# Patient Record
Sex: FEMALE | Race: WHITE | NOT HISPANIC OR LATINO | Employment: OTHER | URBAN - METROPOLITAN AREA
[De-identification: names, ages, dates, MRNs, and addresses within clinical notes are randomized per-mention and may not be internally consistent; named-entity substitution may affect disease eponyms.]

---

## 2017-08-17 ENCOUNTER — ALLSCRIPTS OFFICE VISIT (OUTPATIENT)
Dept: OTHER | Facility: OTHER | Age: 67
End: 2017-08-17

## 2017-08-17 ENCOUNTER — APPOINTMENT (OUTPATIENT)
Dept: RADIOLOGY | Facility: CLINIC | Age: 67
End: 2017-08-17

## 2017-08-17 DIAGNOSIS — M25.562 PAIN IN LEFT KNEE: ICD-10-CM

## 2017-08-17 DIAGNOSIS — M54.16 RADICULOPATHY OF LUMBAR REGION: ICD-10-CM

## 2017-08-17 DIAGNOSIS — M54.50 LOW BACK PAIN: ICD-10-CM

## 2017-08-17 DIAGNOSIS — M17.11 PRIMARY OSTEOARTHRITIS OF RIGHT KNEE: ICD-10-CM

## 2017-08-17 DIAGNOSIS — M25.561 PAIN IN RIGHT KNEE: ICD-10-CM

## 2017-08-17 PROCEDURE — 72100 X-RAY EXAM L-S SPINE 2/3 VWS: CPT

## 2017-08-17 PROCEDURE — 73564 X-RAY EXAM KNEE 4 OR MORE: CPT

## 2017-08-17 PROCEDURE — 73560 X-RAY EXAM OF KNEE 1 OR 2: CPT

## 2017-08-29 ENCOUNTER — APPOINTMENT (OUTPATIENT)
Dept: PHYSICAL THERAPY | Age: 67
End: 2017-08-29
Payer: MEDICARE

## 2017-08-29 DIAGNOSIS — M17.11 PRIMARY OSTEOARTHRITIS OF RIGHT KNEE: ICD-10-CM

## 2017-08-29 DIAGNOSIS — M54.16 RADICULOPATHY OF LUMBAR REGION: ICD-10-CM

## 2017-08-29 PROCEDURE — G8978 MOBILITY CURRENT STATUS: HCPCS

## 2017-08-29 PROCEDURE — 97162 PT EVAL MOD COMPLEX 30 MIN: CPT

## 2017-08-29 PROCEDURE — G8979 MOBILITY GOAL STATUS: HCPCS

## 2017-08-30 ENCOUNTER — APPOINTMENT (OUTPATIENT)
Dept: PHYSICAL THERAPY | Age: 67
End: 2017-08-30
Payer: MEDICARE

## 2017-08-30 PROCEDURE — 97110 THERAPEUTIC EXERCISES: CPT

## 2017-09-05 ENCOUNTER — APPOINTMENT (OUTPATIENT)
Dept: PHYSICAL THERAPY | Age: 67
End: 2017-09-05
Payer: MEDICARE

## 2017-09-05 ENCOUNTER — GENERIC CONVERSION - ENCOUNTER (OUTPATIENT)
Dept: OTHER | Facility: OTHER | Age: 67
End: 2017-09-05

## 2017-09-05 PROCEDURE — 97110 THERAPEUTIC EXERCISES: CPT

## 2017-09-07 ENCOUNTER — APPOINTMENT (OUTPATIENT)
Dept: PHYSICAL THERAPY | Age: 67
End: 2017-09-07
Payer: MEDICARE

## 2017-09-07 PROCEDURE — 97110 THERAPEUTIC EXERCISES: CPT

## 2017-09-12 ENCOUNTER — APPOINTMENT (OUTPATIENT)
Dept: PHYSICAL THERAPY | Age: 67
End: 2017-09-12
Payer: MEDICARE

## 2017-09-12 PROCEDURE — 97110 THERAPEUTIC EXERCISES: CPT

## 2017-09-14 ENCOUNTER — APPOINTMENT (OUTPATIENT)
Dept: PHYSICAL THERAPY | Age: 67
End: 2017-09-14
Payer: MEDICARE

## 2017-09-14 PROCEDURE — 97110 THERAPEUTIC EXERCISES: CPT

## 2017-09-19 ENCOUNTER — APPOINTMENT (OUTPATIENT)
Dept: PHYSICAL THERAPY | Age: 67
End: 2017-09-19
Payer: MEDICARE

## 2017-09-19 PROCEDURE — 97110 THERAPEUTIC EXERCISES: CPT

## 2017-09-21 ENCOUNTER — APPOINTMENT (OUTPATIENT)
Dept: PHYSICAL THERAPY | Age: 67
End: 2017-09-21
Payer: MEDICARE

## 2017-09-21 PROCEDURE — 97110 THERAPEUTIC EXERCISES: CPT

## 2017-09-26 ENCOUNTER — APPOINTMENT (OUTPATIENT)
Dept: PHYSICAL THERAPY | Age: 67
End: 2017-09-26
Payer: MEDICARE

## 2017-09-26 PROCEDURE — 97110 THERAPEUTIC EXERCISES: CPT

## 2017-09-28 ENCOUNTER — APPOINTMENT (OUTPATIENT)
Dept: PHYSICAL THERAPY | Age: 67
End: 2017-09-28
Payer: MEDICARE

## 2017-09-28 PROCEDURE — G8979 MOBILITY GOAL STATUS: HCPCS

## 2017-09-28 PROCEDURE — 97110 THERAPEUTIC EXERCISES: CPT

## 2017-09-28 PROCEDURE — G8978 MOBILITY CURRENT STATUS: HCPCS

## 2017-09-28 PROCEDURE — 97164 PT RE-EVAL EST PLAN CARE: CPT

## 2017-09-29 ENCOUNTER — ALLSCRIPTS OFFICE VISIT (OUTPATIENT)
Dept: OTHER | Facility: OTHER | Age: 67
End: 2017-09-29

## 2017-09-29 ENCOUNTER — GENERIC CONVERSION - ENCOUNTER (OUTPATIENT)
Dept: OTHER | Facility: OTHER | Age: 67
End: 2017-09-29

## 2017-09-29 DIAGNOSIS — M54.16 RADICULOPATHY OF LUMBAR REGION: ICD-10-CM

## 2017-09-29 DIAGNOSIS — M17.11 PRIMARY OSTEOARTHRITIS OF RIGHT KNEE: ICD-10-CM

## 2017-10-02 ENCOUNTER — APPOINTMENT (OUTPATIENT)
Dept: PHYSICAL THERAPY | Age: 67
End: 2017-10-02
Payer: MEDICARE

## 2017-10-02 PROCEDURE — 97110 THERAPEUTIC EXERCISES: CPT

## 2017-10-04 ENCOUNTER — APPOINTMENT (OUTPATIENT)
Dept: PHYSICAL THERAPY | Age: 67
End: 2017-10-04
Payer: MEDICARE

## 2017-10-04 PROCEDURE — 97110 THERAPEUTIC EXERCISES: CPT

## 2017-10-09 ENCOUNTER — APPOINTMENT (OUTPATIENT)
Dept: PHYSICAL THERAPY | Age: 67
End: 2017-10-09
Payer: MEDICARE

## 2017-10-09 PROCEDURE — 97110 THERAPEUTIC EXERCISES: CPT

## 2017-10-11 ENCOUNTER — APPOINTMENT (OUTPATIENT)
Dept: PHYSICAL THERAPY | Age: 67
End: 2017-10-11
Payer: MEDICARE

## 2017-10-11 PROCEDURE — G8979 MOBILITY GOAL STATUS: HCPCS

## 2017-10-11 PROCEDURE — G8980 MOBILITY D/C STATUS: HCPCS

## 2017-10-11 PROCEDURE — 97110 THERAPEUTIC EXERCISES: CPT

## 2017-10-11 PROCEDURE — G8978 MOBILITY CURRENT STATUS: HCPCS

## 2017-10-17 ENCOUNTER — GENERIC CONVERSION - ENCOUNTER (OUTPATIENT)
Dept: OTHER | Facility: OTHER | Age: 67
End: 2017-10-17

## 2017-10-27 ENCOUNTER — TRANSCRIBE ORDERS (OUTPATIENT)
Dept: ADMINISTRATIVE | Facility: HOSPITAL | Age: 67
End: 2017-10-27

## 2017-10-27 ENCOUNTER — GENERIC CONVERSION - ENCOUNTER (OUTPATIENT)
Dept: OTHER | Facility: OTHER | Age: 67
End: 2017-10-27

## 2017-10-27 DIAGNOSIS — M54.16 LUMBAR RADICULOPATHY: Primary | ICD-10-CM

## 2017-10-30 ENCOUNTER — HOSPITAL ENCOUNTER (OUTPATIENT)
Dept: RADIOLOGY | Facility: HOSPITAL | Age: 67
Discharge: HOME | End: 2017-10-30
Attending: PHYSICAL MEDICINE & REHABILITATION
Payer: MEDICARE

## 2017-10-30 DIAGNOSIS — M54.16 LUMBAR RADICULOPATHY: ICD-10-CM

## 2017-10-30 PROCEDURE — 72148 MRI LUMBAR SPINE W/O DYE: CPT

## 2017-11-01 ENCOUNTER — GENERIC CONVERSION - ENCOUNTER (OUTPATIENT)
Dept: OTHER | Facility: OTHER | Age: 67
End: 2017-11-01

## 2017-11-03 ENCOUNTER — GENERIC CONVERSION - ENCOUNTER (OUTPATIENT)
Dept: OTHER | Facility: OTHER | Age: 67
End: 2017-11-03

## 2017-11-17 ENCOUNTER — ALLSCRIPTS OFFICE VISIT (OUTPATIENT)
Dept: OTHER | Facility: OTHER | Age: 67
End: 2017-11-17

## 2017-11-18 NOTE — PROGRESS NOTES
Assessment    1  Primary localized osteoarthritis of right knee (715 16) (M17 11)    Plan  Primary localized osteoarthritis of right knee    · Follow-up visit in 3 months Evaluation and Treatment  Follow-up  Status: Hold For -Scheduling  Requested for: [id number]    Discussion/Summary    Ms Gibson received a right knee steroid injection today  She should ice her knee and avoid strenuous activity for 1-2 days if needed  She will continue with home exercises  She will take Tylenol as needed for pain control  She will follow up with Dr Milton Ngo  She will follow up with me in 3 months when she is eligible for a steoid injection or sooner if needed  Chief Complaint  Presents for follow-up right knee osteoarthritis      History of Present Illness  HPI: 58-year-old female presents for follow-up right knee osteoarthritis  She had a steroid injection on 08/17 which helped her up until 2 weeks ago  At this time she has medial and anterior right knee pain  Occasionally her pain radiates up the lateral aspect of her knee to her hip  She admits instability as well as feeling like her knee is hyperextending at times  She has done physical therapy in the past and still does home exercises  She stopped Diclofenac as it caused her blood pressure to go up  She has a knee brace  She she has appoint with Dr Milton Ngo regarding her back on November 28th  Review of Systems   Constitutional: not feeling poorly  Musculoskeletal: as noted in HPI  Active Problems  1  Acute lumbar radiculopathy (724 4) (M54 16)   2  Fibromyalgia (729 1) (M79 7)   3  Left knee pain (719 46) (M25 562)   4  Lumbar pain (724 2) (M54 5)   5  Primary localized osteoarthritis of right knee (715 16) (M17 11)   6  Right knee pain (719 46) (M25 561)   7   Right lumbar radiculitis (724 4) (M54 16)    Past Medical History   · History of arthritis (V13 4) (Z87 39)   · History of hypertension (V12 59) (Z86 79)   · Right knee pain (719 46) (M25 561)   · History of Stomach problems (536 9) (K31 9)    The active problems and past medical history were reviewed and updated today  Surgical History   · Denied: History Of Prior Surgery    The surgical history was reviewed and updated today  Family History  Mother    · Family history of malignant neoplasm (V16 9) (Z80 9)   · Denied: Family history of osteoporosis  Family History    · Family history of malignant neoplasm (V16 9) (Z80 9)   · Denied: Family history of osteoporosis    The family history was reviewed and updated today  Social History     · Never a smoker  The social history was reviewed and updated today  Current Meds   1  Diclofenac Sodium 75 MG Oral Tablet Delayed Release; TAKE 1 TABLET BY MOUTH TWICE A DAY AS NEEDED FOR PAIN with food; Therapy: [id number] to (Evaluate:16Oct2017)  Requested for: 17Aug2017; Last Rx:17Aug2017 Ordered   2  Omeprazole 10 MG Oral Capsule Delayed Release; Therapy: (Recorded:17Aug2017) to Recorded    The medication list was reviewed and updated today  Allergies  1  Penicillins    Vitals  Signs     Heart Rate: 77  Systolic: 897  Diastolic: 86  Height: 5 ft 1 5 in  Weight: 181 lb 8 oz  BMI Calculated: 33 74  BSA Calculated: 1 82    Physical Exam  hearing intact   Right Knee: Appearance: Normal  ROM: Full  Special Test: no laxity on Valgus Stress-- and-- no laxity on Varus Stress  Right Hip: Tenderness: None   ROM:  Full  Special Tests: negative STEFANO test,-- negative Radha's test-- and-- negative Straight Leg Raise  Lumbosacral Spine: Appearance: Normal  Tenderness: right sciatic notch, but-- not the lumbar spine-- and-- not the left sciatic notch  Special Tests: negative Straight Leg Raise  Constitutional - General appearance: Normal   Neurologic - Lower extremity peripheral neuro exam: Normal  Neuromuscular strength examination findings: involved side normal foot eversion, inversion, and great toe extension    Psychiatric - Orientation to person, place, and time: Normal -- Mood and affect: Normal       Procedure    Procedure: Injection of the right knee joint  Indication:  Joint pain-- and-- Osteoarthritis  Potential complications include bleeding,-- infection-- and-- allergic reaction  Risk, benefits and alternatives were discussed with the patient  Verbal consent was obtained prior to the procedure  Alcohol was used to prep the area  ethyl chloride spray was used as a topical anesthetic  Using sterile technique, the aspiration/injection needle was then directed from a Anterolateral aspect  A 22-gauge was used to inject 3 mL of 1% Lidocaine-- and-- 2 mL of 40mg/mL methylprednisolone  A bandage was applied  the patient tolerated the procedure well  Complications: none  Patient instructed to avoid strenuous activity for 1-2 day(s)        Future Appointments    Date/Time Provider Specialty Site   02/19/2018 09:45 AM Scotty Julian DO Orthopedic Surgery [facility]   11/28/2017 02:45 PM Divina Suggs DO Pain Management [address]       Signatures   Electronically signed by : Clarissa Ellington DO; Nov 17 2017 10:21AM EST                       (Author)

## 2017-11-27 ENCOUNTER — CONVERSION ENCOUNTER (OUTPATIENT)
Dept: RADIOLOGY | Facility: IMAGING CENTER | Age: 67
End: 2017-11-27

## 2017-11-28 ENCOUNTER — ALLSCRIPTS OFFICE VISIT (OUTPATIENT)
Dept: OTHER | Facility: OTHER | Age: 67
End: 2017-11-28

## 2017-11-29 NOTE — PROGRESS NOTES
Assessment  1  Right lumbar radiculitis (724 4) (M54 16)   2  Lumbar disc herniation with radiculopathy (722 10) (M51 16)    Discussion/Summary    Today while in the office I reviewed the imaging with the patient and her   She does have a L5-S1 disc herniation with extrusion  This is likely accounting for her back and radiating right leg symptoms  She states that currently this is manageable  I did review with her the option of a right L5-S1 interlaminar epidural steroid injection and gave her a handout related to the procedure  She will contact me in the future if she would like to pursue the injection  Chief Complaint    1  Back Pain  continued back and radiating right leg pain improving      History of Present Illness  Ms Steph Rivas returns in follow-up to review MRI findings and discuss treatment options  She currently states her pain is the same or slightly better and rates this as a 4/10  She does describe intermittent pain that is bothersome throughout the entirety of the day and characterized as burning, sharp, shooting, pins and needles  states currently the pain is manageable  She is not seeking any further care at this time  I did review with her options including other medications and an epidural steroid injection which she would prefer to hold off on at this point  I think that is reasonable and she is aware that she may contact me at any time for further care   have personally reviewed and/or updated the patient's past medical history, past surgical history, family history, social history, current medications, allergies, and vital signs today  Shahid Willett presents with complaints of gradual onset of intermittent episodes of moderate bilateral lower back pain, described as sharp and burning, radiating to the right buttock, right thigh, right lower leg and right foot  On a scale of 1 to 10, the patient rates the pain as 4  Symptoms are unchanged        Review of Systems Constitutional: no fever,-- no recent weight gain-- and-- no recent weight loss  Eyes: no double vision-- and-- no blurry vision  Cardiovascular: no chest pain,-- no palpitations-- and-- no lower extremity edema  Respiratory: no complaints of shortness of breath-- and-- no wheezing  Musculoskeletal: no difficulty walking,-- no muscle weakness,-- no joint stiffness,-- no joint swelling,-- no limb swelling,-- no pain in extremity-- and-- no decreased range of motion  Neurological: no dizziness,-- no difficulty swallowing,-- no memory loss,-- no loss of consciousness-- and-- no seizures  Gastrointestinal: no nausea,-- no vomiting,-- no constipation-- and-- no diarrhea  Genitourinary: no difficulty initiating urine stream,-- no genital pain-- and-- no frequent urination  Integumentary: no complaints of skin rash  Psychiatric: no depression  Endocrine: no excessive thirst,-- no adrenal disease,-- no hypothyroidism-- and-- no hyperthyroidism  Hematologic/Lymphatic: no tendency for easy bruising-- and-- no tendency for easy bleeding  Active Problems  1  Acute lumbar radiculopathy (724 4) (M54 16)   2  Fibromyalgia (729 1) (M79 7)   3  Left knee pain (719 46) (M25 562)   4  Lumbar pain (724 2) (M54 5)   5  Primary localized osteoarthritis of right knee (715 16) (M17 11)   6  Right knee pain (719 46) (M25 561)   7  Right lumbar radiculitis (724 4) (M54 16)    Past Medical History  1  History of arthritis (V13 4) (Z87 39)   2  History of hypertension (V12 59) (Z86 79)   3  Right knee pain (719 46) (M25 561)   4  History of Stomach problems (536 9) (K31 9)    Surgical History  1  Denied: History Of Prior Surgery    Family History  Mother    1  Family history of malignant neoplasm (V16 9) (Z80 9)   2  Denied: Family history of osteoporosis  Family History    3  Family history of malignant neoplasm (V16 9) (Z80 9)   4   Denied: Family history of osteoporosis    Social History     · Never a smoker    Current Meds 1  Amlodipine Besy-Benazepril HCl - 10-20 MG Oral Capsule; Therapy: [id number] to Recorded   2  Omeprazole 10 MG Oral Capsule Delayed Release; Therapy: (Recorded:75Rjl9077) to Recorded    Allergies  1  Penicillins    Vitals  Vital Signs    Recorded: 29OGP6013 03:04PM   Heart Rate 84   Respiration 14   Systolic 252   Diastolic 70   Height 5 ft 1 5 in   Weight 178 lb    BMI Calculated 33 09   BSA Calculated 1 81   Pain Scale 4       Physical Exam   Constitutional  General appearance: Well developed, well nourished, alert, in no distress, non-toxic and no overt pain behavior  Eyes  Sclera: anicteric  HEENT  Hearing grossly intact  Pulmonary  Respiratory effort: Even and unlabored  Psychiatric  Mood and affect: Mood and affect appropriate  Neurologic  Cranial nerves: Cranial nerves II-XII grossly intact     Musculoskeletal  Gait and station: Normal        Future Appointments    Date/Time Provider Specialty Site   02/19/2018 09:45 AM Geoffrey Huertas DO Orthopedic Surgery [address]       Signatures   Electronically signed by : Chance Cabrera DO; Nov 28 2017  3:17PM EST                       (Author)

## 2018-01-12 NOTE — MISCELLANEOUS
Message   Recorded as Task   Date: 11/03/2017 09:57 AM, Created By: Alex Walls   Task Name: Follow Up   Assigned To: [id number] [address] clinical,Team   Regarding Patient: Mervin Dowell, Status: Active   Akuachristy Bowman - 03 Nov 2017 9:57 AM     TASK CREATED  please review impression of MRI with pt and schedule follow up with me to review results and determine next treatment step   Yamini Mcgovern - 03 Nov 2017 10:08 AM     TASK EDITED  Attempted to reach pt at home #, male answered and stated pt would return call once home, left c/b# and office hours  Sean Johnsonya - 03 Nov 2017 10:13 AM     TASK EDITED  pt returning call please call pt back at [address] Nov 2017 10:28 AM     TASK EDITED  S/w pt  Advised pt  of the same, scheduled pt  for SOVS on 11/28 at 2:45 with JE  Pt  verbalized understanding  Alex Walls - 19 Nov 2017 11:50 AM     TASK REPLIED TO: Previously Assigned To Alfonso Loomis                   aware agree        Active Problems    1  Acute lumbar radiculopathy (724 4) (M54 16)   2  Fibromyalgia (729 1) (M79 7)   3  Left knee pain (719 46) (M25 562)   4  Lumbar pain (724 2) (M54 5)   5  Primary localized osteoarthritis of right knee (715 16) (M17 11)   6  Right knee pain (719 46) (M25 561)   7  Right lumbar radiculitis (724 4) (M54 16)    Current Meds   1  Diclofenac Sodium 75 MG Oral Tablet Delayed Release; TAKE 1 TABLET BY MOUTH   TWICE A DAY AS NEEDED FOR PAIN with food; Therapy: [id number] to (Evaluate:01Jny4912)  Requested for: 17Aug2017; Last   Rx:[id number] Ordered   2  Omeprazole 10 MG Oral Capsule Delayed Release; Therapy: (Recorded:17Aug2017) to Recorded    Allergies    1   Penicillins    Signatures   Electronically signed by : Samantha Goldberg, ; Nov  3 2017 11:52AM EST                       (Author)

## 2018-01-13 VITALS
SYSTOLIC BLOOD PRESSURE: 132 MMHG | HEART RATE: 77 BPM | BODY MASS INDEX: 33.4 KG/M2 | HEIGHT: 62 IN | DIASTOLIC BLOOD PRESSURE: 86 MMHG | WEIGHT: 181.5 LBS

## 2018-01-13 VITALS
BODY MASS INDEX: 32.76 KG/M2 | HEART RATE: 84 BPM | RESPIRATION RATE: 14 BRPM | SYSTOLIC BLOOD PRESSURE: 112 MMHG | HEIGHT: 62 IN | DIASTOLIC BLOOD PRESSURE: 70 MMHG | WEIGHT: 178 LBS

## 2018-01-15 NOTE — PROGRESS NOTES
Assessment    1  Right knee pain (719 46) (M25 561)   2  Primary localized osteoarthritis of right knee (715 16) (M17 11)   3  Acute lumbar radiculopathy (724 4) (M54 16)    Plan  Acute lumbar radiculopathy    · Follow-up visit in 6 weeks Evaluation and Treatment  Follow-up  Status: Hold For -  Scheduling  Requested for: [id number]  Acute lumbar radiculopathy, Primary localized osteoarthritis of right knee    · MethylPREDNISolone 4 MG Oral Tablet Therapy Pack; use as directed   · *1 - [facility]  *  Status: Active  Requested for: 17Aug2017  Care Summary provided  : Yes  Acute lumbar radiculopathy, Primary localized osteoarthritis of right knee, Right knee  pain    · Diclofenac Sodium 75 MG Oral Tablet Delayed Release; TAKE 1 TABLET BY  MOUTH TWICE A DAY AS NEEDED FOR PAIN with food  Left knee pain    · XR KNEE 1 OR 2 VIEW LEFT; Status:Active - Retrospective By Protocol Authorization; Requested for:17Aug2017;   Lumbar pain    · * XR SPINE LUMBAR 2 OR 3 VIEWS INJURY; Status:Active - Retrospective By Protocol  Authorization; Requested for:17Aug2017;   Primary localized osteoarthritis of right knee    · Depo-Medrol 40 MG/ML Injection Suspension (MethylPREDNISolone  Acetate)  Primary localized osteoarthritis of right knee, Right knee pain    · Knee brace; Status:Complete;   Done: 30TFZ4451  Right knee pain    · * XR KNEE 4+ VW RIGHT INJURY; Status:Active - Retrospective By Protocol  Authorization; Requested for:17Aug2017;     Discussion/Summary    Ms Wali Solano has right knee osteoarthritis and lumbar radiculopathy  She received a right knee steroid injection today  She should ice her knee and avoid strenuous activity for 1-2 days  She will use this injection for diagnostic and therapeutic purposes  She will monitor her pain over the next few hours to be able to differentiate differentiate how much of her pain is coming from her knee and how much is coming from her back   She will try a knee brace for comfort  In 1 week if her predominant pain is coming from her leg she will try a Medrol Dosepak  She also has Diclofenac to take as needed  She hasn't taken together  She'll go to physical therapy for her back and knee  She'll follow up in 6 weeks or sooner if needed  Chief Complaint  Complains of right knee pain      History of Present Illness  HPI: 56-year-old female presents with right knee pain since  and July  She denies any falls or trauma  She admits swelling  Most her pain is anterior  Showsa pain radiates down her leg  She has pain with walking  At this point she has improved but she still has intermittent pain over the anterior aspect of her knee and pain radiating down her leg  She is  pain as burning and stabbing  She has tried Advil and Aleve without relief  He takes aspirin which helps  She admits intermittent numbness and tingling in her feet as well as down her leg  She denies bowel or bladder changes  She denies saddle anesthesia  She has not done physical therapy  She has not had injections in her back or knee  She is not has surgery on her back or knee  Review of Systems    Constitutional: not feeling poorly  Musculoskeletal: as noted in HPI  Active Problems    1  Fibromyalgia (729 1) (M79 7)   2  Left knee pain (719 46) (M25 562)   3  Right knee pain (719 46) (M25 561)    Past Medical History    · History of arthritis (V13 4) (Z87 39)   · History of hypertension (V12 59) (Z86 79)   · Right knee pain (719 46) (M25 561)   · History of Stomach problems (536 9) (K31 9)    The active problems and past medical history were reviewed and updated today  Surgical History    · Denied: History Of Prior Surgery    The surgical history was reviewed and updated today         Family History  Mother    · Family history of malignant neoplasm (V16 9) (Z80 9)   · Denied: Family history of osteoporosis  Family History    · Family history of malignant neoplasm (V16 9) (Z80 9)   · Denied: Family history of osteoporosis    The family history was reviewed and updated today  Social History    · Never a smoker  The social history was reviewed and updated today  Current Meds   1  Omeprazole 10 MG Oral Capsule Delayed Release; Therapy: (Recorded:38Eyl0795) to Recorded    The medication list was reviewed and updated today  Allergies    1  Penicillins    Vitals  Signs    Heart Rate: 69  Systolic: 742  Diastolic: 92  Height: 5 ft 1 5 in  Weight: 178 lb 6 08 oz  BMI Calculated: 33 16  BSA Calculated: 1 81    Physical Exam  hearing intact, no audible wheezing, regular rate, no discharge from eyes     Lumbosacral Spine:   Palpation/Tenderness:  not the lumbar spine, not the left paraspinal and not the right paraspinal    ROM: Full and pain with extension  Strength Testing: Normal     Special Tests:  negative Straight Leg Raise  Right Knee: Appearance: Normal except an effusion grade mild  Tenderness: not over the lateral joint line, not over the medial joint line, not the inferior pole patella, not on the distal patellar tendon, not the midportion of the patella tendon and not the proximal patella tendon  Palpatory findings include crepitus and no warmth  AROM: 5-130 degrees  Motor: Normal  Special Test: positive medial Pérez test, but negative patellar grind, negative lateral Apley's Grind test, negative medial Apley's Grind test, negative lateral Pérez test, negative Anterior Drawer sign, negative Lachman's test, negative Posterior Drawer sign, no laxity on Valgus Stress and no laxity on Varus Stress  Constitutional - General appearance: Normal    Musculoskeletal - Lower extremity compartments: Normal    Cardiovascular - Pulses: Normal  Dorsalis pedis pulse was 2+ on the right  Neurologic - Reflexes: Normal  Deep tendon reflexes: 2+ right patella and 2+ left patella   Lower extremity peripheral neuro exam: Normal  Peripheral sensation findings: light touch intact on both lower legs, ankles, and feet  Neuromuscular strength examination findings: normal bilateral foot eversion, inversion, and great toe extension  Psychiatric - Orientation to person, place, and time: Normal  Mood and affect: Normal       Results/Data  I personally reviewed the films/images/results in the office today  My interpretation follows  X-ray Review X-ray right knee: Severe DJD  X-ray lumbar spine: Mild multilevel degenerative changes  Procedure    Procedure: Injection of the right knee joint  Indication:  Joint pain and Osteoarthritis  Potential complications include bleeding, infection and allergic reaction  Risk, benefits and alternatives were discussed with the patient  Verbal consent was obtained prior to the procedure  Alcohol was used to prep the area  ethyl chloride spray was used as a topical anesthetic  Using sterile technique, the aspiration/injection needle was then directed from a Anterolateral aspect  A 22-gauge was used to inject 3 mL of 1% Lidocaine and 2 mL of 40mg/mL methylprednisolone  A bandage was applied  the patient tolerated the procedure well  Complications: none  Patient instructed to avoid strenuous activity for 1-2 day(s)        Future Appointments    Date/Time Provider Specialty Site   09/29/2017 09:45 AM Christena Lundborg, DO Orthopedic Surgery [address]     Signatures   Electronically signed by : Nam Nolasco DO; Aug 17 2017 11:22AM EST                       (Author)

## 2018-01-17 NOTE — MISCELLANEOUS
Message   Recorded as Task   Date: 10/18/2017 01:19 PM, Created By: Terrence Watt   Task Name: Miscellaneous   Assigned To: Promise Johnson   Regarding Patient: Karlie Blue, Status: Active   Comment:    Promise Johnson - 18 Oct 2017 1:19 PM     TASK CREATED  pt rescheduled her appt that she had with you for tomorrow   Chrissy German - 19 Oct 2017 7:54 AM     TASK REPLIED TO: Previously Assigned To Chrissy German                      aware     Signatures   Electronically signed by :  Cisco Steiner, ; Nov 1 2017  8:46AM EST                       (Author)

## 2018-01-22 VITALS
HEART RATE: 69 BPM | WEIGHT: 178.38 LBS | SYSTOLIC BLOOD PRESSURE: 139 MMHG | DIASTOLIC BLOOD PRESSURE: 92 MMHG | HEIGHT: 62 IN | BODY MASS INDEX: 32.82 KG/M2

## 2018-01-22 VITALS
WEIGHT: 182.25 LBS | BODY MASS INDEX: 33.88 KG/M2 | TEMPERATURE: 97.6 F | DIASTOLIC BLOOD PRESSURE: 70 MMHG | SYSTOLIC BLOOD PRESSURE: 133 MMHG

## 2018-01-22 VITALS
HEIGHT: 62 IN | HEART RATE: 81 BPM | DIASTOLIC BLOOD PRESSURE: 84 MMHG | SYSTOLIC BLOOD PRESSURE: 146 MMHG | WEIGHT: 180.25 LBS | BODY MASS INDEX: 33.17 KG/M2

## 2018-02-19 ENCOUNTER — OFFICE VISIT (OUTPATIENT)
Dept: OBGYN CLINIC | Facility: MEDICAL CENTER | Age: 68
End: 2018-02-19
Payer: COMMERCIAL

## 2018-02-19 DIAGNOSIS — M17.11 PRIMARY LOCALIZED OSTEOARTHRITIS OF RIGHT KNEE: Primary | ICD-10-CM

## 2018-02-19 DIAGNOSIS — M25.561 CHRONIC PAIN OF RIGHT KNEE: ICD-10-CM

## 2018-02-19 DIAGNOSIS — G89.29 CHRONIC PAIN OF RIGHT KNEE: ICD-10-CM

## 2018-02-19 PROCEDURE — 20610 DRAIN/INJ JOINT/BURSA W/O US: CPT | Performed by: PHYSICIAN ASSISTANT

## 2018-02-19 PROCEDURE — 99213 OFFICE O/P EST LOW 20 MIN: CPT | Performed by: ORTHOPAEDIC SURGERY

## 2018-02-19 RX ORDER — METHYLPREDNISOLONE ACETATE 40 MG/ML
2 INJECTION, SUSPENSION INTRA-ARTICULAR; INTRALESIONAL; INTRAMUSCULAR; SOFT TISSUE
Status: COMPLETED | OUTPATIENT
Start: 2018-02-19 | End: 2018-02-19

## 2018-02-19 RX ORDER — LIDOCAINE HYDROCHLORIDE 10 MG/ML
3 INJECTION, SOLUTION INFILTRATION; PERINEURAL
Status: COMPLETED | OUTPATIENT
Start: 2018-02-19 | End: 2018-02-19

## 2018-02-19 RX ADMIN — METHYLPREDNISOLONE ACETATE 2 ML: 40 INJECTION, SUSPENSION INTRA-ARTICULAR; INTRALESIONAL; INTRAMUSCULAR; SOFT TISSUE at 10:30

## 2018-02-19 RX ADMIN — LIDOCAINE HYDROCHLORIDE 3 ML: 10 INJECTION, SOLUTION INFILTRATION; PERINEURAL at 10:30

## 2018-02-19 NOTE — PROGRESS NOTES
Assessment/Plan:    1  Primary localized osteoarthritis of right knee     2  Chronic pain of right knee       Discussion:  - received a right knee steroid injection today  Avoid strenuous activities rest and ice her knee for 1-2 days   -continue take Tylenol as needed for pain control   -continue with home exercises  -continue wear the knee brace as needed for comfort  Return in about 3 months (around 5/19/2018)  Subjective    History of Present Illness   Chief Complaint   Patient presents with    Right Knee - Follow-up       Michelle Mills is a 69 y o  female who presents for follow-up right knee pain today  She had a right knee steroid injection on 11/17/2017  She states she had relief for about 2 and half months  States over the last few weeks she has had increased knee pain  Her knee pain is medial and does not radiate  It is worse with stairs  She does note her knee to be unstable and feels like it lima at times  She does have a knee brace that she wears as needed for comfort  She has been taking Tylenol as needed for pain control  She cannot take NSAIDs due to increased blood pressure  She does have sciatic that runs down her right leg  She has been seeing Dr Sonal Ng for this  [employer] software was used to dictate this note  It may contain errors with dictating incorrect words/spelling  Please contact physician directly for any questions  Review of Systems  Constitutional: negative  Eyes: negative  Respiratory: negative- no audible wheezing   Musculoskeletal: as noted in HPI  Neurological: negative for numbness, tingling, strength intact   Behavioral/Psych: negative    History:    Past Medical History:   Diagnosis Date    Hypertension     Primary localized osteoarthritis of right knee 8/17/2017     History reviewed  No pertinent surgical history    Social History   History   Alcohol Use No     History   Drug use: Unknown     History   Smoking Status    Never Smoker   Smokeless Tobacco    Never Used     Family History:   Family History   Problem Relation Age of Onset    No Known Problems Mother     Skin cancer Father        Meds/Allergies   Allergies   Allergen Reactions    Penicillins           Objective     There were no vitals taken for this visit  Exam   Ortho Exam  Large joint arthrocentesis  Date/Time: 2/19/2018 10:30 AM  Consent given by: patient  Site marked: site marked  Timeout: Immediately prior to procedure a time out was called to verify the correct patient, procedure, equipment, support staff and site/side marked as required   Supporting Documentation  Indications: pain   Procedure Details  Location: knee - R knee  Needle size: 22 G  Approach: anterolateral  Medications administered: 3 mL lidocaine 1 %; 2 mL methylPREDNISolone acetate 40 mg/mL    Patient tolerance: patient tolerated the procedure well with no immediate complications  Dressing:  Sterile dressing applied      Knee Exam    Appearance:  Normal with no swelling or bruising and no obvious joint deformity  Palpation/Tenderness:  medial and lateral joint line tenderness  Active Range of Motion:  Flexion: No limitation and Extension: No limitation  Special Tests:   The  Valgus Stress Test:  negative  Varus Stress Test:  negative

## 2018-05-01 ENCOUNTER — OFFICE VISIT (OUTPATIENT)
Dept: INTERNAL MEDICINE CLINIC | Facility: CLINIC | Age: 68
End: 2018-05-01
Payer: COMMERCIAL

## 2018-05-01 VITALS
SYSTOLIC BLOOD PRESSURE: 110 MMHG | HEIGHT: 62 IN | TEMPERATURE: 97.5 F | HEART RATE: 70 BPM | DIASTOLIC BLOOD PRESSURE: 80 MMHG | BODY MASS INDEX: 34.82 KG/M2 | OXYGEN SATURATION: 98 % | WEIGHT: 189.2 LBS | RESPIRATION RATE: 16 BRPM

## 2018-05-01 DIAGNOSIS — M17.11 PRIMARY LOCALIZED OSTEOARTHRITIS OF RIGHT KNEE: ICD-10-CM

## 2018-05-01 DIAGNOSIS — K21.9 GASTROESOPHAGEAL REFLUX DISEASE WITHOUT ESOPHAGITIS: ICD-10-CM

## 2018-05-01 DIAGNOSIS — M79.7 FIBROMYALGIA: ICD-10-CM

## 2018-05-01 DIAGNOSIS — M51.16 LUMBAR DISC HERNIATION WITH RADICULOPATHY: ICD-10-CM

## 2018-05-01 DIAGNOSIS — I10 BENIGN ESSENTIAL HTN: Primary | ICD-10-CM

## 2018-05-01 PROBLEM — K76.0 FATTY LIVER: Status: ACTIVE | Noted: 2018-05-01

## 2018-05-01 PROBLEM — M54.50 LUMBAR PAIN: Status: ACTIVE | Noted: 2017-08-17

## 2018-05-01 PROCEDURE — 1101F PT FALLS ASSESS-DOCD LE1/YR: CPT | Performed by: INTERNAL MEDICINE

## 2018-05-01 PROCEDURE — 99204 OFFICE O/P NEW MOD 45 MIN: CPT | Performed by: INTERNAL MEDICINE

## 2018-05-01 RX ORDER — OMEPRAZOLE 10 MG/1
10 CAPSULE, DELAYED RELEASE ORAL DAILY
COMMUNITY

## 2018-05-01 RX ORDER — DICLOFENAC SODIUM 75 MG/1
75 TABLET, DELAYED RELEASE ORAL 2 TIMES DAILY PRN
COMMUNITY
End: 2018-08-20 | Stop reason: SDUPTHER

## 2018-05-01 RX ORDER — AMLODIPINE BESYLATE AND BENAZEPRIL HYDROCHLORIDE 10; 20 MG/1; MG/1
CAPSULE ORAL
COMMUNITY
Start: 2017-11-28 | End: 2018-06-01 | Stop reason: SDUPTHER

## 2018-05-01 RX ORDER — DULOXETIN HYDROCHLORIDE 30 MG/1
30 CAPSULE, DELAYED RELEASE ORAL DAILY
Qty: 30 CAPSULE | Refills: 0 | Status: SHIPPED | OUTPATIENT
Start: 2018-05-01 | End: 2018-06-01 | Stop reason: SDUPTHER

## 2018-05-01 RX ORDER — VITAMIN B COMPLEX
1 TABLET ORAL DAILY
COMMUNITY
End: 2019-07-19

## 2018-05-01 NOTE — ASSESSMENT & PLAN NOTE
Failed cortisone inj x 3    Patient encouraged to reconsider hyaluronic inj, she will follow up with Ortho

## 2018-05-01 NOTE — ASSESSMENT & PLAN NOTE
bp acceptable on current regimen of amlodipine-benazepril 10/20mg daily  Patient encouraged to lose weight  Check labs prior to next visit

## 2018-05-01 NOTE — PROGRESS NOTES
Assessment/Plan:    Benign essential HTN  bp acceptable on current regimen of amlodipine-benazepril 10/20mg daily  Patient encouraged to lose weight  Check labs prior to next visit  Lumbar disc herniation with radiculopathy  Completed PT  Discussed reconsidering epidural inj by Pain Management  Primary localized osteoarthritis of right knee  Failed cortisone inj x 3  Patient encouraged to reconsider hyaluronic inj, she will follow up with Ortho    Gastroesophageal reflux disease without esophagitis  She has been on long term PPI and discussed side effects  Encouraged to lose weight, avoid ulcergenic foods and use TUMS/H2B for breakthrough symptoms  Stop PPI  Fibromyalgia  Had side effect from Lyrica  She may benefit from trial of cymbalta 30mg daily, side effects reviewed  Plan to re-eval in 1 month  1  Benign essential HTN  Lipid panel    Comprehensive metabolic panel    TSH, 3rd generation with T4 reflex    Urinalysis with reflex to microscopic   2  Fibromyalgia  DULoxetine (CYMBALTA) 30 mg delayed release capsule   3  Lumbar disc herniation with radiculopathy     4  Primary localized osteoarthritis of right knee     5  Gastroesophageal reflux disease without esophagitis         Subjective:      Patient ID: Marilia Butler is a 77 y o  female  HPI     65yo female with fibromyalgia, b/l knee pain, LBP, fatty liver and HTN here as a new patient  She is accompanied by her   She has received 3 cortisone inj in her R knee, initially lasted for 3 months, 2nd inj  1 5months then 3rd inj only lasted 1 month  Then she started taking diclofenac as needed though is limiting its use due to her blood pressure  She has R moderate degenerative change of medial compartment with tricompartmental marginal osteophytes  Pain started last year and was unable to ambulate  L knee xray shows degenerative change with moderate medial compartment narrowing and marginal osteophytes    MRI LS showed mild central stenosis L4-5 and L5-S1 secondary to protrusion type disc herniation and superimposed on annular bulging  At L5-S1, moderate central and slightly R paramedian disc extends slightly superiorly  Moderate L5_S1 facet hypertrophy  She was referred to Pain Management Dr Emily Titus, patient declined epidural inj  She did complete PT  She has since complained of neck pain and was told she has a pinched nerve  She has not been able to exercise this winter and has gained weight  She has been diagnosed with fibromyalgia, dx by Rheum many years ago  Was tried lyrica but developed swelling  She had complaints of pain in her skin, shoulders, hands, feet, her whole body  She has had HTN for several years, was taken off meds because they normalized but over the past 1 5 years ago her meds were restarted  She currently takes amlodipine-benazepril 10-20mg daily  Home bp around 120-150/60-85  She gets HA, occasional dizziness, MEYER  Denies CP  She has been taking omeprazole for several years  She had liquid dysphagia before, has changed her diet  She is now taking the medication every other day  She has never had EGD  She denies abd or melena      The following portions of the patient's history were reviewed and updated as appropriate: allergies, current medications, past family history, past medical history, past social history, past surgical history and problem list     Current Outpatient Prescriptions:     amLODIPine-benazepril (LOTREL) 10-20 MG per capsule, Take by mouth, Disp: , Rfl:     Coenzyme Q10 (COQ10) 100 MG CAPS, Take by mouth, Disp: , Rfl:     diclofenac (VOLTAREN) 75 mg EC tablet, Take 75 mg by mouth 2 (two) times a day, Disp: , Rfl:     SUPER B COMPLEX/C PO, Take by mouth, Disp: , Rfl:     TURMERIC CURCUMIN PO, Take by mouth, Disp: , Rfl:     DULoxetine (CYMBALTA) 30 mg delayed release capsule, Take 1 capsule (30 mg total) by mouth daily, Disp: 30 capsule, Rfl: 0    omeprazole (PriLOSEC) 10 mg delayed release capsule, Take by mouth, Disp: , Rfl:     Review of Systems   Constitutional: Positive for unexpected weight change  HENT: Positive for rhinorrhea and sneezing  Respiratory:        MEYER   Cardiovascular: Positive for leg swelling  Negative for chest pain and palpitations  Gastrointestinal: Negative for abdominal pain and blood in stool  Heartburn   Musculoskeletal: Positive for arthralgias, back pain, myalgias and neck pain  Knee pain   Neurological: Positive for dizziness and headaches  Psychiatric/Behavioral: Negative  Objective:    /80 (BP Location: Right arm, Patient Position: Sitting)   Pulse 70   Temp 97 5 °F (36 4 °C)   Resp 16   Ht 5' 2" (1 575 m)   Wt 85 8 kg (189 lb 3 2 oz)   SpO2 98%   BMI 34 61 kg/m²      Physical Exam   Constitutional: She is oriented to person, place, and time  She appears well-developed and well-nourished  No distress  HENT:   Mouth/Throat: Oropharynx is clear and moist  She has dentures  Eyes: Conjunctivae and EOM are normal  Pupils are equal, round, and reactive to light  Neck: Neck supple  No thyromegaly present  Cardiovascular: Normal rate, regular rhythm, normal heart sounds and intact distal pulses  Pulmonary/Chest: Effort normal and breath sounds normal  No respiratory distress  She has no wheezes  Abdominal: Soft  Bowel sounds are normal  She exhibits no distension  There is no tenderness  Musculoskeletal:   Tender on b/l 2nd costochondral junction, lateral epicodyle, greater trochanter prominence, medial knee upper outer quad of buttock and trapezius muscles  b/l knee hypertrophy with crepitus   Neurological: She is alert and oriented to person, place, and time  Psychiatric: She has a normal mood and affect  Her behavior is normal    Vitals reviewed

## 2018-05-01 NOTE — ASSESSMENT & PLAN NOTE
Had side effect from Lyrica  She may benefit from trial of cymbalta 30mg daily, side effects reviewed  Plan to re-eval in 1 month

## 2018-05-01 NOTE — ASSESSMENT & PLAN NOTE
She has been on long term PPI and discussed side effects  Encouraged to lose weight, avoid ulcergenic foods and use TUMS/H2B for breakthrough symptoms  Stop PPI

## 2018-05-21 ENCOUNTER — OFFICE VISIT (OUTPATIENT)
Dept: OBGYN CLINIC | Facility: MEDICAL CENTER | Age: 68
End: 2018-05-21
Payer: COMMERCIAL

## 2018-05-21 VITALS
HEIGHT: 62 IN | HEART RATE: 84 BPM | SYSTOLIC BLOOD PRESSURE: 143 MMHG | BODY MASS INDEX: 34.67 KG/M2 | DIASTOLIC BLOOD PRESSURE: 84 MMHG | WEIGHT: 188.4 LBS

## 2018-05-21 DIAGNOSIS — M17.11 PRIMARY LOCALIZED OSTEOARTHRITIS OF RIGHT KNEE: Primary | ICD-10-CM

## 2018-05-21 PROCEDURE — 99213 OFFICE O/P EST LOW 20 MIN: CPT | Performed by: ORTHOPAEDIC SURGERY

## 2018-05-21 PROCEDURE — 20610 DRAIN/INJ JOINT/BURSA W/O US: CPT | Performed by: ORTHOPAEDIC SURGERY

## 2018-05-21 RX ORDER — METHYLPREDNISOLONE ACETATE 40 MG/ML
2 INJECTION, SUSPENSION INTRA-ARTICULAR; INTRALESIONAL; INTRAMUSCULAR; SOFT TISSUE
Status: COMPLETED | OUTPATIENT
Start: 2018-05-21 | End: 2018-05-21

## 2018-05-21 RX ORDER — BUPIVACAINE HYDROCHLORIDE 5 MG/ML
2 INJECTION, SOLUTION EPIDURAL; INTRACAUDAL
Status: COMPLETED | OUTPATIENT
Start: 2018-05-21 | End: 2018-05-21

## 2018-05-21 RX ADMIN — METHYLPREDNISOLONE ACETATE 2 ML: 40 INJECTION, SUSPENSION INTRA-ARTICULAR; INTRALESIONAL; INTRAMUSCULAR; SOFT TISSUE at 09:55

## 2018-05-21 RX ADMIN — BUPIVACAINE HYDROCHLORIDE 2 ML: 5 INJECTION, SOLUTION EPIDURAL; INTRACAUDAL at 09:55

## 2018-05-21 NOTE — PROGRESS NOTES
Assessment/Plan:  1  Primary localized osteoarthritis of right knee      Orders Placed This Encounter   Procedures    Large joint arthrocentesis     Received steroid injection today  Patient knows to ice and avoid strenuous activity for 1-2 days if needed  Diclofenac prn pain- use sparingly  Continue home exercises  Has knee brace for comfort  Follow-up  With Dr Alexi Jones as planned  Return in about 3 months (around 8/21/2018)  I answered all of the patient's questions during the visit and provided education of the patient's condition during the visit  The patient verbalized understanding of the information given and agrees with the plan  This note was dictated using [employer] software  It may contain errors including improperly dictated words  Please contact physician directly for any questions  Subjective   Chief Complaint:   Chief Complaint   Patient presents with    Right Knee - Follow-up       Tobias Goodell is a 77 y o  female who presents for follow up for R knee osteoarthritis  She had received a right knee steroid injection on 02/19 which helped up until about a week ago  At this time she is medial right knee pain and swelling  She try diclofenac since the injection wore off which helped  She tries to use it sparingly as her PCP told her to avoid it secondary to elevation her blood pressure  She does home exercises  She would like to have a right knee steroid injection today  She has also has some increased pain going down her leg  She plans on following up with Dr Alexi Jones for this  Review of Systems  ROS:    See HPI for musculoskeletal review  All other systems reviewed are negative     History:  Past Medical History:   Diagnosis Date    Arthritis     Fatty liver 5/1/2018    Hypertension     Primary localized osteoarthritis of right knee 8/17/2017     History reviewed  No pertinent surgical history    Social History   History   Alcohol Use No     History   Drug use: Unknown     History   Smoking Status    Never Smoker   Smokeless Tobacco    Never Used     Family History:   Family History   Problem Relation Age of Onset    Cancer Mother     Osteoporosis Mother     Skin cancer Father     Cancer Family     Osteoporosis Family        Meds/Allergies     (Not in a hospital admission)  Allergies   Allergen Reactions    Penicillins           Objective     /84   Pulse 84   Ht 5' 2" (1 575 m)   Wt 85 5 kg (188 lb 6 4 oz)   BMI 34 46 kg/m²      PE:  AAOx 3  WDWN  Hearing intact, no drainage from eyes  no audible wheezing  no abdominal distension  LE compartments soft, skin intact    Ortho Exam:  right Knee:   No erythema  no swelling  no effusion  no warmth  AROM: full  Stable to varus/valgus stress    Large joint arthrocentesis  Date/Time: 5/21/2018 9:55 AM  Consent given by: patient  Site marked: site marked  Timeout: Immediately prior to procedure a time out was called to verify the correct patient, procedure, equipment, support staff and site/side marked as required   Supporting Documentation  Indications: pain   Procedure Details  Location: knee - R knee  Preparation: Patient was prepped and draped in the usual sterile fashion  Needle size: 22 G  Ultrasound guidance: no  Approach: anterolateral  Medications administered: 2 mL methylPREDNISolone acetate 40 mg/mL; 2 mL bupivacaine (PF) 0 5 %    Patient tolerance: patient tolerated the procedure well with no immediate complications  Dressing:  Sterile dressing applied

## 2018-05-26 ENCOUNTER — APPOINTMENT (OUTPATIENT)
Dept: LAB | Age: 68
End: 2018-05-26
Payer: COMMERCIAL

## 2018-05-26 DIAGNOSIS — I10 BENIGN ESSENTIAL HTN: ICD-10-CM

## 2018-05-26 LAB
ALBUMIN SERPL BCP-MCNC: 3.6 G/DL (ref 3.5–5)
ALP SERPL-CCNC: 80 U/L (ref 46–116)
ALT SERPL W P-5'-P-CCNC: 40 U/L (ref 12–78)
ANION GAP SERPL CALCULATED.3IONS-SCNC: 7 MMOL/L (ref 4–13)
AST SERPL W P-5'-P-CCNC: 19 U/L (ref 5–45)
BACTERIA UR QL AUTO: ABNORMAL /HPF
BILIRUB SERPL-MCNC: 0.51 MG/DL (ref 0.2–1)
BILIRUB UR QL STRIP: NEGATIVE
BUN SERPL-MCNC: 19 MG/DL (ref 5–25)
CALCIUM SERPL-MCNC: 8.4 MG/DL (ref 8.3–10.1)
CHLORIDE SERPL-SCNC: 107 MMOL/L (ref 100–108)
CHOLEST SERPL-MCNC: 156 MG/DL (ref 50–200)
CLARITY UR: CLEAR
CO2 SERPL-SCNC: 26 MMOL/L (ref 21–32)
COLOR UR: YELLOW
CREAT SERPL-MCNC: 0.68 MG/DL (ref 0.6–1.3)
GFR SERPL CREATININE-BSD FRML MDRD: 89 ML/MIN/1.73SQ M
GLUCOSE P FAST SERPL-MCNC: 93 MG/DL (ref 65–99)
GLUCOSE UR STRIP-MCNC: NEGATIVE MG/DL
HDLC SERPL-MCNC: 50 MG/DL (ref 40–60)
HGB UR QL STRIP.AUTO: NEGATIVE
KETONES UR STRIP-MCNC: NEGATIVE MG/DL
LDLC SERPL CALC-MCNC: 90 MG/DL (ref 0–100)
LEUKOCYTE ESTERASE UR QL STRIP: ABNORMAL
MUCOUS THREADS UR QL AUTO: ABNORMAL
NITRITE UR QL STRIP: NEGATIVE
NON-SQ EPI CELLS URNS QL MICRO: ABNORMAL /HPF
NONHDLC SERPL-MCNC: 106 MG/DL
PH UR STRIP.AUTO: 6 [PH] (ref 4.5–8)
POTASSIUM SERPL-SCNC: 3.6 MMOL/L (ref 3.5–5.3)
PROT SERPL-MCNC: 6.7 G/DL (ref 6.4–8.2)
PROT UR STRIP-MCNC: NEGATIVE MG/DL
RBC #/AREA URNS AUTO: ABNORMAL /HPF
SODIUM SERPL-SCNC: 140 MMOL/L (ref 136–145)
SP GR UR STRIP.AUTO: 1.02 (ref 1–1.03)
TRIGL SERPL-MCNC: 81 MG/DL
TSH SERPL DL<=0.05 MIU/L-ACNC: 0.97 UIU/ML (ref 0.36–3.74)
UROBILINOGEN UR QL STRIP.AUTO: 1 E.U./DL
WBC #/AREA URNS AUTO: ABNORMAL /HPF

## 2018-05-26 PROCEDURE — 80053 COMPREHEN METABOLIC PANEL: CPT

## 2018-05-26 PROCEDURE — 81001 URINALYSIS AUTO W/SCOPE: CPT

## 2018-05-26 PROCEDURE — 80061 LIPID PANEL: CPT

## 2018-05-26 PROCEDURE — 84443 ASSAY THYROID STIM HORMONE: CPT

## 2018-05-26 PROCEDURE — 36415 COLL VENOUS BLD VENIPUNCTURE: CPT

## 2018-06-01 ENCOUNTER — OFFICE VISIT (OUTPATIENT)
Dept: INTERNAL MEDICINE CLINIC | Facility: CLINIC | Age: 68
End: 2018-06-01
Payer: COMMERCIAL

## 2018-06-01 VITALS
HEIGHT: 62 IN | WEIGHT: 184.4 LBS | OXYGEN SATURATION: 96 % | SYSTOLIC BLOOD PRESSURE: 130 MMHG | HEART RATE: 80 BPM | BODY MASS INDEX: 33.93 KG/M2 | TEMPERATURE: 97.4 F | RESPIRATION RATE: 16 BRPM | DIASTOLIC BLOOD PRESSURE: 82 MMHG

## 2018-06-01 DIAGNOSIS — I10 BENIGN ESSENTIAL HTN: ICD-10-CM

## 2018-06-01 DIAGNOSIS — K21.9 GASTROESOPHAGEAL REFLUX DISEASE WITHOUT ESOPHAGITIS: Primary | ICD-10-CM

## 2018-06-01 DIAGNOSIS — M79.7 FIBROMYALGIA: ICD-10-CM

## 2018-06-01 PROCEDURE — 99214 OFFICE O/P EST MOD 30 MIN: CPT | Performed by: INTERNAL MEDICINE

## 2018-06-01 PROCEDURE — 3725F SCREEN DEPRESSION PERFORMED: CPT | Performed by: INTERNAL MEDICINE

## 2018-06-01 RX ORDER — AMLODIPINE BESYLATE AND BENAZEPRIL HYDROCHLORIDE 10; 20 MG/1; MG/1
1 CAPSULE ORAL DAILY
Qty: 90 CAPSULE | Refills: 1 | Status: SHIPPED | OUTPATIENT
Start: 2018-06-01 | End: 2018-11-17 | Stop reason: SDUPTHER

## 2018-06-01 RX ORDER — DULOXETIN HYDROCHLORIDE 30 MG/1
30 CAPSULE, DELAYED RELEASE ORAL DAILY
Qty: 90 CAPSULE | Refills: 1 | Status: SHIPPED | OUTPATIENT
Start: 2018-06-01 | End: 2018-08-17

## 2018-06-01 NOTE — ASSESSMENT & PLAN NOTE
She was unable to wean off omeprazole, encouraged to take take as needed  Avoid ulcergenic foods and to lose weight

## 2018-06-01 NOTE — PROGRESS NOTES
Assessment/Plan:    Gastroesophageal reflux disease without esophagitis  She was unable to wean off omeprazole, encouraged to take take as needed  Avoid ulcergenic foods and to lose weight  Benign essential HTN  Fair control of bp, encouraged to lose weight  Continue on amlodipine-benazepril 10/20mg daily  Fibromyalgia  Improved, recommend continuing on same dose of duloxetine 30mg daily with re-eval in 4months  Encouraged to try aquatic therapy    1  Gastroesophageal reflux disease without esophagitis     2  Benign essential HTN  amLODIPine-benazepril (LOTREL) 10-20 MG per capsule   3  Fibromyalgia  DULoxetine (CYMBALTA) 30 mg delayed release capsule       Subjective:      Patient ID: Luca Ramirez is a 77 y o  female  HPI   67yo female with fibromyalgia, LBP, GERD, b/l knee pain, fatty liver and HTN here for follow up care and to review labs  She is accompanied by her   She was unable to wean off omeprazole, she had worsened symptoms in the evening, therefore she restarted it  She was started on cymbalta 30mg daily at her initial visit for fibromyalgia  Reports the medication has relaxed her, her toes and neck are improved  She is able to turn her neck more  Her bp at home have been stable and have been running around 130/80s      The following portions of the patient's history were reviewed and updated as appropriate: allergies, current medications, past family history, past medical history, past social history, past surgical history and problem list     Current Outpatient Prescriptions:     amLODIPine-benazepril (LOTREL) 10-20 MG per capsule, Take 1 capsule by mouth daily, Disp: 90 capsule, Rfl: 1    Coenzyme Q10 (COQ10) 100 MG CAPS, Take by mouth, Disp: , Rfl:     diclofenac (VOLTAREN) 75 mg EC tablet, Take 75 mg by mouth 2 (two) times a day, Disp: , Rfl:     DULoxetine (CYMBALTA) 30 mg delayed release capsule, Take 1 capsule (30 mg total) by mouth daily, Disp: 90 capsule, Rfl: 1    omeprazole (PriLOSEC) 10 mg delayed release capsule, Take by mouth, Disp: , Rfl:     SUPER B COMPLEX/C PO, Take by mouth, Disp: , Rfl:     TURMERIC CURCUMIN PO, Take by mouth, Disp: , Rfl:     Review of Systems   Constitutional: Positive for unexpected weight change  Respiratory: Negative  Cardiovascular: Negative  Gastrointestinal:        Reflux   Musculoskeletal: Positive for arthralgias, back pain and myalgias  B/ knees pain   Neurological: Positive for headaches  Negative for dizziness  Psychiatric/Behavioral: Negative  Objective:    /82 (BP Location: Left arm, Patient Position: Sitting, Cuff Size: Standard)   Pulse 80   Temp (!) 97 4 °F (36 3 °C)   Resp 16   Ht 5' 2" (1 575 m)   Wt 83 6 kg (184 lb 6 4 oz)   SpO2 96%   BMI 33 73 kg/m²      Physical Exam   Constitutional: She appears well-developed and well-nourished  No distress  Neck: Neck supple  Cardiovascular: Normal rate, regular rhythm, normal heart sounds and intact distal pulses  Pulmonary/Chest: Effort normal and breath sounds normal  No respiratory distress  She has no wheezes  Musculoskeletal:   Mild tenderness on b/l 2nd costochondral junction, lateral epicodyles, greater trochanter prominence, medial knees,  upper outer quad of buttocks and trapezius muscles   Neurological: She is alert  Psychiatric: She has a normal mood and affect  Her behavior is normal    Vitals reviewed        Recent Results (from the past 168 hour(s))   Lipid panel    Collection Time: 05/26/18  8:31 AM   Result Value Ref Range    Cholesterol 156 50 - 200 mg/dL    Triglycerides 81 <=150 mg/dL    HDL, Direct 50 40 - 60 mg/dL    LDL Calculated 90 0 - 100 mg/dL    Non-HDL-Chol (CHOL-HDL) 106 mg/dl   Comprehensive metabolic panel    Collection Time: 05/26/18  8:31 AM   Result Value Ref Range    Sodium 140 136 - 145 mmol/L    Potassium 3 6 3 5 - 5 3 mmol/L    Chloride 107 100 - 108 mmol/L    CO2 26 21 - 32 mmol/L    Anion Gap 7 4 - 13 mmol/L    BUN 19 5 - 25 mg/dL    Creatinine 0 68 0 60 - 1 30 mg/dL    Glucose, Fasting 93 65 - 99 mg/dL    Calcium 8 4 8 3 - 10 1 mg/dL    AST 19 5 - 45 U/L    ALT 40 12 - 78 U/L    Alkaline Phosphatase 80 46 - 116 U/L    Total Protein 6 7 6 4 - 8 2 g/dL    Albumin 3 6 3 5 - 5 0 g/dL    Total Bilirubin 0 51 0 20 - 1 00 mg/dL    eGFR 89 ml/min/1 73sq m   TSH, 3rd generation with T4 reflex    Collection Time: 05/26/18  8:31 AM   Result Value Ref Range    TSH 3RD GENERATON 0 967 0 358 - 3 740 uIU/mL   Urinalysis with reflex to microscopic    Collection Time: 05/26/18  8:31 AM   Result Value Ref Range    Color, UA Yellow     Clarity, UA Clear     Specific Gravity, UA 1 020 1 003 - 1 030    pH, UA 6 0 4 5 - 8 0    Leukocytes, UA Moderate (A) Negative    Nitrite, UA Negative Negative    Protein, UA Negative Negative mg/dl    Glucose, UA Negative Negative mg/dl    Ketones, UA Negative Negative mg/dl    Urobilinogen, UA 1 0 0 2, 1 0 E U /dl E U /dl    Bilirubin, UA Negative Negative    Blood, UA Negative Negative   Urine Microscopic    Collection Time: 05/26/18  8:31 AM   Result Value Ref Range    RBC, UA None Seen None Seen, 0-5 /hpf    WBC, UA 4-10 (A) None Seen, 0-5, 5-55, 5-65 /hpf    Epithelial Cells None Seen None Seen, Occasional /hpf    Bacteria, UA None Seen None Seen, Occasional /hpf    MUCOUS THREADS Occasional (A) None Seen     PHQ-9 Depression Screening    PHQ-9:    Frequency of the following problems over the past two weeks:       Little interest or pleasure in doing things:  0 - not at all  Feeling down, depressed, or hopeless:  0 - not at all  PHQ-2 Score:  0

## 2018-06-01 NOTE — ASSESSMENT & PLAN NOTE
Improved, recommend continuing on same dose of duloxetine 30mg daily with re-eval in 4months    Encouraged to try aquatic therapy

## 2018-06-06 ENCOUNTER — OFFICE VISIT (OUTPATIENT)
Dept: PAIN MEDICINE | Facility: MEDICAL CENTER | Age: 68
End: 2018-06-06
Payer: COMMERCIAL

## 2018-06-06 VITALS
DIASTOLIC BLOOD PRESSURE: 60 MMHG | SYSTOLIC BLOOD PRESSURE: 110 MMHG | WEIGHT: 187 LBS | BODY MASS INDEX: 34.2 KG/M2 | TEMPERATURE: 98.7 F

## 2018-06-06 DIAGNOSIS — M54.50 LUMBAR PAIN: ICD-10-CM

## 2018-06-06 DIAGNOSIS — M51.16 LUMBAR DISC HERNIATION WITH RADICULOPATHY: Primary | ICD-10-CM

## 2018-06-06 DIAGNOSIS — M79.7 FIBROMYALGIA: ICD-10-CM

## 2018-06-06 PROCEDURE — 99214 OFFICE O/P EST MOD 30 MIN: CPT | Performed by: NURSE PRACTITIONER

## 2018-06-06 NOTE — PROGRESS NOTES
Pt c/o lower back pain that radiates down both legs  Assessment:  1  Lumbar disc herniation with radiculopathy - Bilateral    2  Lumbar pain    3  Fibromyalgia        Plan:  77 y o  female who presents for a follow up office visit in regards to Back Pain  She was last seen in the office by Dr Magui Jean-Baptiste on November 28, 2017  The patients current symptoms include lumbosacral back pain that radiates into the bilateral lower extremities on the right side greater than left in an L5-S1 fashion  The patient complains of subjective weakness, numbness, and tingling in both lower extremities but has noticed she has been tripping more often with the right leg  1   I will schedule the patient for a right L5-S1 LESI with Dr Magui Jean-Baptiste  Complete risks and benefits including bleeding, infection, tissue reaction, nerve injury and allergic reaction were discussed  Patient was informed that she does need a  the day of the procedure  The approach was demonstrated using models and literature was provided  2   The patient is to continue on her Cymbalta and diclofenac as prescribed by her PCP  3   I will follow up with the patient pending results of the epidural steroid injection  History of Present Illness: The patient is a 77 y o  female who presents for a follow up office visit in regards to Back Pain  She was last seen in the office by Dr Magui Jean-Baptiste on November 28, 2017  The patients current symptoms include lumbosacral back pain that radiates into the bilateral lower extremities on the right side greater than left in an L5-S1 fashion  The patient complains of subjective weakness, numbness, and tingling in both lower extremities but has noticed she has been tripping more often with the right leg  At today's visit she rates her pain an 8/10 on numeric pain rating scale  She states the pain is constant and more bothersome in the evening    She describes the pain as burning, dull aching, cramping, shooting and pins and needles  The pain is made worse with walking, standing, sitting, and bending and is slightly alleviated with rest      At last office visit, Dr Skylar Schwab did offer a right L5-S1 interlaminar epidural steroid injection, however the patient declined at that time  She was sent home with literature and has returned to the office to move forward with this option  Current pain medications includes:  Diclofenac 75 mg EC BID and duloxetine 30 mg as prescribed by her PCP  Patient reports that this regimen is providing 40% pain relief  The patient is reporting no side effects from this pain medication regimen  Other than as stated above, the patient denies any interval changes in medications, medical condition, mental condition, symptoms, or allergies since the last office visit  I have personally reviewed and/or updated the patient's past medical history, past surgical history, family history, social history, current medications, allergies, and vital signs today  Review of Systems  Review of Systems   Respiratory: Negative for shortness of breath  Cardiovascular: Negative for chest pain  Gastrointestinal: Negative for constipation, diarrhea, nausea and vomiting  Musculoskeletal: Negative for arthralgias, gait problem, joint swelling and myalgias  Difficulty walking  Joint stiffness   Skin: Negative for rash  Neurological: Positive for dizziness  Negative for seizures and weakness  All other systems reviewed and are negative  Past Medical History:   Diagnosis Date    Arthritis     Fatty liver 5/1/2018    Hypertension     Primary localized osteoarthritis of right knee 8/17/2017       History reviewed  No pertinent surgical history  Family History   Problem Relation Age of Onset    Cancer Mother     Osteoporosis Mother     Skin cancer Father     Cancer Family     Osteoporosis Family        Social History     Occupational History    Not on file       Social History Main Topics    Smoking status: Never Smoker    Smokeless tobacco: Never Used    Alcohol use No    Drug use: Unknown    Sexual activity: Not on file         Current Outpatient Prescriptions:     amLODIPine-benazepril (LOTREL) 10-20 MG per capsule, Take 1 capsule by mouth daily, Disp: 90 capsule, Rfl: 1    Coenzyme Q10 (COQ10) 100 MG CAPS, Take by mouth, Disp: , Rfl:     diclofenac (VOLTAREN) 75 mg EC tablet, Take 75 mg by mouth 2 (two) times a day, Disp: , Rfl:     DULoxetine (CYMBALTA) 30 mg delayed release capsule, Take 1 capsule (30 mg total) by mouth daily, Disp: 90 capsule, Rfl: 1    omeprazole (PriLOSEC) 10 mg delayed release capsule, Take by mouth, Disp: , Rfl:     SUPER B COMPLEX/C PO, Take by mouth, Disp: , Rfl:     TURMERIC CURCUMIN PO, Take by mouth, Disp: , Rfl:     Allergies   Allergen Reactions    Penicillins        Physical Exam:    /60   Temp 98 7 °F (37 1 °C)   Wt 84 8 kg (187 lb)   BMI 34 20 kg/m²     Constitutional:normal, well developed, well nourished, alert, in no distress and non-toxic and no overt pain behavior  Eyes:anicteric  HEENT:grossly intact  Neck:supple, symmetric, trachea midline and no masses   Pulmonary:even and unlabored  Cardiovascular:No edema or pitting edema present  Skin:Normal without rashes or lesions and well hydrated  Psychiatric:Mood and affect appropriate  Neurologic:Cranial Nerves II-XII grossly intact  Musculoskeletal:antalgic and Steady without any assistive devices  Positive straight leg test on the right  Positive Teddy's test bilaterally, however worse on the right  Negative Teddy's test on the left    5/5 strength in all muscle groups in the bilateral lower extremities    Imaging  No orders to display     Study Result     MRI LUMBAR SPINE WITHOUT CONTRAST     INDICATION:  Chronic low back pain and bilateral leg pain      COMPARISON:  None      TECHNIQUE:  Sagittal T1, sagittal T2, sagittal inversion recovery, axial T1 and axial T2, coronal T2        IMAGE QUALITY:  Diagnostic     FINDINGS:     ALIGNMENT:  Slight dextroscoliosis lumbar spine      MARROW SIGNAL:  Normal marrow signal is identified within the visualized bony structures  No discrete marrow lesion      DISTAL CORD AND CONUS:  Normal size and signal within the distal cord and conus  The conus ends at the L2 level      PARASPINAL SOFT TISSUES:  Paraspinal soft tissues are unremarkable      SACRUM:  Normal signal within the sacrum  No evidence of insufficiency or stress fracture      LOWER THORACIC DISC SPACES:  Normal disc height and signal   No disc herniation, canal stenosis or foraminal narrowing      LUMBAR DISC SPACES:          L1-L2:  Normal      L2-L3:  Normal      L3-L4:  Moderate facet hypertrophy  Minimal central stenosis without foraminal narrowing      L4-L5:  Broad-based central protrusion type disc herniation with mild facet hypertrophy  There is mild central stenosis  Foramina are patent      L5-S1:  Degenerative disc ossify complex with marginal osteophytes identified  There is a moderate size central superiorly extending protrusion type disc herniation contributing to mild central stenosis and mild right lateral recess stenosis  Moderate   facet hypertrophy noted      IMPRESSION:     Mild central stenosis L4-5 and L5-S1 secondary to protrusion type disc herniation superimposed on annular bulging  At L5-S1, moderate central and slightly right paramedian disc extends slightly superiorly  Moderate L5-S1 facet hypertrophy         Workstation performed: [id number]          No orders of the defined types were placed in this encounter

## 2018-06-08 ENCOUNTER — TELEPHONE (OUTPATIENT)
Dept: INTERNAL MEDICINE CLINIC | Facility: CLINIC | Age: 68
End: 2018-06-08

## 2018-07-23 ENCOUNTER — HOSPITAL ENCOUNTER (OUTPATIENT)
Dept: RADIOLOGY | Facility: MEDICAL CENTER | Age: 68
Discharge: HOME | End: 2018-07-23
Admitting: PHYSICAL MEDICINE & REHABILITATION
Payer: COMMERCIAL

## 2018-07-23 VITALS
TEMPERATURE: 97.6 F | OXYGEN SATURATION: 98 % | RESPIRATION RATE: 18 BRPM | HEART RATE: 76 BPM | SYSTOLIC BLOOD PRESSURE: 123 MMHG | DIASTOLIC BLOOD PRESSURE: 85 MMHG

## 2018-07-23 DIAGNOSIS — M54.16 LUMBAR RADICULOPATHY: ICD-10-CM

## 2018-07-23 PROCEDURE — 62323 NJX INTERLAMINAR LMBR/SAC: CPT | Performed by: PHYSICAL MEDICINE & REHABILITATION

## 2018-07-23 RX ORDER — LIDOCAINE HYDROCHLORIDE 10 MG/ML
5 INJECTION, SOLUTION EPIDURAL; INFILTRATION; INTRACAUDAL; PERINEURAL ONCE
Status: COMPLETED | OUTPATIENT
Start: 2018-07-23 | End: 2018-07-23

## 2018-07-23 RX ORDER — METHYLPREDNISOLONE ACETATE 80 MG/ML
80 INJECTION, SUSPENSION INTRA-ARTICULAR; INTRALESIONAL; INTRAMUSCULAR; PARENTERAL; SOFT TISSUE ONCE
Status: COMPLETED | OUTPATIENT
Start: 2018-07-23 | End: 2018-07-23

## 2018-07-23 RX ADMIN — LIDOCAINE HYDROCHLORIDE 1.5 ML: 10 INJECTION, SOLUTION EPIDURAL; INFILTRATION; INTRACAUDAL; PERINEURAL at 10:42

## 2018-07-23 RX ADMIN — METHYLPREDNISOLONE ACETATE 80 MG: 80 INJECTION, SUSPENSION INTRA-ARTICULAR; INTRALESIONAL; INTRAMUSCULAR; PARENTERAL; SOFT TISSUE at 10:43

## 2018-07-23 RX ADMIN — IOHEXOL 1 ML: 300 INJECTION, SOLUTION INTRAVENOUS at 10:43

## 2018-07-23 NOTE — DISCHARGE INSTRUCTIONS
Epidural Steroid Injection   WHAT YOU NEED TO KNOW:   An epidural steroid injection (CHRISSIE) is a procedure to inject steroid medicine into the epidural space  The epidural space is between your spinal cord and vertebrae  Steroids reduce inflammation and fluid buildup in your spine that may be causing pain  You may be given pain medicine along with the steroids  ACTIVITY  · Do not drive or operate machinery today  · No strenuous activity today - bending, lifting, etc   · You may resume normal activites starting tomorrow - start slowly and as tolerated  · You may shower today, but no tub baths or hot tubs  · You may have numbness for several hours from the local anesthetic  Please use caution and common sense, especially with weight-bearing activities  CARE OF THE INJECTION SITE  · If you have soreness or pain, apply ice to the area today (20 minutes on/20 minutes off)  · Starting tomorrow, you may use warm, moist heat or ice if needed  · You may have an increase or change in your discomfort for 36-48 hours after your treatment  · Apply ice and continue with any pain medication you have been prescribed  · Notify [facility] if you have any of the following: redness, drainage, swelling, headache, stiff neck or fever above 100°F     SPECIAL INSTRUCTIONS  · Our office will contact you in approximately 7 days for a progress report  MEDICATIONS  · Continue to take all routine medications  · Our office may have instructed you to hold some medications  Resume diclofenac tomorrow 7/24/18  If you have a problem specifically related to your procedure, please call our office at [phone]  Problems not related to your procedure should be directed to your primary care physician

## 2018-07-23 NOTE — H&P
History of Present Illness: The patient is a 77 y o  female who presents with complaints of   Back and radiating right leg pain    Patient Active Problem List   Diagnosis    Primary localized osteoarthritis of right knee    Right knee pain    Lumbar disc herniation with radiculopathy    Lumbar pain    Fibromyalgia    Benign essential HTN    Fatty liver    Gastroesophageal reflux disease without esophagitis       Past Medical History:   Diagnosis Date    Arthritis     Fatty liver 5/1/2018    Hypertension     Primary localized osteoarthritis of right knee 8/17/2017       No past surgical history on file  Current Outpatient Prescriptions:     amLODIPine-benazepril (LOTREL) 10-20 MG per capsule, Take 1 capsule by mouth daily, Disp: 90 capsule, Rfl: 1    Coenzyme Q10 (COQ10) 100 MG CAPS, Take 1 capsule by mouth daily  , Disp: , Rfl:     diclofenac (VOLTAREN) 75 mg EC tablet, Take 75 mg by mouth 2 (two) times a day as needed  , Disp: , Rfl:     DULoxetine (CYMBALTA) 30 mg delayed release capsule, Take 1 capsule (30 mg total) by mouth daily, Disp: 90 capsule, Rfl: 1    omeprazole (PriLOSEC) 10 mg delayed release capsule, Take 10 mg by mouth daily  , Disp: , Rfl:     SUPER B COMPLEX/C PO, Take 1 tablet by mouth daily  , Disp: , Rfl:     TURMERIC CURCUMIN PO, Take 1 capsule by mouth daily  , Disp: , Rfl:     Allergies   Allergen Reactions    Penicillins        Physical Exam:   Vitals:    07/23/18 1029   BP: 126/84   Pulse: 80   Resp: 20   Temp: 97 6 °F (36 4 °C)   SpO2: 99%     General: Awake, Alert, Oriented x 3, Mood and affect appropriate  Respiratory: Respirations even and unlabored  Cardiovascular: Peripheral pulses intact; no edema  Musculoskeletal Exam:  Back and radiating right leg    ASA Score:  2  Patient/Chart Verification  Patient ID Verified: Verbal  Consents Confirmed: Procedural, To be obtained in the Pre-Procedure area  H&P( within 30 days) Verified:  To be obtained in the Pre-Procedure area  Interval H&P(within 24 hr) Complete (required for Outpatients and Surgery Admit only): To be obtained in the Pre-Procedure area  Allergies Reviewed: Yes  Anticoag/NSAID held?: Yes (Diclofenac last dose over one week ago)  Currently on antibiotics?: No  Pregnancy denied?: Yes    Assessment:   1   Lumbar radiculopathy        Plan: RT L5-S1 LESI - DICLOFENAC

## 2018-08-02 ENCOUNTER — TELEPHONE (OUTPATIENT)
Dept: PAIN MEDICINE | Facility: MEDICAL CENTER | Age: 68
End: 2018-08-02

## 2018-08-03 NOTE — TELEPHONE ENCOUNTER
Pt is returning call, pain level 1, Pain relief 99%    Pt states she can finally walk much better, and it really feels good

## 2018-08-09 ENCOUNTER — TELEPHONE (OUTPATIENT)
Dept: OBGYN CLINIC | Facility: HOSPITAL | Age: 68
End: 2018-08-09

## 2018-08-09 DIAGNOSIS — M17.11 PRIMARY LOCALIZED OSTEOARTHRITIS OF RIGHT KNEE: Primary | ICD-10-CM

## 2018-08-09 NOTE — TELEPHONE ENCOUNTER
I called and spoke to the patient today  I let her know that we ordered the visco for her  She has an appointment on the 8/23  I let her know to cancel this if she has not heard from our office by then  Someone will call her when the injections are ready   She will call and check on the status a week before her appointment

## 2018-08-09 NOTE — TELEPHONE ENCOUNTER
Caller- dr Frank Cao patient  - [phone]  Patient is calling to inform dr Frank Cao she is interested in the gel injection for her knee  She has a f/u on 8/23

## 2018-08-17 DIAGNOSIS — M79.7 FIBROMYALGIA: Primary | ICD-10-CM

## 2018-08-17 NOTE — TELEPHONE ENCOUNTER
I CALLED & SPOKE WITH PT  SHE SAYS THAT YOU GUROSS TALKED OVER THE PHONE AT ONE POINT AND PER HER , SHE WAS TOLD THAT SHE COULD INCREASE THE DOSE IF SHE NEEDED TO BUT SHE  WANTS TO KNOW IF SHE CAN GET THE 30MG DOSE 1 QD

## 2018-08-20 ENCOUNTER — TELEPHONE (OUTPATIENT)
Dept: PAIN MEDICINE | Facility: CLINIC | Age: 68
End: 2018-08-20

## 2018-08-20 ENCOUNTER — OFFICE VISIT (OUTPATIENT)
Dept: PAIN MEDICINE | Facility: MEDICAL CENTER | Age: 68
End: 2018-08-20
Payer: COMMERCIAL

## 2018-08-20 ENCOUNTER — TELEPHONE (OUTPATIENT)
Dept: OBGYN CLINIC | Facility: MEDICAL CENTER | Age: 68
End: 2018-08-20

## 2018-08-20 VITALS
WEIGHT: 182 LBS | HEART RATE: 83 BPM | TEMPERATURE: 97.4 F | BODY MASS INDEX: 33.29 KG/M2 | SYSTOLIC BLOOD PRESSURE: 154 MMHG | DIASTOLIC BLOOD PRESSURE: 100 MMHG

## 2018-08-20 DIAGNOSIS — M79.7 FIBROMYALGIA: ICD-10-CM

## 2018-08-20 DIAGNOSIS — M46.1 SACROILIITIS (HCC): ICD-10-CM

## 2018-08-20 DIAGNOSIS — M17.11 PRIMARY LOCALIZED OSTEOARTHRITIS OF RIGHT KNEE: ICD-10-CM

## 2018-08-20 DIAGNOSIS — M25.561 CHRONIC PAIN OF RIGHT KNEE: ICD-10-CM

## 2018-08-20 DIAGNOSIS — G89.29 CHRONIC PAIN OF RIGHT KNEE: ICD-10-CM

## 2018-08-20 DIAGNOSIS — M54.50 LUMBAR PAIN: ICD-10-CM

## 2018-08-20 DIAGNOSIS — M54.16 LUMBAR RADICULOPATHY: ICD-10-CM

## 2018-08-20 DIAGNOSIS — M51.16 LUMBAR DISC HERNIATION WITH RADICULOPATHY: Primary | ICD-10-CM

## 2018-08-20 PROCEDURE — 99214 OFFICE O/P EST MOD 30 MIN: CPT | Performed by: NURSE PRACTITIONER

## 2018-08-20 RX ORDER — GABAPENTIN 300 MG/1
300 CAPSULE ORAL 3 TIMES DAILY
Qty: 90 CAPSULE | Refills: 2 | Status: SHIPPED | OUTPATIENT
Start: 2018-08-20 | End: 2018-10-15 | Stop reason: SDUPTHER

## 2018-08-20 RX ORDER — DICLOFENAC SODIUM 75 MG/1
75 TABLET, DELAYED RELEASE ORAL 2 TIMES DAILY PRN
Qty: 60 TABLET | Refills: 2 | Status: SHIPPED | OUTPATIENT
Start: 2018-08-20 | End: 2019-01-11

## 2018-08-20 RX ORDER — DICLOFENAC SODIUM 75 MG/1
75 TABLET, DELAYED RELEASE ORAL 2 TIMES DAILY PRN
Qty: 60 TABLET | Refills: 2 | Status: SHIPPED | OUTPATIENT
Start: 2018-08-20 | End: 2018-08-20 | Stop reason: SDUPTHER

## 2018-08-20 RX ORDER — GABAPENTIN 300 MG/1
300 CAPSULE ORAL 3 TIMES DAILY
Qty: 90 CAPSULE | Refills: 2 | Status: SHIPPED | OUTPATIENT
Start: 2018-08-20 | End: 2018-08-20 | Stop reason: SDUPTHER

## 2018-08-20 NOTE — TELEPHONE ENCOUNTER
S/W Lisandra Cardenas at [facility] at [phone] and cancelled the scripts for gabapentin and diclofenac  She verbalized understanding

## 2018-08-20 NOTE — TELEPHONE ENCOUNTER
Can you call and cancel the prescriptions for gabapentin and diclofenac that I sent to the [facility] on the patient's file by accident  Thank you

## 2018-08-20 NOTE — TELEPHONE ENCOUNTER
Just FYI task can be "done" upon reading  Pt returned call and I explained to her the info mentioned above  She has agreed to come in as originally planned on Thursday 8/23/18 for another cortisone injection as gel injections coinsurance 20% is too high for her at this time  Pt was thankful for the help and info      # [phone]

## 2018-08-20 NOTE — TELEPHONE ENCOUNTER
Patient called  On Aug 9 to ask for gel injections  I couldn't see when if it is authorized or not  She has an appointment on Aug 23 should she keep it?

## 2018-08-20 NOTE — TELEPHONE ENCOUNTER
Attempted to call pt and inform her of info mentioned above  Left v/m for pt to call back       CB# [phone]

## 2018-08-20 NOTE — TELEPHONE ENCOUNTER
Called and spoke to Linda Serrano with rep, Fadi Patel  She tells me that pt is covered for either Synvisc One or Euflexxa @ 80%, however is responsible for remaining 20%  Pt only has primary insurance  Rep also tells me that patient has meet just $105 34 of her deductible of $5090  I will call and make pt aware and see how she would like to proceed  Or, she can keep her appt for later this week for routine 3mo steroid inj     Reference # [id number]  Callback # Neida Seymour: [phone]

## 2018-08-20 NOTE — PROGRESS NOTES
Pt is c/o lower back pain that radiates down her right leg  Assessment:  1  Lumbar disc herniation with radiculopathy    2  Lumbar radiculopathy    3  Lumbar pain    4  Fibromyalgia    5  Chronic pain of right knee    6  Primary localized osteoarthritis of right knee    7  Sacroiliitis ([address] )        Plan:  1  Since last office visit, the patient states that she did receive about 100% relief from her right L5-S1 LESI on July 23, 2018 which has lasted up until this point, however she does state the pain has returned and is radiating in the same fashion in her right lower extremity, just not as severe as prior  We will schedule the patient for a repeat right L5-S1 LESI for the inflammatory component of her residual neuropathic pain complaints  Complete risks and benefits including bleeding, infection, tissue reaction, nerve injury and allergic reaction were discussed  The approach was demonstrated using models and literature was provided  Verbal consent was obtained  2   I will initiate the patient on Gabapentin 300 QHS for her neuropathic pain complaints  I did explain to the patient that if she is tolerating this dose after a week without side effects, she can increase the medication the TID dosing  I did provide the patient with a titration calendar that illustrates how to titrate up on the medication appropriately  Patient was educated on medications most common side effects which include dizziness, drowsiness, and swelling of the hands and feet  Patient was instructed to call the office with any adverse medication side effects  The patient is also aware that if they would like to come off of the medication, they need to let us know as suddenly stopping the medication puts them at risk to have a seizure  3   The patient can continue on diclofenac 75EC BID PRN  She does state this medication has been extremely helpful, however has run out of the medication   I will refill this medication for her at today's office visit as it may be helpful with her sacroiliac mediated pain as well  4   The patient has since discontinued duloxetine for her fibromyalgia since our last visit as Prescribed by her PCP, stating she was trialing 2 tablets daily and ran out early  I did advise against taking medication differently than prescribed and instructed the patient to follow up with her PCP regarding this medication  The patient was agreeable and verbalized an understanding  5  If the patient is left over with right low back pain after the repeat LESI, may benefit from a Right SI joint injection as she does appear to have right sacroiliac joint mediated pain upon exam as well  6  The patient can continue with her home exercise program  7  We will call the patient after her injection with Dr Negin Key  The patient will follow-up in 12 weeks for medication prescription refill and reevaluation  The patient was advised to contact the office should their symptoms worsen in the interim  The patient was agreeable and verbalized an understanding  I attest that I have spent at least 25 minutes face to face with the patient and that at least 50% of the time was spent educating and/or discussing the patient's symptoms and treatment plan options  History of Present Illness: The patient is a 77 y o  female last seen on 06/06/18 who presents for a follow up office visit in regards to chronic pain secondary to fibromyalgia, Lumbar disc herniation, lumbar stenosis and spondylosis  The patient is status post right L5-S1 LESI with Dr Negin Key on July 23, 2018 and has reported 99% relief up until this point  She states the pain has just recently started to return and radiates in the same L5-S1 fashion in her right lower extremity terminating in the calf  She does state that the pain is not as severe as it was prior to the injection  She does continue to follow with orthopedics for  The osteoarthritis in her knees      She currently rates her pain a 6/10 on the numeric pain rating scale  She states the pain is intermittent and follows no particular pattern throughout the day  She characterizes the pain as dull aching  The patient is not currently taking any medications for her neuropathic or osteoarthritic pain  She was prescribed Cymbalta 30 mg daily by her PCP for her fibromyalgia however states she trialed 60 mg daily and ran out of medication early as she was only given enough for 1 tablet daily  She states she was unsure if she was experiencing any relief from this medication  She denies ever trying gabapentin for her neuropathic pain in the past   She also was prescribed diclofenac by Ortho and states that this medication significantly helps with her pain when she takes it as needed  She denies any side effects to her current medication regimen  I have personally reviewed and/or updated the patient's past medical history, past surgical history, family history, social history, current medications, allergies, and vital signs today  Review of Systems:    Review of Systems   Constitutional: Negative for fever and unexpected weight change  HENT: Negative for trouble swallowing  Eyes: Negative for visual disturbance  Respiratory: Negative for shortness of breath and wheezing  Cardiovascular: Negative for chest pain and palpitations  Gastrointestinal: Negative for constipation, diarrhea, nausea and vomiting  Endocrine: Negative for cold intolerance, heat intolerance and polydipsia  Genitourinary: Negative for difficulty urinating and frequency  Musculoskeletal: Negative for arthralgias, gait problem, joint swelling and myalgias  Joint stiffness   Skin: Negative for rash  Neurological: Negative for dizziness, seizures, syncope, weakness and headaches  Hematological: Does not bruise/bleed easily  Psychiatric/Behavioral: Negative for dysphoric mood     All other systems reviewed and are negative  Past Medical History:   Diagnosis Date    Arthritis     Fatty liver 5/1/2018    Hypertension     Primary localized osteoarthritis of right knee 8/17/2017       No past surgical history on file  Family History   Problem Relation Age of Onset    Cancer Mother     Osteoporosis Mother     Skin cancer Father     Cancer Family     Osteoporosis Family        Social History     Occupational History    Not on file       Social History Main Topics    Smoking status: Never Smoker    Smokeless tobacco: Never Used    Alcohol use No    Drug use: Unknown    Sexual activity: Not on file         Current Outpatient Prescriptions:     amLODIPine-benazepril (LOTREL) 10-20 MG per capsule, Take 1 capsule by mouth daily, Disp: 90 capsule, Rfl: 1    Coenzyme Q10 (COQ10) 100 MG CAPS, Take 1 capsule by mouth daily  , Disp: , Rfl:     diclofenac (VOLTAREN) 75 mg EC tablet, Take 1 tablet (75 mg total) by mouth 2 (two) times a day as needed (pain), Disp: 60 tablet, Rfl: 2    omeprazole (PriLOSEC) 10 mg delayed release capsule, Take 10 mg by mouth daily  , Disp: , Rfl:     SUPER B COMPLEX/C PO, Take 1 tablet by mouth daily  , Disp: , Rfl:     TURMERIC CURCUMIN PO, Take 1 capsule by mouth daily  , Disp: , Rfl:     gabapentin (NEURONTIN) 300 mg capsule, Take 1 capsule (300 mg total) by mouth 3 (three) times a day, Disp: 90 capsule, Rfl: 2    Allergies   Allergen Reactions    Penicillins        Physical Exam:    /100   Pulse 83   Temp (!) 97 4 °F (36 3 °C)   Wt 82 6 kg (182 lb)   BMI 33 29 kg/m²     Constitutional:Endomorphic body habitus  Eyes:anicteric  HEENT:grossly intact  Neck:supple, symmetric, trachea midline and no masses   Pulmonary:even and unlabored  Cardiovascular:No edema or pitting edema present  Skin:Normal without rashes or lesions and well hydrated  Psychiatric:Mood and affect appropriate  Neurologic:Cranial Nerves II-XII grossly intact  Musculoskeletal:Antalgic gait, but steady without the use of assistive devices     Lumbar Spine Exam    Appearance:  Normal lordosis  Palpation/Tenderness:  right lumbar paraspinal tenderness  right sacroiliac joint tenderness  Left sacroiliac joint nontender to palpation  Motor Strength:   strength 5/5 in all muscle groups of the bilateral lower extremities  Special Tests:  Left Straight Leg Test:  negative  Right Straight Leg Test:  positive  Left Teddy's Maneuver:  negative  Right Teddy's Maneuver:  positive  Left Pelvic Distraction Test:  negative  Right Pelvic Distraction Test:  positive      Imaging  FL spine and pain procedure    (Results Pending)     MRI lumbar spine wo contrast   Status: Final result   PACS Images     Show images for MRI lumbar spine wo contrast   Order Report      Order Details   Study Result     MRI LUMBAR SPINE WITHOUT CONTRAST     INDICATION:  Chronic low back pain and bilateral leg pain      COMPARISON:  None      TECHNIQUE:  Sagittal T1, sagittal T2, sagittal inversion recovery, axial T1 and axial T2, coronal T2        IMAGE QUALITY:  Diagnostic     FINDINGS:     ALIGNMENT:  Slight dextroscoliosis lumbar spine      MARROW SIGNAL:  Normal marrow signal is identified within the visualized bony structures  No discrete marrow lesion      DISTAL CORD AND CONUS:  Normal size and signal within the distal cord and conus  The conus ends at the L2 level      PARASPINAL SOFT TISSUES:  Paraspinal soft tissues are unremarkable      SACRUM:  Normal signal within the sacrum  No evidence of insufficiency or stress fracture      LOWER THORACIC DISC SPACES:  Normal disc height and signal   No disc herniation, canal stenosis or foraminal narrowing      LUMBAR DISC SPACES:          L1-L2:  Normal      L2-L3:  Normal      L3-L4:  Moderate facet hypertrophy  Minimal central stenosis without foraminal narrowing      L4-L5:  Broad-based central protrusion type disc herniation with mild facet hypertrophy  There is mild central stenosis  Foramina are patent      L5-S1:  Degenerative disc ossify complex with marginal osteophytes identified  There is a moderate size central superiorly extending protrusion type disc herniation contributing to mild central stenosis and mild right lateral recess stenosis  Moderate   facet hypertrophy noted      IMPRESSION:     Mild central stenosis L4-5 and L5-S1 secondary to protrusion type disc herniation superimposed on annular bulging  At L5-S1, moderate central and slightly right paramedian disc extends slightly superiorly    Moderate L5-S1 facet hypertrophy         Workstation performed: [id number]            Orders Placed This Encounter   Procedures    [place] spine and pain procedure

## 2018-08-21 RX ORDER — DULOXETIN HYDROCHLORIDE 60 MG/1
60 CAPSULE, DELAYED RELEASE ORAL DAILY
Qty: 30 CAPSULE | Refills: 2 | Status: SHIPPED | OUTPATIENT
Start: 2018-08-21 | End: 2018-11-15 | Stop reason: SDUPTHER

## 2018-08-23 ENCOUNTER — OFFICE VISIT (OUTPATIENT)
Dept: OBGYN CLINIC | Facility: MEDICAL CENTER | Age: 68
End: 2018-08-23
Payer: COMMERCIAL

## 2018-08-23 ENCOUNTER — APPOINTMENT (OUTPATIENT)
Dept: RADIOLOGY | Facility: CLINIC | Age: 68
End: 2018-08-23
Payer: COMMERCIAL

## 2018-08-23 VITALS
DIASTOLIC BLOOD PRESSURE: 87 MMHG | SYSTOLIC BLOOD PRESSURE: 137 MMHG | BODY MASS INDEX: 33.68 KG/M2 | HEART RATE: 83 BPM | WEIGHT: 183 LBS | HEIGHT: 62 IN

## 2018-08-23 DIAGNOSIS — M70.50 PES ANSERINE BURSITIS: ICD-10-CM

## 2018-08-23 DIAGNOSIS — M17.11 PRIMARY OSTEOARTHRITIS OF RIGHT KNEE: ICD-10-CM

## 2018-08-23 DIAGNOSIS — Z01.89 ENCOUNTER FOR LOWER EXTREMITY COMPARISON IMAGING STUDY: ICD-10-CM

## 2018-08-23 DIAGNOSIS — Z01.89 ENCOUNTER FOR LOWER EXTREMITY COMPARISON IMAGING STUDY: Primary | ICD-10-CM

## 2018-08-23 PROCEDURE — 20610 DRAIN/INJ JOINT/BURSA W/O US: CPT | Performed by: PHYSICIAN ASSISTANT

## 2018-08-23 PROCEDURE — 73560 X-RAY EXAM OF KNEE 1 OR 2: CPT

## 2018-08-23 PROCEDURE — 99213 OFFICE O/P EST LOW 20 MIN: CPT | Performed by: ORTHOPAEDIC SURGERY

## 2018-08-23 PROCEDURE — 73564 X-RAY EXAM KNEE 4 OR MORE: CPT

## 2018-08-23 RX ORDER — METHYLPREDNISOLONE ACETATE 40 MG/ML
2 INJECTION, SUSPENSION INTRA-ARTICULAR; INTRALESIONAL; INTRAMUSCULAR; SOFT TISSUE
Status: COMPLETED | OUTPATIENT
Start: 2018-08-23 | End: 2018-08-23

## 2018-08-23 RX ORDER — BUPIVACAINE HYDROCHLORIDE 2.5 MG/ML
2 INJECTION, SOLUTION INFILTRATION; PERINEURAL
Status: COMPLETED | OUTPATIENT
Start: 2018-08-23 | End: 2018-08-23

## 2018-08-23 RX ADMIN — BUPIVACAINE HYDROCHLORIDE 2 ML: 2.5 INJECTION, SOLUTION INFILTRATION; PERINEURAL at 10:05

## 2018-08-23 RX ADMIN — METHYLPREDNISOLONE ACETATE 2 ML: 40 INJECTION, SUSPENSION INTRA-ARTICULAR; INTRALESIONAL; INTRAMUSCULAR; SOFT TISSUE at 10:05

## 2018-08-23 NOTE — PROGRESS NOTES
Assessment/Plan:  1  Encounter for lower extremity comparison imaging study    2  Primary osteoarthritis of right knee    3  Pes anserine bursitis      Orders Placed This Encounter   Procedures    Large joint arthrocentesis    XR knee 4+ vw right injury    XR knee 1 or 2 vw left   -patient received a right knee steroid injection today  She should avoid strenuous activities rest ice her knee 1-2 days   -continue with diclofenac as needed for pain control  -continue follow-up with pain management for her back and hip bursitis  She knows she can return to our office if she decides for hip bursitis  -may return in 2 weeks if needed for pes anserine bursa injection     Return in about 3 months (around 11/23/2018)  for steroid injection       Subjective   Chief Complaint:   Chief Complaint   Patient presents with    Right Knee - Follow-up       Kari Finney is a 77 y o  female who presents for follow up for right knee pain today  She had a right knee steroid injection on 05/21  She states she did get about 2 months of relief  We are going to try viscosupplementation but the co-pay is too high for her  She still having pain that is medial   The pain does not radiate  Her knee does lock on her and sticks occasionally  She did physical therapy a year ago has been doing home exercises  She does take diclofenac occasionally as needed for pain control  She states her primary care doctor is okay with this and does not report her blood pressure  She has been seeing spine and pain for her back pain has been receiving injections  She states that they talked to her at spine and  pain about doing greater troch bursa injections  She would  continue follow-up with them for this at that time  Review of Systems  ROS:    See HPI for musculoskeletal review     All other systems reviewed are negative     History:  Past Medical History:   Diagnosis Date    Arthritis     Fatty liver 5/1/2018    Hypertension     Primary localized osteoarthritis of right knee 8/17/2017     History reviewed  No pertinent surgical history  Social History   History   Alcohol Use No     History   Drug use: Unknown     History   Smoking Status    Never Smoker   Smokeless Tobacco    Never Used     Family History:   Family History   Problem Relation Age of Onset    Cancer Mother     Osteoporosis Mother     Skin cancer Father     Cancer Family     Osteoporosis Family        Meds/Allergies     (Not in a hospital admission)  Allergies   Allergen Reactions    Penicillins           Objective     /87   Pulse 83   Ht 5' 2" (1 575 m)   Wt 83 kg (183 lb)   BMI 33 47 kg/m²      PE:  AAOx 3  WDWN  Hearing intact, no drainage from eyes  no audible wheezing  no abdominal distension  LE compartments soft, skin intact    Ortho Exam:  right Knee:   No erythema  no swelling  no effusion  no warmth  AROM: full  Stable to varus/valgus stress  Mild TTP over pes anserine bursa      Large joint arthrocentesis  Date/Time: 8/23/2018 10:05 AM  Consent given by: patient  Site marked: site marked  Timeout: Immediately prior to procedure a time out was called to verify the correct patient, procedure, equipment, support staff and site/side marked as required   Supporting Documentation  Indications: pain   Procedure Details  Location: knee - R knee  Needle size: 22 G  Approach: anterolateral  Medications administered: 2 mL bupivacaine 0 25 %; 2 mL methylPREDNISolone acetate 40 mg/mL    Patient tolerance: patient tolerated the procedure well with no immediate complications  Dressing:  Sterile dressing applied      Imaging Studies: I have personally reviewed pertinent films in PACS  X-rays right knee- severe DJD

## 2018-09-05 ENCOUNTER — HOSPITAL ENCOUNTER (OUTPATIENT)
Dept: RADIOLOGY | Facility: MEDICAL CENTER | Age: 68
Discharge: HOME | End: 2018-09-05
Payer: COMMERCIAL

## 2018-09-05 VITALS
SYSTOLIC BLOOD PRESSURE: 120 MMHG | RESPIRATION RATE: 20 BRPM | DIASTOLIC BLOOD PRESSURE: 82 MMHG | OXYGEN SATURATION: 99 % | TEMPERATURE: 97.9 F | HEART RATE: 75 BPM

## 2018-09-05 DIAGNOSIS — M51.16 LUMBAR DISC HERNIATION WITH RADICULOPATHY: ICD-10-CM

## 2018-09-05 DIAGNOSIS — M54.50 LUMBAR PAIN: ICD-10-CM

## 2018-09-05 DIAGNOSIS — M54.16 LUMBAR RADICULOPATHY: ICD-10-CM

## 2018-09-05 PROCEDURE — 62323 NJX INTERLAMINAR LMBR/SAC: CPT | Performed by: PHYSICAL MEDICINE & REHABILITATION

## 2018-09-05 RX ORDER — LIDOCAINE HYDROCHLORIDE 10 MG/ML
5 INJECTION, SOLUTION EPIDURAL; INFILTRATION; INTRACAUDAL; PERINEURAL ONCE
Status: COMPLETED | OUTPATIENT
Start: 2018-09-05 | End: 2018-09-05

## 2018-09-05 RX ORDER — METHYLPREDNISOLONE ACETATE 80 MG/ML
80 INJECTION, SUSPENSION INTRA-ARTICULAR; INTRALESIONAL; INTRAMUSCULAR; PARENTERAL; SOFT TISSUE ONCE
Status: COMPLETED | OUTPATIENT
Start: 2018-09-05 | End: 2018-09-05

## 2018-09-05 RX ADMIN — IOHEXOL 2 ML: 300 INJECTION, SOLUTION INTRAVENOUS at 14:42

## 2018-09-05 RX ADMIN — METHYLPREDNISOLONE ACETATE 80 MG: 80 INJECTION, SUSPENSION INTRA-ARTICULAR; INTRALESIONAL; INTRAMUSCULAR; PARENTERAL; SOFT TISSUE at 14:42

## 2018-09-05 RX ADMIN — LIDOCAINE HYDROCHLORIDE 2.5 ML: 10 INJECTION, SOLUTION EPIDURAL; INFILTRATION; INTRACAUDAL; PERINEURAL at 14:40

## 2018-09-05 NOTE — H&P
History of Present Illness: The patient is a 77 y o  female who presents with complaints of Back and radiating leg pain    Patient Active Problem List   Diagnosis    Primary localized osteoarthritis of right knee    Right knee pain    Lumbar disc herniation with radiculopathy    Lumbar pain    Fibromyalgia    Benign essential HTN    Fatty liver    Gastroesophageal reflux disease without esophagitis    Lumbar radiculopathy    Sacroiliitis (HCC)    Pes anserine bursitis       Past Medical History:   Diagnosis Date    Arthritis     Fatty liver 5/1/2018    Hypertension     Primary localized osteoarthritis of right knee 8/17/2017       No past surgical history on file        Current Outpatient Prescriptions:     amLODIPine-benazepril (LOTREL) 10-20 MG per capsule, Take 1 capsule by mouth daily, Disp: 90 capsule, Rfl: 1    Coenzyme Q10 (COQ10) 100 MG CAPS, Take 1 capsule by mouth daily  , Disp: , Rfl:     diclofenac (VOLTAREN) 75 mg EC tablet, Take 1 tablet (75 mg total) by mouth 2 (two) times a day as needed (pain), Disp: 60 tablet, Rfl: 2    DULoxetine (CYMBALTA) 60 mg delayed release capsule, Take 1 capsule (60 mg total) by mouth daily, Disp: 30 capsule, Rfl: 2    gabapentin (NEURONTIN) 300 mg capsule, Take 1 capsule (300 mg total) by mouth 3 (three) times a day, Disp: 90 capsule, Rfl: 2    omeprazole (PriLOSEC) 10 mg delayed release capsule, Take 10 mg by mouth daily  , Disp: , Rfl:     SUPER B COMPLEX/C PO, Take 1 tablet by mouth daily  , Disp: , Rfl:     TURMERIC CURCUMIN PO, Take 1 capsule by mouth daily  , Disp: , Rfl:     Allergies   Allergen Reactions    Penicillins        Physical Exam:   Vitals:    09/05/18 1422   BP: 120/82   Pulse: 75   Resp: 20   Temp: 97 9 °F (36 6 °C)   SpO2: 99%     General: Awake, Alert, Oriented x 3, Mood and affect appropriate  Respiratory: Respirations even and unlabored  Cardiovascular: Peripheral pulses intact; no edema  Musculoskeletal Exam:  Back and radiating leg pain    ASA Score:  2  Patient/Chart Verification  Patient ID Verified: Verbal  ID Band Applied: No  Consents Confirmed: Procedural, To be obtained in the Pre-Procedure area  H&P( within 30 days) Verified: To be obtained in the Pre-Procedure area  Interval H&P(within 24 hr) Complete (required for Outpatients and Surgery Admit only): To be obtained in the Pre-Procedure area  Allergies Reviewed: Yes  Anticoag/NSAID held?: Yes (per pt no nsiads in greater than 4 days )  Currently on antibiotics?: No    Assessment:   1  Lumbar disc herniation with radiculopathy    2  Lumbar radiculopathy    3   Lumbar pain        Plan: Right L5-S1 LESI #2

## 2018-09-05 NOTE — DISCHARGE INSTRUCTIONS
Epidural Steroid Injection   WHAT YOU NEED TO KNOW:   An epidural steroid injection (CHRISSIE) is a procedure to inject steroid medicine into the epidural space  The epidural space is between your spinal cord and vertebrae  Steroids reduce inflammation and fluid buildup in your spine that may be causing pain  You may be given pain medicine along with the steroids  ACTIVITY  · Do not drive or operate machinery today  · No strenuous activity today - bending, lifting, etc   · You may resume normal activites starting tomorrow - start slowly and as tolerated  · You may shower today, but no tub baths or hot tubs  · You may have numbness for several hours from the local anesthetic  Please use caution and common sense, especially with weight-bearing activities  CARE OF THE INJECTION SITE  · If you have soreness or pain, apply ice to the area today (20 minutes on/20 minutes off)  · Starting tomorrow, you may use warm, moist heat or ice if needed  · You may have an increase or change in your discomfort for 36-48 hours after your treatment  · Apply ice and continue with any pain medication you have been prescribed  · Notify [facility] if you have any of the following: redness, drainage, swelling, headache, stiff neck or fever above 100°F     SPECIAL INSTRUCTIONS  · Our office will contact you in approximately 7 days for a progress report  MEDICATIONS  · Continue to take all routine medications  · Our office may have instructed you to hold some medications  If you have a problem specifically related to your procedure, please call our office at [phone]  Problems not related to your procedure should be directed to your primary care physician

## 2018-09-14 ENCOUNTER — TELEPHONE (OUTPATIENT)
Dept: PAIN MEDICINE | Facility: CLINIC | Age: 68
End: 2018-09-14

## 2018-09-14 NOTE — TELEPHONE ENCOUNTER
Pt  Reports 95% relief post inj  With a pressure feeling from the inj  Site down her thigh 1/10 pain

## 2018-10-05 ENCOUNTER — OFFICE VISIT (OUTPATIENT)
Dept: INTERNAL MEDICINE CLINIC | Facility: CLINIC | Age: 68
End: 2018-10-05
Payer: COMMERCIAL

## 2018-10-05 VITALS
SYSTOLIC BLOOD PRESSURE: 122 MMHG | WEIGHT: 183 LBS | DIASTOLIC BLOOD PRESSURE: 80 MMHG | OXYGEN SATURATION: 98 % | BODY MASS INDEX: 33.68 KG/M2 | TEMPERATURE: 97.8 F | HEART RATE: 79 BPM | HEIGHT: 62 IN

## 2018-10-05 DIAGNOSIS — K21.9 GASTROESOPHAGEAL REFLUX DISEASE WITHOUT ESOPHAGITIS: ICD-10-CM

## 2018-10-05 DIAGNOSIS — M51.16 LUMBAR DISC HERNIATION WITH RADICULOPATHY: ICD-10-CM

## 2018-10-05 DIAGNOSIS — R60.0 BILATERAL LOWER EXTREMITY EDEMA: Primary | ICD-10-CM

## 2018-10-05 DIAGNOSIS — M79.7 FIBROMYALGIA: ICD-10-CM

## 2018-10-05 DIAGNOSIS — I10 BENIGN ESSENTIAL HTN: ICD-10-CM

## 2018-10-05 PROCEDURE — 3074F SYST BP LT 130 MM HG: CPT | Performed by: INTERNAL MEDICINE

## 2018-10-05 PROCEDURE — 99214 OFFICE O/P EST MOD 30 MIN: CPT | Performed by: INTERNAL MEDICINE

## 2018-10-05 PROCEDURE — 3079F DIAST BP 80-89 MM HG: CPT | Performed by: INTERNAL MEDICINE

## 2018-10-05 NOTE — ASSESSMENT & PLAN NOTE
Received epidural injection and recently started on gabapentin 300mg bid  Advised decreasing dose of gabapentin to 300mg in PM due to LE edema    Continue follow up with Pain Management

## 2018-10-05 NOTE — ASSESSMENT & PLAN NOTE
Suspect due to medication side effect  Suspect more due to gabapentin though amlodipine can also cause LE edema    Advised decreasing dose of gabapentin to 300mg in PM

## 2018-10-05 NOTE — PROGRESS NOTES
Assessment/Plan:    Benign essential HTN  She is normotensive on amlodipine-benazepril 10/20mg daily  Obtain labs  Gastroesophageal reflux disease without esophagitis  She will continue on omeprazole 10mg daily as she was unable to wean off the medication    Lumbar disc herniation with radiculopathy  Received epidural injection and recently started on gabapentin 300mg bid  Advised decreasing dose of gabapentin to 300mg in PM due to LE edema  Continue follow up with Pain Management    Bilateral lower extremity edema  Suspect due to medication side effect  Suspect more due to gabapentin though amlodipine can also cause LE edema  Advised decreasing dose of gabapentin to 300mg in PM    Fibromyalgia  Improved on duloxetine 60mg daily  Will continue    1  Bilateral lower extremity edema     2  Benign essential HTN  Comprehensive metabolic panel   3  Fibromyalgia     4  Lumbar disc herniation with radiculopathy     5  Gastroesophageal reflux disease without esophagitis         Subjective:      Patient ID: Milton Laird is a 77 y o  female  HPI  65yo female with LBP, GERD, fibromyalgia, b/l knee pain, fatty liver and HTN here for follow up care  She is accompanied by her   She has had BLE edema intermittently over the summer, however over the past two weeks it has been persistent  Has had chronic MEYER, has morning cough, weight is overall stable  She adheres to low sodium diet  Home bp around 120-130/70s  She was started on gabapentin 300mg bid one month ago for low back pain with some improvement  She also has received epidural injection one month ago  She has been on cymbalta 60mg daily for fibromyalgia and reports decreased numbness in her extremities and her myalgias are improved  She also feels more real  xed  She is unable to wean off omeprazole, she also takes rolaids and TUMS  She gets burning sensation in her stomach      The following portions of the patient's history were reviewed and updated as appropriate: allergies, current medications, past family history, past medical history, past social history, past surgical history and problem list     Current Outpatient Prescriptions:     amLODIPine-benazepril (LOTREL) 10-20 MG per capsule, Take 1 capsule by mouth daily, Disp: 90 capsule, Rfl: 1    Coenzyme Q10 (COQ10) 100 MG CAPS, Take 1 capsule by mouth daily  , Disp: , Rfl:     diclofenac (VOLTAREN) 75 mg EC tablet, Take 1 tablet (75 mg total) by mouth 2 (two) times a day as needed (pain), Disp: 60 tablet, Rfl: 2    DULoxetine (CYMBALTA) 60 mg delayed release capsule, Take 1 capsule (60 mg total) by mouth daily, Disp: 30 capsule, Rfl: 2    gabapentin (NEURONTIN) 300 mg capsule, Take 1 capsule (300 mg total) by mouth 3 (three) times a day, Disp: 90 capsule, Rfl: 2    omeprazole (PriLOSEC) 10 mg delayed release capsule, Take 10 mg by mouth daily  , Disp: , Rfl:     SUPER B COMPLEX/C PO, Take 1 tablet by mouth daily  , Disp: , Rfl:     TURMERIC CURCUMIN PO, Take 1 capsule by mouth daily  , Disp: , Rfl:       Review of Systems   Constitutional: Negative  HENT: Positive for postnasal drip and rhinorrhea  Respiratory: Negative for wheezing  Productive cough, chronic MEYER   Cardiovascular: Positive for leg swelling  Negative for chest pain and palpitations  Gastrointestinal:        Reflux   Genitourinary: Negative  Musculoskeletal: Positive for arthralgias, back pain and myalgias  Knee pain   Allergic/Immunologic: Positive for environmental allergies  Neurological: Positive for numbness  Negative for dizziness and headaches  Psychiatric/Behavioral: Negative for dysphoric mood and sleep disturbance  The patient is nervous/anxious          Objective:    /80 (BP Location: Left arm, Patient Position: Sitting, Cuff Size: Large)   Pulse 79   Temp 97 8 °F (36 6 °C)   Ht 5' 2" (1 575 m)   Wt 83 kg (183 lb)   SpO2 98%   BMI 33 47 kg/m²      Physical Exam Constitutional: She is oriented to person, place, and time  She appears well-developed and well-nourished  HENT:   Nose: Rhinorrhea present  Mouth/Throat: Oropharynx is clear and moist  No oropharyngeal exudate  Neck: Neck supple  No thyromegaly present  Cardiovascular: Normal rate, regular rhythm, normal heart sounds and intact distal pulses  1+ nonpitting edema BLE   Pulmonary/Chest: Effort normal and breath sounds normal  No respiratory distress  She has no wheezes  Lymphadenopathy:     She has no cervical adenopathy  Neurological: She is alert and oriented to person, place, and time  Psychiatric: She has a normal mood and affect  Vitals reviewed

## 2018-10-15 ENCOUNTER — OFFICE VISIT (OUTPATIENT)
Dept: PAIN MEDICINE | Facility: MEDICAL CENTER | Age: 68
End: 2018-10-15
Payer: COMMERCIAL

## 2018-10-15 VITALS
HEART RATE: 80 BPM | SYSTOLIC BLOOD PRESSURE: 122 MMHG | HEIGHT: 62 IN | DIASTOLIC BLOOD PRESSURE: 88 MMHG | WEIGHT: 182 LBS | BODY MASS INDEX: 33.49 KG/M2

## 2018-10-15 DIAGNOSIS — M51.16 LUMBAR DISC HERNIATION WITH RADICULOPATHY: Primary | ICD-10-CM

## 2018-10-15 DIAGNOSIS — M54.50 LUMBAR PAIN: ICD-10-CM

## 2018-10-15 DIAGNOSIS — M79.7 FIBROMYALGIA: ICD-10-CM

## 2018-10-15 DIAGNOSIS — M54.16 LUMBAR RADICULOPATHY: ICD-10-CM

## 2018-10-15 PROCEDURE — 99213 OFFICE O/P EST LOW 20 MIN: CPT | Performed by: NURSE PRACTITIONER

## 2018-10-15 RX ORDER — GABAPENTIN 300 MG/1
300 CAPSULE ORAL 3 TIMES DAILY
Qty: 90 CAPSULE | Refills: 2 | Status: SHIPPED | OUTPATIENT
Start: 2018-10-15 | End: 2019-01-11 | Stop reason: SDUPTHER

## 2018-10-15 NOTE — PROGRESS NOTES
Assessment:  1  Lumbar disc herniation with radiculopathy    2  Lumbar radiculopathy    3  Lumbar pain    4  Fibromyalgia        Plan:  1  Patient is doing well s/p repeat L5-S1 LESI on September 5, 2018  We can repeat this injection p r n   2   Patient will continue on gabapentin 300 mg t i d  p r n     I did discuss with the patient that if her numbness and paresthesias worsen in the right lower extremity, we can increase to 400 mg t i d , however the patient would like to continue on her current dose at this time  This medication was refilled at today's office visit  3   Patient can continue on Duloxetine 60 mg as prescribed by her primary care physician  4   The patient will continue on diclofenac 75 mg b i d  p r n  Bryant Manifold She does not need a refill of this medication at today's office visit  5   Patient will continue with her home exercise program  6  Patient will continue to follow with Orthopedics for right knee injections  7   The patient will follow-up in 3 months for medication prescription refill and reevaluation  The patient was advised to contact the office should their symptoms worsen in the interim  The patient was agreeable and verbalized an understanding  My impressions and treatment recommendations were discussed in detail with the patient who verbalized understanding and had no further questions  Discharge instructions were provided  I personally saw and examined the patient and I agree with the above discussed plan of care  No orders of the defined types were placed in this encounter      New Medications Ordered This Visit   Medications    gabapentin (NEURONTIN) 300 mg capsule     Sig: Take 1 capsule (300 mg total) by mouth 3 (three) times a day     Dispense:  90 capsule     Refill:  2       History of Present Illness:  Wayne Joel is a 77 y o  female last seen in the office on August 20, 2018 who presents for a follow up office visit in regards to Back Pain with radicular symptoms into the bilateral lower extremities, right greater than left, secondary to lumbar disc herniation, lumbar stenosis and spondylosis, and fibromyalgia  The patient is status post repeat right L5-S1 LESI with Dr Oumar Conklin on September 5, 2018 with significant relief of her right lower extremity symptoms  She does continue with occasional numbness in the posterior aspect of her right lower extremity, however she states that it is not bothersome at this time  The patient states that she has also been following with Dr Anushka Estevez of orthopedics for right knee injections and is scheduled to receive Synvisc injections in the near future  Patient currently reports her pain as a 2/10 on the numeric pain rating scale  She states the pain is occasional in nature and follows no particular pattern throughout the day  She characterizes the pain as dull aching    Current pain medications includes:   Gabapentin 300 mg t i d , Duloxetine 60 mg daily, and diclofenac 75 mg 1 tablet b i d  p r n     The patient reports that this regimen is providing 95% pain relief  The patient is reporting no side effects from this pain medication regimen  I have personally reviewed and/or updated the patient's past medical history, past surgical history, family history, social history, current medications, allergies, and vital signs today  Review of Systems   Respiratory: Negative for shortness of breath  Cardiovascular: Negative for chest pain  Gastrointestinal: Negative for constipation, diarrhea, nausea and vomiting  Musculoskeletal: Negative for arthralgias, gait problem, joint swelling and myalgias  Skin: Negative for rash  Neurological: Negative for dizziness, seizures and weakness  All other systems reviewed and are negative        Patient Active Problem List   Diagnosis    Primary localized osteoarthritis of right knee    Right knee pain    Lumbar disc herniation with radiculopathy    Lumbar pain    Fibromyalgia    Benign essential HTN    Fatty liver    Gastroesophageal reflux disease without esophagitis    Lumbar radiculopathy    Sacroiliitis (HCC)    Pes anserine bursitis    Bilateral lower extremity edema       Past Medical History:   Diagnosis Date    Arthritis     Fatty liver 5/1/2018    Hypertension     Primary localized osteoarthritis of right knee 8/17/2017       No past surgical history on file  Family History   Problem Relation Age of Onset    Cancer Mother     Osteoporosis Mother     Skin cancer Father     Cancer Family     Osteoporosis Family        Social History     Occupational History    Not on file  Social History Main Topics    Smoking status: Never Smoker    Smokeless tobacco: Never Used    Alcohol use No    Drug use: Unknown    Sexual activity: Not on file       Current Outpatient Prescriptions on File Prior to Visit   Medication Sig    amLODIPine-benazepril (LOTREL) 10-20 MG per capsule Take 1 capsule by mouth daily    Coenzyme Q10 (COQ10) 100 MG CAPS Take 1 capsule by mouth daily      diclofenac (VOLTAREN) 75 mg EC tablet Take 1 tablet (75 mg total) by mouth 2 (two) times a day as needed (pain)    DULoxetine (CYMBALTA) 60 mg delayed release capsule Take 1 capsule (60 mg total) by mouth daily    omeprazole (PriLOSEC) 10 mg delayed release capsule Take 10 mg by mouth daily      SUPER B COMPLEX/C PO Take 1 tablet by mouth daily      TURMERIC CURCUMIN PO Take 1 capsule by mouth daily      [DISCONTINUED] gabapentin (NEURONTIN) 300 mg capsule Take 1 capsule (300 mg total) by mouth 3 (three) times a day     No current facility-administered medications on file prior to visit          Allergies   Allergen Reactions    Penicillins        Physical Exam:    /88   Pulse 80   Ht 5' 2" (1 575 m)   Wt 82 6 kg (182 lb)   BMI 33 29 kg/m²     Constitutional:normal, well developed, well nourished, alert, in no distress and non-toxic and no overt pain behavior  Eyes:anicteric  HEENT:grossly intact  Neck:supple, symmetric, trachea midline and no masses   Pulmonary:even and unlabored  Cardiovascular:No edema or pitting edema present  Skin:Normal without rashes or lesions and well hydrated  Psychiatric:Mood and affect appropriate  Neurologic:Cranial Nerves II-XII grossly intact  Musculoskeletal:normal gait  Bilateral lower extremity strength 5/5 in all muscle groups  Negative seated straight leg raise bilaterally  Imaging  MRI LUMBAR SPINE WITHOUT CONTRAST     INDICATION:  Chronic low back pain and bilateral leg pain      COMPARISON:  None      TECHNIQUE:  Sagittal T1, sagittal T2, sagittal inversion recovery, axial T1 and axial T2, coronal T2        IMAGE QUALITY:  Diagnostic     FINDINGS:     ALIGNMENT:  Slight dextroscoliosis lumbar spine      MARROW SIGNAL:  Normal marrow signal is identified within the visualized bony structures  No discrete marrow lesion      DISTAL CORD AND CONUS:  Normal size and signal within the distal cord and conus  The conus ends at the L2 level      PARASPINAL SOFT TISSUES:  Paraspinal soft tissues are unremarkable      SACRUM:  Normal signal within the sacrum  No evidence of insufficiency or stress fracture      LOWER THORACIC DISC SPACES:  Normal disc height and signal   No disc herniation, canal stenosis or foraminal narrowing      LUMBAR DISC SPACES:          L1-L2:  Normal      L2-L3:  Normal      L3-L4:  Moderate facet hypertrophy  Minimal central stenosis without foraminal narrowing      L4-L5:  Broad-based central protrusion type disc herniation with mild facet hypertrophy  There is mild central stenosis  Foramina are patent      L5-S1:  Degenerative disc ossify complex with marginal osteophytes identified  There is a moderate size central superiorly extending protrusion type disc herniation contributing to mild central stenosis and mild right lateral recess stenosis    Moderate   facet hypertrophy noted      IMPRESSION:     Mild central stenosis L4-5 and L5-S1 secondary to protrusion type disc herniation superimposed on annular bulging  At L5-S1, moderate central and slightly right paramedian disc extends slightly superiorly    Moderate L5-S1 facet hypertrophy

## 2018-11-05 ENCOUNTER — APPOINTMENT (OUTPATIENT)
Dept: LAB | Age: 68
End: 2018-11-05
Payer: COMMERCIAL

## 2018-11-05 DIAGNOSIS — I10 BENIGN ESSENTIAL HTN: ICD-10-CM

## 2018-11-05 LAB
ALBUMIN SERPL BCP-MCNC: 3.8 G/DL (ref 3.5–5)
ALP SERPL-CCNC: 91 U/L (ref 46–116)
ALT SERPL W P-5'-P-CCNC: 56 U/L (ref 12–78)
ANION GAP SERPL CALCULATED.3IONS-SCNC: 6 MMOL/L (ref 4–13)
AST SERPL W P-5'-P-CCNC: 22 U/L (ref 5–45)
BILIRUB SERPL-MCNC: 0.56 MG/DL (ref 0.2–1)
BUN SERPL-MCNC: 22 MG/DL (ref 5–25)
CALCIUM SERPL-MCNC: 9.1 MG/DL (ref 8.3–10.1)
CHLORIDE SERPL-SCNC: 104 MMOL/L (ref 100–108)
CO2 SERPL-SCNC: 27 MMOL/L (ref 21–32)
CREAT SERPL-MCNC: 0.65 MG/DL (ref 0.6–1.3)
GFR SERPL CREATININE-BSD FRML MDRD: 90 ML/MIN/1.73SQ M
GLUCOSE P FAST SERPL-MCNC: 93 MG/DL (ref 65–99)
POTASSIUM SERPL-SCNC: 4.1 MMOL/L (ref 3.5–5.3)
PROT SERPL-MCNC: 7.3 G/DL (ref 6.4–8.2)
SODIUM SERPL-SCNC: 137 MMOL/L (ref 136–145)

## 2018-11-05 PROCEDURE — 36415 COLL VENOUS BLD VENIPUNCTURE: CPT

## 2018-11-05 PROCEDURE — 80053 COMPREHEN METABOLIC PANEL: CPT

## 2018-11-12 DIAGNOSIS — M25.561 CHRONIC PAIN OF RIGHT KNEE: ICD-10-CM

## 2018-11-12 DIAGNOSIS — M17.11 PRIMARY LOCALIZED OSTEOARTHRITIS OF RIGHT KNEE: ICD-10-CM

## 2018-11-12 DIAGNOSIS — M46.1 SACROILIITIS (HCC): ICD-10-CM

## 2018-11-12 DIAGNOSIS — G89.29 CHRONIC PAIN OF RIGHT KNEE: ICD-10-CM

## 2018-11-12 RX ORDER — DICLOFENAC SODIUM 75 MG/1
75 TABLET, DELAYED RELEASE ORAL 2 TIMES DAILY PRN
Qty: 60 TABLET | Refills: 2 | OUTPATIENT
Start: 2018-11-12

## 2018-11-12 NOTE — TELEPHONE ENCOUNTER
RN s/w pt regarding previous  Per pt she does not require a refill of this medication at this time  Per Nathaniel Lares she has two bottles of this at this time  RN then called [facility] and left a VMMOM regarding same  Pt has f/u appt with [facility] in january and will call if needs refill sooner

## 2018-11-15 DIAGNOSIS — M79.7 FIBROMYALGIA: ICD-10-CM

## 2018-11-15 RX ORDER — DULOXETIN HYDROCHLORIDE 60 MG/1
CAPSULE, DELAYED RELEASE ORAL
Qty: 30 CAPSULE | Refills: 1 | Status: SHIPPED | OUTPATIENT
Start: 2018-11-15 | End: 2019-01-10 | Stop reason: SDUPTHER

## 2018-11-17 DIAGNOSIS — I10 BENIGN ESSENTIAL HTN: ICD-10-CM

## 2018-11-19 ENCOUNTER — OFFICE VISIT (OUTPATIENT)
Dept: OBGYN CLINIC | Facility: MEDICAL CENTER | Age: 68
End: 2018-11-19
Payer: COMMERCIAL

## 2018-11-19 VITALS
BODY MASS INDEX: 33.31 KG/M2 | DIASTOLIC BLOOD PRESSURE: 70 MMHG | HEIGHT: 62 IN | WEIGHT: 181 LBS | SYSTOLIC BLOOD PRESSURE: 114 MMHG | HEART RATE: 78 BPM

## 2018-11-19 DIAGNOSIS — M17.11 PRIMARY LOCALIZED OSTEOARTHRITIS OF RIGHT KNEE: Primary | ICD-10-CM

## 2018-11-19 PROCEDURE — 99213 OFFICE O/P EST LOW 20 MIN: CPT | Performed by: ORTHOPAEDIC SURGERY

## 2018-11-19 PROCEDURE — 20610 DRAIN/INJ JOINT/BURSA W/O US: CPT | Performed by: ORTHOPAEDIC SURGERY

## 2018-11-19 RX ORDER — AMLODIPINE BESYLATE AND BENAZEPRIL HYDROCHLORIDE 10; 20 MG/1; MG/1
CAPSULE ORAL
Qty: 90 CAPSULE | Refills: 1 | Status: SHIPPED | OUTPATIENT
Start: 2018-11-19 | End: 2019-03-05 | Stop reason: SDUPTHER

## 2018-11-19 RX ORDER — METHYLPREDNISOLONE ACETATE 40 MG/ML
2 INJECTION, SUSPENSION INTRA-ARTICULAR; INTRALESIONAL; INTRAMUSCULAR; SOFT TISSUE
Status: COMPLETED | OUTPATIENT
Start: 2018-11-19 | End: 2018-11-19

## 2018-11-19 RX ORDER — LIDOCAINE HYDROCHLORIDE 10 MG/ML
3 INJECTION, SOLUTION INFILTRATION; PERINEURAL
Status: COMPLETED | OUTPATIENT
Start: 2018-11-19 | End: 2018-11-19

## 2018-11-19 RX ADMIN — METHYLPREDNISOLONE ACETATE 2 ML: 40 INJECTION, SUSPENSION INTRA-ARTICULAR; INTRALESIONAL; INTRAMUSCULAR; SOFT TISSUE at 08:19

## 2018-11-19 RX ADMIN — LIDOCAINE HYDROCHLORIDE 3 ML: 10 INJECTION, SOLUTION INFILTRATION; PERINEURAL at 08:19

## 2018-11-19 NOTE — PROGRESS NOTES
Assessment/Plan:  1  Primary localized osteoarthritis of right knee      Orders Placed This Encounter   Procedures    Large joint arthrocentesis   · Steroid injection given today  Patient encouraged to rest and ice knee today  Continue diclofenac prn pain, medications warnings were reviewed with patient  · Follow-up in 2 months for gel injection  Subjective   Chief Complaint:   Chief Complaint   Patient presents with    Right Knee - Follow-up       Mason Stahl is a 77 y o  female who presents for follow up of right knee pain  Patient last received CSI 3 months ago and would like another one today  She reports about 2 months of pain relief from her last injection  She states she uses ice and Diclofenac for pain control when her injection wears off  She reports locking in her knee that occurs 1-2 times per day and occasional instability  No recent falls or trauma  Review of Systems  ROS:    See HPI for musculoskeletal review  All other systems reviewed are negative     History:  Past Medical History:   Diagnosis Date    Arthritis     Fatty liver 5/1/2018    Hypertension     Primary localized osteoarthritis of right knee 8/17/2017     History reviewed  No pertinent surgical history    Social History   History   Alcohol Use No     History   Drug use: Unknown     History   Smoking Status    Never Smoker   Smokeless Tobacco    Never Used     Family History:   Family History   Problem Relation Age of Onset    Cancer Mother     Osteoporosis Mother     Skin cancer Father     Cancer Family     Osteoporosis Family        Meds/Allergies     (Not in a hospital admission)  Allergies   Allergen Reactions    Penicillins           Objective     /70   Pulse 78   Ht 5' 2" (1 575 m)   Wt 82 1 kg (181 lb)   BMI 33 11 kg/m²      PE:  AAOx 3  WDWN  Hearing intact, no drainage from eyes  no audible wheezing  no abdominal distension  LE compartments soft, skin intact    Ortho Exam:  right Knee:   No erythema  no swelling  no effusion  no warmth  AROM: full  Stable to varus/valgus stress    Large joint arthrocentesis  Date/Time: 11/19/2018 8:19 AM  Consent given by: patient  Site marked: site marked  Supporting Documentation  Indications: pain   Procedure Details  Location: knee - R knee  Preparation: Patient was prepped and draped in the usual sterile fashion  Needle size: 22 G  Ultrasound guidance: no  Approach: anterolateral  Medications administered: 3 mL lidocaine 1 %; 2 mL methylPREDNISolone acetate 40 mg/mL    Patient tolerance: patient tolerated the procedure well with no immediate complications  Dressing:  Sterile dressing applied

## 2019-01-10 DIAGNOSIS — M79.7 FIBROMYALGIA: ICD-10-CM

## 2019-01-10 RX ORDER — DULOXETIN HYDROCHLORIDE 60 MG/1
60 CAPSULE, DELAYED RELEASE ORAL DAILY
Qty: 30 CAPSULE | Refills: 2 | Status: SHIPPED | OUTPATIENT
Start: 2019-01-10 | End: 2019-03-05 | Stop reason: SDUPTHER

## 2019-01-10 NOTE — TELEPHONE ENCOUNTER
Will renew present dose, med does not come at a higher dose, additional pill would have to be rx  We can discuss at next office visit

## 2019-01-11 ENCOUNTER — OFFICE VISIT (OUTPATIENT)
Dept: PAIN MEDICINE | Facility: MEDICAL CENTER | Age: 69
End: 2019-01-11
Payer: MEDICARE

## 2019-01-11 VITALS
BODY MASS INDEX: 33.86 KG/M2 | SYSTOLIC BLOOD PRESSURE: 128 MMHG | DIASTOLIC BLOOD PRESSURE: 84 MMHG | WEIGHT: 184 LBS | HEIGHT: 62 IN

## 2019-01-11 DIAGNOSIS — M54.50 LUMBAR PAIN: ICD-10-CM

## 2019-01-11 DIAGNOSIS — M79.7 FIBROMYALGIA: ICD-10-CM

## 2019-01-11 DIAGNOSIS — M54.16 LUMBAR RADICULOPATHY: ICD-10-CM

## 2019-01-11 DIAGNOSIS — M51.16 LUMBAR DISC HERNIATION WITH RADICULOPATHY: Primary | ICD-10-CM

## 2019-01-11 PROCEDURE — 99213 OFFICE O/P EST LOW 20 MIN: CPT | Performed by: NURSE PRACTITIONER

## 2019-01-11 RX ORDER — GABAPENTIN 300 MG/1
300 CAPSULE ORAL DAILY
Qty: 30 CAPSULE | Refills: 2 | Status: SHIPPED | OUTPATIENT
Start: 2019-01-11 | End: 2019-04-19

## 2019-01-28 ENCOUNTER — APPOINTMENT (OUTPATIENT)
Dept: RADIOLOGY | Facility: CLINIC | Age: 69
End: 2019-01-28
Payer: MEDICARE

## 2019-01-28 ENCOUNTER — OFFICE VISIT (OUTPATIENT)
Dept: OBGYN CLINIC | Facility: MEDICAL CENTER | Age: 69
End: 2019-01-28
Payer: MEDICARE

## 2019-01-28 VITALS
DIASTOLIC BLOOD PRESSURE: 73 MMHG | BODY MASS INDEX: 32.79 KG/M2 | HEART RATE: 103 BPM | SYSTOLIC BLOOD PRESSURE: 120 MMHG | HEIGHT: 62 IN | WEIGHT: 178.2 LBS

## 2019-01-28 DIAGNOSIS — M25.562 LEFT KNEE PAIN, UNSPECIFIED CHRONICITY: ICD-10-CM

## 2019-01-28 DIAGNOSIS — M17.11 PRIMARY LOCALIZED OSTEOARTHRITIS OF RIGHT KNEE: Primary | ICD-10-CM

## 2019-01-28 PROCEDURE — 73564 X-RAY EXAM KNEE 4 OR MORE: CPT

## 2019-01-28 PROCEDURE — 20610 DRAIN/INJ JOINT/BURSA W/O US: CPT | Performed by: PHYSICIAN ASSISTANT

## 2019-01-28 PROCEDURE — 99213 OFFICE O/P EST LOW 20 MIN: CPT | Performed by: PHYSICIAN ASSISTANT

## 2019-01-28 RX ORDER — DICLOFENAC SODIUM 75 MG/1
TABLET, DELAYED RELEASE ORAL
Qty: 60 TABLET | Refills: 2 | Status: SHIPPED | OUTPATIENT
Start: 2019-01-28 | End: 2019-03-05

## 2019-01-28 NOTE — PROGRESS NOTES
Assessment/Plan     1  Left knee pain, unspecified chronicity    2  Primary localized osteoarthritis of right knee      Orders Placed This Encounter   Procedures    XR knee 4+ vw left injury   -patient received right knee Synvisc injection today  She should avoid strenuous activities rest ice her knee for 1-2 days  -patient received left knee steroid injection today  She should avoid strenuous activities and rest and ice her knee for 1-2 days   -continue with diclofenac as needed for pain control  -continue with home exercises  -continue with knee brace as needed for comfort  -continue follow-up with pain management for her lower back    Return in about 2 months (around 3/28/2019)  History of Present Illness   Chief complaint:   Chief Complaint   Patient presents with    Right Knee - Follow-up       HPI: Mason Stahl is a 77 y o  female that c/o right knee pain  She last had a steroid injection on 11/19/18  She states that helped relieve her pain until last week  She states her knee pain is medial and lateral   The pain comes and goes  Her knee does feel unstable at times  She does have a knee brace which does help  She has been doing home exercises at the gym which helped  She takes diclofenac as needed for pain control which does help  She is here for Synvisc injection today  She also started complaining of left knee pain that started a month ago  Her knee pain is medial   The pain does not radiate  Pain comes and goes  Her knee does have some instability at times  She has not had any injections or surgeries on this knee  She also is complaining of lower back pain that radiates down her right leg  She does see pain management for this  ROS:    See HPI for musculoskeletal review     All other systems reviewed are negative     Historical Information   Past Medical History:   Diagnosis Date    Arthritis     Fatty liver 5/1/2018    Hypertension     Primary localized osteoarthritis of right knee 8/17/2017     No past surgical history on file  Social History   History   Alcohol Use No     History   Drug use: Unknown     History   Smoking Status    Never Smoker   Smokeless Tobacco    Never Used     Family History:   Family History   Problem Relation Age of Onset    Cancer Mother     Osteoporosis Mother     Skin cancer Father     Cancer Family     Osteoporosis Family        Meds/Allergies     (Not in a hospital admission)  Allergies   Allergen Reactions    Penicillins        Objective   Vitals: Blood pressure 120/73, pulse 103, height 5' 2" (1 575 m), weight 80 8 kg (178 lb 3 2 oz)  ,Body mass index is 32 59 kg/m²      PE:  AAOx 3  WDWN  Hearing intact, no drainage from eyes  Regular rate  no audible wheezing  no abdominal distension  LE compartments soft, skin intact  EHL/AT/GS intact, sensation grossly intact L4, L5, S1, palpable pedal pulse    leftknee:    Appearance:  no swelling   No bruising    no obvious joint deformity   No effusion  Palpation/Tenderness:  +TTP over medial joint line and pes anserine bursa, no TTP over lateral joint line or over patella/patellar tendon  Active Range of Motion:   AROM: full  Special Tests:  Medial Pérez's Test:  Negative  Lateral Pérez's Test:  Negative  Apley's compression test:  Negative  Lachman's Test:  negative  Anterior Drawer Test:  negative  Valgus Stress Test:  negative  Varus Stress Test:  negative      No ipsilateral hip pain with ROM  Right Knee:  no swelling   No bruising    no obvious joint deformity   No effusion  ROM full   + TTP over medial joint line    Large joint arthrocentesis  Date/Time: 1/28/2019 11:51 AM  Consent given by: patient  Site marked: site marked  Timeout: Immediately prior to procedure a time out was called to verify the correct patient, procedure, equipment, support staff and site/side marked as required   Supporting Documentation  Indications: pain   Procedure Details  Location: knee - R knee  Preparation: Patient was prepped and draped in the usual sterile fashion  Needle size: 22 G  Ultrasound guidance: no  Approach: anterolateral  Medications administered: 16 mg hylan 16 MG/2ML    Patient tolerance: patient tolerated the procedure well with no immediate complications  Dressing:  Sterile dressing applied      Imaging Studies: I have personally reviewed pertinent films in PACS   X-rays left knee- severe DJD

## 2019-03-01 ENCOUNTER — TELEPHONE (OUTPATIENT)
Dept: OBGYN CLINIC | Facility: HOSPITAL | Age: 69
End: 2019-03-01

## 2019-03-01 NOTE — TELEPHONE ENCOUNTER
Caller: patient  Call back number: [phone]    Patient called stating that she discussed cutting back her gabapentin at her last visit  She just wanted to let Bryant Mcnair know that it did not help her  She states that she went back to talking 1  300 mg pill three times daily

## 2019-03-04 NOTE — PROGRESS NOTES
Assessment/Plan:    Gastroesophageal reflux disease without esophagitis  Patient was able to wean off omeprazole, may use PRN for breakthrough symptoms  URI (upper respiratory infection)  Subacute  Lung exam unremarkable  Advised to use nasal spray  Benign essential HTN  Controlled on amlodipine-benazepril 10-20mg daily  Med renewed  Check labs prior to next visit  Lumbar radiculopathy  Has received CHRISSIE, last injection was 6 months ago  Advised taking Aleve two tabs today, restart home exercises/stretches  If ineffective, contact Pain Management for consideration for repeat CHRISSIE  Primary localized osteoarthritis of right knee  S/p synvisc injection with relief of symptoms  Continue follow up with Ortho  1  Benign essential HTN  amLODIPine-benazepril (LOTREL) 10-20 MG per capsule    Lipid panel    Comprehensive metabolic panel    TSH, 3rd generation with Free T4 reflex    CBC    Urinalysis with reflex to microscopic   2  Upper respiratory tract infection, unspecified type     3  Lumbar radiculopathy     4  Gastroesophageal reflux disease without esophagitis     5  Primary localized osteoarthritis of right knee     6  Fibromyalgia  DULoxetine (CYMBALTA) 60 mg delayed release capsule       Subjective:      Patient ID: Maximino Palmer is a 68 y o  female  70-year-old female with fibromyalgia, LBP, reflux, HTN, fatty liver and bilateral knee pain here for follow-up care  She is accompanied by her   Hypertension   This is a chronic problem  The current episode started more than 1 year ago  The problem is controlled  Associated symptoms include headaches  Pertinent negatives include no shortness of breath  Risk factors for coronary artery disease include post-menopausal state and obesity  Past treatments include calcium channel blockers and ACE inhibitors  Her  had a cold 3 weeks ago, then she developed symptoms, including fever and has had a productive cough since  Overall she is better from the time her symptoms began  Reports HA, occasional dizziness, nasal congestion  Denies sore throat, wheezing, SOB, nausea  She took theraflu and Robitussin  She received a R knee synvisc injection and L knee cortisone injection two months ago and is scheduled for follow up next week  Reports knees are better but her low back is starting to hurt again with radiation down her R leg, worse over the past few days  Last L5-S1 LESI was 9/2018  She has remained on gabapentin TID and duloxetine  She was able to wean off omeprazole and has been drinking pure aloe and vinegar  She only takes omeprazole when she eats specific foods  The following portions of the patient's history were reviewed and updated as appropriate: allergies, current medications, past family history, past medical history, past social history, past surgical history and problem list     Current Outpatient Medications:     amLODIPine-benazepril (LOTREL) 10-20 MG per capsule, Take 1 capsule by mouth daily, Disp: 90 capsule, Rfl: 1    Coenzyme Q10 (COQ10) 100 MG CAPS, Take 1 capsule by mouth daily  , Disp: , Rfl:     DULoxetine (CYMBALTA) 60 mg delayed release capsule, Take 1 capsule (60 mg total) by mouth daily, Disp: 90 capsule, Rfl: 1    gabapentin (NEURONTIN) 300 mg capsule, Take 1 capsule (300 mg total) by mouth daily, Disp: 30 capsule, Rfl: 2    SUPER B COMPLEX/C PO, Take 1 tablet by mouth daily  , Disp: , Rfl:     omeprazole (PriLOSEC) 10 mg delayed release capsule, Take 10 mg by mouth daily  , Disp: , Rfl:     Review of Systems   Constitutional:        +gradual weight loss   HENT: Positive for postnasal drip and rhinorrhea  Respiratory: Positive for cough  Negative for shortness of breath and wheezing  Cardiovascular: Negative  Gastrointestinal:        Occasional heartburn   Neurological: Positive for numbness and headaches         Objective:    /70 (BP Location: Left arm, Patient Position: Sitting)   Pulse 81   Temp 97 8 °F (36 6 °C)   Resp 16   Ht 5' 2" (1 575 m)   Wt 78 5 kg (173 lb)   SpO2 97%   BMI 31 64 kg/m²      Physical Exam   Constitutional: She appears well-developed and well-nourished  HENT:   Right Ear: External ear normal    Left Ear: External ear normal    Nose: Rhinorrhea present  Mouth/Throat: No oropharyngeal exudate  Wears dentures   Neck: Neck supple  Cardiovascular: Normal rate, regular rhythm, normal heart sounds and intact distal pulses  Pulmonary/Chest: Effort normal and breath sounds normal  No stridor  No respiratory distress  Moist cough   Musculoskeletal:   Motor strength 5/5 BLE   Neurological: She is alert  Psychiatric: She has a normal mood and affect  Vitals reviewed

## 2019-03-05 ENCOUNTER — OFFICE VISIT (OUTPATIENT)
Dept: INTERNAL MEDICINE CLINIC | Facility: CLINIC | Age: 69
End: 2019-03-05
Payer: MEDICARE

## 2019-03-05 VITALS
SYSTOLIC BLOOD PRESSURE: 122 MMHG | HEART RATE: 81 BPM | DIASTOLIC BLOOD PRESSURE: 70 MMHG | TEMPERATURE: 97.8 F | RESPIRATION RATE: 16 BRPM | HEIGHT: 62 IN | BODY MASS INDEX: 31.83 KG/M2 | OXYGEN SATURATION: 97 % | WEIGHT: 173 LBS

## 2019-03-05 DIAGNOSIS — M51.16 LUMBAR DISC HERNIATION WITH RADICULOPATHY: ICD-10-CM

## 2019-03-05 DIAGNOSIS — M17.11 PRIMARY LOCALIZED OSTEOARTHRITIS OF RIGHT KNEE: ICD-10-CM

## 2019-03-05 DIAGNOSIS — J06.9 UPPER RESPIRATORY TRACT INFECTION, UNSPECIFIED TYPE: ICD-10-CM

## 2019-03-05 DIAGNOSIS — M79.7 FIBROMYALGIA: ICD-10-CM

## 2019-03-05 DIAGNOSIS — I10 BENIGN ESSENTIAL HTN: Primary | ICD-10-CM

## 2019-03-05 DIAGNOSIS — K21.9 GASTROESOPHAGEAL REFLUX DISEASE WITHOUT ESOPHAGITIS: ICD-10-CM

## 2019-03-05 DIAGNOSIS — M54.16 LUMBAR RADICULOPATHY: ICD-10-CM

## 2019-03-05 DIAGNOSIS — M54.50 LUMBAR PAIN: ICD-10-CM

## 2019-03-05 PROBLEM — R60.0 BILATERAL LOWER EXTREMITY EDEMA: Status: RESOLVED | Noted: 2018-10-05 | Resolved: 2019-03-05

## 2019-03-05 PROCEDURE — 99214 OFFICE O/P EST MOD 30 MIN: CPT | Performed by: INTERNAL MEDICINE

## 2019-03-05 RX ORDER — DULOXETIN HYDROCHLORIDE 60 MG/1
60 CAPSULE, DELAYED RELEASE ORAL DAILY
Qty: 90 CAPSULE | Refills: 1 | Status: SHIPPED | OUTPATIENT
Start: 2019-03-05 | End: 2019-08-25 | Stop reason: SDUPTHER

## 2019-03-05 RX ORDER — GABAPENTIN 300 MG/1
CAPSULE ORAL
Qty: 90 CAPSULE | Refills: 2 | OUTPATIENT
Start: 2019-03-05

## 2019-03-05 RX ORDER — AMLODIPINE BESYLATE AND BENAZEPRIL HYDROCHLORIDE 10; 20 MG/1; MG/1
1 CAPSULE ORAL DAILY
Qty: 90 CAPSULE | Refills: 1 | Status: SHIPPED | OUTPATIENT
Start: 2019-03-05 | End: 2019-09-05

## 2019-03-05 NOTE — ASSESSMENT & PLAN NOTE
Has received CHRISSIE, last injection was 6 months ago  Advised taking Aleve two tabs today, restart home exercises/stretches  If ineffective, contact Pain Management for consideration for repeat CHRISSIE

## 2019-03-22 ENCOUNTER — OFFICE VISIT (OUTPATIENT)
Dept: OBGYN CLINIC | Facility: MEDICAL CENTER | Age: 69
End: 2019-03-22
Payer: MEDICARE

## 2019-03-22 VITALS
WEIGHT: 174.8 LBS | DIASTOLIC BLOOD PRESSURE: 85 MMHG | HEART RATE: 91 BPM | BODY MASS INDEX: 32.17 KG/M2 | HEIGHT: 62 IN | SYSTOLIC BLOOD PRESSURE: 124 MMHG

## 2019-03-22 DIAGNOSIS — M17.11 PRIMARY LOCALIZED OSTEOARTHRITIS OF RIGHT KNEE: Primary | ICD-10-CM

## 2019-03-22 DIAGNOSIS — M17.12 OSTEOARTHRITIS OF LEFT KNEE, UNSPECIFIED OSTEOARTHRITIS TYPE: ICD-10-CM

## 2019-03-22 PROCEDURE — 20610 DRAIN/INJ JOINT/BURSA W/O US: CPT | Performed by: ORTHOPAEDIC SURGERY

## 2019-03-22 PROCEDURE — 99213 OFFICE O/P EST LOW 20 MIN: CPT | Performed by: ORTHOPAEDIC SURGERY

## 2019-03-22 RX ORDER — LIDOCAINE HYDROCHLORIDE 10 MG/ML
3 INJECTION, SOLUTION INFILTRATION; PERINEURAL
Status: COMPLETED | OUTPATIENT
Start: 2019-03-22 | End: 2019-03-22

## 2019-03-22 RX ORDER — METHYLPREDNISOLONE ACETATE 40 MG/ML
2 INJECTION, SUSPENSION INTRA-ARTICULAR; INTRALESIONAL; INTRAMUSCULAR; SOFT TISSUE
Status: COMPLETED | OUTPATIENT
Start: 2019-03-22 | End: 2019-03-22

## 2019-03-22 RX ORDER — DICLOFENAC SODIUM 75 MG/1
75 TABLET, DELAYED RELEASE ORAL 2 TIMES DAILY
Qty: 60 TABLET | Refills: 1 | Status: SHIPPED | OUTPATIENT
Start: 2019-03-22 | End: 2019-06-11 | Stop reason: SDUPTHER

## 2019-03-22 RX ADMIN — LIDOCAINE HYDROCHLORIDE 3 ML: 10 INJECTION, SOLUTION INFILTRATION; PERINEURAL at 10:43

## 2019-03-22 RX ADMIN — METHYLPREDNISOLONE ACETATE 2 ML: 40 INJECTION, SUSPENSION INTRA-ARTICULAR; INTRALESIONAL; INTRAMUSCULAR; SOFT TISSUE at 10:43

## 2019-03-22 NOTE — PROGRESS NOTES
Assessment/Plan:  1  Primary localized osteoarthritis of right knee    2  Osteoarthritis of left knee, unspecified osteoarthritis type      Orders Placed This Encounter   Procedures    Large joint arthrocentesis: R knee    Injection procedure prior authorization     · Received right knee  steroid injection today  Patient knows to ice and avoid strenuous activity for 1-2 days if needed  · Will be ordering Synvisc one injection for Left knee  · Continue with home exercises   · Continue wearing knee brace for comfort  · Refilled Diclofenac prn for pain   Return for Will contact patient when Left knee Synvisc One injection has been approved   I answered all of the patient's questions during the visit and provided education of the patient's condition during the visit  The patient verbalized understanding of the information given and agrees with the plan  This note was dictated using [employer] software  It may contain errors including improperly dictated words  Please contact physician directly for any questions  Subjective   Chief Complaint:   Chief Complaint   Patient presents with    Right Knee - Follow-up       Maximino Palmer is a 68 y o  female who presents for follow up for bilateral knee  Patient had right knee Synvisc One injection on 1/18/19 and states she had about a month and half of relief  She also notes she had left knee steroid injection on 1/18/19 and had relief for about a month and half  Patient is contunuing with home exercises  Pain in left  knee is anterior medial and right  anterior knee  Pain is constant achy pain  She does note instability in the right knee more often  She is taking Diclofenac prn for pain and wearing knee brace for comfort  Review of Systems  ROS:    See HPI for musculoskeletal review     All other systems reviewed are negative     History:  Past Medical History:   Diagnosis Date    Arthritis     Fatty liver 5/1/2018    Hypertension     Primary localized osteoarthritis of right knee 8/17/2017     History reviewed  No pertinent surgical history  Social History   Social History     Substance and Sexual Activity   Alcohol Use No     Social History     Substance and Sexual Activity   Drug Use Not on file     Social History     Tobacco Use   Smoking Status Never Smoker   Smokeless Tobacco Never Used     Family History:   Family History   Problem Relation Age of Onset    Cancer Mother     Osteoporosis Mother     Skin cancer Father     Cancer Family     Osteoporosis Family        Current Outpatient Medications on File Prior to Visit   Medication Sig Dispense Refill    amLODIPine-benazepril (LOTREL) 10-20 MG per capsule Take 1 capsule by mouth daily 90 capsule 1    Coenzyme Q10 (COQ10) 100 MG CAPS Take 1 capsule by mouth daily        DULoxetine (CYMBALTA) 60 mg delayed release capsule Take 1 capsule (60 mg total) by mouth daily 90 capsule 1    gabapentin (NEURONTIN) 300 mg capsule Take 1 capsule (300 mg total) by mouth daily 30 capsule 2    omeprazole (PriLOSEC) 10 mg delayed release capsule Take 10 mg by mouth daily        SUPER B COMPLEX/C PO Take 1 tablet by mouth daily         No current facility-administered medications on file prior to visit        Allergies   Allergen Reactions    Penicillins         Objective     /85   Pulse 91   Ht 5' 2" (1 575 m)   Wt 79 3 kg (174 lb 12 8 oz)   BMI 31 97 kg/m²      PE:  AAOx 3  WDWN  Hearing intact, no drainage from eyes  no audible wheezing  no abdominal distension  LE compartments soft, skin intact    Ortho Exam:  right Knee:   No erythema  Mild swelling  no effusion  no warmth  AROM:   Stable to varus/valgus stress      Left knee   No erythema  no swelling  no effusion  no warmth  AROM: 5-120  Stable to varus/valgus stress      Imaging Studies: N/A    Large joint arthrocentesis: R knee  Date/Time: 3/22/2019 10:43 AM  Consent given by: patient  Site marked: site marked  Timeout: Immediately prior to procedure a time out was called to verify the correct patient, procedure, equipment, support staff and site/side marked as required   Supporting Documentation  Indications: pain   Procedure Details  Location: knee - R knee  Preparation: Patient was prepped and draped in the usual sterile fashion  Needle size: 22 G  Approach: anterolateral  Medications administered: 3 mL lidocaine 1 %; 2 mL methylPREDNISolone acetate 40 mg/mL    Patient tolerance: patient tolerated the procedure well with no immediate complications  Dressing:  Sterile dressing applied          Scribe Attestation    I,:   Alexandria Hernandez am acting as a scribe while in the presence of the attending physician :        I,:   Oscar Rivero DO personally performed the services described in this documentation    as scribed in my presence :

## 2019-03-29 ENCOUNTER — OFFICE VISIT (OUTPATIENT)
Dept: OBGYN CLINIC | Facility: MEDICAL CENTER | Age: 69
End: 2019-03-29
Payer: MEDICARE

## 2019-03-29 VITALS
BODY MASS INDEX: 31.28 KG/M2 | SYSTOLIC BLOOD PRESSURE: 125 MMHG | DIASTOLIC BLOOD PRESSURE: 82 MMHG | HEART RATE: 96 BPM | HEIGHT: 62 IN | WEIGHT: 170 LBS

## 2019-03-29 DIAGNOSIS — M17.12 OSTEOARTHRITIS OF LEFT KNEE, UNSPECIFIED OSTEOARTHRITIS TYPE: Primary | ICD-10-CM

## 2019-03-29 PROCEDURE — 99213 OFFICE O/P EST LOW 20 MIN: CPT | Performed by: PHYSICIAN ASSISTANT

## 2019-03-29 PROCEDURE — 20610 DRAIN/INJ JOINT/BURSA W/O US: CPT | Performed by: PHYSICIAN ASSISTANT

## 2019-03-29 NOTE — PROGRESS NOTES
Assessment/Plan:  1  Osteoarthritis of left knee, unspecified osteoarthritis type      Orders Placed This Encounter   Procedures    Large joint arthrocentesis: L knee   -patient received left knee Synvisc-One injection today  She should avoid strenuous activities rest ice her knee for 1-2 days   -continue with home exercises  -continue with the knee brace as needed for comfort  -continue with diclofenac as needed for pain control    Return in about 2 months (around 5/29/2019)  For left knee steroid injection  She would like Synvisc for her right knee in July  I answered all of the patient's questions during the visit and provided education of the patient's condition during the visit  The patient verbalized understanding of the information given and agrees with the plan  This note was dictated using [employer] software  It may contain errors including improperly dictated words  Please contact physician directly for any questions  Subjective   Chief Complaint:   Chief Complaint   Patient presents with    Left Knee - Follow-up       Cassandra Rowell is a 68 y o  female who presents for follow up for left knee OA  Patient had CSI on 1/28/19 and notes had relief for two months  Patient states she is having pain in  anterior medial knee  Pain is worse with steps, and transitional positions  She does note instability in the left knee  She is doing daily home exercises  She is currently taking Diclofenac prn for pain   Review of Systems  ROS:    See HPI for musculoskeletal review  All other systems reviewed are negative     History:  Past Medical History:   Diagnosis Date    Arthritis     Fatty liver 5/1/2018    Hypertension     Primary localized osteoarthritis of right knee 8/17/2017     History reviewed  No pertinent surgical history    Social History   Social History     Substance and Sexual Activity   Alcohol Use No     Social History     Substance and Sexual Activity   Drug Use Not on file Social History     Tobacco Use   Smoking Status Never Smoker   Smokeless Tobacco Never Used     Family History:   Family History   Problem Relation Age of Onset    Cancer Mother     Osteoporosis Mother     Skin cancer Father     Cancer Family     Osteoporosis Family        Current Outpatient Medications on File Prior to Visit   Medication Sig Dispense Refill    amLODIPine-benazepril (LOTREL) 10-20 MG per capsule Take 1 capsule by mouth daily 90 capsule 1    Coenzyme Q10 (COQ10) 100 MG CAPS Take 1 capsule by mouth daily        diclofenac (VOLTAREN) 75 mg EC tablet Take 1 tablet (75 mg total) by mouth 2 (two) times a day PRN for pain 60 tablet 1    DULoxetine (CYMBALTA) 60 mg delayed release capsule Take 1 capsule (60 mg total) by mouth daily 90 capsule 1    gabapentin (NEURONTIN) 300 mg capsule Take 1 capsule (300 mg total) by mouth daily 30 capsule 2    omeprazole (PriLOSEC) 10 mg delayed release capsule Take 10 mg by mouth daily        SUPER B COMPLEX/C PO Take 1 tablet by mouth daily         No current facility-administered medications on file prior to visit        Allergies   Allergen Reactions    Penicillins         Objective     /82   Pulse 96   Ht 5' 2" (1 575 m)   Wt 77 1 kg (170 lb)   BMI 31 09 kg/m²      PE:  AAOx 3  WDWN  Hearing intact, no drainage from eyes  no audible wheezing  no abdominal distension  LE compartments soft, skin intact    Ortho Exam:  left Knee:   No erythema  no swelling  no effusion  no warmth  AROM: full  Stable to varus/valgus stress    Imaging Studies: N/A   Large joint arthrocentesis: L knee  Date/Time: 3/29/2019 4:20 PM  Consent given by: patient  Site marked: site marked  Timeout: Immediately prior to procedure a time out was called to verify the correct patient, procedure, equipment, support staff and site/side marked as required   Supporting Documentation  Indications: pain   Procedure Details  Location: knee - L knee  Preparation: Patient was prepped and draped in the usual sterile fashion  Needle size: 22 G  Ultrasound guidance: no  Medications administered: 48 mg hylan 48 MG/6ML    Patient tolerance: patient tolerated the procedure well with no immediate complications  Dressing:  Sterile dressing applied              Scribe Attestation    I,:    am acting as a scribe while in the presence of the attending physician :        I,:    personally performed the services described in this documentation    as scribed in my presence :

## 2019-04-13 DIAGNOSIS — M51.16 LUMBAR DISC HERNIATION WITH RADICULOPATHY: ICD-10-CM

## 2019-04-13 DIAGNOSIS — M54.50 LUMBAR PAIN: ICD-10-CM

## 2019-04-13 DIAGNOSIS — M54.16 LUMBAR RADICULOPATHY: ICD-10-CM

## 2019-04-15 RX ORDER — GABAPENTIN 300 MG/1
CAPSULE ORAL
Qty: 90 CAPSULE | Refills: 2 | OUTPATIENT
Start: 2019-04-15

## 2019-04-19 ENCOUNTER — OFFICE VISIT (OUTPATIENT)
Dept: PAIN MEDICINE | Facility: MEDICAL CENTER | Age: 69
End: 2019-04-19
Payer: MEDICARE

## 2019-04-19 VITALS
WEIGHT: 169 LBS | DIASTOLIC BLOOD PRESSURE: 83 MMHG | SYSTOLIC BLOOD PRESSURE: 134 MMHG | HEART RATE: 76 BPM | HEIGHT: 62 IN | BODY MASS INDEX: 31.1 KG/M2

## 2019-04-19 DIAGNOSIS — M51.16 LUMBAR DISC HERNIATION WITH RADICULOPATHY: ICD-10-CM

## 2019-04-19 DIAGNOSIS — M54.16 LUMBAR RADICULOPATHY: Primary | ICD-10-CM

## 2019-04-19 PROCEDURE — 99214 OFFICE O/P EST MOD 30 MIN: CPT | Performed by: NURSE PRACTITIONER

## 2019-04-19 RX ORDER — GABAPENTIN 400 MG/1
400 CAPSULE ORAL 3 TIMES DAILY
Qty: 90 CAPSULE | Refills: 1 | Status: SHIPPED | OUTPATIENT
Start: 2019-04-19 | End: 2019-06-17 | Stop reason: SDUPTHER

## 2019-05-13 ENCOUNTER — HOSPITAL ENCOUNTER (OUTPATIENT)
Dept: RADIOLOGY | Facility: MEDICAL CENTER | Age: 69
Discharge: HOME | End: 2019-05-13
Payer: MEDICARE

## 2019-05-13 VITALS
RESPIRATION RATE: 18 BRPM | DIASTOLIC BLOOD PRESSURE: 77 MMHG | OXYGEN SATURATION: 97 % | HEART RATE: 91 BPM | SYSTOLIC BLOOD PRESSURE: 129 MMHG | TEMPERATURE: 98 F

## 2019-05-13 DIAGNOSIS — M54.16 LUMBAR RADICULOPATHY: ICD-10-CM

## 2019-05-13 PROCEDURE — 62323 NJX INTERLAMINAR LMBR/SAC: CPT | Performed by: PHYSICAL MEDICINE & REHABILITATION

## 2019-05-13 RX ORDER — METHYLPREDNISOLONE ACETATE 80 MG/ML
80 INJECTION, SUSPENSION INTRA-ARTICULAR; INTRALESIONAL; INTRAMUSCULAR; PARENTERAL; SOFT TISSUE ONCE
Status: COMPLETED | OUTPATIENT
Start: 2019-05-13 | End: 2019-05-13

## 2019-05-13 RX ORDER — LIDOCAINE HYDROCHLORIDE 10 MG/ML
5 INJECTION, SOLUTION EPIDURAL; INFILTRATION; INTRACAUDAL; PERINEURAL ONCE
Status: COMPLETED | OUTPATIENT
Start: 2019-05-13 | End: 2019-05-13

## 2019-05-13 RX ADMIN — LIDOCAINE HYDROCHLORIDE 2.5 ML: 10 INJECTION, SOLUTION EPIDURAL; INFILTRATION; INTRACAUDAL; PERINEURAL at 14:13

## 2019-05-13 RX ADMIN — METHYLPREDNISOLONE ACETATE 80 MG: 80 INJECTION, SUSPENSION INTRA-ARTICULAR; INTRALESIONAL; INTRAMUSCULAR; PARENTERAL; SOFT TISSUE at 14:15

## 2019-05-13 RX ADMIN — IOHEXOL 1 ML: 300 INJECTION, SOLUTION INTRAVENOUS at 14:15

## 2019-05-20 ENCOUNTER — TELEPHONE (OUTPATIENT)
Dept: PAIN MEDICINE | Facility: CLINIC | Age: 69
End: 2019-05-20

## 2019-06-11 DIAGNOSIS — M17.12 OSTEOARTHRITIS OF LEFT KNEE, UNSPECIFIED OSTEOARTHRITIS TYPE: ICD-10-CM

## 2019-06-11 DIAGNOSIS — M17.11 PRIMARY LOCALIZED OSTEOARTHRITIS OF RIGHT KNEE: ICD-10-CM

## 2019-06-11 RX ORDER — DICLOFENAC SODIUM 75 MG/1
75 TABLET, DELAYED RELEASE ORAL 2 TIMES DAILY
Qty: 60 TABLET | Refills: 1 | Status: SHIPPED | OUTPATIENT
Start: 2019-06-11 | End: 2019-08-05 | Stop reason: SDUPTHER

## 2019-06-17 ENCOUNTER — TELEPHONE (OUTPATIENT)
Dept: PAIN MEDICINE | Facility: MEDICAL CENTER | Age: 69
End: 2019-06-17

## 2019-06-17 DIAGNOSIS — M54.16 LUMBAR RADICULOPATHY: ICD-10-CM

## 2019-06-17 RX ORDER — GABAPENTIN 400 MG/1
400 CAPSULE ORAL 3 TIMES DAILY
Qty: 90 CAPSULE | Refills: 1 | Status: SHIPPED | OUTPATIENT
Start: 2019-06-17 | End: 2019-07-19

## 2019-07-11 DIAGNOSIS — M54.16 LUMBAR RADICULOPATHY: ICD-10-CM

## 2019-07-11 RX ORDER — GABAPENTIN 400 MG/1
400 CAPSULE ORAL 3 TIMES DAILY
Qty: 90 CAPSULE | Refills: 1 | OUTPATIENT
Start: 2019-07-11

## 2019-07-16 ENCOUNTER — OFFICE VISIT (OUTPATIENT)
Dept: INTERNAL MEDICINE CLINIC | Facility: CLINIC | Age: 69
End: 2019-07-16
Payer: MEDICARE

## 2019-07-16 VITALS
BODY MASS INDEX: 32.79 KG/M2 | DIASTOLIC BLOOD PRESSURE: 80 MMHG | HEIGHT: 62 IN | TEMPERATURE: 97.8 F | WEIGHT: 178.2 LBS | OXYGEN SATURATION: 96 % | HEART RATE: 80 BPM | RESPIRATION RATE: 16 BRPM | SYSTOLIC BLOOD PRESSURE: 122 MMHG

## 2019-07-16 DIAGNOSIS — Z12.39 SCREENING FOR BREAST CANCER: ICD-10-CM

## 2019-07-16 DIAGNOSIS — M79.7 FIBROMYALGIA: Primary | ICD-10-CM

## 2019-07-16 DIAGNOSIS — E66.9 OBESITY (BMI 30.0-34.9): ICD-10-CM

## 2019-07-16 PROBLEM — J06.9 URI (UPPER RESPIRATORY INFECTION): Status: RESOLVED | Noted: 2019-03-05 | Resolved: 2019-07-16

## 2019-07-16 PROBLEM — E66.811 OBESITY (BMI 30.0-34.9): Status: ACTIVE | Noted: 2019-07-16

## 2019-07-16 PROCEDURE — 99213 OFFICE O/P EST LOW 20 MIN: CPT | Performed by: INTERNAL MEDICINE

## 2019-07-16 NOTE — PATIENT INSTRUCTIONS
Obesity   AMBULATORY CARE:   Obesity  is when your body mass index (BMI) is greater than 30  Your healthcare provider will use your height and weight to measure your BMI  The risks of obesity include  many health problems, such as injuries or physical disability  You may need tests to check for the following:  · Diabetes     · High blood pressure or high cholesterol     · Heart disease     · Gallbladder or liver disease     · Cancer of the colon, breast, prostate, liver, or kidney     · Sleep apnea     · Arthritis or gout  Seek care immediately if:   · You have a severe headache, confusion, or difficulty speaking  · You have weakness on one side of your body  · You have chest pain, sweating, or shortness of breath  Contact your healthcare provider if:   · You have symptoms of gallbladder or liver disease, such as pain in your upper abdomen  · You have knee or hip pain and discomfort while walking  · You have symptoms of diabetes, such as intense hunger and thirst, and frequent urination  · You have symptoms of sleep apnea, such as snoring or daytime sleepiness  · You have questions or concerns about your condition or care  Treatment for obesity  focuses on helping you lose weight to improve your health  Even a small decrease in BMI can reduce the risk for many health problems  Your healthcare provider will help you set a weight-loss goal   · Lifestyle changes  are the first step in treating obesity  These include making healthy food choices and getting regular physical activity  Your healthcare provider may suggest a weight-loss program that involves coaching, education, and therapy  · Medicine  may help you lose weight when it is used with a healthy diet and physical activity  · Surgery  can help you lose weight if you are very obese and have other health problems  There are several types of weight-loss surgery  Ask your healthcare provider for more information    Be successful losing weight:   · Set small, realistic goals  An example of a small goal is to walk for 20 minutes 5 days a week  Anther goal is to lose 5% of your body weight  · Tell friends, family members, and coworkers about your goals  and ask for their support  Ask a friend to lose weight with you, or join a weight-loss support group  · Identify foods or triggers that may cause you to overeat , and find ways to avoid them  Remove tempting high-calorie foods from your home and workplace  Place a bowl of fresh fruit on your kitchen counter  If stress causes you to eat, then find other ways to cope with stress  · Keep a diary to track what you eat and drink  Also write down how many minutes of physical activity you do each day  Weigh yourself once a week and record it in your diary  Eating changes: You will need to eat 500 to 1,000 fewer calories each day than you currently eat to lose 1 to 2 pounds a week  The following changes will help you cut calories:  · Eat smaller portions  Use small plates, no larger than 9 inches in diameter  Fill your plate half full of fruits and vegetables  Measure your food using measuring cups until you know what a serving size looks like  · Eat 3 meals and 1 or 2 snacks each day  Plan your meals in advance  [employer] and eat at home most of the time  Eat slowly  · Eat fruits and vegetables at every meal   They are low in calories and high in fiber, which makes you feel full  Do not add butter, margarine, or cream sauce to vegetables  Use herbs to season steamed vegetables  · Eat less fat and fewer fried foods  Eat more baked or grilled chicken and fish  These protein sources are lower in calories and fat than red meat  Limit fast food  Dress your salads with olive oil and vinegar instead of bottled dressing  · Limit the amount of sugar you eat  Do not drink sugary beverages  Limit alcohol  Activity changes:  Physical activity is good for your body in many ways   It helps you burn calories and build strong muscles  It decreases stress and depression, and improves your mood  It can also help you sleep better  Talk to your healthcare provider before you begin an exercise program   · Exercise for at least 30 minutes 5 days a week  Start slowly  Set aside time each day for physical activity that you enjoy and that is convenient for you  It is best to do both weight training and an activity that increases your heart rate, such as walking, bicycling, or swimming  · Find ways to be more active  Do yard work and housecleaning  Walk up the stairs instead of using elevators  Spend your leisure time going to events that require walking, such as outdoor festivals or fairs  This extra physical activity can help you lose weight and keep it off  Follow up with your healthcare provider as directed: You may need to meet with a dietitian  Write down your questions so you remember to ask them during your visits  © 2017 [employer] [employer] Information is for End User's use only and may not be sold, redistributed or otherwise used for commercial purposes  All illustrations and images included in CareNotes® are the copyrighted property of [employer] , [employer]  or Enrique Owen  The above information is an  only  It is not intended as medical advice for individual conditions or treatments  Talk to your doctor, nurse or pharmacist before following any medical regimen to see if it is safe and effective for you  Low Fat Diet   AMBULATORY CARE:   A low-fat diet  is an eating plan that is low in total fat, unhealthy fat, and cholesterol  You may need to follow a low-fat diet if you have trouble digesting or absorbing fat  You may also need to follow this diet if you have high cholesterol  You can also lower your cholesterol by increasing the amount of fiber in your diet  Soluble fiber is a type of fiber that helps to decrease cholesterol levels     Different types of fat in food:   · Limit unhealthy fats  A diet that is high in cholesterol, saturated fat, and trans fat may cause unhealthy cholesterol levels  Unhealthy cholesterol levels increase your risk of heart disease  ¨ Cholesterol:  Limit intake of cholesterol to less than 200 mg per day  Cholesterol is found in meat, eggs, and dairy  ¨ Saturated fat:  Limit saturated fat to less than 7% of your total daily calories  Ask your dietitian how many calories you need each day  Saturated fat is found in butter, cheese, ice cream, whole milk, and palm oil  Saturated fat is also found in meat, such as beef, pork, chicken skin, and processed meats  Processed meats include sausage, hot dogs, and bologna  ¨ Trans fat:  Avoid trans fat as much as possible  Trans fat is used in fried and baked foods  Foods that say trans fat free on the label may still have up to 0 5 grams of trans fat per serving  · Include healthy fats  Replace foods that are high in saturated and trans fat with foods high in healthy fats  This may help to decrease high cholesterol levels  ¨ Monounsaturated fats: These are found in avocados, nuts, and vegetable oils, such as olive, canola, and sunflower oil  ¨ Polyunsaturated fats: These can be found in vegetable oils, such as soybean or corn oil  Omega-3 fats can help to decrease the risk of heart disease  Omega-3 fats are found in fish, such as salmon, herring, trout, and tuna  Omega-3 fats can also be found in plant foods, such as walnuts, flaxseed, soybeans, and canola oil    Foods to limit or avoid:   · Grains:      ¨ Snacks that are made with partially hydrogenated oils, such as chips, regular crackers, and butter-flavored popcorn    ¨ High-fat baked goods, such as biscuits, croissants, doughnuts, pies, cookies, and pastries    · Dairy:      ¨ Whole milk, 2% milk, and yogurt and ice cream made with whole milk    ¨ Half and half creamer, heavy cream, and whipping cream    ¨ Cheese, cream cheese, and sour cream    · Meats and proteins:      ¨ High-fat cuts of meat (T-bone steak, regular hamburger, and ribs)    ¨ Fried meat, poultry (turkey and chicken), and fish    ¨ Poultry (chicken and turkey) with skin    ¨ Cold cuts (salami or bologna), hot dogs, quintero, and sausage    ¨ Whole eggs and egg yolks    · Vegetables and fruits with added fat:      ¨ Fried vegetables or vegetables in butter or high-fat sauces, such as cream or cheese sauces    ¨ Fried fruit or fruit served with butter or cream    · Fats:      ¨ Butter, stick margarine, and shortening    ¨ Coconut, palm oil, and palm kernel oil  Foods to include:   · Grains:      ¨ Whole-grain breads, cereals, pasta, and brown rice    ¨ Low-fat crackers and pretzels    · Vegetables and fruits:      ¨ Fresh, frozen, or canned vegetables (no salt or low-sodium)    ¨ Fresh, frozen, dried, or canned fruit (canned in light syrup or fruit juice)    ¨ Avocado    · Low-fat dairy products:      ¨ Nonfat (skim) or 1% milk    ¨ Nonfat or low-fat cheese, yogurt, and cottage cheese    · Meats and proteins:      ¨ Chicken or turkey with no skin    ¨ Baked or broiled fish    ¨ Lean beef and pork (loin, round, extra lean hamburger)    ¨ Beans and peas, unsalted nuts, soy products    ¨ Egg whites and substitutes    ¨ Seeds and nuts    · Fats:      ¨ Unsaturated oil, such as canola, olive, peanut, soybean, or sunflower oil    ¨ Soft or liquid margarine and vegetable oil spread    ¨ Low-fat salad dressing  Other ways to decrease fat:   · Read food labels before you buy foods  Choose foods that have less than 30% of calories from fat  Choose low-fat or fat-free dairy products  Remember that fat free does not mean calorie free  These foods still contain calories, and too many calories can lead to weight gain  · Trim fat from meat and avoid fried food  Trim all visible fat from meat before you cook it  Remove the skin from poultry  Do not wagner meat, fish, or poultry  Bake, roast, boil, or broil these foods instead  Avoid fried foods  Eat a baked potato instead of [place] Amy fries  Steam vegetables instead of sautéing them in butter  · Add less fat to foods  Use imitation quintero bits on salads and baked potatoes instead of regular quintero bits  Use fat-free or low-fat salad dressings instead of regular dressings  Use low-fat or nonfat butter-flavored topping instead of regular butter or margarine on popcorn and other foods  Ways to decrease fat in recipes:  Replace high-fat ingredients with low-fat or nonfat ones  This may cause baked goods to be drier than usual  You may need to use nonfat cooking spray on pans to prevent food from sticking  You also may need to change the amount of other ingredients, such as water, in the recipe  Try the following:  · Use low-fat or light margarine instead of regular margarine or shortening  · Use lean ground turkey breast or chicken, or lean ground beef (less than 5% fat) instead of hamburger  · Add 1 teaspoon of canola oil to 8 ounces of skim milk instead of using cream or half and half  · Use grated zucchini, carrots, or apples in breads instead of coconut  · Use blenderized, low-fat cottage cheese, plain tofu, or low-fat ricotta cheese instead of cream cheese  · Use 1 egg white and 1 teaspoon of canola oil, or use ¼ cup (2 ounces) of fat-free egg substitute instead of a whole egg  · Replace half of the oil that is called for in a recipe with applesauce when you bake  Use 3 tablespoons of cocoa powder and 1 tablespoon of canola oil instead of a square of baking chocolate  How to increase fiber:  Eat enough high-fiber foods to get 20 to 30 grams of fiber every day  Slowly increase your fiber intake to avoid stomach cramps, gas, and other problems  · Eat 3 ounces of whole-grain foods each day  An ounce is about 1 slice of bread  Eat whole-grain breads, such as whole-wheat bread   Whole wheat, whole-wheat flour, or other whole grains should be listed as the first ingredient on the food label  Replace white flour with whole-grain flour or use half of each in recipes  Whole-grain flour is heavier than white flour, so you may have to add more yeast or baking powder  · Eat a high-fiber cereal for breakfast   Oatmeal is a good source of soluble fiber  Look for cereals that have bran or fiber in the name  Choose whole-grain products, such as brown rice, barley, and whole-wheat pasta  · Eat more beans, peas, and lentils  For example, add beans to soups or salads  Eat at least 5 cups of fruits and vegetables each day  Eat fruits and vegetables with the peel because the peel is high in fiber  © 2017 [address] [employer] Information is for End User's use only and may not be sold, redistributed or otherwise used for commercial purposes  All illustrations and images included in CareNotes® are the copyrighted property of A D A M [employer]  or Enrique Owen  The above information is an  only  It is not intended as medical advice for individual conditions or treatments  Talk to your doctor, nurse or pharmacist before following any medical regimen to see if it is safe and effective for you  Heart Healthy Diet   AMBULATORY CARE:   A heart healthy diet  is an eating plan low in total fat, unhealthy fats, and sodium (salt)  A heart healthy diet helps decrease your risk for heart disease and stroke  Limit the amount of fat you eat to 25% to 35% of your total daily calories  Limit sodium to less than 2,300 mg each day  Healthy fats:  Healthy fats can help improve cholesterol levels  The risk for heart disease is decreased when cholesterol levels are normal  Choose healthy fats, such as the following:  · Unsaturated fat  is found in foods such as soybean, canola, olive, corn, and safflower oils  It is also found in soft tub margarine that is made with liquid vegetable oil       · Omega-3 fat  is found in certain fish, such as salmon, tuna, and trout, and in walnuts and flaxseed  Unhealthy fats:  Unhealthy fats can cause unhealthy cholesterol levels in your blood and increase your risk of heart disease  Limit unhealthy fats, such as the following:  · Cholesterol  is found in animal foods, such as eggs and lobster, and in dairy products made from whole milk  Limit cholesterol to less than 300 milligrams (mg) each day  You may need to limit cholesterol to 200 mg each day if you have heart disease  · Saturated fat  is found in meats, such as quintero and hamburger  It is also found in chicken or turkey skin, whole milk, and butter  Limit saturated fat to less than 7% of your total daily calories  Limit saturated fat to less than 6% if you have heart disease or are at increased risk for it  · Trans fat  is found in packaged foods, such as potato chips and cookies  It is also in hard margarine, some fried foods, and shortening  Avoid trans fats as much as possible    Heart healthy foods and drinks to include:  Ask your dietitian or healthcare provider how many servings to have from each of the following food groups:  · Grains:      ¨ Whole-wheat breads, cereals, and pastas, and brown rice    ¨ Low-fat, low-sodium crackers and chips    · Vegetables:      ¨ Broccoli, green beans, green peas, and spinach    ¨ Collards, kale, and lima beans    ¨ Carrots, sweet potatoes, tomatoes, and peppers    ¨ Canned vegetables with no salt added    · Fruits:      ¨ Bananas, peaches, pears, and pineapple    ¨ Grapes, raisins, and dates    ¨ Oranges, tangerines, grapefruit, orange juice, and grapefruit juice    ¨ Apricots, mangoes, melons, and papaya    ¨ Raspberries and strawberries    ¨ Canned fruit with no added sugar    · Low-fat dairy products:      ¨ Nonfat (skim) milk, 1% milk, and low-fat almond, cashew, or soy milks fortified with calcium    ¨ Low-fat cheese, regular or frozen yogurt, and cottage cheese    · Meats and proteins , such as lean cuts of beef and pork (loin, leg, round), skinless chicken and turkey, legumes, soy products, egg whites, and nuts  Foods and drinks to limit or avoid:  Ask your dietitian or healthcare provider about these and other foods that are high in unhealthy fat, sodium, and sugar:  · Snack or packaged foods , such as frozen dinners, cookies, macaroni and cheese, and cereals with more than 300 mg of sodium per serving    · Canned or dry mixes  for cakes, soups, sauces, or gravies    · Vegetables with added sodium , such as instant potatoes, vegetables with added sauces, or regular canned vegetables    · Other foods high in sodium , such as ketchup, barbecue sauce, salad dressing, pickles, olives, soy sauce, and miso    · High-fat dairy foods  such as whole or 2% milk, cream cheese, or sour cream, and cheeses     · High-fat protein foods  such as high-fat cuts of beef (T-bone steaks, ribs), chicken or turkey with skin, and organ meats, such as liver    · Cured or smoked meats , such as hot dogs, quintero, and sausage    · Unhealthy fats and oils , such as butter, stick margarine, shortening, and cooking oils such as coconut or palm oil    · Food and drinks high in sugar , such as soft drinks (soda), sports drinks, sweetened tea, candy, cake, cookies, pies, and doughnuts  Other diet guidelines to follow:   · Eat more foods containing omega-3 fats  Eat fish high in omega-3 fats at least 2 times a week  · Limit alcohol  Too much alcohol can damage your heart and raise your blood pressure  Women should limit alcohol to 1 drink a day  Men should limit alcohol to 2 drinks a day  A drink of alcohol is 12 ounces of beer, 5 ounces of wine, or 1½ ounces of liquor  · Choose low-sodium foods  High-sodium foods can lead to high blood pressure  Add little or no salt to food you prepare  Use herbs and spices in place of salt  · Eat more fiber  to help lower cholesterol levels   Eat at least 5 servings of fruits and vegetables each day  Eat 3 ounces of whole-grain foods each day  Legumes (beans) are also a good source of fiber  Lifestyle guidelines:   · Do not smoke  Nicotine and other chemicals in cigarettes and cigars can cause lung and heart damage  Ask your healthcare provider for information if you currently smoke and need help to quit  E-cigarettes or smokeless tobacco still contain nicotine  Talk to your healthcare provider before you use these products  · Exercise regularly  to help you maintain a healthy weight and improve your blood pressure and cholesterol levels  Ask your healthcare provider about the best exercise plan for you  Do not start an exercise program without asking your healthcare provider  Follow up with your healthcare provider as directed:  Write down your questions so you remember to ask them during your visits  © 2017 [facility] [facility] Information is for End User's use only and may not be sold, redistributed or otherwise used for commercial purposes  All illustrations and images included in CareNotes® are the copyrighted property of A D A M [employer]  or Enrique Lex[employer]  The above information is an  only  It is not intended as medical advice for individual conditions or treatments  Talk to your doctor, nurse or pharmacist before following any medical regimen to see if it is safe and effective for you  Calorie Counting Diet   WHAT YOU NEED TO KNOW:   What is a calorie counting diet? It is a meal plan based on counting calories each day to reach a healthy body weight  You will need to eat fewer calories if you are trying to lose weight  Weight loss may decrease your risk for certain health problems or improve your health if you have health problems  Some of these health problems include heart disease, high blood pressure, and diabetes  What foods should I avoid?   Your dietitian will tell you if you need to avoid certain foods based on your body weight and health condition  You may need to avoid high-fat foods if you are at risk for or have heart disease  You may need to eat fewer foods from the breads and starches food group if you have diabetes  How many calories are in foods? The following is a list of foods and drinks with the approximate number of calories in each  Check the food label to find the exact number of calories  A dietitian can tell you how many calories you should have from each food group each day    · Carbohydrate:      ¨ ½ of a 3-inch bagel, 1 slice of bread, or ½ of a hamburger bun or hot dog bun (80)    ¨ 1 (8-inch) flour tortilla or ½ cup of cooked rice (100)    ¨ 1 (6-inch) corn tortilla (80)    ¨ 1 (6-inch) pancake or 1 cup of bran flakes cereal (110)    ¨ ½ cup of cooked cereal (80)    ¨ ½ cup of cooked pasta (85)    ¨ 1 ounce of pretzels (100)    ¨ 3 cups of air-popped popcorn without butter or oil (80)    · Dairy:      ¨ 1 cup of skim or 1% milk (90)    ¨ 1 cup of 2% milk (120)    ¨ 1 cup of whole milk (160)    ¨ 1 cup of 2% chocolate milk (220)    ¨ 1 ounce of low-fat cheese with 3 grams of fat per ounce (70)    ¨ 1 ounce of cheddar cheese (114)    ¨ ½ cup of 1% fat cottage cheese (80)    ¨ 1 cup of plain or sugar-free, fat-free yogurt (90)    · Protein foods:      ¨ 3 ounces of fish (not breaded or fried) (95)    ¨ 3 ounces of breaded, fried fish (195)    ¨ ¾ cup of tuna canned in water (105)    ¨ 3 ounces of chicken breast without skin (105)    ¨ 1 fried chicken breast with skin (350)    ¨ ¼ cup of fat free egg substitute (40)    ¨ 1 large egg (75)    ¨ 3 ounces of lean beef or pork (165)    ¨ 3 ounces of fried pork chop or ham (185)    ¨ ½ cup of cooked dried beans, such as kidney, shields, lentils, or navy (115)    ¨ 3 ounces of bologna or lunch meat (225)    ¨ 2 links of breakfast sausage (140)    · Vegetables:      ¨ ½ cup of sliced mushrooms (10)    ¨ 1 cup of salad greens, such as lettuce, spinach, or corinne (15)    ¨ ½ cup of steamed asparagus (20)    ¨ ½ cup of cooked summer squash, zucchini squash, or green or wax beans (25)    ¨ 1 cup of broccoli or cauliflower florets, or 1 medium tomato (25)    ¨ 1 large raw carrot or ½ cup of cooked carrots (40)    ¨ ? of a medium cucumber or 1 stalk of celery (5)    ¨ 1 small baked potato (160)    ¨ 1 cup of breaded, fried vegetables (230)    · Fruit:      ¨ 1 (6-inch) banana (55)     ¨ ½ of a 4-inch grapefruit (55)    ¨ 15 grapes (60)    ¨ 1 medium orange or apple (70)    ¨ 1 large peach (65)    ¨ 1 cup of fresh pineapple chunks (75)    ¨ 1 cup of melon cubes (50)    ¨ 1¼ cups of whole strawberries (45)    ¨ ½ cup of fruit canned in juice (55)    ¨ ½ cup of fruit canned in heavy syrup (110)    ¨ ?  cup of raisins (130)    ¨ ½ cup of unsweetened fruit juice (60)    ¨ ½ cup of grape, cranberry, or prune juice (90)    · Fat:      ¨ 10 peanuts or 2 teaspoons of peanut butter (55)    ¨ 2 tablespoons of avocado or 1 tablespoon of regular salad dressing (45)    ¨ 2 slices of quintero (90)    ¨ 1 teaspoon of oil, such as safflower, canola, corn, or olive oil (45)    ¨ 2 teaspoons of low-fat margarine, or 1 tablespoon of low-fat mayonnaise (50)    ¨ 1 teaspoon of regular margarine (40)    ¨ 1 tablespoon of regular mayonnaise (135)    ¨ 1 tablespoon of cream cheese or 2 tablespoons of low-fat cream cheese (45)    ¨ 2 tablespoons of vegetable shortening (215)    · Dessert and sweets:      ¨ 8 animal crackers or 5 vanilla wafers (80)    ¨ 1 frozen fruit juice bar (80)    ¨ ½ cup of ice milk or low-fat frozen yogurt (90)    ¨ ½ cup of sherbet or sorbet (125)    ¨ ½ cup of sugar-free pudding or custard (60)    ¨ ½ cup of ice cream (140)    ¨ ½ cup of pudding or custard (175)    ¨ 1 (2-inch) square chocolate brownie (185)    · Combination foods:      ¨ Bean burrito made with an 8-inch tortilla, without cheese (275)    ¨ Chicken breast sandwich with lettuce and tomato (325)    ¨ 1 cup of chicken noodle soup (60)    ¨ 1 beef taco (175)    ¨ Regular hamburger with lettuce and tomato (310)    ¨ Regular cheeseburger with lettuce and tomato (410)     ¨ ¼ of a 12-inch cheese pizza (280)    ¨ Fried fish sandwich with lettuce and tomato (425)    ¨ Hot dog and bun (275)    ¨ 1½ cups of macaroni and cheese (310)    ¨ Taco salad with a fried tortilla shell (870)    · Low-calorie foods:      ¨ 1 tablespoon of ketchup or 1 tablespoon of fat free sour cream (15)    ¨ 1 teaspoon of mustard (5)    ¨ ¼ cup of salsa (20)    ¨ 1 large dill pickle (15)    ¨ 1 tablespoon of fat free salad dressing (10)    ¨ 2 teaspoons of low-sugar, light jam or jelly, or 1 tablespoon of sugar-free syrup (15)    ¨ 1 sugar-free popsicle (15)    ¨ 1 cup of club soda, seltzer water, or diet soda (0)  CARE AGREEMENT:   You have the right to help plan your care  Discuss treatment options with your caregivers to decide what care you want to receive  You always have the right to refuse treatment  The above information is an  only  It is not intended as medical advice for individual conditions or treatments  Talk to your doctor, nurse or pharmacist before following any medical regimen to see if it is safe and effective for you  © 2017 [employer] Information is for End User's use only and may not be sold, redistributed or otherwise used for commercial purposes  All illustrations and images included in CareNotes® are the copyrighted property of [employer]  or [employer]

## 2019-07-16 NOTE — ASSESSMENT & PLAN NOTE
BMI 32 with comorbidities  Patient advised to start food diary, decrease overall calorie intake and eat smaller portion sizes  Increase physical activity as tolerated

## 2019-07-16 NOTE — PROGRESS NOTES
Assessment/Plan:    Problem List Items Addressed This Visit        Other    Fibromyalgia - Primary     May have been exacerbated with recent flare of back pain  Patient at goal on duloxetine 60mg daily, would caution adjustment of gabapentin 400mg TID due to decreasing seizure threshhold  Recommend aquatic therapy for fibromyalgia  Relevant Orders    Ambulatory referral to Physical Therapy    Obesity (BMI 30 0-34  9)     BMI 32 with comorbidities  Patient advised to start food diary, decrease overall calorie intake and eat smaller portion sizes  Increase physical activity as tolerated  Other Visit Diagnoses     Screening for breast cancer        Relevant Orders    Mammo screening bilateral w cad          Subjective:      Patient ID: Leonard Hallman is a 68 y o  female  HPI  77yo female with fibromyalgia, HTN, fatty liver, reflux, LBP and knee pain here for re-evaluation of fibromyalgia  He is accompanied by her   Reports increase back pain with radiculopathy and was advised to rest by Pain Management  She also has noticed hurting "all over", increased sensitivity of her skin as well  She has cut back with activities in her home because she admits at times she does overdo tasks at home  She knows that humidity has bothered her      The following portions of the patient's history were reviewed and updated as appropriate: allergies, current medications, past family history, past medical history, past social history, past surgical history and problem list     Current Outpatient Medications:     amLODIPine-benazepril (LOTREL) 10-20 MG per capsule, Take 1 capsule by mouth daily, Disp: 90 capsule, Rfl: 1    Coenzyme Q10 (COQ10) 100 MG CAPS, Take 1 capsule by mouth daily  , Disp: , Rfl:     diclofenac (VOLTAREN) 75 mg EC tablet, TAKE 1 TABLET (75 MG TOTAL) BY MOUTH 2 (TWO) TIMES A DAY PRN FOR PAIN, Disp: 60 tablet, Rfl: 1    DULoxetine (CYMBALTA) 60 mg delayed release capsule, Take 1 capsule (60 mg total) by mouth daily, Disp: 90 capsule, Rfl: 1    gabapentin (NEURONTIN) 400 mg capsule, Take 1 capsule (400 mg total) by mouth 3 (three) times a day, Disp: 90 capsule, Rfl: 1    omeprazole (PriLOSEC) 10 mg delayed release capsule, Take 10 mg by mouth daily  , Disp: , Rfl:     SUPER B COMPLEX/C PO, Take 1 tablet by mouth daily  , Disp: , Rfl:     Review of Systems   Constitutional: Negative for fever  +weight gain   Musculoskeletal: Positive for arthralgias and myalgias  Knee and hand swelling   Skin: Negative for rash  Neurological: Positive for numbness  Objective:    /80 (BP Location: Left arm, Patient Position: Sitting)   Pulse 80   Temp 97 8 °F (36 6 °C)   Resp 16   Ht 5' 2" (1 575 m)   Wt 80 8 kg (178 lb 3 2 oz)   SpO2 96%   BMI 32 59 kg/m²      Physical Exam   Constitutional: She is oriented to person, place, and time  She appears well-developed and well-nourished  No distress  obese   Cardiovascular: Normal rate, regular rhythm, normal heart sounds and intact distal pulses  Pulmonary/Chest: Effort normal and breath sounds normal  No stridor  No respiratory distress  Musculoskeletal:   Positive tender points b/l mid upper trap muscle, upper mid back, low back, second costochondral junction, lateral elbows, greater trochanter prominence and medial knee   Neurological: She is alert and oriented to person, place, and time  Psychiatric: She has a normal mood and affect  Vitals reviewed  BMI Counseling: Body mass index is 32 59 kg/m²  Discussed the patient's BMI with her  The BMI is above average  BMI counseling and education was provided to the patient  Nutrition recommendations include reducing portion sizes, decreasing overall calorie intake and moderation in carbohydrate intake  Exercise recommendations include moderate aerobic physical activity for 150 minutes/week and strength training exercises

## 2019-07-16 NOTE — ASSESSMENT & PLAN NOTE
May have been exacerbated with recent flare of back pain  Patient at goal on duloxetine 60mg daily, would caution adjustment of gabapentin 400mg TID due to decreasing seizure threshhold  Recommend aquatic therapy for fibromyalgia

## 2019-07-19 ENCOUNTER — OFFICE VISIT (OUTPATIENT)
Dept: PAIN MEDICINE | Facility: MEDICAL CENTER | Age: 69
End: 2019-07-19
Payer: MEDICARE

## 2019-07-19 VITALS
DIASTOLIC BLOOD PRESSURE: 78 MMHG | SYSTOLIC BLOOD PRESSURE: 126 MMHG | WEIGHT: 177 LBS | HEART RATE: 78 BPM | BODY MASS INDEX: 32.37 KG/M2

## 2019-07-19 DIAGNOSIS — M51.16 LUMBAR DISC HERNIATION WITH RADICULOPATHY: ICD-10-CM

## 2019-07-19 DIAGNOSIS — M54.12 CERVICAL RADICULOPATHY: ICD-10-CM

## 2019-07-19 DIAGNOSIS — M54.16 LUMBAR RADICULOPATHY: Primary | ICD-10-CM

## 2019-07-19 PROCEDURE — 99214 OFFICE O/P EST MOD 30 MIN: CPT | Performed by: NURSE PRACTITIONER

## 2019-07-19 RX ORDER — GABAPENTIN 600 MG/1
600 TABLET ORAL 3 TIMES DAILY
Qty: 90 TABLET | Refills: 0 | Status: SHIPPED | OUTPATIENT
Start: 2019-07-19 | End: 2019-09-16

## 2019-07-19 NOTE — PROGRESS NOTES
Assessment:  1  Lumbar radiculopathy    2  Lumbar disc herniation with radiculopathy    3  Cervical radiculopathy        Plan:  1  Patient is status post L5-S1 LESI with Dr Sen Garcia on May 13, 2019  Per the patient, she does feel that some her pain is starting to return, however does continue with relief at this time  I discussed with the patient that since there has been moderate to significant improvement in the pain symptoms, we will hold off on any repeat injections at this point in time  However, I reviewed with the patient that if their symptoms should return or worsen,  they should call our office to schedule to discuss repeating the injection  2  Patient has a new pain complaint of neck pain with numbness, tingling and subjective weakness into the upper extremities consistent with cervical radiculopathy  I will order an MRI of the cervical spine for further evaluation of her symptoms  3  I will increase gabapentin to 600 mg t i d  I advised the patient that if they experience any side effects or issues with the changes in their medication regiment, they should give our office a call to discuss  I also advised the patient not to drive or operate machinery until they see how the changes in the medication regimen affects them  The patient was agreeable and verbalized an understanding  4  Patient may continue Duloxetine and diclofenac as prescribed by her PCP  No changes were made to these medications   5  Patient will follow up in 4 weeks with Dr Sen Garcia for consultation on new neck pain and upper extremity symptoms  Other than as stated above, the patient denies any interval changes in medications, medical condition, mental condition, symptoms, or allergies since the last office visit  M*Modal software was used to dictate this note  It may contain errors with dictating incorrect words or incorrect spelling  Please contact the provider directly with any questions      History of Present Illness: The patient is a 68 y o  female last seen on 4/19/19 who presents for a follow up office visit in regards to chronic lumbosacral back pain with radiculopathy into the bilateral lower extremities secondary to lumbar degenerative disc disease, lumbar spondylosis and stenosis of fibromyalgia  The patient denies bowel or bladder incontinence or saddle anesthesia  The patient is status post right L5-S1 LESI with Dr Sen Garcia on May 13, 2019  She does continue with some relief from the procedure at this time, however does feel her right lower extremity pain is starting to slightly worsened at this time  However, at today's office visit she prioritizes neck pain with pain, numbness, tingling and subjective weakness into the lateral aspect of the bilateral upper extremities into the 4th and 5th digits of her hands  I do not have any updated advanced imaging of the cervical spine for review  MRI of the lumbar spine reveals multilevel lumbar spondylosis, minimal central stenosis at L3-4, mild central stenosis at L4-5, mild central, and mild right foraminal stenosis at L5-S1  The patient currently rates her pain an 8/10 on the numeric pain rating scale  She states her pain is constant nature and bothersome throughout the entire day  She characterizes the pain as burning, sharp, throbbing, numbness  Current pain medications includes:  Gabapentin 400 mg t i d , diclofenac 75 mg b i d  And duloxetine 60 mg daily as prescribed by her PCP   The patient reports that this regimen is providing moderate pain relief  The patient is reporting no side effects from this pain medication regimen  I have personally reviewed and/or updated the patient's past medical history, past surgical history, family history, social history, current medications, allergies, and vital signs today  Review of Systems:    Review of Systems   Respiratory: Negative for shortness of breath      Cardiovascular: Negative for chest pain  Gastrointestinal: Negative for constipation, diarrhea, nausea and vomiting  Musculoskeletal: Positive for gait problem  Negative for arthralgias, joint swelling and myalgias  Skin: Negative for rash  Neurological: Negative for dizziness, seizures and weakness  All other systems reviewed and are negative  Past Medical History:   Diagnosis Date    Arthritis     Fatty liver 5/1/2018    Hypertension     Primary localized osteoarthritis of right knee 8/17/2017       No past surgical history on file      Family History   Problem Relation Age of Onset    Cancer Mother     Osteoporosis Mother     Skin cancer Father     Cancer Family     Osteoporosis Family        Social History     Occupational History    Not on file   Tobacco Use    Smoking status: Never Smoker    Smokeless tobacco: Never Used   Substance and Sexual Activity    Alcohol use: No    Drug use: Not on file    Sexual activity: Not on file         Current Outpatient Medications:     amLODIPine-benazepril (LOTREL) 10-20 MG per capsule, Take 1 capsule by mouth daily, Disp: 90 capsule, Rfl: 1    diclofenac (VOLTAREN) 75 mg EC tablet, TAKE 1 TABLET (75 MG TOTAL) BY MOUTH 2 (TWO) TIMES A DAY PRN FOR PAIN, Disp: 60 tablet, Rfl: 1    DULoxetine (CYMBALTA) 60 mg delayed release capsule, Take 1 capsule (60 mg total) by mouth daily, Disp: 90 capsule, Rfl: 1    omeprazole (PriLOSEC) 10 mg delayed release capsule, Take 10 mg by mouth daily  , Disp: , Rfl:     SUPER B COMPLEX/C PO, Take 1 tablet by mouth daily  , Disp: , Rfl:     gabapentin (NEURONTIN) 600 MG tablet, Take 1 tablet (600 mg total) by mouth 3 (three) times a day, Disp: 90 tablet, Rfl: 0    Allergies   Allergen Reactions    Penicillins        Physical Exam:    /78   Pulse 78   Wt 80 3 kg (177 lb)   BMI 32 37 kg/m²     Constitutional:normal, well developed, well nourished, alert, in no distress and non-toxic and no overt pain behavior  Eyes:anicteric  HEENT:grossly intact  Neck:supple, symmetric, trachea midline and no masses   Pulmonary:even and unlabored  Cardiovascular:No edema or pitting edema present  Skin:Normal without rashes or lesions and well hydrated  Psychiatric:Mood and affect appropriate  Neurologic:Cranial Nerves II-XII grossly intact  Musculoskeletal:normal gait  Neck pain reproduced with cervical extension and flexion  Bilateral upper extremity strength 5/5 in all muscle groups  Negative Gibson's reflex bilaterally  Positive Spurling's reflex bilaterally        Imaging  MRI cervical spine without contrast    (Results Pending)         Orders Placed This Encounter   Procedures    MRI cervical spine without contrast

## 2019-07-25 ENCOUNTER — EVALUATION (OUTPATIENT)
Dept: PHYSICAL THERAPY | Age: 69
End: 2019-07-25
Payer: MEDICARE

## 2019-07-25 DIAGNOSIS — M79.7 FIBROMYALGIA: Primary | ICD-10-CM

## 2019-07-25 PROCEDURE — 97162 PT EVAL MOD COMPLEX 30 MIN: CPT | Performed by: PHYSICAL THERAPIST

## 2019-07-25 NOTE — PROGRESS NOTES
PT Evaluation     Today's date: 2019  Patient name: Elba Askew  : 1950  MRN: [id number]  Referring provider: Tonia Mclain DO  Dx:   Encounter Diagnosis     ICD-10-CM    1  Fibromyalgia M79 7 Ambulatory referral to Physical Therapy                  Assessment  Assessment details: Patient seen for PT evaluation for fibromyalgia  Patient presents with generalized weakness, radicular symptoms into R LE which are alleviated with repeated extension in standing  Poor posture, decreased core strength, gait deficits and significant tightness into extension at R knee which contributes to gait dysfunction  PT discussed goals for pool therapy, will trial to progress to land therapy if able  Impairments: abnormal gait, abnormal or restricted ROM, activity intolerance, impaired balance, impaired physical strength, lacks appropriate home exercise program, pain with function, poor posture  and poor body mechanics  Functional limitations: Patient reports moderate limitations with IADls, standing tolerance, limitations with lifting, bending and with recreational activities  Understanding of Dx/Px/POC: good   Prognosis: good    Goals  Impairment Goals to be met within 4 weeks  - Decrease pain to 3/10 at rest and 6/10 with activity  - Improve ROM by 5-10 degrees lumbar spine  - Increase strength to 5/5 throughout  - Improve sitting and standing posture     Functional Goals to be met within 4-6 weeks     - Return to Prior Level of Function  - Increase Functional Status Measure to: expected  - Patient will be independent with HEP  - Patient to be able to tolerate household chores with pain <5/10         Plan  Patient would benefit from: skilled speech therapy  Planned modality interventions: hydrotherapy  Planned therapy interventions: abdominal trunk stabilization, joint mobilization, manual therapy, neuromuscular re-education, balance, aquatic therapy, patient education, postural training, strengthening, stretching, therapeutic activities, therapeutic exercise, therapeutic training, home exercise program and gait training  Frequency: 2x week  Duration in weeks: 6  Treatment plan discussed with: patient        Subjective Evaluation    History of Present Illness  Mechanism of injury: Patient reports a recent flare up of her pain  Thinks that she "over-did-it" with her exercises from previously and on top of that she had to take care of her  who was recently discharged from the SN[facility]  Patient no longer needs to lift him/support him with standing  Patient followed up with MD  Was recommended for pool therapy  Patient to have MRI of spine - lumbar and cervical as a follow up to pain  Patient has had injections for her spine pain in the past (lumbar spine)  Patient reports a sensation of her arms falling asleep- which is the reason for her MRI for the cervical spine  Patient had injections for lumbar spine, reported relief; last one x ~ 3-4 months ago  Pain  Current pain ratin  At best pain ratin  At worst pain rating: 10  Location: full body  Quality: throbbing, dull ache and sharp  Aggravating factors: sitting, standing, walking, stair climbing, lifting and overhead activity  Progression: no change    Social Support  Steps to enter house: yes  Stairs in house: yes   Lives with: spouse    Employment status: not working  Patient Goals  Patient goals for therapy: decreased pain, improved balance, increased motion, increased strength and return to sport/leisure activities          Objective     Concurrent Complaints  Positive for disturbed sleep   Negative for bladder dysfunction, bowel dysfunction and saddle (S4) numbness    Postural Observations  Seated posture: fair  Standing posture: fair  Correction of posture: has no consistent effect        Neurological Testing     Sensation     Lumbar   Left   Intact: light touch    Right   Paresthesia: light touch    Reflexes   Left   Clonus sign: negative    Right Clonus sign: negative    Active Range of Motion   Left Shoulder   Flexion: 150 degrees   Abduction: 165 degrees   External rotation BTH: C4     Right Shoulder   Flexion: 150 degrees   Abduction: 165 degrees   External rotation BTH: C4     Left Elbow   Normal active range of motion    Right Elbow   Normal active range of motion    Lumbar   Flexion: 70 degrees  with pain  Extension: 25 degrees  with pain  Left lateral flexion: 22 degrees    with pain  Right lateral flexion: 20 degrees  with pain  Left rotation: 20 degrees  with pain  Right rotation: 30 degrees  with pain  Left Hip   Flexion: 80 degrees with pain    Right Hip   Flexion: 80 degrees with pain  Left Knee   Flexion: WFL  Extension: WFL    Right Knee   Flexion: WFL  Extension: -10 degrees     Additional Active Range of Motion Details  Numbness/Tingling in the toes constant    Numbness/tingling and pain R proximal lateral thigh, lateral lower leg and dorsum of the foot   Appears randomly per patient report  Mechanical Assessment    Cervical      Thoracic      Lumbar    Standing extension: repeated movements  Pain location: centralized  Pain intensity: better  Pain level: decreased    Strength/Myotome Testing     Left Shoulder     Planes of Motion   Flexion: 3+   Abduction: 3+   External rotation at 0°: 4-   Internal rotation at 0°: 4-     Right Shoulder     Planes of Motion   Flexion: 3+   Abduction: 3+   External rotation at 0°: 4-   Internal rotation at 0°: 4-     Left Elbow   Flexion: 4  Extension: 4-    Right Elbow   Flexion: 4  Extension: 4-    Left Hip   Planes of Motion   Flexion: 3+  Abduction: 3+  Adduction: 3+    Right Hip   Planes of Motion   Flexion: 3+  Abduction: 3+  Adduction: 3+    Left Knee   Flexion: 4-  Extension: 4-    Right Knee   Flexion: 3+  Extension: 3+    Left Ankle/Foot   Dorsiflexion: 4+  Plantar flexion: 4    Right Ankle/Foot   Dorsiflexion: 4+  Plantar flexion: 4    Ambulation     Observational Gait   Gait: antalgic   Decreased walking speed and stride length  Quality of Movement During Gait     Knee    Knee (Right): Positive increased flexion during stance and increased flexion during swing         Flowsheet Rows      Most Recent Value   PT/OT G-Codes   Current Score  48   Projected Score  55             Precautions none    Specialty Daily Treatment Diary     POOL  Exercise Diary                 Lap walking        Ham stretch with strap        SKTC        LAQ        Seated iso abd                SLR x 3        Ham curls        HR/TR        Side stepping        squats        Pool pony cycle        Standing back bends                Paddles:        Rows and extension        Horizontal abd/add        abd/add        IR/ER                Progress to        Step ups        1/2 jacks

## 2019-07-26 ENCOUNTER — OFFICE VISIT (OUTPATIENT)
Dept: PHYSICAL THERAPY | Age: 69
End: 2019-07-26
Payer: MEDICARE

## 2019-07-26 DIAGNOSIS — M79.7 FIBROMYALGIA: Primary | ICD-10-CM

## 2019-07-26 PROCEDURE — 97113 AQUATIC THERAPY/EXERCISES: CPT | Performed by: SPECIALIST/TECHNOLOGIST

## 2019-07-26 NOTE — PROGRESS NOTES
Daily Note     Today's date: 2019  Patient name: Luz Trent  : 1950  MRN: [id number]  Referring provider: Brenda Montiel, DO  Dx:   Encounter Diagnosis     ICD-10-CM    1  Fibromyalgia M79 7                   Subjective: Pt reports "8/10 pain today"- fibromyalgia symptoms persist      Objective: See treatment diary below    Assessment: Introduced aquatic exercise program this visit, pt demonstrates good form and muscle activation throughout treatment session  Pt demonstrates improved quality of movement in pool due to buoyancy  Tolerated treatment well  Patient would benefit from continued PT to improve fibromyalgia symptoms  Plan: Continue per plan of care  Progress treatment as tolerated         Precautions none    Specialty Daily Treatment Diary     POOL  Exercise Diary                 Lap walking 3x       Ham stretch with strap [id number]       SKTC [id number]       LAQ 20x       Seated iso abd [id number]               SLR x 3 15x       Ham curls 20x       HR/TR 20x       Side stepping 5x       squats 20x       Pool pony cycle        Standing back bends 20x               Paddles:        Rows and extension 20x       Horizontal abd/add 20x       abd/add 20x        IR/ER -               Progress to        Step ups -       1/2 jacks -

## 2019-07-31 ENCOUNTER — OFFICE VISIT (OUTPATIENT)
Dept: PHYSICAL THERAPY | Age: 69
End: 2019-07-31
Payer: MEDICARE

## 2019-07-31 DIAGNOSIS — M79.7 FIBROMYALGIA: Primary | ICD-10-CM

## 2019-07-31 PROCEDURE — 97113 AQUATIC THERAPY/EXERCISES: CPT

## 2019-07-31 NOTE — PROGRESS NOTES
Daily Note     Today's date: 2019  Patient name: Marilia Butler  : 1950  MRN: [id number]  Referring provider: Lauren Serna DO  Dx:   Encounter Diagnosis     ICD-10-CM    1  Fibromyalgia M79 7                   Subjective: Pt noted 5/10 LB, B LE and B feet today        Objective: See treatment diary below      Assessment: Tolerated treatm      Plan: Cont with Plan of care      Precautions none    Specialty Daily Treatment Diary     POOL  Exercise Diary                Lap walking 3x 10x at shallow end       Ham stretch with strap [id number] 20sec 5x       SKTC [id number] 20sec 5x B       LAQ 20x 20c 3sec       Seated iso abd [id number] 20x 5sec               SLR x 3 15x 20x       Ham curls 20x 20x       HR/TR 20x 20x       Side stepping 5x NT       squats 20x 20x       Pool pony cycle  NT       Standing back bends 20x 20x               Paddles:        Rows and extension 20x NT       Horizontal abd/add 20x NT       abd/add 20x  NT      IR/ER -               Progress to        Step ups -       1/2 jacks -

## 2019-08-01 ENCOUNTER — OFFICE VISIT (OUTPATIENT)
Dept: URGENT CARE | Age: 69
End: 2019-08-01
Payer: MEDICARE

## 2019-08-01 VITALS
DIASTOLIC BLOOD PRESSURE: 88 MMHG | WEIGHT: 171 LBS | HEIGHT: 62 IN | HEART RATE: 95 BPM | SYSTOLIC BLOOD PRESSURE: 119 MMHG | TEMPERATURE: 99.4 F | BODY MASS INDEX: 31.47 KG/M2 | OXYGEN SATURATION: 98 % | RESPIRATION RATE: 16 BRPM

## 2019-08-01 DIAGNOSIS — N30.01 ACUTE CYSTITIS WITH HEMATURIA: ICD-10-CM

## 2019-08-01 DIAGNOSIS — R39.9 UTI SYMPTOMS: Primary | ICD-10-CM

## 2019-08-01 LAB
SL AMB  POCT GLUCOSE, UA: NEGATIVE
SL AMB LEUKOCYTE ESTERASE,UA: ABNORMAL
SL AMB POCT BILIRUBIN,UA: ABNORMAL
SL AMB POCT BLOOD,UA: ABNORMAL
SL AMB POCT CLARITY,UA: CLEAR
SL AMB POCT COLOR,UA: ABNORMAL
SL AMB POCT KETONES,UA: ABNORMAL
SL AMB POCT NITRITE,UA: ABNORMAL
SL AMB POCT PH,UA: ABNORMAL
SL AMB POCT SPECIFIC GRAVITY,UA: 1.01
SL AMB POCT URINE PROTEIN: ABNORMAL
SL AMB POCT UROBILINOGEN: ABNORMAL

## 2019-08-01 PROCEDURE — 99213 OFFICE O/P EST LOW 20 MIN: CPT | Performed by: FAMILY MEDICINE

## 2019-08-01 PROCEDURE — 81002 URINALYSIS NONAUTO W/O SCOPE: CPT | Performed by: FAMILY MEDICINE

## 2019-08-01 PROCEDURE — 87086 URINE CULTURE/COLONY COUNT: CPT | Performed by: FAMILY MEDICINE

## 2019-08-01 PROCEDURE — G0463 HOSPITAL OUTPT CLINIC VISIT: HCPCS | Performed by: FAMILY MEDICINE

## 2019-08-01 RX ORDER — SULFAMETHOXAZOLE AND TRIMETHOPRIM 800; 160 MG/1; MG/1
1 TABLET ORAL EVERY 12 HOURS SCHEDULED
Qty: 6 TABLET | Refills: 0 | Status: SHIPPED | OUTPATIENT
Start: 2019-08-01 | End: 2019-08-04

## 2019-08-01 NOTE — PROGRESS NOTES
[facility]        NAME: Chantel Farah is a 68 y o  female  : 1950    MRN: [id number]  DATE: 2019  TIME: 1:14 PM    Assessment and Plan   UTI symptoms [R39 9]  1  UTI symptoms  POCT urine dip    Urine culture   2  Acute cystitis with hematuria  sulfamethoxazole-trimethoprim (BACTRIM DS) 800-160 mg per tablet         Patient Instructions       Follow up with PCP in 3-5 days  Proceed to  ER if symptoms worsen  Chief Complaint     Chief Complaint   Patient presents with    Possible UTI     Pt c/o low back pain x 1 week  Pt reports burning with urination  Pt is taking AZO            History of Present Illness       HPI    Review of Systems   Review of Systems      Current Medications       Current Outpatient Medications:     amLODIPine-benazepril (LOTREL) 10-20 MG per capsule, Take 1 capsule by mouth daily, Disp: 90 capsule, Rfl: 1    diclofenac (VOLTAREN) 75 mg EC tablet, TAKE 1 TABLET (75 MG TOTAL) BY MOUTH 2 (TWO) TIMES A DAY PRN FOR PAIN, Disp: 60 tablet, Rfl: 1    DULoxetine (CYMBALTA) 60 mg delayed release capsule, Take 1 capsule (60 mg total) by mouth daily, Disp: 90 capsule, Rfl: 1    gabapentin (NEURONTIN) 600 MG tablet, Take 1 tablet (600 mg total) by mouth 3 (three) times a day, Disp: 90 tablet, Rfl: 0    omeprazole (PriLOSEC) 10 mg delayed release capsule, Take 10 mg by mouth daily  , Disp: , Rfl:     SUPER B COMPLEX/C PO, Take 1 tablet by mouth daily  , Disp: , Rfl:     sulfamethoxazole-trimethoprim (BACTRIM DS) 800-160 mg per tablet, Take 1 tablet by mouth every 12 (twelve) hours for 3 days, Disp: 6 tablet, Rfl: 0    Current Allergies     Allergies as of 2019 - Reviewed 2019   Allergen Reaction Noted    Penicillins  2017            The following portions of the patient's history were reviewed and updated as appropriate: allergies, current medications, past family history, past medical history, past social history, past surgical history and problem list      Past Medical History:   Diagnosis Date    Arthritis     Fatty liver 5/1/2018    Hypertension     Primary localized osteoarthritis of right knee 8/17/2017       No past surgical history on file  Family History   Problem Relation Age of Onset    Cancer Mother     Osteoporosis Mother     Skin cancer Father     Cancer Family     Osteoporosis Family          Medications have been verified  Objective   /88   Pulse 95   Temp 99 4 °F (37 4 °C) (Tympanic)   Resp 16   Ht 5' 2" (1 575 m)   Wt 77 6 kg (171 lb)   SpO2 98%   BMI 31 28 kg/m²        Physical Exam     Physical Exam            Assessment:      Acute cystitis      Plan:  Plan:      1  Medications: TMP/SMX x 3 days  Urine sent for culture  2  Maintain adequate hydration  3  Follow up if symptoms not improving, and prn  Subjective:      Deneen Amato is a 68 y o  female who complains of dysuria, frequency and incomplete bladder emptying for 6 days  Patient also complains of back pain  Patient denies fever and vaginal discharge  She tried azo for the past 4 days with minimal relief  Patient does not have a history of recurrent UTI  Patient does not have a history of pyelonephritis  The following portions of the patient's history were reviewed and updated as appropriate: allergies, current medications, past family history, past medical history, past social history, past surgical history and problem list     Review of Systems  Pertinent items are noted in HPI  Objective:      /88   Pulse 95   Temp 99 4 °F (37 4 °C) (Tympanic)   Resp 16   Ht 5' 2" (1 575 m)   Wt 77 6 kg (171 lb)   SpO2 98%   BMI 31 28 kg/m²   General: alert and oriented, in no acute distress   Abdomen: soft, non-tender, without masses or organomegaly    Back: CVA tenderness absent   : defer exam     Laboratory:   Urine dipstick shows large blood, but unable to analyze leukocyte esterase and nitrites due to azo use    Micro exam:  Urine sent for culture

## 2019-08-02 ENCOUNTER — APPOINTMENT (OUTPATIENT)
Dept: PHYSICAL THERAPY | Age: 69
End: 2019-08-02
Payer: MEDICARE

## 2019-08-02 LAB — BACTERIA UR CULT: NORMAL

## 2019-08-05 ENCOUNTER — OFFICE VISIT (OUTPATIENT)
Dept: PHYSICAL THERAPY | Age: 69
End: 2019-08-05
Payer: MEDICARE

## 2019-08-05 DIAGNOSIS — M17.12 OSTEOARTHRITIS OF LEFT KNEE, UNSPECIFIED OSTEOARTHRITIS TYPE: ICD-10-CM

## 2019-08-05 DIAGNOSIS — M17.11 PRIMARY LOCALIZED OSTEOARTHRITIS OF RIGHT KNEE: ICD-10-CM

## 2019-08-05 DIAGNOSIS — M79.7 FIBROMYALGIA: Primary | ICD-10-CM

## 2019-08-05 PROCEDURE — 97110 THERAPEUTIC EXERCISES: CPT

## 2019-08-05 RX ORDER — DICLOFENAC SODIUM 75 MG/1
TABLET, DELAYED RELEASE ORAL
Qty: 60 TABLET | Refills: 1 | Status: SHIPPED | OUTPATIENT
Start: 2019-08-05 | End: 2020-01-21 | Stop reason: ALTCHOICE

## 2019-08-05 NOTE — PROGRESS NOTES
Daily Note     Today's date: 2019  Patient name: Kari Finney  : 1950  MRN: [id number]  Referring provider: Cristina Rodas DO  Dx:   Encounter Diagnosis     ICD-10-CM    1  Fibromyalgia M79 7                   Subjective: Pt noted 3/10 in LB and R LE      Objective: See treatment diary below      Assessment: Tolerated treatmment well  Feels B LB and R LE are  Both improved  Felt R knee flexion was especially improved after session        Plan: Cont with Plan of care      Precautions none    Specialty Daily Treatment Diary     POOL  Exercise Diary   8             Lap walking 3x 10x at shallow end  10x at shallow end     Ham stretch with strap [id number] 20sec 5x  22bsni5     SKTC 7l04sgm 20sec 5x B  93qcud7 B     LAQ 20x 20c 3sec  20x 3 sec     Seated iso abd 09q6rxh 20x 5sec  20x 5sec             SLR x 3 15x 20x  20x     Ham curls 20x 20x  20x     HR/TR 20x 20x  20x     Side stepping 5x NT  5x     squats 20x 20x  20x     Pool pony cycle  NT       Standing back bends 20x 20x  20x             Paddles:        Rows and extension 20x NT  20x     Horizontal abd/add 20x NT  20x     abd/add 20x  NT 20x     IR/ER -  20x             Progress to        Step ups -       1/2 jacks -

## 2019-08-07 ENCOUNTER — OFFICE VISIT (OUTPATIENT)
Dept: PHYSICAL THERAPY | Age: 69
End: 2019-08-07
Payer: MEDICARE

## 2019-08-07 DIAGNOSIS — M79.7 FIBROMYALGIA: Primary | ICD-10-CM

## 2019-08-07 PROCEDURE — 97113 AQUATIC THERAPY/EXERCISES: CPT

## 2019-08-07 NOTE — PROGRESS NOTES
Daily Note     Today's date: 2019  Patient name: Gabriel Ludwig  : 1950  MRN: [id number]  Referring provider: Mark Galvan DO  Dx:   Encounter Diagnosis     ICD-10-CM    1  Fibromyalgia M79 7                   Subjective: Pt  reports knees and back feel better  Feels overall stronger  Objective: See treatment diary below      Assessment: Tolerated treatment well  Patient would benefit from continued PT      Plan: Continue per plan of care        Precautions none    Specialty Daily Treatment Diary     POOL  Exercise Diary              Lap walking 3x 10x at shallow end  10x at shallow end 5    Ham stretch with strap 8e26fru 20sec 5x  67nuws9 20sec x 5    SKTC 5m64aoo 20sec 5x B  11azag0 B 10sec x 5    LAQ 20x 20c 3sec  20x 3 sec 20    Seated iso abd 95g2hbb 20x 5sec  20x 5sec 20            SLR x 3 15x 20x  20x 20    Ham curls 20x 20x  20x 20    HR/TR 20x 20x  20x 20    Side stepping 5x NT  5x     squats 20x 20x  20x 20x    Pool pony cycle  NT       Standing back bends 20x 20x  20x 20x            Paddles:        Rows and extension 20x NT  20x Paddles x 20    Horizontal abd/add 20x NT  20x " x 20    abd/add 20x  NT 20x " x 20    IR/ER -  20x "x 20            Progress to        Step ups -       1/2 jacks -

## 2019-08-12 ENCOUNTER — OFFICE VISIT (OUTPATIENT)
Dept: PHYSICAL THERAPY | Age: 69
End: 2019-08-12
Payer: MEDICARE

## 2019-08-12 DIAGNOSIS — M79.7 FIBROMYALGIA: Primary | ICD-10-CM

## 2019-08-12 PROCEDURE — 97113 AQUATIC THERAPY/EXERCISES: CPT | Performed by: SPECIALIST/TECHNOLOGIST

## 2019-08-12 NOTE — PROGRESS NOTES
Daily Note     Today's date: 2019  Patient name: Raisa Grant  : 1950  MRN: [id number]  Referring provider: Júnior Kolb DO  Dx:   Encounter Diagnosis     ICD-10-CM    1  Fibromyalgia M79 7                   Subjective: Pt reports she feels aquatic PT is helping her fibromyalgia symptoms, pt reports improved function with ADL's, able to do stairs with reciprocal pattern now  Objective: See treatment diary below    Assessment: Pt able to increase repetitions with aquatic therex this visit without excessive muscular fatigue or exacerbation of fibromyalgia symptoms  Tolerated treatment well  Patient demonstrated fatigue post treatment, exhibited good technique with therapeutic exercises and would benefit from continued PT    Plan: Continue per plan of care  Progress treatment as tolerated         Precautions none    Specialty Daily Treatment Diary     POOL  Exercise Diary             Lap walking 3x 10x at shallow end  10x at shallow end 5 5x   Ham stretch with strap 5r98gsw 20sec 5x  65zhvg7 20sec x 5 20sec x5   SKTC 1n80ihq 20sec 5x B  21efsr4 B 10sec x 5 10sec x5   LAQ 20x 20c 3sec  20x 3 sec 20 30x   Seated iso abd 57r9drt 20x 5sec  20x 5sec 20 20x           SLR x 3 15x 20x  20x 20 30   Ham curls 20x 20x  20x 20 30x   HR/TR 20x 20x  20x 20 30x   Side stepping 5x NT  5x     squats 20x 20x  20x 20x 30x   Pool pony cycle  NT       Standing back bends 20x 20x  20x 20x 30x           Paddles:        Rows and extension 20x NT  20x Paddles x 20 Paddles x30   Horizontal abd/add 20x NT  20x " x 20 x30   abd/add 20x  NT 20x " x 20 x30   IR/ER -  20x "x 20 x30           Progress to        Step ups -    20x   1/2 jacks -

## 2019-08-13 DIAGNOSIS — M54.16 LUMBAR RADICULOPATHY: ICD-10-CM

## 2019-08-13 DIAGNOSIS — M54.12 CERVICAL RADICULOPATHY: ICD-10-CM

## 2019-08-14 ENCOUNTER — OFFICE VISIT (OUTPATIENT)
Dept: PHYSICAL THERAPY | Age: 69
End: 2019-08-14
Payer: MEDICARE

## 2019-08-14 DIAGNOSIS — M79.7 FIBROMYALGIA: Primary | ICD-10-CM

## 2019-08-14 PROCEDURE — 97113 AQUATIC THERAPY/EXERCISES: CPT

## 2019-08-14 RX ORDER — GABAPENTIN 600 MG/1
TABLET ORAL
Qty: 90 TABLET | Refills: 0 | OUTPATIENT
Start: 2019-08-14

## 2019-08-14 NOTE — PROGRESS NOTES
Daily Note     Today's date: 2019  Patient name: Flex Muhammad  : 1950  MRN: [id number]  Referring provider: Samson Bearden DO  Dx:   Encounter Diagnosis     ICD-10-CM    1  Fibromyalgia M79 7                   Subjective: Pt  Reports her mornings are good but the afternoons are painful  States she can move her toes better  Objective: See treatment diary below      Assessment: Tolerated treatment well  Patient would benefit from continued PT      Plan: Continue per plan of care        Precautions none    Specialty Daily Treatment Diary     POOL  Exercise Diary             Lap walking 3x 10x at shallow end  10x at shallow end 5 5x   Ham stretch with strap 4e98wfh 20sec 5x  78bpbz0 20sec x 5 20sec x5   SKTC 9f76mms 20sec 5x B  67bnnc9 B 10sec x 5 10sec x5   LAQ 30x 20c 3sec  20x 3 sec 20 30x   Seated iso abd 50d0doe 20x 5sec  20x 5sec 20 20x           SLR x 3 30x 20x  20x 20 30   Ham curls 30x 20x  20x 20 30x   HR/TR 30x 20x  20x 20 30x   Side stepping 5x NT  5x     squats 30x 20x  20x 20x 30x   Pool pony cycle  NT       Standing back bends 30x 20x  20x 20x 30x           Paddles:        Rows and extension 30x NT  20x Paddles x 20 Paddles x30   Horizontal abd/add 30x NT  20x " x 20 x30   abd/add 30x  NT 20x " x 20 x30   IR/ER 30x  20x "x 20 x30           Progress to        Step ups -    20x   1/2 jacks -

## 2019-08-19 ENCOUNTER — APPOINTMENT (OUTPATIENT)
Dept: PHYSICAL THERAPY | Age: 69
End: 2019-08-19
Payer: MEDICARE

## 2019-08-20 ENCOUNTER — HOSPITAL ENCOUNTER (OUTPATIENT)
Dept: RADIOLOGY | Facility: IMAGING CENTER | Age: 69
Discharge: HOME | End: 2019-08-20
Payer: MEDICARE

## 2019-08-20 VITALS — HEIGHT: 62 IN | WEIGHT: 174 LBS | BODY MASS INDEX: 32.02 KG/M2

## 2019-08-20 DIAGNOSIS — Z12.39 SCREENING FOR BREAST CANCER: ICD-10-CM

## 2019-08-20 DIAGNOSIS — M54.12 CERVICAL RADICULOPATHY: ICD-10-CM

## 2019-08-20 PROCEDURE — 72141 MRI NECK SPINE W/O DYE: CPT

## 2019-08-20 PROCEDURE — 77067 SCR MAMMO BI INCL CAD: CPT

## 2019-08-21 ENCOUNTER — APPOINTMENT (OUTPATIENT)
Dept: PHYSICAL THERAPY | Age: 69
End: 2019-08-21
Payer: MEDICARE

## 2019-08-21 ENCOUNTER — TELEPHONE (OUTPATIENT)
Dept: PAIN MEDICINE | Facility: CLINIC | Age: 69
End: 2019-08-21

## 2019-08-21 ENCOUNTER — APPOINTMENT (OUTPATIENT)
Dept: LAB | Age: 69
End: 2019-08-21
Payer: MEDICARE

## 2019-08-21 DIAGNOSIS — I10 BENIGN ESSENTIAL HTN: ICD-10-CM

## 2019-08-21 LAB
BACTERIA UR QL AUTO: ABNORMAL /HPF
BILIRUB UR QL STRIP: NEGATIVE
CLARITY UR: ABNORMAL
COLOR UR: YELLOW
ERYTHROCYTE [DISTWIDTH] IN BLOOD BY AUTOMATED COUNT: 13.1 % (ref 11.6–15.1)
GLUCOSE UR STRIP-MCNC: NEGATIVE MG/DL
HCT VFR BLD AUTO: 41.8 % (ref 34.8–46.1)
HGB BLD-MCNC: 13.3 G/DL (ref 11.5–15.4)
HGB UR QL STRIP.AUTO: NEGATIVE
KETONES UR STRIP-MCNC: NEGATIVE MG/DL
LEUKOCYTE ESTERASE UR QL STRIP: ABNORMAL
MCH RBC QN AUTO: 28.2 PG (ref 26.8–34.3)
MCHC RBC AUTO-ENTMCNC: 31.8 G/DL (ref 31.4–37.4)
MCV RBC AUTO: 89 FL (ref 82–98)
NITRITE UR QL STRIP: NEGATIVE
NON-SQ EPI CELLS URNS QL MICRO: ABNORMAL /HPF
PH UR STRIP.AUTO: 6.5 [PH]
PLATELET # BLD AUTO: 216 THOUSANDS/UL (ref 149–390)
PMV BLD AUTO: 10.1 FL (ref 8.9–12.7)
PROT UR STRIP-MCNC: NEGATIVE MG/DL
RBC # BLD AUTO: 4.71 MILLION/UL (ref 3.81–5.12)
RBC #/AREA URNS AUTO: ABNORMAL /HPF
SP GR UR STRIP.AUTO: 1.02 (ref 1–1.03)
UROBILINOGEN UR QL STRIP.AUTO: 0.2 E.U./DL
WBC # BLD AUTO: 4.2 THOUSAND/UL (ref 4.31–10.16)
WBC #/AREA URNS AUTO: ABNORMAL /HPF

## 2019-08-21 PROCEDURE — 36415 COLL VENOUS BLD VENIPUNCTURE: CPT

## 2019-08-21 PROCEDURE — 85027 COMPLETE CBC AUTOMATED: CPT

## 2019-08-21 PROCEDURE — 81001 URINALYSIS AUTO W/SCOPE: CPT

## 2019-08-21 NOTE — TELEPHONE ENCOUNTER
Patient's MRI of the cervical spine does reveal stenosis most severe at C5-6, and C6-7  We can offer her cervical epidural steroid injection for her neck and upper extremity symptoms if she would like  She would just need to hold her diclofenac

## 2019-08-21 NOTE — TELEPHONE ENCOUNTER
RN s/w pt  Pt would like to think about having the injection and discuss with PERRY at her next appt on Friday

## 2019-08-22 ENCOUNTER — APPOINTMENT (OUTPATIENT)
Dept: PHYSICAL THERAPY | Age: 69
End: 2019-08-22
Payer: MEDICARE

## 2019-08-23 ENCOUNTER — OFFICE VISIT (OUTPATIENT)
Dept: PAIN MEDICINE | Facility: MEDICAL CENTER | Age: 69
End: 2019-08-23
Payer: MEDICARE

## 2019-08-23 ENCOUNTER — APPOINTMENT (OUTPATIENT)
Dept: PHYSICAL THERAPY | Age: 69
End: 2019-08-23
Payer: MEDICARE

## 2019-08-23 VITALS
HEIGHT: 62 IN | WEIGHT: 174.8 LBS | BODY MASS INDEX: 32.17 KG/M2 | SYSTOLIC BLOOD PRESSURE: 151 MMHG | HEART RATE: 91 BPM | DIASTOLIC BLOOD PRESSURE: 82 MMHG

## 2019-08-23 DIAGNOSIS — M48.02 CERVICAL SPINAL STENOSIS: ICD-10-CM

## 2019-08-23 DIAGNOSIS — M54.50 CHRONIC RIGHT-SIDED LOW BACK PAIN WITHOUT SCIATICA: ICD-10-CM

## 2019-08-23 DIAGNOSIS — M54.12 CERVICAL RADICULOPATHY: Primary | ICD-10-CM

## 2019-08-23 DIAGNOSIS — M47.816 LUMBAR SPONDYLOSIS: ICD-10-CM

## 2019-08-23 DIAGNOSIS — G89.29 CHRONIC RIGHT-SIDED LOW BACK PAIN WITHOUT SCIATICA: ICD-10-CM

## 2019-08-23 PROCEDURE — 99214 OFFICE O/P EST MOD 30 MIN: CPT | Performed by: PHYSICAL MEDICINE & REHABILITATION

## 2019-08-23 NOTE — PROGRESS NOTES
Assessment:  1  Cervical radiculopathy    2  Cervical spinal stenosis    3  Chronic right-sided low back pain without sciatica    4  Lumbar spondylosis        Plan:  1  We will schedule patient for a cervical epidural steroid injection to address her neck and radiating arm pain  2  Continue medication regimen, she will decrease back down to gabapentin 300 mg t i d   3  If she fails to improve with the epidural steroid injection would obtain bilateral upper extremity EMG to look for peripheral nerve compression  4  We will schedule the patient for right L3-5 medial branch nerve blocks with intention of moving forward towards radiofrequency ablation if there is an appropriate diagnostic response  The initial blocks will be performed with 2% lidocaine and if an appropriate response is obtained upon review of the patient's pain diary, a confirmatory block will be scheduled with 0 5% bupivacaine  In the office today, we reviewed the nature of facet joint pathology in depth using a spine model  We discussed the approach we would use for the injections and provided literature for home review  The patient understands the risks associated with the procedure including bleeding, infection, tissue injury, and allergic reaction and provided verbal informed consent in the office today  History of Present Illness: The patient is a 68 y o  female who presents for consultation in regards to neck and radiating bilateral upper extremity pain  She did also previously undergo epidural steroid injection for the lumbar spine and is continuing to have some significant lower back pain  Her neck pain is rated as a 5/10 and radiates down both arms  She describes intermittent sensation which is worse in the evening and nighttime described as burning, dull, aching, throbbing, cramping, shooting, numbness, pins and needles      She has had some improvement with the medication regimen but is looking for further relief  Review of Systems:    Review of Systems   Constitutional: Negative for chills  HENT: Negative for congestion  Eyes: Negative for pain  Respiratory: Negative for cough  Cardiovascular: Negative for chest pain  Gastrointestinal: Negative for anal bleeding  Endocrine: Negative for polyphagia  Genitourinary: Negative for hematuria  Musculoskeletal: Positive for back pain (right lower lunbar radiating down leg into knee, ankle, and foot), gait problem, joint swelling (bilat ankles up to bilat knees) and myalgias  Skin: Negative for pallor  Allergic/Immunologic: Negative for immunocompromised state  Neurological: Negative for syncope  Hematological: Does not bruise/bleed easily  Psychiatric/Behavioral: Negative for dysphoric mood  Past Medical History:   Diagnosis Date    Arthritis     Fatty liver 5/1/2018    Hypertension     Primary localized osteoarthritis of right knee 8/17/2017       History reviewed  No pertinent surgical history      Family History   Problem Relation Age of Onset    Osteoporosis Mother     Skin cancer Father     Cancer Family     Osteoporosis Family     Stomach cancer Sister 52    No Known Problems Daughter     No Known Problems Maternal Grandmother     No Known Problems Maternal Grandfather     No Known Problems Paternal Grandmother     No Known Problems Paternal Grandfather     No Known Problems Maternal Aunt     No Known Problems Maternal Aunt     No Known Problems Maternal Aunt     No Known Problems Maternal Aunt     No Known Problems Paternal Aunt        Social History     Occupational History    Not on file   Tobacco Use    Smoking status: Never Smoker    Smokeless tobacco: Never Used   Substance and Sexual Activity    Alcohol use: No    Drug use: Not on file    Sexual activity: Not on file         Current Outpatient Medications:     amLODIPine-benazepril (LOTREL) 10-20 MG per capsule, Take 1 capsule by mouth daily, Disp: 90 capsule, Rfl: 1    diclofenac (VOLTAREN) 75 mg EC tablet, TAKE 1 TABLET (75 MG TOTAL) BY MOUTH 2 (TWO) TIMES A DAY AS NEEDED FOR PAIN, Disp: 60 tablet, Rfl: 1    DULoxetine (CYMBALTA) 60 mg delayed release capsule, Take 1 capsule (60 mg total) by mouth daily, Disp: 90 capsule, Rfl: 1    gabapentin (NEURONTIN) 600 MG tablet, Take 1 tablet (600 mg total) by mouth 3 (three) times a day, Disp: 90 tablet, Rfl: 0    omeprazole (PriLOSEC) 10 mg delayed release capsule, Take 10 mg by mouth daily  , Disp: , Rfl:     SUPER B COMPLEX/C PO, Take 1 tablet by mouth daily  , Disp: , Rfl:     Allergies   Allergen Reactions    Penicillins        Physical Exam:    /82   Pulse 91   Ht 5' 2" (1 575 m)   Wt 79 3 kg (174 lb 12 8 oz)   BMI 31 97 kg/m²     CERVICAL  General: Well-developed, well-nourished individual in no acute distress  Mental: Appropriate mood and affect  Grossly oriented with coherent speech and thought processing   Neuro:  Cranial nerves: Cranial nerve function is grossly intact bilaterally   Strength: Bilateral upper extremity strength is normal and symmetric   No atrophy or tone abnormalities noted   Reflexes: Bilateral upper extremity muscle stretch reflexes are physiologic and symmetric   No Gibson sign   Sensation: No loss of sensation is noted   Foraminal Compression Maneuvers:  Spurling sign is absent present on the left  Gait:  Gait/gross motor: Gait is normal  Station is normal      Musculoskeletal:  Palpation: Inspection and palpation of the spine and extremities are unremarkable except for tenderness to palpation over the right lumbar facet joints  Spine: Normal pain-free range of motion except for end range cervical flexion and rotation to either side  No gross axial skeletal deformities   She does have pain with lumbar extension and rotation to the right reproducing her symptoms      Skin: Skin inspection grossly negative for erythema, breakdown, or concerning lesions in affected area   Lymph: No lymphadenopathy is appreciated in the involved extremity   Vessels: No lower extremity edema   Lungs: Breathing is comfortable and regular  No dyspnea noted during examination   Eyes: Visual field grossly intact to confrontation  No redness appreciated  ENT: No craniofacial deformities or asymmetry  No neck masses appreciated  Imaging    Study Result     MRI CERVICAL SPINE WITHOUT CONTRAST     INDICATION: M54 12: Radiculopathy, cervical region  Chronic bilateral arm pain left worse than right     COMPARISON:  MR 1/14/2016     TECHNIQUE:  Sagittal T1, sagittal T2, sagittal inversion recovery, axial T2, axial  2D merge     IMAGE QUALITY:  Diagnostic     FINDINGS:     ALIGNMENT:  Straightening of normal cervical lordosis  Loss of disc height C5-C6 and C6-C7, minor relative retrolisthesis of C6 on C7 with degenerative anterolisthesis of C4 on C5, similar to prior study      MARROW SIGNAL:  Normal marrow signal is identified within the visualized bony structures  No discrete marrow lesion      CERVICAL AND VISUALIZED THORACIC CORD:  Normal signal within the visualized cord      PREVERTEBRAL AND PARASPINAL SOFT TISSUES:  Normal      VISUALIZED POSTERIOR FOSSA:  The visualized posterior fossa demonstrates no abnormal signal      CERVICAL DISC SPACES:     C2-C3:  Minimal facet arthrosis, minor bulge     C3-C4:  Progressive left greater than right facet arthrosis      C4-C5:  Bilateral, left greater than right facet arthrosis slightly progressive  No critical stenosis      C5-C6:  Loss of disc height, marginal osteophytes with circumferential bulge  Bilateral uncinate arthrosis moderately severe foraminal stenosis, correlate for bilateral C6 radiculitis      C6-C7:  Loss of disc height, circumferential bulge with marginal osteophytes    Bilateral, right greater than left uncinate arthrosis with relatively significant bilateral foraminal stenosis, correlate for bilateral C7 radiculitis  AP dimension of the   canal reduced to 7-8 mm      C7-T1:  Bilateral facet arthrosis grade 1 anterolisthesis, no critical stenosis      UPPER THORACIC DISC SPACES:  Bulging discs T2-T3      IMPRESSION:     Progressive degenerative changes of the cervical spine  Loss of disc height the C5-C6 and C6-C7 where marginal osteophytes and uncinate spurs narrow the foramen bilaterally at C5-C6 and C6-C7  Correlate for bilateral C6 and C7 radiculitis                Workstation performed: [id number]          FL spine and pain procedure    (Results Pending)   FL spine and pain procedure    (Results Pending)       Orders Placed This Encounter   Procedures    FL spine and pain procedure    FL spine and pain procedure

## 2019-08-25 DIAGNOSIS — M79.7 FIBROMYALGIA: ICD-10-CM

## 2019-08-25 RX ORDER — DULOXETIN HYDROCHLORIDE 60 MG/1
CAPSULE, DELAYED RELEASE ORAL
Qty: 90 CAPSULE | Refills: 1 | Status: SHIPPED | OUTPATIENT
Start: 2019-08-25 | End: 2020-02-24

## 2019-08-26 ENCOUNTER — EVALUATION (OUTPATIENT)
Dept: PHYSICAL THERAPY | Age: 69
End: 2019-08-26
Payer: MEDICARE

## 2019-08-26 DIAGNOSIS — M79.7 FIBROMYALGIA: Primary | ICD-10-CM

## 2019-08-26 PROCEDURE — 97110 THERAPEUTIC EXERCISES: CPT | Performed by: PHYSICAL THERAPIST

## 2019-08-26 PROCEDURE — 97164 PT RE-EVAL EST PLAN CARE: CPT | Performed by: PHYSICAL THERAPIST

## 2019-08-26 NOTE — PROGRESS NOTES
PT Re-Evaluation     Today's date: 2019  Patient name: Elvia Mcconnell  : 1950  MRN: [id number]  Referring provider: Landry Thao DO  Dx:   Encounter Diagnosis     ICD-10-CM    1  Fibromyalgia M79 7                   Assessment  Assessment details: Patient seen for PT re-assessment  Patient presents with improved lumbar ROM, decrease in pain, no change in radicular symptoms in UEs nor LEs, improved LE and UE strength since IE  Discussed progressing to land and pool therapy to assist with transition to gym/community fitness  PT progressed to land therapy today, good tolerance to therapy, focusing on strengthening within tolerance  Impairments: abnormal gait, abnormal or restricted ROM, activity intolerance, impaired balance, impaired physical strength, lacks appropriate home exercise program, pain with function, poor posture  and poor body mechanics  Functional limitations: Patient reports less pain with ascending/descending the stairs  Still feels moderate limitations with IADLs and recreational activities d/t pain  Understanding of Dx/Px/POC: good   Prognosis: good    Goals  Impairment Goals to be met within 4 weeks  - Decrease pain to 3/10 at rest and 6/10 with activity  Partially met  - Improve ROM by 5-10 degrees lumbar spine met  - Increase strength to 5/5 throughout progressing  - Improve sitting and standing posture  progressing    Functional Goals to be met within 4-6 weeks     - Return to Prior Level of Function progressing  - Increase Functional Status Measure to: expected progressing   - Patient will be independent with HEP partially met  - Patient to be able to tolerate household chores with pain <5/10 progressing         Plan  Patient would benefit from: skilled speech therapy  Planned modality interventions: hydrotherapy  Planned therapy interventions: abdominal trunk stabilization, joint mobilization, manual therapy, neuromuscular re-education, balance, aquatic therapy, patient education, postural training, strengthening, stretching, therapeutic activities, therapeutic exercise, therapeutic training, home exercise program and gait training  Frequency: 2x week  Duration in weeks: 6  Treatment plan discussed with: patient        Subjective Evaluation    History of Present Illness  Mechanism of injury: Patient seen for re-assessment  Patient feels therapy is helping with her strength and with her pain  Enjoyed exercising in the pool  Discussed progressing to pool and land therapy to progress patient's exercise program and to assist with transition to fitness program      Reports that her arms went numb today while she was in the car coming here today  Reports that she will be having testing after the upcoming injections for both her neck and low back  Has had MRI for both cervical and lumbar spine  Patient trying to avoid surgery at all cost    Pain  Current pain ratin  At best pain ratin  At worst pain ratin  Location: full body  Quality: throbbing, dull ache and sharp  Aggravating factors: sitting, standing, walking, stair climbing, lifting and overhead activity  Progression: no change    Social Support  Steps to enter house: yes  Stairs in house: yes   Lives with: spouse    Employment status: not working  Patient Goals  Patient goals for therapy: decreased pain, improved balance, increased motion, increased strength and return to sport/leisure activities          Objective     Concurrent Complaints  Positive for disturbed sleep   Negative for bladder dysfunction, bowel dysfunction and saddle (S4) numbness    Postural Observations  Seated posture: fair  Standing posture: good  Correction of posture: has no consistent effect        Neurological Testing     Sensation     Lumbar   Left   Intact: light touch    Right   Paresthesia: light touch    Reflexes   Left   Clonus sign: negative    Right   Clonus sign: negative    Active Range of Motion   Left Shoulder   Flexion: 150 degrees Abduction: 165 degrees   External rotation BTH: C4     Right Shoulder   Flexion: 150 degrees   Abduction: 165 degrees   External rotation BTH: C4     Left Elbow   Normal active range of motion    Right Elbow   Normal active range of motion    Lumbar   Flexion: 90 degrees  with pain  Extension: 40 degrees  with pain  Left lateral flexion: 30 degrees    with pain  Right lateral flexion: 20 degrees  with pain  Left rotation: 35 degrees  with pain  Right rotation: 32 degrees  with pain  Left Hip   Flexion: 80 degrees with pain    Right Hip   Flexion: 80 degrees with pain  Left Knee   Flexion: WFL  Extension: WFL    Right Knee   Flexion: WFL  Extension: -10 degrees     Additional Active Range of Motion Details  Numbness/Tingling in the toes constant    Numbness/tingling and pain R proximal lateral thigh, lateral lower leg and dorsum of the foot  Appears randomly per patient report  Mechanical Assessment    Cervical      Thoracic      Lumbar    Standing extension: repeated movements  Pain location: centralized  Pain intensity: better  Pain level: decreased    Strength/Myotome Testing     Left Shoulder     Planes of Motion   Flexion: 4   Abduction: 4   External rotation at 0°: 4-   Internal rotation at 0°: 4     Right Shoulder     Planes of Motion   Flexion: 4   Abduction: 4   External rotation at 0°: 4-   Internal rotation at 0°: 4     Left Elbow   Flexion: 4  Extension: 4    Right Elbow   Flexion: 4  Extension: 4    Left Hip   Planes of Motion   Flexion: 4  Abduction: 4-  Adduction: 4-    Right Hip   Planes of Motion   Flexion: 4  Abduction: 4-  Adduction: 4-    Left Knee   Flexion: 4-  Extension: 4    Right Knee   Flexion: 4-  Extension: 4-    Left Ankle/Foot   Dorsiflexion: 5  Plantar flexion: 4+    Right Ankle/Foot   Dorsiflexion: 5  Plantar flexion: 4+    Ambulation     Observational Gait   Gait: antalgic   Decreased walking speed and stride length       Quality of Movement During Gait     Knee    Knee (Right): Positive increased flexion during stance and increased flexion during swing                Precautions fibromyalgia    Specialty Daily Treatment Diary     LAND  Manual 8/26                               Exercise Diary                 Nu step L2 10'               TB rows and extension GTB 2x10               Step ups FW  6" 10x ea       Step ups lateral 6" 10x ea               Squats hi/lo 15x               Leg press 50# 15x       Knee extension 15# 15x       Hip abd 30# 15x       Ham curls 30# 15x                                                       Modalities                                Progress as able

## 2019-08-30 ENCOUNTER — OFFICE VISIT (OUTPATIENT)
Dept: PHYSICAL THERAPY | Age: 69
End: 2019-08-30
Payer: MEDICARE

## 2019-08-30 DIAGNOSIS — M79.7 FIBROMYALGIA: Primary | ICD-10-CM

## 2019-08-30 PROCEDURE — 97113 AQUATIC THERAPY/EXERCISES: CPT

## 2019-08-30 NOTE — PROGRESS NOTES
Daily Note     Today's date: 2019  Patient name: Leslie Sheridan  : 1950  MRN: [id number]  Referring provider: Nimo Torres DO  Dx:   Encounter Diagnosis     ICD-10-CM    1  Fibromyalgia M79 7                   Subjective:  Pt reported 8/10 pain everywhere today "the pool is helping me move better"       Objective: See treatment diary below      Assessment: Pt experienced overall decreased pain and improved mobility  Plan: Continue per plan of care        Precautions none    Specialty Daily Treatment Diary     POOL  Exercise Diary                  Lap walking 3x       Ham stretch with strap [id number]       SKTC [id number]       LAQ 30x       Seated iso abd [id number]               SLR x 3 30x       Ham curls 30x       HR/TR 30x       Side stepping 5x       squats 30x       Pool pony cycle        Standing back bends 30x               Paddles:        Rows and extension Paddles  30x       Horizontal abd/add 30x       abd/add 30x        IR/ER 30x               Progress to        Step ups -       1/2 jacks -

## 2019-09-01 DIAGNOSIS — M54.16 LUMBAR RADICULOPATHY: ICD-10-CM

## 2019-09-01 DIAGNOSIS — M54.12 CERVICAL RADICULOPATHY: ICD-10-CM

## 2019-09-03 ENCOUNTER — OFFICE VISIT (OUTPATIENT)
Dept: PHYSICAL THERAPY | Age: 69
End: 2019-09-03
Payer: MEDICARE

## 2019-09-03 DIAGNOSIS — M79.7 FIBROMYALGIA: Primary | ICD-10-CM

## 2019-09-03 PROCEDURE — 97110 THERAPEUTIC EXERCISES: CPT

## 2019-09-03 NOTE — PROGRESS NOTES
Daily Note     Today's date: 9/3/2019  Patient name: Karen Montoya  : 1950  MRN: [id number]  Referring provider: Candace Andrews DO  Dx:   Encounter Diagnosis     ICD-10-CM    1  Fibromyalgia M79 7                   Subjective: Main c/o is pain in B knees when stands for longer periods      Objective: See treatment diary below    LAND  Manual 8/26  9/3                                                   Exercise Diary                            Nu step L2 10'  bike 10m                       TB rows and extension GTB 2x10  GTB 2x10                       Step ups FW  6" 10x ea  6in x15         Step ups lateral 6" 10x ea  6in x15          sidesteps w/tband    red x5         Squats hi/lo 15x  20x                       Leg press 50# 15x  55# 2x15         Knee extension 15# 15x  15# 2x10         Hip abd 30# 15x  30# 2x15         Ham curls 30# 15x  30# 2x15                                                                                             Modalities                                                          Assessment: Tolerated treatment well  Pt should benefit from increased strengthening regimen on land and in water that will translate into improved standing time   Only had fatigue in LE vs pain after today's session      Plan: Cont PT per LPT plan     Precautions none    Specialty Daily Treatment Diary     POOL  Exercise Diary                  Lap walking 3x       Ham stretch with strap [id number]       SKTC [id number]       LAQ 30x       Seated iso abd [id number]               SLR x 3 30x       Ham curls 30x       HR/TR 30x       Side stepping 5x       squats 30x       Pool pony cycle        Standing back bends 30x               Paddles:        Rows and extension Paddles  30x       Horizontal abd/add 30x       abd/add 30x        IR/ER 30x               Progress to        Step ups -       1/2 jacks -

## 2019-09-04 ENCOUNTER — OFFICE VISIT (OUTPATIENT)
Dept: PHYSICAL THERAPY | Age: 69
End: 2019-09-04
Payer: MEDICARE

## 2019-09-04 DIAGNOSIS — M79.7 FIBROMYALGIA: Primary | ICD-10-CM

## 2019-09-04 PROCEDURE — 97113 AQUATIC THERAPY/EXERCISES: CPT

## 2019-09-04 NOTE — PROGRESS NOTES
Daily Note     Today's date: 2019  Patient name: Ion Merritt  : 1950  MRN: [id number]  Referring provider: Christianne Frost DO  Dx:   Encounter Diagnosis     ICD-10-CM    1  Fibromyalgia M79 7                   Subjective: Pt reports transition to combination of land & aquatic PT is going well, improved function noted at home with ADLs  Objective: See treatment diary below      Assessment: Patient tolerated tx session well without complaint  She demonstrated ability to perform PRE's with proper form  Patient demonstrated fatigue post treatment, exhibited good technique with therapeutic exercises and would benefit from continued PT      Plan: Continue per plan of care        Precautions none    Specialty Daily Treatment Diary     POOL  Exercise Diary    9              Lap walking 3x 5x shallow      Ham stretch with strap [id number]      SKTC [id number] 5x10"       LAQ 30x 30x      Seated iso abd [id number]              SLR x 3 30x 30x      Ham curls 30x 30x      HR/TR 30x 30x      Side stepping 5x 5x      squats 30x 30x      Pool pony cycle        Standing back bends 30x 30x              Paddles:        Rows and extension Paddles  30x 30x      Horizontal abd/add 30x 30x      abd/add 30x  30x      IR/ER 30x 30x              Progress to        Step ups -       1/2 jacks -

## 2019-09-04 NOTE — PROGRESS NOTES
Assessment/Plan:    Problem List Items Addressed This Visit        Cardiovascular and Mediastinum    Benign essential HTN - Primary     Stable  However, due to BLE edema, will start on hctz 12 5mg daily, keep on benazepril 20mg daily and d/c amlodipine 10mg daily  Obtain BMP in 3-5days  Monitor home bp and return with log for review in one month  Side effects discussed  Relevant Medications    hydrochlorothiazide (HYDRODIURIL) 12 5 mg tablet    benazepril (LOTENSIN) 20 mg tablet    Other Relevant Orders    Basic metabolic panel       Nervous and Auditory    Cervical radiculopathy     Due to foraminal stenosis with arthritis  Patient on gabapentin per Pain Management and scheduled for epidural tomorrow  Musculoskeletal and Integument    Cervical spine arthritis       Other    Fibromyalgia    Lower extremity edema     Suspect dependent edema  However, patient is on CCB and gabapentin  Will d/c amlodipine, start hctz with re-eval in one month  Relevant Medications    hydrochlorothiazide (HYDRODIURIL) 12 5 mg tablet      Other Visit Diagnoses     Medicare annual wellness visit, subsequent        Encounter for immunization        Relevant Orders    PREFERRED: influenza vaccine, [id number], high-dose, PF 0 5 mL, for patients 65 yr+ (FLUZONE HIGH-DOSE) (Completed)    Asymptomatic postmenopausal state        Relevant Orders    DXA bone density spine hip and pelvis          Subjective:      Patient ID: Lydia Carreon is a 68 y o  female  68-year-old female with fibromyalgia, reflux, fatty liver, HTN, LBP and knee pain here for follow-up care  She is accompanied by her   Hypertension   This is a chronic problem  The current episode started more than 1 year ago  The problem is controlled  Associated symptoms include neck pain  Pertinent negatives include no chest pain or palpitations   Risk factors for coronary artery disease include obesity, post-menopausal state and sedentary lifestyle  Past treatments include calcium channel blockers and ACE inhibitors  Reports she did not have relief of her low back, neck and shoulder pain with higher dose of gabapentin therefore it was decreased back to 300mg TID  She is scheduled for cervical epidural injection tomorrow  Has lumbar epidural injection scheduled two weeks later  She was referred to aquatic therapy for fibromyalgia, reports her legs feel stronger but continues to have pain  She has had LE edema, worse in the evening  No SOB  The following portions of the patient's history were reviewed and updated as appropriate: allergies, current medications, past family history, past medical history, past social history, past surgical history and problem list     Current Outpatient Medications:     diclofenac (VOLTAREN) 75 mg EC tablet, TAKE 1 TABLET (75 MG TOTAL) BY MOUTH 2 (TWO) TIMES A DAY AS NEEDED FOR PAIN, Disp: 60 tablet, Rfl: 1    DULoxetine (CYMBALTA) 60 mg delayed release capsule, TAKE 1 CAPSULE BY MOUTH EVERY DAY, Disp: 90 capsule, Rfl: 1    gabapentin (NEURONTIN) 600 MG tablet, Take 1 tablet (600 mg total) by mouth 3 (three) times a day, Disp: 90 tablet, Rfl: 0    omeprazole (PriLOSEC) 10 mg delayed release capsule, Take 10 mg by mouth daily  , Disp: , Rfl:     SUPER B COMPLEX/C PO, Take 1 tablet by mouth daily  , Disp: , Rfl:     benazepril (LOTENSIN) 20 mg tablet, Take 1 tablet (20 mg total) by mouth daily, Disp: 90 tablet, Rfl: 1    hydrochlorothiazide (HYDRODIURIL) 12 5 mg tablet, Take 1 tablet (12 5 mg total) by mouth daily, Disp: 30 tablet, Rfl: 0    Review of Systems   Constitutional: Positive for fatigue  +weight gain   HENT: Positive for postnasal drip and rhinorrhea  Respiratory: Negative  Cardiovascular: Positive for leg swelling  Negative for chest pain and palpitations  Gastrointestinal: Negative  Genitourinary: Negative      Musculoskeletal: Positive for arthralgias, back pain, myalgias, neck pain and neck stiffness  Neurological: Positive for numbness  Psychiatric/Behavioral: Positive for decreased concentration  Negative for sleep disturbance  The patient is nervous/anxious  Objective:    /70 (BP Location: Right arm, Patient Position: Sitting)   Pulse 82   Temp 98 1 °F (36 7 °C)   Resp 16   Ht 5' 2" (1 575 m)   Wt 80 5 kg (177 lb 6 4 oz)   SpO2 98%   BMI 32 45 kg/m²      Physical Exam   Constitutional: She is oriented to person, place, and time  She appears well-developed  No distress  obese   HENT:   Right Ear: External ear normal    Left Ear: External ear normal    Nose: Nose normal    Mouth/Throat: Oropharynx is clear and moist  No oropharyngeal exudate  Eyes:   Wears glasses   Neck: Neck supple  Cardiovascular: Normal rate, regular rhythm, normal heart sounds and intact distal pulses  BLE 1+ pitting edema   Pulmonary/Chest: Effort normal and breath sounds normal  No stridor  No respiratory distress  Abdominal: Soft  Bowel sounds are normal  She exhibits no distension  There is no tenderness  Musculoskeletal:   No spinous process tenderness on palpation   strength 5/5  Slight decrease in cervical ROM   Neurological: She is alert and oriented to person, place, and time  Psychiatric: She has a normal mood and affect  Her behavior is normal    Vitals reviewed        Recent Results (from the past 504 hour(s))   CBC    Collection Time: 08/21/19  8:16 AM   Result Value Ref Range    WBC 4 20 (L) 4 31 - 10 16 Thousand/uL    RBC 4 71 3 81 - 5 12 Million/uL    Hemoglobin 13 3 11 5 - 15 4 g/dL    Hematocrit 41 8 34 8 - 46 1 %    MCV 89 82 - 98 fL    MCH 28 2 26 8 - 34 3 pg    MCHC 31 8 31 4 - 37 4 g/dL    RDW 13 1 11 6 - 15 1 %    Platelets 882 787 - 937 Thousands/uL    MPV 10 1 8 9 - 12 7 fL   Urinalysis with reflex to microscopic    Collection Time: 08/21/19  8:16 AM   Result Value Ref Range    Color, UA Yellow     Clarity, UA Turbid     Specific [place], UA 1 018 1 003 - 1 030    pH, UA 6 5 4 5, 5 0, 5 5, 6 0, 6 5, 7 0, 7 5, 8 0    Leukocytes, UA Small (A) Negative    Nitrite, UA Negative Negative    Protein, UA Negative Negative mg/dl    Glucose, UA Negative Negative mg/dl    Ketones, UA Negative Negative mg/dl    Urobilinogen, UA 0 2 0 2, 1 0 E U /dl E U /dl    Bilirubin, UA Negative Negative    Blood, UA Negative Negative   Urine Microscopic    Collection Time: 08/21/19  8:16 AM   Result Value Ref Range    RBC, UA None Seen None Seen, 0-5 /hpf    WBC, UA 2-4 (A) None Seen, 0-5, 5-55, 5-65 /hpf    Epithelial Cells Occasional None Seen, Occasional /hpf    Bacteria, UA Occasional None Seen, Occasional /hpf

## 2019-09-05 ENCOUNTER — OFFICE VISIT (OUTPATIENT)
Dept: INTERNAL MEDICINE CLINIC | Facility: CLINIC | Age: 69
End: 2019-09-05
Payer: MEDICARE

## 2019-09-05 VITALS
DIASTOLIC BLOOD PRESSURE: 70 MMHG | HEIGHT: 62 IN | OXYGEN SATURATION: 98 % | WEIGHT: 177.4 LBS | SYSTOLIC BLOOD PRESSURE: 118 MMHG | TEMPERATURE: 98.1 F | RESPIRATION RATE: 16 BRPM | BODY MASS INDEX: 32.65 KG/M2 | HEART RATE: 82 BPM

## 2019-09-05 DIAGNOSIS — Z78.0 ASYMPTOMATIC POSTMENOPAUSAL STATE: ICD-10-CM

## 2019-09-05 DIAGNOSIS — R60.0 LOWER EXTREMITY EDEMA: ICD-10-CM

## 2019-09-05 DIAGNOSIS — Z23 ENCOUNTER FOR IMMUNIZATION: ICD-10-CM

## 2019-09-05 DIAGNOSIS — M47.812 CERVICAL SPINE ARTHRITIS: ICD-10-CM

## 2019-09-05 DIAGNOSIS — M54.12 CERVICAL RADICULOPATHY: ICD-10-CM

## 2019-09-05 DIAGNOSIS — Z00.00 MEDICARE ANNUAL WELLNESS VISIT, SUBSEQUENT: ICD-10-CM

## 2019-09-05 DIAGNOSIS — M79.7 FIBROMYALGIA: ICD-10-CM

## 2019-09-05 DIAGNOSIS — I10 BENIGN ESSENTIAL HTN: Primary | ICD-10-CM

## 2019-09-05 PROCEDURE — G0439 PPPS, SUBSEQ VISIT: HCPCS | Performed by: INTERNAL MEDICINE

## 2019-09-05 PROCEDURE — G0008 ADMIN INFLUENZA VIRUS VAC: HCPCS

## 2019-09-05 PROCEDURE — 90662 IIV NO PRSV INCREASED AG IM: CPT

## 2019-09-05 PROCEDURE — 99214 OFFICE O/P EST MOD 30 MIN: CPT | Performed by: INTERNAL MEDICINE

## 2019-09-05 RX ORDER — HYDROCHLOROTHIAZIDE 12.5 MG/1
12.5 TABLET ORAL DAILY
Qty: 30 TABLET | Refills: 0 | Status: SHIPPED | OUTPATIENT
Start: 2019-09-05 | End: 2019-09-27 | Stop reason: SDUPTHER

## 2019-09-05 RX ORDER — BENAZEPRIL HYDROCHLORIDE 20 MG/1
20 TABLET ORAL DAILY
Qty: 90 TABLET | Refills: 1 | Status: SHIPPED | OUTPATIENT
Start: 2019-09-05 | End: 2020-02-25

## 2019-09-05 NOTE — ASSESSMENT & PLAN NOTE
Stable  However, due to BLE edema, will start on hctz 12 5mg daily, keep on benazepril 20mg daily and d/c amlodipine 10mg daily  Obtain BMP in 3-5days  Monitor home bp and return with log for review in one month  Side effects discussed

## 2019-09-05 NOTE — ASSESSMENT & PLAN NOTE
Due to foraminal stenosis with arthritis  Patient on gabapentin per Pain Management and scheduled for epidural tomorrow

## 2019-09-05 NOTE — ASSESSMENT & PLAN NOTE
Suspect dependent edema  However, patient is on CCB and gabapentin  Will d/c amlodipine, start hctz with re-eval in one month

## 2019-09-05 NOTE — PROGRESS NOTES
Assessment and Plan:     Problem List Items Addressed This Visit     None      Visit Diagnoses     Medicare annual wellness visit, subsequent        Asymptomatic postmenopausal state        Relevant Orders    DXA bone density spine hip and pelvis         History of Present Illness:     Patient presents for Medicare Annual Wellness visit    Patient Care Team:  Jeri Lynn DO as PCP - General (Internal Medicine)  DO Asim Maloney DO     Problem List:     Patient Active Problem List   Diagnosis    Primary localized osteoarthritis of right knee    Right knee pain    Lumbar disc herniation with radiculopathy    Lumbar pain    Fibromyalgia    Benign essential HTN    Fatty liver    Gastroesophageal reflux disease without esophagitis    Lumbar radiculopathy    Sacroiliitis (HCC)    Pes anserine bursitis    Obesity (BMI 30 0-34  9)      Past Medical and Surgical History:     Past Medical History:   Diagnosis Date    Arthritis     Fatty liver 5/1/2018    Hypertension     Primary localized osteoarthritis of right knee 8/17/2017     History reviewed  No pertinent surgical history     Family History:     Family History   Problem Relation Age of Onset    Osteoporosis Mother     Skin cancer Father     Cancer Family     Osteoporosis Family     Stomach cancer Sister 52    No Known Problems Daughter     No Known Problems Maternal Grandmother     No Known Problems Maternal Grandfather     No Known Problems Paternal Grandmother     No Known Problems Paternal Grandfather     No Known Problems Maternal Aunt     No Known Problems Maternal Aunt     No Known Problems Maternal Aunt     No Known Problems Maternal Aunt     No Known Problems Paternal Aunt       Social History:     Social History     Tobacco Use   Smoking Status Never Smoker   Smokeless Tobacco Never Used     Social History     Substance and Sexual Activity   Alcohol Use No     Social History     Substance and Sexual Activity Drug Use Not on file      Medications and Allergies:     Current Outpatient Medications   Medication Sig Dispense Refill    amLODIPine-benazepril (LOTREL) 10-20 MG per capsule Take 1 capsule by mouth daily 90 capsule 1    diclofenac (VOLTAREN) 75 mg EC tablet TAKE 1 TABLET (75 MG TOTAL) BY MOUTH 2 (TWO) TIMES A DAY AS NEEDED FOR PAIN 60 tablet 1    DULoxetine (CYMBALTA) 60 mg delayed release capsule TAKE 1 CAPSULE BY MOUTH EVERY DAY 90 capsule 1    gabapentin (NEURONTIN) 600 MG tablet Take 1 tablet (600 mg total) by mouth 3 (three) times a day 90 tablet 0    omeprazole (PriLOSEC) 10 mg delayed release capsule Take 10 mg by mouth daily        SUPER B COMPLEX/C PO Take 1 tablet by mouth daily         No current facility-administered medications for this visit  Allergies   Allergen Reactions    Penicillins       Immunizations:     Immunization History   Administered Date(s) Administered    INFLUENZA 09/29/2017    Influenza Split High Dose Preservative Free IM 09/05/2018      Medicare Screening Tests and Risk Assessments:     Juan Daniel Funes is here for her Subsequent Wellness visit  Health Risk Assessment:  Patient rates overall health as good  Patient feels that their physical health rating is Same  Eyesight was rated as Same  Hearing was rated as Same  Patient feels that their emotional and mental health rating is Same  Pain experienced by patient in the last 7 days has been A lot  Patient's pain rating has been 8/10  (Additional comments: Generalized body aches)    Emotional/Mental Health:  Patient has been feeling nervous/anxious  PHQ-9 Depression Screening:    Frequency of the following problems over the past two weeks:      1  Little interest or pleasure in doing things: 1 - several days      2  Feeling down, depressed, or hopeless: 0 - not at all  PHQ-2 Score: 1          Broken Bones/Falls:     Fall Risk Assessment:    In the past year, patient has experienced: No history of falling in past year          Bladder/Bowel:  Patient has not leaked urine accidently in the last six months  Patient reports no loss of bowel control  Immunizations:  Patient has had a flu vaccination within the last year  Patient has not received a pneumonia shot  Patient has received a shingles shot  Patient has not received tetanus/diphtheria shot  Home Safety:  Patient has trouble with stairs inside or outside of their home  Patient currently reports that there are no safety hazards present in home, working smoke alarms, working carbon monoxide detectors  (Additional Comments: Difficulty with stairs due to knee pain)    Preventative Screenings:   Breast cancer screening performed, no colon cancer screen completed, no cholesterol screen completed, no glaucoma eye exam completed    Nutrition:  Current diet: Regular and No Added Salt with servings of the following:    Medications:  Patient is not currently taking any over-the-counter supplements  Patient is able to manage medications  Lifestyle Choices:  Patient reports no tobacco use  Patient has not smoked or used tobacco in the past   Patient reports no alcohol use  Patient drives a vehicle  Patient wears seat belt  Current level of exercise of physical activity described by patient as: doing aquatic therapy  Activities of Daily Living:  Can get out of bed by his or her self, able to dress self, able to make own meals, able to do own shopping, able to bathe self, can do own laundry/housekeeping, can manage own money, pay bills and track expenses    Previous Hospitalizations:  No hospitalization or ED visit in past 12 months        Advanced Directives:  Patient has decided on a power of   Patient has not spoken to designated power of   Patient has not completed advanced directive          Preventative Screening/Counseling:      Cardiovascular:      General: Risks and Benefits Discussed and Screening Current          Diabetes: General: Risks and Benefits Discussed and Screening Current          Colorectal Cancer:      General: Screening Current      Comments: darius 2017        Breast Cancer:      General: Screening Current          Cervical Cancer:      General: Screening Not Indicated      Comments: Due to advanced age        Osteoporosis:      General: Risks and Benefits Discussed      Counseling: Calcium and Vitamin D Intake and Regular Weightbearing Exercise      Due for studies: DXA Axial          AAA:      General: Screening Not Indicated          Glaucoma:      General: Risks and Benefits Discussed and Screening Current          HIV:      General: Screening Not Indicated          Hepatitis C:      General: Risks and Benefits Discussed      Counseling: has received general HCV counseling        Advanced Directives:   Patient has no living will for healthcare, does not have durable POA for healthcare, patient does not have an advanced directive  Information on ACP and/or AD provided  Immunizations:      Influenza: Risks & Benefits Discussed and Influenza Due Today      Pneumococcal: Risks & Benefits Discussed and Patient Declines      Shingrix: Risks & Benefits Discussed and Patient Declines      Hepatitis B (Low risk patients): Series Not Indicated      Zostavax: Risks & Benefits Discussed and Patient Declines      TD: Patient Declines and Risks & Benefits Discussed      TDAP: Risks & Benefits Discussed and Patient Declines  Additional Comments: Defers prevnar 13 toay    Other Preventative Counseling (Non-Medicare): Fall Prevention, Increase physical activity and Car/seat belt/driving safety reviewed      Referrals:   Additional Comments: None today

## 2019-09-05 NOTE — PATIENT INSTRUCTIONS
Obesity   AMBULATORY CARE:   Obesity  is when your body mass index (BMI) is greater than 30  Your healthcare provider will use your height and weight to measure your BMI  The risks of obesity include  many health problems, such as injuries or physical disability  You may need tests to check for the following:  · Diabetes     · High blood pressure or high cholesterol     · Heart disease     · Gallbladder or liver disease     · Cancer of the colon, breast, prostate, liver, or kidney     · Sleep apnea     · Arthritis or gout  Seek care immediately if:   · You have a severe headache, confusion, or difficulty speaking  · You have weakness on one side of your body  · You have chest pain, sweating, or shortness of breath  Contact your healthcare provider if:   · You have symptoms of gallbladder or liver disease, such as pain in your upper abdomen  · You have knee or hip pain and discomfort while walking  · You have symptoms of diabetes, such as intense hunger and thirst, and frequent urination  · You have symptoms of sleep apnea, such as snoring or daytime sleepiness  · You have questions or concerns about your condition or care  Treatment for obesity  focuses on helping you lose weight to improve your health  Even a small decrease in BMI can reduce the risk for many health problems  Your healthcare provider will help you set a weight-loss goal   · Lifestyle changes  are the first step in treating obesity  These include making healthy food choices and getting regular physical activity  Your healthcare provider may suggest a weight-loss program that involves coaching, education, and therapy  · Medicine  may help you lose weight when it is used with a healthy diet and physical activity  · Surgery  can help you lose weight if you are very obese and have other health problems  There are several types of weight-loss surgery  Ask your healthcare provider for more information    Be successful losing weight:   · Set small, realistic goals  An example of a small goal is to walk for 20 minutes 5 days a week  Anther goal is to lose 5% of your body weight  · Tell friends, family members, and coworkers about your goals  and ask for their support  Ask a friend to lose weight with you, or join a weight-loss support group  · Identify foods or triggers that may cause you to overeat , and find ways to avoid them  Remove tempting high-calorie foods from your home and workplace  Place a bowl of fresh fruit on your kitchen counter  If stress causes you to eat, then find other ways to cope with stress  · Keep a diary to track what you eat and drink  Also write down how many minutes of physical activity you do each day  Weigh yourself once a week and record it in your diary  Eating changes: You will need to eat 500 to 1,000 fewer calories each day than you currently eat to lose 1 to 2 pounds a week  The following changes will help you cut calories:  · Eat smaller portions  Use small plates, no larger than 9 inches in diameter  Fill your plate half full of fruits and vegetables  Measure your food using measuring cups until you know what a serving size looks like  · Eat 3 meals and 1 or 2 snacks each day  Plan your meals in advance  Susanne Stauffer and eat at home most of the time  Eat slowly  · Eat fruits and vegetables at every meal   They are low in calories and high in fiber, which makes you feel full  Do not add butter, margarine, or cream sauce to vegetables  Use herbs to season steamed vegetables  · Eat less fat and fewer fried foods  Eat more baked or grilled chicken and fish  These protein sources are lower in calories and fat than red meat  Limit fast food  Dress your salads with olive oil and vinegar instead of bottled dressing  · Limit the amount of sugar you eat  Do not drink sugary beverages  Limit alcohol  Activity changes:  Physical activity is good for your body in many ways   It helps you burn calories and build strong muscles  It decreases stress and depression, and improves your mood  It can also help you sleep better  Talk to your healthcare provider before you begin an exercise program   · Exercise for at least 30 minutes 5 days a week  Start slowly  Set aside time each day for physical activity that you enjoy and that is convenient for you  It is best to do both weight training and an activity that increases your heart rate, such as walking, bicycling, or swimming  · Find ways to be more active  Do yard work and housecleaning  Walk up the stairs instead of using elevators  Spend your leisure time going to events that require walking, such as outdoor festivals or fairs  This extra physical activity can help you lose weight and keep it off  Follow up with your healthcare provider as directed: You may need to meet with a dietitian  Write down your questions so you remember to ask them during your visits  © 2017 [address] [employer] Information is for End User's use only and may not be sold, redistributed or otherwise used for commercial purposes  All illustrations and images included in CareNotes® are the copyrighted property of [employer]  or Enrique Owen  The above information is an  only  It is not intended as medical advice for individual conditions or treatments  Talk to your doctor, nurse or pharmacist before following any medical regimen to see if it is safe and effective for you  Urinary Incontinence   WHAT YOU NEED TO KNOW:   What is urinary incontinence? Urinary incontinence (UI) is when you lose control of your bladder  What causes UI? UI occurs because your bladder cannot store or empty urine properly  The following are the most common types of UI:  · Stress incontinence  is when you leak urine due to increased bladder pressure  This may happen when you cough, sneeze, or exercise       · Urge incontinence  is when you feel the need to urinate right away and leak urine accidentally  · Mixed incontinence  is when you have both stress and urge UI  What are the signs and symptoms of UI?   · You feel like your bladder does not empty completely when you urinate  · You urinate often and need to urinate immediately  · You leak urine when you sleep, or you wake up with the urge to urinate  · You leak urine when you cough, sneeze, exercise, or laugh  How is UI diagnosed? Your healthcare provider will ask how often you leak urine and whether you have stress or urge symptoms  Tell him which medicines you take, how often you urinate, and how much liquid you drink each day  You may need any of the following tests:  · Urine tests  may show infection or kidney function  · A pelvic exam  may be done to check for blockages  A pelvic exam will also show if your bladder, uterus, or other organs have moved out of place  · An x-ray, ultrasound, or CT  may show problems with parts of your urinary system  You may be given contrast liquid to help your organs show up better in the pictures  Tell the healthcare provider if you have ever had an allergic reaction to contrast liquid  Do not enter the MRI room with anything metal  Metal can cause serious injury  Tell the healthcare provider if you have any metal in or on your body  · A bladder scan  will show how much urine is left in your bladder after you urinate  You will be asked to urinate and then healthcare providers will use a small ultrasound machine to check the urine left in your bladder  · Cystometry  is used to check the function of your urinary system  Your healthcare provider checks the pressure in your bladder while filling it with fluid  Your bladder pressure may also be tested when your bladder is full and while you urinate  How is UI treated? · Medicines  can help strengthen your bladder control      · Electrical stimulation  is used to send a small amount of electrical energy to your pelvic floor muscles  This helps control your bladder function  Electrodes may be placed outside your body or in your rectum  For women, the electrodes may be placed in the vagina  · A bulking agent  may be injected into the wall of your urethra to make it thicker  This helps keep your urethra closed and decreases urine leakage  · Devices  such as a clamp, pessary, or tampon may help stop urine leaks  Ask your healthcare provider for more information about these and other devices  · Surgery  may be needed if other treatments do not work  Several types of surgery can help improve your bladder control  Ask your healthcare provider for more information about the surgery you may need  How can I manage my symptoms? · Do pelvic muscle exercises often  Your pelvic muscles help you stop urinating  Squeeze these muscles tight for 5 seconds, then relax for 5 seconds  Gradually work up to squeezing for 10 seconds  Do 3 sets of 15 repetitions a day, or as directed  This will help strengthen your pelvic muscles and improve bladder control  · A catheter  may be used to help empty your bladder  A catheter is a tiny, plastic tube that is put into your bladder to drain your urine  Your healthcare provider may tell you to use a catheter to prevent your bladder from getting too full and leaking urine  · Keep a UI record  Write down how often you leak urine and how much you leak  Make a note of what you were doing when you leaked urine  · Train your bladder  Go to the bathroom at set times, such as every 2 hours, even if you do not feel the urge to go  You can also try to hold your urine when you feel the urge to go  For example, hold your urine for 5 minutes when you feel the urge to go  As that becomes easier, hold your urine for 10 minutes  · Drink liquids as directed  Ask your healthcare provider how much liquid to drink each day and which liquids are best for you   You may need to limit the amount of liquid you drink to help control your urine leakage  Limit or do not have drinks that contain caffeine or alcohol  Do not drink any liquid right before you go to bed  · Prevent constipation  Eat a variety of high-fiber foods  Good examples are high-fiber cereals, beans, vegetables, and whole-grain breads  Prune juice may help make your bowel movement softer  Walking is the best way to trigger your intestines to have a bowel movement  · Exercise regularly and maintain a healthy weight  Ask your healthcare provider how much you should weigh and about the best exercise plan for you  Weight loss and exercise will decrease pressure on your bladder and help you control your leakage  Ask him to help you create a weight loss plan if you are overweight  When should I seek immediate care? · You have severe pain  · You are confused or cannot think clearly  When should I contact my healthcare provider? · You have a fever  · You see blood in your urine  · You have pain when you urinate  · You have new or worse pain, even after treatment  · Your mouth feels dry or you have vision changes  · Your urine is cloudy or smells bad  · You have questions or concerns about your condition or care  CARE AGREEMENT:   You have the right to help plan your care  Learn about your health condition and how it may be treated  Discuss treatment options with your caregivers to decide what care you want to receive  You always have the right to refuse treatment  The above information is an  only  It is not intended as medical advice for individual conditions or treatments  Talk to your doctor, nurse or pharmacist before following any medical regimen to see if it is safe and effective for you  © 2017 [address] [employer] Information is for End User's use only and may not be sold, redistributed or otherwise used for commercial purposes   All illustrations and images included in CareNotes® are the copyrighted property of [employer] M [employer] Inc  or Enrique Owen  Cigarette Smoking and Your Health   AMBULATORY CARE:   Risks to your health if you smoke:  Nicotine and other chemicals found in tobacco damage every cell in your body  Even if you are a light smoker, you have an increased risk for cancer, heart disease, and lung disease  If you are pregnant or have diabetes, smoking increases your risk for complications  Benefits to your health if you stop smoking:   · You decrease respiratory symptoms such as coughing, wheezing, and shortness of breath  · You reduce your risk for cancers of the lung, mouth, throat, kidney, bladder, pancreas, stomach, and cervix  If you already have cancer, you increase the benefits of chemotherapy  You also reduce your risk for cancer returning or a second cancer from developing  · You reduce your risk for heart disease, blood clots, heart attack, and stroke  · You reduce your risk for lung infections, and diseases such as pneumonia, asthma, chronic bronchitis, and emphysema  · Your circulation improves  More oxygen can be delivered to your body  If you have diabetes, you lower your risk for complications, such as kidney, artery, and eye diseases  You also lower your risk for nerve damage  Nerve damage can lead to amputations, poor vision, and blindness  · You improve your body's ability to heal and to fight infections  Benefits to the health of others if you stop smoking:  Tobacco is harmful to nonsmokers who breathe in your secondhand smoke  The following are ways the health of others around you may improve when you stop smoking:  · You lower the risks for lung cancer and heart disease in nonsmoking adults  · If you are pregnant, you lower the risk for miscarriage, early delivery, low birth weight, and stillbirth  You also lower your baby's risk for SIDS, obesity, developmental delay, and neurobehavioral problems, such as ADHD  · If you have children, you lower their risk for ear infections, colds, pneumonia, bronchitis, and asthma  For more information and support to stop smoking:   · Smokefree  gov  Phone: 6- 775 - 471-1560  Web Address: www smokefree  gov  Follow up with your healthcare provider as directed:  Write down your questions so you remember to ask them during your visits  © 2017 [address] [employer] Information is for End User's use only and may not be sold, redistributed or otherwise used for commercial purposes  All illustrations and images included in CareNotes® are the copyrighted property of [employer]  or Enrique Owen  The above information is an  only  It is not intended as medical advice for individual conditions or treatments  Talk to your doctor, nurse or pharmacist before following any medical regimen to see if it is safe and effective for you  Fall Prevention   AMBULATORY CARE:   Fall prevention  includes ways to make your home and other areas safer  It also includes ways you can move more carefully to prevent a fall  Health conditions that cause changes in your blood pressure, vision, or muscle strength and coordination may increase your risk for falls  Medicines may also increase your risk for falls if they make you dizzy, weak, or sleepy  Call 911 or have someone else call if:   · You have fallen and are unconscious  · You have fallen and cannot move part of your body  Contact your healthcare provider if:   · You have fallen and have pain or a headache  · You have questions or concerns about your condition or care  Fall prevention tips:   · Stand or sit up slowly  This may help you keep your balance and prevent falls  · Use assistive devices as directed  Your healthcare provider may suggest that you use a cane or walker to help you keep your balance  You may need to have grab bars put in your bathroom near the toilet or in the shower      · Wear shoes that fit well and have soles that   Wear shoes both inside and outside  Use slippers with good   Do not wear shoes with high heels  · Wear a personal alarm  This is a device that allows you to call 911 if you fall and need help  Ask your healthcare provider for more information  · Stay active  Exercise can help strengthen your muscles and improve your balance  Your healthcare provider may recommend water aerobics or walking  He or she may also recommend physical therapy to improve your coordination  Never start an exercise program without talking to your healthcare provider first      · Manage your medical conditions  Keep all appointments with your healthcare providers  Visit your eye doctor as directed  Home safety tips:   · Add items to prevent falls in the bathroom  Put nonslip strips on your bath or shower floor to prevent you from slipping  Use a bath mat if you do not have carpet in the bathroom  This will prevent you from falling when you step out of the bath or shower  Use a shower seat so you do not need to stand while you shower  Sit on the toilet or a chair in your bathroom to dry yourself and put on clothing  This will prevent you from losing your balance from drying or dressing yourself while you are standing  · Keep paths clear  Remove books, shoes, and other objects from walkways and stairs  Place cords for telephones and lamps out of the way so that you do not need to walk over them  Tape them down if you cannot move them  Remove small rugs  If you cannot remove a rug, secure it with double-sided tape  This will prevent you from tripping  · Install bright lights in your home  Use night lights to help light paths to the bathroom or kitchen  Always turn on the light before you start walking  · Keep items you use often on shelves within reach  Do not use a step stool to help you reach an item  · Paint or place reflective tape on the edges of your stairs    This will help you see the stairs better  Follow up with your healthcare provider as directed:  Write down your questions so you remember to ask them during your visits  © 2017 [address] [employer] Information is for End User's use only and may not be sold, redistributed or otherwise used for commercial purposes  All illustrations and images included in CareNotes® are the copyrighted property of [employer]  or Enrique Owen  The above information is an  only  It is not intended as medical advice for individual conditions or treatments  Talk to your doctor, nurse or pharmacist before following any medical regimen to see if it is safe and effective for you  Advance Directives   WHAT YOU NEED TO KNOW:   What are advance directives? Advance directives are legal documents that state your wishes and plans for medical care  These plans are made ahead of time in case you lose your ability to make decisions for yourself  Advance directives can apply to any medical decision, such as the treatments you want, and if you want to donate organs  What are the types of advance directives? There are many types of advance directives, and each state has rules about how to use them  You may choose a combination of any of the following:  · Living will: This is a written record of the treatment you want  You can also choose which treatments you do not want, which to limit, and which to stop at a certain time  This includes surgery, medicine, IV fluid, and tube feedings  · Durable power of  for healthcare [place] [facility]): This is a written record that states who you want to make healthcare choices for you when you are unable to make them for yourself  This person, called a proxy, is usually a family member or a friend  You may choose more than 1 proxy  · Do not resuscitate (DNR) order:  A DNR order is used in case your heart stops beating or you stop breathing   It is a request not to have certain forms of treatment, such as CPR  A DNR order may be included in other types of advance directives  · Medical directive: This covers the care that you want if you are in a coma, near death, or unable to make decisions for yourself  You can list the treatments you want for each condition  Treatment may include pain medicine, surgery, blood transfusions, dialysis, IV or tube feedings, and a ventilator (breathing machine)  · Values history: This document has questions about your views, beliefs, and how you feel and think about life  This information can help others choose the care that you would choose  Why are advance directives important? An advance directive helps you control your care  Although spoken wishes may be used, it is better to have your wishes written down  Spoken wishes can be misunderstood, or not followed  Treatments may be given even if you do not want them  An advance directive may make it easier for your family to make difficult choices about your care  How do I decide what to put in my advance directives? · Make informed decisions:  Make sure you fully understand treatments or care you may receive  Think about the benefits and problems your decisions could cause for you or your family  Talk to healthcare providers if you have concerns or questions before you write down your wishes  You may also want to talk with your Denominational or , or a   Check your state laws to make sure that what you put in your advance directive is legal      · Sign all forms:  Sign and date your advance directive when you have finished  You may also need 2 witnesses to sign the forms  Witnesses cannot be your doctor or his staff, your spouse, heirs or beneficiaries, people you owe money to, or your chosen proxy  Talk to your family, proxy, and healthcare providers about your advance directive  Give each person a copy, and keep one for yourself in a place you can get to easily   Do not keep it hidden or locked away  · Review and revise your plans: You can revise your advance directive at any time, as long as you are able to make decisions  Review your plan every year, and when there are changes in your life, or your health  When you make changes, let your family, proxy, and healthcare providers know  Give each a new copy  Where can I find more information? · [employer]  [address] , [place] [address]  Phone: 3- 094 - 857-9998  Phone: 3- 019 - [phone]  Web Address: http://www  aafp org  · [address] [facility])  [address] [address] [facility], [address] [facility] , [address] [place] [address]  Phone: 8- [phone] - [phone]  Phone: [phone]  Web Address: Moses li  CARE AGREEMENT:   You have the right to help plan your care  To help with this plan, you must learn about your health condition and treatment options  You must also learn about advance directives and how they are used  Work with your healthcare providers to decide what care will be used to treat you  You always have the right to refuse treatment  The above information is an  only  It is not intended as medical advice for individual conditions or treatments  Talk to your doctor, nurse or pharmacist before following any medical regimen to see if it is safe and effective for you  © 2017 [address]  Information is for End User's use only and may not be sold, redistributed or otherwise used for commercial purposes  All illustrations and images included in CareNotes® are the copyrighted property of [employer] M [employer]  or Enrique Owen  Obesity   AMBULATORY CARE:   Obesity  is when your body mass index (BMI) is greater than 30  Your healthcare provider will use your height and weight to measure your BMI  The risks of obesity include  many health problems, such as injuries or physical disability   You may need tests to check for the following:  · Diabetes     · High blood pressure or high cholesterol     · Heart disease     · Gallbladder or liver disease     · Cancer of the colon, breast, prostate, liver, or kidney     · Sleep apnea     · Arthritis or gout  Seek care immediately if:   · You have a severe headache, confusion, or difficulty speaking  · You have weakness on one side of your body  · You have chest pain, sweating, or shortness of breath  Contact your healthcare provider if:   · You have symptoms of gallbladder or liver disease, such as pain in your upper abdomen  · You have knee or hip pain and discomfort while walking  · You have symptoms of diabetes, such as intense hunger and thirst, and frequent urination  · You have symptoms of sleep apnea, such as snoring or daytime sleepiness  · You have questions or concerns about your condition or care  Treatment for obesity  focuses on helping you lose weight to improve your health  Even a small decrease in BMI can reduce the risk for many health problems  Your healthcare provider will help you set a weight-loss goal   · Lifestyle changes  are the first step in treating obesity  These include making healthy food choices and getting regular physical activity  Your healthcare provider may suggest a weight-loss program that involves coaching, education, and therapy  · Medicine  may help you lose weight when it is used with a healthy diet and physical activity  · Surgery  can help you lose weight if you are very obese and have other health problems  There are several types of weight-loss surgery  Ask your healthcare provider for more information  Be successful losing weight:   · Set small, realistic goals  An example of a small goal is to walk for 20 minutes 5 days a week  Anther goal is to lose 5% of your body weight  · Tell friends, family members, and coworkers about your goals  and ask for their support   Ask a friend to lose weight with you, or join a weight-loss support group  · Identify foods or triggers that may cause you to overeat , and find ways to avoid them  Remove tempting high-calorie foods from your home and workplace  Place a bowl of fresh fruit on your kitchen counter  If stress causes you to eat, then find other ways to cope with stress  · Keep a diary to track what you eat and drink  Also write down how many minutes of physical activity you do each day  Weigh yourself once a week and record it in your diary  Eating changes: You will need to eat 500 to 1,000 fewer calories each day than you currently eat to lose 1 to 2 pounds a week  The following changes will help you cut calories:  · Eat smaller portions  Use small plates, no larger than 9 inches in diameter  Fill your plate half full of fruits and vegetables  Measure your food using measuring cups until you know what a serving size looks like  · Eat 3 meals and 1 or 2 snacks each day  Plan your meals in advance  [place] and eat at home most of the time  Eat slowly  · Eat fruits and vegetables at every meal   They are low in calories and high in fiber, which makes you feel full  Do not add butter, margarine, or cream sauce to vegetables  Use herbs to season steamed vegetables  · Eat less fat and fewer fried foods  Eat more baked or grilled chicken and fish  These protein sources are lower in calories and fat than red meat  Limit fast food  Dress your salads with olive oil and vinegar instead of bottled dressing  · Limit the amount of sugar you eat  Do not drink sugary beverages  Limit alcohol  Activity changes:  Physical activity is good for your body in many ways  It helps you burn calories and build strong muscles  It decreases stress and depression, and improves your mood  It can also help you sleep better  Talk to your healthcare provider before you begin an exercise program   · Exercise for at least 30 minutes 5 days a week  Start slowly   Set aside time each day for physical activity that you enjoy and that is convenient for you  It is best to do both weight training and an activity that increases your heart rate, such as walking, bicycling, or swimming  · Find ways to be more active  Do yard work and housecleaning  Walk up the stairs instead of using elevators  Spend your leisure time going to events that require walking, such as outdoor festivals or fairs  This extra physical activity can help you lose weight and keep it off  Follow up with your healthcare provider as directed: You may need to meet with a dietitian  Write down your questions so you remember to ask them during your visits  © 2017 [address] [employer] Information is for End User's use only and may not be sold, redistributed or otherwise used for commercial purposes  All illustrations and images included in CareNotes® are the copyrighted property of [email or web address]  or Enrique Owen  The above information is an  only  It is not intended as medical advice for individual conditions or treatments  Talk to your doctor, nurse or pharmacist before following any medical regimen to see if it is safe and effective for you  Urinary Incontinence   WHAT YOU NEED TO KNOW:   What is urinary incontinence? Urinary incontinence (UI) is when you lose control of your bladder  What causes UI? UI occurs because your bladder cannot store or empty urine properly  The following are the most common types of UI:  · Stress incontinence  is when you leak urine due to increased bladder pressure  This may happen when you cough, sneeze, or exercise  · Urge incontinence  is when you feel the need to urinate right away and leak urine accidentally  · Mixed incontinence  is when you have both stress and urge UI  What are the signs and symptoms of UI?   · You feel like your bladder does not empty completely when you urinate  · You urinate often and need to urinate immediately      · You leak urine when you sleep, or you wake up with the urge to urinate  · You leak urine when you cough, sneeze, exercise, or laugh  How is UI diagnosed? Your healthcare provider will ask how often you leak urine and whether you have stress or urge symptoms  Tell him which medicines you take, how often you urinate, and how much liquid you drink each day  You may need any of the following tests:  · Urine tests  may show infection or kidney function  · A pelvic exam  may be done to check for blockages  A pelvic exam will also show if your bladder, uterus, or other organs have moved out of place  · An x-ray, ultrasound, or CT  may show problems with parts of your urinary system  You may be given contrast liquid to help your organs show up better in the pictures  Tell the healthcare provider if you have ever had an allergic reaction to contrast liquid  Do not enter the MRI room with anything metal  Metal can cause serious injury  Tell the healthcare provider if you have any metal in or on your body  · A bladder scan  will show how much urine is left in your bladder after you urinate  You will be asked to urinate and then healthcare providers will use a small ultrasound machine to check the urine left in your bladder  · Cystometry  is used to check the function of your urinary system  Your healthcare provider checks the pressure in your bladder while filling it with fluid  Your bladder pressure may also be tested when your bladder is full and while you urinate  How is UI treated? · Medicines  can help strengthen your bladder control  · Electrical stimulation  is used to send a small amount of electrical energy to your pelvic floor muscles  This helps control your bladder function  Electrodes may be placed outside your body or in your rectum  For women, the electrodes may be placed in the vagina  · A bulking agent  may be injected into the wall of your urethra to make it thicker   This helps keep your urethra closed and decreases urine leakage  · Devices  such as a clamp, pessary, or tampon may help stop urine leaks  Ask your healthcare provider for more information about these and other devices  · Surgery  may be needed if other treatments do not work  Several types of surgery can help improve your bladder control  Ask your healthcare provider for more information about the surgery you may need  How can I manage my symptoms? · Do pelvic muscle exercises often  Your pelvic muscles help you stop urinating  Squeeze these muscles tight for 5 seconds, then relax for 5 seconds  Gradually work up to squeezing for 10 seconds  Do 3 sets of 15 repetitions a day, or as directed  This will help strengthen your pelvic muscles and improve bladder control  · A catheter  may be used to help empty your bladder  A catheter is a tiny, plastic tube that is put into your bladder to drain your urine  Your healthcare provider may tell you to use a catheter to prevent your bladder from getting too full and leaking urine  · Keep a UI record  Write down how often you leak urine and how much you leak  Make a note of what you were doing when you leaked urine  · Train your bladder  Go to the bathroom at set times, such as every 2 hours, even if you do not feel the urge to go  You can also try to hold your urine when you feel the urge to go  For example, hold your urine for 5 minutes when you feel the urge to go  As that becomes easier, hold your urine for 10 minutes  · Drink liquids as directed  Ask your healthcare provider how much liquid to drink each day and which liquids are best for you  You may need to limit the amount of liquid you drink to help control your urine leakage  Limit or do not have drinks that contain caffeine or alcohol  Do not drink any liquid right before you go to bed  · Prevent constipation  Eat a variety of high-fiber foods   Good examples are high-fiber cereals, beans, vegetables, and whole-grain breads  Prune juice may help make your bowel movement softer  Walking is the best way to trigger your intestines to have a bowel movement  · Exercise regularly and maintain a healthy weight  Ask your healthcare provider how much you should weigh and about the best exercise plan for you  Weight loss and exercise will decrease pressure on your bladder and help you control your leakage  Ask him to help you create a weight loss plan if you are overweight  When should I seek immediate care? · You have severe pain  · You are confused or cannot think clearly  When should I contact my healthcare provider? · You have a fever  · You see blood in your urine  · You have pain when you urinate  · You have new or worse pain, even after treatment  · Your mouth feels dry or you have vision changes  · Your urine is cloudy or smells bad  · You have questions or concerns about your condition or care  CARE AGREEMENT:   You have the right to help plan your care  Learn about your health condition and how it may be treated  Discuss treatment options with your caregivers to decide what care you want to receive  You always have the right to refuse treatment  The above information is an  only  It is not intended as medical advice for individual conditions or treatments  Talk to your doctor, nurse or pharmacist before following any medical regimen to see if it is safe and effective for you  © 2017 [address] [employer]  Information is for End User's use only and may not be sold, redistributed or otherwise used for commercial purposes  All illustrations and images included in CareNotes® are the copyrighted property of [employer] M [employer] Inc  or Enrique Lexie  Cigarette Smoking and Your Health   AMBULATORY CARE:   Risks to your health if you smoke:  Nicotine and other chemicals found in tobacco damage every cell in your body   Even if you are a light smoker, you have an increased risk for cancer, heart disease, and lung disease  If you are pregnant or have diabetes, smoking increases your risk for complications  Benefits to your health if you stop smoking:   · You decrease respiratory symptoms such as coughing, wheezing, and shortness of breath  · You reduce your risk for cancers of the lung, mouth, throat, kidney, bladder, pancreas, stomach, and cervix  If you already have cancer, you increase the benefits of chemotherapy  You also reduce your risk for cancer returning or a second cancer from developing  · You reduce your risk for heart disease, blood clots, heart attack, and stroke  · You reduce your risk for lung infections, and diseases such as pneumonia, asthma, chronic bronchitis, and emphysema  · Your circulation improves  More oxygen can be delivered to your body  If you have diabetes, you lower your risk for complications, such as kidney, artery, and eye diseases  You also lower your risk for nerve damage  Nerve damage can lead to amputations, poor vision, and blindness  · You improve your body's ability to heal and to fight infections  Benefits to the health of others if you stop smoking:  Tobacco is harmful to nonsmokers who breathe in your secondhand smoke  The following are ways the health of others around you may improve when you stop smoking:  · You lower the risks for lung cancer and heart disease in nonsmoking adults  · If you are pregnant, you lower the risk for miscarriage, early delivery, low birth weight, and stillbirth  You also lower your baby's risk for SIDS, obesity, developmental delay, and neurobehavioral problems, such as ADHD  · If you have children, you lower their risk for ear infections, colds, pneumonia, bronchitis, and asthma  For more information and support to stop smoking:   · [employer]  Phone: 9- 565 - [phone]  Web Address: www [employer]  Follow up with your healthcare provider as directed: Write down your questions so you remember to ask them during your visits  © 2017 [address] [employer] Information is for End User's use only and may not be sold, redistributed or otherwise used for commercial purposes  All illustrations and images included in CareNotes® are the copyrighted property of A D [employer] M , Inc  or Enrique Owen  The above information is an  only  It is not intended as medical advice for individual conditions or treatments  Talk to your doctor, nurse or pharmacist before following any medical regimen to see if it is safe and effective for you  Fall Prevention   AMBULATORY CARE:   Fall prevention  includes ways to make your home and other areas safer  It also includes ways you can move more carefully to prevent a fall  Health conditions that cause changes in your blood pressure, vision, or muscle strength and coordination may increase your risk for falls  Medicines may also increase your risk for falls if they make you dizzy, weak, or sleepy  Call 911 or have someone else call if:   · You have fallen and are unconscious  · You have fallen and cannot move part of your body  Contact your healthcare provider if:   · You have fallen and have pain or a headache  · You have questions or concerns about your condition or care  Fall prevention tips:   · Stand or sit up slowly  This may help you keep your balance and prevent falls  · Use assistive devices as directed  Your healthcare provider may suggest that you use a cane or walker to help you keep your balance  You may need to have grab bars put in your bathroom near the toilet or in the shower  · Wear shoes that fit well and have soles that   Wear shoes both inside and outside  Use slippers with good   Do not wear shoes with high heels  · Wear a personal alarm  This is a device that allows you to call 911 if you fall and need help   Ask your healthcare provider for more information  · Stay active  Exercise can help strengthen your muscles and improve your balance  Your healthcare provider may recommend water aerobics or walking  He or she may also recommend physical therapy to improve your coordination  Never start an exercise program without talking to your healthcare provider first      · Manage your medical conditions  Keep all appointments with your healthcare providers  Visit your eye doctor as directed  Home safety tips:   · Add items to prevent falls in the bathroom  Put nonslip strips on your bath or shower floor to prevent you from slipping  Use a bath mat if you do not have carpet in the bathroom  This will prevent you from falling when you step out of the bath or shower  Use a shower seat so you do not need to stand while you shower  Sit on the toilet or a chair in your bathroom to dry yourself and put on clothing  This will prevent you from losing your balance from drying or dressing yourself while you are standing  · Keep paths clear  Remove books, shoes, and other objects from walkways and stairs  Place cords for telephones and lamps out of the way so that you do not need to walk over them  Tape them down if you cannot move them  Remove small rugs  If you cannot remove a rug, secure it with double-sided tape  This will prevent you from tripping  · Install bright lights in your home  Use night lights to help light paths to the bathroom or kitchen  Always turn on the light before you start walking  · Keep items you use often on shelves within reach  Do not use a step stool to help you reach an item  · Paint or place reflective tape on the edges of your stairs  This will help you see the stairs better  Follow up with your healthcare provider as directed:  Write down your questions so you remember to ask them during your visits     © 2017 [employer] Information is for End User's use only and may not be sold, redistributed or otherwise used for commercial purposes  All illustrations and images included in CareNotes® are the copyrighted property of A D [employer] M , Inc  or Enrique Owen  The above information is an  only  It is not intended as medical advice for individual conditions or treatments  Talk to your doctor, nurse or pharmacist before following any medical regimen to see if it is safe and effective for you  Advance Directives   WHAT YOU NEED TO KNOW:   What are advance directives? Advance directives are legal documents that state your wishes and plans for medical care  These plans are made ahead of time in case you lose your ability to make decisions for yourself  Advance directives can apply to any medical decision, such as the treatments you want, and if you want to donate organs  What are the types of advance directives? There are many types of advance directives, and each state has rules about how to use them  You may choose a combination of any of the following:  · Living will: This is a written record of the treatment you want  You can also choose which treatments you do not want, which to limit, and which to stop at a certain time  This includes surgery, medicine, IV fluid, and tube feedings  · Durable power of  for healthcare [facility]): This is a written record that states who you want to make healthcare choices for you when you are unable to make them for yourself  This person, called a proxy, is usually a family member or a friend  You may choose more than 1 proxy  · Do not resuscitate (DNR) order:  A DNR order is used in case your heart stops beating or you stop breathing  It is a request not to have certain forms of treatment, such as CPR  A DNR order may be included in other types of advance directives  · Medical directive: This covers the care that you want if you are in a coma, near death, or unable to make decisions for yourself   You can list the treatments you want for each condition  Treatment may include pain medicine, surgery, blood transfusions, dialysis, IV or tube feedings, and a ventilator (breathing machine)  · Values history: This document has questions about your views, beliefs, and how you feel and think about life  This information can help others choose the care that you would choose  Why are advance directives important? An advance directive helps you control your care  Although spoken wishes may be used, it is better to have your wishes written down  Spoken wishes can be misunderstood, or not followed  Treatments may be given even if you do not want them  An advance directive may make it easier for your family to make difficult choices about your care  How do I decide what to put in my advance directives? · Make informed decisions:  Make sure you fully understand treatments or care you may receive  Think about the benefits and problems your decisions could cause for you or your family  Talk to healthcare providers if you have concerns or questions before you write down your wishes  You may also want to talk with your [employer] or , or a   Check your state laws to make sure that what you put in your advance directive is legal      · Sign all forms:  Sign and date your advance directive when you have finished  You may also need 2 witnesses to sign the forms  Witnesses cannot be your doctor or his staff, your spouse, heirs or beneficiaries, people you owe money to, or your chosen proxy  Talk to your family, proxy, and healthcare providers about your advance directive  Give each person a copy, and keep one for yourself in a place you can get to easily  Do not keep it hidden or locked away  · Review and revise your plans: You can revise your advance directive at any time, as long as you are able to make decisions  Review your plan every year, and when there are changes in your life, or your health   When you make changes, let your family, proxy, and healthcare providers know  Give each a new copy  Where can I find more information? · [employer]  [address] , [address]  Phone: [phone] - [phone]  Phone: [phone]  Web Address: http://www  aa org  · [address] [facility])  [address], [address] [facility] , [address] [place] [address]  Phone: [phone]  Phone: [phone]  Web Address: Moses li  CARE AGREEMENT:   You have the right to help plan your care  To help with this plan, you must learn about your health condition and treatment options  You must also learn about advance directives and how they are used  Work with your healthcare providers to decide what care will be used to treat you  You always have the right to refuse treatment  The above information is an  only  It is not intended as medical advice for individual conditions or treatments  Talk to your doctor, nurse or pharmacist before following any medical regimen to see if it is safe and effective for you  © 2017 [address] [facility] Information is for End User's use only and may not be sold, redistributed or otherwise used for commercial purposes  All illustrations and images included in CareNotes® are the copyrighted property of A D [employer] M , Inc  or Enrique Owen

## 2019-09-06 ENCOUNTER — HOSPITAL ENCOUNTER (OUTPATIENT)
Dept: RADIOLOGY | Facility: MEDICAL CENTER | Age: 69
Discharge: HOME | End: 2019-09-06
Attending: PHYSICAL MEDICINE & REHABILITATION | Admitting: PHYSICAL MEDICINE & REHABILITATION
Payer: MEDICARE

## 2019-09-06 VITALS
HEART RATE: 87 BPM | RESPIRATION RATE: 20 BRPM | OXYGEN SATURATION: 99 % | DIASTOLIC BLOOD PRESSURE: 84 MMHG | TEMPERATURE: 97.5 F | SYSTOLIC BLOOD PRESSURE: 134 MMHG

## 2019-09-06 DIAGNOSIS — M48.02 CERVICAL SPINAL STENOSIS: ICD-10-CM

## 2019-09-06 DIAGNOSIS — M54.12 CERVICAL RADICULOPATHY: ICD-10-CM

## 2019-09-06 PROCEDURE — 62321 NJX INTERLAMINAR CRV/THRC: CPT | Performed by: PHYSICAL MEDICINE & REHABILITATION

## 2019-09-06 RX ORDER — LIDOCAINE HYDROCHLORIDE 10 MG/ML
5 INJECTION, SOLUTION EPIDURAL; INFILTRATION; INTRACAUDAL; PERINEURAL ONCE
Status: COMPLETED | OUTPATIENT
Start: 2019-09-06 | End: 2019-09-06

## 2019-09-06 RX ORDER — LIDOCAINE HYDROCHLORIDE 10 MG/ML
5 INJECTION, SOLUTION EPIDURAL; INFILTRATION; INTRACAUDAL; PERINEURAL ONCE
Status: DISCONTINUED | OUTPATIENT
Start: 2019-09-06 | End: 2019-09-06

## 2019-09-06 RX ORDER — METHYLPREDNISOLONE ACETATE 80 MG/ML
80 INJECTION, SUSPENSION INTRA-ARTICULAR; INTRALESIONAL; INTRAMUSCULAR; PARENTERAL; SOFT TISSUE ONCE
Status: COMPLETED | OUTPATIENT
Start: 2019-09-06 | End: 2019-09-06

## 2019-09-06 RX ORDER — PAPAVERINE HCL 150 MG
10 CAPSULE, EXTENDED RELEASE ORAL ONCE
Status: DISCONTINUED | OUTPATIENT
Start: 2019-09-06 | End: 2019-09-06

## 2019-09-06 RX ADMIN — METHYLPREDNISOLONE ACETATE 80 MG: 80 INJECTION, SUSPENSION INTRA-ARTICULAR; INTRALESIONAL; INTRAMUSCULAR; PARENTERAL; SOFT TISSUE at 11:57

## 2019-09-06 RX ADMIN — IOHEXOL 1 ML: 300 INJECTION, SOLUTION INTRAVENOUS at 11:57

## 2019-09-06 RX ADMIN — LIDOCAINE HYDROCHLORIDE 1.5 ML: 10 INJECTION, SOLUTION EPIDURAL; INFILTRATION; INTRACAUDAL; PERINEURAL at 11:55

## 2019-09-06 NOTE — H&P
History of Present Illness: The patient is a 68 y o  female who presents with complaints of neck and radiating arm pain    Patient Active Problem List   Diagnosis    Primary localized osteoarthritis of right knee    Right knee pain    Lumbar disc herniation with radiculopathy    Lumbar pain    Fibromyalgia    Benign essential HTN    Fatty liver    Gastroesophageal reflux disease without esophagitis    Lumbar radiculopathy    Sacroiliitis (HCC)    Pes anserine bursitis    Lower extremity edema    Obesity (BMI 30 0-34  9)    Cervical radiculopathy    Cervical spine arthritis       Past Medical History:   Diagnosis Date    Arthritis     Fatty liver 5/1/2018    Hypertension     Primary localized osteoarthritis of right knee 8/17/2017       No past surgical history on file        Current Outpatient Medications:     benazepril (LOTENSIN) 20 mg tablet, Take 1 tablet (20 mg total) by mouth daily, Disp: 90 tablet, Rfl: 1    diclofenac (VOLTAREN) 75 mg EC tablet, TAKE 1 TABLET (75 MG TOTAL) BY MOUTH 2 (TWO) TIMES A DAY AS NEEDED FOR PAIN, Disp: 60 tablet, Rfl: 1    DULoxetine (CYMBALTA) 60 mg delayed release capsule, TAKE 1 CAPSULE BY MOUTH EVERY DAY, Disp: 90 capsule, Rfl: 1    gabapentin (NEURONTIN) 600 MG tablet, Take 1 tablet (600 mg total) by mouth 3 (three) times a day, Disp: 90 tablet, Rfl: 0    hydrochlorothiazide (HYDRODIURIL) 12 5 mg tablet, Take 1 tablet (12 5 mg total) by mouth daily, Disp: 30 tablet, Rfl: 0    omeprazole (PriLOSEC) 10 mg delayed release capsule, Take 10 mg by mouth daily  , Disp: , Rfl:     SUPER B COMPLEX/C PO, Take 1 tablet by mouth daily  , Disp: , Rfl:     Current Facility-Administered Medications:     iohexol (OMNIPAQUE) 300 mg/mL injection 50 mL, 50 mL, Epidural, Once, Kira Norman DO    lidocaine (PF) (XYLOCAINE-MPF) 1 % injection 5 mL, 5 mL, Infiltration, Once, Kira Norman DO    methylPREDNISolone acetate (DEPO-MEDROL) injection 80 mg, 80 mg, Epidural, Once, Kj Fabry, DO    Allergies   Allergen Reactions    Penicillins        Physical Exam:   Vitals:    09/06/19 1141   BP: 127/84   Pulse: 87   Resp: 20   Temp: 97 5 °F (36 4 °C)   SpO2: 99%     General: Awake, Alert, Oriented x 3, Mood and affect appropriate  Respiratory: Respirations even and unlabored  Cardiovascular: Peripheral pulses intact; no edema  Musculoskeletal Exam:  Neck and radiating arm pain    ASA Score:  2  Patient/Chart Verification  Patient ID Verified: Verbal  ID Band Applied: No  Consents Confirmed: Procedural  H&P( within 30 days) Verified: To be obtained in the Pre-Procedure area  Interval H&P(within 24 hr) Complete (required for Outpatients and Surgery Admit only): To be obtained in the Pre-Procedure area  Allergies Reviewed: Yes  Anticoag/NSAID held?: Yes(LD of diclofenac was before 9/1)  Currently on antibiotics?: No    Assessment:   1  Cervical radiculopathy    2   Cervical spinal stenosis        Plan: JANEL

## 2019-09-06 NOTE — DISCHARGE INSTRUCTIONS
Epidural Steroid Injection   WHAT YOU NEED TO KNOW:   An epidural steroid injection (CHRISSIE) is a procedure to inject steroid medicine into the epidural space  The epidural space is between your spinal cord and vertebrae  Steroids reduce inflammation and fluid buildup in your spine that may be causing pain  You may be given pain medicine along with the steroids  ACTIVITY  · Do not drive or operate machinery today  · No strenuous activity today - bending, lifting, etc   · You may resume normal activites starting tomorrow - start slowly and as tolerated  · You may shower today, but no tub baths or hot tubs  · You may have numbness for several hours from the local anesthetic  Please use caution and common sense, especially with weight-bearing activities  CARE OF THE INJECTION SITE  · If you have soreness or pain, apply ice to the area today (20 minutes on/20 minutes off)  · Starting tomorrow, you may use warm, moist heat or ice if needed  · You may have an increase or change in your discomfort for 36-48 hours after your treatment  · Apply ice and continue with any pain medication you have been prescribed  · Notify [facility] if you have any of the following: redness, drainage, swelling, headache, stiff neck or fever above 100°F     SPECIAL INSTRUCTIONS  · Our office will contact you in approximately 7 days for a progress report  MEDICATIONS  · Continue to take all routine medications  · Our office may have instructed you to hold some medications  If you have a problem specifically related to your procedure, please call our office at [phone]  Problems not related to your procedure should be directed to your primary care physician

## 2019-09-10 ENCOUNTER — OFFICE VISIT (OUTPATIENT)
Dept: PHYSICAL THERAPY | Age: 69
End: 2019-09-10
Payer: MEDICARE

## 2019-09-10 DIAGNOSIS — M79.7 FIBROMYALGIA: Primary | ICD-10-CM

## 2019-09-10 PROCEDURE — 97110 THERAPEUTIC EXERCISES: CPT

## 2019-09-10 NOTE — PROGRESS NOTES
Daily Note     Today's date: 9/10/2019  Patient name: Drake Hamlin  : 1950  MRN: [id number]  Referring provider: Madison Sayda,   Dx:   Encounter Diagnosis     ICD-10-CM    1  Fibromyalgia M79 7                   Subjective: Pt had injection in neck last Friday and that has made neck pain much improved  Reports swelling in down in B legs and her knees also feel better  Objective: See treatment diary below    LAND  Manual 8/26  9/3  9/10                                                 Exercise Diary                            Nu step L2 10'  bike 10m  bike 10m                     TB rows and extension GTB 2x10  GTB 2x10  GTB 2x10        TB ER      red 2x10       Step ups FW  6" 10x ea  6in x15  6in x20       Step ups lateral 6" 10x ea  6in x15  6in x20        sidesteps w/tband    red x5  red x20       Squats hi/lo 15x  20x  20x                     Leg press 50# 15x  55# 2x15  55#c 2x15       Knee extension 15# 15x  15# 2x10  15# 2x10       Hip abd 30# 15x  30# 2x15  30# 2x15       Ham curls 30# 15x  30# 2x15  30# 2x15                                                                                           Modalities                                                          Assessment: Tolerated land treatment well  Pt should benefit from increased strengthening regimen on land and in water that will translate into improved standing time  Only had fatigue in LE vs pain after today's session         Plan: Cont PT per LPT plan     Precautions none    Specialty Daily Treatment Diary     POOL  Exercise Diary                  Lap walking 3x       Ham stretch with strap [id number]       SKTC [id number]       LAQ 30x       Seated iso abd [id number]               SLR x 3 30x       Ham curls 30x       HR/TR 30x       Side stepping 5x       squats 30x       Pool pony cycle        Standing back bends 30x               Paddles:        Rows and extension Paddles  30x       Horizontal abd/add 30x       abd/add 30x IR/ER 30x               Progress to        Step ups -       1/2 bety -

## 2019-09-11 ENCOUNTER — APPOINTMENT (OUTPATIENT)
Dept: LAB | Age: 69
End: 2019-09-11
Payer: MEDICARE

## 2019-09-11 DIAGNOSIS — I10 BENIGN ESSENTIAL HTN: ICD-10-CM

## 2019-09-11 LAB
ALBUMIN SERPL BCP-MCNC: 4.1 G/DL (ref 3.5–5)
ALP SERPL-CCNC: 108 U/L (ref 46–116)
ALT SERPL W P-5'-P-CCNC: 46 U/L (ref 12–78)
ANION GAP SERPL CALCULATED.3IONS-SCNC: 5 MMOL/L (ref 4–13)
AST SERPL W P-5'-P-CCNC: 21 U/L (ref 5–45)
BILIRUB SERPL-MCNC: 0.47 MG/DL (ref 0.2–1)
BUN SERPL-MCNC: 17 MG/DL (ref 5–25)
CALCIUM SERPL-MCNC: 9.1 MG/DL (ref 8.3–10.1)
CHLORIDE SERPL-SCNC: 103 MMOL/L (ref 100–108)
CHOLEST SERPL-MCNC: 169 MG/DL (ref 50–200)
CO2 SERPL-SCNC: 28 MMOL/L (ref 21–32)
CREAT SERPL-MCNC: 0.64 MG/DL (ref 0.6–1.3)
GFR SERPL CREATININE-BSD FRML MDRD: 90 ML/MIN/1.73SQ M
GLUCOSE P FAST SERPL-MCNC: 91 MG/DL (ref 65–99)
HDLC SERPL-MCNC: 65 MG/DL (ref 40–60)
LDLC SERPL CALC-MCNC: 87 MG/DL (ref 0–100)
NONHDLC SERPL-MCNC: 104 MG/DL
POTASSIUM SERPL-SCNC: 3.8 MMOL/L (ref 3.5–5.3)
PROT SERPL-MCNC: 7.6 G/DL (ref 6.4–8.2)
SODIUM SERPL-SCNC: 136 MMOL/L (ref 136–145)
TRIGL SERPL-MCNC: 87 MG/DL
TSH SERPL DL<=0.05 MIU/L-ACNC: 1.26 UIU/ML (ref 0.36–3.74)

## 2019-09-11 PROCEDURE — 80053 COMPREHEN METABOLIC PANEL: CPT

## 2019-09-11 PROCEDURE — 36415 COLL VENOUS BLD VENIPUNCTURE: CPT

## 2019-09-11 PROCEDURE — 80061 LIPID PANEL: CPT

## 2019-09-11 PROCEDURE — 84443 ASSAY THYROID STIM HORMONE: CPT

## 2019-09-13 ENCOUNTER — TELEPHONE (OUTPATIENT)
Dept: PAIN MEDICINE | Facility: CLINIC | Age: 69
End: 2019-09-13

## 2019-09-13 ENCOUNTER — OFFICE VISIT (OUTPATIENT)
Dept: PHYSICAL THERAPY | Age: 69
End: 2019-09-13
Payer: MEDICARE

## 2019-09-13 DIAGNOSIS — M79.7 FIBROMYALGIA: Primary | ICD-10-CM

## 2019-09-13 DIAGNOSIS — M54.12 CERVICAL RADICULOPATHY: ICD-10-CM

## 2019-09-13 DIAGNOSIS — M54.16 LUMBAR RADICULOPATHY: ICD-10-CM

## 2019-09-13 PROCEDURE — 97113 AQUATIC THERAPY/EXERCISES: CPT

## 2019-09-13 NOTE — TELEPHONE ENCOUNTER
Pt said injection helped and she has no pain in her neck  Only some tingling down her fingers      No pain feels tingle/pinch in her neck  Percentage of relief: 90%

## 2019-09-13 NOTE — PROGRESS NOTES
Daily Note     Today's date: 2019  Patient name: Deneen Amato  : 1950  MRN: [id number]  Referring provider: Keara Chambers DO  Dx:   Encounter Diagnosis     ICD-10-CM    1   Fibromyalgia M79 7                   Subjective: Pt noted 5/10 LB pain today  "my neck doesn't hurt since the injection"       Objective: See treatment diary below    Assessment: Decreased pain and improved mobility, Progress as able       Plan: Cont PT per LPT plan    LAND  Manual 8/26  9/3  9/10                                                 Exercise Diary                            Nu step L2 10'  bike 10m  bike 10m                     TB rows and extension GTB 2x10  GTB 2x10  GTB 2x10        TB ER      red 2x10       Step ups FW  6" 10x ea  6in x15  6in x20       Step ups lateral 6" 10x ea  6in x15  6in x20        sidesteps w/tband    red x5  red x20       Squats hi/lo 15x  20x  20x                     Leg press 50# 15x  55# 2x15  55#c 2x15       Knee extension 15# 15x  15# 2x10  15# 2x10       Hip abd 30# 15x  30# 2x15  30# 2x15       Ham curls 30# 15x  30# 2x15  30# 2x15                                                                                           Modalities                                                                Precautions none    Specialty Daily Treatment Diary     POOL  Exercise Diary                Lap walking 3x 5x       Ham stretch with strap [id number] 20sec 5x       SKTC [id number] 5x 10sec       LAQ 30x 30x 3sec       Seated iso abd [id number] 20x 5sec              SLR x 3 30x 30x       Ham curls 30x 30x       HR/TR 30x 30x       Side stepping 5x 5x       squats 30x 30x       Pool pony cycle  NT       Standing back bends 30x 30x               Paddles:        Rows and extension Paddles  30x NT       Horizontal abd/add 30x NT       abd/add 30x  NT       IR/ER 30x NT               Progress to        Step ups -       1/2 jacks -

## 2019-09-13 NOTE — TELEPHONE ENCOUNTER
Pt contacted Call Center requested refill of their medication  Medication Name:  Gabapentin     Dosage of Med:  300 mg    Frequency of Med:  1 tablet three times a day    Remaining Medication:      Pharmacy and Location:  [place] [address] [place] [facility], [address] [facility] [place]      Pt  Preferred Callback Phone Number:  [phone]    Thank you

## 2019-09-16 ENCOUNTER — HOSPITAL ENCOUNTER (OUTPATIENT)
Dept: RADIOLOGY | Facility: MEDICAL CENTER | Age: 69
Discharge: HOME | End: 2019-09-16
Attending: PHYSICAL MEDICINE & REHABILITATION | Admitting: PHYSICAL MEDICINE & REHABILITATION
Payer: MEDICARE

## 2019-09-16 VITALS
RESPIRATION RATE: 18 BRPM | SYSTOLIC BLOOD PRESSURE: 148 MMHG | HEART RATE: 69 BPM | TEMPERATURE: 98.7 F | OXYGEN SATURATION: 98 % | DIASTOLIC BLOOD PRESSURE: 97 MMHG

## 2019-09-16 DIAGNOSIS — M54.50 CHRONIC RIGHT-SIDED LOW BACK PAIN WITHOUT SCIATICA: ICD-10-CM

## 2019-09-16 DIAGNOSIS — M47.816 LUMBAR SPONDYLOSIS: ICD-10-CM

## 2019-09-16 DIAGNOSIS — M54.12 CERVICAL RADICULOPATHY: Primary | ICD-10-CM

## 2019-09-16 DIAGNOSIS — G89.29 CHRONIC RIGHT-SIDED LOW BACK PAIN WITHOUT SCIATICA: ICD-10-CM

## 2019-09-16 PROCEDURE — 64494 INJ PARAVERT F JNT L/S 2 LEV: CPT | Performed by: PHYSICAL MEDICINE & REHABILITATION

## 2019-09-16 PROCEDURE — 64493 INJ PARAVERT F JNT L/S 1 LEV: CPT | Performed by: PHYSICAL MEDICINE & REHABILITATION

## 2019-09-16 RX ORDER — GABAPENTIN 300 MG/1
300 CAPSULE ORAL 3 TIMES DAILY
Qty: 90 CAPSULE | Refills: 1 | Status: SHIPPED | OUTPATIENT
Start: 2019-09-16 | End: 2019-10-09 | Stop reason: SDUPTHER

## 2019-09-16 RX ADMIN — Medication 1.5 ML: at 09:24

## 2019-09-16 NOTE — TELEPHONE ENCOUNTER
S/w the patient and she stated she noticed that her pain is no different with the increase, so she would like to stay on the 300mg TID and she stated she did speak with [facility] in regards to this at her previous office visit  Please review and advise   Thanks

## 2019-09-16 NOTE — TELEPHONE ENCOUNTER
How is the patient currently taking the medication? Because her medication list shows 600 mg tablet t i d

## 2019-09-16 NOTE — H&P
History of Present Illness: The patient is a 68 y o  female who presents with complaints of right-sided lower back pain    Patient Active Problem List   Diagnosis    Primary localized osteoarthritis of right knee    Right knee pain    Lumbar disc herniation with radiculopathy    Lumbar pain    Fibromyalgia    Benign essential HTN    Fatty liver    Gastroesophageal reflux disease without esophagitis    Lumbar radiculopathy    Sacroiliitis (HCC)    Pes anserine bursitis    Lower extremity edema    Obesity (BMI 30 0-34  9)    Cervical radiculopathy    Cervical spine arthritis    Cervical spinal stenosis       Past Medical History:   Diagnosis Date    Arthritis     Fatty liver 5/1/2018    Hypertension     Primary localized osteoarthritis of right knee 8/17/2017       No past surgical history on file        Current Outpatient Medications:     benazepril (LOTENSIN) 20 mg tablet, Take 1 tablet (20 mg total) by mouth daily, Disp: 90 tablet, Rfl: 1    diclofenac (VOLTAREN) 75 mg EC tablet, TAKE 1 TABLET (75 MG TOTAL) BY MOUTH 2 (TWO) TIMES A DAY AS NEEDED FOR PAIN, Disp: 60 tablet, Rfl: 1    DULoxetine (CYMBALTA) 60 mg delayed release capsule, TAKE 1 CAPSULE BY MOUTH EVERY DAY, Disp: 90 capsule, Rfl: 1    gabapentin (NEURONTIN) 600 MG tablet, Take 1 tablet (600 mg total) by mouth 3 (three) times a day, Disp: 90 tablet, Rfl: 0    hydrochlorothiazide (HYDRODIURIL) 12 5 mg tablet, Take 1 tablet (12 5 mg total) by mouth daily, Disp: 30 tablet, Rfl: 0    omeprazole (PriLOSEC) 10 mg delayed release capsule, Take 10 mg by mouth daily  , Disp: , Rfl:     SUPER B COMPLEX/C PO, Take 1 tablet by mouth daily  , Disp: , Rfl:     Current Facility-Administered Medications:     lidocaine (PF) (XYLOCAINE-MPF) 2 % injection 2 mL, 2 mL, Perineural, Once, Macarena Loaiza,     Allergies   Allergen Reactions    Penicillins        Physical Exam:   Vitals:    09/16/19 0909   BP: 132/82   Pulse: 81   Resp: 20   Temp: 98 7 °F (37 1 °C)   SpO2: 96%     General: Awake, Alert, Oriented x 3, Mood and affect appropriate  Respiratory: Respirations even and unlabored  Cardiovascular: Peripheral pulses intact; no edema  Musculoskeletal Exam:  Right-sided lower back pain    ASA Score:  2  Patient/Chart Verification  Patient ID Verified: Verbal  ID Band Applied: No  Consents Confirmed: Procedural  H&P( within 30 days) Verified: To be obtained in the Pre-Procedure area  Interval H&P(within 24 hr) Complete (required for Outpatients and Surgery Admit only): To be obtained in the Pre-Procedure area  Allergies Reviewed: Yes  Anticoag/NSAID held?: NA  Currently on antibiotics?: No    Assessment:   1  Chronic right-sided low back pain without sciatica    2   Lumbar spondylosis        Plan: (R) L3-5 MBB#1

## 2019-09-16 NOTE — DISCHARGE INSTRUCTIONS

## 2019-09-17 ENCOUNTER — TELEPHONE (OUTPATIENT)
Dept: RADIOLOGY | Facility: MEDICAL CENTER | Age: 69
End: 2019-09-17

## 2019-09-17 NOTE — TELEPHONE ENCOUNTER
Left a detailed message as per release of health information, advising pt the same  C/B # provided for any questions

## 2019-09-17 NOTE — TELEPHONE ENCOUNTER
Pain diary reviewed by Dr Tessa Hammonds; poor response to lumbar MBB  Schedule f/u with NP to review further and discuss options       Appt line: review lumbar MBB response

## 2019-09-18 ENCOUNTER — OFFICE VISIT (OUTPATIENT)
Dept: PHYSICAL THERAPY | Age: 69
End: 2019-09-18
Payer: MEDICARE

## 2019-09-18 DIAGNOSIS — M79.7 FIBROMYALGIA: Primary | ICD-10-CM

## 2019-09-18 PROCEDURE — 97113 AQUATIC THERAPY/EXERCISES: CPT

## 2019-09-18 NOTE — PROGRESS NOTES
Daily Note     Today's date: 2019  Patient name: Patrick Quiroz  : 1950  MRN: [id number]  Referring provider: Miller Kwan DO  Dx:   Encounter Diagnosis     ICD-10-CM    1  Fibromyalgia M79 7                   Subjective: Pt noted 4/10 LB pain "I havent had neck pain since my injection"       Objective: See treatment diary below    Assessment: Decreased pain to 2/10 after aqua therapy based exercises  Add ankle weights if able next session         Plan: Cont PT per LPT plan    LAND  Manual 8/26  9/3  9/10                                                 Exercise Diary                            Nu step L2 10'  bike 10m  bike 10m                     TB rows and extension GTB 2x10  GTB 2x10  GTB 2x10        TB ER      red 2x10       Step ups FW  6" 10x ea  6in x15  6in x20       Step ups lateral 6" 10x ea  6in x15  6in x20        sidesteps w/tband    red x5  red x20       Squats hi/lo 15x  20x  20x                     Leg press 50# 15x  55# 2x15  55#c 2x15       Knee extension 15# 15x  15# 2x10  15# 2x10       Hip abd 30# 15x  30# 2x15  30# 2x15       Ham curls 30# 15x  30# 2x15  30# 2x15                                                                                           Modalities                                                                Precautions none    Specialty Daily Treatment Diary     POOL  Exercise Diary           #     Lap walking 3x 5x  5x      Ham stretch with strap [id number] 20sec 5x  20sec 5x      SKTC [id number] 5x 10sec  5x 10sec      LAQ 30x 30x 3sec  30x 3sec      Seated iso abd [id number] 20x 5sec 20x 5sec              SLR x 3 30x 30x  30x      Ham curls 30x 30x  30x      HR/TR 30x 30x  30x      Side stepping 5x 5x  6x      squats 30x 30x  30x       Bike at bench   NT  5 min      Standing back bends 30x 30x  30x              Paddles:        Rows and extension Paddles  30x NT  30x      Horizontal abd/add 30x NT  30x      abd/add 30x  NT  30x      IR/ER 30x NT  30x Rows    30x      Progress to        Step ups -       1/2 jacks -

## 2019-09-20 ENCOUNTER — OFFICE VISIT (OUTPATIENT)
Dept: PHYSICAL THERAPY | Age: 69
End: 2019-09-20
Payer: MEDICARE

## 2019-09-20 DIAGNOSIS — M79.7 FIBROMYALGIA: Primary | ICD-10-CM

## 2019-09-20 PROCEDURE — 97110 THERAPEUTIC EXERCISES: CPT | Performed by: PHYSICAL THERAPIST

## 2019-09-20 NOTE — PROGRESS NOTES
Daily Note     Today's date: 2019  Patient name: Yoan Singh  : 1950  MRN: [id number]  Referring provider: Phillip Vides DO  Dx:   Encounter Diagnosis     ICD-10-CM    1  Fibromyalgia M79 7                   Subjective: Patient reports that she's feeling good today, less knee pain  Still is having difficulty with the stairs at home  Objective: See treatment diary below    Assessment: Increased weights with cybex today and added additional exercises for balance, challenged with SLS on foam today  Less pain with stair exercise in PT, steps at home are higher         Plan: Cont PT per LPT plan        Precautions none    Specialty Daily Treatment Diary  POOL / LAND      POOL  Exercise Diary              Lap walking 3x 5x  5x      Ham stretch with strap [id number] 20sec 5x  20sec 5x      SKTC 2x22roh 5x 10sec  5x 10sec      LAQ 30x 30x 3sec  30x 3sec      Seated iso abd [id number] 20x 5sec 20x 5sec              SLR x 3 30x 30x  30x      Ham curls 30x 30x  30x      HR/TR 30x 30x  30x      Side stepping 5x 5x  6x      squats 30x 30x  30x       Bike at bench   NT  5 min      Standing back bends 30x 30x  30x              Paddles:        Rows and extension Paddles  30x NT  30x      Horizontal abd/add 30x NT  30x      abd/add 30x  NT  30x      IR/ER 30x NT  30x      Rows    30x      Progress to        Step ups -       1/2 jacks -                       LAND        Bike    10'            TB rows and extension    GTB 2x10    TB ER    RTB 2x10            Step ups FW / lateral    6" 2x10    Sidestepping with TB    GTB  5x            SLS foam    5x:10    Tandem walking    3x ballet Foam NV           Squats hi/lo    20x            Leg press    60#  2x15    Knee extension    20# 2x15    Hip abd    35#  2x15    Ham curls    35# 2x15

## 2019-09-23 ENCOUNTER — OFFICE VISIT (OUTPATIENT)
Dept: PAIN MEDICINE | Facility: MEDICAL CENTER | Age: 69
End: 2019-09-23
Payer: MEDICARE

## 2019-09-23 VITALS
HEIGHT: 62 IN | WEIGHT: 174.4 LBS | BODY MASS INDEX: 32.09 KG/M2 | RESPIRATION RATE: 14 BRPM | HEART RATE: 80 BPM | DIASTOLIC BLOOD PRESSURE: 72 MMHG | SYSTOLIC BLOOD PRESSURE: 124 MMHG

## 2019-09-23 DIAGNOSIS — M51.16 LUMBAR DISC HERNIATION WITH RADICULOPATHY: ICD-10-CM

## 2019-09-23 DIAGNOSIS — M47.816 LUMBAR SPONDYLOSIS: ICD-10-CM

## 2019-09-23 DIAGNOSIS — M54.16 LUMBAR RADICULOPATHY: Primary | ICD-10-CM

## 2019-09-23 PROCEDURE — 99214 OFFICE O/P EST MOD 30 MIN: CPT | Performed by: NURSE PRACTITIONER

## 2019-09-23 NOTE — PROGRESS NOTES
Assessment  1  Lumbar radiculopathy    2  Lumbar disc herniation with radiculopathy    3  Lumbar spondylosis        Plan  At this time since the patient did not receive any relief from her right L3-5 medial branch block and she is having her usual low back pain with bilateral leg pain, right side being the worst   I do feel it is reasonable to get her scheduled for repeat right L5-S1 lumbar epidural steroid injection, her last injection was May 13, 2019 in these generally provide her about 100% relief for a few months  Continue with gabapentin she does not require a refill today  Follow up after procedure      [place] Prescription Drug Monitoring Program report was reviewed and was appropriate     Complete risks and benefits including bleeding, infection, tissue reaction, nerve injury and allergic reaction were discussed  The approach was demonstrated using models and literature was provided  Verbal and written consent was obtained  My impressions and treatment recommendations were discussed in detail with the patient who verbalized understanding and had no further questions  Discharge instructions were provided  I personally saw and examined the patient and I agree with the above discussed plan of care  Orders Placed This Encounter   Procedures    FL spine and pain procedure     Standing Status:   Future     Standing Expiration Date:   9/23/2023     Order Specific Question:   Reason for Exam:     Answer:   right L5-S1 LESI     Order Specific Question:   Anticoagulant hold needed? Answer:   diclofenac     No orders of the defined types were placed in this encounter  History of Present Illness    Shen Roberts is a 68 y o  female presents for follow-up related to her chronic low back and leg pain  Today she rates her pain 9/10 this is constant in nature and bothersome throughout the entirety of the day    She describes the pain as burning, dull, aching, sharp, cramping, pressure-like, shooting, numbness, and pins and needles  Patient is status post a right L3-5 medial branch block, with Dr Nica Atkinson on September 16, 2019  Unfortunately she did not receive enough relief to continue to move forward towards radiofrequency ablation  Patient takes gabapentin 300 mg 3 times daily she reports that this is helpful specially for the pain in her feet  I have personally reviewed and/or updated the patient's past medical history, past surgical history, family history, social history, current medications, allergies, and vital signs today  Review of Systems   Respiratory: Negative for shortness of breath  Cardiovascular: Negative for chest pain  Gastrointestinal: Negative for constipation, diarrhea, nausea and vomiting  Musculoskeletal: Negative for arthralgias, gait problem, joint swelling and myalgias  Skin: Negative for rash  Neurological: Negative for dizziness, seizures and weakness  All other systems reviewed and are negative  Patient Active Problem List   Diagnosis    Primary localized osteoarthritis of right knee    Right knee pain    Lumbar disc herniation with radiculopathy    Chronic right-sided low back pain without sciatica    Fibromyalgia    Benign essential HTN    Fatty liver    Gastroesophageal reflux disease without esophagitis    Lumbar radiculopathy    Sacroiliitis (HCC)    Pes anserine bursitis    Lower extremity edema    Obesity (BMI 30 0-34  9)    Cervical radiculopathy    Cervical spine arthritis    Cervical spinal stenosis    Lumbar spondylosis       Past Medical History:   Diagnosis Date    Arthritis     Fatty liver 5/1/2018    Hypertension     Primary localized osteoarthritis of right knee 8/17/2017       History reviewed  No pertinent surgical history      Family History   Problem Relation Age of Onset    Osteoporosis Mother     Skin cancer Father     Cancer Family     Osteoporosis Family     Stomach cancer Sister 52    No Known Problems Daughter     No Known Problems Maternal Grandmother     No Known Problems Maternal Grandfather     No Known Problems Paternal Grandmother     No Known Problems Paternal Grandfather     No Known Problems Maternal Aunt     No Known Problems Maternal Aunt     No Known Problems Maternal Aunt     No Known Problems Maternal Aunt     No Known Problems Paternal Aunt        Social History     Occupational History    Not on file   Tobacco Use    Smoking status: Never Smoker    Smokeless tobacco: Never Used   Substance and Sexual Activity    Alcohol use: No    Drug use: Not on file    Sexual activity: Not on file       Current Outpatient Medications on File Prior to Visit   Medication Sig    benazepril (LOTENSIN) 20 mg tablet Take 1 tablet (20 mg total) by mouth daily    diclofenac (VOLTAREN) 75 mg EC tablet TAKE 1 TABLET (75 MG TOTAL) BY MOUTH 2 (TWO) TIMES A DAY AS NEEDED FOR PAIN    DULoxetine (CYMBALTA) 60 mg delayed release capsule TAKE 1 CAPSULE BY MOUTH EVERY DAY    gabapentin (NEURONTIN) 300 mg capsule Take 1 capsule (300 mg total) by mouth 3 (three) times a day    hydrochlorothiazide (HYDRODIURIL) 12 5 mg tablet Take 1 tablet (12 5 mg total) by mouth daily    omeprazole (PriLOSEC) 10 mg delayed release capsule Take 10 mg by mouth daily      SUPER B COMPLEX/C PO Take 1 tablet by mouth daily       No current facility-administered medications on file prior to visit  Allergies   Allergen Reactions    Penicillins        Physical Exam    /72   Pulse 80   Resp 14   Ht 5' 2" (1 575 m)   Wt 79 1 kg (174 lb 6 4 oz)   BMI 31 90 kg/m²     Constitutional: normal, well developed, well nourished, alert, in no distress and non-toxic and no overt pain behavior    Eyes: anicteric  HEENT: grossly intact  Neck: supple, symmetric, trachea midline and no masses   Pulmonary:even and unlabored  Cardiovascular:No edema or pitting edema present  Skin:Normal without rashes or lesions and well hydrated  Psychiatric:Mood and affect appropriate  Neurologic:Cranial Nerves II-XII grossly intact  Musculoskeletal:normal   Lumbar Spine Exam    Appearance:  Normal lordosis  Palpation/Tenderness:  left lumbar paraspinal tenderness  right lumbar paraspinal tenderness      Range of Motion:  Flexion:  No limitation  with pain  Extension:  No limitation  with pain  Lateral Flexion - Left:  No limitation  with pain  Lateral Flexion - Right:  No limitation  with pain  Rotation - Left:  No limitation  with pain  Rotation - Right:  No limitation  with pain  Motor Strength:  Left hip flexion:  5/5  Left hip extension:  5/5  Right hip flexion:  5/5  Right hip extension:  5/5  Left knee flexion:  5/5  Left knee extension:  5/5  Right knee flexion:  5/5  Right knee extension:  5/5  Left foot dorsiflexion:  5/5  Left foot plantar flexion:  5/5  Right foot dorsiflexion:  5/5  Right foot plantar flexion:  5/5    Special Tests:  Positive slump test bilaterally, right side worse than left      Imaging

## 2019-09-24 ENCOUNTER — APPOINTMENT (OUTPATIENT)
Dept: PHYSICAL THERAPY | Age: 69
End: 2019-09-24
Payer: MEDICARE

## 2019-09-27 ENCOUNTER — OFFICE VISIT (OUTPATIENT)
Dept: PHYSICAL THERAPY | Age: 69
End: 2019-09-27
Payer: MEDICARE

## 2019-09-27 DIAGNOSIS — I10 BENIGN ESSENTIAL HTN: ICD-10-CM

## 2019-09-27 DIAGNOSIS — M79.7 FIBROMYALGIA: Primary | ICD-10-CM

## 2019-09-27 DIAGNOSIS — R60.0 LOWER EXTREMITY EDEMA: ICD-10-CM

## 2019-09-27 PROCEDURE — 97113 AQUATIC THERAPY/EXERCISES: CPT

## 2019-09-27 RX ORDER — HYDROCHLOROTHIAZIDE 12.5 MG/1
TABLET ORAL
Qty: 30 TABLET | Refills: 5 | Status: SHIPPED | OUTPATIENT
Start: 2019-09-27 | End: 2020-03-25

## 2019-09-27 NOTE — PROGRESS NOTES
Daily Note     Today's date: 2019  Patient name: Yoan Singh  : 1950  MRN: [id number]  Referring provider: Phillip Vides DO  Dx:   Encounter Diagnosis     ICD-10-CM    1  Fibromyalgia M79 7                   Subjective:  Pt noted 5/10 overall pain today  Objective: See treatment diary below    Assessment: Challenged managing stairs at home  Temporary relief of symptoms while in aqua environment          Plan: Cont PT per LPT plan        Precautions none    Specialty Daily Treatment Diary  POOL / LAND      POOL  Exercise Diary              Lap walking 3x 5x  5x  5x    Ham stretch with strap [id number] 20sec 5x  20sec 5x  20sec 5x     SKTC 8i07yaz 5x 10sec  5x 10sec  5x 10sec     LAQ 30x 30x 3sec  30x 3sec  30x 3sec     Seated iso abd 42i4cmh 20x 5sec 20x 5sec  20x 5sec             SLR x 3 30x 30x  30x  30x     Ham curls 30x 30x  30x  30x     HR/TR 30x 30x  30x  30x     Side stepping 5x 5x  6x  6x     squats 30x 30x  30x  30x      Bike at bench   NT  5 min  5 min     Standing back bends 30x 30x  30x  30x             Paddles:        Rows and extension Paddles  30x NT  30x  30x     Horizontal abd/add 30x NT  30x  30x     abd/add 30x  NT  30x  30x     IR/ER 30x NT  30x  30x     Rows    30x  30x     Progress to        Step ups -       1/2 jacks -                       LAND        Bike    10'            TB rows and extension    GTB 2x10    TB ER    RTB 2x10            Step ups FW / lateral    6" 2x10    Sidestepping with TB    GTB  5x            SLS foam    5x:10    Tandem walking    3x ballet Foam NV           Squats hi/lo    20x            Leg press    60#  2x15    Knee extension    20# 2x15    Hip abd    35#  2x15    Ham curls    35# 2x15

## 2019-10-01 ENCOUNTER — OFFICE VISIT (OUTPATIENT)
Dept: PHYSICAL THERAPY | Age: 69
End: 2019-10-01
Payer: MEDICARE

## 2019-10-01 DIAGNOSIS — M79.7 FIBROMYALGIA: Primary | ICD-10-CM

## 2019-10-01 PROCEDURE — 97110 THERAPEUTIC EXERCISES: CPT

## 2019-10-01 NOTE — PROGRESS NOTES
Daily Note     Today's date: 10/1/2019  Patient name: Janneth Garcia  : 1950  MRN: [id number]  Referring provider: Yoseph Gary DO  Dx:   No diagnosis found  Subjective:  Pt reports stair climbing is painful at home in both up and down directions  Objective: See treatment diary below  FOTO done 10/1    Assessment: Using  Step ups as functional strengthening to aid with home stairs  Pt does not have any knee pain with step ups at therapy  Pt reports fatigue in both LE after session   Pt will continue to benefit from PT to address functional deficits at home      Plan: Cont PT per LPT plan        Precautions none    Specialty Daily Treatment Diary  POOL / LAND      POOL  Exercise Diary              Lap walking 3x 5x  5x  5x    Ham stretch with strap [id number] 20sec 5x  20sec 5x  20sec 5x     SKTC [id number] 5x 10sec  5x 10sec  5x 10sec     LAQ 30x 30x 3sec  30x 3sec  30x 3sec     Seated iso abd [id number] 20x 5sec 20x 5sec  20x 5sec             SLR x 3 30x 30x  30x  30x     Ham curls 30x 30x  30x  30x     HR/TR 30x 30x  30x  30x     Side stepping 5x 5x  6x  6x     squats 30x 30x  30x  30x      Bike at bench   NT  5 min  5 min     Standing back bends 30x 30x  30x  30x             Paddles:        Rows and extension Paddles  30x NT  30x  30x     Horizontal abd/add 30x NT  30x  30x     abd/add 30x  NT  30x  30x     IR/ER 30x NT  30x  30x     Rows    30x  30x     Progress to        Step ups -       1/2 jacks -                       LAND    9/20 10/1   Bike    10' 10m           TB rows and extension    GTB 2x10 GTB 2x10   TB ER    RTB 2x10 GTB 2x10           Step ups FW / lateral    6" 2x10 6in  2x10   Sidestepping with TB    GTB  5x GTB x 5           SLS foam    5x:10 20s x5   Tandem walking    3x ballet Foam NV           Squats hi/lo    20x 2x15           Leg press    60#  2x15 60# 2x15   Knee extension    20# 2x15 20# 2x15   Hip abd    35#  2x15 35# 2x15   Ham curls    35# 2x15 35# 2x15

## 2019-10-04 ENCOUNTER — OFFICE VISIT (OUTPATIENT)
Dept: PHYSICAL THERAPY | Age: 69
End: 2019-10-04
Payer: MEDICARE

## 2019-10-04 DIAGNOSIS — M79.7 FIBROMYALGIA: Primary | ICD-10-CM

## 2019-10-04 PROCEDURE — 97113 AQUATIC THERAPY/EXERCISES: CPT

## 2019-10-04 NOTE — PROGRESS NOTES
Daily Note     Today's date: 10/4/2019  Patient name: Jaqui Roman  : 1950  MRN: [id number]  Referring provider: Shanta Silvre DO  Dx:   Encounter Diagnosis     ICD-10-CM    1  Fibromyalgia M79 7                   Subjective:  Pt reports trying to go gym where she lives but woke up very stiff this AM  " I only tried the stuff I do at home"      Objective: See treatment diary below  FOTO done 10/1    Assessment: Pt will continue to benefit from PT to address functional deficits at home  Pt felt pool helped to decrease stiffness she had coming in this weekend        Plan: Cont PT per LPT plan        Precautions none    Specialty Daily Treatment Diary  POOL / LAND      POOL  Exercise Diary  8/30  9/13 9/18 9/27 10/4           Lap walking 3x 5x  5x  5x 5x   Ham stretch with strap 5m17hps 20sec 5x  20sec 5x  20sec 5x  54vzhu9   SKTC 7z06eir 5x 10sec  5x 10sec  5x 10sec  5x 10sec   LAQ 30x 30x 3sec  30x 3sec  30x 3sec  30x with PPT   Seated iso abd 28k9epf 20x 5sec 20x 5sec  20x 5sec  20x 5sechold           SLR x 3 30x 30x  30x  30x  30x   Ham curls 30x 30x  30x  30x  30x   HR/TR 30x 30x  30x  30x  30x   Side stepping 5x 5x  6x  6x  6x   squats 30x 30x  30x  30x  30x    Bike at bench   NT  5 min  5 min  5 min   Standing back bends 30x 30x  30x  30x  30x           Paddles:        Rows and extension Paddles  30x NT  30x  30x  30x   Horizontal abd/add 30x NT  30x  30x  30x   abd/add 30x  NT  30x  30x  30x   IR/ER 30x NT  30x  30x  30x   Rows    30x  30x  30x   Progress to        Step ups -       1/2 jacks -                       LAND    9/20 10/1   Bike    10' 10m           TB rows and extension    GTB 2x10 GTB 2x10   TB ER    RTB 2x10 GTB 2x10           Step ups FW / lateral    6" 2x10 6in  2x10   Sidestepping with TB    GTB  5x GTB x 5           SLS foam    5x:10 20s x5   Tandem walking    3x ballet Foam NV           Squats hi/lo    20x 2x15           Leg press    60#  2x15 60# 2x15   Knee extension    20# 2x15 20# 2x15   Hip abd    35#  2x15 35# 2x15   Ham curls    35# 2x15 35# 2x15

## 2019-10-08 ENCOUNTER — OFFICE VISIT (OUTPATIENT)
Dept: PHYSICAL THERAPY | Age: 69
End: 2019-10-08
Payer: MEDICARE

## 2019-10-08 DIAGNOSIS — M79.7 FIBROMYALGIA: Primary | ICD-10-CM

## 2019-10-08 PROCEDURE — 97110 THERAPEUTIC EXERCISES: CPT

## 2019-10-08 PROCEDURE — 97112 NEUROMUSCULAR REEDUCATION: CPT

## 2019-10-08 NOTE — PROGRESS NOTES
Daily Note     Today's date: 10/8/2019  Patient name: Jaqui Roman  : 1950  MRN: [id number]  Referring provider: Shanta Silver DO  Dx:   Encounter Diagnosis     ICD-10-CM    1  Fibromyalgia M79 7                   Subjective:  Pt reports doing her squats at home and she even has her  doing them with her  Objective: See treatment diary below  FOTO done 10/1    Assessment: Pt will continue to benefit from PT to address functional deficits at home  Combo of pool and land aid in pain relief and strengthening        Plan: Cont PT per LPT plan        Precautions none    Specialty Daily Treatment Diary  POOL / LAND      POOL  Exercise Diary   10           Lap walking 3x 5x  5x  5x 5x   Ham stretch with strap [id number] 20sec 5x  20sec 5x  20sec 5x  65ffnd7   SKTC 9s16foy 5x 10sec  5x 10sec  5x 10sec  5x 10sec   LAQ 30x 30x 3sec  30x 3sec  30x 3sec  30x with PPT   Seated iso abd 90l7fli 20x 5sec 20x 5sec  20x 5sec  20x 5sechold           SLR x 3 30x 30x  30x  30x  30x   Ham curls 30x 30x  30x  30x  30x   HR/TR 30x 30x  30x  30x  30x   Side stepping 5x 5x  6x  6x  6x   squats 30x 30x  30x  30x  30x    Bike at bench   NT  5 min  5 min  5 min   Standing back bends 30x 30x  30x  30x  30x           Paddles:        Rows and extension Paddles  30x NT  30x  30x  30x   Horizontal abd/add 30x NT  30x  30x  30x   abd/add 30x  NT  30x  30x  30x   IR/ER 30x NT  30x  30x  30x   Rows    30x  30x  30x   Progress to        Step ups -       1/2 jacks -                       LAND 10/8   9/20 10/1   Bike 10m   10' 10m           TB rows and extension GTB 3x10   GTB 2x10 GTB 2x10   TB ER GTB 2x10   RTB 2x10 GTB 2x10           Step ups FW / lateral 6in x20   6" 2x10 6in  2x10   Sidestepping with TB GTB x5   GTB  5x GTB x 5           SLS foam 20sx5   5x:10 20s x5   Tandem walking fwd/bwd On foam x5- 10ft   3x ballet Foam NV           Squats hi/lo 2x15   20x 2x15           Leg press 65# 2x15   60#  2x15 60# 2x15   Knee extension 25# 2x10   20# 2x15 20# 2x15   Hip abd 40# 2x15   35#  2x15 35# 2x15   Ham curls 40# 2x15   35# 2x15 35# 2x15

## 2019-10-09 ENCOUNTER — HOSPITAL ENCOUNTER (OUTPATIENT)
Dept: RADIOLOGY | Facility: MEDICAL CENTER | Age: 69
Discharge: HOME | End: 2019-10-09
Attending: PHYSICAL MEDICINE & REHABILITATION | Admitting: PHYSICAL MEDICINE & REHABILITATION
Payer: MEDICARE

## 2019-10-09 VITALS
TEMPERATURE: 98.6 F | DIASTOLIC BLOOD PRESSURE: 77 MMHG | SYSTOLIC BLOOD PRESSURE: 108 MMHG | HEART RATE: 81 BPM | OXYGEN SATURATION: 98 % | RESPIRATION RATE: 20 BRPM

## 2019-10-09 DIAGNOSIS — M54.12 CERVICAL RADICULOPATHY: ICD-10-CM

## 2019-10-09 DIAGNOSIS — M51.16 LUMBAR DISC HERNIATION WITH RADICULOPATHY: ICD-10-CM

## 2019-10-09 PROCEDURE — 64483 NJX AA&/STRD TFRM EPI L/S 1: CPT | Performed by: PHYSICAL MEDICINE & REHABILITATION

## 2019-10-09 RX ORDER — GABAPENTIN 300 MG/1
CAPSULE ORAL
Qty: 90 CAPSULE | Refills: 1 | Status: SHIPPED | OUTPATIENT
Start: 2019-10-09 | End: 2019-11-03 | Stop reason: SDUPTHER

## 2019-10-09 RX ORDER — METHYLPREDNISOLONE ACETATE 80 MG/ML
80 INJECTION, SUSPENSION INTRA-ARTICULAR; INTRALESIONAL; INTRAMUSCULAR; PARENTERAL; SOFT TISSUE ONCE
Status: COMPLETED | OUTPATIENT
Start: 2019-10-09 | End: 2019-10-09

## 2019-10-09 RX ORDER — PAPAVERINE HCL 150 MG
10 CAPSULE, EXTENDED RELEASE ORAL ONCE
Status: DISCONTINUED | OUTPATIENT
Start: 2019-10-09 | End: 2019-10-09

## 2019-10-09 RX ORDER — LIDOCAINE HYDROCHLORIDE 10 MG/ML
5 INJECTION, SOLUTION EPIDURAL; INFILTRATION; INTRACAUDAL; PERINEURAL ONCE
Status: COMPLETED | OUTPATIENT
Start: 2019-10-09 | End: 2019-10-09

## 2019-10-09 RX ORDER — LIDOCAINE HYDROCHLORIDE 10 MG/ML
5 INJECTION, SOLUTION EPIDURAL; INFILTRATION; INTRACAUDAL; PERINEURAL ONCE
Status: DISCONTINUED | OUTPATIENT
Start: 2019-10-09 | End: 2019-10-09

## 2019-10-09 RX ADMIN — IOHEXOL 1 ML: 300 INJECTION, SOLUTION INTRAVENOUS at 13:16

## 2019-10-09 RX ADMIN — METHYLPREDNISOLONE ACETATE 80 MG: 80 INJECTION, SUSPENSION INTRA-ARTICULAR; INTRALESIONAL; INTRAMUSCULAR; PARENTERAL; SOFT TISSUE at 13:16

## 2019-10-09 RX ADMIN — LIDOCAINE HYDROCHLORIDE 2 ML: 10 INJECTION, SOLUTION EPIDURAL; INFILTRATION; INTRACAUDAL; PERINEURAL at 13:15

## 2019-10-09 NOTE — PROGRESS NOTES
Assessment/Plan:    Problem List Items Addressed This Visit        Cardiovascular and Mediastinum    Benign essential HTN - Primary     Controlled  Medications were adjusted at her last visit and currently doing well with hctz 12 5mg daily and benazepril 20mg daily  Continue weight loss efforts  Musculoskeletal and Integument    Skin lesion of chest wall     Persistent despite application of topical agents  Recommend evaluation by Derm  Relevant Orders    Ambulatory referral to Dermatology       Other    Fibromyalgia     Continues to improve with better control of back and neck pain and aquatic therapy  Continue present treatment to include duloxetine 60mg daily and gabapentin 300mg TID  Lower extremity edema     Improved  Has trace pedal edema now  Off CCB and is on HCTZ  Subjective:      Patient ID: Jonathan Shelley is a 68 y o  female  HPI  77yo female with HTN, fibromyalgia, cervical radiculopathy, reflux, chronic LBP and knee pain here for follow up care  She is accompanied by her   Amlodipine was discontinued and patient was started HCTZ due to complaint of BLE edema which has since improved  Her BP over the past two weeks have been 110-120/70-90s, pulse 70-90s  She denies HA, dizziness, CP or SOB  She received epidural injection in cspine one month ago and has less numbness and pain in her L arm  She had epidural injection in lumbar spine yesterday  Reports fibromyalgia has been better with recent injections and doing aquatherapy  She also has been going to the gym and walking  She has had burning sensation on her skin on anterior chest wall for >1month  Minimal pruritis  Has applied multiple topical agents without change in size  Denies bleeding      The following portions of the patient's history were reviewed and updated as appropriate: allergies, current medications, past family history, past medical history, past social history, past surgical history and problem list     Current Outpatient Medications:     benazepril (LOTENSIN) 20 mg tablet, Take 1 tablet (20 mg total) by mouth daily, Disp: 90 tablet, Rfl: 1    diclofenac (VOLTAREN) 75 mg EC tablet, TAKE 1 TABLET (75 MG TOTAL) BY MOUTH 2 (TWO) TIMES A DAY AS NEEDED FOR PAIN, Disp: 60 tablet, Rfl: 1    DULoxetine (CYMBALTA) 60 mg delayed release capsule, TAKE 1 CAPSULE BY MOUTH EVERY DAY, Disp: 90 capsule, Rfl: 1    gabapentin (NEURONTIN) 300 mg capsule, TAKE 1 CAPSULE BY MOUTH THREE TIMES A DAY, Disp: 90 capsule, Rfl: 1    hydrochlorothiazide (HYDRODIURIL) 12 5 mg tablet, TAKE 1 TABLET BY MOUTH EVERY DAY, Disp: 30 tablet, Rfl: 5    omeprazole (PriLOSEC) 10 mg delayed release capsule, Take 10 mg by mouth daily  , Disp: , Rfl:     SUPER B COMPLEX/C PO, Take 1 tablet by mouth daily  , Disp: , Rfl:   No current facility-administered medications for this visit  Review of Systems   Constitutional:        +gradual weight loss   Respiratory: Negative  Cardiovascular: Negative  Musculoskeletal: Positive for back pain, myalgias and neck pain  Skin: Positive for color change  Neurological: Positive for numbness  Negative for dizziness and headaches  Objective:    /76 (BP Location: Left arm, Patient Position: Sitting)   Pulse 80   Temp 98 °F (36 7 °C)   Resp 16   Ht 5' 2" (1 575 m)   Wt 78 7 kg (173 lb 9 6 oz)   SpO2 98%   BMI 31 75 kg/m²      Physical Exam   Constitutional: She appears well-developed  No distress  HENT:   Mouth/Throat: Oropharynx is clear and moist    Neck: Neck supple  Cardiovascular: Normal rate, regular rhythm and normal heart sounds  Trace bilateral pedal edema   Pulmonary/Chest: Effort normal and breath sounds normal  No stridor  No respiratory distress  Neurological: She is alert  Skin:   Raised [id number] rectangular shaped lesion on R anterior chest wall, nontender  Psychiatric: She has a normal mood and affect     Vitals reviewed        Recent Results (from the past 840 hour(s))   Lipid panel    Collection Time: 09/11/19  7:15 AM   Result Value Ref Range    Cholesterol 169 50 - 200 mg/dL    Triglycerides 87 <=150 mg/dL    HDL, Direct 65 (H) 40 - 60 mg/dL    LDL Calculated 87 0 - 100 mg/dL    Non-HDL-Chol (CHOL-HDL) 104 mg/dl   Comprehensive metabolic panel    Collection Time: 09/11/19  7:15 AM   Result Value Ref Range    Sodium 136 136 - 145 mmol/L    Potassium 3 8 3 5 - 5 3 mmol/L    Chloride 103 100 - 108 mmol/L    CO2 28 21 - 32 mmol/L    ANION GAP 5 4 - 13 mmol/L    BUN 17 5 - 25 mg/dL    Creatinine 0 64 0 60 - 1 30 mg/dL    Glucose, Fasting 91 65 - 99 mg/dL    Calcium 9 1 8 3 - 10 1 mg/dL    AST 21 5 - 45 U/L    ALT 46 12 - 78 U/L    Alkaline Phosphatase 108 46 - 116 U/L    Total Protein 7 6 6 4 - 8 2 g/dL    Albumin 4 1 3 5 - 5 0 g/dL    Total Bilirubin 0 47 0 20 - 1 00 mg/dL    eGFR 90 ml/min/1 73sq m   TSH, 3rd generation with Free T4 reflex    Collection Time: 09/11/19  7:15 AM   Result Value Ref Range    TSH 3RD GENERATON 1 260 0 358 - 3 740 uIU/mL

## 2019-10-09 NOTE — DISCHARGE INSTRUCTIONS
Epidural Steroid Injection   WHAT YOU NEED TO KNOW:   An epidural steroid injection (CHRISSIE) is a procedure to inject steroid medicine into the epidural space  The epidural space is between your spinal cord and vertebrae  Steroids reduce inflammation and fluid buildup in your spine that may be causing pain  You may be given pain medicine along with the steroids  ACTIVITY  · Do not drive or operate machinery today  · No strenuous activity today - bending, lifting, etc   · You may resume normal activites starting tomorrow - start slowly and as tolerated  · You may shower today, but no tub baths or hot tubs  · You may have numbness for several hours from the local anesthetic  Please use caution and common sense, especially with weight-bearing activities  CARE OF THE INJECTION SITE  · If you have soreness or pain, apply ice to the area today (20 minutes on/20 minutes off)  · Starting tomorrow, you may use warm, moist heat or ice if needed  · You may have an increase or change in your discomfort for 36-48 hours after your treatment  · Apply ice and continue with any pain medication you have been prescribed  · Notify [facility] if you have any of the following: redness, drainage, swelling, headache, stiff neck or fever above 100°F     SPECIAL INSTRUCTIONS  · Our office will contact you in approximately 7 days for a progress report  MEDICATIONS  · Continue to take all routine medications  You may restart diclofenac tomorrow 10/10  · Our office may have instructed you to hold some medications  If you have a problem specifically related to your procedure, please call our office at [phone]  Problems not related to your procedure should be directed to your primary care physician

## 2019-10-09 NOTE — H&P
History of Present Illness: The patient is a 68 y o  female who presents with complaints of right back and leg pain    Patient Active Problem List   Diagnosis    Primary localized osteoarthritis of right knee    Right knee pain    Lumbar disc herniation with radiculopathy    Chronic right-sided low back pain without sciatica    Fibromyalgia    Benign essential HTN    Fatty liver    Gastroesophageal reflux disease without esophagitis    Lumbar radiculopathy    Sacroiliitis (HCC)    Pes anserine bursitis    Lower extremity edema    Obesity (BMI 30 0-34  9)    Cervical radiculopathy    Cervical spine arthritis    Cervical spinal stenosis    Lumbar spondylosis       Past Medical History:   Diagnosis Date    Arthritis     Fatty liver 5/1/2018    Hypertension     Primary localized osteoarthritis of right knee 8/17/2017       No past surgical history on file        Current Outpatient Medications:     benazepril (LOTENSIN) 20 mg tablet, Take 1 tablet (20 mg total) by mouth daily, Disp: 90 tablet, Rfl: 1    diclofenac (VOLTAREN) 75 mg EC tablet, TAKE 1 TABLET (75 MG TOTAL) BY MOUTH 2 (TWO) TIMES A DAY AS NEEDED FOR PAIN, Disp: 60 tablet, Rfl: 1    DULoxetine (CYMBALTA) 60 mg delayed release capsule, TAKE 1 CAPSULE BY MOUTH EVERY DAY, Disp: 90 capsule, Rfl: 1    gabapentin (NEURONTIN) 300 mg capsule, TAKE 1 CAPSULE BY MOUTH THREE TIMES A DAY, Disp: 90 capsule, Rfl: 1    hydrochlorothiazide (HYDRODIURIL) 12 5 mg tablet, TAKE 1 TABLET BY MOUTH EVERY DAY, Disp: 30 tablet, Rfl: 5    omeprazole (PriLOSEC) 10 mg delayed release capsule, Take 10 mg by mouth daily  , Disp: , Rfl:     SUPER B COMPLEX/C PO, Take 1 tablet by mouth daily  , Disp: , Rfl:     Current Facility-Administered Medications:     dexamethasone (PF) (DECADRON) injection 10 mg, 10 mg, Epidural, Once, Elzie Oiler, DO    iohexol (OMNIPAQUE) 300 mg/mL injection 50 mL, 50 mL, Epidural, Once, Elzie Oiler, DO    lidocaine (PF) (XYLOCAINE-MPF) 1 % injection 5 mL, 5 mL, Infiltration, Once, Ervin Li, DO    Allergies   Allergen Reactions    Penicillins        Physical Exam:   Vitals:    10/09/19 1259   BP: 115/79   Pulse: 83   Resp: 20   Temp: 98 6 °F (37 °C)   SpO2: 93%     General: Awake, Alert, Oriented x 3, Mood and affect appropriate  Respiratory: Respirations even and unlabored  Cardiovascular: Peripheral pulses intact; no edema  Musculoskeletal Exam: right back and leg pain    ASA Score: 2    Patient/Chart Verification  Patient ID Verified: Verbal  ID Band Applied: No  Consents Confirmed: Procedural  H&P( within 30 days) Verified: To be obtained in the Pre-Procedure area  Interval H&P(within 24 hr) Complete (required for Outpatients and Surgery Admit only): To be obtained in the Pre-Procedure area  Allergies Reviewed: Yes  Anticoag/NSAID held?: Yes(diclofenac LD 10/4 or prior)  Currently on antibiotics?: No    Assessment:   1   Lumbar disc herniation with radiculopathy        Plan: right L5-S1 LESI

## 2019-10-10 ENCOUNTER — OFFICE VISIT (OUTPATIENT)
Dept: INTERNAL MEDICINE CLINIC | Facility: CLINIC | Age: 69
End: 2019-10-10
Payer: MEDICARE

## 2019-10-10 VITALS
OXYGEN SATURATION: 98 % | RESPIRATION RATE: 16 BRPM | BODY MASS INDEX: 31.94 KG/M2 | TEMPERATURE: 98 F | SYSTOLIC BLOOD PRESSURE: 124 MMHG | HEART RATE: 80 BPM | WEIGHT: 173.6 LBS | DIASTOLIC BLOOD PRESSURE: 76 MMHG | HEIGHT: 62 IN

## 2019-10-10 DIAGNOSIS — I10 BENIGN ESSENTIAL HTN: Primary | ICD-10-CM

## 2019-10-10 DIAGNOSIS — M79.7 FIBROMYALGIA: ICD-10-CM

## 2019-10-10 DIAGNOSIS — R60.0 LOWER EXTREMITY EDEMA: ICD-10-CM

## 2019-10-10 DIAGNOSIS — L98.9 SKIN LESION OF CHEST WALL: ICD-10-CM

## 2019-10-10 PROCEDURE — 99214 OFFICE O/P EST MOD 30 MIN: CPT | Performed by: INTERNAL MEDICINE

## 2019-10-10 NOTE — ASSESSMENT & PLAN NOTE
Continues to improve with better control of back and neck pain and aquatic therapy  Continue present treatment to include duloxetine 60mg daily and gabapentin 300mg TID

## 2019-10-10 NOTE — ASSESSMENT & PLAN NOTE
Controlled  Medications were adjusted at her last visit and currently doing well with hctz 12 5mg daily and benazepril 20mg daily  Continue weight loss efforts

## 2019-10-11 ENCOUNTER — APPOINTMENT (OUTPATIENT)
Dept: PHYSICAL THERAPY | Age: 69
End: 2019-10-11
Payer: MEDICARE

## 2019-10-15 ENCOUNTER — OFFICE VISIT (OUTPATIENT)
Dept: PHYSICAL THERAPY | Age: 69
End: 2019-10-15
Payer: MEDICARE

## 2019-10-15 DIAGNOSIS — M79.7 FIBROMYALGIA: Primary | ICD-10-CM

## 2019-10-15 PROCEDURE — 97110 THERAPEUTIC EXERCISES: CPT

## 2019-10-15 PROCEDURE — 97112 NEUROMUSCULAR REEDUCATION: CPT

## 2019-10-15 NOTE — PROGRESS NOTES
Daily Note     Today's date: 10/15/2019  Patient name: Janneth Garcia  : 1950  MRN: [id number]  Referring provider: Yoseph Gary DO  Dx:   Encounter Diagnosis     ICD-10-CM    1  Fibromyalgia M79 7                   Subjective:  Pt offered no new c/o  Feels therapy is helping her to regain her strength      Objective: See treatment diary below  FOTO done 10/1    Assessment: Pt will continue to benefit from PT to address functional deficits at home  Combo of pool and land aid in pain relief and strengthening        Plan: Cont PT per LPT plan        Precautions none    Specialty Daily Treatment Diary  POOL / LAND      POOL  Exercise Diary   10           Lap walking 3x 5x  5x  5x 5x   Ham stretch with strap [id number] 20sec 5x  20sec 5x  20sec 5x  47ohay6   SKTC 1o29bzw 5x 10sec  5x 10sec  5x 10sec  5x 10sec   LAQ 30x 30x 3sec  30x 3sec  30x 3sec  30x with PPT   Seated iso abd [id number] 20x 5sec 20x 5sec  20x 5sec  20x 5sechold           SLR x 3 30x 30x  30x  30x  30x   Ham curls 30x 30x  30x  30x  30x   HR/TR 30x 30x  30x  30x  30x   Side stepping 5x 5x  6x  6x  6x   squats 30x 30x  30x  30x  30x    Bike at bench   NT  5 min  5 min  5 min   Standing back bends 30x 30x  30x  30x  30x           Paddles:        Rows and extension Paddles  30x NT  30x  30x  30x   Horizontal abd/add 30x NT  30x  30x  30x   abd/add 30x  NT  30x  30x  30x   IR/ER 30x NT  30x  30x  30x   Rows    30x  30x  30x   Progress to        Step ups -       1/2 jacks -                       LAND 10/8 10/15  9/20 10/1   Bike 10m 12m  10' 10m   UBE  6m      TB rows and extension GTB 3x10 GTB 3x10  GTB 2x10 GTB 2x10   TB ER GTB 2x10 GTB 2x10  RTB 2x10 GTB 2x10           Step ups FW / lateral 6in x20 6in x20  6" 2x10 6in  2x10   Sidestepping with TB GTB x5 GTB x6  GTB  5x GTB x 5           SLS foam 20sx5 20sx5  5x:10 20s x5   Tandem walking fwd/bwd On foam x5- 10ft On foam 5 x10ft  3x ballet Foam NV           Squats hi/lo 2x15 2x20 20x 2x15           Leg press 65# 2x15 65# 2x15  60#  2x15 60# 2x15   Knee extension 25# 2x10 25# 3x10  20# 2x15 20# 2x15   Hip abd 40# 2x15 45# 2x15  35#  2x15 35# 2x15   Ham curls 40# 2x15 45# 2x15  35# 2x15 35# 2x15

## 2019-10-16 ENCOUNTER — TELEPHONE (OUTPATIENT)
Dept: PAIN MEDICINE | Facility: CLINIC | Age: 69
End: 2019-10-16

## 2019-10-18 ENCOUNTER — OFFICE VISIT (OUTPATIENT)
Dept: PHYSICAL THERAPY | Age: 69
End: 2019-10-18
Payer: MEDICARE

## 2019-10-18 DIAGNOSIS — M79.7 FIBROMYALGIA: Primary | ICD-10-CM

## 2019-10-18 PROCEDURE — 97113 AQUATIC THERAPY/EXERCISES: CPT

## 2019-10-18 NOTE — PROGRESS NOTES
Daily Note     Today's date: 10/18/2019  Patient name: Brady Sever  : 1950  MRN: [id number]  Referring provider: Sita Salmon DO  Dx:   Encounter Diagnosis     ICD-10-CM    1  Fibromyalgia M79 7                   Subjective:  Pt noted B LE pain 4/10 today       Objective: See treatment diary below   FOTO done 10/1    Assessment: Good tolerance to exercises, progress as able       Plan: Cont PT per LPT plan        Precautions none    Specialty Daily Treatment Diary  POOL / LAND      POOL  Exercise Diary  10/18                Lap walking 3x       Ham stretch with strap 1b37sjs       SKTC 8x87mjc       LAQ 30x       Seated iso abd [id number]               SLR x 3 30x       Ham curls 30x       HR/TR 30x       Side stepping 10x bench        squats 30x        Bike at bench  5min        Standing back bends 30x               Paddles:        Rows and extension Paddles  30x       Horizontal abd/add 30x       abd/add 30x        IR/ER 30x       Rows         Progress to 20x        Step ups -       1/2 jacks -                       LAND 10/8 10/15  9/20 10/1   Bike 10m 12m  10' 10m   UBE  6m      TB rows and extension GTB 3x10 GTB 3x10  GTB 2x10 GTB 2x10   TB ER GTB 2x10 GTB 2x10  RTB 2x10 GTB 2x10           Step ups FW / lateral 6in x20 6in x20  6" 2x10 6in  2x10   Sidestepping with TB GTB x5 GTB x6  GTB  5x GTB x 5           SLS foam 20sx5 20sx5  5x:10 20s x5   Tandem walking fwd/bwd On foam x5- 10ft On foam 5 x10ft  3x ballet Foam NV           Squats hi/lo 2x15 2x20  20x 2x15           Leg press 65# 2x15 65# 2x15  60#  2x15 60# 2x15   Knee extension 25# 2x10 25# 3x10  20# 2x15 20# 2x15   Hip abd 40# 2x15 45# 2x15  35#  2x15 35# 2x15   Ham curls 40# 2x15 45# 2x15  35# 2x15 35# 2x15

## 2019-10-21 ENCOUNTER — HOSPITAL ENCOUNTER (OUTPATIENT)
Dept: RADIOLOGY | Age: 69
Discharge: HOME | End: 2019-10-21
Payer: MEDICARE

## 2019-10-21 DIAGNOSIS — Z78.0 ASYMPTOMATIC POSTMENOPAUSAL STATE: ICD-10-CM

## 2019-10-21 PROCEDURE — 77080 DXA BONE DENSITY AXIAL: CPT

## 2019-10-22 ENCOUNTER — OFFICE VISIT (OUTPATIENT)
Dept: PHYSICAL THERAPY | Age: 69
End: 2019-10-22
Payer: MEDICARE

## 2019-10-22 DIAGNOSIS — M79.7 FIBROMYALGIA: Primary | ICD-10-CM

## 2019-10-22 PROCEDURE — 97110 THERAPEUTIC EXERCISES: CPT

## 2019-10-22 PROCEDURE — 97112 NEUROMUSCULAR REEDUCATION: CPT

## 2019-10-22 NOTE — PROGRESS NOTES
Daily Note     Today's date: 10/22/2019  Patient name: Mark Chen  : 1950  MRN: [id number]  Referring provider: Ronnell Peoples DO  Dx:   Encounter Diagnosis     ICD-10-CM    1  Fibromyalgia M79 7                   Subjective:  Pt reports good day with pain in knees  Pt still gets pain with stair climbing lorri on R in knee      Objective: See treatment diary below  FOTO done 10/1    Assessment: Good tolerance to exercises, progress as able   Still struggles with balance ex with narrow based activities   Pt can benefit from further land and pool tx to improve pain and function with ADL's      Plan: Cont PT per LPT plan        Precautions none    Specialty Daily Treatment Diary  POOL / LAND      POOL  Exercise Diary  10/18                Lap walking 3x       Ham stretch with strap [id number]       SKTC [id number]       LAQ 30x       Seated iso abd 98h9sre               SLR x 3 30x       Ham curls 30x       HR/TR 30x       Side stepping 10x bench        squats 30x        Bike at bench  5min        Standing back bends 30x               Paddles:        Rows and extension Paddles  30x       Horizontal abd/add 30x       abd/add 30x        IR/ER 30x       Rows         Progress to 20x        Step ups -       1/2 jacks -                       LAND 10/8 10/15 10/22 9/20 10/1   Bike 10m 12m 12m 10' 10m   UBE  6m 6m     TB rows and extension GTB 3x10 GTB 3x10 GTB 3x10 GTB 2x10 GTB 2x10   TB ER GTB 2x10 GTB 2x10 GTB 3x10 RTB 2x10 GTB 2x10           Step ups FW / lateral 6in x20 6in x20 6in x30 6" 2x10 6in  2x10   Sidestepping with TB GTB x5 GTB x6 GTB x6 GTB  5x GTB x 5           SLS foam 20sx5 20sx5 20sx5 5x:10 20s x5   Tandem walking fwd/bwd On foam x5- 10ft On foam 5 x10ft On foam 64aur60 3x ballet Foam NV           Squats hi/lo 2x15 2x20 2x20 20x 2x15           Leg press 65# 2x15 65# 2x15 65# 2x15 60#  2x15 60# 2x15   Knee extension 25# 2x10 25# 3x10 25# 3x10 20# 2x15 20# 2x15   Hip abd 40# 2x15 45# 2x15 45# 2x15 35# 2x15 35# 2x15   Ham curls 40# 2x15 45# 2x15 45# x25 35# 2x15 35# 2x15

## 2019-10-25 ENCOUNTER — OFFICE VISIT (OUTPATIENT)
Dept: PHYSICAL THERAPY | Age: 69
End: 2019-10-25
Payer: MEDICARE

## 2019-10-25 DIAGNOSIS — M79.7 FIBROMYALGIA: Primary | ICD-10-CM

## 2019-10-25 PROCEDURE — 97113 AQUATIC THERAPY/EXERCISES: CPT

## 2019-10-25 NOTE — PROGRESS NOTES
Daily Note     Today's date: 10/25/2019  Patient name: Yanelis Covington  : 1950  MRN: [id number]  Referring provider: Tello Simmons DO  Dx:   Encounter Diagnosis     ICD-10-CM    1  Fibromyalgia M79 7        Start Time: 0800  Stop Time: 0900  Total time in clinic (min): 60 minutes    Subjective: "Both my hips and LB hurt more today"       Objective: See treatment diary below      Assessment: Lasting relief of symptoms between PT sessions         Plan: Cont with Plan of care      Precautions none    Specialty Daily Treatment Diary  POOL / LAND      POOL  Exercise Diary  10/18  10/25              Lap walking 3x 3x       Ham stretch with strap 9a68pfq 4q50owh       SKTC 7u97gmc 5x 10sec       LAQ 30x 30x       Seated iso abd 42r5qrr 20x 5sec               SLR x 3 30x 30x      Ham curls 30x 30x      HR/TR 30x 30x      Side stepping 10x bench  10x       squats 30x 30x        Bike at bench  5min  5 min       Standing back bends 30x 30x               Paddles:        Rows and extension Paddles  30x 30x      Horizontal abd/add 30x 30x      abd/add 30x  30x       IR/ER 30x 30x      Rows         Progress to 20x  20x      Step ups -       1/2 jacks -                       LAND 10/8 10/15 10/22 9/20 10/1   Bike 10m 12m 12m 10' 10m   UBE  6m 6m     TB rows and extension GTB 3x10 GTB 3x10 GTB 3x10 GTB 2x10 GTB 2x10   TB ER GTB 2x10 GTB 2x10 GTB 3x10 RTB 2x10 GTB 2x10           Step ups FW / lateral 6in x20 6in x20 6in x30 6" 2x10 6in  2x10   Sidestepping with TB GTB x5 GTB x6 GTB x6 GTB  5x GTB x 5           SLS foam 20sx5 20sx5 20sx5 5x:10 20s x5   Tandem walking fwd/bwd On foam x5- 10ft On foam 5 x10ft On foam 17zno63 3x ballet Foam NV           Squats hi/lo 2x15 2x20 2x20 20x 2x15           Leg press 65# 2x15 65# 2x15 65# 2x15 60#  2x15 60# 2x15   Knee extension 25# 2x10 25# 3x10 25# 3x10 20# 2x15 20# 2x15   Hip abd 40# 2x15 45# 2x15 45# 2x15 35#  2x15 35# 2x15   Ham curls 40# 2x15 45# 2x15 45# x25 35# 2x15 35# 2x15

## 2019-10-29 ENCOUNTER — OFFICE VISIT (OUTPATIENT)
Dept: PHYSICAL THERAPY | Age: 69
End: 2019-10-29
Payer: MEDICARE

## 2019-10-29 DIAGNOSIS — M79.7 FIBROMYALGIA: Primary | ICD-10-CM

## 2019-10-29 PROCEDURE — 97110 THERAPEUTIC EXERCISES: CPT | Performed by: PHYSICAL THERAPIST

## 2019-10-29 NOTE — PROGRESS NOTES
Daily Note     Today's date: 10/29/2019  Patient name: Carter Franco  : 1950  MRN: [id number]  Referring provider: Cornel Lawrence DO  Dx:   Encounter Diagnosis     ICD-10-CM    1  Fibromyalgia M79 7                   Subjective: Patient reports today is a good day and she is feeling pretty good  Objective: See treatment diary below      Assessment: Pt demonstrates good exercise technique and tolerated treatment well  Pt exhibited fatigue at times, but took rest breaks as needed  Pt continues to show improvement with pain, QOL, and ability to perform functional tasks  Pt would benefit from continued PT  Plan: Cont with Plan of care  Re-assess next visit on land       Precautions none    Specialty Daily Treatment Diary  POOL / LAND      POOL  Exercise Diary  10/18  10/25              Lap walking 3x 3x       Ham stretch with strap 8g09krm 4i74fzp       SKTC 3b94hiy 5x 10sec       LAQ 30x 30x       Seated iso abd 96i7iea 20x 5sec               SLR x 3 30x 30x      Ham curls 30x 30x      HR/TR 30x 30x      Side stepping 10x bench  10x       squats 30x 30x        Bike at bench  5min  5 min       Standing back bends 30x 30x               Paddles:        Rows and extension Paddles  30x 30x      Horizontal abd/add 30x 30x      abd/add 30x  30x       IR/ER 30x 30x      Rows         Progress to 20x  20x      Step ups -       1/2 jacks -                       LAND 10/8 10/15 10/22 10/29 10/1   Bike 10m 12m 12m 12' 10m   UBE  6m 6m     TB rows and extension GTB 3x10 GTB 3x10 GTB 3x10 GTB 3x10 GTB 2x10   TB ER GTB 2x10 GTB 2x10 GTB 3x10 GTB 3x10 GTB 2x10           Step ups FW / lateral 6in x20 6in x20 6in x30 6" 3x10 6in  2x10   Sidestepping with TB GTB x5 GTB x6 GTB x6 GTB  6x GTB x 5           SLS foam 20sx5 20sx5 20sx5 20sec x5 20s x5   Tandem walking fwd/bwd On foam x5- 10ft On foam 5 x10ft On foam 90vdy14 On foam 10ft x10 Foam NV           Squats hi/lo 2x15 2x20 2x20 20x2 2x15           Leg press 65# 2x15 65# 2x15 65# 2x15 65#  2x15 60# 2x15   Knee extension 25# 2x10 25# 3x10 25# 3x10 25# 2x15 20# 2x15   Hip abd 40# 2x15 45# 2x15 45# 2x15 45#  2x15 35# 2x15   Ham curls 40# 2x15 45# 2x15 45# x25 45# 2x15 35# 2x15

## 2019-10-31 DIAGNOSIS — M17.12 OSTEOARTHRITIS OF LEFT KNEE, UNSPECIFIED OSTEOARTHRITIS TYPE: ICD-10-CM

## 2019-10-31 DIAGNOSIS — M17.11 PRIMARY LOCALIZED OSTEOARTHRITIS OF RIGHT KNEE: ICD-10-CM

## 2019-11-01 ENCOUNTER — OFFICE VISIT (OUTPATIENT)
Dept: PHYSICAL THERAPY | Age: 69
End: 2019-11-01
Payer: MEDICARE

## 2019-11-01 DIAGNOSIS — M79.7 FIBROMYALGIA: Primary | ICD-10-CM

## 2019-11-01 PROCEDURE — 97113 AQUATIC THERAPY/EXERCISES: CPT

## 2019-11-01 RX ORDER — DICLOFENAC SODIUM 75 MG/1
75 TABLET, DELAYED RELEASE ORAL 2 TIMES DAILY
Qty: 60 TABLET | Refills: 1 | OUTPATIENT
Start: 2019-11-01

## 2019-11-01 NOTE — PROGRESS NOTES
Daily Note     Today's date: 2019  Patient name: Johnny Kiran  : 1950  MRN: [id number]  Referring provider: Anselmo High DO  Dx:   Encounter Diagnosis     ICD-10-CM    1  Fibromyalgia M79 7                   Subjective:  Pt reported 5/10 pain at LB and B knees today  Objective: See treatment diary below  Therapy sessions  Assessment: Short term relief after aqua based exercises, progress as able       Plan: Cont with Plan of care  Re-assess next visit on land       Precautions none    Specialty Daily Treatment Diary  POOL / LAND      POOL  Exercise Diary  10/18  10/25 11/1             Lap walking 3x 3x  3x      Ham stretch with strap 9g08lpf 5d84xnj  30sec 5x      SKTC 9o74fae 5x 10sec  5x 10sec      LAQ 30x 30x  30x      Seated iso abd 40u0yft 20x 5sec  5sec 20x              SLR x 3 30x 30x 30x      Ham curls 30x 30x 30x      HR/TR 30x 30x 30x     Side stepping 10x bench  10x  10x      squats 30x 30x  30x       Bike at bench  5min  5 min  5 min      Standing back bends 30x 30x               Paddles:        Rows and extension Paddles  30x 30x 30x      Horizontal abd/add 30x 30x 30x      abd/add 30x  30x  30x      IR/ER 30x 30x 30x      Rows         Progress to 20x  20x      Step ups -       1/2 jacks -                       LAND 10/8 10/15 10/22 10/29 10/1   Bike 10m 12m 12m 12' 10m   UBE  6m 6m     TB rows and extension GTB 3x10 GTB 3x10 GTB 3x10 GTB 3x10 GTB 2x10   TB ER GTB 2x10 GTB 2x10 GTB 3x10 GTB 3x10 GTB 2x10           Step ups FW / lateral 6in x20 6in x20 6in x30 6" 3x10 6in  2x10   Sidestepping with TB GTB x5 GTB x6 GTB x6 GTB  6x GTB x 5           SLS foam 20sx5 20sx5 20sx5 20sec x5 20s x5   Tandem walking fwd/bwd On foam x5- 10ft On foam 5 x10ft On foam 93nga02 On foam 10ft x10 Foam NV           Squats hi/lo 2x15 2x20 2x20 20x2 2x15           Leg press 65# 2x15 65# 2x15 65# 2x15 65#  2x15 60# 2x15   Knee extension 25# 2x10 25# 3x10 25# 3x10 25# 2x15 20# 2x15   Hip abd 40# 2x15 45# 2x15 45# 2x15 45#  2x15 35# 2x15   Ham curls 40# 2x15 45# 2x15 45# x25 45# 2x15 35# 2x15

## 2019-11-03 DIAGNOSIS — M54.12 CERVICAL RADICULOPATHY: ICD-10-CM

## 2019-11-04 RX ORDER — GABAPENTIN 300 MG/1
CAPSULE ORAL
Qty: 90 CAPSULE | Refills: 1 | Status: SHIPPED | OUTPATIENT
Start: 2019-11-04 | End: 2019-12-06 | Stop reason: SDUPTHER

## 2019-11-05 ENCOUNTER — APPOINTMENT (OUTPATIENT)
Dept: PHYSICAL THERAPY | Age: 69
End: 2019-11-05
Payer: MEDICARE

## 2019-11-07 ENCOUNTER — OFFICE VISIT (OUTPATIENT)
Dept: PAIN MEDICINE | Facility: MEDICAL CENTER | Age: 69
End: 2019-11-07
Payer: MEDICARE

## 2019-11-07 VITALS
WEIGHT: 176 LBS | BODY MASS INDEX: 32.39 KG/M2 | SYSTOLIC BLOOD PRESSURE: 133 MMHG | HEIGHT: 62 IN | DIASTOLIC BLOOD PRESSURE: 83 MMHG | HEART RATE: 87 BPM

## 2019-11-07 DIAGNOSIS — M47.816 LUMBAR SPONDYLOSIS: ICD-10-CM

## 2019-11-07 DIAGNOSIS — M51.16 LUMBAR DISC HERNIATION WITH RADICULOPATHY: Primary | ICD-10-CM

## 2019-11-07 PROCEDURE — 99213 OFFICE O/P EST LOW 20 MIN: CPT | Performed by: NURSE PRACTITIONER

## 2019-11-07 NOTE — PROGRESS NOTES
Assessment  1  Lumbar disc herniation with radiculopathy    2  Lumbar spondylosis        Plan  I discussed with the patient that since there has been  significant improvement in the pain symptoms that we will hold off on any repeat injections at this point in time  Patient can continue with gabapentin as prescribed she does not require a refill today  Follow-up in 8 weeks for medication refills        [place] Prescription Drug Monitoring Program report was reviewed and was appropriate         My impressions and treatment recommendations were discussed in detail with the patient who verbalized understanding and had no further questions  Discharge instructions were provided  I personally saw and examined the patient and I agree with the above discussed plan of care  No orders of the defined types were placed in this encounter  No orders of the defined types were placed in this encounter  History of Present Illness    Ozzy Gr is a 68 y o  female presents for follow-up related to her right low back and leg pain  Today she reports she is doing better rates her pain 4/10  Her pain is intermittent  She is status post a right L5-S1 lumbar epidural steroid injection on October 9, 2019 with Dr Mary Lou Arredondo  She reports that she has gotten a lot of relief following this injection  She continues to take gabapentin 300 mg 3 times daily she tells me that this works  She denies any side effects or issues  I have personally reviewed and/or updated the patient's past medical history, past surgical history, family history, social history, current medications, allergies, and vital signs today  Review of Systems   Constitutional: Negative for fatigue  HENT: Negative for ear discharge and sinus pressure  Eyes: Negative for photophobia  Respiratory: Negative for shortness of breath  Cardiovascular: Negative for leg swelling  Gastrointestinal: Negative for anal bleeding     Endocrine: Negative for polydipsia  Genitourinary: Negative for genital sores  Musculoskeletal: Positive for gait problem, joint swelling and myalgias  Skin: Negative for pallor  Allergic/Immunologic: Negative for immunocompromised state  Neurological: Negative for syncope  Hematological: Negative for adenopathy  Psychiatric/Behavioral: Negative for confusion  Patient Active Problem List   Diagnosis    Primary localized osteoarthritis of right knee    Right knee pain    Lumbar disc herniation with radiculopathy    Chronic right-sided low back pain without sciatica    Fibromyalgia    Benign essential HTN    Fatty liver    Gastroesophageal reflux disease without esophagitis    Lumbar radiculopathy    Sacroiliitis (HCC)    Pes anserine bursitis    Lower extremity edema    Obesity (BMI 30 0-34  9)    Cervical radiculopathy    Cervical spine arthritis    Cervical spinal stenosis    Lumbar spondylosis    Skin lesion of chest wall       Past Medical History:   Diagnosis Date    Arthritis     Fatty liver 5/1/2018    Hypertension     Primary localized osteoarthritis of right knee 8/17/2017       History reviewed  No pertinent surgical history      Family History   Problem Relation Age of Onset    Osteoporosis Mother     Skin cancer Father     Cancer Family     Osteoporosis Family     Stomach cancer Sister 52    No Known Problems Daughter     No Known Problems Maternal Grandmother     No Known Problems Maternal Grandfather     No Known Problems Paternal Grandmother     No Known Problems Paternal Grandfather     No Known Problems Maternal Aunt     No Known Problems Maternal Aunt     No Known Problems Maternal Aunt     No Known Problems Maternal Aunt     No Known Problems Paternal Aunt        Social History     Occupational History    Not on file   Tobacco Use    Smoking status: Never Smoker    Smokeless tobacco: Never Used   Substance and Sexual Activity    Alcohol use: No    Drug use: Not on file    Sexual activity: Not on file       Current Outpatient Medications on File Prior to Visit   Medication Sig    benazepril (LOTENSIN) 20 mg tablet Take 1 tablet (20 mg total) by mouth daily    diclofenac (VOLTAREN) 75 mg EC tablet TAKE 1 TABLET (75 MG TOTAL) BY MOUTH 2 (TWO) TIMES A DAY AS NEEDED FOR PAIN    DULoxetine (CYMBALTA) 60 mg delayed release capsule TAKE 1 CAPSULE BY MOUTH EVERY DAY    gabapentin (NEURONTIN) 300 mg capsule TAKE 1 CAPSULE BY MOUTH THREE TIMES A DAY    hydrochlorothiazide (HYDRODIURIL) 12 5 mg tablet TAKE 1 TABLET BY MOUTH EVERY DAY    omeprazole (PriLOSEC) 10 mg delayed release capsule Take 10 mg by mouth daily      SUPER B COMPLEX/C PO Take 1 tablet by mouth daily       No current facility-administered medications on file prior to visit  Allergies   Allergen Reactions    Penicillins        Physical Exam    /83   Pulse 87   Ht 5' 2" (1 575 m)   Wt 79 8 kg (176 lb)   BMI 32 19 kg/m²     Constitutional: normal, well developed, well nourished, alert, in no distress and non-toxic and no overt pain behavior    Eyes: anicteric  HEENT: grossly intact  Neck: supple, symmetric, trachea midline and no masses   Pulmonary:even and unlabored  Cardiovascular:No edema or pitting edema present  Skin:Normal without rashes or lesions and well hydrated  Psychiatric:Mood and affect appropriate  Neurologic:Cranial Nerves II-XII grossly intact  Musculoskeletal:ambulates with cane    Imaging

## 2019-11-08 ENCOUNTER — OFFICE VISIT (OUTPATIENT)
Dept: PHYSICAL THERAPY | Age: 69
End: 2019-11-08
Payer: MEDICARE

## 2019-11-08 DIAGNOSIS — M79.7 FIBROMYALGIA: Primary | ICD-10-CM

## 2019-11-08 PROCEDURE — 97113 AQUATIC THERAPY/EXERCISES: CPT

## 2019-11-08 NOTE — PROGRESS NOTES
Daily Note     Today's date: 2019  Patient name: Yanelis Covington  : 1950  MRN: [id number]  Referring provider: Tello Simmons DO  Dx:   Encounter Diagnosis     ICD-10-CM    1  Fibromyalgia M79 7                   Subjective:  Pt was sick earlier in week  Pt reported 6/10 in R LB and down R leg      Objective: See treatment diary below  Therapy sessions  Assessment: Short term relief after aqua based exercises, progress as able   Pt reports that when she has LBP and R LE pain it usually goes away with land and pool therapy sessions      Plan: Cont with Plan of care  Re-assess next visit on land       Precautions none    Specialty Daily Treatment Diary  POOL / LAND      POOL  Exercise Diary  10/18  10/25 11/1 11/8            Lap walking 3x 3x  3x  3x    Ham stretch with strap 4h82ylt 7l58fak  30sec 5x  88htpm6    SKTC 1v35dvb 5x 10sec  5x 10sec  5x 10secx    LAQ 30x 30x  30x  30x    Seated iso abd 89o8mro 20x 5sec  5sec 20x  8aoem93            SLR x 3 30x 30x 30x  30x    Ham curls 30x 30x 30x  30x    HR/TR 30x 30x 30x 30x    Side stepping 10x bench  10x  10x  30x    squats 30x 30x  30x  30x     Bike at bench  5min  5 min  5 min  5min    Standing back bends 30x 30x               Paddles:        Rows and extension Paddles  30x 30x 30x  30x    Horizontal abd/add 30x 30x 30x  30x    abd/add 30x  30x  30x  30x    IR/ER 30x 30x 30x  30x    Rows         Progress to 20x  20x      Step ups -       1/2 jacks -                       LAND 10/8 10/15 10/22 10/29 10/1   Bike 10m 12m 12m 12' 10m   UBE  6m 6m     TB rows and extension GTB 3x10 GTB 3x10 GTB 3x10 GTB 3x10 GTB 2x10   TB ER GTB 2x10 GTB 2x10 GTB 3x10 GTB 3x10 GTB 2x10           Step ups FW / lateral 6in x20 6in x20 6in x30 6" 3x10 6in  2x10   Sidestepping with TB GTB x5 GTB x6 GTB x6 GTB  6x GTB x 5           SLS foam 20sx5 20sx5 20sx5 20sec x5 20s x5   Tandem walking fwd/bwd On foam x5- 10ft On foam 5 x10ft On foam 88rfo44 On foam 10ft x10 Foam NV Squats hi/lo 2x15 2x20 2x20 20x2 2x15           Leg press 65# 2x15 65# 2x15 65# 2x15 65#  2x15 60# 2x15   Knee extension 25# 2x10 25# 3x10 25# 3x10 25# 2x15 20# 2x15   Hip abd 40# 2x15 45# 2x15 45# 2x15 45#  2x15 35# 2x15   Ham curls 40# 2x15 45# 2x15 45# x25 45# 2x15 35# 2x15

## 2019-11-12 ENCOUNTER — OFFICE VISIT (OUTPATIENT)
Dept: PHYSICAL THERAPY | Age: 69
End: 2019-11-12
Payer: MEDICARE

## 2019-11-12 DIAGNOSIS — M79.7 FIBROMYALGIA: Primary | ICD-10-CM

## 2019-11-12 PROCEDURE — 97110 THERAPEUTIC EXERCISES: CPT

## 2019-11-12 NOTE — PROGRESS NOTES
Daily Note     Today's date: 2019  Patient name: Rey Kay  : 1950  MRN: [id number]  Referring provider: Treva Swann DO  Dx:   No diagnosis found  Subjective:  Reports she  has R buttock pain with pain shooting into foot to start session  That pain increases with static standing per her report      Objective: See treatment diary below  Therapy sessions  Assessment:  Pt reports that when she has LBP and R LE pain it usually goes away with land and pool therapy sessions and it was again reduced today  Pt continues to feel quad strength is improving to help with ADL's such as stair climbing      Plan: Cont with Plan of care  Re-assess next visit on land   Look at flexion affect of LBP and R LE pain     Precautions none    Specialty Daily Treatment Diary  POOL / LAND      POOL  Exercise Diary  10/18  10/25 11/1 11/8            Lap walking 3x 3x  3x  3x    Ham stretch with strap 3t13rno 7y48tuv  30sec 5x  47klho8    SKTC 9f16ulm 5x 10sec  5x 10sec  5x 10secx    LAQ 30x 30x  30x  30x    Seated iso abd 14k6flh 20x 5sec  5sec 20x  2okus63            SLR x 3 30x 30x 30x  30x    Ham curls 30x 30x 30x  30x    HR/TR 30x 30x 30x 30x    Side stepping 10x bench  10x  10x  30x    squats 30x 30x  30x  30x     Bike at bench  5min  5 min  5 min  5min    Standing back bends 30x 30x               Paddles:        Rows and extension Paddles  30x 30x 30x  30x    Horizontal abd/add 30x 30x 30x  30x    abd/add 30x  30x  30x  30x    IR/ER 30x 30x 30x  30x    Rows         Progress to 20x  20x      Step ups -       1/2 jacks -                       LAND 10/8 10/15 10/22 10/29 11/12   Bike 10m 12m 12m 12' 12m   UBE  6m 6m 6m 6m   TB rows and extension GTB 3x10 GTB 3x10 GTB 3x10 GTB 3x10 GTB 2x10   TB ER GTB 2x10 GTB 2x10 GTB 3x10 GTB 3x10 GTB 2x10           Step ups FW / lateral 6in x20 6in x20 6in x30 6" 3x10 6in  2x10   Sidestepping with TB GTB x5 GTB x6 GTB x6 GTB  6x GTB x 5           SLS foam 20sx5 20sx5 20sx5 20sec x5 20s x5   Tandem walking fwd/bwd On foam x5- 10ft On foam 5 x10ft On foam 25dno28 On foam 10ft x10 On foam 10ft x10           Squats hi/lo 2x15 2x20 2x20 20x2 2x20           Leg press 65# 2x15 65# 2x15 65# 2x15 65#  2x15 65# 2x15   Knee extension 25# 2x10 25# 3x10 25# 3x10 25# 2x15 25# 2x15   Hip abd 40# 2x15 45# 2x15 45# 2x15 45#  2x15 45# 2x15   Ham curls 40# 2x15 45# 2x15 45# x25 45# 2x15 45# 2x15

## 2019-11-15 ENCOUNTER — OFFICE VISIT (OUTPATIENT)
Dept: PHYSICAL THERAPY | Age: 69
End: 2019-11-15
Payer: MEDICARE

## 2019-11-15 DIAGNOSIS — M79.7 FIBROMYALGIA: Primary | ICD-10-CM

## 2019-11-15 PROCEDURE — 97113 AQUATIC THERAPY/EXERCISES: CPT

## 2019-11-15 NOTE — PROGRESS NOTES
Daily Note     Today's date: 11/15/2019  Patient name: Bruce Jones  : 1950  MRN: [id number]  Referring provider: Spencer Daigle DO  Dx:   Encounter Diagnosis     ICD-10-CM    1  Fibromyalgia M79 7                   Subjective: Pt noted no pain today  Objective: See treatment diary below  Therapy sessions  Assessment: Tolerated session well, progress as well  Plan: Cont with Plan of care  Re-assess next visit on land   Look at flexion affect of LBP and R LE pain     Precautions none    Specialty Daily Treatment Diary  POOL / LAND      POOL  Exercise Diary  10/18  10/25 11/1 11/8  11/15           Lap walking 3x 3x  3x  3x 3x    Ham stretch with strap 2q05aof 6w42cqo  30sec 5x  34yeyf1 10wpl7x    SKTC 6c56ydr 5x 10sec  5x 10sec  5x 10secx 5x 10sec    LAQ 30x 30x  30x  30x 30x    Seated iso abd 85e6uuf 20x 5sec  5sec 20x  9ekqq92 5sec 20x            SLR x 3 30x 30x 30x  30x 30x    Ham curls 30x 30x 30x  30x 30x    HR/TR 30x 30x 30x 30x 30x    Side stepping 10x bench  10x  10x  30x 30x    squats 30x 30x  30x  30x 30x     Bike at bench  5min  5 min  5 min  5min 5 min    Standing back bends 30x 30x               Paddles:        Rows and extension Paddles  30x 30x 30x  30x    Horizontal abd/add 30x 30x 30x  30x    abd/add 30x  30x  30x  30x    IR/ER 30x 30x 30x  30x    Rows         Progress to 20x  20x      Step ups -       1/2 jacks -                       LAND 10/8 10/15 10/22 10/29 11/12   Bike 10m 12m 12m 12' 12m   UBE  6m 6m 6m 6m   TB rows and extension GTB 3x10 GTB 3x10 GTB 3x10 GTB 3x10 GTB 2x10   TB ER GTB 2x10 GTB 2x10 GTB 3x10 GTB 3x10 GTB 2x10           Step ups FW / lateral 6in x20 6in x20 6in x30 6" 3x10 6in  2x10   Sidestepping with TB GTB x5 GTB x6 GTB x6 GTB  6x GTB x 5           SLS foam 20sx5 20sx5 20sx5 20sec x5 20s x5   Tandem walking fwd/bwd On foam x5- 10ft On foam 5 x10ft On foam 42cfb63 On foam 10ft x10 On foam 10ft x10           Squats hi/lo 2x15 2x20 2x20 20x2 2x20 Leg press 65# 2x15 65# 2x15 65# 2x15 65#  2x15 65# 2x15   Knee extension 25# 2x10 25# 3x10 25# 3x10 25# 2x15 25# 2x15   Hip abd 40# 2x15 45# 2x15 45# 2x15 45#  2x15 45# 2x15   Ham curls 40# 2x15 45# 2x15 45# x25 45# 2x15 45# 2x15

## 2019-11-18 RX ORDER — GABAPENTIN 600 MG/1
TABLET ORAL
Qty: 90 TABLET | Refills: 0 | OUTPATIENT
Start: 2019-11-18

## 2019-11-19 ENCOUNTER — APPOINTMENT (OUTPATIENT)
Dept: PHYSICAL THERAPY | Age: 69
End: 2019-11-19
Payer: MEDICARE

## 2019-11-22 ENCOUNTER — APPOINTMENT (OUTPATIENT)
Dept: PHYSICAL THERAPY | Age: 69
End: 2019-11-22
Payer: MEDICARE

## 2019-11-26 ENCOUNTER — OFFICE VISIT (OUTPATIENT)
Dept: PHYSICAL THERAPY | Age: 69
End: 2019-11-26
Payer: MEDICARE

## 2019-11-26 DIAGNOSIS — M79.7 FIBROMYALGIA: Primary | ICD-10-CM

## 2019-11-26 PROCEDURE — 97110 THERAPEUTIC EXERCISES: CPT | Performed by: PHYSICAL THERAPIST

## 2019-11-26 PROCEDURE — 97112 NEUROMUSCULAR REEDUCATION: CPT | Performed by: PHYSICAL THERAPIST

## 2019-11-26 NOTE — PROGRESS NOTES
Daily Note     Today's date: 2019  Patient name: Macie Lambert  : 1950  MRN: [id number]  Referring provider: Chyna Baldwin DO  Dx:   Encounter Diagnosis     ICD-10-CM    1  Fibromyalgia M79 7                   Subjective: Patient reports that she'd like to do only land therapy from this point forward  Still recovering from her cold last week  Still has a cough, but is feeling better  Objective: See treatment diary below      Assessment: Continued with exercise program  Switched schedule to land therapy- will re-assess NV  R knee buckling during standing today, able to use UEs to recover and prevent fall  Reports her R knee will buckle often with standing/turning  Feeling sore/pain today  Patient able to complete program despite pain  Plan: Continue per plan of care        Precautions none    Specialty Daily Treatment Diary  POOL / LAND      POOL  Exercise Diary  10/18  10/25 11/1 11/8  11/15           Lap walking 3x 3x  3x  3x 3x    Ham stretch with strap 3m82jho 4v47iaa  30sec 5x  89sdot7 40dki5i    SKTC 9h39gil 5x 10sec  5x 10sec  5x 10secx 5x 10sec    LAQ 30x 30x  30x  30x 30x    Seated iso abd 60i7txs 20x 5sec  5sec 20x  6yfzh61 5sec 20x            SLR x 3 30x 30x 30x  30x 30x    Ham curls 30x 30x 30x  30x 30x    HR/TR 30x 30x 30x 30x 30x    Side stepping 10x bench  10x  10x  30x 30x    squats 30x 30x  30x  30x 30x     Bike at bench  5min  5 min  5 min  5min 5 min    Standing back bends 30x 30x               Paddles:        Rows and extension Paddles  30x 30x 30x  30x    Horizontal abd/add 30x 30x 30x  30x    abd/add 30x  30x  30x  30x    IR/ER 30x 30x 30x  30x    Rows         Progress to 20x  20x      Step ups -       1/2 jacks -                       LAND 11/26 10/15 10/22 10/29 11/12   Bike 10m 12m 12m 12' 12m   UBE 6' 6m 6m 6m 6m   TB rows and extension GTB 3x10 GTB 3x10 GTB 3x10 GTB 3x10 GTB 2x10   TB ER GTB 2x10 GTB 2x10 GTB 3x10 GTB 3x10 GTB 2x10           Step ups FW / lateral 6in x20 6in x20 6in x30 6" 3x10 6in  2x10   Sidestepping with TB GTB x5- NT GTB x6 GTB x6 GTB  6x GTB x 5           SLS foam 20sx5 20sx5 20sx5 20sec x5 20s x5   Tandem walking fwd/bwd On foam x10- 10ft On foam 5 x10ft On foam 28dbo01 On foam 10ft x10 On foam 10ft x10           Squats hi/lo 2x20 2x20 2x20 20x2 2x20           Leg press 70# 2x15 65# 2x15 65# 2x15 65#  2x15 65# 2x15   Knee extension 25# 2x10 25# 3x10 25# 3x10 25# 2x15 25# 2x15   Hip abd 40# 2x15 45# 2x15 45# 2x15 45#  2x15 45# 2x15   Ham curls 40# 2x15 45# 2x15 45# x25 45# 2x15 45# 2x15

## 2019-11-29 ENCOUNTER — OFFICE VISIT (OUTPATIENT)
Dept: PHYSICAL THERAPY | Age: 69
End: 2019-11-29
Payer: MEDICARE

## 2019-11-29 DIAGNOSIS — M79.7 FIBROMYALGIA: Primary | ICD-10-CM

## 2019-11-29 PROCEDURE — 97112 NEUROMUSCULAR REEDUCATION: CPT

## 2019-11-29 PROCEDURE — 97110 THERAPEUTIC EXERCISES: CPT

## 2019-11-29 NOTE — PROGRESS NOTES
Daily Note     Today's date: 2019  Patient name: Brady Sever  : 1950  MRN: [id number]  Referring provider: Sita Salmon DO  Dx:   Encounter Diagnosis     ICD-10-CM    1  Fibromyalgia M79 7                   Subjective: Patient reports that she'd like to do only land therapy from this point forward  Main c/o of Bknee pain from Thanksgiving activites      Objective: See treatment diary below      Assessment: Continued with exercise program   Reports her R knee will buckle often with standing/turning  Decreased reps with SLS to 3 reps due to pain with that activity  Pt feeling both lateral hip and thigh are tight and would like to work further to [employer] her flexibility  Given stretch to help area for home  Plan: Continue per plan of care        Precautions none    Specialty Daily Treatment Diary  POOL / LAND      POOL  Exercise Diary  10/18  10/25 11/1 11/8  11/15           Lap walking 3x 3x  3x  3x 3x    Ham stretch with strap 8b30hsc 5y01tgq  30sec 5x  21hprs0 01rhc4m    SKTC 9e08jio 5x 10sec  5x 10sec  5x 10secx 5x 10sec    LAQ 30x 30x  30x  30x 30x    Seated iso abd 79x1bou 20x 5sec  5sec 20x  9dwok14 5sec 20x            SLR x 3 30x 30x 30x  30x 30x    Ham curls 30x 30x 30x  30x 30x    HR/TR 30x 30x 30x 30x 30x    Side stepping 10x bench  10x  10x  30x 30x    squats 30x 30x  30x  30x 30x     Bike at bench  5min  5 min  5 min  5min 5 min    Standing back bends 30x 30x               Paddles:        Rows and extension Paddles  30x 30x 30x  30x    Horizontal abd/add 30x 30x 30x  30x    abd/add 30x  30x  30x  30x    IR/ER 30x 30x 30x  30x    Rows         Progress to 20x  20x      Step ups -       1/2 jacks -                       LAND 11/26 11/29 10/22 10/29 11/12   Bike 10m 12m 12m 12' 12m   UBE 6' 6m 6m 6m 6m   TB rows and extension GTB 3x10 GTB 3x10 GTB 3x10 GTB 3x10 GTB 2x10   TB ER GTB 2x10 GTB 2x10 GTB 3x10 GTB 3x10 GTB 2x10           Step ups FW / lateral 6in x20 6in x20 6in x30 6" 3x10 6in  2x10   Sidestepping with TB GTB x5- NT GTB x6 GTB x6 GTB  6x GTB x 5           SLS foam 20sx5 20sx3 20sx5 20sec x5 20s x5   Tandem walking fwd/bwd On foam x10- 10ft On foam 5 x10ft On foam 78axl57 On foam 10ft x10 On foam 10ft x10           Squats hi/lo 2x20 2x20 2x20 20x2 2x20           Leg press 70# 2x15 70# 2x15 65# 2x15 65#  2x15 65# 2x15   Knee extension 25# 2x10 25# 3x10 25# 3x10 25# 2x15 25# 2x15   Hip abd 40# 2x15 55# 2x15 45# 2x15 45#  2x15 45# 2x15   Ham curls 40# 2x15 45# 2x15 45# x25 45# 2x15 45# 2x15   Supine bent leg pirformis str  70sdke4

## 2019-12-06 DIAGNOSIS — M54.12 CERVICAL RADICULOPATHY: ICD-10-CM

## 2019-12-09 RX ORDER — GABAPENTIN 300 MG/1
CAPSULE ORAL
Qty: 90 CAPSULE | Refills: 1 | Status: SHIPPED | OUTPATIENT
Start: 2019-12-09 | End: 2020-01-02

## 2020-01-02 DIAGNOSIS — M54.12 CERVICAL RADICULOPATHY: ICD-10-CM

## 2020-01-02 RX ORDER — GABAPENTIN 300 MG/1
CAPSULE ORAL
Qty: 90 CAPSULE | Refills: 0 | Status: SHIPPED | OUTPATIENT
Start: 2020-01-02 | End: 2020-01-28

## 2020-01-06 ENCOUNTER — OFFICE VISIT (OUTPATIENT)
Dept: PAIN MEDICINE | Facility: MEDICAL CENTER | Age: 70
End: 2020-01-06
Payer: MEDICARE

## 2020-01-06 VITALS
BODY MASS INDEX: 32.94 KG/M2 | WEIGHT: 179 LBS | RESPIRATION RATE: 14 BRPM | HEIGHT: 62 IN | SYSTOLIC BLOOD PRESSURE: 118 MMHG | DIASTOLIC BLOOD PRESSURE: 68 MMHG | HEART RATE: 76 BPM

## 2020-01-06 DIAGNOSIS — G89.29 CHRONIC RIGHT-SIDED LOW BACK PAIN WITH RIGHT-SIDED SCIATICA: ICD-10-CM

## 2020-01-06 DIAGNOSIS — M54.12 CERVICAL RADICULOPATHY: Primary | ICD-10-CM

## 2020-01-06 DIAGNOSIS — M47.816 LUMBAR SPONDYLOSIS: ICD-10-CM

## 2020-01-06 DIAGNOSIS — M47.812 CERVICAL SPINE ARTHRITIS: ICD-10-CM

## 2020-01-06 DIAGNOSIS — M54.16 LUMBAR RADICULOPATHY: ICD-10-CM

## 2020-01-06 DIAGNOSIS — M51.16 LUMBAR DISC HERNIATION WITH RADICULOPATHY: ICD-10-CM

## 2020-01-06 DIAGNOSIS — M48.02 CERVICAL SPINAL STENOSIS: ICD-10-CM

## 2020-01-06 DIAGNOSIS — M54.41 CHRONIC RIGHT-SIDED LOW BACK PAIN WITH RIGHT-SIDED SCIATICA: ICD-10-CM

## 2020-01-06 PROCEDURE — 99214 OFFICE O/P EST MOD 30 MIN: CPT | Performed by: NURSE PRACTITIONER

## 2020-01-06 RX ORDER — MELATONIN
1000 DAILY
COMMUNITY

## 2020-01-06 NOTE — PROGRESS NOTES
Assessment  1  Cervical radiculopathy    2  Lumbar radiculopathy    3  Lumbar disc herniation with radiculopathy    4  Cervical spine arthritis    5  Lumbar spondylosis    6  Chronic right-sided low back pain with right-sided sciatica    7  Cervical spinal stenosis        Plan  Continue with gabapentin the patient does not require refills today  At this time the patient would like to repeat both her cervical epidural steroid injection and her right L5-S1 lumbar epidural steroid injection  Her cervical epidural was last done on September 6, 2019 and she did get 3 months relief  Her right lumbar epidural steroid injection was last done on October 9th 2019 and again she got about 3 months relief from this injection  We will get her scheduled for repeat injections with her cervical epidural steroid injection being scheduled 1st per her request     Follow up after procedures        [place] Prescription Drug Monitoring Program report was reviewed and was appropriate     Complete risks and benefits including bleeding, infection, tissue reaction, nerve injury and allergic reaction were discussed  The approach was demonstrated using models and literature was provided  Verbal and written consent was obtained  My impressions and treatment recommendations were discussed in detail with the patient who verbalized understanding and had no further questions  Discharge instructions were provided  I personally saw and examined the patient and I agree with the above discussed plan of care  Orders Placed This Encounter   Procedures    FL spine and pain procedure     Schedule a Right L5-S1 LESI as well after neck injection     Standing Status:   Future     Standing Expiration Date:   1/6/2024     Order Specific Question:   Reason for Exam:     Answer:   repeat JANEL     Order Specific Question:   Anticoagulant hold needed?      Answer:   Voltaren tablets     New Medications Ordered This Visit   Medications    cholecalciferol (VITAMIN D3) 1,000 units tablet     Sig: Take 1,000 Units by mouth daily       History of Present Illness    Alvarado Fowler is a 68 y o  female presents for follow-up related to her chronic neck and arm pain as well as her right low back and leg pain  Today the patient rates her pain 5/10 this is constant bothersome throughout the entirety of the day  She describes the pain as burning, dull, aching, sharp, cramping, shooting, numbness, and pins and needles  Patient does take gabapentin 300 mg 3 times a day she does get relief from the medication without any side effects or issues  Patient is status post a cervical epidural steroid injection on September 6, 2019 as well as a right L5-S1 lumbar epidural steroid injection on October 9, 2019  Both procedures were performed by Dr Dayanara Tomas  She got 3 months relief from each procedure and is interested in repeating at this time  I have personally reviewed and/or updated the patient's past medical history, past surgical history, family history, social history, current medications, allergies, and vital signs today  Review of Systems   Respiratory: Negative for shortness of breath  Cardiovascular: Negative for chest pain  Gastrointestinal: Negative for constipation, diarrhea, nausea and vomiting  Musculoskeletal: Positive for arthralgias (both knees), back pain (right sided hpi and leg), gait problem, joint swelling, myalgias and neck pain (posterior into the left shoulder and arm)  Skin: Negative for rash  Neurological: Positive for numbness (left hand)  Negative for dizziness, seizures and weakness  Psychiatric/Behavioral: Positive for decreased concentration  All other systems reviewed and are negative        Patient Active Problem List   Diagnosis    Primary localized osteoarthritis of right knee    Right knee pain    Lumbar disc herniation with radiculopathy    Chronic right-sided low back pain with right-sided sciatica    Fibromyalgia    Benign essential HTN    Fatty liver    Gastroesophageal reflux disease without esophagitis    Lumbar radiculopathy    Sacroiliitis (HCC)    Pes anserine bursitis    Lower extremity edema    Obesity (BMI 30 0-34  9)    Cervical radiculopathy    Cervical spine arthritis    Cervical spinal stenosis    Lumbar spondylosis    Skin lesion of chest wall       Past Medical History:   Diagnosis Date    Arthritis     Fatty liver 5/1/2018    Hypertension     Low back pain     Neck pain     Primary localized osteoarthritis of right knee 8/17/2017       History reviewed  No pertinent surgical history      Family History   Problem Relation Age of Onset    Osteoporosis Mother     Skin cancer Father     Cancer Family     Osteoporosis Family     Stomach cancer Sister 52    No Known Problems Daughter     No Known Problems Maternal Grandmother     No Known Problems Maternal Grandfather     No Known Problems Paternal Grandmother     No Known Problems Paternal Grandfather     No Known Problems Maternal Aunt     No Known Problems Maternal Aunt     No Known Problems Maternal Aunt     No Known Problems Maternal Aunt     No Known Problems Paternal Aunt        Social History     Occupational History    Not on file   Tobacco Use    Smoking status: Never Smoker    Smokeless tobacco: Never Used   Substance and Sexual Activity    Alcohol use: No    Drug use: Never    Sexual activity: Not Currently       Current Outpatient Medications on File Prior to Visit   Medication Sig    benazepril (LOTENSIN) 20 mg tablet Take 1 tablet (20 mg total) by mouth daily    cholecalciferol (VITAMIN D3) 1,000 units tablet Take 1,000 Units by mouth daily    diclofenac (VOLTAREN) 75 mg EC tablet TAKE 1 TABLET (75 MG TOTAL) BY MOUTH 2 (TWO) TIMES A DAY AS NEEDED FOR PAIN    DULoxetine (CYMBALTA) 60 mg delayed release capsule TAKE 1 CAPSULE BY MOUTH EVERY DAY    gabapentin (NEURONTIN) 300 mg capsule TAKE 1 CAPSULE BY MOUTH THREE TIMES A DAY    hydrochlorothiazide (HYDRODIURIL) 12 5 mg tablet TAKE 1 TABLET BY MOUTH EVERY DAY    omeprazole (PriLOSEC) 10 mg delayed release capsule Take 10 mg by mouth daily      SUPER B COMPLEX/C PO Take 1 tablet by mouth daily       No current facility-administered medications on file prior to visit  Allergies   Allergen Reactions    Penicillins        Physical Exam    /68   Pulse 76   Resp 14   Ht 5' 2" (1 575 m)   Wt 81 2 kg (179 lb)   BMI 32 74 kg/m²     Constitutional: normal, well developed, well nourished, alert, in no distress and non-toxic and no overt pain behavior    Eyes: anicteric  HEENT: grossly intact  Neck: supple, symmetric, trachea midline and no masses   Pulmonary:even and unlabored  Cardiovascular:No edema or pitting edema present  Skin:Normal without rashes or lesions and well hydrated  Psychiatric:Mood and affect appropriate  Neurologic:Cranial Nerves II-XII grossly intact  Musculoskeletal:normal       Imaging

## 2020-01-09 ENCOUNTER — TELEPHONE (OUTPATIENT)
Dept: OBGYN CLINIC | Facility: MEDICAL CENTER | Age: 70
End: 2020-01-09

## 2020-01-09 NOTE — TELEPHONE ENCOUNTER
Called and spoke to patient yesterday morning  I inquired about Synvisc order from the summer  At this time, she does not wish to still process gel injection order  She will either come in tomorrow and consider steroid injection vs discuss surgery       # [phone]

## 2020-01-10 ENCOUNTER — TELEPHONE (OUTPATIENT)
Dept: PAIN MEDICINE | Facility: MEDICAL CENTER | Age: 70
End: 2020-01-10

## 2020-01-10 ENCOUNTER — OFFICE VISIT (OUTPATIENT)
Dept: OBGYN CLINIC | Facility: MEDICAL CENTER | Age: 70
End: 2020-01-10
Payer: MEDICARE

## 2020-01-10 VITALS
WEIGHT: 177.8 LBS | BODY MASS INDEX: 32.72 KG/M2 | HEART RATE: 92 BPM | DIASTOLIC BLOOD PRESSURE: 90 MMHG | SYSTOLIC BLOOD PRESSURE: 134 MMHG | HEIGHT: 62 IN

## 2020-01-10 DIAGNOSIS — M17.0 PRIMARY OSTEOARTHRITIS OF BOTH KNEES: Primary | ICD-10-CM

## 2020-01-10 PROCEDURE — 20610 DRAIN/INJ JOINT/BURSA W/O US: CPT | Performed by: ORTHOPAEDIC SURGERY

## 2020-01-10 PROCEDURE — 99213 OFFICE O/P EST LOW 20 MIN: CPT | Performed by: ORTHOPAEDIC SURGERY

## 2020-01-10 RX ORDER — LIDOCAINE HYDROCHLORIDE 10 MG/ML
3 INJECTION, SOLUTION INFILTRATION; PERINEURAL
Status: COMPLETED | OUTPATIENT
Start: 2020-01-10 | End: 2020-01-10

## 2020-01-10 RX ORDER — METHYLPREDNISOLONE ACETATE 40 MG/ML
2 INJECTION, SUSPENSION INTRA-ARTICULAR; INTRALESIONAL; INTRAMUSCULAR; SOFT TISSUE
Status: COMPLETED | OUTPATIENT
Start: 2020-01-10 | End: 2020-01-10

## 2020-01-10 RX ORDER — DICLOFENAC POTASSIUM 50 MG/1
50 TABLET, FILM COATED ORAL 2 TIMES DAILY
Qty: 60 TABLET | Refills: 1 | Status: SHIPPED | OUTPATIENT
Start: 2020-01-10 | End: 2020-05-06

## 2020-01-10 RX ADMIN — LIDOCAINE HYDROCHLORIDE 3 ML: 10 INJECTION, SOLUTION INFILTRATION; PERINEURAL at 11:45

## 2020-01-10 RX ADMIN — METHYLPREDNISOLONE ACETATE 2 ML: 40 INJECTION, SUSPENSION INTRA-ARTICULAR; INTRALESIONAL; INTRAMUSCULAR; SOFT TISSUE at 11:45

## 2020-01-10 NOTE — PROGRESS NOTES
Assessment/Plan:  1  Primary osteoarthritis of both knees      Orders Placed This Encounter   Procedures    Large joint arthrocentesis     Patient received bilateral knee steroid injections in the office today  Patient tolerated the procedure well  Advised to apply ice and avoid strenuous activity for 1-2 days as needed  Prescribed diclofenac potassium 50 mg b i d  p r n  Pain  Medication warnings and side effects provided  Continue home exercise program    Will discuss efficacy of back injection for her right hip pain at follow-up appointment  Return in about 3 months (around 4/10/2020) for repeat bilat CSI, discuss R hip  I answered all of the patient's questions during the visit and provided education of the patient's condition during the visit  The patient verbalized understanding of the information given and agrees with the plan  This note was dictated using Va[employer] software  It may contain errors including improperly dictated words  Please contact physician directly for any questions  Subjective   Chief Complaint:   Chief Complaint   Patient presents with    Left Knee - Follow-up    Right Knee - Follow-up       Brady Sever is a 68 y o  female who presents for follow up for bilateral knee pain  The patient completed Synvisc injections on 03/22/2019 for her right knee and 03/29/2019 for her left knee  She reports roughly 3 months of 75% improvement in her pain  She states that her pain has been getting progressively worse since her last appointment on the anteromedial aspect of bilateral knees  She has been taking diclofenac 75 mg for pain as needed  She reports mild improvement with the medication but feels that it has been working less effectively  She had steroid injections on 01/28/2019 and would like to resume steroid injections instead of Visco supplementation  The she also reports seeing Dr Simran Chowdhury with pain management for back pain   She is scheduled to have a back injection with Dr Danish Woods in the near future  The patient is also asking about her right hip pain today, but would like to wait until after her back injection for further evaluation of her hip pain  Denies trauma or injury  Review of Systems  ROS:    See HPI for musculoskeletal review  All other systems reviewed are negative     History:  Past Medical History:   Diagnosis Date    Arthritis     Fatty liver 5/1/2018    Hypertension     Low back pain     Neck pain     Primary localized osteoarthritis of right knee 8/17/2017     History reviewed  No pertinent surgical history    Social History   Social History     Substance and Sexual Activity   Alcohol Use No     Social History     Substance and Sexual Activity   Drug Use Never     Social History     Tobacco Use   Smoking Status Never Smoker   Smokeless Tobacco Never Used     Family History:   Family History   Problem Relation Age of Onset    Osteoporosis Mother     Skin cancer Father     Cancer Family     Osteoporosis Family     Stomach cancer Sister 52    No Known Problems Daughter     No Known Problems Maternal Grandmother     No Known Problems Maternal Grandfather     No Known Problems Paternal Grandmother     No Known Problems Paternal Grandfather     No Known Problems Maternal Aunt     No Known Problems Maternal Aunt     No Known Problems Maternal Aunt     No Known Problems Maternal Aunt     No Known Problems Paternal Aunt        Current Outpatient Medications on File Prior to Visit   Medication Sig Dispense Refill    benazepril (LOTENSIN) 20 mg tablet Take 1 tablet (20 mg total) by mouth daily 90 tablet 1    cholecalciferol (VITAMIN D3) 1,000 units tablet Take 1,000 Units by mouth daily      diclofenac (VOLTAREN) 75 mg EC tablet TAKE 1 TABLET (75 MG TOTAL) BY MOUTH 2 (TWO) TIMES A DAY AS NEEDED FOR PAIN 60 tablet 1    DULoxetine (CYMBALTA) 60 mg delayed release capsule TAKE 1 CAPSULE BY MOUTH EVERY DAY 90 capsule 1    gabapentin (NEURONTIN) 300 mg capsule TAKE 1 CAPSULE BY MOUTH THREE TIMES A DAY 90 capsule 0    hydrochlorothiazide (HYDRODIURIL) 12 5 mg tablet TAKE 1 TABLET BY MOUTH EVERY DAY 30 tablet 5    omeprazole (PriLOSEC) 10 mg delayed release capsule Take 10 mg by mouth daily        SUPER B COMPLEX/C PO Take 1 tablet by mouth daily         No current facility-administered medications on file prior to visit        Allergies   Allergen Reactions    Penicillins         Objective     /90   Pulse 92   Ht 5' 2" (1 575 m)   Wt 80 6 kg (177 lb 12 8 oz)   BMI 32 52 kg/m²      PE:  AAOx 3  WDWN  Hearing intact, no drainage from eyes  no audible wheezing  no abdominal distension  LE compartments soft, skin intact    Ortho Exam:  right Knee:   No erythema  no swelling  no effusion  no warmth  AROM: 3- 115  Stable to varus/valgus stress    Left Knee:   No erythema  no swelling  no effusion  no warmth  AROM: 5- 120  Stable to varus/valgus stress    right Hip Exam  Palpation:    + TTP over greater trochanter   + TTP over SIJ  ROM: F/E- 0-100  Ex rot- 0-30  Int rot- 0- 25 with pain at extreme of motion  5/5 abduction  Tests: +SLR, Jaki Sarepta, Aminata Campbell  NV: sensation grossly intact L4, L5, S1, palpable posterior tibial pulse    Large joint arthrocentesis: bilateral knee  Date/Time: 1/10/2020 11:45 AM  Consent given by: patient  Site marked: site marked  Timeout: Immediately prior to procedure a time out was called to verify the correct patient, procedure, equipment, support staff and site/side marked as required   Supporting Documentation  Indications: pain   Procedure Details  Location: knee - bilateral knee  Preparation: Patient was prepped and draped in the usual sterile fashion  Needle size: 22 G  Ultrasound guidance: no  Approach: anterolateral    Medications (Right): 3 mL lidocaine 1 %; 2 mL methylPREDNISolone acetate 40 mg/mLMedications (Left): 3 mL lidocaine 1 %; 2 mL methylPREDNISolone acetate 40 mg/mL   Patient tolerance: patient tolerated the procedure well with no immediate complications  Dressing:  Sterile dressing applied

## 2020-01-10 NOTE — TELEPHONE ENCOUNTER
RN s/w pt on home ph#, pt aware left a VMM on mobile phone earlier today  Pt aware that gabapentin sent to pharmacy on 1/2  Pt will call back if any questions or concerns  Confirmed appt with PERRY on 1/21

## 2020-01-10 NOTE — TELEPHONE ENCOUNTER
RN attempted to reach pt regarding previous  RN left a detailed VMMOM stating that gabapentin ordered on 1/2/2020, Left c/b number office hours and location provided if any further questions or concerns

## 2020-01-21 ENCOUNTER — HOSPITAL ENCOUNTER (OUTPATIENT)
Dept: RADIOLOGY | Facility: MEDICAL CENTER | Age: 70
Discharge: HOME | End: 2020-01-21
Attending: PHYSICAL MEDICINE & REHABILITATION
Payer: MEDICARE

## 2020-01-21 VITALS
TEMPERATURE: 98 F | HEART RATE: 91 BPM | DIASTOLIC BLOOD PRESSURE: 84 MMHG | SYSTOLIC BLOOD PRESSURE: 125 MMHG | RESPIRATION RATE: 16 BRPM | OXYGEN SATURATION: 98 %

## 2020-01-21 DIAGNOSIS — M54.16 LUMBAR RADICULOPATHY: ICD-10-CM

## 2020-01-21 DIAGNOSIS — M54.12 CERVICAL RADICULOPATHY: ICD-10-CM

## 2020-01-21 PROCEDURE — 62321 NJX INTERLAMINAR CRV/THRC: CPT | Performed by: PHYSICAL MEDICINE & REHABILITATION

## 2020-01-21 RX ORDER — METHYLPREDNISOLONE ACETATE 80 MG/ML
80 INJECTION, SUSPENSION INTRA-ARTICULAR; INTRALESIONAL; INTRAMUSCULAR; PARENTERAL; SOFT TISSUE ONCE
Status: COMPLETED | OUTPATIENT
Start: 2020-01-21 | End: 2020-01-21

## 2020-01-21 RX ORDER — LIDOCAINE HYDROCHLORIDE 10 MG/ML
5 INJECTION, SOLUTION EPIDURAL; INFILTRATION; INTRACAUDAL; PERINEURAL ONCE
Status: COMPLETED | OUTPATIENT
Start: 2020-01-21 | End: 2020-01-21

## 2020-01-21 RX ADMIN — LIDOCAINE HYDROCHLORIDE 2 ML: 10 INJECTION, SOLUTION EPIDURAL; INFILTRATION; INTRACAUDAL; PERINEURAL at 09:42

## 2020-01-21 RX ADMIN — METHYLPREDNISOLONE ACETATE 80 MG: 80 INJECTION, SUSPENSION INTRA-ARTICULAR; INTRALESIONAL; INTRAMUSCULAR; PARENTERAL; SOFT TISSUE at 09:44

## 2020-01-21 RX ADMIN — IOHEXOL 1 ML: 300 INJECTION, SOLUTION INTRAVENOUS at 09:44

## 2020-01-21 NOTE — DISCHARGE INSTRUCTIONS
Epidural Steroid Injection   WHAT YOU NEED TO KNOW:   An epidural steroid injection (CHRISSIE) is a procedure to inject steroid medicine into the epidural space  The epidural space is between your spinal cord and vertebrae  Steroids reduce inflammation and fluid buildup in your spine that may be causing pain  You may be given pain medicine along with the steroids  ACTIVITY  · Do not drive or operate machinery today  · No strenuous activity today - bending, lifting, etc   · You may resume normal activites starting tomorrow - start slowly and as tolerated  · You may shower today, but no tub baths or hot tubs  · You may have numbness for several hours from the local anesthetic  Please use caution and common sense, especially with weight-bearing activities  CARE OF THE INJECTION SITE  · If you have soreness or pain, apply ice to the area today (20 minutes on/20 minutes off)  · Starting tomorrow, you may use warm, moist heat or ice if needed  · You may have an increase or change in your discomfort for 36-48 hours after your treatment  · Apply ice and continue with any pain medication you have been prescribed  · Notify [facility] if you have any of the following: redness, drainage, swelling, headache, stiff neck or fever above 100°F     SPECIAL INSTRUCTIONS  · Our office will contact you in approximately 7 days for a progress report  MEDICATIONS  · Continue to take all routine medications  · Our office may have instructed you to hold some medications  Resume diclofenac tomorrow 1/21/20  If you have a problem specifically related to your procedure, please call our office at [phone]  Problems not related to your procedure should be directed to your primary care physician

## 2020-01-21 NOTE — H&P
History of Present Illness: The patient is a 68 y o  female who presents with complaints of neck and radiating arm pain    Patient Active Problem List   Diagnosis    Primary localized osteoarthritis of right knee    Right knee pain    Lumbar disc herniation with radiculopathy    Chronic right-sided low back pain with right-sided sciatica    Fibromyalgia    Benign essential HTN    Fatty liver    Gastroesophageal reflux disease without esophagitis    Lumbar radiculopathy    Sacroiliitis (HCC)    Pes anserine bursitis    Lower extremity edema    Obesity (BMI 30 0-34  9)    Cervical radiculopathy    Cervical spine arthritis    Cervical spinal stenosis    Lumbar spondylosis    Skin lesion of chest wall       Past Medical History:   Diagnosis Date    Arthritis     Fatty liver 5/1/2018    Hypertension     Low back pain     Neck pain     Primary localized osteoarthritis of right knee 8/17/2017       No past surgical history on file        Current Outpatient Medications:     benazepril (LOTENSIN) 20 mg tablet, Take 1 tablet (20 mg total) by mouth daily, Disp: 90 tablet, Rfl: 1    cholecalciferol (VITAMIN D3) 1,000 units tablet, Take 1,000 Units by mouth daily, Disp: , Rfl:     diclofenac potassium (CATAFLAM) 50 mg tablet, Take 1 tablet (50 mg total) by mouth 2 (two) times a day, Disp: 60 tablet, Rfl: 1    DULoxetine (CYMBALTA) 60 mg delayed release capsule, TAKE 1 CAPSULE BY MOUTH EVERY DAY, Disp: 90 capsule, Rfl: 1    gabapentin (NEURONTIN) 300 mg capsule, TAKE 1 CAPSULE BY MOUTH THREE TIMES A DAY, Disp: 90 capsule, Rfl: 0    hydrochlorothiazide (HYDRODIURIL) 12 5 mg tablet, TAKE 1 TABLET BY MOUTH EVERY DAY, Disp: 30 tablet, Rfl: 5    omeprazole (PriLOSEC) 10 mg delayed release capsule, Take 10 mg by mouth daily  , Disp: , Rfl:     SUPER B COMPLEX/C PO, Take 1 tablet by mouth daily  , Disp: , Rfl:     Current Facility-Administered Medications:     iohexol (OMNIPAQUE) 300 mg/mL injection 50 mL, 50 mL, Epidural, Once, Mona Thompson, DO    lidocaine (PF) (XYLOCAINE-MPF) 1 % injection 5 mL, 5 mL, Infiltration, Once, Mona Thompson, DO    methylPREDNISolone acetate (DEPO-MEDROL) injection 80 mg, 80 mg, Epidural, Once, Mona Thompson, DO    Allergies   Allergen Reactions    Penicillins        Physical Exam:   Vitals:    01/21/20 0931   BP: 107/73   Pulse: 92   Resp: 18   Temp: 98 °F (36 7 °C)   SpO2: 97%     General: Awake, Alert, Oriented x 3, Mood and affect appropriate  Respiratory: Respirations even and unlabored  Cardiovascular: Peripheral pulses intact; no edema  Musculoskeletal Exam:  Neck and radiating arm pain    ASA Score:  2  Patient/Chart Verification  Patient ID Verified: Verbal  Consents Confirmed: Procedural, To be obtained in the Pre-Procedure area  Allergies Reviewed: Yes  Anticoag/NSAID held?: Yes(diclofenac last dose 2 weeks ago)  Currently on antibiotics?: No  Pregnancy denied?: NA    Assessment:   1  Cervical radiculopathy    2   Lumbar radiculopathy        Plan: repeat JANEL

## 2020-01-28 ENCOUNTER — TELEPHONE (OUTPATIENT)
Dept: PAIN MEDICINE | Facility: MEDICAL CENTER | Age: 70
End: 2020-01-28

## 2020-01-28 DIAGNOSIS — M54.12 CERVICAL RADICULOPATHY: ICD-10-CM

## 2020-01-28 RX ORDER — GABAPENTIN 300 MG/1
CAPSULE ORAL
Qty: 90 CAPSULE | Refills: 0 | Status: SHIPPED | OUTPATIENT
Start: 2020-01-28 | End: 2020-03-02

## 2020-01-28 NOTE — TELEPHONE ENCOUNTER
Left pt a message to call back to schedule an appt w/Np to review futher regarding pain level on Post Inj with 50% relief  Please schedule patient

## 2020-01-28 NOTE — TELEPHONE ENCOUNTER
Patient states that she is doing ok  Patient states that she know longer has the pain in her left arm  Patient states that she does have tingling and then numbness in her left arm  Patient also states that she has some stiffness in her neck  Patient states that she sometimes doesn't have full range of motion      Pain scale is 4/10 with 50% relief

## 2020-02-04 ENCOUNTER — OFFICE VISIT (OUTPATIENT)
Dept: PAIN MEDICINE | Facility: MEDICAL CENTER | Age: 70
End: 2020-02-04
Payer: MEDICARE

## 2020-02-04 VITALS
WEIGHT: 177 LBS | DIASTOLIC BLOOD PRESSURE: 86 MMHG | HEIGHT: 62 IN | SYSTOLIC BLOOD PRESSURE: 130 MMHG | HEART RATE: 86 BPM | BODY MASS INDEX: 32.57 KG/M2

## 2020-02-04 DIAGNOSIS — M51.16 LUMBAR DISC HERNIATION WITH RADICULOPATHY: ICD-10-CM

## 2020-02-04 DIAGNOSIS — M54.12 CERVICAL RADICULOPATHY: ICD-10-CM

## 2020-02-04 DIAGNOSIS — M54.16 LUMBAR RADICULOPATHY: ICD-10-CM

## 2020-02-04 DIAGNOSIS — M47.816 LUMBAR SPONDYLOSIS: ICD-10-CM

## 2020-02-04 DIAGNOSIS — M48.02 CERVICAL SPINAL STENOSIS: ICD-10-CM

## 2020-02-04 DIAGNOSIS — M54.41 CHRONIC RIGHT-SIDED LOW BACK PAIN WITH RIGHT-SIDED SCIATICA: ICD-10-CM

## 2020-02-04 DIAGNOSIS — M47.812 CERVICAL SPINE ARTHRITIS: Primary | ICD-10-CM

## 2020-02-04 DIAGNOSIS — G89.29 CHRONIC RIGHT-SIDED LOW BACK PAIN WITH RIGHT-SIDED SCIATICA: ICD-10-CM

## 2020-02-04 PROCEDURE — 99214 OFFICE O/P EST MOD 30 MIN: CPT | Performed by: NURSE PRACTITIONER

## 2020-02-04 PROCEDURE — 1036F TOBACCO NON-USER: CPT | Performed by: NURSE PRACTITIONER

## 2020-02-04 PROCEDURE — 3075F SYST BP GE 130 - 139MM HG: CPT | Performed by: NURSE PRACTITIONER

## 2020-02-04 PROCEDURE — 1160F RVW MEDS BY RX/DR IN RCRD: CPT | Performed by: NURSE PRACTITIONER

## 2020-02-04 PROCEDURE — 3079F DIAST BP 80-89 MM HG: CPT | Performed by: NURSE PRACTITIONER

## 2020-02-04 PROCEDURE — 3008F BODY MASS INDEX DOCD: CPT | Performed by: NURSE PRACTITIONER

## 2020-02-04 NOTE — PROGRESS NOTES
Assessment  1  Cervical spine arthritis    2  Cervical radiculopathy    3  Cervical spinal stenosis    4  Chronic right-sided low back pain with right-sided sciatica    5  Lumbar radiculopathy    6  Lumbar disc herniation with radiculopathy    7  Lumbar spondylosis        Plan  While the patient was in the office today, I did have a thorough conversation regarding their chronic pain syndrome, medication management, and treatment plan options  Patient recently underwent a C7-T1 cervical epidural steroid injection on 01/21/2020 with almost complete resolution of her arm pain  She continues to complain of axial pain in the cervical spine  As such, we will schedule the patient for a cervical facet medial branch blocks with intention of moving forward towards radiofrequency ablation if there is an appropriate diagnostic response  The initial blocks will be performed with 2% lidocaine and if an appropriate response is obtained upon review of the patient's pain diary, a confirmatory block will be scheduled with 0 5% bupivacaine  Patient was given brochures regarding the medial branch block as well as radiofrequency ablation  Complete risks and benefits including bleeding, infection, tissue reaction, nerve injury and allergic reaction were discussed  The approach was demonstrated using models and literature was provided  Verbal and written consent was obtained  My impressions and treatment recommendations were discussed in detail with the patient who verbalized understanding and had no further questions  Discharge instructions were provided  I personally saw and examined the patient and I agree with the above discussed plan of care  Orders Placed This Encounter   Procedures    FL spine and pain procedure     Standing Status:   Future     Standing Expiration Date:   2/4/2024     Order Specific Question:   Reason for Exam:     Answer:   B/L cerv MBB, C2-4     Order Specific Question:   Anticoagulant hold needed? Answer:   N/A- Takes Diclofenac as needed  No orders of the defined types were placed in this encounter  History of Present Illness    Teodoro Acosta is a 68 y o  female   With a chief complaint of neck pain related to spondylosis, degenerative disc disease, spinal stenosis  She has planning of midline cervical pain that can radiate to both shoulders  Left>Right  She recently underwent of C7-T1 epidural steroid injection on 01/21/2020  She reports almost complete resolution of her left upper extremity pain following that injection  She still experiences intermittent numbness and tingling in the left arm that radiates to the 4th and 5th fingers  Current pain level is a 6/10  Pain is described as burning, dull aching, sharp, numb, pins and needles  She continues to take Cymbalta 60 mg once daily as well as gabapentin 300 mg 3 times daily  She states that these medications do provide her with some improvement in her pain  She denies side effects from these medications  She occasionally uses diclofenac 50 mg, more on an as-needed basis  She is scheduled for a lumbar epidural steroid injection tomorrow to address the inflammatory component of her back and leg pain  I have personally reviewed and/or updated the patient's past medical history, past surgical history, family history, social history, current medications, allergies, and vital signs today  Review of Systems   Respiratory: Negative for shortness of breath  Cardiovascular: Negative for chest pain  Gastrointestinal: Negative for constipation, diarrhea, nausea and vomiting  Musculoskeletal: Positive for joint swelling, neck pain (radiates to both arms  ) and neck stiffness  Negative for arthralgias, gait problem and myalgias  Skin: Negative for rash  Neurological: Negative for dizziness, seizures and weakness  All other systems reviewed and are negative        Patient Active Problem List   Diagnosis    Primary localized osteoarthritis of right knee    Right knee pain    Lumbar disc herniation with radiculopathy    Chronic right-sided low back pain with right-sided sciatica    Fibromyalgia    Benign essential HTN    Fatty liver    Gastroesophageal reflux disease without esophagitis    Lumbar radiculopathy    Sacroiliitis (HCC)    Pes anserine bursitis    Lower extremity edema    Obesity (BMI 30 0-34  9)    Cervical radiculopathy    Cervical spine arthritis    Cervical spinal stenosis    Lumbar spondylosis    Skin lesion of chest wall       Past Medical History:   Diagnosis Date    Arthritis     Fatty liver 5/1/2018    Hypertension     Low back pain     Neck pain     Primary localized osteoarthritis of right knee 8/17/2017       History reviewed  No pertinent surgical history      Family History   Problem Relation Age of Onset    Osteoporosis Mother     Skin cancer Father     Cancer Family     Osteoporosis Family     Stomach cancer Sister 52    No Known Problems Daughter     No Known Problems Maternal Grandmother     No Known Problems Maternal Grandfather     No Known Problems Paternal Grandmother     No Known Problems Paternal Grandfather     No Known Problems Maternal Aunt     No Known Problems Maternal Aunt     No Known Problems Maternal Aunt     No Known Problems Maternal Aunt     No Known Problems Paternal Aunt        Social History     Occupational History    Not on file   Tobacco Use    Smoking status: Never Smoker    Smokeless tobacco: Never Used   Substance and Sexual Activity    Alcohol use: No    Drug use: Never    Sexual activity: Not Currently       Current Outpatient Medications on File Prior to Visit   Medication Sig    benazepril (LOTENSIN) 20 mg tablet Take 1 tablet (20 mg total) by mouth daily    cholecalciferol (VITAMIN D3) 1,000 units tablet Take 1,000 Units by mouth daily    diclofenac potassium (CATAFLAM) 50 mg tablet Take 1 tablet (50 mg total) by mouth 2 (two) times a day    DULoxetine (CYMBALTA) 60 mg delayed release capsule TAKE 1 CAPSULE BY MOUTH EVERY DAY    gabapentin (NEURONTIN) 300 mg capsule TAKE 1 CAPSULE BY MOUTH THREE TIMES A DAY    hydrochlorothiazide (HYDRODIURIL) 12 5 mg tablet TAKE 1 TABLET BY MOUTH EVERY DAY    omeprazole (PriLOSEC) 10 mg delayed release capsule Take 10 mg by mouth daily      SUPER B COMPLEX/C PO Take 1 tablet by mouth daily       No current facility-administered medications on file prior to visit  Allergies   Allergen Reactions    Penicillins        Physical Exam    /86   Pulse 86   Ht 5' 2" (1 575 m)   Wt 80 3 kg (177 lb)   BMI 32 37 kg/m²     Constitutional: normal, well developed, well nourished, alert, in no distress and non-toxic and no overt pain behavior  Eyes: anicteric  HEENT: grossly intact  Neck: supple, symmetric, trachea midline and no masses   Pulmonary:even and unlabored  Cardiovascular:No edema or pitting edema present  Skin:Normal without rashes or lesions and well hydrated  Psychiatric:Mood and affect appropriate  Neurologic:Cranial Nerves II-XII grossly intact  Musculoskeletal:normal   Range of motion of her cervical spine is limited in all planes  There is trigger point tenderness in bilateral cervical paraspinal muscles as well as the left upper trapezius muscle      Imaging

## 2020-02-05 ENCOUNTER — HOSPITAL ENCOUNTER (OUTPATIENT)
Dept: RADIOLOGY | Facility: MEDICAL CENTER | Age: 70
Discharge: HOME | End: 2020-02-05
Attending: PHYSICAL MEDICINE & REHABILITATION | Admitting: PHYSICAL MEDICINE & REHABILITATION
Payer: MEDICARE

## 2020-02-05 VITALS
TEMPERATURE: 98 F | SYSTOLIC BLOOD PRESSURE: 120 MMHG | HEART RATE: 74 BPM | DIASTOLIC BLOOD PRESSURE: 82 MMHG | RESPIRATION RATE: 18 BRPM | OXYGEN SATURATION: 100 %

## 2020-02-05 DIAGNOSIS — M54.16 LUMBAR RADICULOPATHY: ICD-10-CM

## 2020-02-05 PROCEDURE — 62323 NJX INTERLAMINAR LMBR/SAC: CPT | Performed by: PHYSICAL MEDICINE & REHABILITATION

## 2020-02-05 RX ORDER — LIDOCAINE HYDROCHLORIDE 10 MG/ML
5 INJECTION, SOLUTION EPIDURAL; INFILTRATION; INTRACAUDAL; PERINEURAL ONCE
Status: COMPLETED | OUTPATIENT
Start: 2020-02-05 | End: 2020-02-05

## 2020-02-05 RX ORDER — METHYLPREDNISOLONE ACETATE 80 MG/ML
80 INJECTION, SUSPENSION INTRA-ARTICULAR; INTRALESIONAL; INTRAMUSCULAR; PARENTERAL; SOFT TISSUE ONCE
Status: COMPLETED | OUTPATIENT
Start: 2020-02-05 | End: 2020-02-05

## 2020-02-05 RX ADMIN — METHYLPREDNISOLONE ACETATE 80 MG: 80 INJECTION, SUSPENSION INTRA-ARTICULAR; INTRALESIONAL; INTRAMUSCULAR; PARENTERAL; SOFT TISSUE at 11:15

## 2020-02-05 RX ADMIN — IOHEXOL 2 ML: 300 INJECTION, SOLUTION INTRAVENOUS at 11:15

## 2020-02-05 RX ADMIN — LIDOCAINE HYDROCHLORIDE 2.5 ML: 10 INJECTION, SOLUTION EPIDURAL; INFILTRATION; INTRACAUDAL; PERINEURAL at 11:13

## 2020-02-05 NOTE — DISCHARGE INSTRUCTIONS
Epidural Steroid Injection   WHAT YOU NEED TO KNOW:   An epidural steroid injection (CHRISSIE) is a procedure to inject steroid medicine into the epidural space  The epidural space is between your spinal cord and vertebrae  Steroids reduce inflammation and fluid buildup in your spine that may be causing pain  You may be given pain medicine along with the steroids  ACTIVITY  · Do not drive or operate machinery today  · No strenuous activity today - bending, lifting, etc   · You may resume normal activites starting tomorrow - start slowly and as tolerated  · You may shower today, but no tub baths or hot tubs  · You may have numbness for several hours from the local anesthetic  Please use caution and common sense, especially with weight-bearing activities  CARE OF THE INJECTION SITE  · If you have soreness or pain, apply ice to the area today (20 minutes on/20 minutes off)  · Starting tomorrow, you may use warm, moist heat or ice if needed  · You may have an increase or change in your discomfort for 36-48 hours after your treatment  · Apply ice and continue with any pain medication you have been prescribed  · [facility] if you have any of the following: redness, drainage, swelling, headache, stiff neck or fever above 100°F     SPECIAL INSTRUCTIONS  · Our office will contact you in approximately 7 days for a progress report  MEDICATIONS  · Continue to take all routine medications  · Our office may have instructed you to hold some medications  If you have a problem specifically related to your procedure, please call our office at [phone]  Problems not related to your procedure should be directed to your primary care physician

## 2020-02-05 NOTE — H&P
History of Present Illness: The patient is a 68 y o  female who presents with complaints of back and right leg pain    Patient Active Problem List   Diagnosis    Primary localized osteoarthritis of right knee    Right knee pain    Lumbar disc herniation with radiculopathy    Chronic right-sided low back pain with right-sided sciatica    Fibromyalgia    Benign essential HTN    Fatty liver    Gastroesophageal reflux disease without esophagitis    Lumbar radiculopathy    Sacroiliitis (HCC)    Pes anserine bursitis    Lower extremity edema    Obesity (BMI 30 0-34  9)    Cervical radiculopathy    Cervical spine arthritis    Cervical spinal stenosis    Lumbar spondylosis    Skin lesion of chest wall       Past Medical History:   Diagnosis Date    Arthritis     Fatty liver 5/1/2018    Hypertension     Low back pain     Neck pain     Primary localized osteoarthritis of right knee 8/17/2017       No past surgical history on file        Current Outpatient Medications:     benazepril (LOTENSIN) 20 mg tablet, Take 1 tablet (20 mg total) by mouth daily, Disp: 90 tablet, Rfl: 1    cholecalciferol (VITAMIN D3) 1,000 units tablet, Take 1,000 Units by mouth daily, Disp: , Rfl:     diclofenac potassium (CATAFLAM) 50 mg tablet, Take 1 tablet (50 mg total) by mouth 2 (two) times a day, Disp: 60 tablet, Rfl: 1    DULoxetine (CYMBALTA) 60 mg delayed release capsule, TAKE 1 CAPSULE BY MOUTH EVERY DAY, Disp: 90 capsule, Rfl: 1    gabapentin (NEURONTIN) 300 mg capsule, TAKE 1 CAPSULE BY MOUTH THREE TIMES A DAY, Disp: 90 capsule, Rfl: 0    hydrochlorothiazide (HYDRODIURIL) 12 5 mg tablet, TAKE 1 TABLET BY MOUTH EVERY DAY, Disp: 30 tablet, Rfl: 5    omeprazole (PriLOSEC) 10 mg delayed release capsule, Take 10 mg by mouth daily  , Disp: , Rfl:     SUPER B COMPLEX/C PO, Take 1 tablet by mouth daily  , Disp: , Rfl:     Current Facility-Administered Medications:     iohexol (OMNIPAQUE) 300 mg/mL injection 50 mL, 50 mL, Epidural, Once, Saturnino Murguia DO    lidocaine (PF) (XYLOCAINE-MPF) 1 % injection 5 mL, 5 mL, Infiltration, Once, Saturnino Murguia DO    methylPREDNISolone acetate (DEPO-MEDROL) injection 80 mg, 80 mg, Epidural, Once, Saturnino Murguia DO    Allergies   Allergen Reactions    Penicillins        Physical Exam:   Vitals:    02/05/20 1052   BP: 102/71   Pulse: 77   Resp: 20   Temp: 98 °F (36 7 °C)   SpO2: 99%     General: Awake, Alert, Oriented x 3, Mood and affect appropriate  Respiratory: Respirations even and unlabored  Cardiovascular: Peripheral pulses intact; no edema  Musculoskeletal Exam:  Back and right leg pain    ASA Score:  2  Patient/Chart Verification  Patient ID Verified: Verbal  ID Band Applied: No  Consents Confirmed: Procedural  H&P( within 30 days) Verified: To be obtained in the Pre-Procedure area  Interval H&P(within 24 hr) Complete (required for Outpatients and Surgery Admit only): To be obtained in the Pre-Procedure area  Allergies Reviewed: Yes  Anticoag/NSAID held?: NA(only takes diclofenac)  Currently on antibiotics?: No    Assessment:   1   Lumbar radiculopathy        Plan: RT L5-S1 LESI

## 2020-02-09 NOTE — PROGRESS NOTES
Assessment/Plan:    Problem List Items Addressed This Visit        Cardiovascular and Mediastinum    Benign essential HTN - Primary     Elevated  Reinforce low sodium diet  Will continue on present doses of benazepril 20mg daily and hctz 12 5mg daily for now, call in 2 weeks with BP log and will consider adjusting if elevated BP persist          Relevant Orders    Comprehensive metabolic panel    CBC       Nervous and Auditory    Lumbar radiculopathy     S/p CHRISSIE  She is followed by Pain Management  Other    Fibromyalgia     Overall stable  Completed aquatherapy and recommend she continue exercises at a local pool  Continue on duloxetine 60mg daily for now         Current severe episode of major depressive disorder without psychotic features without prior episode ([facility] [address] )     New  Obtain labs  TSH was noted to be normal in 9/2019  She is already on duloxetine 60mg daily for fibromyalgia, recommend therapy for now with re-eval in 3 weeks  Relevant Orders    Ambulatory referral to Psychiatry          Subjective:      Patient ID: Julia Monk is a 68 y o  female  77yo female with, fibromyalgia, reflux, cervical radiculopathy and chronic LBP and knee pain here for follow up care  She is accompanied by her   Hypertension   This is a chronic problem  The current episode started more than 1 year ago  The problem is controlled (home bp around 120-140/-)  Associated symptoms include headaches  Past treatments include diuretics and ACE inhibitors  Compliance problems include exercise  Reports fibromyalgia is stable, she is not in as much pain as before  She gets occasional flares but not like before  She completed aquatic therapy  She recently received an L5-S1 LESI five days ago  Has some pulling sensation occasionally depending on her position  Her lower back and legs feel tired and feels "jumping" when she uses the vacuum      The following portions of the patient's history were reviewed and updated as appropriate: allergies, current medications, past family history, past medical history, past social history, past surgical history and problem list       Current Outpatient Medications:     benazepril (LOTENSIN) 20 mg tablet, Take 1 tablet (20 mg total) by mouth daily, Disp: 90 tablet, Rfl: 1    cholecalciferol (VITAMIN D3) 1,000 units tablet, Take 1,000 Units by mouth daily, Disp: , Rfl:     diclofenac potassium (CATAFLAM) 50 mg tablet, Take 1 tablet (50 mg total) by mouth 2 (two) times a day, Disp: 60 tablet, Rfl: 1    DULoxetine (CYMBALTA) 60 mg delayed release capsule, TAKE 1 CAPSULE BY MOUTH EVERY DAY, Disp: 90 capsule, Rfl: 1    gabapentin (NEURONTIN) 300 mg capsule, TAKE 1 CAPSULE BY MOUTH THREE TIMES A DAY, Disp: 90 capsule, Rfl: 0    hydrochlorothiazide (HYDRODIURIL) 12 5 mg tablet, TAKE 1 TABLET BY MOUTH EVERY DAY, Disp: 30 tablet, Rfl: 5    omeprazole (PriLOSEC) 10 mg delayed release capsule, Take 10 mg by mouth daily  , Disp: , Rfl:     SUPER B COMPLEX/C PO, Take 1 tablet by mouth daily  , Disp: , Rfl:     Review of Systems   Constitutional: Positive for fatigue  Negative for activity change, appetite change and unexpected weight change  HENT: Positive for postnasal drip and rhinorrhea  Respiratory: Negative  Cardiovascular: Negative  Gastrointestinal: Positive for abdominal pain (epigastric discomfort)  Negative for blood in stool  Heartburn   Musculoskeletal: Positive for arthralgias, back pain and myalgias  Neurological: Positive for dizziness (occasional), numbness and headaches  Negative for weakness  Psychiatric/Behavioral: Positive for decreased concentration, dysphoric mood and sleep disturbance  The patient is nervous/anxious            Objective:    /90 (BP Location: Right arm, Patient Position: Sitting, Cuff Size: Large)   Pulse 97   Temp 98 8 °F (37 1 °C) (Tympanic)   Ht 5' 2" (1 575 m)   Wt 80 7 kg (178 lb)   SpO2 97%   BMI 32 56 kg/m²      Physical Exam   Constitutional: She appears well-developed and well-nourished  No distress  obese   HENT:   Head: Normocephalic  Nose: Nose normal    Mouth/Throat: Oropharynx is clear and moist  No oropharyngeal exudate  Wears dentures   Eyes:   Wears glasses   Neck: Neck supple  Cardiovascular: Normal rate, regular rhythm, normal heart sounds and intact distal pulses  Pulmonary/Chest: Effort normal and breath sounds normal  No stridor  No respiratory distress  Neurological: She is alert  Skin: She is not diaphoretic  Psychiatric: She has a normal mood and affect  Her speech is normal and behavior is normal  Thought content normal  She is attentive  Vitals reviewed  PHQ-9 Depression Screening    PHQ-9:    Frequency of the following problems over the past two weeks:       Little interest or pleasure in doing things:  2 - more than half the days  Feeling down, depressed, or hopeless:  3 - nearly every day  Trouble falling or staying asleep, or sleeping too much:  2 - more than half the days  Feeling tired or having little energy:  3 - nearly every day  Poor appetite or overeatin - more than half the days  Feeling bad about yourself - or that you are a failure or have let yourself or your family down:  3 - nearly every day  Trouble concentrating on things, such as reading the newspaper or watching television:  3 - nearly every day  Moving or speaking so slowly that other people could have noticed  Or the opposite - being so fidgety or restless that you have been moving around a lot more than usual:  1 - several days  Thoughts that you would be better off dead, or of hurting yourself in some way:  0 - not at all  PHQ-2 Score:  5  PHQ-9 Score:  19       BMI Counseling: Body mass index is 32 56 kg/m²  The BMI is above normal  Nutrition recommendations include decreasing portion sizes, consuming healthier snacks and moderation in carbohydrate intake   Exercise recommendations include moderate physical activity 150 minutes/week and exercising 3-5 times per week  No pharmacotherapy was ordered  Depression Screening and Follow-up Plan: Patient's depression screening was positive with a PHQ-2 score of 5  Their PHQ-9 score was 19  Patient assessed for underlying major depression  Brief counseling provided and recommend additional follow-up/re-evaluation next office visit   Referred for therapy

## 2020-02-10 ENCOUNTER — OFFICE VISIT (OUTPATIENT)
Dept: INTERNAL MEDICINE CLINIC | Facility: CLINIC | Age: 70
End: 2020-02-10
Payer: MEDICARE

## 2020-02-10 VITALS
DIASTOLIC BLOOD PRESSURE: 90 MMHG | HEIGHT: 62 IN | TEMPERATURE: 98.8 F | BODY MASS INDEX: 32.76 KG/M2 | OXYGEN SATURATION: 97 % | WEIGHT: 178 LBS | HEART RATE: 97 BPM | SYSTOLIC BLOOD PRESSURE: 150 MMHG

## 2020-02-10 DIAGNOSIS — I10 BENIGN ESSENTIAL HTN: Primary | ICD-10-CM

## 2020-02-10 DIAGNOSIS — F32.2 CURRENT SEVERE EPISODE OF MAJOR DEPRESSIVE DISORDER WITHOUT PSYCHOTIC FEATURES WITHOUT PRIOR EPISODE (HCC): ICD-10-CM

## 2020-02-10 DIAGNOSIS — M79.7 FIBROMYALGIA: ICD-10-CM

## 2020-02-10 DIAGNOSIS — M54.16 LUMBAR RADICULOPATHY: ICD-10-CM

## 2020-02-10 PROCEDURE — 99214 OFFICE O/P EST MOD 30 MIN: CPT | Performed by: INTERNAL MEDICINE

## 2020-02-10 PROCEDURE — 3008F BODY MASS INDEX DOCD: CPT | Performed by: INTERNAL MEDICINE

## 2020-02-10 PROCEDURE — 3080F DIAST BP >= 90 MM HG: CPT | Performed by: INTERNAL MEDICINE

## 2020-02-10 PROCEDURE — 1036F TOBACCO NON-USER: CPT | Performed by: INTERNAL MEDICINE

## 2020-02-10 PROCEDURE — 3077F SYST BP >= 140 MM HG: CPT | Performed by: INTERNAL MEDICINE

## 2020-02-10 PROCEDURE — 1160F RVW MEDS BY RX/DR IN RCRD: CPT | Performed by: INTERNAL MEDICINE

## 2020-02-10 NOTE — ASSESSMENT & PLAN NOTE
New   Obtain labs  TSH was noted to be normal in 9/2019  She is already on duloxetine 60mg daily for fibromyalgia, recommend therapy for now with re-eval in 3 weeks

## 2020-02-10 NOTE — ASSESSMENT & PLAN NOTE
Elevated  Reinforce low sodium diet    Will continue on present doses of benazepril 20mg daily and hctz 12 5mg daily for now, call in 2 weeks with BP log and will consider adjusting if elevated BP persist

## 2020-02-10 NOTE — ASSESSMENT & PLAN NOTE
Overall stable  Completed aquatherapy and recommend she continue exercises at a local pool    Continue on duloxetine 60mg daily for now

## 2020-02-12 ENCOUNTER — TELEPHONE (OUTPATIENT)
Dept: PAIN MEDICINE | Facility: CLINIC | Age: 70
End: 2020-02-12

## 2020-02-13 ENCOUNTER — SOCIAL WORK (OUTPATIENT)
Dept: BEHAVIORAL/MENTAL HEALTH CLINIC | Facility: CLINIC | Age: 70
End: 2020-02-13
Payer: MEDICARE

## 2020-02-13 DIAGNOSIS — F32.2 CURRENT SEVERE EPISODE OF MAJOR DEPRESSIVE DISORDER WITHOUT PSYCHOTIC FEATURES WITHOUT PRIOR EPISODE (HCC): Primary | ICD-10-CM

## 2020-02-13 PROCEDURE — 90834 PSYTX W PT 45 MINUTES: CPT | Performed by: SOCIAL WORKER

## 2020-02-13 PROCEDURE — 1036F TOBACCO NON-USER: CPT | Performed by: SOCIAL WORKER

## 2020-02-13 NOTE — PATIENT INSTRUCTIONS
Processed struggles with chronic pain, seasonal issues and stress at home- supportive therapy provided  Reviewed coping strategies for anxiety and depression  Reinforced need to get back to regular pattern of psychical and social activity with or without her   Reviewed assertiveness strategies to utilize at home  Provided my contact information and offered additional sessions on a PRN basis

## 2020-02-13 NOTE — PSYCH
Assessment/Plan: Manage depression     There are no diagnoses linked to this encounter  Subjective: Nathalie Manzano reports feeling less depressed  Had become increasingly overwhelmed dealing with adult son who moved back home as well as with her  due to their negativity and lack of activity away from the home  Has been feeling less depressed with some increase in energy and motivation  Needs to return toregular pattern of exercise with or without her  at gym in their community as well as more social activity at [employer]  This has proven to be beneficial in past  Denies any SI  Patient ID: Carolann Lucero is a 68 y o  female  Met with Nathalie Manzano for 50 minutes from 8:00AM-8:50AM  Had been struggling with increased in depression that appears to have been related to pain issues, stress at home and some seasonal issues as well  Feels less depressed, less negative and is not avoiding things by staying in bed  No SI  Feeling more positive and verbalizing hopeful statements regarding future  Review of Systems   Psychiatric/Behavioral: The patient is nervous/anxious  Objective: Nathalie Manzano presents as mildly anxious and overwhelmed but feels as this so she is making progress and moving forward  Presents as pleasant, verbal, cooperative and well oriented  Physical Exam   Psychiatric: She has a normal mood and affect   Her behavior is normal  Judgment and thought content normal

## 2020-02-14 NOTE — TELEPHONE ENCOUNTER
S/w pt, she reports 90% relief from the injection, still gets some occ tightness in her lower back  Confirmed next procedure for her neck

## 2020-02-23 DIAGNOSIS — M79.7 FIBROMYALGIA: ICD-10-CM

## 2020-02-24 RX ORDER — DULOXETIN HYDROCHLORIDE 60 MG/1
CAPSULE, DELAYED RELEASE ORAL
Qty: 90 CAPSULE | Refills: 1 | Status: SHIPPED | OUTPATIENT
Start: 2020-02-24 | End: 2020-08-18

## 2020-02-25 DIAGNOSIS — I10 BENIGN ESSENTIAL HTN: ICD-10-CM

## 2020-02-25 RX ORDER — BENAZEPRIL HYDROCHLORIDE 20 MG/1
TABLET ORAL
Qty: 90 TABLET | Refills: 1 | Status: SHIPPED | OUTPATIENT
Start: 2020-02-25 | End: 2020-06-01

## 2020-02-27 ENCOUNTER — TELEPHONE (OUTPATIENT)
Dept: PAIN MEDICINE | Facility: MEDICAL CENTER | Age: 70
End: 2020-02-27

## 2020-02-27 NOTE — TELEPHONE ENCOUNTER
Patient has a question regarding procedure  She was diagnosis with skin cancer and had it removed today and wants to know if she should still come in for the procedure on Monday  Please advise

## 2020-02-28 NOTE — TELEPHONE ENCOUNTER
RN s/w pt regarding previous  Per pt she just had a skin cancer lesion removed from her chest area and is unable to lay prone at present due to causing more pain  Per pt she also is taking medcation for the pain and would not want to stop that for her procedure due to the pain she is still having from the removal of the lesion  RN cx her appt for 3/2 and made second appt that was scheduled for a tentative appt on 3/16 her MBB#1  Pt appreciative of same    RN to inform Surgery scheduler as well   -Please clarify levels for procedure as 3/2 and 3/16 had different levels noted on appt line notes-

## 2020-03-01 DIAGNOSIS — M54.12 CERVICAL RADICULOPATHY: ICD-10-CM

## 2020-03-02 RX ORDER — GABAPENTIN 300 MG/1
CAPSULE ORAL
Qty: 90 CAPSULE | Refills: 0 | Status: SHIPPED | OUTPATIENT
Start: 2020-03-02 | End: 2020-03-24

## 2020-03-24 DIAGNOSIS — M54.12 CERVICAL RADICULOPATHY: ICD-10-CM

## 2020-03-24 RX ORDER — GABAPENTIN 300 MG/1
CAPSULE ORAL
Qty: 90 CAPSULE | Refills: 0 | Status: SHIPPED | OUTPATIENT
Start: 2020-03-24 | End: 2020-04-20

## 2020-03-25 DIAGNOSIS — I10 BENIGN ESSENTIAL HTN: ICD-10-CM

## 2020-03-25 DIAGNOSIS — R60.0 LOWER EXTREMITY EDEMA: ICD-10-CM

## 2020-03-25 RX ORDER — HYDROCHLOROTHIAZIDE 12.5 MG/1
TABLET ORAL
Qty: 90 TABLET | Refills: 1 | Status: SHIPPED | OUTPATIENT
Start: 2020-03-25 | End: 2020-09-14

## 2020-04-15 DIAGNOSIS — M54.12 CERVICAL RADICULOPATHY: ICD-10-CM

## 2020-04-20 ENCOUNTER — APPOINTMENT (OUTPATIENT)
Dept: LAB | Age: 70
End: 2020-04-20
Payer: MEDICARE

## 2020-04-20 ENCOUNTER — TRANSCRIBE ORDERS (OUTPATIENT)
Dept: ADMINISTRATIVE | Age: 70
End: 2020-04-20

## 2020-04-20 DIAGNOSIS — I10 BENIGN ESSENTIAL HTN: ICD-10-CM

## 2020-04-20 LAB
ALBUMIN SERPL BCP-MCNC: 3.7 G/DL (ref 3.5–5)
ALP SERPL-CCNC: 90 U/L (ref 46–116)
ALT SERPL W P-5'-P-CCNC: 50 U/L (ref 12–78)
ANION GAP SERPL CALCULATED.3IONS-SCNC: 5 MMOL/L (ref 4–13)
AST SERPL W P-5'-P-CCNC: 32 U/L (ref 5–45)
BILIRUB SERPL-MCNC: 0.4 MG/DL (ref 0.2–1)
BUN SERPL-MCNC: 17 MG/DL (ref 5–25)
CALCIUM SERPL-MCNC: 9.3 MG/DL (ref 8.3–10.1)
CHLORIDE SERPL-SCNC: 104 MMOL/L (ref 100–108)
CO2 SERPL-SCNC: 29 MMOL/L (ref 21–32)
CREAT SERPL-MCNC: 0.77 MG/DL (ref 0.6–1.3)
ERYTHROCYTE [DISTWIDTH] IN BLOOD BY AUTOMATED COUNT: 13.2 % (ref 11.6–15.1)
GFR SERPL CREATININE-BSD FRML MDRD: 77 ML/MIN/1.73SQ M
GLUCOSE P FAST SERPL-MCNC: 115 MG/DL (ref 65–99)
HCT VFR BLD AUTO: 41.4 % (ref 34.8–46.1)
HGB BLD-MCNC: 13.3 G/DL (ref 11.5–15.4)
MCH RBC QN AUTO: 27.7 PG (ref 26.8–34.3)
MCHC RBC AUTO-ENTMCNC: 32.1 G/DL (ref 31.4–37.4)
MCV RBC AUTO: 86 FL (ref 82–98)
PLATELET # BLD AUTO: 190 THOUSANDS/UL (ref 149–390)
PMV BLD AUTO: 10.1 FL (ref 8.9–12.7)
POTASSIUM SERPL-SCNC: 4 MMOL/L (ref 3.5–5.3)
PROT SERPL-MCNC: 7.3 G/DL (ref 6.4–8.2)
RBC # BLD AUTO: 4.8 MILLION/UL (ref 3.81–5.12)
SODIUM SERPL-SCNC: 138 MMOL/L (ref 136–145)
WBC # BLD AUTO: 5.54 THOUSAND/UL (ref 4.31–10.16)

## 2020-04-20 PROCEDURE — 85027 COMPLETE CBC AUTOMATED: CPT

## 2020-04-20 PROCEDURE — 80053 COMPREHEN METABOLIC PANEL: CPT

## 2020-04-20 PROCEDURE — 36415 COLL VENOUS BLD VENIPUNCTURE: CPT

## 2020-04-20 RX ORDER — GABAPENTIN 300 MG/1
CAPSULE ORAL
Qty: 90 CAPSULE | Refills: 0 | Status: SHIPPED | OUTPATIENT
Start: 2020-04-20 | End: 2020-05-21

## 2020-05-02 DIAGNOSIS — M17.0 PRIMARY OSTEOARTHRITIS OF BOTH KNEES: ICD-10-CM

## 2020-05-06 RX ORDER — DICLOFENAC POTASSIUM 50 MG/1
TABLET, FILM COATED ORAL
Qty: 60 TABLET | Refills: 1 | Status: SHIPPED | OUTPATIENT
Start: 2020-05-06 | End: 2020-08-19 | Stop reason: SDUPTHER

## 2020-05-21 DIAGNOSIS — M54.12 CERVICAL RADICULOPATHY: ICD-10-CM

## 2020-05-21 RX ORDER — GABAPENTIN 300 MG/1
CAPSULE ORAL
Qty: 90 CAPSULE | Refills: 0 | Status: SHIPPED | OUTPATIENT
Start: 2020-05-21 | End: 2020-06-12

## 2020-06-01 DIAGNOSIS — I10 BENIGN ESSENTIAL HTN: ICD-10-CM

## 2020-06-01 RX ORDER — BENAZEPRIL HYDROCHLORIDE 20 MG/1
TABLET ORAL
Qty: 90 TABLET | Refills: 1 | Status: SHIPPED | OUTPATIENT
Start: 2020-06-01 | End: 2020-11-24 | Stop reason: SDUPTHER

## 2020-06-12 DIAGNOSIS — M54.12 CERVICAL RADICULOPATHY: ICD-10-CM

## 2020-06-12 RX ORDER — GABAPENTIN 300 MG/1
CAPSULE ORAL
Qty: 90 CAPSULE | Refills: 0 | Status: SHIPPED | OUTPATIENT
Start: 2020-06-12 | End: 2020-07-06

## 2020-06-15 ENCOUNTER — OFFICE VISIT (OUTPATIENT)
Dept: PAIN MEDICINE | Facility: MEDICAL CENTER | Age: 70
End: 2020-06-15
Payer: MEDICARE

## 2020-06-15 VITALS
DIASTOLIC BLOOD PRESSURE: 78 MMHG | HEIGHT: 62 IN | WEIGHT: 183 LBS | RESPIRATION RATE: 14 BRPM | HEART RATE: 81 BPM | TEMPERATURE: 99.1 F | BODY MASS INDEX: 33.68 KG/M2 | SYSTOLIC BLOOD PRESSURE: 133 MMHG

## 2020-06-15 DIAGNOSIS — M48.02 CERVICAL SPINAL STENOSIS: ICD-10-CM

## 2020-06-15 DIAGNOSIS — M51.16 LUMBAR DISC HERNIATION WITH RADICULOPATHY: Primary | ICD-10-CM

## 2020-06-15 DIAGNOSIS — M54.12 CERVICAL RADICULOPATHY: ICD-10-CM

## 2020-06-15 PROCEDURE — 1036F TOBACCO NON-USER: CPT | Performed by: NURSE PRACTITIONER

## 2020-06-15 PROCEDURE — 99214 OFFICE O/P EST MOD 30 MIN: CPT | Performed by: NURSE PRACTITIONER

## 2020-06-15 PROCEDURE — 3075F SYST BP GE 130 - 139MM HG: CPT | Performed by: NURSE PRACTITIONER

## 2020-06-15 PROCEDURE — 3078F DIAST BP <80 MM HG: CPT | Performed by: NURSE PRACTITIONER

## 2020-06-15 PROCEDURE — 1160F RVW MEDS BY RX/DR IN RCRD: CPT | Performed by: NURSE PRACTITIONER

## 2020-06-15 PROCEDURE — 3008F BODY MASS INDEX DOCD: CPT | Performed by: NURSE PRACTITIONER

## 2020-07-01 ENCOUNTER — HOSPITAL ENCOUNTER (OUTPATIENT)
Dept: RADIOLOGY | Facility: MEDICAL CENTER | Age: 70
Discharge: HOME | End: 2020-07-01
Attending: PHYSICAL MEDICINE & REHABILITATION | Admitting: PHYSICAL MEDICINE & REHABILITATION
Payer: MEDICARE

## 2020-07-01 VITALS
TEMPERATURE: 98.5 F | HEART RATE: 75 BPM | DIASTOLIC BLOOD PRESSURE: 77 MMHG | OXYGEN SATURATION: 98 % | RESPIRATION RATE: 20 BRPM | SYSTOLIC BLOOD PRESSURE: 110 MMHG

## 2020-07-01 DIAGNOSIS — M54.12 CERVICAL RADICULOPATHY: ICD-10-CM

## 2020-07-01 PROCEDURE — 62321 NJX INTERLAMINAR CRV/THRC: CPT | Performed by: PHYSICAL MEDICINE & REHABILITATION

## 2020-07-01 RX ORDER — LIDOCAINE HYDROCHLORIDE 10 MG/ML
5 INJECTION, SOLUTION EPIDURAL; INFILTRATION; INTRACAUDAL; PERINEURAL ONCE
Status: COMPLETED | OUTPATIENT
Start: 2020-07-01 | End: 2020-07-01

## 2020-07-01 RX ORDER — METHYLPREDNISOLONE ACETATE 80 MG/ML
80 INJECTION, SUSPENSION INTRA-ARTICULAR; INTRALESIONAL; INTRAMUSCULAR; PARENTERAL; SOFT TISSUE ONCE
Status: COMPLETED | OUTPATIENT
Start: 2020-07-01 | End: 2020-07-01

## 2020-07-01 RX ADMIN — IOHEXOL 1 ML: 300 INJECTION, SOLUTION INTRAVENOUS at 13:33

## 2020-07-01 RX ADMIN — LIDOCAINE HYDROCHLORIDE 2 ML: 10 INJECTION, SOLUTION EPIDURAL; INFILTRATION; INTRACAUDAL; PERINEURAL at 13:30

## 2020-07-01 RX ADMIN — METHYLPREDNISOLONE ACETATE 80 MG: 80 INJECTION, SUSPENSION INTRA-ARTICULAR; INTRALESIONAL; INTRAMUSCULAR; PARENTERAL; SOFT TISSUE at 13:34

## 2020-07-01 NOTE — DISCHARGE INSTRUCTIONS
Epidural Steroid Injection   WHAT YOU NEED TO KNOW:   An epidural steroid injection (CHRISSIE) is a procedure to inject steroid medicine into the epidural space  The epidural space is between your spinal cord and vertebrae  Steroids reduce inflammation and fluid buildup in your spine that may be causing pain  You may be given pain medicine along with the steroids  ACTIVITY  · Do not drive or operate machinery today  · No strenuous activity today - bending, lifting, etc   · You may resume normal activites starting tomorrow - start slowly and as tolerated  · You may shower today, but no tub baths or hot tubs  · You may have numbness for several hours from the local anesthetic  Please use caution and common sense, especially with weight-bearing activities  CARE OF THE INJECTION SITE  · If you have soreness or pain, apply ice to the area today (20 minutes on/20 minutes off)  · Starting tomorrow, you may use warm, moist heat or ice if needed  · You may have an increase or change in your discomfort for 36-48 hours after your treatment  · Apply ice and continue with any pain medication you have been prescribed  · [facility]ify [facility] if you have any of the following: redness, drainage, swelling, headache, stiff neck or fever above 100°F     SPECIAL INSTRUCTIONS  · Our office will contact you in approximately 7 days for a progress report  MEDICATIONS  · Continue to take all routine medications  May restart Diclofenac tomorrow  · Our office may have instructed you to hold some medications  If you have a problem specifically related to your procedure, please call our office at [phone]  Problems not related to your procedure should be directed to your primary care physician

## 2020-07-01 NOTE — H&P
History of Present Illness: The patient is a 65 y o  female who presents with complaints of neck and radiating arm pain    Patient Active Problem List   Diagnosis    Primary localized osteoarthritis of right knee    Right knee pain    Lumbar disc herniation with radiculopathy    Chronic right-sided low back pain with right-sided sciatica    Fibromyalgia    Benign essential HTN    Fatty liver    Gastroesophageal reflux disease without esophagitis    Lumbar radiculopathy    Sacroiliitis (HCC)    Pes anserine bursitis    Lower extremity edema    Obesity (BMI 30 0-34  9)    Cervical radiculopathy    Cervical spine arthritis    Cervical spinal stenosis    Lumbar spondylosis    Skin lesion of chest wall    Current severe episode of major depressive disorder without psychotic features without prior episode [facility])       Past Medical History:   Diagnosis Date    Arthritis     Fatty liver 5/1/2018    Hypertension     Low back pain     Neck pain     Primary localized osteoarthritis of right knee 8/17/2017       No past surgical history on file        Current Outpatient Medications:     benazepril (LOTENSIN) 20 mg tablet, TAKE 1 TABLET BY MOUTH EVERY DAY, Disp: 90 tablet, Rfl: 1    cholecalciferol (VITAMIN D3) 1,000 units tablet, Take 1,000 Units by mouth daily, Disp: , Rfl:     diclofenac potassium (CATAFLAM) 50 mg tablet, TAKE 1 TABLET BY MOUTH TWICE A DAY, Disp: 60 tablet, Rfl: 1    DULoxetine (CYMBALTA) 60 mg delayed release capsule, TAKE 1 CAPSULE BY MOUTH EVERY DAY, Disp: 90 capsule, Rfl: 1    gabapentin (NEURONTIN) 300 mg capsule, TAKE 1 CAPSULE BY MOUTH THREE TIMES A DAY, Disp: 90 capsule, Rfl: 0    hydrochlorothiazide (HYDRODIURIL) 12 5 mg tablet, TAKE 1 TABLET BY MOUTH EVERY DAY, Disp: 90 tablet, Rfl: 1    omeprazole (PriLOSEC) 10 mg delayed release capsule, Take 10 mg by mouth daily  , Disp: , Rfl:     SUPER B COMPLEX/C PO, Take 1 tablet by mouth daily  , Disp: , Rfl:     Current Facility-Administered Medications:     iohexol (OMNIPAQUE) 300 mg/mL injection 50 mL, 50 mL, Epidural, Once, Theoplis Mar, DO    lidocaine (PF) (XYLOCAINE-MPF) 1 % injection 5 mL, 5 mL, Infiltration, Once, Theoplis Mar, DO    methylPREDNISolone acetate (DEPO-MEDROL) injection 80 mg, 80 mg, Epidural, Once, Theoplis Mar, DO    Allergies   Allergen Reactions    Penicillins        Physical Exam:   Vitals:    07/01/20 1315   BP: 107/74   Pulse: 81   Resp: 20   Temp: 98 5 °F (36 9 °C)   SpO2: 97%     General: Awake, Alert, Oriented x 3, Mood and affect appropriate  Respiratory: Respirations even and unlabored  Cardiovascular: Peripheral pulses intact; no edema  Musculoskeletal Exam:  Neck and radiating arm pain    ASA Score:  2  Patient/Chart Verification  Patient ID Verified: Verbal  ID Band Applied: No  Consents Confirmed: Procedural, To be obtained in the Pre-Procedure area  H&P( within 30 days) Verified: To be obtained in the Pre-Procedure area  Interval H&P(within 24 hr) Complete (required for Outpatients and Surgery Admit only): To be obtained in the Pre-Procedure area  Allergies Reviewed: Yes  Anticoag/NSAID held?: Yes(last dose Friday 6/26)  Currently on antibiotics?: No    Assessment:   1   Cervical radiculopathy        Plan: JANEL

## 2020-07-02 ENCOUNTER — OFFICE VISIT (OUTPATIENT)
Dept: OBGYN CLINIC | Facility: MEDICAL CENTER | Age: 70
End: 2020-07-02
Payer: MEDICARE

## 2020-07-02 VITALS
TEMPERATURE: 97.9 F | SYSTOLIC BLOOD PRESSURE: 131 MMHG | HEART RATE: 78 BPM | HEIGHT: 62 IN | WEIGHT: 178 LBS | DIASTOLIC BLOOD PRESSURE: 81 MMHG | BODY MASS INDEX: 32.76 KG/M2

## 2020-07-02 DIAGNOSIS — M17.0 BILATERAL PRIMARY OSTEOARTHRITIS OF KNEE: Primary | ICD-10-CM

## 2020-07-02 PROCEDURE — 1036F TOBACCO NON-USER: CPT | Performed by: ORTHOPAEDIC SURGERY

## 2020-07-02 PROCEDURE — 99213 OFFICE O/P EST LOW 20 MIN: CPT | Performed by: PHYSICIAN ASSISTANT

## 2020-07-02 PROCEDURE — 3008F BODY MASS INDEX DOCD: CPT | Performed by: ORTHOPAEDIC SURGERY

## 2020-07-02 PROCEDURE — 3079F DIAST BP 80-89 MM HG: CPT | Performed by: ORTHOPAEDIC SURGERY

## 2020-07-02 PROCEDURE — 20610 DRAIN/INJ JOINT/BURSA W/O US: CPT | Performed by: PHYSICIAN ASSISTANT

## 2020-07-02 PROCEDURE — 3075F SYST BP GE 130 - 139MM HG: CPT | Performed by: ORTHOPAEDIC SURGERY

## 2020-07-02 RX ORDER — METHYLPREDNISOLONE ACETATE 40 MG/ML
2 INJECTION, SUSPENSION INTRA-ARTICULAR; INTRALESIONAL; INTRAMUSCULAR; SOFT TISSUE
Status: COMPLETED | OUTPATIENT
Start: 2020-07-02 | End: 2020-07-02

## 2020-07-02 RX ORDER — LIDOCAINE HYDROCHLORIDE 10 MG/ML
3 INJECTION, SOLUTION INFILTRATION; PERINEURAL
Status: COMPLETED | OUTPATIENT
Start: 2020-07-02 | End: 2020-07-02

## 2020-07-02 RX ADMIN — LIDOCAINE HYDROCHLORIDE 3 ML: 10 INJECTION, SOLUTION INFILTRATION; PERINEURAL at 10:11

## 2020-07-02 RX ADMIN — METHYLPREDNISOLONE ACETATE 2 ML: 40 INJECTION, SUSPENSION INTRA-ARTICULAR; INTRALESIONAL; INTRAMUSCULAR; SOFT TISSUE at 10:11

## 2020-07-02 NOTE — PROGRESS NOTES
Assessment/Plan:  1  Bilateral primary osteoarthritis of knee      No orders of the defined types were placed in this encounter  · Patient received bilateral knee steroid injections in the office today after discussion of risks  Tolerated the procedure well  Advised to apply ice and avoid strenuous activity for 1-2 days as needed  · Continue diclofenac 50 mg b i d  p r n  pain  Advised patient to not exceed 2 tablets per day  If needed she may include Tylenol up to 3000 mg per day  · Continue knee braces as needed for comfort  · Home exercises provided for patient to incorporate into her home workouts  Return in about 3 months (around 10/2/2020) for bilat knee CSI  I answered all of the patient's questions during the visit and provided education of the patient's condition during the visit  The patient verbalized understanding of the information given and agrees with the plan  This note was dictated using ITN software  It may contain errors including improperly dictated words  Please contact physician directly for any questions  Subjective   Chief Complaint:   Chief Complaint   Patient presents with    Left Knee - Follow-up    Right Knee - Follow-up       HPI  Maximino Palmer is a 65 y o  female who presents for follow up for bilateral knee pain  Patient received bilateral knee steroid injections on 01/10/2020 and reports several months of pain relief  As of today, she reports pain worse in the right knee than the left  She has trouble walking down steps  She notes she is limited in her walking due to pain  Pain is duffuse about the knees, not one place in particular  She has instability and locking  She has been walking, using a pedal bike, and working on range of motion at home  She is taking diclofenac as needed for pain  She is wearing knee braces with relief of pressure  Denies new injury, numbness, or tingling  Review of Systems  ROS:    See HPI for musculoskeletal review  All other systems reviewed are negative     History:  Past Medical History:   Diagnosis Date    Arthritis     Fatty liver 5/1/2018    Hypertension     Low back pain     Neck pain     Primary localized osteoarthritis of right knee 8/17/2017     No past surgical history on file    Social History   Social History     Substance and Sexual Activity   Alcohol Use No     Social History     Substance and Sexual Activity   Drug Use Never     Social History     Tobacco Use   Smoking Status Never Smoker   Smokeless Tobacco Never Used     Family History:   Family History   Problem Relation Age of Onset    Osteoporosis Mother     Skin cancer Father     Cancer Family     Osteoporosis Family     Stomach cancer Sister 52    No Known Problems Daughter     No Known Problems Maternal Grandmother     No Known Problems Maternal Grandfather     No Known Problems Paternal Grandmother     No Known Problems Paternal Grandfather     No Known Problems Maternal Aunt     No Known Problems Maternal Aunt     No Known Problems Maternal Aunt     No Known Problems Maternal Aunt     No Known Problems Paternal Aunt        Current Outpatient Medications on File Prior to Visit   Medication Sig Dispense Refill    benazepril (LOTENSIN) 20 mg tablet TAKE 1 TABLET BY MOUTH EVERY DAY 90 tablet 1    cholecalciferol (VITAMIN D3) 1,000 units tablet Take 1,000 Units by mouth daily      diclofenac potassium (CATAFLAM) 50 mg tablet TAKE 1 TABLET BY MOUTH TWICE A DAY 60 tablet 1    DULoxetine (CYMBALTA) 60 mg delayed release capsule TAKE 1 CAPSULE BY MOUTH EVERY DAY 90 capsule 1    gabapentin (NEURONTIN) 300 mg capsule TAKE 1 CAPSULE BY MOUTH THREE TIMES A DAY 90 capsule 0    hydrochlorothiazide (HYDRODIURIL) 12 5 mg tablet TAKE 1 TABLET BY MOUTH EVERY DAY 90 tablet 1    omeprazole (PriLOSEC) 10 mg delayed release capsule Take 10 mg by mouth daily        SUPER B COMPLEX/C PO Take 1 tablet by mouth daily         Current Facility-Administered Medications on File Prior to Visit   Medication Dose Route Frequency Provider Last Rate Last Dose    [COMPLETED] iohexol (OMNIPAQUE) 300 mg/mL injection 50 mL  50 mL Epidural Once Dearl [place], DO   1 mL at 07/01/20 1333    [COMPLETED] lidocaine (PF) (XYLOCAINE-MPF) 1 % injection 5 mL  5 mL Infiltration Once Dearl [place], DO   2 mL at 07/01/20 1330    [COMPLETED] methylPREDNISolone acetate (DEPO-MEDROL) injection 80 mg  80 mg Epidural Once Dearl Mark, DO   80 mg at 07/01/20 1334     Allergies   Allergen Reactions    Penicillins         Objective     /81   Pulse 78   Temp 97 9 °F (36 6 °C)   Ht 5' 2" (1 575 m)   Wt 80 7 kg (178 lb)   BMI 32 56 kg/m²      PE:  AAOx 3  WDWN  Hearing intact, no drainage from eyes  no audible wheezing  no abdominal distension  LE compartments soft, skin intact    Ortho Exam:  right Knee:   No erythema  no swelling  no effusion  no warmth  +TTP diffusely about the knee  AROM: 3- 115  Stable to varus/valgus stress    Left Knee:   No erythema  no swelling  no effusion  no warmth  +TTP diffusely about the knee  AROM: 3- 110  Stable to varus/valgus stress    Large joint arthrocentesis: bilateral knee  Date/Time: 7/2/2020 10:11 AM  Consent given by: patient  Site marked: site marked  Timeout: Immediately prior to procedure a time out was called to verify the correct patient, procedure, equipment, support staff and site/side marked as required   Supporting Documentation  Indications: pain   Procedure Details  Location: knee - bilateral knee  Preparation: Patient was prepped and draped in the usual sterile fashion  Needle size: 22 G  Ultrasound guidance: no  Approach: anterolateral    Medications (Right): 3 mL lidocaine 1 %; 2 mL methylPREDNISolone acetate 40 mg/mLMedications (Left): 3 mL lidocaine 1 %; 2 mL methylPREDNISolone acetate 40 mg/mL   Patient tolerance: patient tolerated the procedure well with no immediate complications  Dressing:  Sterile dressing applied

## 2020-07-05 DIAGNOSIS — M54.12 CERVICAL RADICULOPATHY: ICD-10-CM

## 2020-07-06 RX ORDER — GABAPENTIN 300 MG/1
CAPSULE ORAL
Qty: 90 CAPSULE | Refills: 0 | Status: SHIPPED | OUTPATIENT
Start: 2020-07-06 | End: 2020-08-05

## 2020-07-08 ENCOUNTER — TELEPHONE (OUTPATIENT)
Dept: PAIN MEDICINE | Facility: CLINIC | Age: 70
End: 2020-07-08

## 2020-07-15 ENCOUNTER — HOSPITAL ENCOUNTER (OUTPATIENT)
Dept: RADIOLOGY | Facility: MEDICAL CENTER | Age: 70
Discharge: HOME | End: 2020-07-15
Attending: PHYSICAL MEDICINE & REHABILITATION
Payer: MEDICARE

## 2020-07-15 VITALS
TEMPERATURE: 98.4 F | SYSTOLIC BLOOD PRESSURE: 123 MMHG | DIASTOLIC BLOOD PRESSURE: 81 MMHG | RESPIRATION RATE: 18 BRPM | HEART RATE: 77 BPM | OXYGEN SATURATION: 97 %

## 2020-07-15 DIAGNOSIS — M51.16 LUMBAR DISC HERNIATION WITH RADICULOPATHY: ICD-10-CM

## 2020-07-15 PROCEDURE — 62323 NJX INTERLAMINAR LMBR/SAC: CPT | Performed by: PHYSICAL MEDICINE & REHABILITATION

## 2020-07-15 RX ORDER — LIDOCAINE HYDROCHLORIDE 10 MG/ML
5 INJECTION, SOLUTION EPIDURAL; INFILTRATION; INTRACAUDAL; PERINEURAL ONCE
Status: COMPLETED | OUTPATIENT
Start: 2020-07-15 | End: 2020-07-15

## 2020-07-15 RX ORDER — METHYLPREDNISOLONE ACETATE 80 MG/ML
80 INJECTION, SUSPENSION INTRA-ARTICULAR; INTRALESIONAL; INTRAMUSCULAR; PARENTERAL; SOFT TISSUE ONCE
Status: COMPLETED | OUTPATIENT
Start: 2020-07-15 | End: 2020-07-15

## 2020-07-15 RX ADMIN — LIDOCAINE HYDROCHLORIDE 2.5 ML: 10 INJECTION, SOLUTION EPIDURAL; INFILTRATION; INTRACAUDAL; PERINEURAL at 09:17

## 2020-07-15 RX ADMIN — IOHEXOL 1 ML: 300 INJECTION, SOLUTION INTRAVENOUS at 09:18

## 2020-07-15 RX ADMIN — METHYLPREDNISOLONE ACETATE 80 MG: 80 INJECTION, SUSPENSION INTRA-ARTICULAR; INTRALESIONAL; INTRAMUSCULAR; PARENTERAL; SOFT TISSUE at 09:19

## 2020-07-15 NOTE — DISCHARGE INSTRUCTIONS
Epidural Steroid Injection   WHAT YOU NEED TO KNOW:   An epidural steroid injection (CHRISSIE) is a procedure to inject steroid medicine into the epidural space  The epidural space is between your spinal cord and vertebrae  Steroids reduce inflammation and fluid buildup in your spine that may be causing pain  You may be given pain medicine along with the steroids  ACTIVITY  · Do not drive or operate machinery today  · No strenuous activity today - bending, lifting, etc   · You may resume normal activites starting tomorrow - start slowly and as tolerated  · You may shower today, but no tub baths or hot tubs  · You may have numbness for several hours from the local anesthetic  Please use caution and common sense, especially with weight-bearing activities  CARE OF THE INJECTION SITE  · If you have soreness or pain, apply ice to the area today (20 minutes on/20 minutes off)  · Starting tomorrow, you may use warm, moist heat or ice if needed  · You may have an increase or change in your discomfort for 36-48 hours after your treatment  · Apply ice and continue with any pain medication you have been prescribed  · Notify [facility] if you have any of the following: redness, drainage, swelling, headache, stiff neck or fever above 100°F     SPECIAL INSTRUCTIONS  · Our office will contact you in approximately 7 days for a progress report  MEDICATIONS  · Continue to take all routine medications  · Our office may have instructed you to hold some medications  If you have a problem specifically related to your procedure, please call our office at [phone]  Problems not related to your procedure should be directed to your primary care physician

## 2020-07-15 NOTE — H&P
History of Present Illness: The patient is a 65 y o  female who presents with complaints of back and bilateral leg pain    Patient Active Problem List   Diagnosis    Primary localized osteoarthritis of right knee    Right knee pain    Lumbar disc herniation with radiculopathy    Chronic right-sided low back pain with right-sided sciatica    Fibromyalgia    Benign essential HTN    Fatty liver    Gastroesophageal reflux disease without esophagitis    Lumbar radiculopathy    Sacroiliitis (HCC)    Pes anserine bursitis    Lower extremity edema    Obesity (BMI 30 0-34  9)    Cervical radiculopathy    Cervical spine arthritis    Cervical spinal stenosis    Lumbar spondylosis    Skin lesion of chest wall    Current severe episode of major depressive disorder without psychotic features without prior episode [facility])       Past Medical History:   Diagnosis Date    Arthritis     Fatty liver 5/1/2018    Hypertension     Low back pain     Neck pain     Primary localized osteoarthritis of right knee 8/17/2017       No past surgical history on file        Current Outpatient Medications:     benazepril (LOTENSIN) 20 mg tablet, TAKE 1 TABLET BY MOUTH EVERY DAY, Disp: 90 tablet, Rfl: 1    cholecalciferol (VITAMIN D3) 1,000 units tablet, Take 1,000 Units by mouth daily, Disp: , Rfl:     diclofenac potassium (CATAFLAM) 50 mg tablet, TAKE 1 TABLET BY MOUTH TWICE A DAY, Disp: 60 tablet, Rfl: 1    DULoxetine (CYMBALTA) 60 mg delayed release capsule, TAKE 1 CAPSULE BY MOUTH EVERY DAY, Disp: 90 capsule, Rfl: 1    gabapentin (NEURONTIN) 300 mg capsule, TAKE 1 CAPSULE BY MOUTH THREE TIMES A DAY, Disp: 90 capsule, Rfl: 0    hydrochlorothiazide (HYDRODIURIL) 12 5 mg tablet, TAKE 1 TABLET BY MOUTH EVERY DAY, Disp: 90 tablet, Rfl: 1    omeprazole (PriLOSEC) 10 mg delayed release capsule, Take 10 mg by mouth daily  , Disp: , Rfl:     SUPER B COMPLEX/C PO, Take 1 tablet by mouth daily  , Disp: , Rfl:     Current Facility-Administered Medications:     iohexol (OMNIPAQUE) 300 mg/mL injection 50 mL, 50 mL, Epidural, Once, Crowell Race, DO    lidocaine (PF) (XYLOCAINE-MPF) 1 % injection 5 mL, 5 mL, Infiltration, Once, Crowell Race, DO    methylPREDNISolone acetate (DEPO-MEDROL) injection 80 mg, 80 mg, Epidural, Once, Crowell Race, DO    Allergies   Allergen Reactions    Penicillins        Physical Exam:   Vitals:    07/15/20 0907   BP: 123/81   Pulse: 77   Resp: 18   Temp: 98 4 °F (36 9 °C)   SpO2: 97%     General: Awake, Alert, Oriented x 3, Mood and affect appropriate  Respiratory: Respirations even and unlabored  Cardiovascular: Peripheral pulses intact; no edema  Musculoskeletal Exam:  Back and bilateral leg pain    ASA Score:  2  Patient/Chart Verification  Patient ID Verified: Verbal  Consents Confirmed: Procedural, To be obtained in the Pre-Procedure area  H&P( within 30 days) Verified: To be obtained in the Pre-Procedure area  Allergies Reviewed: Yes  Anticoag/NSAID held?: NA  Currently on antibiotics?: No  Pregnancy denied?: NA    Assessment:   1   Lumbar disc herniation with radiculopathy        Plan: right L5-S1 LESI

## 2020-07-21 NOTE — PROGRESS NOTES
Assessment/Plan:    Problem List Items Addressed This Visit        Digestive    Gastroesophageal reflux disease without esophagitis     -encouraged to avoid triggers  -use omeprazole prn, may also take H2B for breakthrough symptoms            Cardiovascular and Mediastinum    Benign essential HTN - Primary     -normotensive  -encouraged weight loss and reinforced low sodium diet  -continue on hctz 12 5mg daily and benazepril 20mg daily  -monitor BMP         Relevant Orders    Lipid panel    Comprehensive metabolic panel    TSH, 3rd generation with Free T4 reflex    UA (URINE) with reflex to Scope       Other    Major depressive disorder with single episode, in full remission ([facility] [address] )     -improved  -will monitor  -pt noted to be on duloxetine at baseline for treatment of fibromyalgia           Other Visit Diagnoses     IFG (impaired fasting glucose)        -advised low glycemic diet and weight loss  -will monitor    Screening for colon cancer        Relevant Orders    Cologuard          Subjective:      Patient ID: Maximino Palmer is a 65 y o  female  63yo female with HTN, fibromyalgia, reflux, cervical radiculopathy, MDD, chronic LBP and knee pain here for follow up care  Hypertension   This is a chronic problem  The current episode started more than 1 year ago  The problem is controlled (home bp 120-130/80)  Pertinent negatives include no chest pain, headaches, palpitations or shortness of breath  Risk factors for coronary artery disease include post-menopausal state, obesity and sedentary lifestyle  Past treatments include angiotensin blockers and diuretics  Heartburn   She complains of heartburn  She reports no abdominal pain, no chest pain, no coughing, no dysphagia, no nausea or no wheezing  This is a chronic problem  The current episode started more than 1 year ago  The problem has been waxing and waning  Exacerbated by: ice cream, eating after 8p   Pertinent negatives include no fatigue, melena or weight loss  Risk factors include obesity  She has tried a PPI for the symptoms  The treatment provided moderate relief  PHQ-9 Depression Screening    PHQ-9:    Frequency of the following problems over the past two weeks:       Little interest or pleasure in doing things:  0 - not at all  Feeling down, depressed, or hopeless:  0 - not at all  Trouble falling or staying asleep, or sleeping too much:  0 - not at all  Feeling tired or having little energy:  0 - not at all  Poor appetite or overeatin - not at all  Feeling bad about yourself - or that you are a failure or have let yourself or your family down:  0 - not at all  Trouble concentrating on things, such as reading the newspaper or watching television:  1 - several days  Moving or speaking so slowly that other people could have noticed   Or the opposite - being so fidgety or restless that you have been moving around a lot more than usual:  0 - not at all  Thoughts that you would be better off dead, or of hurting yourself in some way:  0 - not at all  PHQ-2 Score:  0  PHQ-9 Score:  1         The following portions of the patient's history were reviewed and updated as appropriate: allergies, current medications, past family history, past medical history, past social history, past surgical history and problem list       Current Outpatient Medications:     benazepril (LOTENSIN) 20 mg tablet, TAKE 1 TABLET BY MOUTH EVERY DAY, Disp: 90 tablet, Rfl: 1    cholecalciferol (VITAMIN D3) 1,000 units tablet, Take 1,000 Units by mouth daily, Disp: , Rfl:     diclofenac potassium (CATAFLAM) 50 mg tablet, TAKE 1 TABLET BY MOUTH TWICE A DAY, Disp: 60 tablet, Rfl: 1    DULoxetine (CYMBALTA) 60 mg delayed release capsule, TAKE 1 CAPSULE BY MOUTH EVERY DAY, Disp: 90 capsule, Rfl: 1    gabapentin (NEURONTIN) 300 mg capsule, TAKE 1 CAPSULE BY MOUTH THREE TIMES A DAY, Disp: 90 capsule, Rfl: 0    hydrochlorothiazide (HYDRODIURIL) 12 5 mg tablet, TAKE 1 TABLET BY MOUTH EVERY DAY, Disp: 90 tablet, Rfl: 1    omeprazole (PriLOSEC) 10 mg delayed release capsule, Take 10 mg by mouth daily  , Disp: , Rfl:     SUPER B COMPLEX/C PO, Take 1 tablet by mouth daily  , Disp: , Rfl:     Review of Systems   Constitutional: Negative for activity change, appetite change, fatigue, unexpected weight change and weight loss  Respiratory: Negative for cough, shortness of breath, wheezing and stridor  Cardiovascular: Negative for chest pain, palpitations and leg swelling  Gastrointestinal: Positive for heartburn  Negative for abdominal pain, diarrhea, dysphagia, melena, nausea and vomiting  Musculoskeletal: Positive for arthralgias, back pain and myalgias  Negative for gait problem  Neurological: Positive for numbness  Negative for dizziness and headaches  Psychiatric/Behavioral: Negative for decreased concentration, dysphoric mood and suicidal ideas  Objective:    /78   Pulse 86   Temp 98 7 °F (37 1 °C)   Resp 16   Ht 5' 2" (1 575 m)   Wt 81 1 kg (178 lb 12 8 oz)   SpO2 96%   BMI 32 70 kg/m²      Physical Exam   Constitutional: She appears well-developed and well-nourished  No distress  Eyes:   Wears glasses   Neck: Neck supple  No thyromegaly present  Cardiovascular: Normal rate, regular rhythm, normal heart sounds and intact distal pulses  Pulmonary/Chest: Effort normal and breath sounds normal  No stridor  No respiratory distress  Abdominal: Soft  Bowel sounds are normal  She exhibits no distension  There is no tenderness  obese   Lymphadenopathy:     She has no cervical adenopathy  Neurological: She is alert  Skin: She is not diaphoretic  Psychiatric: Her speech is normal and behavior is normal  Her mood appears not anxious  She does not exhibit a depressed mood  She is attentive  Vitals reviewed  BMI Counseling: Body mass index is 32 7 kg/m²   The BMI is above normal  Nutrition recommendations include decreasing portion sizes, limiting drinks that contain sugar and moderation in carbohydrate intake  Exercise recommendations include moderate physical activity 150 minutes/week and exercising 3-5 times per week  No pharmacotherapy was ordered           Recent Results (from the past 3360 hour(s))   Comprehensive metabolic panel    Collection Time: 04/20/20  8:52 AM   Result Value Ref Range    Sodium 138 136 - 145 mmol/L    Potassium 4 0 3 5 - 5 3 mmol/L    Chloride 104 100 - 108 mmol/L    CO2 29 21 - 32 mmol/L    ANION GAP 5 4 - 13 mmol/L    BUN 17 5 - 25 mg/dL    Creatinine 0 77 0 60 - 1 30 mg/dL    Glucose, Fasting 115 (H) 65 - 99 mg/dL    Calcium 9 3 8 3 - 10 1 mg/dL    AST 32 5 - 45 U/L    ALT 50 12 - 78 U/L    Alkaline Phosphatase 90 46 - 116 U/L    Total Protein 7 3 6 4 - 8 2 g/dL    Albumin 3 7 3 5 - 5 0 g/dL    Total Bilirubin 0 40 0 20 - 1 00 mg/dL    eGFR 77 ml/min/1 73sq m   CBC    Collection Time: 04/20/20  8:52 AM   Result Value Ref Range    WBC 5 54 4 31 - 10 16 Thousand/uL    RBC 4 80 3 81 - 5 12 Million/uL    Hemoglobin 13 3 11 5 - 15 4 g/dL    Hematocrit 41 4 34 8 - 46 1 %    MCV 86 82 - 98 fL    MCH 27 7 26 8 - 34 3 pg    MCHC 32 1 31 4 - 37 4 g/dL    RDW 13 2 11 6 - 15 1 %    Platelets 761 951 - 520 Thousands/uL    MPV 10 1 8 9 - 12 7 fL

## 2020-07-22 ENCOUNTER — OFFICE VISIT (OUTPATIENT)
Dept: INTERNAL MEDICINE CLINIC | Facility: CLINIC | Age: 70
End: 2020-07-22
Payer: MEDICARE

## 2020-07-22 ENCOUNTER — TELEPHONE (OUTPATIENT)
Dept: PAIN MEDICINE | Facility: CLINIC | Age: 70
End: 2020-07-22

## 2020-07-22 VITALS
TEMPERATURE: 98.7 F | HEIGHT: 62 IN | SYSTOLIC BLOOD PRESSURE: 119 MMHG | DIASTOLIC BLOOD PRESSURE: 78 MMHG | RESPIRATION RATE: 16 BRPM | OXYGEN SATURATION: 96 % | WEIGHT: 178.8 LBS | HEART RATE: 86 BPM | BODY MASS INDEX: 32.9 KG/M2

## 2020-07-22 DIAGNOSIS — K21.9 GASTROESOPHAGEAL REFLUX DISEASE WITHOUT ESOPHAGITIS: ICD-10-CM

## 2020-07-22 DIAGNOSIS — I10 BENIGN ESSENTIAL HTN: Primary | ICD-10-CM

## 2020-07-22 DIAGNOSIS — R73.01 IFG (IMPAIRED FASTING GLUCOSE): ICD-10-CM

## 2020-07-22 DIAGNOSIS — F32.5 MAJOR DEPRESSIVE DISORDER WITH SINGLE EPISODE, IN FULL REMISSION (HCC): ICD-10-CM

## 2020-07-22 DIAGNOSIS — Z12.11 SCREENING FOR COLON CANCER: ICD-10-CM

## 2020-07-22 PROBLEM — E66.811 OBESITY (BMI 30.0-34.9): Status: RESOLVED | Noted: 2019-07-16 | Resolved: 2020-07-22

## 2020-07-22 PROBLEM — L98.9 SKIN LESION OF CHEST WALL: Status: RESOLVED | Noted: 2019-10-10 | Resolved: 2020-07-22

## 2020-07-22 PROBLEM — E66.9 OBESITY (BMI 30.0-34.9): Status: RESOLVED | Noted: 2019-07-16 | Resolved: 2020-07-22

## 2020-07-22 PROCEDURE — 3074F SYST BP LT 130 MM HG: CPT | Performed by: INTERNAL MEDICINE

## 2020-07-22 PROCEDURE — 1036F TOBACCO NON-USER: CPT | Performed by: INTERNAL MEDICINE

## 2020-07-22 PROCEDURE — 1160F RVW MEDS BY RX/DR IN RCRD: CPT | Performed by: INTERNAL MEDICINE

## 2020-07-22 PROCEDURE — 99214 OFFICE O/P EST MOD 30 MIN: CPT | Performed by: INTERNAL MEDICINE

## 2020-07-22 PROCEDURE — 3008F BODY MASS INDEX DOCD: CPT | Performed by: INTERNAL MEDICINE

## 2020-07-22 PROCEDURE — 3078F DIAST BP <80 MM HG: CPT | Performed by: INTERNAL MEDICINE

## 2020-07-22 NOTE — ASSESSMENT & PLAN NOTE
-normotensive  -encouraged weight loss and reinforced low sodium diet  -continue on hctz 12 5mg daily and benazepril 20mg daily  -monitor BMP

## 2020-08-05 DIAGNOSIS — M54.12 CERVICAL RADICULOPATHY: ICD-10-CM

## 2020-08-05 RX ORDER — GABAPENTIN 300 MG/1
CAPSULE ORAL
Qty: 90 CAPSULE | Refills: 0 | Status: SHIPPED | OUTPATIENT
Start: 2020-08-05 | End: 2020-09-03 | Stop reason: SDUPTHER

## 2020-08-18 DIAGNOSIS — M79.7 FIBROMYALGIA: ICD-10-CM

## 2020-08-18 RX ORDER — DULOXETIN HYDROCHLORIDE 60 MG/1
CAPSULE, DELAYED RELEASE ORAL
Qty: 90 CAPSULE | Refills: 1 | Status: SHIPPED | OUTPATIENT
Start: 2020-08-18 | End: 2021-02-16

## 2020-08-19 ENCOUNTER — TELEPHONE (OUTPATIENT)
Dept: OBGYN CLINIC | Facility: HOSPITAL | Age: 70
End: 2020-08-19

## 2020-08-19 DIAGNOSIS — M17.0 PRIMARY OSTEOARTHRITIS OF BOTH KNEES: ICD-10-CM

## 2020-08-19 RX ORDER — DICLOFENAC POTASSIUM 50 MG/1
50 TABLET, FILM COATED ORAL 2 TIMES DAILY PRN
Qty: 60 TABLET | Refills: 1 | Status: SHIPPED | OUTPATIENT
Start: 2020-08-19 | End: 2020-10-28 | Stop reason: SDUPTHER

## 2020-08-19 NOTE — TELEPHONE ENCOUNTER
Pt contacted Call Center requested refill of their medication  Medication Name: diclofenac      Dosage of Med:      Frequency of Med:      Remaining Medication: none      Pharmacy and Location: [facility] [address]        Pt  Preferred Callback Phone Number:       Thank you  PLEASE ADVISE PATIENTS:    REFILL REQUESTS WILL BE PROCESSED WITHIN 24-48 HOURS

## 2020-08-29 DIAGNOSIS — M54.12 CERVICAL RADICULOPATHY: ICD-10-CM

## 2020-08-31 RX ORDER — GABAPENTIN 300 MG/1
CAPSULE ORAL
Qty: 90 CAPSULE | Refills: 0 | OUTPATIENT
Start: 2020-08-31

## 2020-09-03 ENCOUNTER — TELEPHONE (OUTPATIENT)
Dept: PAIN MEDICINE | Facility: MEDICAL CENTER | Age: 70
End: 2020-09-03

## 2020-09-03 DIAGNOSIS — M54.12 CERVICAL RADICULOPATHY: ICD-10-CM

## 2020-09-03 RX ORDER — GABAPENTIN 300 MG/1
300 CAPSULE ORAL 3 TIMES DAILY
Qty: 90 CAPSULE | Refills: 2 | Status: SHIPPED | OUTPATIENT
Start: 2020-09-03 | End: 2020-12-07 | Stop reason: SDUPTHER

## 2020-09-03 RX ORDER — GABAPENTIN 300 MG/1
CAPSULE ORAL
Qty: 270 CAPSULE | Refills: 1 | OUTPATIENT
Start: 2020-09-03

## 2020-09-03 NOTE — TELEPHONE ENCOUNTER
Patient called stating she's experiencing neck pain in the middle of her neck, which is radiating down to her left shoulder/arm  She would like to discuss her status with a nurse   Please advise, kianna    Call back# [phone]

## 2020-09-03 NOTE — TELEPHONE ENCOUNTER
S/W pt  Pt stated she has pain in b/l neck-left side worse into b/l arms- left mainly and arms have numbness, b/l hands go numb and shoulder blades have a "pinching pain "  Pain varies from a 6-10/10  Pt taking diclofenac, gabapentin and Cymbalta  Scheduled pt for SOVS on 9/16 at 10:45 w/ AO  Pt asking pt there is anything else she can do for her appt? Please advise

## 2020-09-03 NOTE — TELEPHONE ENCOUNTER
LMOM for pt to C/B, C/B # provided      --at this time BK had a cancellation on her schedule, want to offer pt sooner appt-

## 2020-09-03 NOTE — TELEPHONE ENCOUNTER
Pt contacted Call Center requested refill of their medication  Medication Name: Gabapentin      Dosage of Med: 300 mg      Frequency of Med: TAKE 1 CAPSULE BY MOUTH THREE TIMES A DAY      Remaining Medication: 3 day supply      Pharmacy and Location: [facility] on [facility]        Pt  Preferred Callback Phone Number: [phone]      Thank you

## 2020-09-04 ENCOUNTER — TELEPHONE (OUTPATIENT)
Dept: PAIN MEDICINE | Facility: MEDICAL CENTER | Age: 70
End: 2020-09-04

## 2020-09-08 NOTE — TELEPHONE ENCOUNTER
RN s/w pt  Pt states " I am feeling better   I would like to cancel my appt on 9/10 and will keep my appt for 9/16 " (/10 appt cancelled per pt's request

## 2020-09-14 DIAGNOSIS — R60.0 LOWER EXTREMITY EDEMA: ICD-10-CM

## 2020-09-14 DIAGNOSIS — I10 BENIGN ESSENTIAL HTN: ICD-10-CM

## 2020-09-14 RX ORDER — HYDROCHLOROTHIAZIDE 12.5 MG/1
TABLET ORAL
Qty: 90 TABLET | Refills: 1 | Status: SHIPPED | OUTPATIENT
Start: 2020-09-14 | End: 2020-11-24 | Stop reason: SDUPTHER

## 2020-09-16 ENCOUNTER — OFFICE VISIT (OUTPATIENT)
Dept: PAIN MEDICINE | Facility: MEDICAL CENTER | Age: 70
End: 2020-09-16
Payer: MEDICARE

## 2020-09-16 VITALS
HEART RATE: 80 BPM | HEIGHT: 62 IN | WEIGHT: 183 LBS | SYSTOLIC BLOOD PRESSURE: 149 MMHG | DIASTOLIC BLOOD PRESSURE: 95 MMHG | TEMPERATURE: 97.7 F | BODY MASS INDEX: 33.68 KG/M2

## 2020-09-16 DIAGNOSIS — G89.29 CHRONIC RIGHT-SIDED LOW BACK PAIN WITH RIGHT-SIDED SCIATICA: ICD-10-CM

## 2020-09-16 DIAGNOSIS — M51.16 LUMBAR DISC HERNIATION WITH RADICULOPATHY: ICD-10-CM

## 2020-09-16 DIAGNOSIS — M48.02 CERVICAL SPINAL STENOSIS: ICD-10-CM

## 2020-09-16 DIAGNOSIS — M54.12 CERVICAL RADICULOPATHY: Primary | ICD-10-CM

## 2020-09-16 DIAGNOSIS — M54.41 CHRONIC RIGHT-SIDED LOW BACK PAIN WITH RIGHT-SIDED SCIATICA: ICD-10-CM

## 2020-09-16 DIAGNOSIS — M47.812 CERVICAL SPINE ARTHRITIS: ICD-10-CM

## 2020-09-16 DIAGNOSIS — M54.16 LUMBAR RADICULOPATHY: ICD-10-CM

## 2020-09-16 DIAGNOSIS — M47.816 LUMBAR SPONDYLOSIS: ICD-10-CM

## 2020-09-16 PROCEDURE — 99214 OFFICE O/P EST MOD 30 MIN: CPT | Performed by: NURSE PRACTITIONER

## 2020-09-16 NOTE — PROGRESS NOTES
Assessment  1  Cervical radiculopathy    2  Cervical spine arthritis    3  Cervical spinal stenosis    4  Lumbar spondylosis    5  Lumbar radiculopathy    6  Chronic right-sided low back pain with right-sided sciatica    7  Lumbar disc herniation with radiculopathy        Plan  At this time will get the patient set up for repeat procedures  We will get her scheduled for a right L5-S1 lumbar epidural steroid injection as this did provide her 90% relief last time  We will also schedule her for her cervical epidural steroid injection as this provided her 99% relief and her pain symptoms are just now starting to return approaching the 3 month cami  Continue with gabapentin, she does not require refill today  Patient to follow up after procedures        Complete risks and benefits including bleeding, infection, tissue reaction, nerve injury and allergic reaction were discussed  The approach was demonstrated using models and literature was provided  Verbal and written consent was obtained  My impressions and treatment recommendations were discussed in detail with the patient who verbalized understanding and had no further questions  Discharge instructions were provided  I personally saw and examined the patient and I agree with the above discussed plan of care  Orders Placed This Encounter   Procedures    FL spine and pain procedure     Standing Status:   Future     Standing Expiration Date:   9/16/2024     Order Specific Question:   Reason for Exam:     Answer:   Delories Radish on or after 10-1-20 and Right L5-S1 LESI on or after 10-15-20     Order Specific Question:   Anticoagulant hold needed? Answer:   none    FL spine and pain procedure     Standing Status:   Future     Standing Expiration Date:   9/16/2024     Order Specific Question:   Reason for Exam:     Answer:   right L5-S1 LESI on or after 10-15-20     Order Specific Question:   Anticoagulant hold needed?      Answer:   none     No orders of the defined types were placed in this encounter  History of Present Illness    Nathaly Kaiser is a 65 y o  female presents for follow-up related to her chronic neck and left arm pain as well as her right low back and leg pain  Today the patient rates her pain 3 to 4/10 this is intermittent and most bothersome at night  She describes her pain as burning, dull, aching, sharp, cramping, shooting, numbness, and pins and needles  Patient last had cervical epidural steroid injection July 1, 2020 with 99% relief as well as a right L5-S1 lumbar epidural steroid injection on July 15, 2020 with 90% relief  Both procedures were performed by Dr Emerita Kaplan  She is just now starting to notice some pain returned and she would like to get her procedures rescheduled  Patient does take gabapentin 300 mg 3 times daily  I have personally reviewed and/or updated the patient's past medical history, past surgical history, family history, social history, current medications, allergies, and vital signs today  Review of Systems   Respiratory: Negative for shortness of breath  Cardiovascular: Negative for chest pain  Gastrointestinal: Negative for constipation, diarrhea, nausea and vomiting  Musculoskeletal: Positive for joint swelling, myalgias and neck pain  Negative for arthralgias and gait problem  Skin: Negative for rash  Neurological: Negative for dizziness, seizures and weakness  All other systems reviewed and are negative        Patient Active Problem List   Diagnosis    Primary localized osteoarthritis of right knee    Right knee pain    Lumbar disc herniation with radiculopathy    Chronic right-sided low back pain with right-sided sciatica    Fibromyalgia    Benign essential HTN    Fatty liver    Gastroesophageal reflux disease without esophagitis    Lumbar radiculopathy    Sacroiliitis (HCC)    Pes anserine bursitis    Lower extremity edema    Cervical radiculopathy    Cervical spine arthritis    Cervical spinal stenosis    Lumbar spondylosis    Major depressive disorder with single episode, in full remission [facility])       Past Medical History:   Diagnosis Date    Arthritis     Fatty liver 5/1/2018    Hypertension     Low back pain     Neck pain     Primary localized osteoarthritis of right knee 8/17/2017       History reviewed  No pertinent surgical history      Family History   Problem Relation Age of Onset    Osteoporosis Mother     Skin cancer Father     Cancer Family     Osteoporosis Family     Stomach cancer Sister 52    No Known Problems Daughter     No Known Problems Maternal Grandmother     No Known Problems Maternal Grandfather     No Known Problems Paternal Grandmother     No Known Problems Paternal Grandfather     No Known Problems Maternal Aunt     No Known Problems Maternal Aunt     No Known Problems Maternal Aunt     No Known Problems Maternal Aunt     No Known Problems Paternal Aunt        Social History     Occupational History    Not on file   Tobacco Use    Smoking status: Never Smoker    Smokeless tobacco: Never Used   Substance and Sexual Activity    Alcohol use: No    Drug use: Never    Sexual activity: Not Currently       Current Outpatient Medications on File Prior to Visit   Medication Sig    benazepril (LOTENSIN) 20 mg tablet TAKE 1 TABLET BY MOUTH EVERY DAY    cholecalciferol (VITAMIN D3) 1,000 units tablet Take 1,000 Units by mouth daily    diclofenac potassium (CATAFLAM) 50 mg tablet Take 1 tablet (50 mg total) by mouth 2 (two) times a day as needed (pain)    DULoxetine (CYMBALTA) 60 mg delayed release capsule TAKE 1 CAPSULE BY MOUTH EVERY DAY    gabapentin (NEURONTIN) 300 mg capsule Take 1 capsule (300 mg total) by mouth 3 (three) times a day    hydrochlorothiazide (HYDRODIURIL) 12 5 mg tablet TAKE 1 TABLET BY MOUTH EVERY DAY    omeprazole (PriLOSEC) 10 mg delayed release capsule Take 10 mg by mouth daily      SUPER B COMPLEX/C PO Take 1 tablet by mouth daily       No current facility-administered medications on file prior to visit  Allergies   Allergen Reactions    Penicillins        Physical Exam    /95   Pulse 80   Temp 97 7 °F (36 5 °C) (Temporal)   Ht 5' 2" (1 575 m)   Wt 83 kg (183 lb)   BMI 33 47 kg/m²     Constitutional: normal, well developed, well nourished, alert, in no distress and non-toxic and no overt pain behavior    Eyes: anicteric  HEENT: grossly intact  Neck: supple, symmetric, trachea midline and no masses   Pulmonary:even and unlabored  Cardiovascular:No edema or pitting edema present  Skin:Normal without rashes or lesions and well hydrated  Psychiatric:Mood and affect appropriate  Neurologic:Cranial Nerves II-XII grossly intact  Musculoskeletal:normal    Imaging

## 2020-10-15 ENCOUNTER — LAB (OUTPATIENT)
Dept: LAB | Age: 70
End: 2020-10-15
Payer: MEDICARE

## 2020-10-15 DIAGNOSIS — I10 BENIGN ESSENTIAL HTN: ICD-10-CM

## 2020-10-15 LAB
ALBUMIN SERPL BCP-MCNC: 4 G/DL (ref 3.5–5)
ALP SERPL-CCNC: 94 U/L (ref 46–116)
ALT SERPL W P-5'-P-CCNC: 59 U/L (ref 12–78)
ANION GAP SERPL CALCULATED.3IONS-SCNC: 6 MMOL/L (ref 4–13)
AST SERPL W P-5'-P-CCNC: 30 U/L (ref 5–45)
BACTERIA UR QL AUTO: NORMAL /HPF
BILIRUB SERPL-MCNC: 0.27 MG/DL (ref 0.2–1)
BILIRUB UR QL STRIP: NEGATIVE
BUN SERPL-MCNC: 28 MG/DL (ref 5–25)
CALCIUM SERPL-MCNC: 9.2 MG/DL (ref 8.3–10.1)
CHLORIDE SERPL-SCNC: 106 MMOL/L (ref 100–108)
CHOLEST SERPL-MCNC: 184 MG/DL (ref 50–200)
CLARITY UR: CLEAR
CO2 SERPL-SCNC: 28 MMOL/L (ref 21–32)
COLOR UR: YELLOW
CREAT SERPL-MCNC: 0.76 MG/DL (ref 0.6–1.3)
GFR SERPL CREATININE-BSD FRML MDRD: 79 ML/MIN/1.73SQ M
GLUCOSE P FAST SERPL-MCNC: 108 MG/DL (ref 65–99)
GLUCOSE UR STRIP-MCNC: NEGATIVE MG/DL
HDLC SERPL-MCNC: 53 MG/DL
HGB UR QL STRIP.AUTO: NEGATIVE
HYALINE CASTS #/AREA URNS LPF: NORMAL /LPF
KETONES UR STRIP-MCNC: NEGATIVE MG/DL
LDLC SERPL CALC-MCNC: 110 MG/DL (ref 0–100)
LEUKOCYTE ESTERASE UR QL STRIP: ABNORMAL
NITRITE UR QL STRIP: NEGATIVE
NON-SQ EPI CELLS URNS QL MICRO: NORMAL /HPF
NONHDLC SERPL-MCNC: 131 MG/DL
PH UR STRIP.AUTO: 5.5 [PH]
POTASSIUM SERPL-SCNC: 4.2 MMOL/L (ref 3.5–5.3)
PROT SERPL-MCNC: 7.6 G/DL (ref 6.4–8.2)
PROT UR STRIP-MCNC: NEGATIVE MG/DL
RBC #/AREA URNS AUTO: NORMAL /HPF
SODIUM SERPL-SCNC: 140 MMOL/L (ref 136–145)
SP GR UR STRIP.AUTO: 1.03 (ref 1–1.03)
TRIGL SERPL-MCNC: 105 MG/DL
TSH SERPL DL<=0.05 MIU/L-ACNC: 1.06 UIU/ML (ref 0.36–3.74)
UROBILINOGEN UR QL STRIP.AUTO: 0.2 E.U./DL
WBC #/AREA URNS AUTO: NORMAL /HPF

## 2020-10-15 PROCEDURE — 80061 LIPID PANEL: CPT

## 2020-10-15 PROCEDURE — 80053 COMPREHEN METABOLIC PANEL: CPT

## 2020-10-15 PROCEDURE — 81001 URINALYSIS AUTO W/SCOPE: CPT

## 2020-10-15 PROCEDURE — 36415 COLL VENOUS BLD VENIPUNCTURE: CPT

## 2020-10-15 PROCEDURE — 84443 ASSAY THYROID STIM HORMONE: CPT

## 2020-10-20 ENCOUNTER — TRANSCRIBE ORDERS (OUTPATIENT)
Dept: ADMINISTRATIVE | Facility: HOSPITAL | Age: 70
End: 2020-10-20

## 2020-10-20 DIAGNOSIS — Z12.31 ENCOUNTER FOR SCREENING MAMMOGRAM FOR MALIGNANT NEOPLASM OF BREAST: Primary | ICD-10-CM

## 2020-10-28 ENCOUNTER — OFFICE VISIT (OUTPATIENT)
Dept: OBGYN CLINIC | Facility: MEDICAL CENTER | Age: 70
End: 2020-10-28
Payer: MEDICARE

## 2020-10-28 VITALS
WEIGHT: 181 LBS | HEART RATE: 99 BPM | BODY MASS INDEX: 33.31 KG/M2 | HEIGHT: 62 IN | TEMPERATURE: 97.6 F | SYSTOLIC BLOOD PRESSURE: 116 MMHG | DIASTOLIC BLOOD PRESSURE: 79 MMHG

## 2020-10-28 DIAGNOSIS — M17.0 PRIMARY OSTEOARTHRITIS OF BOTH KNEES: Primary | ICD-10-CM

## 2020-10-28 DIAGNOSIS — G89.29 CHRONIC PAIN OF BOTH KNEES: ICD-10-CM

## 2020-10-28 DIAGNOSIS — M25.561 CHRONIC PAIN OF BOTH KNEES: ICD-10-CM

## 2020-10-28 DIAGNOSIS — M25.562 CHRONIC PAIN OF BOTH KNEES: ICD-10-CM

## 2020-10-28 PROCEDURE — 20610 DRAIN/INJ JOINT/BURSA W/O US: CPT | Performed by: PHYSICIAN ASSISTANT

## 2020-10-28 PROCEDURE — 99214 OFFICE O/P EST MOD 30 MIN: CPT | Performed by: PHYSICIAN ASSISTANT

## 2020-10-28 RX ORDER — DICLOFENAC POTASSIUM 50 MG/1
50 TABLET, FILM COATED ORAL 2 TIMES DAILY PRN
Qty: 60 TABLET | Refills: 1 | Status: SHIPPED | OUTPATIENT
Start: 2020-10-28 | End: 2020-12-28

## 2020-10-28 RX ORDER — LIDOCAINE HYDROCHLORIDE 10 MG/ML
3 INJECTION, SOLUTION INFILTRATION; PERINEURAL
Status: COMPLETED | OUTPATIENT
Start: 2020-10-28 | End: 2020-10-28

## 2020-10-28 RX ORDER — METHYLPREDNISOLONE ACETATE 40 MG/ML
2 INJECTION, SUSPENSION INTRA-ARTICULAR; INTRALESIONAL; INTRAMUSCULAR; SOFT TISSUE
Status: COMPLETED | OUTPATIENT
Start: 2020-10-28 | End: 2020-10-28

## 2020-10-28 RX ADMIN — METHYLPREDNISOLONE ACETATE 2 ML: 40 INJECTION, SUSPENSION INTRA-ARTICULAR; INTRALESIONAL; INTRAMUSCULAR; SOFT TISSUE at 18:30

## 2020-10-28 RX ADMIN — LIDOCAINE HYDROCHLORIDE 3 ML: 10 INJECTION, SOLUTION INFILTRATION; PERINEURAL at 18:30

## 2020-11-24 ENCOUNTER — OFFICE VISIT (OUTPATIENT)
Dept: INTERNAL MEDICINE CLINIC | Facility: CLINIC | Age: 70
End: 2020-11-24
Payer: MEDICARE

## 2020-11-24 VITALS
DIASTOLIC BLOOD PRESSURE: 70 MMHG | WEIGHT: 180.2 LBS | BODY MASS INDEX: 33.16 KG/M2 | SYSTOLIC BLOOD PRESSURE: 120 MMHG | HEART RATE: 83 BPM | TEMPERATURE: 98.3 F | OXYGEN SATURATION: 98 % | HEIGHT: 62 IN | RESPIRATION RATE: 16 BRPM

## 2020-11-24 DIAGNOSIS — K21.9 GASTROESOPHAGEAL REFLUX DISEASE WITHOUT ESOPHAGITIS: ICD-10-CM

## 2020-11-24 DIAGNOSIS — Z23 ENCOUNTER FOR IMMUNIZATION: ICD-10-CM

## 2020-11-24 DIAGNOSIS — Z00.00 MEDICARE ANNUAL WELLNESS VISIT, SUBSEQUENT: ICD-10-CM

## 2020-11-24 DIAGNOSIS — I10 BENIGN ESSENTIAL HTN: Primary | ICD-10-CM

## 2020-11-24 DIAGNOSIS — Z12.31 ENCOUNTER FOR SCREENING MAMMOGRAM FOR BREAST CANCER: ICD-10-CM

## 2020-11-24 DIAGNOSIS — E78.2 MIXED HYPERLIPIDEMIA: ICD-10-CM

## 2020-11-24 DIAGNOSIS — M17.0 PRIMARY OSTEOARTHRITIS OF BOTH KNEES: ICD-10-CM

## 2020-11-24 DIAGNOSIS — R60.0 LOWER EXTREMITY EDEMA: ICD-10-CM

## 2020-11-24 DIAGNOSIS — Z11.59 NEED FOR HEPATITIS C SCREENING TEST: ICD-10-CM

## 2020-11-24 DIAGNOSIS — R73.01 IFG (IMPAIRED FASTING GLUCOSE): ICD-10-CM

## 2020-11-24 PROBLEM — M25.561 RIGHT KNEE PAIN: Status: RESOLVED | Noted: 2017-08-17 | Resolved: 2020-11-24

## 2020-11-24 PROCEDURE — G0008 ADMIN INFLUENZA VIRUS VAC: HCPCS

## 2020-11-24 PROCEDURE — 90670 PCV13 VACCINE IM: CPT

## 2020-11-24 PROCEDURE — 99214 OFFICE O/P EST MOD 30 MIN: CPT | Performed by: INTERNAL MEDICINE

## 2020-11-24 PROCEDURE — 90662 IIV NO PRSV INCREASED AG IM: CPT

## 2020-11-24 PROCEDURE — G0439 PPPS, SUBSEQ VISIT: HCPCS | Performed by: INTERNAL MEDICINE

## 2020-11-24 PROCEDURE — G0009 ADMIN PNEUMOCOCCAL VACCINE: HCPCS

## 2020-11-24 RX ORDER — HYDROCHLOROTHIAZIDE 12.5 MG/1
12.5 TABLET ORAL DAILY
Qty: 90 TABLET | Refills: 1 | Status: SHIPPED | OUTPATIENT
Start: 2020-11-24 | End: 2021-07-26

## 2020-11-24 RX ORDER — BENAZEPRIL HYDROCHLORIDE 20 MG/1
20 TABLET ORAL DAILY
Qty: 90 TABLET | Refills: 1 | Status: SHIPPED | OUTPATIENT
Start: 2020-11-24 | End: 2021-07-13

## 2020-12-07 ENCOUNTER — TELEPHONE (OUTPATIENT)
Dept: PAIN MEDICINE | Facility: MEDICAL CENTER | Age: 70
End: 2020-12-07

## 2020-12-07 DIAGNOSIS — M54.12 CERVICAL RADICULOPATHY: ICD-10-CM

## 2020-12-07 RX ORDER — GABAPENTIN 300 MG/1
300 CAPSULE ORAL 3 TIMES DAILY
Qty: 90 CAPSULE | Refills: 2 | Status: SHIPPED | OUTPATIENT
Start: 2020-12-07 | End: 2021-04-12 | Stop reason: SDUPTHER

## 2020-12-27 DIAGNOSIS — M17.0 PRIMARY OSTEOARTHRITIS OF BOTH KNEES: ICD-10-CM

## 2020-12-28 RX ORDER — DICLOFENAC POTASSIUM 50 MG/1
TABLET, FILM COATED ORAL
Qty: 60 TABLET | Refills: 1 | Status: SHIPPED | OUTPATIENT
Start: 2020-12-28 | End: 2021-03-02

## 2021-01-04 ENCOUNTER — OFFICE VISIT (OUTPATIENT)
Dept: OBGYN CLINIC | Facility: MEDICAL CENTER | Age: 71
End: 2021-01-04
Payer: MEDICARE

## 2021-01-04 ENCOUNTER — HOSPITAL ENCOUNTER (OUTPATIENT)
Dept: NON INVASIVE DIAGNOSTICS | Facility: HOSPITAL | Age: 71
Discharge: HOME | End: 2021-01-04
Payer: MEDICARE

## 2021-01-04 ENCOUNTER — TELEPHONE (OUTPATIENT)
Dept: OBGYN CLINIC | Facility: HOSPITAL | Age: 71
End: 2021-01-04

## 2021-01-04 VITALS
TEMPERATURE: 97.8 F | BODY MASS INDEX: 33.31 KG/M2 | HEART RATE: 79 BPM | SYSTOLIC BLOOD PRESSURE: 114 MMHG | WEIGHT: 181 LBS | DIASTOLIC BLOOD PRESSURE: 78 MMHG | HEIGHT: 62 IN

## 2021-01-04 DIAGNOSIS — M17.0 BILATERAL PRIMARY OSTEOARTHRITIS OF KNEE: ICD-10-CM

## 2021-01-04 DIAGNOSIS — M79.662 PAIN OF LEFT CALF: Primary | ICD-10-CM

## 2021-01-04 DIAGNOSIS — M79.662 PAIN OF LEFT CALF: ICD-10-CM

## 2021-01-04 PROCEDURE — 99213 OFFICE O/P EST LOW 20 MIN: CPT | Performed by: ORTHOPAEDIC SURGERY

## 2021-01-04 PROCEDURE — 93971 EXTREMITY STUDY: CPT

## 2021-01-04 PROCEDURE — 93971 EXTREMITY STUDY: CPT | Performed by: SURGERY

## 2021-01-04 NOTE — TELEPHONE ENCOUNTER
Spoke with vascular lab, preliminary result is negative for DVT  Called patient and let her know the result as well  She should follow up closer to 1/28 for her injections  Patient had no other questions at this time

## 2021-01-04 NOTE — TELEPHONE ENCOUNTER
Patient sees Dr Ashley Zepeda from [facility] called to provide results of patient  Call got disconnected when reaching the office

## 2021-01-04 NOTE — PROGRESS NOTES
Assessment/Plan:  1  Pain of left calf    2  Bilateral primary osteoarthritis of knee      No orders of the defined types were placed in this encounter  · Will order STAT venous duplex due to left calf pain, +Phuong's, and mild swelling  · Patient denies history of DVT or PE and does not take a blood thinner  · Patient will follow up for bilateral knee CSI on 1/28/2021  · Patient would like to postpone planning surgery due to [place] pandemic  · Continue diclofenac tabs as needed for pain  Patient aware to avoid other NSAIDs while taking diclofenac  · Continue home exercises  Return in about 24 days (around 1/28/2021) for bilateral knee CSI  I answered all of the patient's questions during the visit and provided education of the patient's condition during the visit  The patient verbalized understanding of the information given and agrees with the plan  This note was dictated using [employer] software  It may contain errors including improperly dictated words  Please contact physician directly for any questions  Subjective   Chief Complaint:   Chief Complaint   Patient presents with    Left Knee - Follow-up    Right Knee - Follow-up       HPI  Pebbles Eloina is a 65 y o  female who presents for follow up for bilateral knee pain and osteoarthritis  Patient received bilateral knee CSI on 10/28/20 and reports ongoing pain relief  As of today patient is reporting pain in her left calf  States pain is constant dull and achy  The pain can be worse if she sits with her knee extended for prolonged period of time  Patient does see Pain Management for right sided radiculopathy and states this pain does not feel similar to that  She denies history of DVT or PE and she is not on a blood thinner  Patient is taking diclofenac as needed for pain  She does attempt home exercises to stretch her legs  She is not wearing a knee brace at this time   Patient would like to pursue TKA but would like to wait until after the [place] pandemic  Review of Systems  ROS:    See HPI for musculoskeletal review  All other systems reviewed are negative     History:  Past Medical History:   Diagnosis Date    Arthritis     Fatty liver 5/1/2018    Hypertension     Low back pain     Neck pain     Primary localized osteoarthritis of right knee 8/17/2017     No past surgical history on file    Social History   Social History     Substance and Sexual Activity   Alcohol Use No     Social History     Substance and Sexual Activity   Drug Use Never     Social History     Tobacco Use   Smoking Status Never Smoker   Smokeless Tobacco Never Used     Family History:   Family History   Problem Relation Age of Onset    Osteoporosis Mother     Skin cancer Father     Cancer Family     Osteoporosis Family     Stomach cancer Sister 52    No Known Problems Daughter     No Known Problems Maternal Grandmother     No Known Problems Maternal Grandfather     No Known Problems Paternal Grandmother     No Known Problems Paternal Grandfather     No Known Problems Maternal Aunt     No Known Problems Maternal Aunt     No Known Problems Maternal Aunt     No Known Problems Maternal Aunt     No Known Problems Paternal Aunt        Current Outpatient Medications on File Prior to Visit   Medication Sig Dispense Refill    benazepril (LOTENSIN) 20 mg tablet Take 1 tablet (20 mg total) by mouth daily 90 tablet 1    cholecalciferol (VITAMIN D3) 1,000 units tablet Take 1,000 Units by mouth daily      diclofenac potassium (CATAFLAM) 50 mg tablet TAKE 1 TABLET (50 MG TOTAL) BY MOUTH 2 (TWO) TIMES A DAY AS NEEDED FOR PAIN 60 tablet 1    DULoxetine (CYMBALTA) 60 mg delayed release capsule TAKE 1 CAPSULE BY MOUTH EVERY DAY 90 capsule 1    gabapentin (NEURONTIN) 300 mg capsule Take 1 capsule (300 mg total) by mouth 3 (three) times a day 90 capsule 2    hydrochlorothiazide (HYDRODIURIL) 12 5 mg tablet Take 1 tablet (12 5 mg total) by mouth daily 90 tablet 1    omeprazole (PriLOSEC) 10 mg delayed release capsule Take 10 mg by mouth daily        SUPER B COMPLEX/C PO Take 1 tablet by mouth daily         No current facility-administered medications on file prior to visit        Allergies   Allergen Reactions    Penicillins         Objective     /78   Pulse 79   Temp 97 8 °F (36 6 °C)   Ht 5' 2" (1 575 m)   Wt 82 1 kg (181 lb)   BMI 33 11 kg/m²      PE:  AAOx 3  WDWN  Hearing intact, no drainage from eyes  no audible wheezing  no abdominal distension  LE compartments soft, skin intact    Ortho Exam:  left Knee:   No erythema  Mild lower extremity swelling  no effusion  no warmth  No varicosities   No TTP  AROM: 0- 120  Stable to varus/valgus stress  Positive Phuong's test

## 2021-01-29 ENCOUNTER — OFFICE VISIT (OUTPATIENT)
Dept: OBGYN CLINIC | Facility: MEDICAL CENTER | Age: 71
End: 2021-01-29
Payer: MEDICARE

## 2021-01-29 VITALS
DIASTOLIC BLOOD PRESSURE: 91 MMHG | WEIGHT: 183 LBS | BODY MASS INDEX: 33.68 KG/M2 | HEIGHT: 62 IN | TEMPERATURE: 99 F | HEART RATE: 87 BPM | SYSTOLIC BLOOD PRESSURE: 149 MMHG

## 2021-01-29 DIAGNOSIS — M17.0 PRIMARY OSTEOARTHRITIS OF BOTH KNEES: Primary | ICD-10-CM

## 2021-01-29 PROCEDURE — 99213 OFFICE O/P EST LOW 20 MIN: CPT | Performed by: PHYSICIAN ASSISTANT

## 2021-01-29 PROCEDURE — 20610 DRAIN/INJ JOINT/BURSA W/O US: CPT | Performed by: PHYSICIAN ASSISTANT

## 2021-01-29 RX ORDER — LIDOCAINE HYDROCHLORIDE 10 MG/ML
3 INJECTION, SOLUTION INFILTRATION; PERINEURAL
Status: COMPLETED | OUTPATIENT
Start: 2021-01-29 | End: 2021-01-29

## 2021-01-29 RX ORDER — METHYLPREDNISOLONE ACETATE 40 MG/ML
2 INJECTION, SUSPENSION INTRA-ARTICULAR; INTRALESIONAL; INTRAMUSCULAR; SOFT TISSUE
Status: COMPLETED | OUTPATIENT
Start: 2021-01-29 | End: 2021-01-29

## 2021-01-29 RX ADMIN — LIDOCAINE HYDROCHLORIDE 3 ML: 10 INJECTION, SOLUTION INFILTRATION; PERINEURAL at 10:24

## 2021-01-29 RX ADMIN — METHYLPREDNISOLONE ACETATE 2 ML: 40 INJECTION, SUSPENSION INTRA-ARTICULAR; INTRALESIONAL; INTRAMUSCULAR; SOFT TISSUE at 10:24

## 2021-01-29 NOTE — PROGRESS NOTES
Assessment/Plan:  No diagnosis found  No orders of the defined types were placed in this encounter  · Patient received bilateral knee     No follow-ups on file  I answered all of the patient's questions during the visit and provided education of the patient's condition during the visit  The patient verbalized understanding of the information given and agrees with the plan  This note was dictated using [employer] software  It may contain errors including improperly dictated words  Please contact physician directly for any questions  Subjective   Chief Complaint:   Chief Complaint   Patient presents with    Left Knee - Follow-up    Right Knee - Follow-up       HPI  Priya Naqvi is a 65 y o  female who presents for follow up for bilateral knee pain  Patient received bilateral knee CSI on 10/28/20 and reports 2 5 months of pain relief  Review of Systems  ROS:    See HPI for musculoskeletal review  All other systems reviewed are negative     History:  Past Medical History:   Diagnosis Date    Arthritis     Fatty liver 5/1/2018    Hypertension     Low back pain     Neck pain     Primary localized osteoarthritis of right knee 8/17/2017     No past surgical history on file    Social History   Social History     Substance and Sexual Activity   Alcohol Use No     Social History     Substance and Sexual Activity   Drug Use Never     Social History     Tobacco Use   Smoking Status Never Smoker   Smokeless Tobacco Never Used     Family History:   Family History   Problem Relation Age of Onset    Osteoporosis Mother     Skin cancer Father     Cancer Family     Osteoporosis Family     Stomach cancer Sister 52    No Known Problems Daughter     No Known Problems Maternal Grandmother     No Known Problems Maternal Grandfather     No Known Problems Paternal Grandmother     No Known Problems Paternal Grandfather     No Known Problems Maternal Aunt     No Known Problems Maternal Aunt     No Known Problems Maternal Aunt     No Known Problems Maternal Aunt     No Known Problems Paternal Aunt        Current Outpatient Medications on File Prior to Visit   Medication Sig Dispense Refill    benazepril (LOTENSIN) 20 mg tablet Take 1 tablet (20 mg total) by mouth daily 90 tablet 1    cholecalciferol (VITAMIN D3) 1,000 units tablet Take 1,000 Units by mouth daily      diclofenac potassium (CATAFLAM) 50 mg tablet TAKE 1 TABLET (50 MG TOTAL) BY MOUTH 2 (TWO) TIMES A DAY AS NEEDED FOR PAIN 60 tablet 1    DULoxetine (CYMBALTA) 60 mg delayed release capsule TAKE 1 CAPSULE BY MOUTH EVERY DAY 90 capsule 1    gabapentin (NEURONTIN) 300 mg capsule Take 1 capsule (300 mg total) by mouth 3 (three) times a day 90 capsule 2    hydrochlorothiazide (HYDRODIURIL) 12 5 mg tablet Take 1 tablet (12 5 mg total) by mouth daily 90 tablet 1    omeprazole (PriLOSEC) 10 mg delayed release capsule Take 10 mg by mouth daily        SUPER B COMPLEX/C PO Take 1 tablet by mouth daily         No current facility-administered medications on file prior to visit  Allergies   Allergen Reactions    Penicillins         Objective     There were no vitals taken for this visit  PE:  AAOx 3  WDWN  Hearing intact, no drainage from eyes  no audible wheezing  no abdominal distension  LE compartments soft, skin intact    Ortho Exam:  right Knee:   No erythema  no swelling  no effusion  no warmth  TTP  AROM:   Stable to varus/valgus stress    Left Knee:   No erythema  no swelling  no effusion  no warmth  TTP  AROM:   Stable to varus/valgus stress      proc

## 2021-01-29 NOTE — PROGRESS NOTES
Assessment/Plan:  1  Primary osteoarthritis of both knees      Orders Placed This Encounter   Procedures    Large joint arthrocentesis: bilateral knee       · Patient received bilateral knee steroid injection in the office today  Tolerated the procedure well  Advised to apply ice and strenuous activity for 1-2 days as needed  · Continue diclofenac tabs and gel as needed for pain  Patient aware to avoid other NSAIDs while taking diclofenac  · Continue home exercises  Return in about 3 months (around 4/29/2021) for bilateral knee CSI  I answered all of the patient's questions during the visit and provided education of the patient's condition during the visit  The patient verbalized understanding of the information given and agrees with the plan  This note was dictated using [employer] software  It may contain errors including improperly dictated words  Please contact physician directly for any questions  Subjective   Chief Complaint:   Chief Complaint   Patient presents with    Left Knee - Follow-up    Right Knee - Follow-up       HPI  Naty Coppola is a 65 y o  female who presents for follow up for bilateral knee pain due to osteoarthritis  She had bilateral knee steroid injections on 10/28/2020 and states she had relief until mid December  She is having constant dull ache over anterior knee bilaterally  She does not note one knee is worse than the other at this time  She has been doing home exercises daily  She is taking diclofenac 75 mg as needed for pain and she also has been using OTC Voltaren gel with relief  Denies distal numbness or tingling  Review of Systems  ROS:    See HPI for musculoskeletal review     All other systems reviewed are negative     History:  Past Medical History:   Diagnosis Date    Arthritis     Fatty liver 5/1/2018    Hypertension     Low back pain     Neck pain     Primary localized osteoarthritis of right knee 8/17/2017     No past surgical history on file   Social History   Social History     Substance and Sexual Activity   Alcohol Use No     Social History     Substance and Sexual Activity   Drug Use Never     Social History     Tobacco Use   Smoking Status Never Smoker   Smokeless Tobacco Never Used     Family History:   Family History   Problem Relation Age of Onset    Osteoporosis Mother     Skin cancer Father     Cancer Family     Osteoporosis Family     Stomach cancer Sister 52    No Known Problems Daughter     No Known Problems Maternal Grandmother     No Known Problems Maternal Grandfather     No Known Problems Paternal Grandmother     No Known Problems Paternal Grandfather     No Known Problems Maternal Aunt     No Known Problems Maternal Aunt     No Known Problems Maternal Aunt     No Known Problems Maternal Aunt     No Known Problems Paternal Aunt        Current Outpatient Medications on File Prior to Visit   Medication Sig Dispense Refill    benazepril (LOTENSIN) 20 mg tablet Take 1 tablet (20 mg total) by mouth daily 90 tablet 1    cholecalciferol (VITAMIN D3) 1,000 units tablet Take 1,000 Units by mouth daily      diclofenac potassium (CATAFLAM) 50 mg tablet TAKE 1 TABLET (50 MG TOTAL) BY MOUTH 2 (TWO) TIMES A DAY AS NEEDED FOR PAIN 60 tablet 1    DULoxetine (CYMBALTA) 60 mg delayed release capsule TAKE 1 CAPSULE BY MOUTH EVERY DAY 90 capsule 1    gabapentin (NEURONTIN) 300 mg capsule Take 1 capsule (300 mg total) by mouth 3 (three) times a day 90 capsule 2    hydrochlorothiazide (HYDRODIURIL) 12 5 mg tablet Take 1 tablet (12 5 mg total) by mouth daily 90 tablet 1    omeprazole (PriLOSEC) 10 mg delayed release capsule Take 10 mg by mouth daily        SUPER B COMPLEX/C PO Take 1 tablet by mouth daily         No current facility-administered medications on file prior to visit        Allergies   Allergen Reactions    Penicillins         Objective     /91   Pulse 87   Temp 99 °F (37 2 °C)   Ht 5' 2" (1 575 m)   Wt 83 kg (183 lb)   BMI 33 47 kg/m²      PE:  AAOx 3  WDWN  Hearing intact, no drainage from eyes  no audible wheezing  no abdominal distension  LE compartments soft, skin intact    Ortho Exam:  bilateral Knee:   No erythema  no swelling  no effusion  no warmth  No TTP  AROM: 0- 125  Stable to varus/valgus stress      Large joint arthrocentesis: bilateral knee  Universal Protocol:  Procedure performed by:  Consent: Verbal consent obtained    Risks and benefits: risks, benefits and alternatives were discussed  Consent given by: patient  Site marked: the operative site was marked  Supporting Documentation  Indications: pain   Procedure Details  Location: knee - bilateral knee  Needle size: 22 G  Ultrasound guidance: no  Approach: anterolateral    Medications (Right): 3 mL lidocaine 1 %; 2 mL methylPREDNISolone acetate 40 mg/mLMedications (Left): 3 mL lidocaine 1 %; 2 mL methylPREDNISolone acetate 40 mg/mL   Patient tolerance: patient tolerated the procedure well with no immediate complications  Dressing:  Sterile dressing applied

## 2021-02-16 DIAGNOSIS — M79.7 FIBROMYALGIA: ICD-10-CM

## 2021-02-16 RX ORDER — DULOXETIN HYDROCHLORIDE 60 MG/1
CAPSULE, DELAYED RELEASE ORAL
Qty: 90 CAPSULE | Refills: 1 | Status: SHIPPED | OUTPATIENT
Start: 2021-02-16 | End: 2021-09-10 | Stop reason: SDUPTHER

## 2021-02-21 ENCOUNTER — IMMUNIZATIONS (OUTPATIENT)
Dept: FAMILY MEDICINE CLINIC | Facility: HOSPITAL | Age: 71
End: 2021-02-21

## 2021-02-21 DIAGNOSIS — Z23 ENCOUNTER FOR IMMUNIZATION: Primary | ICD-10-CM

## 2021-02-21 PROCEDURE — 0001A SARS-COV-2 / COVID-19 MRNA VACCINE (PFIZER-BIONTECH) 30 MCG: CPT

## 2021-02-21 PROCEDURE — 91300 SARS-COV-2 / COVID-19 MRNA VACCINE (PFIZER-BIONTECH) 30 MCG: CPT

## 2021-03-01 DIAGNOSIS — M17.0 PRIMARY OSTEOARTHRITIS OF BOTH KNEES: ICD-10-CM

## 2021-03-02 ENCOUNTER — TELEPHONE (OUTPATIENT)
Dept: OBGYN CLINIC | Facility: HOSPITAL | Age: 71
End: 2021-03-02

## 2021-03-02 RX ORDER — DICLOFENAC POTASSIUM 50 MG/1
TABLET, FILM COATED ORAL
Qty: 60 TABLET | Refills: 1 | Status: SHIPPED | OUTPATIENT
Start: 2021-03-02 | End: 2021-11-17 | Stop reason: ALTCHOICE

## 2021-03-02 NOTE — TELEPHONE ENCOUNTER
Patient sees Dr Meghan Chacon contacted Call Center requested refill of their medication  Medication Name:  diclofenac potassium     Dosage of Med: 50 mg      Frequency of Med:  TAKE 1 TABLET (50 MG TOTAL) BY MOUTH 2 (TWO) TIMES A DAY AS NEEDED FOR PAIN    Remaining Medication: 0      Pharmacy and Location:  [place] [address]      Pt  Preferred Callback Phone Number:  [phone]     Thank you  PLEASE ADVISE PATIENTS:    REFILL REQUESTS WILL BE PROCESSED WITHIN 24-48 HOURS

## 2021-03-02 NOTE — TELEPHONE ENCOUNTER
Pharmacy is calling because they are getting a flag stating that taking this with her blood pressure medication could cause an issue for her kidneys and lowering blood pressure too low  They just need an okay from the doctor to fill

## 2021-03-12 ENCOUNTER — IMMUNIZATIONS (OUTPATIENT)
Dept: FAMILY MEDICINE CLINIC | Facility: HOSPITAL | Age: 71
End: 2021-03-12

## 2021-03-12 DIAGNOSIS — Z23 ENCOUNTER FOR IMMUNIZATION: Primary | ICD-10-CM

## 2021-03-12 PROCEDURE — 0002A SARS-COV-2 / COVID-19 MRNA VACCINE (PFIZER-BIONTECH) 30 MCG: CPT

## 2021-03-12 PROCEDURE — 91300 SARS-COV-2 / COVID-19 MRNA VACCINE (PFIZER-BIONTECH) 30 MCG: CPT

## 2021-03-16 ENCOUNTER — APPOINTMENT (OUTPATIENT)
Dept: LAB | Age: 71
End: 2021-03-16
Payer: MEDICARE

## 2021-03-16 DIAGNOSIS — I10 BENIGN ESSENTIAL HTN: ICD-10-CM

## 2021-03-16 DIAGNOSIS — R73.01 IFG (IMPAIRED FASTING GLUCOSE): ICD-10-CM

## 2021-03-16 DIAGNOSIS — Z11.59 NEED FOR HEPATITIS C SCREENING TEST: ICD-10-CM

## 2021-03-16 DIAGNOSIS — E78.2 MIXED HYPERLIPIDEMIA: ICD-10-CM

## 2021-03-16 LAB
ALBUMIN SERPL BCP-MCNC: 3.8 G/DL (ref 3.5–5)
ALP SERPL-CCNC: 80 U/L (ref 46–116)
ALT SERPL W P-5'-P-CCNC: 48 U/L (ref 12–78)
ANION GAP SERPL CALCULATED.3IONS-SCNC: 7 MMOL/L (ref 4–13)
AST SERPL W P-5'-P-CCNC: 24 U/L (ref 5–45)
BILIRUB SERPL-MCNC: 0.45 MG/DL (ref 0.2–1)
BUN SERPL-MCNC: 18 MG/DL (ref 5–25)
CALCIUM SERPL-MCNC: 9.1 MG/DL (ref 8.3–10.1)
CHLORIDE SERPL-SCNC: 106 MMOL/L (ref 100–108)
CHOLEST SERPL-MCNC: 188 MG/DL (ref 50–200)
CO2 SERPL-SCNC: 26 MMOL/L (ref 21–32)
CREAT SERPL-MCNC: 0.7 MG/DL (ref 0.6–1.3)
GFR SERPL CREATININE-BSD FRML MDRD: 86 ML/MIN/1.73SQ M
GLUCOSE P FAST SERPL-MCNC: 98 MG/DL (ref 65–99)
HCV AB SER QL: NORMAL
HDLC SERPL-MCNC: 50 MG/DL
LDLC SERPL CALC-MCNC: 114 MG/DL (ref 0–100)
NONHDLC SERPL-MCNC: 138 MG/DL
POTASSIUM SERPL-SCNC: 4 MMOL/L (ref 3.5–5.3)
PROT SERPL-MCNC: 6.9 G/DL (ref 6.4–8.2)
SODIUM SERPL-SCNC: 139 MMOL/L (ref 136–145)
TRIGL SERPL-MCNC: 118 MG/DL

## 2021-03-16 PROCEDURE — 86803 HEPATITIS C AB TEST: CPT

## 2021-03-16 PROCEDURE — 80053 COMPREHEN METABOLIC PANEL: CPT

## 2021-03-16 PROCEDURE — 36415 COLL VENOUS BLD VENIPUNCTURE: CPT

## 2021-03-16 PROCEDURE — 80061 LIPID PANEL: CPT

## 2021-03-24 ENCOUNTER — OFFICE VISIT (OUTPATIENT)
Dept: INTERNAL MEDICINE CLINIC | Facility: CLINIC | Age: 71
End: 2021-03-24
Payer: MEDICARE

## 2021-03-24 VITALS
OXYGEN SATURATION: 98 % | BODY MASS INDEX: 32.39 KG/M2 | SYSTOLIC BLOOD PRESSURE: 122 MMHG | WEIGHT: 176 LBS | TEMPERATURE: 98.6 F | HEART RATE: 80 BPM | RESPIRATION RATE: 16 BRPM | DIASTOLIC BLOOD PRESSURE: 70 MMHG | HEIGHT: 62 IN

## 2021-03-24 DIAGNOSIS — M47.812 CERVICAL SPINE ARTHRITIS: ICD-10-CM

## 2021-03-24 DIAGNOSIS — M48.02 CERVICAL SPINAL STENOSIS: ICD-10-CM

## 2021-03-24 DIAGNOSIS — K21.9 GASTROESOPHAGEAL REFLUX DISEASE WITHOUT ESOPHAGITIS: ICD-10-CM

## 2021-03-24 DIAGNOSIS — I10 BENIGN ESSENTIAL HTN: ICD-10-CM

## 2021-03-24 DIAGNOSIS — E78.2 MIXED HYPERLIPIDEMIA: ICD-10-CM

## 2021-03-24 DIAGNOSIS — M54.12 CERVICAL RADICULOPATHY: Primary | ICD-10-CM

## 2021-03-24 DIAGNOSIS — R73.01 IFG (IMPAIRED FASTING GLUCOSE): ICD-10-CM

## 2021-03-24 PROCEDURE — 99214 OFFICE O/P EST MOD 30 MIN: CPT | Performed by: INTERNAL MEDICINE

## 2021-03-24 RX ORDER — CYCLOBENZAPRINE HCL 5 MG
5 TABLET ORAL
Qty: 14 TABLET | Refills: 0 | Status: SHIPPED | OUTPATIENT
Start: 2021-03-24 | End: 2021-07-29 | Stop reason: SDUPTHER

## 2021-03-24 RX ORDER — METHYLPREDNISOLONE 4 MG/1
TABLET ORAL
Qty: 21 EACH | Refills: 0 | Status: SHIPPED | OUTPATIENT
Start: 2021-03-24 | End: 2021-07-29

## 2021-03-24 NOTE — ASSESSMENT & PLAN NOTE
-slight improvement with lifestyle modifications though ascvd risk remains high  -patient prefers off prescription medication  -encouraged to continue weight loss efforts

## 2021-03-24 NOTE — ASSESSMENT & PLAN NOTE
-normotensive  -encouraged to continue with weight loss efforts  -hctz 12 5mg daily and benazepril 20mg daily   -monitor BMP

## 2021-03-24 NOTE — PROGRESS NOTES
Assessment/Plan:    Problem List Items Addressed This Visit        Digestive    Gastroesophageal reflux disease without esophagitis     -less episodes with dietary changes  -uses omeprazole prn            Endocrine    IFG (impaired fasting glucose)     -improved with dietary changes  -will monitor            Cardiovascular and Mediastinum    Benign essential HTN     -normotensive  -encouraged to continue with weight loss efforts  -hctz 12 5mg daily and benazepril 20mg daily   -monitor BMP         Relevant Orders    Lipid panel    Comprehensive metabolic panel       Nervous and Auditory    Cervical radiculopathy - Primary     -has underlying foraminal stenosis and arthritis  -has current flare     -did not get relief with epidurals  Already on gabapentin  -plan for medrol dose cullen and flexeril 5mg qhs prn, side effects discussed  -continue with exercises  -if still symptomatic, then consider increase gabapentin         Relevant Medications    methylPREDNISolone 4 MG tablet therapy pack    cyclobenzaprine (FLEXERIL) 5 mg tablet       Musculoskeletal and Integument    Cervical spine arthritis    Relevant Medications    methylPREDNISolone 4 MG tablet therapy pack    cyclobenzaprine (FLEXERIL) 5 mg tablet       Other    Cervical spinal stenosis    Relevant Medications    methylPREDNISolone 4 MG tablet therapy pack    cyclobenzaprine (FLEXERIL) 5 mg tablet    Mixed hyperlipidemia     -slight improvement with lifestyle modifications though ascvd risk remains high  -patient prefers off prescription medication  -encouraged to continue weight loss efforts  Subjective:      Patient ID: Yash Hernandez is a 66 y o  female  75yo female with GERD, IFG, HTN, knee OA, HLD, fibromyalgia, chronic LBP, c-spine radiculopathy, MDD here for follow up care  She is accompanied by her   She completed COVID vaccinations      She stopped getting shots in her neck and back but is now having pain in that area with numbness going down her arms  She is doing exercises that she was taught in PT  She is now getting HA  Occuring for one month  Hypertension  This is a chronic problem  The current episode started more than 1 year ago  The problem is controlled  Associated symptoms include headaches, neck pain and peripheral edema  Pertinent negatives include no chest pain, palpitations or shortness of breath  Risk factors for coronary artery disease include post-menopausal state and obesity  Past treatments include ACE inhibitors and diuretics  Hyperlipidemia  This is a chronic problem  The current episode started more than 1 year ago  Condition status: improving  Recent lipid tests were reviewed and are variable  Exacerbating diseases include obesity  Associated symptoms include myalgias  Pertinent negatives include no chest pain or shortness of breath  She is currently on no antihyperlipidemic treatment  Risk factors for coronary artery disease include hypertension, obesity, dyslipidemia and post-menopausal      With diet changes, her reflux has been better  Sometimes she does not need to take omeprazole      The following portions of the patient's history were reviewed and updated as appropriate: allergies, current medications, past family history, past medical history, past social history, past surgical history and problem list       Current Outpatient Medications:     benazepril (LOTENSIN) 20 mg tablet, Take 1 tablet (20 mg total) by mouth daily, Disp: 90 tablet, Rfl: 1    cholecalciferol (VITAMIN D3) 1,000 units tablet, Take 1,000 Units by mouth daily, Disp: , Rfl:     diclofenac potassium (CATAFLAM) 50 mg tablet, TAKE 1 TABLET (50 MG TOTAL) BY MOUTH 2 (TWO) TIMES A DAY AS NEEDED FOR PAIN, Disp: 60 tablet, Rfl: 1    DULoxetine (CYMBALTA) 60 mg delayed release capsule, TAKE 1 CAPSULE BY MOUTH EVERY DAY, Disp: 90 capsule, Rfl: 1    gabapentin (NEURONTIN) 300 mg capsule, Take 1 capsule (300 mg total) by mouth 3 (three) times a day, Disp: 90 capsule, Rfl: 2    hydrochlorothiazide (HYDRODIURIL) 12 5 mg tablet, Take 1 tablet (12 5 mg total) by mouth daily, Disp: 90 tablet, Rfl: 1    omeprazole (PriLOSEC) 10 mg delayed release capsule, Take 10 mg by mouth daily  , Disp: , Rfl:     SUPER B COMPLEX/C PO, Take 1 tablet by mouth daily  , Disp: , Rfl:     cyclobenzaprine (FLEXERIL) 5 mg tablet, Take 1 tablet (5 mg total) by mouth daily at bedtime as needed for muscle spasms, Disp: 14 tablet, Rfl: 0    methylPREDNISolone 4 MG tablet therapy pack, Use as directed on package, Disp: 21 each, Rfl: 0    Review of Systems   Constitutional: Positive for activity change (walking more), appetite change (decreased portion sizes) and unexpected weight change (weight loss)  HENT: Positive for sneezing and voice change  Negative for postnasal drip, rhinorrhea and sore throat  Respiratory: Negative for cough, chest tightness, shortness of breath and wheezing  Cardiovascular: Positive for leg swelling  Negative for chest pain and palpitations  Gastrointestinal: Negative for abdominal pain, constipation, diarrhea, nausea and vomiting  Occasional reflux   Musculoskeletal: Positive for arthralgias, back pain, myalgias, neck pain and neck stiffness  Neurological: Positive for dizziness and headaches  Objective:    /70 (BP Location: Left arm, Patient Position: Sitting)   Pulse 80   Temp 98 6 °F (37 °C) (Tympanic)   Resp 16   Ht 5' 2" (1 575 m)   Wt 79 8 kg (176 lb)   SpO2 98%   BMI 32 19 kg/m²      Physical Exam  Vitals signs reviewed  Constitutional:       General: She is not in acute distress  Appearance: Normal appearance  She is obese  Neck:      Musculoskeletal: Normal range of motion and neck supple  No muscular tenderness  Cardiovascular:      Rate and Rhythm: Normal rate and regular rhythm  Pulses: Normal pulses  Heart sounds: Normal heart sounds     Pulmonary:      Effort: Pulmonary effort is normal  No respiratory distress  Breath sounds: Normal breath sounds  No wheezing  Abdominal:      General: Bowel sounds are normal  There is no distension  Palpations: Abdomen is soft  Tenderness: There is no abdominal tenderness  Musculoskeletal:      Right lower leg: Edema (trace) present  Left lower leg: Edema (trace) present  Comments: Paracervical hypertonicity   strength 5/5 BL   Skin:     Coloration: Skin is not jaundiced or pale  Neurological:      Mental Status: She is alert and oriented to person, place, and time  Psychiatric:         Attention and Perception: Attention normal          Mood and Affect: Mood and affect normal          Speech: Speech normal          Behavior: Behavior normal            Recent Results (from the past 504 hour(s))   Hepatitis C antibody    Collection Time: 03/16/21  8:11 AM   Result Value Ref Range    Hepatitis C Ab Non-reactive Non-reactive   Lipid panel    Collection Time: 03/16/21  8:11 AM   Result Value Ref Range    Cholesterol 188 50 - 200 mg/dL    Triglycerides 118 <=150 mg/dL    HDL, Direct 50 >=40 mg/dL    LDL Calculated 114 (H) 0 - 100 mg/dL    Non-HDL-Chol (CHOL-HDL) 138 mg/dl   Comprehensive metabolic panel    Collection Time: 03/16/21  8:11 AM   Result Value Ref Range    Sodium 139 136 - 145 mmol/L    Potassium 4 0 3 5 - 5 3 mmol/L    Chloride 106 100 - 108 mmol/L    CO2 26 21 - 32 mmol/L    ANION GAP 7 4 - 13 mmol/L    BUN 18 5 - 25 mg/dL    Creatinine 0 70 0 60 - 1 30 mg/dL    Glucose, Fasting 98 65 - 99 mg/dL    Calcium 9 1 8 3 - 10 1 mg/dL    AST 24 5 - 45 U/L    ALT 48 12 - 78 U/L    Alkaline Phosphatase 80 46 - 116 U/L    Total Protein 6 9 6 4 - 8 2 g/dL    Albumin 3 8 3 5 - 5 0 g/dL    Total Bilirubin 0 45 0 20 - 1 00 mg/dL    eGFR 86 ml/min/1 73sq m     BMI Counseling: Body mass index is 32 19 kg/m²   The BMI is above normal  Nutrition recommendations include decreasing portion sizes, consuming healthier snacks, limiting drinks that contain sugar, moderation in carbohydrate intake and reducing intake of cholesterol  Exercise recommendations include exercising 3-5 times per week and strength training exercises  No pharmacotherapy was ordered

## 2021-03-24 NOTE — ASSESSMENT & PLAN NOTE
-has underlying foraminal stenosis and arthritis  -has current flare     -did not get relief with epidurals  Already on gabapentin  -plan for medrol dose cullen and flexeril 5mg qhs prn, side effects discussed    -continue with exercises  -if still symptomatic, then consider increase gabapentin

## 2021-04-10 DIAGNOSIS — M54.12 CERVICAL RADICULOPATHY: ICD-10-CM

## 2021-04-12 ENCOUNTER — TELEPHONE (OUTPATIENT)
Dept: PAIN MEDICINE | Facility: MEDICAL CENTER | Age: 71
End: 2021-04-12

## 2021-04-12 DIAGNOSIS — M54.12 CERVICAL RADICULOPATHY: ICD-10-CM

## 2021-04-12 RX ORDER — GABAPENTIN 300 MG/1
300 CAPSULE ORAL 3 TIMES DAILY
Qty: 90 CAPSULE | Refills: 2 | Status: SHIPPED | OUTPATIENT
Start: 2021-04-12 | End: 2021-07-06 | Stop reason: SDUPTHER

## 2021-04-12 RX ORDER — GABAPENTIN 300 MG/1
CAPSULE ORAL
Qty: 270 CAPSULE | OUTPATIENT
Start: 2021-04-12

## 2021-04-12 NOTE — TELEPHONE ENCOUNTER
Pt contacted Call Center requested refill of their medication  Medication Name:gabapentin (NEURONTIN          Dosage of Med:300 mg     Frequency of Med:Take 1 capsule (300 mg total) by mouth 3 (three) times a day      Remaining Medication: 1 day supply       Pharmacy and Location:  [facility]  [address]  [id number]      Pt  Preferred Callback Phone Number:  [phone]      Thank you

## 2021-04-26 ENCOUNTER — HOSPITAL ENCOUNTER (OUTPATIENT)
Dept: MAMMOGRAPHY | Facility: MEDICAL CENTER | Age: 71
Discharge: HOME | End: 2021-04-26
Payer: MEDICARE

## 2021-04-26 VITALS — HEIGHT: 62 IN | BODY MASS INDEX: 32.39 KG/M2 | WEIGHT: 176 LBS

## 2021-04-26 DIAGNOSIS — Z12.31 ENCOUNTER FOR SCREENING MAMMOGRAM FOR MALIGNANT NEOPLASM OF BREAST: ICD-10-CM

## 2021-04-26 PROCEDURE — 77063 BREAST TOMOSYNTHESIS BI: CPT

## 2021-04-26 PROCEDURE — 77067 SCR MAMMO BI INCL CAD: CPT

## 2021-04-30 ENCOUNTER — OFFICE VISIT (OUTPATIENT)
Dept: OBGYN CLINIC | Facility: MEDICAL CENTER | Age: 71
End: 2021-04-30
Payer: MEDICARE

## 2021-04-30 VITALS
WEIGHT: 176 LBS | BODY MASS INDEX: 32.19 KG/M2 | SYSTOLIC BLOOD PRESSURE: 147 MMHG | DIASTOLIC BLOOD PRESSURE: 89 MMHG | HEART RATE: 82 BPM | TEMPERATURE: 98.5 F

## 2021-04-30 DIAGNOSIS — M17.0 PRIMARY OSTEOARTHRITIS OF BOTH KNEES: Primary | ICD-10-CM

## 2021-04-30 PROCEDURE — 20610 DRAIN/INJ JOINT/BURSA W/O US: CPT | Performed by: ORTHOPAEDIC SURGERY

## 2021-04-30 PROCEDURE — 99213 OFFICE O/P EST LOW 20 MIN: CPT | Performed by: ORTHOPAEDIC SURGERY

## 2021-04-30 RX ORDER — METHYLPREDNISOLONE ACETATE 40 MG/ML
2 INJECTION, SUSPENSION INTRA-ARTICULAR; INTRALESIONAL; INTRAMUSCULAR; SOFT TISSUE
Status: COMPLETED | OUTPATIENT
Start: 2021-04-30 | End: 2021-04-30

## 2021-04-30 RX ORDER — LIDOCAINE HYDROCHLORIDE 10 MG/ML
3 INJECTION, SOLUTION INFILTRATION; PERINEURAL
Status: COMPLETED | OUTPATIENT
Start: 2021-04-30 | End: 2021-04-30

## 2021-04-30 RX ORDER — DICLOFENAC SODIUM 75 MG/1
75 TABLET, DELAYED RELEASE ORAL 2 TIMES DAILY
Qty: 60 TABLET | Refills: 1 | Status: SHIPPED | OUTPATIENT
Start: 2021-04-30 | End: 2021-06-25

## 2021-04-30 RX ADMIN — LIDOCAINE HYDROCHLORIDE 3 ML: 10 INJECTION, SOLUTION INFILTRATION; PERINEURAL at 10:10

## 2021-04-30 RX ADMIN — METHYLPREDNISOLONE ACETATE 2 ML: 40 INJECTION, SUSPENSION INTRA-ARTICULAR; INTRALESIONAL; INTRAMUSCULAR; SOFT TISSUE at 10:10

## 2021-04-30 NOTE — PROGRESS NOTES
Assessment/Plan:  1  Primary osteoarthritis of both knees      Orders Placed This Encounter   Procedures    Large joint arthrocentesis: bilateral knee     · Received bilateral knee  steroid injection today  Patient knows to ice and avoid strenuous activity for 1-2 days if needed  · Continue taking Tylenol ,Diclofenac 75 mg and using Diclofenac gel as needed for pain  · Refill of Diclofenac 75 mg was sent to the pharmacy today   · Continue with stretching exercises  Encouraged patient to work on her extension especially on the right knee   Return in about 3 months (around 7/30/2021) for Recheck Bilateral knee   I answered all of the patient's questions during the visit and provided education of the patient's condition during the visit  The patient verbalized understanding of the information given and agrees with the plan  This note was dictated using [employer] software  It may contain errors including improperly dictated words  Please contact physician directly for any questions  Subjective   Chief Complaint: No chief complaint on file  HPI  Gabino Farrar is a 66 y o  female who presents for follow up for  Bilateral knee pain due to moderate to severe osteoarthritis  Patient had bilateral knee steroid injections on 01/29/2021 had relief until a week ago  She states she is having generalized achy pain  bilateral knee  She does state locking at times  She has been taking diclofenac 75 mg and Tylenol as needed for pain with relief  She has been doing stretching exercises daily  She also has been walking daily for exercise  She denies any numbness or tingling  Review of Systems  ROS:    See HPI for musculoskeletal review     All other systems reviewed are negative     History:  Past Medical History:   Diagnosis Date    Arthritis     Fatty liver 5/1/2018    Hypertension     Low back pain     Neck pain     Primary localized osteoarthritis of right knee 8/17/2017     No past surgical history on file    Social History   Social History     Substance and Sexual Activity   Alcohol Use No     Social History     Substance and Sexual Activity   Drug Use Never     Social History     Tobacco Use   Smoking Status Never Smoker   Smokeless Tobacco Never Used     Family History:   Family History   Problem Relation Age of Onset    Osteoporosis Mother     Skin cancer Father 54    Stomach cancer Sister 52    No Known Problems Daughter     No Known Problems Maternal Grandmother     No Known Problems Maternal Grandfather     No Known Problems Paternal Grandmother     No Known Problems Paternal Grandfather     No Known Problems Maternal Aunt     No Known Problems Maternal Aunt     No Known Problems Maternal Aunt     No Known Problems Maternal Aunt     No Known Problems Paternal Aunt        Current Outpatient Medications on File Prior to Visit   Medication Sig Dispense Refill    benazepril (LOTENSIN) 20 mg tablet Take 1 tablet (20 mg total) by mouth daily 90 tablet 1    cholecalciferol (VITAMIN D3) 1,000 units tablet Take 1,000 Units by mouth daily      cyclobenzaprine (FLEXERIL) 5 mg tablet Take 1 tablet (5 mg total) by mouth daily at bedtime as needed for muscle spasms 14 tablet 0    diclofenac potassium (CATAFLAM) 50 mg tablet TAKE 1 TABLET (50 MG TOTAL) BY MOUTH 2 (TWO) TIMES A DAY AS NEEDED FOR PAIN 60 tablet 1    DULoxetine (CYMBALTA) 60 mg delayed release capsule TAKE 1 CAPSULE BY MOUTH EVERY DAY 90 capsule 1    gabapentin (NEURONTIN) 300 mg capsule Take 1 capsule (300 mg total) by mouth 3 (three) times a day 90 capsule 2    hydrochlorothiazide (HYDRODIURIL) 12 5 mg tablet Take 1 tablet (12 5 mg total) by mouth daily 90 tablet 1    methylPREDNISolone 4 MG tablet therapy pack Use as directed on package 21 each 0    omeprazole (PriLOSEC) 10 mg delayed release capsule Take 10 mg by mouth daily        SUPER B COMPLEX/C PO Take 1 tablet by mouth daily         No current facility-administered medications on file prior to visit  Allergies   Allergen Reactions    Penicillins         Objective     /89   Pulse 82   Temp 98 5 °F (36 9 °C)   Wt 79 8 kg (176 lb)   BMI 32 19 kg/m²      PE:  AAOx 3  WDWN  Hearing intact, no drainage from eyes  no audible wheezing  no abdominal distension  LE compartments soft, skin intact    Ortho Exam:  bilateral Knee:   No erythema  no swelling  no effusion  no warmth  AROM: Right 5-110/ Left 0-110   Stable to varus/valgus stress    Large joint arthrocentesis: bilateral knee  Universal Protocol:  Consent: Verbal consent obtained  Risks and benefits: risks, benefits and alternatives were discussed  Consent given by: patient  Time out: Immediately prior to procedure a "time out" was called to verify the correct patient, procedure, equipment, support staff and site/side marked as required    Timeout called at: 4/30/2021 10:10 AM   Patient understanding: patient states understanding of the procedure being performed  Site marked: the operative site was marked  Supporting Documentation  Indications: pain   Procedure Details  Location: knee - bilateral knee  Preparation: Patient was prepped and draped in the usual sterile fashion  Needle size: 22 G  Ultrasound guidance: no  Approach: anterolateral    Medications (Right): 3 mL lidocaine 1 %; 2 mL methylPREDNISolone acetate 40 mg/mLMedications (Left): 3 mL lidocaine 1 %; 2 mL methylPREDNISolone acetate 40 mg/mL   Patient tolerance: patient tolerated the procedure well with no immediate complications  Dressing:  Sterile dressing applied            Scribe Attestation    I,:  Wendy Gasca am acting as a scribe while in the presence of the attending physician :       I,:  aMrko Alfredo DO personally performed the services described in this documentation    as scribed in my presence :

## 2021-05-13 LAB
LEFT EYE DIABETIC RETINOPATHY: NORMAL
RIGHT EYE DIABETIC RETINOPATHY: NORMAL

## 2021-06-25 DIAGNOSIS — M17.0 PRIMARY OSTEOARTHRITIS OF BOTH KNEES: ICD-10-CM

## 2021-06-25 RX ORDER — DICLOFENAC SODIUM 75 MG/1
TABLET, DELAYED RELEASE ORAL
Qty: 60 TABLET | Refills: 1 | Status: SHIPPED | OUTPATIENT
Start: 2021-06-25 | End: 2021-08-24

## 2021-07-06 ENCOUNTER — TELEPHONE (OUTPATIENT)
Dept: PAIN MEDICINE | Facility: MEDICAL CENTER | Age: 71
End: 2021-07-06

## 2021-07-06 DIAGNOSIS — M54.12 CERVICAL RADICULOPATHY: ICD-10-CM

## 2021-07-06 RX ORDER — GABAPENTIN 300 MG/1
300 CAPSULE ORAL 3 TIMES DAILY
Qty: 90 CAPSULE | Refills: 2 | Status: SHIPPED | OUTPATIENT
Start: 2021-07-06 | End: 2021-10-11 | Stop reason: SDUPTHER

## 2021-07-06 NOTE — TELEPHONE ENCOUNTER
Pt contacted Call Center requested refill of their medication  Medication Name:gabapentin (NEURONTIN        Dosage of Med: 300 mg       Frequency of Med:  Take 1 capsule (300 mg total) by mouth 3 (three) times a day      Remaining Medication:A few           Pharmacy and Location: [place] [address], [facility]   Phone:  [phone]          Pt  Preferred Callback Phone Number: [phone]      Thank you 
Please advise 
Please let patient know that refill was sent to pharmacy on file 
S/W pt  Advised pt of the same  Pt verbalized understanding 
Normal rate, regular rhythm.  Heart sounds S1, S2.  No murmurs, rubs or gallops.

## 2021-07-07 ENCOUNTER — APPOINTMENT (OUTPATIENT)
Dept: LAB | Age: 71
End: 2021-07-07
Payer: MEDICARE

## 2021-07-07 DIAGNOSIS — I10 BENIGN ESSENTIAL HTN: ICD-10-CM

## 2021-07-07 LAB
ALBUMIN SERPL BCP-MCNC: 3.6 G/DL (ref 3.5–5)
ALP SERPL-CCNC: 80 U/L (ref 46–116)
ALT SERPL W P-5'-P-CCNC: 53 U/L (ref 12–78)
ANION GAP SERPL CALCULATED.3IONS-SCNC: 4 MMOL/L (ref 4–13)
AST SERPL W P-5'-P-CCNC: 27 U/L (ref 5–45)
BILIRUB SERPL-MCNC: 0.45 MG/DL (ref 0.2–1)
BUN SERPL-MCNC: 17 MG/DL (ref 5–25)
CALCIUM SERPL-MCNC: 9.3 MG/DL (ref 8.3–10.1)
CHLORIDE SERPL-SCNC: 107 MMOL/L (ref 100–108)
CHOLEST SERPL-MCNC: 189 MG/DL (ref 50–200)
CO2 SERPL-SCNC: 28 MMOL/L (ref 21–32)
CREAT SERPL-MCNC: 0.66 MG/DL (ref 0.6–1.3)
GFR SERPL CREATININE-BSD FRML MDRD: 88 ML/MIN/1.73SQ M
GLUCOSE P FAST SERPL-MCNC: 108 MG/DL (ref 65–99)
HDLC SERPL-MCNC: 44 MG/DL
LDLC SERPL CALC-MCNC: 102 MG/DL (ref 0–100)
NONHDLC SERPL-MCNC: 145 MG/DL
POTASSIUM SERPL-SCNC: 3.8 MMOL/L (ref 3.5–5.3)
PROT SERPL-MCNC: 6.7 G/DL (ref 6.4–8.2)
SODIUM SERPL-SCNC: 139 MMOL/L (ref 136–145)
TRIGL SERPL-MCNC: 215 MG/DL

## 2021-07-07 PROCEDURE — 80053 COMPREHEN METABOLIC PANEL: CPT

## 2021-07-07 PROCEDURE — 80061 LIPID PANEL: CPT

## 2021-07-07 PROCEDURE — 36415 COLL VENOUS BLD VENIPUNCTURE: CPT

## 2021-07-13 DIAGNOSIS — I10 BENIGN ESSENTIAL HTN: ICD-10-CM

## 2021-07-13 RX ORDER — BENAZEPRIL HYDROCHLORIDE 20 MG/1
TABLET ORAL
Qty: 90 TABLET | Refills: 1 | Status: SHIPPED | OUTPATIENT
Start: 2021-07-13 | End: 2022-01-05

## 2021-07-26 DIAGNOSIS — I10 BENIGN ESSENTIAL HTN: ICD-10-CM

## 2021-07-26 DIAGNOSIS — R60.0 LOWER EXTREMITY EDEMA: ICD-10-CM

## 2021-07-26 RX ORDER — HYDROCHLOROTHIAZIDE 12.5 MG/1
TABLET ORAL
Qty: 90 TABLET | Refills: 1 | Status: SHIPPED | OUTPATIENT
Start: 2021-07-26 | End: 2022-01-24

## 2021-07-29 ENCOUNTER — OFFICE VISIT (OUTPATIENT)
Dept: INTERNAL MEDICINE CLINIC | Facility: CLINIC | Age: 71
End: 2021-07-29
Payer: MEDICARE

## 2021-07-29 VITALS
SYSTOLIC BLOOD PRESSURE: 132 MMHG | TEMPERATURE: 97.7 F | BODY MASS INDEX: 33.9 KG/M2 | RESPIRATION RATE: 16 BRPM | HEIGHT: 62 IN | OXYGEN SATURATION: 98 % | DIASTOLIC BLOOD PRESSURE: 88 MMHG | WEIGHT: 184.2 LBS | HEART RATE: 87 BPM

## 2021-07-29 DIAGNOSIS — R06.83 SNORING: ICD-10-CM

## 2021-07-29 DIAGNOSIS — F32.5 MAJOR DEPRESSIVE DISORDER WITH SINGLE EPISODE, IN FULL REMISSION (HCC): ICD-10-CM

## 2021-07-29 DIAGNOSIS — E78.2 MIXED HYPERLIPIDEMIA: ICD-10-CM

## 2021-07-29 DIAGNOSIS — M48.02 CERVICAL SPINAL STENOSIS: ICD-10-CM

## 2021-07-29 DIAGNOSIS — R73.01 IFG (IMPAIRED FASTING GLUCOSE): ICD-10-CM

## 2021-07-29 DIAGNOSIS — I10 BENIGN ESSENTIAL HTN: ICD-10-CM

## 2021-07-29 DIAGNOSIS — M54.12 CERVICAL RADICULOPATHY: ICD-10-CM

## 2021-07-29 DIAGNOSIS — E66.09 CLASS 1 OBESITY DUE TO EXCESS CALORIES WITH SERIOUS COMORBIDITY AND BODY MASS INDEX (BMI) OF 33.0 TO 33.9 IN ADULT: ICD-10-CM

## 2021-07-29 DIAGNOSIS — G47.19 EXCESSIVE DAYTIME SLEEPINESS: Primary | ICD-10-CM

## 2021-07-29 DIAGNOSIS — M47.812 CERVICAL SPINE ARTHRITIS: ICD-10-CM

## 2021-07-29 PROCEDURE — 99214 OFFICE O/P EST MOD 30 MIN: CPT | Performed by: INTERNAL MEDICINE

## 2021-07-29 RX ORDER — CYCLOBENZAPRINE HCL 5 MG
5 TABLET ORAL
Qty: 28 TABLET | Refills: 0 | Status: SHIPPED | OUTPATIENT
Start: 2021-07-29 | End: 2021-09-10 | Stop reason: SDUPTHER

## 2021-07-29 NOTE — ASSESSMENT & PLAN NOTE
-diastolic elevated though home BP are adequate  -continue hctz 12 5mg daily and benazepril 20mg daily  -monitor BMP  -adhere to low sodium diet and weight loss

## 2021-07-29 NOTE — PROGRESS NOTES
Assessment/Plan:    Problem List Items Addressed This Visit        Endocrine    IFG (impaired fasting glucose)     -check A1c           Relevant Orders    Hemoglobin A1C    Comprehensive metabolic panel       Cardiovascular and Mediastinum    Benign essential HTN     -diastolic elevated though home BP are adequate  -continue hctz 12 5mg daily and benazepril 20mg daily  -monitor BMP  -adhere to low sodium diet and weight loss         Relevant Orders    Comprehensive metabolic panel    UA (URINE) with reflex to Scope       Nervous and Auditory    Cervical radiculopathy     -with underlying foraminal stenosis and arthritis  -renew flexeril for prn use  -continue stretches         Relevant Medications    cyclobenzaprine (FLEXERIL) 5 mg tablet       Musculoskeletal and Integument    Cervical spine arthritis    Relevant Medications    cyclobenzaprine (FLEXERIL) 5 mg tablet       Other    Cervical spinal stenosis    Relevant Medications    cyclobenzaprine (FLEXERIL) 5 mg tablet    Major depressive disorder with single episode, in full remission ([facility] [address] )    Relevant Orders    Home Study    Mixed hyperlipidemia    Relevant Orders    Lipid panel    Comprehensive metabolic panel      Other Visit Diagnoses     Excessive daytime sleepiness    -  Primary    -sx of snoring, fatigue, morning HA, MDD, obesity concerning for sleep apnea  -will send for HST    Relevant Orders    Home Study    Snoring        Relevant Orders    Home Study    Class 1 obesity due to excess calories with serious comorbidity and body mass index (BMI) of 33 0 to 33 9 in adult        Relevant Orders    Home Study          Subjective:      Patient ID: Gabriel Ludwig is a 66 y o  female  75yo female with HTN, HLD, IFG, fibromyalgia, chronic LBP, c-spine radiculopathy, knee OA, GERD, MDD for follow up care  She is accompanied by her   Reports she had good response with medrol dose cullen and flexeril  Sometimes her neck tightens up and stretches      She is now receiving cortisone injection in her bilateral knees  Last injection was 4/30 but did not last too long  Some days she cannot move her knees  Applies heat  Has difficulty going down the steps  Does not use diclofenac everyday  Blood Sugar Problem  This is a chronic problem  The current episode started more than 1 year ago  The problem has been waxing and waning  Associated symptoms include arthralgias, fatigue, headaches, joint swelling (R foot), myalgias, neck pain and numbness  Pertinent negatives include no abdominal pain, chest pain, coughing, nausea or vomiting  Hypertension  This is a chronic problem  The current episode started more than 1 year ago  Condition status: home BP around 120/70s  Associated symptoms include headaches, neck pain and shortness of breath (MEYER)  Pertinent negatives include no chest pain or palpitations  Risk factors for coronary artery disease include obesity, sedentary lifestyle and post-menopausal state  Past treatments include ACE inhibitors and diuretics  Hyperlipidemia  This is a chronic problem  The current episode started more than 1 year ago  The problem is uncontrolled  Recent lipid tests were reviewed and are high  Exacerbating diseases include diabetes and obesity  Associated symptoms include myalgias and shortness of breath (MEYER)  Pertinent negatives include no chest pain  She is currently on no antihyperlipidemic treatment  Risk factors for coronary artery disease include hypertension, dyslipidemia, obesity, post-menopausal and a sedentary lifestyle  She has daytime sleepiness and disrupted sleep overnight  Patient snores, has morning HA and has nocturia      The following portions of the patient's history were reviewed and updated as appropriate: allergies, current medications, past family history, past medical history, past social history, past surgical history and problem list       Current Outpatient Medications:     benazepril (LOTENSIN) 20 mg tablet, TAKE 1 TABLET BY MOUTH EVERY DAY, Disp: 90 tablet, Rfl: 1    cholecalciferol (VITAMIN D3) 1,000 units tablet, Take 1,000 Units by mouth daily, Disp: , Rfl:     diclofenac (VOLTAREN) 75 mg EC tablet, TAKE 1 TABLET (75 MG TOTAL) BY MOUTH 2 (TWO) TIMES A DAY AS NEEDED FOR PAIN, Disp: 60 tablet, Rfl: 1    DULoxetine (CYMBALTA) 60 mg delayed release capsule, TAKE 1 CAPSULE BY MOUTH EVERY DAY, Disp: 90 capsule, Rfl: 1    gabapentin (NEURONTIN) 300 mg capsule, Take 1 capsule (300 mg total) by mouth 3 (three) times a day, Disp: 90 capsule, Rfl: 2    hydrochlorothiazide (HYDRODIURIL) 12 5 mg tablet, TAKE 1 TABLET BY MOUTH EVERY DAY, Disp: 90 tablet, Rfl: 1    omeprazole (PriLOSEC) 10 mg delayed release capsule, Take 10 mg by mouth daily  , Disp: , Rfl:     SUPER B COMPLEX/C PO, Take 1 tablet by mouth daily  , Disp: , Rfl:     cyclobenzaprine (FLEXERIL) 5 mg tablet, Take 1 tablet (5 mg total) by mouth daily at bedtime as needed for muscle spasms, Disp: 28 tablet, Rfl: 0    diclofenac potassium (CATAFLAM) 50 mg tablet, TAKE 1 TABLET (50 MG TOTAL) BY MOUTH 2 (TWO) TIMES A DAY AS NEEDED FOR PAIN (Patient not taking: Reported on 7/29/2021), Disp: 60 tablet, Rfl: 1    Review of Systems   Constitutional: Positive for fatigue and unexpected weight change (weight gain)  Negative for activity change and appetite change  Respiratory: Positive for shortness of breath (MEYER) and wheezing (occasional)  Negative for cough and chest tightness  Cardiovascular: Negative for chest pain, palpitations and leg swelling  Gastrointestinal: Negative for abdominal pain, constipation, diarrhea, nausea and vomiting  Occasional heartburn   Genitourinary:        Nocturia   Musculoskeletal: Positive for arthralgias, joint swelling (R foot), myalgias, neck pain and neck stiffness  Neurological: Positive for numbness and headaches  Negative for dizziness  Psychiatric/Behavioral: Positive for sleep disturbance  Objective:    /88 (BP Location: Left arm, Patient Position: Sitting)   Pulse 87   Temp 97 7 °F (36 5 °C)   Resp 16   Ht 5' 2" (1 575 m)   Wt 83 6 kg (184 lb 3 2 oz)   SpO2 98%   BMI 33 69 kg/m²      Physical Exam  Vitals reviewed  Constitutional:       General: She is not in acute distress  Appearance: Normal appearance  She is obese  She is not diaphoretic  HENT:      Mouth/Throat:      Mouth: Mucous membranes are moist       Pharynx: Oropharynx is clear  No oropharyngeal exudate or posterior oropharyngeal erythema  Comments: dentures  Cardiovascular:      Rate and Rhythm: Normal rate and regular rhythm  Pulses: Normal pulses  Heart sounds: Normal heart sounds  Pulmonary:      Effort: Pulmonary effort is normal  No respiratory distress  Breath sounds: Normal breath sounds  No wheezing  Musculoskeletal:      Cervical back: Neck supple  No tenderness  Decreased range of motion (decreased sidebending)  Right lower leg: No edema  Left lower leg: No edema  Lymphadenopathy:      Cervical: No cervical adenopathy  Neurological:      Mental Status: She is alert and oriented to person, place, and time  Psychiatric:         Attention and Perception: Attention normal          Mood and Affect: Mood normal          Speech: Speech normal          Behavior: Behavior is cooperative           Recent Results (from the past 672 hour(s))   Lipid panel    Collection Time: 07/07/21  8:09 AM   Result Value Ref Range    Cholesterol 189 50 - 200 mg/dL    Triglycerides 215 (H) <=150 mg/dL    HDL, Direct 44 >=40 mg/dL    LDL Calculated 102 (H) 0 - 100 mg/dL    Non-HDL-Chol (CHOL-HDL) 145 mg/dl   Comprehensive metabolic panel    Collection Time: 07/07/21  8:09 AM   Result Value Ref Range    Sodium 139 136 - 145 mmol/L    Potassium 3 8 3 5 - 5 3 mmol/L    Chloride 107 100 - 108 mmol/L    CO2 28 21 - 32 mmol/L    ANION GAP 4 4 - 13 mmol/L    BUN 17 5 - 25 mg/dL    Creatinine 0 66 0 60 - 1 30 mg/dL    Glucose, Fasting 108 (H) 65 - 99 mg/dL    Calcium 9 3 8 3 - 10 1 mg/dL    AST 27 5 - 45 U/L    ALT 53 12 - 78 U/L    Alkaline Phosphatase 80 46 - 116 U/L    Total Protein 6 7 6 4 - 8 2 g/dL    Albumin 3 6 3 5 - 5 0 g/dL    Total Bilirubin 0 45 0 20 - 1 00 mg/dL    eGFR 88 ml/min/1 73sq m

## 2021-07-30 ENCOUNTER — OFFICE VISIT (OUTPATIENT)
Dept: OBGYN CLINIC | Facility: MEDICAL CENTER | Age: 71
End: 2021-07-30
Payer: MEDICARE

## 2021-07-30 VITALS
HEART RATE: 79 BPM | WEIGHT: 187.8 LBS | BODY MASS INDEX: 34.56 KG/M2 | SYSTOLIC BLOOD PRESSURE: 141 MMHG | DIASTOLIC BLOOD PRESSURE: 84 MMHG | HEIGHT: 62 IN

## 2021-07-30 DIAGNOSIS — M17.0 BILATERAL PRIMARY OSTEOARTHRITIS OF KNEE: Primary | ICD-10-CM

## 2021-07-30 PROCEDURE — 99213 OFFICE O/P EST LOW 20 MIN: CPT | Performed by: PHYSICIAN ASSISTANT

## 2021-07-30 PROCEDURE — 20610 DRAIN/INJ JOINT/BURSA W/O US: CPT | Performed by: PHYSICIAN ASSISTANT

## 2021-07-30 RX ORDER — TRIAMCINOLONE ACETONIDE 40 MG/ML
20 INJECTION, SUSPENSION INTRA-ARTICULAR; INTRAMUSCULAR
Status: COMPLETED | OUTPATIENT
Start: 2021-07-30 | End: 2021-07-30

## 2021-07-30 RX ORDER — LIDOCAINE HYDROCHLORIDE 10 MG/ML
4 INJECTION, SOLUTION INFILTRATION; PERINEURAL
Status: COMPLETED | OUTPATIENT
Start: 2021-07-30 | End: 2021-07-30

## 2021-07-30 RX ADMIN — TRIAMCINOLONE ACETONIDE 20 MG: 40 INJECTION, SUSPENSION INTRA-ARTICULAR; INTRAMUSCULAR at 09:33

## 2021-07-30 RX ADMIN — LIDOCAINE HYDROCHLORIDE 4 ML: 10 INJECTION, SOLUTION INFILTRATION; PERINEURAL at 09:33

## 2021-07-30 NOTE — PROGRESS NOTES
Assessment/Plan:  1  Bilateral primary osteoarthritis of knee      Orders Placed This Encounter   Procedures    Large joint arthrocentesis     · Patient received bilateral knee steroid injections in the office today  Tolerated the procedure well  Advised to apply ice and avoid strenuous activity for 1-2 days as needed  · Continue diclofenac tabs and tylenol prn pain  · Encouraged home exercises to work on strength and range of motion  Return in about 3 months (around 10/30/2021) for bilat knee CSI  I answered all of the patient's questions during the visit and provided education of the patient's condition during the visit  The patient verbalized understanding of the information given and agrees with the plan  This note was dictated using [employer] software  It may contain errors including improperly dictated words  Please contact physician directly for any questions  Subjective   Chief Complaint:   Chief Complaint   Patient presents with    Left Knee - Follow-up    Right Knee - Follow-up       HPI  Elvia Mcconnell is a 66 y o  female who presents for follow up for bilateral knee osteoarthritis  Patient received bilateral knee steroid injections on 4/30/21 and reports several months of pain relief  Patient states the injections are still working really well for her  She notes bilateral knee pain with walking on uneven ground or walking down stairs  Patient is taking tylenol and diclofenac tabs as needed for pain  Patient reports doing home exercises to stretch her legs  She elevates her feet due to swelling  Patient is requesting repeat CSI today  Review of Systems  ROS:    See HPI for musculoskeletal review     All other systems reviewed are negative     History:  Past Medical History:   Diagnosis Date    Arthritis     Fatty liver 5/1/2018    Hypertension     Low back pain     Neck pain     Primary localized osteoarthritis of right knee 8/17/2017     No past surgical history on file   Social History   Social History     Substance and Sexual Activity   Alcohol Use No     Social History     Substance and Sexual Activity   Drug Use Never     Social History     Tobacco Use   Smoking Status Never Smoker   Smokeless Tobacco Never Used     Family History:   Family History   Problem Relation Age of Onset    Osteoporosis Mother     Skin cancer Father 54    Stomach cancer Sister 52    No Known Problems Daughter     No Known Problems Maternal Grandmother     No Known Problems Maternal Grandfather     No Known Problems Paternal Grandmother     No Known Problems Paternal Grandfather     No Known Problems Maternal Aunt     No Known Problems Maternal Aunt     No Known Problems Maternal Aunt     No Known Problems Maternal Aunt     No Known Problems Paternal Aunt        Current Outpatient Medications on File Prior to Visit   Medication Sig Dispense Refill    benazepril (LOTENSIN) 20 mg tablet TAKE 1 TABLET BY MOUTH EVERY DAY 90 tablet 1    cholecalciferol (VITAMIN D3) 1,000 units tablet Take 1,000 Units by mouth daily      cyclobenzaprine (FLEXERIL) 5 mg tablet Take 1 tablet (5 mg total) by mouth daily at bedtime as needed for muscle spasms 28 tablet 0    diclofenac (VOLTAREN) 75 mg EC tablet TAKE 1 TABLET (75 MG TOTAL) BY MOUTH 2 (TWO) TIMES A DAY AS NEEDED FOR PAIN 60 tablet 1    diclofenac potassium (CATAFLAM) 50 mg tablet TAKE 1 TABLET (50 MG TOTAL) BY MOUTH 2 (TWO) TIMES A DAY AS NEEDED FOR PAIN (Patient not taking: Reported on 7/29/2021) 60 tablet 1    DULoxetine (CYMBALTA) 60 mg delayed release capsule TAKE 1 CAPSULE BY MOUTH EVERY DAY 90 capsule 1    gabapentin (NEURONTIN) 300 mg capsule Take 1 capsule (300 mg total) by mouth 3 (three) times a day 90 capsule 2    hydrochlorothiazide (HYDRODIURIL) 12 5 mg tablet TAKE 1 TABLET BY MOUTH EVERY DAY 90 tablet 1    omeprazole (PriLOSEC) 10 mg delayed release capsule Take 10 mg by mouth daily        SUPER B COMPLEX/C PO Take 1 tablet by mouth daily         No current facility-administered medications on file prior to visit  Allergies   Allergen Reactions    Penicillins         Objective     /84   Pulse 79   Ht 5' 2" (1 575 m)   Wt 85 2 kg (187 lb 12 8 oz)   BMI 34 35 kg/m²      PE:  AAOx 3  WDWN  Hearing intact, no drainage from eyes  no audible wheezing  no abdominal distension  LE compartments soft, skin intact    Ortho Exam:  right Knee:   No erythema  no swelling  no effusion  no warmth  No TTP  AROM: 5- 115  Stable to varus/valgus stress    Left Knee:   No erythema  no swelling  no effusion  no warmth  No TTP  AROM: 5- 120  Stable to varus/valgus stress      Large joint arthrocentesis: bilateral knee  Universal Protocol:  Consent: Verbal consent obtained    Risks and benefits: risks, benefits and alternatives were discussed  Consent given by: patient  Site marked: the operative site was marked  Supporting Documentation  Indications: pain   Procedure Details  Location: knee - bilateral knee  Preparation: Patient was prepped and draped in the usual sterile fashion  Needle size: 22 G  Ultrasound guidance: no  Approach: anterolateral    Medications (Right): 4 mL lidocaine 1 %; 20 mg triamcinolone acetonide 40 mg/mLMedications (Left): 4 mL lidocaine 1 %; 20 mg triamcinolone acetonide 40 mg/mL   Patient tolerance: patient tolerated the procedure well with no immediate complications  Dressing:  Sterile dressing applied

## 2021-08-24 DIAGNOSIS — M17.0 PRIMARY OSTEOARTHRITIS OF BOTH KNEES: ICD-10-CM

## 2021-08-24 RX ORDER — DICLOFENAC SODIUM 75 MG/1
TABLET, DELAYED RELEASE ORAL
Qty: 60 TABLET | Refills: 1 | Status: SHIPPED | OUTPATIENT
Start: 2021-08-24 | End: 2021-10-26

## 2021-09-10 DIAGNOSIS — M54.12 CERVICAL RADICULOPATHY: ICD-10-CM

## 2021-09-10 DIAGNOSIS — M47.812 CERVICAL SPINE ARTHRITIS: ICD-10-CM

## 2021-09-10 DIAGNOSIS — M79.7 FIBROMYALGIA: ICD-10-CM

## 2021-09-10 DIAGNOSIS — M48.02 CERVICAL SPINAL STENOSIS: ICD-10-CM

## 2021-09-10 RX ORDER — CYCLOBENZAPRINE HCL 5 MG
5 TABLET ORAL
Qty: 28 TABLET | Refills: 0 | Status: SHIPPED | OUTPATIENT
Start: 2021-09-10 | End: 2022-01-14 | Stop reason: SDUPTHER

## 2021-09-10 RX ORDER — DULOXETIN HYDROCHLORIDE 60 MG/1
60 CAPSULE, DELAYED RELEASE ORAL DAILY
Qty: 90 CAPSULE | Refills: 1 | Status: SHIPPED | OUTPATIENT
Start: 2021-09-10 | End: 2022-01-31

## 2021-10-11 DIAGNOSIS — M54.12 CERVICAL RADICULOPATHY: ICD-10-CM

## 2021-10-11 RX ORDER — GABAPENTIN 300 MG/1
300 CAPSULE ORAL 3 TIMES DAILY
Qty: 90 CAPSULE | Refills: 2 | Status: SHIPPED | OUTPATIENT
Start: 2021-10-11 | End: 2021-10-21 | Stop reason: DRUGHIGH

## 2021-10-15 ENCOUNTER — OFFICE VISIT (OUTPATIENT)
Dept: PAIN MEDICINE | Facility: MEDICAL CENTER | Age: 71
End: 2021-10-15
Payer: MEDICARE

## 2021-10-15 VITALS
HEART RATE: 97 BPM | DIASTOLIC BLOOD PRESSURE: 81 MMHG | HEIGHT: 62 IN | SYSTOLIC BLOOD PRESSURE: 127 MMHG | BODY MASS INDEX: 33.68 KG/M2 | WEIGHT: 183 LBS

## 2021-10-15 DIAGNOSIS — M46.1 SACROILIITIS (HCC): ICD-10-CM

## 2021-10-15 DIAGNOSIS — M54.12 CERVICAL RADICULOPATHY: ICD-10-CM

## 2021-10-15 DIAGNOSIS — G89.4 CHRONIC PAIN SYNDROME: Primary | ICD-10-CM

## 2021-10-15 DIAGNOSIS — M51.16 LUMBAR DISC HERNIATION WITH RADICULOPATHY: ICD-10-CM

## 2021-10-15 DIAGNOSIS — M50.30 DEGENERATIVE DISC DISEASE, CERVICAL: ICD-10-CM

## 2021-10-15 PROCEDURE — 99214 OFFICE O/P EST MOD 30 MIN: CPT | Performed by: NURSE PRACTITIONER

## 2021-10-21 ENCOUNTER — TELEPHONE (OUTPATIENT)
Dept: PAIN MEDICINE | Facility: MEDICAL CENTER | Age: 71
End: 2021-10-21

## 2021-10-21 DIAGNOSIS — G89.4 CHRONIC PAIN SYNDROME: ICD-10-CM

## 2021-10-21 DIAGNOSIS — M51.16 LUMBAR DISC HERNIATION WITH RADICULOPATHY: Primary | ICD-10-CM

## 2021-10-21 RX ORDER — GABAPENTIN 600 MG/1
600 TABLET ORAL 3 TIMES DAILY
Qty: 90 TABLET | Refills: 2 | Status: SHIPPED | OUTPATIENT
Start: 2021-10-21 | End: 2022-01-31 | Stop reason: SDUPTHER

## 2021-10-21 NOTE — TELEPHONE ENCOUNTER
Please let the patient know that I sent a prescription to her pharmacy for gabapentin 600 mg 3 times a day  This will change her taking 2 capsules to taking 1 tablet 3 times a day

## 2021-10-21 NOTE — TELEPHONE ENCOUNTER
Pt called stating that the 2 tab TID of gabapentin is doing wonders for her   Pt states she feel really good o this strength     Pt can be reached at [phone]

## 2021-10-26 DIAGNOSIS — M17.0 PRIMARY OSTEOARTHRITIS OF BOTH KNEES: ICD-10-CM

## 2021-10-26 RX ORDER — DICLOFENAC SODIUM 75 MG/1
TABLET, DELAYED RELEASE ORAL
Qty: 60 TABLET | Refills: 1 | Status: SHIPPED | OUTPATIENT
Start: 2021-10-26 | End: 2021-12-28

## 2021-11-01 ENCOUNTER — OFFICE VISIT (OUTPATIENT)
Dept: OBGYN CLINIC | Facility: MEDICAL CENTER | Age: 71
End: 2021-11-01
Payer: MEDICARE

## 2021-11-01 VITALS
BODY MASS INDEX: 33.9 KG/M2 | SYSTOLIC BLOOD PRESSURE: 132 MMHG | HEART RATE: 80 BPM | WEIGHT: 184.2 LBS | DIASTOLIC BLOOD PRESSURE: 76 MMHG | HEIGHT: 62 IN

## 2021-11-01 DIAGNOSIS — M17.11 PRIMARY OSTEOARTHRITIS OF RIGHT KNEE: ICD-10-CM

## 2021-11-01 DIAGNOSIS — R63.4 WEIGHT LOSS: ICD-10-CM

## 2021-11-01 DIAGNOSIS — M17.12 PRIMARY OSTEOARTHRITIS OF LEFT KNEE: Primary | ICD-10-CM

## 2021-11-01 PROCEDURE — 99213 OFFICE O/P EST LOW 20 MIN: CPT | Performed by: ORTHOPAEDIC SURGERY

## 2021-11-01 PROCEDURE — 20610 DRAIN/INJ JOINT/BURSA W/O US: CPT | Performed by: ORTHOPAEDIC SURGERY

## 2021-11-01 RX ORDER — METHYLPREDNISOLONE ACETATE 40 MG/ML
2 INJECTION, SUSPENSION INTRA-ARTICULAR; INTRALESIONAL; INTRAMUSCULAR; SOFT TISSUE
Status: COMPLETED | OUTPATIENT
Start: 2021-11-01 | End: 2021-11-01

## 2021-11-01 RX ORDER — LIDOCAINE HYDROCHLORIDE 10 MG/ML
3 INJECTION, SOLUTION INFILTRATION; PERINEURAL
Status: COMPLETED | OUTPATIENT
Start: 2021-11-01 | End: 2021-11-01

## 2021-11-01 RX ADMIN — METHYLPREDNISOLONE ACETATE 2 ML: 40 INJECTION, SUSPENSION INTRA-ARTICULAR; INTRALESIONAL; INTRAMUSCULAR; SOFT TISSUE at 10:40

## 2021-11-01 RX ADMIN — LIDOCAINE HYDROCHLORIDE 3 ML: 10 INJECTION, SOLUTION INFILTRATION; PERINEURAL at 10:35

## 2021-11-01 RX ADMIN — LIDOCAINE HYDROCHLORIDE 3 ML: 10 INJECTION, SOLUTION INFILTRATION; PERINEURAL at 10:40

## 2021-11-01 RX ADMIN — METHYLPREDNISOLONE ACETATE 2 ML: 40 INJECTION, SUSPENSION INTRA-ARTICULAR; INTRALESIONAL; INTRAMUSCULAR; SOFT TISSUE at 10:35

## 2021-11-17 ENCOUNTER — HOSPITAL ENCOUNTER (OUTPATIENT)
Dept: RADIOLOGY | Facility: MEDICAL CENTER | Age: 71
Discharge: HOME | End: 2021-11-17
Attending: PHYSICAL MEDICINE & REHABILITATION | Admitting: PHYSICAL MEDICINE & REHABILITATION
Payer: MEDICARE

## 2021-11-17 VITALS
OXYGEN SATURATION: 99 % | SYSTOLIC BLOOD PRESSURE: 142 MMHG | TEMPERATURE: 97.7 F | HEART RATE: 79 BPM | RESPIRATION RATE: 20 BRPM | DIASTOLIC BLOOD PRESSURE: 80 MMHG

## 2021-11-17 DIAGNOSIS — G89.4 CHRONIC PAIN SYNDROME: ICD-10-CM

## 2021-11-17 DIAGNOSIS — M50.30 DEGENERATIVE DISC DISEASE, CERVICAL: ICD-10-CM

## 2021-11-17 DIAGNOSIS — M54.12 CERVICAL RADICULOPATHY: ICD-10-CM

## 2021-11-17 PROCEDURE — 62321 NJX INTERLAMINAR CRV/THRC: CPT | Performed by: PHYSICAL MEDICINE & REHABILITATION

## 2021-11-17 RX ORDER — METHYLPREDNISOLONE ACETATE 80 MG/ML
80 INJECTION, SUSPENSION INTRA-ARTICULAR; INTRALESIONAL; INTRAMUSCULAR; PARENTERAL; SOFT TISSUE ONCE
Status: COMPLETED | OUTPATIENT
Start: 2021-11-17 | End: 2021-11-17

## 2021-11-17 RX ADMIN — IOHEXOL 2 ML: 300 INJECTION, SOLUTION INTRAVENOUS at 08:26

## 2021-11-17 RX ADMIN — METHYLPREDNISOLONE ACETATE 80 MG: 80 INJECTION, SUSPENSION INTRA-ARTICULAR; INTRALESIONAL; INTRAMUSCULAR; PARENTERAL; SOFT TISSUE at 08:27

## 2021-11-22 ENCOUNTER — APPOINTMENT (OUTPATIENT)
Dept: LAB | Facility: CLINIC | Age: 71
End: 2021-11-22
Payer: MEDICARE

## 2021-11-22 DIAGNOSIS — I10 BENIGN ESSENTIAL HTN: ICD-10-CM

## 2021-11-22 DIAGNOSIS — E78.2 MIXED HYPERLIPIDEMIA: ICD-10-CM

## 2021-11-22 DIAGNOSIS — R73.01 IFG (IMPAIRED FASTING GLUCOSE): ICD-10-CM

## 2021-11-22 LAB
ALBUMIN SERPL BCP-MCNC: 3.7 G/DL (ref 3.5–5)
ALP SERPL-CCNC: 90 U/L (ref 46–116)
ALT SERPL W P-5'-P-CCNC: 66 U/L (ref 12–78)
ANION GAP SERPL CALCULATED.3IONS-SCNC: 7 MMOL/L (ref 4–13)
AST SERPL W P-5'-P-CCNC: 29 U/L (ref 5–45)
BACTERIA UR QL AUTO: ABNORMAL /HPF
BILIRUB SERPL-MCNC: 0.55 MG/DL (ref 0.2–1)
BILIRUB UR QL STRIP: NEGATIVE
BUN SERPL-MCNC: 20 MG/DL (ref 5–25)
CALCIUM SERPL-MCNC: 9 MG/DL (ref 8.3–10.1)
CHLORIDE SERPL-SCNC: 104 MMOL/L (ref 100–108)
CHOLEST SERPL-MCNC: 192 MG/DL
CLARITY UR: ABNORMAL
CO2 SERPL-SCNC: 27 MMOL/L (ref 21–32)
COLOR UR: YELLOW
CREAT SERPL-MCNC: 0.77 MG/DL (ref 0.6–1.3)
EST. AVERAGE GLUCOSE BLD GHB EST-MCNC: 140 MG/DL
GFR SERPL CREATININE-BSD FRML MDRD: 77 ML/MIN/1.73SQ M
GLUCOSE P FAST SERPL-MCNC: 111 MG/DL (ref 65–99)
GLUCOSE UR STRIP-MCNC: NEGATIVE MG/DL
HBA1C MFR BLD: 6.5 %
HDLC SERPL-MCNC: 59 MG/DL
HGB UR QL STRIP.AUTO: NEGATIVE
KETONES UR STRIP-MCNC: NEGATIVE MG/DL
LDLC SERPL CALC-MCNC: 110 MG/DL (ref 0–100)
LEUKOCYTE ESTERASE UR QL STRIP: ABNORMAL
NITRITE UR QL STRIP: NEGATIVE
NON-SQ EPI CELLS URNS QL MICRO: ABNORMAL /HPF
NONHDLC SERPL-MCNC: 133 MG/DL
PH UR STRIP.AUTO: 6.5 [PH]
POTASSIUM SERPL-SCNC: 3.8 MMOL/L (ref 3.5–5.3)
PROT SERPL-MCNC: 7.1 G/DL (ref 6.4–8.2)
PROT UR STRIP-MCNC: NEGATIVE MG/DL
RBC #/AREA URNS AUTO: ABNORMAL /HPF
SODIUM SERPL-SCNC: 138 MMOL/L (ref 136–145)
SP GR UR STRIP.AUTO: 1.02 (ref 1–1.03)
TRIGL SERPL-MCNC: 116 MG/DL
UROBILINOGEN UR QL STRIP.AUTO: 1 E.U./DL
WBC #/AREA URNS AUTO: ABNORMAL /HPF

## 2021-11-22 PROCEDURE — 36415 COLL VENOUS BLD VENIPUNCTURE: CPT

## 2021-11-22 PROCEDURE — 83036 HEMOGLOBIN GLYCOSYLATED A1C: CPT

## 2021-11-22 PROCEDURE — 80053 COMPREHEN METABOLIC PANEL: CPT

## 2021-11-22 PROCEDURE — 81001 URINALYSIS AUTO W/SCOPE: CPT

## 2021-11-22 PROCEDURE — 80061 LIPID PANEL: CPT

## 2021-11-24 ENCOUNTER — TELEPHONE (OUTPATIENT)
Dept: PAIN MEDICINE | Facility: CLINIC | Age: 71
End: 2021-11-24

## 2021-12-01 ENCOUNTER — OFFICE VISIT (OUTPATIENT)
Dept: INTERNAL MEDICINE CLINIC | Facility: CLINIC | Age: 71
End: 2021-12-01
Payer: MEDICARE

## 2021-12-01 ENCOUNTER — HOSPITAL ENCOUNTER (OUTPATIENT)
Dept: RADIOLOGY | Facility: MEDICAL CENTER | Age: 71
Discharge: HOME | End: 2021-12-01
Attending: PHYSICAL MEDICINE & REHABILITATION | Admitting: PHYSICAL MEDICINE & REHABILITATION
Payer: MEDICARE

## 2021-12-01 VITALS
TEMPERATURE: 98 F | RESPIRATION RATE: 18 BRPM | HEART RATE: 77 BPM | SYSTOLIC BLOOD PRESSURE: 132 MMHG | DIASTOLIC BLOOD PRESSURE: 93 MMHG | OXYGEN SATURATION: 94 %

## 2021-12-01 VITALS
OXYGEN SATURATION: 97 % | BODY MASS INDEX: 33.42 KG/M2 | SYSTOLIC BLOOD PRESSURE: 120 MMHG | TEMPERATURE: 97.1 F | HEART RATE: 73 BPM | WEIGHT: 181.6 LBS | DIASTOLIC BLOOD PRESSURE: 80 MMHG | RESPIRATION RATE: 16 BRPM | HEIGHT: 62 IN

## 2021-12-01 DIAGNOSIS — G89.4 CHRONIC PAIN SYNDROME: ICD-10-CM

## 2021-12-01 DIAGNOSIS — E66.9 DIABETES MELLITUS TYPE 2 IN OBESE (HCC): Primary | ICD-10-CM

## 2021-12-01 DIAGNOSIS — M47.816 LUMBAR SPONDYLOSIS: ICD-10-CM

## 2021-12-01 DIAGNOSIS — I10 BENIGN ESSENTIAL HTN: ICD-10-CM

## 2021-12-01 DIAGNOSIS — E11.69 DIABETES MELLITUS TYPE 2 IN OBESE (HCC): Primary | ICD-10-CM

## 2021-12-01 DIAGNOSIS — M51.16 LUMBAR DISC HERNIATION WITH RADICULOPATHY: ICD-10-CM

## 2021-12-01 DIAGNOSIS — E78.2 MIXED HYPERLIPIDEMIA: ICD-10-CM

## 2021-12-01 DIAGNOSIS — Z00.00 MEDICARE ANNUAL WELLNESS VISIT, SUBSEQUENT: ICD-10-CM

## 2021-12-01 DIAGNOSIS — F32.5 MAJOR DEPRESSIVE DISORDER WITH SINGLE EPISODE, IN FULL REMISSION (HCC): ICD-10-CM

## 2021-12-01 DIAGNOSIS — M46.1 SACROILIITIS (HCC): ICD-10-CM

## 2021-12-01 PROCEDURE — 27096 INJECT SACROILIAC JOINT: CPT | Performed by: PHYSICAL MEDICINE & REHABILITATION

## 2021-12-01 PROCEDURE — G0439 PPPS, SUBSEQ VISIT: HCPCS | Performed by: INTERNAL MEDICINE

## 2021-12-01 PROCEDURE — 99214 OFFICE O/P EST MOD 30 MIN: CPT | Performed by: INTERNAL MEDICINE

## 2021-12-01 PROCEDURE — 1123F ACP DISCUSS/DSCN MKR DOCD: CPT | Performed by: INTERNAL MEDICINE

## 2021-12-01 RX ORDER — BUPIVACAINE HCL/PF 2.5 MG/ML
3 VIAL (ML) INJECTION ONCE
Status: COMPLETED | OUTPATIENT
Start: 2021-12-01 | End: 2021-12-01

## 2021-12-01 RX ORDER — ROSUVASTATIN CALCIUM 10 MG/1
10 TABLET, COATED ORAL DAILY
Qty: 90 TABLET | Refills: 1 | Status: SHIPPED | OUTPATIENT
Start: 2021-12-01 | End: 2022-05-17

## 2021-12-01 RX ORDER — METHYLPREDNISOLONE ACETATE 40 MG/ML
80 INJECTION, SUSPENSION INTRA-ARTICULAR; INTRALESIONAL; INTRAMUSCULAR; PARENTERAL; SOFT TISSUE ONCE
Status: COMPLETED | OUTPATIENT
Start: 2021-12-01 | End: 2021-12-01

## 2021-12-01 RX ADMIN — METHYLPREDNISOLONE ACETATE 80 MG: 40 INJECTION, SUSPENSION INTRA-ARTICULAR; INTRALESIONAL; INTRAMUSCULAR; SOFT TISSUE at 08:25

## 2021-12-01 RX ADMIN — Medication 3 ML: at 08:25

## 2021-12-01 RX ADMIN — IOHEXOL 1 ML: 300 INJECTION, SOLUTION INTRAVENOUS at 08:25

## 2021-12-02 PROBLEM — E11.69 DIABETES MELLITUS TYPE 2 IN OBESE (HCC): Status: ACTIVE | Noted: 2021-12-02

## 2021-12-02 PROBLEM — E66.9 DIABETES MELLITUS TYPE 2 IN OBESE (HCC): Status: ACTIVE | Noted: 2021-12-02

## 2021-12-08 ENCOUNTER — TELEPHONE (OUTPATIENT)
Dept: RADIOLOGY | Facility: MEDICAL CENTER | Age: 71
End: 2021-12-08

## 2021-12-16 ENCOUNTER — CLINICAL SUPPORT (OUTPATIENT)
Dept: INTERNAL MEDICINE CLINIC | Facility: CLINIC | Age: 71
End: 2021-12-16
Payer: MEDICARE

## 2021-12-16 VITALS — TEMPERATURE: 97.2 F

## 2021-12-16 DIAGNOSIS — Z23 ENCOUNTER FOR IMMUNIZATION: Primary | ICD-10-CM

## 2021-12-16 PROCEDURE — 90662 IIV NO PRSV INCREASED AG IM: CPT | Performed by: INTERNAL MEDICINE

## 2021-12-16 PROCEDURE — G0008 ADMIN INFLUENZA VIRUS VAC: HCPCS | Performed by: INTERNAL MEDICINE

## 2021-12-27 ENCOUNTER — TELEPHONE (OUTPATIENT)
Dept: OBGYN CLINIC | Facility: CLINIC | Age: 71
End: 2021-12-27

## 2021-12-28 DIAGNOSIS — M17.0 PRIMARY OSTEOARTHRITIS OF BOTH KNEES: ICD-10-CM

## 2021-12-28 RX ORDER — DICLOFENAC SODIUM 75 MG/1
TABLET, DELAYED RELEASE ORAL
Qty: 60 TABLET | Refills: 1 | Status: SHIPPED | OUTPATIENT
Start: 2021-12-28 | End: 2022-02-23 | Stop reason: SDUPTHER

## 2022-01-05 DIAGNOSIS — I10 BENIGN ESSENTIAL HTN: ICD-10-CM

## 2022-01-05 RX ORDER — BENAZEPRIL HYDROCHLORIDE 20 MG/1
TABLET ORAL
Qty: 90 TABLET | Refills: 1 | Status: SHIPPED | OUTPATIENT
Start: 2022-01-05 | End: 2022-07-07

## 2022-01-14 ENCOUNTER — OFFICE VISIT (OUTPATIENT)
Dept: PAIN MEDICINE | Facility: MEDICAL CENTER | Age: 72
End: 2022-01-14
Payer: MEDICARE

## 2022-01-14 VITALS
WEIGHT: 181 LBS | HEART RATE: 84 BPM | SYSTOLIC BLOOD PRESSURE: 120 MMHG | OXYGEN SATURATION: 96 % | DIASTOLIC BLOOD PRESSURE: 74 MMHG | TEMPERATURE: 99.6 F | HEIGHT: 62 IN | BODY MASS INDEX: 33.31 KG/M2

## 2022-01-14 DIAGNOSIS — M54.12 CERVICAL RADICULOPATHY: ICD-10-CM

## 2022-01-14 DIAGNOSIS — M47.812 CERVICAL SPINE ARTHRITIS: ICD-10-CM

## 2022-01-14 DIAGNOSIS — M62.838 SPASM OF RIGHT PIRIFORMIS MUSCLE: ICD-10-CM

## 2022-01-14 DIAGNOSIS — G89.4 CHRONIC PAIN SYNDROME: Primary | ICD-10-CM

## 2022-01-14 DIAGNOSIS — M48.02 CERVICAL SPINAL STENOSIS: ICD-10-CM

## 2022-01-14 PROCEDURE — 99214 OFFICE O/P EST MOD 30 MIN: CPT | Performed by: NURSE PRACTITIONER

## 2022-01-14 RX ORDER — CYCLOBENZAPRINE HCL 5 MG
5 TABLET ORAL 3 TIMES DAILY PRN
Qty: 90 TABLET | Refills: 1 | Status: SHIPPED | OUTPATIENT
Start: 2022-01-14 | End: 2022-04-06 | Stop reason: SDUPTHER

## 2022-01-14 NOTE — PROGRESS NOTES
Assessment  1  Chronic pain syndrome    2  Spasm of right piriformis muscle    3  Cervical radiculopathy    4  Cervical spinal stenosis    5  Cervical spine arthritis        Plan  1  Schedule right-sided piriformis muscle injection with fluoroscopy guidance  2  Schedule C7-T1 cervical epidural steroid injection with fluoroscopy guidance  3  Continue cyclobenzaprine 5 mg up to 3 times daily as needed for piriformis spasm  Patient states she has no side effects from this medication other than drowsiness and refills were sent to the pharmacy on file  Complete risks and benefits including bleeding, infection, tissue reaction, nerve injury and allergic reaction were discussed  The approach was demonstrated using models and literature was provided  Verbal and written consent was obtained  My impressions and treatment recommendations were discussed in detail with the patient who verbalized understanding and had no further questions  Discharge instructions were provided  I personally saw and examined the patient and I agree with the above discussed plan of care  Orders Placed This Encounter   Procedures    [place] spine and pain procedure     Dr Meredith Hernandez on or after 2/17/2022     Standing Status:   Future     Standing Expiration Date:   1/14/2026     Order Specific Question:   Reason for Exam:     Answer:   C7-T1 JANEL     Order Specific Question:   Anticoagulant hold needed? Answer:   no    [place] spine and pain procedure     Dr Meredith Hernandez first if she can get in before February 17th     Standing Status:   Future     Standing Expiration Date:   1/14/2026     Order Specific Question:   Reason for Exam:     Answer:   Right sided piriformis muscle injection     Order Specific Question:   Anticoagulant hold needed?      Answer:   no     New Medications Ordered This Visit   Medications    cyclobenzaprine (FLEXERIL) 5 mg tablet     Sig: Take 1 tablet (5 mg total) by mouth 3 (three) times a day as needed for muscle spasms     Dispense:  90 tablet     Refill:  1       History of Present Illness    Dallas Heard is a 66 y o  female who presents to the office with a pain score of 4/10 right now but that can become quite severe depending on activity and time a day  She states that the pain at its worse is about a 6-7/10  She states that her pain is intermittent and the quality is dull aching, cramping with numbness and pins and needles  She states that the pain is in her neck and it radiates down both of her arms and is also in her low back primarily on the right side and radiates down the back of her thigh  I have personally reviewed and/or updated the patient's past medical history, past surgical history, family history, social history, current medications, allergies, and vital signs today  Review of Systems   Respiratory: Negative for shortness of breath  Cardiovascular: Negative for chest pain  Gastrointestinal: Negative for constipation, diarrhea, nausea and vomiting  Musculoskeletal: Positive for back pain, myalgias, neck pain and neck stiffness  Negative for arthralgias, gait problem and joint swelling  Skin: Negative for rash  Neurological: Negative for dizziness, seizures and weakness  Psychiatric/Behavioral: Positive for decreased concentration  All other systems reviewed and are negative        Patient Active Problem List   Diagnosis    Primary osteoarthritis of both knees    Lumbar disc herniation with radiculopathy    Chronic right-sided low back pain with right-sided sciatica    Fibromyalgia    Benign essential HTN    Fatty liver    Gastroesophageal reflux disease without esophagitis    Lumbar radiculopathy    Sacroiliitis (HCC)    Pes anserine bursitis    Lower extremity edema    Cervical radiculopathy    Cervical spine arthritis    Cervical spinal stenosis    Lumbar spondylosis    Major depressive disorder with single episode, in full remission ([address] )    Mixed hyperlipidemia    IFG (impaired fasting glucose)    Degenerative disc disease, cervical    Diabetes mellitus type 2 in obese [facility])       Past Medical History:   Diagnosis Date    Arthritis     Fatty liver 5/1/2018    Hypertension     Low back pain     Neck pain     Primary localized osteoarthritis of right knee 8/17/2017       History reviewed  No pertinent surgical history      Family History   Problem Relation Age of Onset    Osteoporosis Mother     Skin cancer Father 54    Stomach cancer Sister 52    No Known Problems Daughter     No Known Problems Maternal Grandmother     No Known Problems Maternal Grandfather     No Known Problems Paternal Grandmother     No Known Problems Paternal Grandfather     No Known Problems Maternal Aunt     No Known Problems Maternal Aunt     No Known Problems Maternal Aunt     No Known Problems Maternal Aunt     No Known Problems Paternal Aunt        Social History     Occupational History    Not on file   Tobacco Use    Smoking status: Never Smoker    Smokeless tobacco: Never Used   Vaping Use    Vaping Use: Never used   Substance and Sexual Activity    Alcohol use: No    Drug use: Never    Sexual activity: Not Currently       Current Outpatient Medications on File Prior to Visit   Medication Sig    benazepril (LOTENSIN) 20 mg tablet TAKE 1 TABLET BY MOUTH EVERY DAY    cholecalciferol (VITAMIN D3) 1,000 units tablet Take 1,000 Units by mouth daily    diclofenac (VOLTAREN) 75 mg EC tablet TAKE 1 TABLET BY MOUTH 2 TIMES A DAY AS NEEDED FOR PAIN    DULoxetine (CYMBALTA) 60 mg delayed release capsule Take 1 capsule (60 mg total) by mouth daily    gabapentin (Neurontin) 600 MG tablet Take 1 tablet (600 mg total) by mouth 3 (three) times a day    hydrochlorothiazide (HYDRODIURIL) 12 5 mg tablet TAKE 1 TABLET BY MOUTH EVERY DAY    omeprazole (PriLOSEC) 10 mg delayed release capsule Take 10 mg by mouth daily      rosuvastatin (CRESTOR) 10 MG tablet Take 1 tablet (10 mg total) by mouth daily    SUPER B COMPLEX/C PO Take 1 tablet by mouth daily      [DISCONTINUED] cyclobenzaprine (FLEXERIL) 5 mg tablet Take 1 tablet (5 mg total) by mouth daily at bedtime as needed for muscle spasms     No current facility-administered medications on file prior to visit  Allergies   Allergen Reactions    Penicillins        Physical Exam    /74   Pulse 84   Temp 99 6 °F (37 6 °C)   Ht 5' 2" (1 575 m)   Wt 82 1 kg (181 lb)   SpO2 96%   BMI 33 11 kg/m²     Constitutional: obese  Eyes: anicteric  HEENT: grossly intact  Neck: supple, symmetric, trachea midline and no masses   Pulmonary:even and unlabored  Cardiovascular:No edema or pitting edema present  Skin:Normal without rashes or lesions and well hydrated  Psychiatric:Mood and affect appropriate  Neurologic:Cranial Nerves II-XII grossly intact  Musculoskeletal:Bilateral cervical paraspinal tenderness with palpation, positive right-sided piriformis stretch test, Pace, [place] maneuver were all positive as well  She also had tenderness with palpation over her right piriformis muscle      Imaging

## 2022-01-22 DIAGNOSIS — R60.0 LOWER EXTREMITY EDEMA: ICD-10-CM

## 2022-01-22 DIAGNOSIS — I10 BENIGN ESSENTIAL HTN: ICD-10-CM

## 2022-01-24 RX ORDER — HYDROCHLOROTHIAZIDE 12.5 MG/1
TABLET ORAL
Qty: 90 TABLET | Refills: 1 | Status: SHIPPED | OUTPATIENT
Start: 2022-01-24

## 2022-01-29 DIAGNOSIS — M79.7 FIBROMYALGIA: ICD-10-CM

## 2022-01-31 ENCOUNTER — TELEPHONE (OUTPATIENT)
Dept: PAIN MEDICINE | Facility: MEDICAL CENTER | Age: 72
End: 2022-01-31

## 2022-01-31 DIAGNOSIS — M51.16 LUMBAR DISC HERNIATION WITH RADICULOPATHY: ICD-10-CM

## 2022-01-31 DIAGNOSIS — G89.4 CHRONIC PAIN SYNDROME: ICD-10-CM

## 2022-01-31 RX ORDER — GABAPENTIN 600 MG/1
600 TABLET ORAL 3 TIMES DAILY
Qty: 90 TABLET | Refills: 5 | Status: SHIPPED | OUTPATIENT
Start: 2022-01-31 | End: 2022-07-21 | Stop reason: SDUPTHER

## 2022-01-31 RX ORDER — DULOXETIN HYDROCHLORIDE 60 MG/1
CAPSULE, DELAYED RELEASE ORAL
Qty: 90 CAPSULE | Refills: 1 | Status: SHIPPED | OUTPATIENT
Start: 2022-01-31

## 2022-01-31 NOTE — TELEPHONE ENCOUNTER
Pt contacted Call Center requested refill of their medication  Medication Name:gabapentin (Neurontin)        Dosage of Med: 600 mg       Frequency of Med:Take 1 tablet (600 mg total) by mouth 3 (three) times a day      Remaining Medication: 0      Pharmacy and Location:  [place] [address], [address] [facility]   Phone:  [phone]        Pt  Preferred Callback Phone Number: [phone]      Thank you

## 2022-01-31 NOTE — TELEPHONE ENCOUNTER
Please let the patient know that I sent a prescription for 600 mg gabapentin 3 times a day to the pharmacy on file

## 2022-01-31 NOTE — TELEPHONE ENCOUNTER
S/W pt  Pt stated her last dose of Gabapentin was Saturday am   Advised her she can't abruptly stop it  She stated she knows  She didn't realize that was her last pill to when she was taking it  Pt stated it helps her and denies side effects  Please advise

## 2022-02-22 ENCOUNTER — HOSPITAL ENCOUNTER (OUTPATIENT)
Dept: RADIOLOGY | Facility: MEDICAL CENTER | Age: 72
Discharge: HOME | End: 2022-02-22
Attending: PHYSICAL MEDICINE & REHABILITATION | Admitting: PHYSICAL MEDICINE & REHABILITATION
Payer: MEDICARE

## 2022-02-22 VITALS
TEMPERATURE: 98.5 F | DIASTOLIC BLOOD PRESSURE: 93 MMHG | OXYGEN SATURATION: 97 % | SYSTOLIC BLOOD PRESSURE: 141 MMHG | RESPIRATION RATE: 18 BRPM | HEART RATE: 85 BPM

## 2022-02-22 DIAGNOSIS — M62.838 SPASM OF RIGHT PIRIFORMIS MUSCLE: ICD-10-CM

## 2022-02-22 PROCEDURE — 20552 NJX 1/MLT TRIGGER POINT 1/2: CPT | Performed by: PHYSICAL MEDICINE & REHABILITATION

## 2022-02-22 PROCEDURE — 77002 NEEDLE LOCALIZATION BY XRAY: CPT | Performed by: PHYSICAL MEDICINE & REHABILITATION

## 2022-02-22 RX ORDER — METHYLPREDNISOLONE ACETATE 40 MG/ML
40 INJECTION, SUSPENSION INTRA-ARTICULAR; INTRALESIONAL; INTRAMUSCULAR; PARENTERAL; SOFT TISSUE ONCE
Status: COMPLETED | OUTPATIENT
Start: 2022-02-22 | End: 2022-02-22

## 2022-02-22 RX ORDER — BUPIVACAINE HCL/PF 2.5 MG/ML
3 VIAL (ML) INJECTION ONCE
Status: COMPLETED | OUTPATIENT
Start: 2022-02-22 | End: 2022-02-22

## 2022-02-22 RX ADMIN — Medication 3 ML: at 09:56

## 2022-02-22 RX ADMIN — IOHEXOL 2 ML: 300 INJECTION, SOLUTION INTRAVENOUS at 09:56

## 2022-02-22 RX ADMIN — METHYLPREDNISOLONE ACETATE 40 MG: 40 INJECTION, SUSPENSION INTRA-ARTICULAR; INTRALESIONAL; INTRAMUSCULAR; PARENTERAL; SOFT TISSUE at 09:56

## 2022-02-22 NOTE — DISCHARGE INSTRUCTIONS
1  Do not apply heat to any area that is numb  If you have discomfort or soreness at the injection site, you may apply ice today, 20 minutes on and 20 minutes off  Tomorrow you may use ice or warm, moist heat  Do not apply ice or heat directly to the skin  2  If you experience severe shortness of breath, go to the Emergency Room  3  You may have numbness for several hours from the local anesthetic  Please use caution and common sense, especially with weight-bearing activities  4  You may have an increase or change in the discomfort for 36-48 hours after your treatment  Apply ice and continue with any pain medicine you have been prescribed  5  Do not do anything strenuous today  You may shower, but no tub baths or hot tubs today  You may resume your normal activities tomorrow, but do not overdo it  Resume normal activities slowly when you are feeling better  6  If you experience redness, drainage or swelling at the injection site, or if you develop a fever above 100 degrees, please call [facility] at [phone] or go to the Emergency Room  7  Continue to take all routine medicines prescribed by your primary care physician unless otherwise instructed by our staff  Most blood thinners should be started again according to your regularly scheduled dosing  If you have any questions, please give our office a call  As no general anesthesia was used in today's procedure, you should not experience any side effects related to anesthesia  If you have a problem specifically related to your procedure, please call our office at [phone]  Problems not related to your procedure should be directed to your primary care physician

## 2022-02-22 NOTE — H&P
History of Present Illness: The patient is a 66 y o  female who presents with complaints of right buttock pain    Patient Active Problem List   Diagnosis    Primary osteoarthritis of both knees    Lumbar disc herniation with radiculopathy    Chronic right-sided low back pain with right-sided sciatica    Fibromyalgia    Benign essential HTN    Fatty liver    Gastroesophageal reflux disease without esophagitis    Lumbar radiculopathy    Sacroiliitis (HCC)    Pes anserine bursitis    Lower extremity edema    Cervical radiculopathy    Cervical spine arthritis    Cervical spinal stenosis    Lumbar spondylosis    Major depressive disorder with single episode, in full remission ([address] )    Mixed hyperlipidemia    IFG (impaired fasting glucose)    Degenerative disc disease, cervical    Diabetes mellitus type 2 in obese [facility])       Past Medical History:   Diagnosis Date    Arthritis     Fatty liver 5/1/2018    Hypertension     Low back pain     Neck pain     Primary localized osteoarthritis of right knee 8/17/2017       No past surgical history on file        Current Outpatient Medications:     benazepril (LOTENSIN) 20 mg tablet, TAKE 1 TABLET BY MOUTH EVERY DAY, Disp: 90 tablet, Rfl: 1    cholecalciferol (VITAMIN D3) 1,000 units tablet, Take 1,000 Units by mouth daily, Disp: , Rfl:     cyclobenzaprine (FLEXERIL) 5 mg tablet, Take 1 tablet (5 mg total) by mouth 3 (three) times a day as needed for muscle spasms, Disp: 90 tablet, Rfl: 1    diclofenac (VOLTAREN) 75 mg EC tablet, TAKE 1 TABLET BY MOUTH 2 TIMES A DAY AS NEEDED FOR PAIN, Disp: 60 tablet, Rfl: 1    DULoxetine (CYMBALTA) 60 mg delayed release capsule, TAKE 1 CAPSULE BY MOUTH EVERY DAY, Disp: 90 capsule, Rfl: 1    gabapentin (Neurontin) 600 MG tablet, Take 1 tablet (600 mg total) by mouth 3 (three) times a day, Disp: 90 tablet, Rfl: 5    hydrochlorothiazide (HYDRODIURIL) 12 5 mg tablet, TAKE 1 TABLET BY MOUTH EVERY DAY, Disp: 90 tablet, Rfl: 1    omeprazole (PriLOSEC) 10 mg delayed release capsule, Take 10 mg by mouth daily  , Disp: , Rfl:     rosuvastatin (CRESTOR) 10 MG tablet, Take 1 tablet (10 mg total) by mouth daily, Disp: 90 tablet, Rfl: 1    SUPER B COMPLEX/C PO, Take 1 tablet by mouth daily  , Disp: , Rfl:     Allergies   Allergen Reactions    Penicillins        Physical Exam:   Vitals:    02/22/22 0945   BP: 145/87   Pulse: 81   Resp: 20   Temp: 98 5 °F (36 9 °C)   SpO2: 100%     General: Awake, Alert, Oriented x 3, Mood and affect appropriate  Respiratory: Respirations even and unlabored  Cardiovascular: Peripheral pulses intact; no edema  Musculoskeletal Exam: right buttock and leg pain    ASA Score: 2    Patient/Chart Verification  Patient ID Verified: Verbal  Consents Confirmed: Procedural,To be obtained in the Pre-Procedure area  H&P( within 30 days) Verified: To be obtained in the Pre-Procedure area  Allergies Reviewed: Yes  Anticoag/NSAID held?: NA  Currently on antibiotics?: No  Pregnancy denied?: NA    Assessment:   1   Spasm of right piriformis muscle        Plan: Right sided piriformis muscle injection

## 2022-02-23 ENCOUNTER — TELEPHONE (OUTPATIENT)
Dept: OBGYN CLINIC | Facility: MEDICAL CENTER | Age: 72
End: 2022-02-23

## 2022-02-23 DIAGNOSIS — M17.0 PRIMARY OSTEOARTHRITIS OF BOTH KNEES: ICD-10-CM

## 2022-02-23 RX ORDER — DICLOFENAC SODIUM 75 MG/1
TABLET, DELAYED RELEASE ORAL
Qty: 60 TABLET | Refills: 1 | Status: CANCELLED | OUTPATIENT
Start: 2022-02-23

## 2022-02-23 RX ORDER — DICLOFENAC POTASSIUM 50 MG/1
50 TABLET, FILM COATED ORAL 2 TIMES DAILY
Qty: 60 TABLET | Refills: 1 | Status: SHIPPED | OUTPATIENT
Start: 2022-02-23 | End: 2022-04-25

## 2022-03-01 ENCOUNTER — TELEPHONE (OUTPATIENT)
Dept: PAIN MEDICINE | Facility: CLINIC | Age: 72
End: 2022-03-01

## 2022-03-08 ENCOUNTER — HOSPITAL ENCOUNTER (OUTPATIENT)
Dept: RADIOLOGY | Facility: MEDICAL CENTER | Age: 72
Discharge: HOME | End: 2022-03-08
Attending: PHYSICAL MEDICINE & REHABILITATION | Admitting: PHYSICAL MEDICINE & REHABILITATION
Payer: MEDICARE

## 2022-03-08 VITALS
SYSTOLIC BLOOD PRESSURE: 160 MMHG | HEART RATE: 78 BPM | OXYGEN SATURATION: 98 % | RESPIRATION RATE: 20 BRPM | TEMPERATURE: 97.1 F | DIASTOLIC BLOOD PRESSURE: 98 MMHG

## 2022-03-08 DIAGNOSIS — M54.12 CERVICAL RADICULOPATHY: ICD-10-CM

## 2022-03-08 PROCEDURE — 62321 NJX INTERLAMINAR CRV/THRC: CPT | Performed by: PHYSICAL MEDICINE & REHABILITATION

## 2022-03-08 RX ORDER — METHYLPREDNISOLONE ACETATE 80 MG/ML
80 INJECTION, SUSPENSION INTRA-ARTICULAR; INTRALESIONAL; INTRAMUSCULAR; PARENTERAL; SOFT TISSUE ONCE
Status: COMPLETED | OUTPATIENT
Start: 2022-03-08 | End: 2022-03-08

## 2022-03-08 RX ADMIN — IOHEXOL 2 ML: 300 INJECTION, SOLUTION INTRAVENOUS at 10:14

## 2022-03-08 RX ADMIN — METHYLPREDNISOLONE ACETATE 80 MG: 80 INJECTION, SUSPENSION INTRA-ARTICULAR; INTRALESIONAL; INTRAMUSCULAR; PARENTERAL; SOFT TISSUE at 10:16

## 2022-03-08 NOTE — DISCHARGE INSTRUCTIONS
Epidural Steroid Injection   WHAT YOU NEED TO KNOW:   An epidural steroid injection (CHRISSIE) is a procedure to inject steroid medicine into the epidural space  The epidural space is between your spinal cord and vertebrae  Steroids reduce inflammation and fluid buildup in your spine that may be causing pain  You may be given pain medicine along with the steroids  ACTIVITY  · Do not drive or operate machinery today  · No strenuous activity today - bending, lifting, etc   · You may resume normal activites starting tomorrow - start slowly and as tolerated  · You may shower today, but no tub baths or hot tubs  · You may have numbness for several hours from the local anesthetic  Please use caution and common sense, especially with weight-bearing activities  CARE OF THE INJECTION SITE  · If you have soreness or pain, apply ice to the area today (20 minutes on/20 minutes off)  · Starting tomorrow, you may use warm, moist heat or ice if needed  · You may have an increase or change in your discomfort for 36-48 hours after your treatment  · Apply ice and continue with any pain medication you have been prescribed  · [facility] if you have any of the following: redness, drainage, swelling, headache, stiff neck or fever above 100°F     SPECIAL INSTRUCTIONS  · Our office will contact you in approximately 7 days for a progress report  MEDICATIONS  · Continue to take all routine medications  · Our office may have instructed you to hold some medications  You may resume your Diclofenac in 24 hours, so tomorrow after 10:30 am    As no general anesthesia was used in today's procedure, you should not experience any side effects related to anesthesia  If you have a problem specifically related to your procedure, please call our office at [phone]  Problems not related to your procedure should be directed to your primary care physician

## 2022-03-08 NOTE — H&P
History of Present Illness: The patient is a 74 y o  female who presents with complaints of neck and arm pain    Patient Active Problem List   Diagnosis    Primary osteoarthritis of both knees    Lumbar disc herniation with radiculopathy    Chronic right-sided low back pain with right-sided sciatica    Fibromyalgia    Benign essential HTN    Fatty liver    Gastroesophageal reflux disease without esophagitis    Lumbar radiculopathy    Sacroiliitis (HCC)    Pes anserine bursitis    Lower extremity edema    Cervical radiculopathy    Cervical spine arthritis    Cervical spinal stenosis    Lumbar spondylosis    Major depressive disorder with single episode, in full remission ([address] )    Mixed hyperlipidemia    IFG (impaired fasting glucose)    Degenerative disc disease, cervical    Diabetes mellitus type 2 in obese ([address] )    Spasm of right piriformis muscle       Past Medical History:   Diagnosis Date    Arthritis     Fatty liver 5/1/2018    Hypertension     Low back pain     Neck pain     Primary localized osteoarthritis of right knee 8/17/2017       No past surgical history on file        Current Outpatient Medications:     benazepril (LOTENSIN) 20 mg tablet, TAKE 1 TABLET BY MOUTH EVERY DAY, Disp: 90 tablet, Rfl: 1    cholecalciferol (VITAMIN D3) 1,000 units tablet, Take 1,000 Units by mouth daily, Disp: , Rfl:     cyclobenzaprine (FLEXERIL) 5 mg tablet, Take 1 tablet (5 mg total) by mouth 3 (three) times a day as needed for muscle spasms, Disp: 90 tablet, Rfl: 1    diclofenac potassium (CATAFLAM) 50 mg tablet, Take 1 tablet (50 mg total) by mouth 2 (two) times a day, Disp: 60 tablet, Rfl: 1    DULoxetine (CYMBALTA) 60 mg delayed release capsule, TAKE 1 CAPSULE BY MOUTH EVERY DAY, Disp: 90 capsule, Rfl: 1    gabapentin (Neurontin) 600 MG tablet, Take 1 tablet (600 mg total) by mouth 3 (three) times a day, Disp: 90 tablet, Rfl: 5    hydrochlorothiazide (HYDRODIURIL) 12 5 mg tablet, TAKE 1 TABLET BY MOUTH EVERY DAY, Disp: 90 tablet, Rfl: 1    omeprazole (PriLOSEC) 10 mg delayed release capsule, Take 10 mg by mouth daily  , Disp: , Rfl:     rosuvastatin (CRESTOR) 10 MG tablet, Take 1 tablet (10 mg total) by mouth daily, Disp: 90 tablet, Rfl: 1    SUPER B COMPLEX/C PO, Take 1 tablet by mouth daily  , Disp: , Rfl:   No current facility-administered medications for this encounter  Allergies   Allergen Reactions    Penicillins        Physical Exam:   Vitals:    03/08/22 0957   BP: 147/91   Pulse: 82   Resp: 18   Temp: (!) 97 1 °F (36 2 °C)   SpO2: 97%     General: Awake, Alert, Oriented x 3, Mood and affect appropriate  Respiratory: Respirations even and unlabored  Cardiovascular: Peripheral pulses intact; no edema  Musculoskeletal Exam: neck and arm pain    ASA Score: 2    Patient/Chart Verification  Patient ID Verified: Verbal  ID Band Applied: No  Consents Confirmed: Procedural,To be obtained in the Pre-Procedure area  H&P( within 30 days) Verified: To be obtained in the Pre-Procedure area  Interval H&P(within 24 hr) Complete (required for Outpatients and Surgery Admit only): To be obtained in the Pre-Procedure area  Allergies Reviewed: Yes  Anticoag/NSAID held?: Yes (Pt held her Diclofenac for 5 days )  Currently on antibiotics?: No  Pregnancy denied?: NA    Assessment:   1   Cervical radiculopathy        Plan: C7-T1 JANEL

## 2022-03-15 ENCOUNTER — TELEPHONE (OUTPATIENT)
Dept: PAIN MEDICINE | Facility: MEDICAL CENTER | Age: 72
End: 2022-03-15

## 2022-03-15 NOTE — TELEPHONE ENCOUNTER
Spoke with patient  She will take 50 mg bid prn pain  She may add third dose if needed  She states injections are working well and she only takes sparingly  Confirmed appt for Thursday  No other questions

## 2022-03-15 NOTE — TELEPHONE ENCOUNTER
Patient was previously taking 75mg and wants to make sure that her doctor wants to decrease dose to 50mg      Please call [facility] ZG#[phone]

## 2022-03-17 ENCOUNTER — OFFICE VISIT (OUTPATIENT)
Dept: OBGYN CLINIC | Facility: CLINIC | Age: 72
End: 2022-03-17
Payer: MEDICARE

## 2022-03-17 VITALS
BODY MASS INDEX: 33.13 KG/M2 | SYSTOLIC BLOOD PRESSURE: 127 MMHG | WEIGHT: 180 LBS | HEIGHT: 62 IN | HEART RATE: 80 BPM | DIASTOLIC BLOOD PRESSURE: 74 MMHG

## 2022-03-17 DIAGNOSIS — M17.11 PRIMARY OSTEOARTHRITIS OF RIGHT KNEE: Primary | ICD-10-CM

## 2022-03-17 DIAGNOSIS — M17.12 PRIMARY OSTEOARTHRITIS OF LEFT KNEE: ICD-10-CM

## 2022-03-17 PROCEDURE — 20610 DRAIN/INJ JOINT/BURSA W/O US: CPT | Performed by: ORTHOPAEDIC SURGERY

## 2022-03-17 PROCEDURE — 99213 OFFICE O/P EST LOW 20 MIN: CPT | Performed by: ORTHOPAEDIC SURGERY

## 2022-03-17 RX ORDER — LIDOCAINE HYDROCHLORIDE 10 MG/ML
3 INJECTION, SOLUTION INFILTRATION; PERINEURAL
Status: COMPLETED | OUTPATIENT
Start: 2022-03-17 | End: 2022-03-17

## 2022-03-17 RX ORDER — TRIAMCINOLONE ACETONIDE 40 MG/ML
40 INJECTION, SUSPENSION INTRA-ARTICULAR; INTRAMUSCULAR
Status: COMPLETED | OUTPATIENT
Start: 2022-03-17 | End: 2022-03-17

## 2022-03-17 RX ADMIN — LIDOCAINE HYDROCHLORIDE 3 ML: 10 INJECTION, SOLUTION INFILTRATION; PERINEURAL at 09:48

## 2022-03-17 RX ADMIN — TRIAMCINOLONE ACETONIDE 40 MG: 40 INJECTION, SUSPENSION INTRA-ARTICULAR; INTRAMUSCULAR at 09:48

## 2022-03-17 NOTE — PROGRESS NOTES
Assessment/Plan:  1  Primary osteoarthritis of right knee    2  Primary osteoarthritis of left knee      Orders Placed This Encounter   Procedures    Large joint arthrocentesis: L knee    Large joint arthrocentesis: R knee     Received bilateral knee steroid injections today  Patient should ice and avoid strenuous activity for 1-2 days if needed  Patient should avoid vaccines for 2 weeks if possible  If patient is diabetic should also monitor glucose over the next 7 to 10 days  Continue home exercises  Continue tylenol and diclofenac prn pain    Return in about 3 months (around 6/17/2022)  I answered all of the patient's questions during the visit and provided education of the patient's condition during the visit  The patient verbalized understanding of the information given and agrees with the plan  This note was dictated using [employer] software  It may contain errors including improperly dictated words  Please contact physician directly for any questions  Subjective   Chief Complaint: No chief complaint on file  HPI  Radha Mack is a 74 y o  female who presents for follow up for bilateral knee arthritis  Pain is intermittent  Occurs with walking  Right is worse than left  Does home exercises  Taking diclofenac and tylenol as needed for pain with relief  She had received bilateral knee steroid injections on 11/01 which helped her  She was supposed to be here after 3 months but had to cancel her appointment  She has been noticing pain over the past month or so  She has difficulty especially with stairs  Review of Systems  ROS:    See HPI for musculoskeletal review  All other systems reviewed are negative     History:  Past Medical History:   Diagnosis Date    Arthritis     Fatty liver 5/1/2018    Hypertension     Low back pain     Neck pain     Primary localized osteoarthritis of right knee 8/17/2017     History reviewed  No pertinent surgical history    Social History Social History     Substance and Sexual Activity   Alcohol Use No     Social History     Substance and Sexual Activity   Drug Use Never     Social History     Tobacco Use   Smoking Status Never Smoker   Smokeless Tobacco Never Used     Family History:   Family History   Problem Relation Age of Onset    Osteoporosis Mother     Skin cancer Father 54    Stomach cancer Sister 52    No Known Problems Daughter     No Known Problems Maternal Grandmother     No Known Problems Maternal Grandfather     No Known Problems Paternal Grandmother     No Known Problems Paternal Grandfather     No Known Problems Maternal Aunt     No Known Problems Maternal Aunt     No Known Problems Maternal Aunt     No Known Problems Maternal Aunt     No Known Problems Paternal Aunt        Current Outpatient Medications on File Prior to Visit   Medication Sig Dispense Refill    benazepril (LOTENSIN) 20 mg tablet TAKE 1 TABLET BY MOUTH EVERY DAY 90 tablet 1    cholecalciferol (VITAMIN D3) 1,000 units tablet Take 1,000 Units by mouth daily      cyclobenzaprine (FLEXERIL) 5 mg tablet Take 1 tablet (5 mg total) by mouth 3 (three) times a day as needed for muscle spasms 90 tablet 1    diclofenac potassium (CATAFLAM) 50 mg tablet Take 1 tablet (50 mg total) by mouth 2 (two) times a day 60 tablet 1    DULoxetine (CYMBALTA) 60 mg delayed release capsule TAKE 1 CAPSULE BY MOUTH EVERY DAY 90 capsule 1    gabapentin (Neurontin) 600 MG tablet Take 1 tablet (600 mg total) by mouth 3 (three) times a day 90 tablet 5    hydrochlorothiazide (HYDRODIURIL) 12 5 mg tablet TAKE 1 TABLET BY MOUTH EVERY DAY 90 tablet 1    omeprazole (PriLOSEC) 10 mg delayed release capsule Take 10 mg by mouth daily        rosuvastatin (CRESTOR) 10 MG tablet Take 1 tablet (10 mg total) by mouth daily 90 tablet 1    SUPER B COMPLEX/C PO Take 1 tablet by mouth daily         No current facility-administered medications on file prior to visit       Allergies   Allergen Reactions    Penicillins         Objective     There were no vitals taken for this visit  PE:  AAOx 3  WDWN  Hearing intact, no drainage from eyes  no audible wheezing  no abdominal distension  LE compartments soft, skin intact    Ortho Exam:  bilateral Knee:   No erythema  Mild generalized swelling  AROM: 0-120    Large joint arthrocentesis: L knee  Universal Protocol:  Consent given by: patient  Time out: Immediately prior to procedure a "time out" was called to verify the correct patient, procedure, equipment, support staff and site/side marked as required  Site marked: the operative site was marked  Supporting Documentation  Indications: pain   Procedure Details  Location: knee - L knee  Preparation: Patient was prepped and draped in the usual sterile fashion  Needle size: 22 G  Ultrasound guidance: no  Approach: anterolateral  Medications administered: 3 mL lidocaine 1 %; 40 mg triamcinolone acetonide 40 mg/mL    Patient tolerance: patient tolerated the procedure well with no immediate complications  Dressing:  Sterile dressing applied    Large joint arthrocentesis: R knee  Universal Protocol:  Consent given by: patient  Time out: Immediately prior to procedure a "time out" was called to verify the correct patient, procedure, equipment, support staff and site/side marked as required    Site marked: the operative site was marked  Supporting Documentation  Indications: pain   Procedure Details  Location: knee - R knee  Preparation: Patient was prepped and draped in the usual sterile fashion  Needle size: 22 G  Ultrasound guidance: no  Approach: anterolateral  Medications administered: 3 mL lidocaine 1 %; 40 mg triamcinolone acetonide 40 mg/mL    Patient tolerance: patient tolerated the procedure well with no immediate complications  Dressing:  Sterile dressing applied

## 2022-03-29 ENCOUNTER — APPOINTMENT (OUTPATIENT)
Dept: LAB | Facility: CLINIC | Age: 72
End: 2022-03-29
Payer: MEDICARE

## 2022-03-29 DIAGNOSIS — E78.2 MIXED HYPERLIPIDEMIA: ICD-10-CM

## 2022-03-29 DIAGNOSIS — I10 BENIGN ESSENTIAL HTN: ICD-10-CM

## 2022-03-29 DIAGNOSIS — E11.69 DIABETES MELLITUS TYPE 2 IN OBESE (HCC): ICD-10-CM

## 2022-03-29 DIAGNOSIS — E66.9 DIABETES MELLITUS TYPE 2 IN OBESE (HCC): ICD-10-CM

## 2022-03-29 LAB
ALBUMIN SERPL BCP-MCNC: 3.6 G/DL (ref 3.5–5)
ALP SERPL-CCNC: 73 U/L (ref 46–116)
ALT SERPL W P-5'-P-CCNC: 46 U/L (ref 12–78)
ANION GAP SERPL CALCULATED.3IONS-SCNC: 0 MMOL/L (ref 4–13)
AST SERPL W P-5'-P-CCNC: 24 U/L (ref 5–45)
BILIRUB SERPL-MCNC: 0.46 MG/DL (ref 0.2–1)
BUN SERPL-MCNC: 16 MG/DL (ref 5–25)
CALCIUM SERPL-MCNC: 9.4 MG/DL (ref 8.3–10.1)
CHLORIDE SERPL-SCNC: 103 MMOL/L (ref 100–108)
CHOLEST SERPL-MCNC: 122 MG/DL
CO2 SERPL-SCNC: 32 MMOL/L (ref 21–32)
CREAT SERPL-MCNC: 0.76 MG/DL (ref 0.6–1.3)
CREAT UR-MCNC: 77.9 MG/DL
EST. AVERAGE GLUCOSE BLD GHB EST-MCNC: 137 MG/DL
GFR SERPL CREATININE-BSD FRML MDRD: 77 ML/MIN/1.73SQ M
GLUCOSE P FAST SERPL-MCNC: 101 MG/DL (ref 65–99)
HBA1C MFR BLD: 6.4 %
HDLC SERPL-MCNC: 53 MG/DL
LDLC SERPL CALC-MCNC: 43 MG/DL (ref 0–100)
MICROALBUMIN UR-MCNC: 5 MG/L (ref 0–20)
MICROALBUMIN/CREAT 24H UR: 6 MG/G CREATININE (ref 0–30)
NONHDLC SERPL-MCNC: 69 MG/DL
POTASSIUM SERPL-SCNC: 4.2 MMOL/L (ref 3.5–5.3)
PROT SERPL-MCNC: 6.9 G/DL (ref 6.4–8.2)
SODIUM SERPL-SCNC: 135 MMOL/L (ref 136–145)
TRIGL SERPL-MCNC: 130 MG/DL

## 2022-03-29 PROCEDURE — 80053 COMPREHEN METABOLIC PANEL: CPT

## 2022-03-29 PROCEDURE — 82570 ASSAY OF URINE CREATININE: CPT

## 2022-03-29 PROCEDURE — 80061 LIPID PANEL: CPT

## 2022-03-29 PROCEDURE — 83036 HEMOGLOBIN GLYCOSYLATED A1C: CPT

## 2022-03-29 PROCEDURE — 36415 COLL VENOUS BLD VENIPUNCTURE: CPT

## 2022-03-29 PROCEDURE — 82043 UR ALBUMIN QUANTITATIVE: CPT

## 2022-04-06 ENCOUNTER — TELEPHONE (OUTPATIENT)
Dept: PAIN MEDICINE | Facility: CLINIC | Age: 72
End: 2022-04-06

## 2022-04-06 ENCOUNTER — OFFICE VISIT (OUTPATIENT)
Dept: PAIN MEDICINE | Facility: MEDICAL CENTER | Age: 72
End: 2022-04-06
Payer: MEDICARE

## 2022-04-06 VITALS
HEIGHT: 62 IN | WEIGHT: 180 LBS | HEART RATE: 78 BPM | SYSTOLIC BLOOD PRESSURE: 126 MMHG | TEMPERATURE: 97.5 F | OXYGEN SATURATION: 99 % | DIASTOLIC BLOOD PRESSURE: 78 MMHG | BODY MASS INDEX: 33.13 KG/M2

## 2022-04-06 DIAGNOSIS — M47.812 CERVICAL SPINE ARTHRITIS: ICD-10-CM

## 2022-04-06 DIAGNOSIS — M50.30 DEGENERATIVE DISC DISEASE, CERVICAL: ICD-10-CM

## 2022-04-06 DIAGNOSIS — M54.12 CERVICAL RADICULOPATHY: ICD-10-CM

## 2022-04-06 DIAGNOSIS — M62.838 SPASM OF RIGHT PIRIFORMIS MUSCLE: ICD-10-CM

## 2022-04-06 DIAGNOSIS — M54.41 CHRONIC RIGHT-SIDED LOW BACK PAIN WITH RIGHT-SIDED SCIATICA: Primary | ICD-10-CM

## 2022-04-06 DIAGNOSIS — M48.02 CERVICAL SPINAL STENOSIS: ICD-10-CM

## 2022-04-06 DIAGNOSIS — G89.29 CHRONIC RIGHT-SIDED LOW BACK PAIN WITH RIGHT-SIDED SCIATICA: Primary | ICD-10-CM

## 2022-04-06 PROCEDURE — 99213 OFFICE O/P EST LOW 20 MIN: CPT | Performed by: NURSE PRACTITIONER

## 2022-04-06 RX ORDER — CYCLOBENZAPRINE HCL 5 MG
10 TABLET ORAL
Qty: 60 TABLET | Refills: 2 | Status: SHIPPED | OUTPATIENT
Start: 2022-04-06 | End: 2022-06-29 | Stop reason: SDUPTHER

## 2022-04-06 NOTE — PROGRESS NOTES
Assessment  1  Chronic right-sided low back pain with right-sided sciatica    2  Cervical radiculopathy    3  Cervical spinal stenosis    4  Cervical spine arthritis    5  Degenerative disc disease, cervical    6  Spasm of right piriformis muscle        Plan  I discussed with the patient that since there has been  significant improvement in the pain symptoms that we will hold off on any repeat injections at this point in time  However, I reviewed with the patient that if her symptoms should return, worsen, and/or experience new pain symptoms, she should call our office  to discuss repeating the injection  Continue cyclobenzaprine at bedtime this was refilled today  Follow-up in 3 months for medication refills      My impressions and treatment recommendations were discussed in detail with the patient who verbalized understanding and had no further questions  Discharge instructions were provided  I personally saw and examined the patient and I agree with the above discussed plan of care  No orders of the defined types were placed in this encounter  New Medications Ordered This Visit   Medications    cyclobenzaprine (FLEXERIL) 5 mg tablet     Sig: Take 2 tablets (10 mg total) by mouth daily at bedtime     Dispense:  60 tablet     Refill:  2       History of Present Illness    Rocío Valenzuela is a 74 y o  female presents for follow-up related to her chronic neck and arm pain symptoms as well as her right low back pain symptoms  Today she reports she is doing better and rates her pain 1/10  She gets occasional pain in the morning and at night  She tells me that her neck and her right arm her mainly after she is carrying something  She is status post a right piriformis injection on February 22, 2022 with Dr Ottoniel Braxton which did provide her at least 50% relief    She then had a cervical epidural steroid injection on March 8, 2022 also with Dr Ottoniel Braxton and this provided her 90% of ongoing relief  Patient does continue to use cyclobenzaprine 10 mg at bedtime which she finds very helpful  I have personally reviewed and/or updated the patient's past medical history, past surgical history, family history, social history, current medications, allergies, and vital signs today  Review of Systems   Respiratory: Negative for shortness of breath  Cardiovascular: Positive for leg swelling  Negative for chest pain  Gastrointestinal: Negative for constipation, diarrhea, nausea and vomiting  Musculoskeletal: Positive for joint swelling and neck stiffness  Negative for arthralgias, gait problem and myalgias  Skin: Negative for rash  Neurological: Negative for dizziness, seizures and weakness  All other systems reviewed and are negative  Patient Active Problem List   Diagnosis    Primary osteoarthritis of both knees    Lumbar disc herniation with radiculopathy    Chronic right-sided low back pain with right-sided sciatica    Fibromyalgia    Benign essential HTN    Fatty liver    Gastroesophageal reflux disease without esophagitis    Lumbar radiculopathy    Sacroiliitis (HCC)    Pes anserine bursitis    Lower extremity edema    Cervical radiculopathy    Cervical spine arthritis    Cervical spinal stenosis    Lumbar spondylosis    Major depressive disorder with single episode, in full remission ([address] )    Mixed hyperlipidemia    IFG (impaired fasting glucose)    Degenerative disc disease, cervical    Diabetes mellitus type 2 in obese ([address] )    Spasm of right piriformis muscle       Past Medical History:   Diagnosis Date    Arthritis     Fatty liver 5/1/2018    Hypertension     Low back pain     Neck pain     Primary localized osteoarthritis of right knee 8/17/2017       History reviewed  No pertinent surgical history      Family History   Problem Relation Age of Onset    Osteoporosis Mother     Skin cancer Father 54    Stomach cancer Sister 52    No Known Problems Daughter     No Known Problems Maternal Grandmother     No Known Problems Maternal Grandfather     No Known Problems Paternal Grandmother     No Known Problems Paternal Grandfather     No Known Problems Maternal Aunt     No Known Problems Maternal Aunt     No Known Problems Maternal Aunt     No Known Problems Maternal Aunt     No Known Problems Paternal Aunt        Social History     Occupational History    Not on file   Tobacco Use    Smoking status: Never Smoker    Smokeless tobacco: Never Used   Vaping Use    Vaping Use: Never used   Substance and Sexual Activity    Alcohol use: No    Drug use: Never    Sexual activity: Not Currently       Current Outpatient Medications on File Prior to Visit   Medication Sig    benazepril (LOTENSIN) 20 mg tablet TAKE 1 TABLET BY MOUTH EVERY DAY    cholecalciferol (VITAMIN D3) 1,000 units tablet Take 1,000 Units by mouth daily    diclofenac potassium (CATAFLAM) 50 mg tablet Take 1 tablet (50 mg total) by mouth 2 (two) times a day    DULoxetine (CYMBALTA) 60 mg delayed release capsule TAKE 1 CAPSULE BY MOUTH EVERY DAY    gabapentin (Neurontin) 600 MG tablet Take 1 tablet (600 mg total) by mouth 3 (three) times a day    hydrochlorothiazide (HYDRODIURIL) 12 5 mg tablet TAKE 1 TABLET BY MOUTH EVERY DAY    omeprazole (PriLOSEC) 10 mg delayed release capsule Take 10 mg by mouth daily      rosuvastatin (CRESTOR) 10 MG tablet Take 1 tablet (10 mg total) by mouth daily    SUPER B COMPLEX/C PO Take 1 tablet by mouth daily      [DISCONTINUED] cyclobenzaprine (FLEXERIL) 5 mg tablet Take 1 tablet (5 mg total) by mouth 3 (three) times a day as needed for muscle spasms     No current facility-administered medications on file prior to visit         Allergies   Allergen Reactions    Penicillins        Physical Exam    /78   Pulse 78   Temp 97 5 °F (36 4 °C)   Ht 5' 2" (1 575 m)   Wt 81 6 kg (180 lb)   SpO2 99%   BMI 32 92 kg/m²     Constitutional: normal, well developed, well nourished, alert, in no distress and non-toxic and no overt pain behavior    Eyes: anicteric  HEENT: grossly intact  Neck: supple, symmetric, trachea midline and no masses   Pulmonary:even and unlabored  Cardiovascular:No edema or pitting edema present  Skin:Normal without rashes or lesions and well hydrated  Psychiatric:Mood and affect appropriate  Neurologic:Cranial Nerves II-XII grossly intact  Musculoskeletal:normal    Imaging

## 2022-04-15 ENCOUNTER — OFFICE VISIT (OUTPATIENT)
Dept: INTERNAL MEDICINE CLINIC | Facility: CLINIC | Age: 72
End: 2022-04-15
Payer: MEDICARE

## 2022-04-15 VITALS
WEIGHT: 180 LBS | HEART RATE: 84 BPM | DIASTOLIC BLOOD PRESSURE: 78 MMHG | TEMPERATURE: 98.7 F | BODY MASS INDEX: 33.13 KG/M2 | OXYGEN SATURATION: 97 % | RESPIRATION RATE: 16 BRPM | SYSTOLIC BLOOD PRESSURE: 108 MMHG | HEIGHT: 62 IN

## 2022-04-15 DIAGNOSIS — E66.9 DIABETES MELLITUS TYPE 2 IN OBESE (HCC): ICD-10-CM

## 2022-04-15 DIAGNOSIS — Z86.007 HISTORY OF SQUAMOUS CELL CARCINOMA IN SITU: ICD-10-CM

## 2022-04-15 DIAGNOSIS — E78.2 MIXED HYPERLIPIDEMIA: ICD-10-CM

## 2022-04-15 DIAGNOSIS — E11.69 DIABETES MELLITUS TYPE 2 IN OBESE (HCC): ICD-10-CM

## 2022-04-15 DIAGNOSIS — I10 BENIGN ESSENTIAL HTN: Primary | ICD-10-CM

## 2022-04-15 DIAGNOSIS — R10.11 RUQ ABDOMINAL PAIN: ICD-10-CM

## 2022-04-15 PROCEDURE — 99214 OFFICE O/P EST MOD 30 MIN: CPT | Performed by: INTERNAL MEDICINE

## 2022-04-15 NOTE — ASSESSMENT & PLAN NOTE
-DM2 in obese    A1c is stable at 6 4  Lab Results   Component Value Date    HGBA1C 6 4 (H) 03/29/2022   -continue on diabetic diet  -foot exam performed  -already on ACEI for hypertension

## 2022-04-15 NOTE — PROGRESS NOTES
Assessment/Plan:    Problem List Items Addressed This Visit        Endocrine    Diabetes mellitus type 2 in obese ([facility] [address] )     -DM2 in obese  A1c is stable at 6 4  Lab Results   Component Value Date    HGBA1C 6 4 (H) 03/29/2022   -continue on diabetic diet  -foot exam performed  -already on ACEI for hypertension         Relevant Orders    Hemoglobin A1C    Microalbumin / creatinine urine ratio    Lipid panel       Cardiovascular and Mediastinum    Benign essential HTN - Primary     -normotensive  -continue hctz 12 5mg daily and benazepril 20mg daily  -continue weight loss efforts and adhere to low sodium diet         Relevant Orders    Comprehensive metabolic panel    Lipid panel       Other    Mixed hyperlipidemia     -controlled and improved  -continue rosuvastatin 10mg daily  -adhere to low fat, low cholesterol diet         Relevant Orders    Comprehensive metabolic panel    Lipid panel      Other Visit Diagnoses     RUQ abdominal pain        -LFTs normal  -check RUQ u/s    Relevant Orders    US right upper quadrant    History of squamous cell carcinoma in situ        -requests switch to a different Derm, will refer    Relevant Orders    Ambulatory Referral to Dermatology          Subjective:      Patient ID: Ryan Maldonado is a 74 y o  female  64yo female with DM2, HTN, low back pain, fibromyalgia, cervical radiculopathy with arthritis and stenosis, HLD, MDD, knee OA here for follow up care  She is accompanied by her   Has more muscle aches when she does more chores so she does a little bit at a time  Diabetes  She presents for her follow-up diabetic visit  She has type 2 diabetes mellitus  Her disease course has been stable  Hypoglycemia symptoms include dizziness (intermittent) and headaches (intermittent)  Pertinent negatives for diabetes include no chest pain  Risk factors for coronary artery disease include diabetes mellitus, dyslipidemia and hypertension   Current diabetic treatment includes diet (decreased potatoes, limited bread intake)  Her weight is stable  An ACE inhibitor/angiotensin II receptor blocker is being taken  Hyperlipidemia  This is a chronic problem  The current episode started more than 1 year ago  The problem is controlled  Exacerbating diseases include diabetes and obesity  Associated symptoms include myalgias (chronic)  Pertinent negatives include no chest pain or shortness of breath  Current antihyperlipidemic treatment includes statins  Risk factors for coronary artery disease include hypertension, diabetes mellitus, dyslipidemia and obesity  Hypertension  This is a chronic problem  The current episode started more than 1 year ago  Associated symptoms include headaches (intermittent) and neck pain  Pertinent negatives include no chest pain, palpitations or shortness of breath  Risk factors for coronary artery disease include obesity, diabetes mellitus and dyslipidemia  Past treatments include ACE inhibitors and diuretics  Reports RUQ abd pain, intermittent, had vomiting x1      The following portions of the patient's history were reviewed and updated as appropriate: allergies, current medications, past family history, past medical history, past social history, past surgical history and problem list       Current Outpatient Medications:     benazepril (LOTENSIN) 20 mg tablet, TAKE 1 TABLET BY MOUTH EVERY DAY, Disp: 90 tablet, Rfl: 1    cholecalciferol (VITAMIN D3) 1,000 units tablet, Take 1,000 Units by mouth daily, Disp: , Rfl:     cyclobenzaprine (FLEXERIL) 5 mg tablet, Take 2 tablets (10 mg total) by mouth daily at bedtime, Disp: 60 tablet, Rfl: 2    diclofenac potassium (CATAFLAM) 50 mg tablet, Take 1 tablet (50 mg total) by mouth 2 (two) times a day, Disp: 60 tablet, Rfl: 1    DULoxetine (CYMBALTA) 60 mg delayed release capsule, TAKE 1 CAPSULE BY MOUTH EVERY DAY, Disp: 90 capsule, Rfl: 1    gabapentin (Neurontin) 600 MG tablet, Take 1 tablet (600 mg total) by mouth 3 (three) times a day, Disp: 90 tablet, Rfl: 5    hydrochlorothiazide (HYDRODIURIL) 12 5 mg tablet, TAKE 1 TABLET BY MOUTH EVERY DAY, Disp: 90 tablet, Rfl: 1    omeprazole (PriLOSEC) 10 mg delayed release capsule, Take 10 mg by mouth daily  , Disp: , Rfl:     rosuvastatin (CRESTOR) 10 MG tablet, Take 1 tablet (10 mg total) by mouth daily, Disp: 90 tablet, Rfl: 1    SUPER B COMPLEX/C PO, Take 1 tablet by mouth daily  , Disp: , Rfl:     Review of Systems   Constitutional: Negative for activity change, appetite change and unexpected weight change  Respiratory: Negative for cough, chest tightness, shortness of breath and wheezing  Cardiovascular: Negative for chest pain, palpitations and leg swelling  Gastrointestinal: Positive for abdominal pain and vomiting (x1)  Musculoskeletal: Positive for arthralgias, back pain, joint swelling, myalgias (chronic), neck pain and neck stiffness  Neurological: Positive for dizziness (intermittent) and headaches (intermittent)  Psychiatric/Behavioral: Positive for dysphoric mood  Negative for sleep disturbance  Objective:    /78 (BP Location: Left arm, Patient Position: Sitting)   Pulse 84   Temp 98 7 °F (37 1 °C) (Tympanic)   Resp 16   Ht 5' 2" (1 575 m)   Wt 81 6 kg (180 lb)   SpO2 97%   BMI 32 92 kg/m²      Physical Exam  Vitals reviewed  Constitutional:       General: She is not in acute distress  Appearance: She is not diaphoretic  Cardiovascular:      Rate and Rhythm: Normal rate and regular rhythm  Pulses: Normal pulses  no weak pulses          Dorsalis pedis pulses are 2+ on the right side and 2+ on the left side  Posterior tibial pulses are 2+ on the right side and 2+ on the left side  Heart sounds: Normal heart sounds  Pulmonary:      Effort: Pulmonary effort is normal  No respiratory distress  Breath sounds: Normal breath sounds  No wheezing     Abdominal:      General: Bowel sounds are normal  There is no distension  Palpations: Abdomen is soft  Tenderness: There is abdominal tenderness in the right upper quadrant  Musculoskeletal:      Right lower leg: No edema  Left lower leg: No edema  Feet:      Right foot:      Skin integrity: Dry skin present  Left foot:      Skin integrity: Dry skin present  Skin:     General: Skin is dry  Coloration: Skin is not pale  Neurological:      Mental Status: She is alert and oriented to person, place, and time  Patient's shoes and socks removed  Right Foot/Ankle   Right Foot Inspection  Skin Exam: dry skin  Toe Exam: right toe deformity (onychomycosis)  Sensory   Monofilament testing: diminished    Vascular  Capillary refills: < 3 seconds  The right DP pulse is 2+  The right PT pulse is 2+  Left Foot/Ankle  Left Foot Inspection  Skin Exam: dry skin  Toe Exam: left toe deformity (onychomycosis)  Sensory   Monofilament testing: diminished    Vascular  Capillary refills: < 3 seconds  The left DP pulse is 2+  The left PT pulse is 2+       Assign Risk Category  No deformity present  Loss of protective sensation  No weak pulses  Risk: 1      Recent Results (from the past 672 hour(s))   Comprehensive metabolic panel    Collection Time: 03/29/22  8:22 AM   Result Value Ref Range    Sodium 135 (L) 136 - 145 mmol/L    Potassium 4 2 3 5 - 5 3 mmol/L    Chloride 103 100 - 108 mmol/L    CO2 32 21 - 32 mmol/L    ANION GAP 0 (L) 4 - 13 mmol/L    BUN 16 5 - 25 mg/dL    Creatinine 0 76 0 60 - 1 30 mg/dL    Glucose, Fasting 101 (H) 65 - 99 mg/dL    Calcium 9 4 8 3 - 10 1 mg/dL    AST 24 5 - 45 U/L    ALT 46 12 - 78 U/L    Alkaline Phosphatase 73 46 - 116 U/L    Total Protein 6 9 6 4 - 8 2 g/dL    Albumin 3 6 3 5 - 5 0 g/dL    Total Bilirubin 0 46 0 20 - 1 00 mg/dL    eGFR 77 ml/min/1 73sq m   Lipid panel    Collection Time: 03/29/22  8:22 AM   Result Value Ref Range    Cholesterol 122 See Comment mg/dL Triglycerides 130 See Comment mg/dL    HDL, Direct 53 >=50 mg/dL    LDL Calculated 43 0 - 100 mg/dL    Non-HDL-Chol (CHOL-HDL) 69 mg/dl   Hemoglobin A1C    Collection Time: 03/29/22  8:22 AM   Result Value Ref Range    Hemoglobin A1C 6 4 (H) Normal 3 8-5 6%; PreDiabetic 5 7-6 4%; Diabetic >=6 5%; Glycemic control for adults with diabetes <7 0% %     mg/dl   Microalbumin / creatinine urine ratio    Collection Time: 03/29/22  8:22 AM   Result Value Ref Range    Creatinine, Ur 77 9 mg/dL    Microalbum  ,U,Random 5 0 0 0 - 20 0 mg/L    Microalb Creat Ratio 6 0 - 30 mg/g creatinine

## 2022-04-15 NOTE — ASSESSMENT & PLAN NOTE
-controlled and improved  -continue rosuvastatin 10mg daily  -adhere to low fat, low cholesterol diet

## 2022-04-15 NOTE — ASSESSMENT & PLAN NOTE
-normotensive  -continue hctz 12 5mg daily and benazepril 20mg daily  -continue weight loss efforts and adhere to low sodium diet

## 2022-04-21 ENCOUNTER — HOSPITAL ENCOUNTER (OUTPATIENT)
Dept: ULTRASOUND IMAGING | Facility: MEDICAL CENTER | Age: 72
Discharge: HOME | End: 2022-04-21
Payer: MEDICARE

## 2022-04-21 DIAGNOSIS — R10.11 RUQ ABDOMINAL PAIN: ICD-10-CM

## 2022-04-21 PROCEDURE — 76705 ECHO EXAM OF ABDOMEN: CPT

## 2022-04-23 DIAGNOSIS — M17.0 PRIMARY OSTEOARTHRITIS OF BOTH KNEES: ICD-10-CM

## 2022-04-25 RX ORDER — DICLOFENAC POTASSIUM 50 MG/1
TABLET, FILM COATED ORAL
Qty: 60 TABLET | Refills: 1 | Status: SHIPPED | OUTPATIENT
Start: 2022-04-25 | End: 2022-07-05

## 2022-04-28 ENCOUNTER — HOSPITAL ENCOUNTER (OUTPATIENT)
Dept: MAMMOGRAPHY | Facility: MEDICAL CENTER | Age: 72
Discharge: HOME | End: 2022-04-28
Payer: MEDICARE

## 2022-04-28 VITALS — HEIGHT: 62 IN | BODY MASS INDEX: 33.13 KG/M2 | WEIGHT: 180 LBS

## 2022-04-28 DIAGNOSIS — Z12.31 ENCOUNTER FOR SCREENING MAMMOGRAM FOR MALIGNANT NEOPLASM OF BREAST: ICD-10-CM

## 2022-04-28 PROCEDURE — 77063 BREAST TOMOSYNTHESIS BI: CPT

## 2022-04-28 PROCEDURE — 77067 SCR MAMMO BI INCL CAD: CPT

## 2022-05-17 DIAGNOSIS — E78.2 MIXED HYPERLIPIDEMIA: ICD-10-CM

## 2022-05-17 RX ORDER — ROSUVASTATIN CALCIUM 10 MG/1
TABLET, COATED ORAL
Qty: 90 TABLET | Refills: 1 | Status: SHIPPED | OUTPATIENT
Start: 2022-05-17

## 2022-06-16 ENCOUNTER — OFFICE VISIT (OUTPATIENT)
Dept: OBGYN CLINIC | Facility: CLINIC | Age: 72
End: 2022-06-16
Payer: MEDICARE

## 2022-06-16 VITALS
WEIGHT: 180.8 LBS | HEART RATE: 86 BPM | BODY MASS INDEX: 33.27 KG/M2 | HEIGHT: 62 IN | DIASTOLIC BLOOD PRESSURE: 83 MMHG | SYSTOLIC BLOOD PRESSURE: 129 MMHG

## 2022-06-16 DIAGNOSIS — M17.11 PRIMARY OSTEOARTHRITIS OF RIGHT KNEE: ICD-10-CM

## 2022-06-16 DIAGNOSIS — M17.12 PRIMARY OSTEOARTHRITIS OF LEFT KNEE: Primary | ICD-10-CM

## 2022-06-16 PROCEDURE — 20610 DRAIN/INJ JOINT/BURSA W/O US: CPT | Performed by: ORTHOPAEDIC SURGERY

## 2022-06-16 PROCEDURE — 99213 OFFICE O/P EST LOW 20 MIN: CPT | Performed by: ORTHOPAEDIC SURGERY

## 2022-06-16 RX ORDER — LIDOCAINE HYDROCHLORIDE 10 MG/ML
3 INJECTION, SOLUTION INFILTRATION; PERINEURAL
Status: COMPLETED | OUTPATIENT
Start: 2022-06-16 | End: 2022-06-16

## 2022-06-16 RX ORDER — METHYLPREDNISOLONE ACETATE 40 MG/ML
2 INJECTION, SUSPENSION INTRA-ARTICULAR; INTRALESIONAL; INTRAMUSCULAR; SOFT TISSUE
Status: COMPLETED | OUTPATIENT
Start: 2022-06-16 | End: 2022-06-16

## 2022-06-16 RX ADMIN — METHYLPREDNISOLONE ACETATE 2 ML: 40 INJECTION, SUSPENSION INTRA-ARTICULAR; INTRALESIONAL; INTRAMUSCULAR; SOFT TISSUE at 13:39

## 2022-06-16 RX ADMIN — LIDOCAINE HYDROCHLORIDE 3 ML: 10 INJECTION, SOLUTION INFILTRATION; PERINEURAL at 13:39

## 2022-06-16 NOTE — PROGRESS NOTES
Assessment/Plan:  1  Primary osteoarthritis of left knee    2  Primary osteoarthritis of right knee      Orders Placed This Encounter   Procedures    Large joint arthrocentesis    Large joint arthrocentesis     Received bilateral knee steroid injections today  Patient should ice and avoid strenuous activity for 1-2 days if needed  Patient should avoid vaccines for 2 weeks if possible  If patient is diabetic should also monitor glucose over the next 7 to 10 days  Continue meloxicam prn pain  Can take Tylenol 1,000mg by mouth every 8 hours as needed for pain  Do not exceed 3,000mg per day  Continue home exercises    Return in about 3 months (around 9/16/2022)  I answered all of the patient's questions during the visit and provided education of the patient's condition during the visit  The patient verbalized understanding of the information given and agrees with the plan  This note was dictated using [employer] software  It may contain errors including improperly dictated words  Please contact physician directly for any questions  Subjective   Chief Complaint: No chief complaint on file  HPI  Priya Naqvi is a 74 y o  female who presents for follow up for chronic bilateral knee pain due to osteoarthritis  Patient had bilateral knee steroid injection on 3/17/22 and with relief  She is having generalized bilateral knee pain  She has difficulty especially with stairs  She is taking meloxicam as needed for pain relief  She also is doing home exercises    Review of Systems  ROS:    See HPI for musculoskeletal review  All other systems reviewed are negative     History:  Past Medical History:   Diagnosis Date    Arthritis     Fatty liver 5/1/2018    Hypertension     Low back pain     Neck pain     Primary localized osteoarthritis of right knee 8/17/2017     History reviewed  No pertinent surgical history    Social History   Social History     Substance and Sexual Activity   Alcohol Use No     Social History     Substance and Sexual Activity   Drug Use Never     Social History     Tobacco Use   Smoking Status Never Smoker   Smokeless Tobacco Never Used     Family History:   Family History   Problem Relation Age of Onset    Osteoporosis Mother     Skin cancer Father 54    Stomach cancer Sister 52    No Known Problems Daughter     No Known Problems Maternal Grandmother     No Known Problems Maternal Grandfather     No Known Problems Paternal Grandmother     No Known Problems Paternal Grandfather     No Known Problems Maternal Aunt     No Known Problems Maternal Aunt     No Known Problems Maternal Aunt     No Known Problems Maternal Aunt     No Known Problems Paternal Aunt        Current Outpatient Medications on File Prior to Visit   Medication Sig Dispense Refill    benazepril (LOTENSIN) 20 mg tablet TAKE 1 TABLET BY MOUTH EVERY DAY 90 tablet 1    cholecalciferol (VITAMIN D3) 1,000 units tablet Take 1,000 Units by mouth daily      cyclobenzaprine (FLEXERIL) 5 mg tablet Take 2 tablets (10 mg total) by mouth daily at bedtime 60 tablet 2    diclofenac potassium (CATAFLAM) 50 mg tablet TAKE 1 TABLET BY MOUTH TWICE A DAY 60 tablet 1    DULoxetine (CYMBALTA) 60 mg delayed release capsule TAKE 1 CAPSULE BY MOUTH EVERY DAY 90 capsule 1    gabapentin (Neurontin) 600 MG tablet Take 1 tablet (600 mg total) by mouth 3 (three) times a day 90 tablet 5    hydrochlorothiazide (HYDRODIURIL) 12 5 mg tablet TAKE 1 TABLET BY MOUTH EVERY DAY 90 tablet 1    omeprazole (PriLOSEC) 10 mg delayed release capsule Take 10 mg by mouth daily        rosuvastatin (CRESTOR) 10 MG tablet TAKE 1 TABLET BY MOUTH EVERY DAY 90 tablet 1    SUPER B COMPLEX/C PO Take 1 tablet by mouth daily         No current facility-administered medications on file prior to visit       Allergies   Allergen Reactions    Penicillins Hives and Swelling        Objective     /83   Pulse 86   Ht 5' 2" (1 575 m)   Wt 82 kg (180 lb 12 8 oz)   BMI 33 07 kg/m²      PE:  AAOx 3  WDWN  Hearing intact, no drainage from eyes  no audible wheezing  no abdominal distension  LE compartments soft, skin intact    Ortho Exam:  bilateral Knee:   No erythema  no swelling  no effusion  no warmth  AROM: 0-120  No TTP    Large joint arthrocentesis: L knee  Universal Protocol:  Consent given by: patient  Time out: Immediately prior to procedure a "time out" was called to verify the correct patient, procedure, equipment, support staff and site/side marked as required  Site marked: the operative site was marked  Supporting Documentation  Indications: pain   Procedure Details  Location: knee - L knee  Preparation: Patient was prepped and draped in the usual sterile fashion  Needle size: 22 G  Ultrasound guidance: no  Approach: anterolateral  Medications administered: 3 mL lidocaine 1 %; 2 mL methylPREDNISolone acetate 40 mg/mL    Patient tolerance: patient tolerated the procedure well with no immediate complications  Dressing:  Sterile dressing applied    Large joint arthrocentesis: R knee  Universal Protocol:  Consent given by: patient  Time out: Immediately prior to procedure a "time out" was called to verify the correct patient, procedure, equipment, support staff and site/side marked as required    Site marked: the operative site was marked  Supporting Documentation  Indications: pain   Procedure Details  Location: knee - R knee  Preparation: Patient was prepped and draped in the usual sterile fashion  Needle size: 22 G  Ultrasound guidance: no  Approach: anterolateral  Medications administered: 3 mL lidocaine 1 %; 2 mL methylPREDNISolone acetate 40 mg/mL    Patient tolerance: patient tolerated the procedure well with no immediate complications  Dressing:  Sterile dressing applied

## 2022-06-29 ENCOUNTER — OFFICE VISIT (OUTPATIENT)
Dept: PAIN MEDICINE | Facility: MEDICAL CENTER | Age: 72
End: 2022-06-29
Payer: MEDICARE

## 2022-06-29 VITALS
OXYGEN SATURATION: 97 % | BODY MASS INDEX: 32.76 KG/M2 | TEMPERATURE: 97.6 F | DIASTOLIC BLOOD PRESSURE: 68 MMHG | WEIGHT: 178 LBS | HEART RATE: 80 BPM | SYSTOLIC BLOOD PRESSURE: 116 MMHG | HEIGHT: 62 IN

## 2022-06-29 DIAGNOSIS — M62.838 SPASM OF RIGHT PIRIFORMIS MUSCLE: ICD-10-CM

## 2022-06-29 DIAGNOSIS — M47.812 CERVICAL SPINE ARTHRITIS: ICD-10-CM

## 2022-06-29 DIAGNOSIS — M51.16 LUMBAR DISC HERNIATION WITH RADICULOPATHY: Primary | ICD-10-CM

## 2022-06-29 DIAGNOSIS — M48.02 CERVICAL SPINAL STENOSIS: ICD-10-CM

## 2022-06-29 DIAGNOSIS — M46.1 SACROILIITIS (HCC): ICD-10-CM

## 2022-06-29 DIAGNOSIS — M47.816 LUMBAR SPONDYLOSIS: ICD-10-CM

## 2022-06-29 DIAGNOSIS — M54.16 LUMBAR RADICULOPATHY: ICD-10-CM

## 2022-06-29 DIAGNOSIS — M54.12 CERVICAL RADICULOPATHY: ICD-10-CM

## 2022-06-29 DIAGNOSIS — M50.30 DEGENERATIVE DISC DISEASE, CERVICAL: ICD-10-CM

## 2022-06-29 PROCEDURE — 99213 OFFICE O/P EST LOW 20 MIN: CPT | Performed by: NURSE PRACTITIONER

## 2022-06-29 RX ORDER — CYCLOBENZAPRINE HCL 5 MG
10 TABLET ORAL
Qty: 180 TABLET | Refills: 1 | Status: SHIPPED | OUTPATIENT
Start: 2022-06-29 | End: 2022-09-27

## 2022-06-29 NOTE — PROGRESS NOTES
Assessment  1  Lumbar disc herniation with radiculopathy    2  Cervical radiculopathy    3  Cervical spinal stenosis    4  Cervical spine arthritis    5  Lumbar radiculopathy    6  Sacroiliitis (HCC)    7  Lumbar spondylosis    8  Degenerative disc disease, cervical    9  Spasm of right piriformis muscle        Plan  Continue cyclobenzaprine this was refilled today  Patient is still doing well following her cervical epidural steroid injection that she had in March and her right piriformis injection that she had in February  No need for repeat at this time  Follow-up visit in 6 months for medication refills        My impressions and treatment recommendations were discussed in detail with the patient who verbalized understanding and had no further questions  Discharge instructions were provided  I personally saw and examined the patient and I agree with the above discussed plan of care  No orders of the defined types were placed in this encounter  New Medications Ordered This Visit   Medications    cyclobenzaprine (FLEXERIL) 5 mg tablet     Sig: Take 2 tablets (10 mg total) by mouth daily at bedtime     Dispense:  180 tablet     Refill:  1       History of Present Illness    Melissa Boo is a 74 y o  female presents for follow-up related to her chronic neck pain and low back pain symptoms  Today the patient reports she is feeling better and doing great  She rates her pain 1/10 which is occasional bothersome in the evening  She currently takes cyclobenzaprine 5 mg, 2 tablets at bedtime with more than 50% relief and no side effects or issues  She tells me that this has really helped her day-to-day activities  I have personally reviewed and/or updated the patient's past medical history, past surgical history, family history, social history, current medications, allergies, and vital signs today  Review of Systems   Respiratory: Negative for shortness of breath      Cardiovascular: Negative for chest pain  Gastrointestinal: Negative for constipation, diarrhea, nausea and vomiting  Musculoskeletal: Positive for neck pain and neck stiffness  Negative for arthralgias, gait problem, joint swelling and myalgias  Skin: Negative for rash  Neurological: Negative for dizziness, seizures and weakness  All other systems reviewed and are negative  Patient Active Problem List   Diagnosis    Primary osteoarthritis of both knees    Lumbar disc herniation with radiculopathy    Chronic right-sided low back pain with right-sided sciatica    Fibromyalgia    Benign essential HTN    Fatty liver    Gastroesophageal reflux disease without esophagitis    Lumbar radiculopathy    Sacroiliitis (HCC)    Pes anserine bursitis    Lower extremity edema    Cervical radiculopathy    Cervical spine arthritis    Cervical spinal stenosis    Lumbar spondylosis    Major depressive disorder with single episode, in full remission ([address] )    Mixed hyperlipidemia    IFG (impaired fasting glucose)    Degenerative disc disease, cervical    Diabetes mellitus type 2 in obese ([address] )    Spasm of right piriformis muscle       Past Medical History:   Diagnosis Date    Arthritis     Fatty liver 5/1/2018    Hypertension     Low back pain     Neck pain     Primary localized osteoarthritis of right knee 8/17/2017       History reviewed  No pertinent surgical history      Family History   Problem Relation Age of Onset    Osteoporosis Mother     Skin cancer Father 54    Stomach cancer Sister 52    No Known Problems Daughter     No Known Problems Maternal Grandmother     No Known Problems Maternal Grandfather     No Known Problems Paternal Grandmother     No Known Problems Paternal Grandfather     No Known Problems Maternal Aunt     No Known Problems Maternal Aunt     No Known Problems Maternal Aunt     No Known Problems Maternal Aunt     No Known Problems Paternal Aunt        Social History     Occupational History    Not on file   Tobacco Use    Smoking status: Never Smoker    Smokeless tobacco: Never Used   Vaping Use    Vaping Use: Never used   Substance and Sexual Activity    Alcohol use: No    Drug use: Never    Sexual activity: Not Currently       Current Outpatient Medications on File Prior to Visit   Medication Sig    benazepril (LOTENSIN) 20 mg tablet TAKE 1 TABLET BY MOUTH EVERY DAY    cholecalciferol (VITAMIN D3) 1,000 units tablet Take 1,000 Units by mouth daily    diclofenac potassium (CATAFLAM) 50 mg tablet TAKE 1 TABLET BY MOUTH TWICE A DAY    DULoxetine (CYMBALTA) 60 mg delayed release capsule TAKE 1 CAPSULE BY MOUTH EVERY DAY    gabapentin (Neurontin) 600 MG tablet Take 1 tablet (600 mg total) by mouth 3 (three) times a day    hydrochlorothiazide (HYDRODIURIL) 12 5 mg tablet TAKE 1 TABLET BY MOUTH EVERY DAY    omeprazole (PriLOSEC) 10 mg delayed release capsule Take 10 mg by mouth daily      rosuvastatin (CRESTOR) 10 MG tablet TAKE 1 TABLET BY MOUTH EVERY DAY    SUPER B COMPLEX/C PO Take 1 tablet by mouth daily      [DISCONTINUED] cyclobenzaprine (FLEXERIL) 5 mg tablet Take 2 tablets (10 mg total) by mouth daily at bedtime     No current facility-administered medications on file prior to visit  Allergies   Allergen Reactions    Penicillins Hives and Swelling       Physical Exam    /68   Pulse 80   Temp 97 6 °F (36 4 °C)   Ht 5' 2" (1 575 m)   Wt 80 7 kg (178 lb)   SpO2 97%   BMI 32 56 kg/m²     Constitutional: normal, well developed, well nourished, alert, in no distress and non-toxic and no overt pain behavior    Eyes: anicteric  HEENT: grossly intact  Neck: supple, symmetric, trachea midline and no masses   Pulmonary:even and unlabored  Cardiovascular:No edema or pitting edema present  Skin:Normal without rashes or lesions and well hydrated  Psychiatric:Mood and affect appropriate  Neurologic:Cranial Nerves II-XII grossly intact  Musculoskeletal:normal    Imaging

## 2022-07-02 DIAGNOSIS — M17.0 PRIMARY OSTEOARTHRITIS OF BOTH KNEES: ICD-10-CM

## 2022-07-05 RX ORDER — DICLOFENAC POTASSIUM 50 MG/1
TABLET, FILM COATED ORAL
Qty: 60 TABLET | Refills: 1 | Status: SHIPPED | OUTPATIENT
Start: 2022-07-05 | End: 2022-08-26

## 2022-07-07 DIAGNOSIS — I10 BENIGN ESSENTIAL HTN: ICD-10-CM

## 2022-07-07 RX ORDER — BENAZEPRIL HYDROCHLORIDE 20 MG/1
TABLET ORAL
Qty: 90 TABLET | Refills: 1 | Status: SHIPPED | OUTPATIENT
Start: 2022-07-07

## 2022-07-21 DIAGNOSIS — G89.4 CHRONIC PAIN SYNDROME: ICD-10-CM

## 2022-07-21 DIAGNOSIS — M51.16 LUMBAR DISC HERNIATION WITH RADICULOPATHY: ICD-10-CM

## 2022-07-21 RX ORDER — GABAPENTIN 600 MG/1
600 TABLET ORAL 3 TIMES DAILY
Qty: 90 TABLET | Refills: 5 | Status: SHIPPED | OUTPATIENT
Start: 2022-07-21 | End: 2023-01-19 | Stop reason: SDUPTHER

## 2022-08-15 ENCOUNTER — TELEPHONE (OUTPATIENT)
Dept: PAIN MEDICINE | Facility: MEDICAL CENTER | Age: 72
End: 2022-08-15

## 2022-08-15 NOTE — TELEPHONE ENCOUNTER
S/w pt  Pt describes her pain as across lower back radiating anteriorly and posteriorly down her RLE and into her groin  Pt is requesting to have a procedure done  Pt scheduled for an OV on 8/16 with [facility] for a 1:45 arrival time to have her pain re-evaluated

## 2022-08-15 NOTE — TELEPHONE ENCOUNTER
Pt called stating that she would like to get another injection     Pt can be reached at [phone]

## 2022-08-16 ENCOUNTER — OFFICE VISIT (OUTPATIENT)
Dept: PAIN MEDICINE | Facility: MEDICAL CENTER | Age: 72
End: 2022-08-16
Payer: MEDICARE

## 2022-08-16 ENCOUNTER — APPOINTMENT (OUTPATIENT)
Dept: RADIOLOGY | Facility: MEDICAL CENTER | Age: 72
End: 2022-08-16
Payer: MEDICARE

## 2022-08-16 VITALS
TEMPERATURE: 97.7 F | BODY MASS INDEX: 32.76 KG/M2 | SYSTOLIC BLOOD PRESSURE: 110 MMHG | HEIGHT: 62 IN | OXYGEN SATURATION: 97 % | DIASTOLIC BLOOD PRESSURE: 66 MMHG | HEART RATE: 92 BPM | WEIGHT: 178 LBS

## 2022-08-16 DIAGNOSIS — M79.7 FIBROMYALGIA: ICD-10-CM

## 2022-08-16 DIAGNOSIS — M47.812 CERVICAL SPINE ARTHRITIS: ICD-10-CM

## 2022-08-16 DIAGNOSIS — M54.41 CHRONIC RIGHT-SIDED LOW BACK PAIN WITH RIGHT-SIDED SCIATICA: ICD-10-CM

## 2022-08-16 DIAGNOSIS — M54.12 CERVICAL RADICULOPATHY: ICD-10-CM

## 2022-08-16 DIAGNOSIS — G89.29 CHRONIC RIGHT-SIDED LOW BACK PAIN WITH RIGHT-SIDED SCIATICA: ICD-10-CM

## 2022-08-16 DIAGNOSIS — M25.559 HIP PAIN: ICD-10-CM

## 2022-08-16 DIAGNOSIS — M54.16 LUMBAR RADICULOPATHY: ICD-10-CM

## 2022-08-16 DIAGNOSIS — G89.4 CHRONIC PAIN SYNDROME: Primary | ICD-10-CM

## 2022-08-16 DIAGNOSIS — M48.02 CERVICAL SPINAL STENOSIS: ICD-10-CM

## 2022-08-16 DIAGNOSIS — M50.30 DEGENERATIVE DISC DISEASE, CERVICAL: ICD-10-CM

## 2022-08-16 DIAGNOSIS — M51.16 LUMBAR DISC HERNIATION WITH RADICULOPATHY: ICD-10-CM

## 2022-08-16 DIAGNOSIS — M47.816 LUMBAR SPONDYLOSIS: ICD-10-CM

## 2022-08-16 PROCEDURE — 73502 X-RAY EXAM HIP UNI 2-3 VIEWS: CPT

## 2022-08-16 PROCEDURE — 99214 OFFICE O/P EST MOD 30 MIN: CPT | Performed by: NURSE PRACTITIONER

## 2022-08-16 NOTE — PROGRESS NOTES
Assessment:  1  Chronic pain syndrome    2  Lumbar disc herniation with radiculopathy    3  Chronic right-sided low back pain with right-sided sciatica    4  Lumbar radiculopathy    5  Cervical spine arthritis    6  Lumbar spondylosis    7  Degenerative disc disease, cervical    8  Cervical radiculopathy    9  Cervical spinal stenosis    10  Fibromyalgia    11  Hip pain        Plan:  1  I will order an x-ray of the right hip  2  I will order an updated MRI of the lumbar spine without contrast  3  Patient may continue gabapentin 600 mg 3 times a day as prescribed  She does not require refills today   4  Can consider repeating right piriformis injection  5  Patient will follow-up pending results of her imaging or sooner if needed    History of Present Illness: The patient is a 74 y o  female last seen on 6/29/22 who presents for a follow up office visit in regards to chronic low back pain that radiates into the right groin and lateral aspect of the right lower extremity with associated numbness and tingling  Patient denies bowel or bladder incontinence or saddle anesthesia  I do not have any imaging of the right hip to review  Last MRI of the lumbar spine is from 2017  She has had SI joint injections, piriformis injections and a right L5 LESI with variable relief  She continues on gabapentin 600 mg 3 times a day and duloxetine 60 mg daily as prescribed by her PCP    She rates her pain a 10/10 on the numeric pain rating scale  Constant has pain throughout the day which is described as sharp, and cramping    I have personally reviewed and/or updated the patient's past medical history, past surgical history, family history, social history, current medications, allergies, and vital signs today  Review of Systems:    Review of Systems   Respiratory: Negative for shortness of breath  Cardiovascular: Negative for chest pain  Gastrointestinal: Negative for constipation, diarrhea, nausea and vomiting  Musculoskeletal: Positive for back pain, gait problem and joint swelling  Negative for arthralgias and myalgias  Skin: Negative for rash  Neurological: Negative for dizziness, seizures and weakness  All other systems reviewed and are negative  Past Medical History:   Diagnosis Date    Arthritis     Fatty liver 5/1/2018    Hypertension     Low back pain     Neck pain     Primary localized osteoarthritis of right knee 8/17/2017       History reviewed  No pertinent surgical history      Family History   Problem Relation Age of Onset    Osteoporosis Mother     Skin cancer Father 54    Stomach cancer Sister 52    No Known Problems Daughter     No Known Problems Maternal Grandmother     No Known Problems Maternal Grandfather     No Known Problems Paternal Grandmother     No Known Problems Paternal Grandfather     No Known Problems Maternal Aunt     No Known Problems Maternal Aunt     No Known Problems Maternal Aunt     No Known Problems Maternal Aunt     No Known Problems Paternal Aunt        Social History     Occupational History    Not on file   Tobacco Use    Smoking status: Never Smoker    Smokeless tobacco: Never Used   Vaping Use    Vaping Use: Never used   Substance and Sexual Activity    Alcohol use: No    Drug use: Never    Sexual activity: Not Currently         Current Outpatient Medications:     benazepril (LOTENSIN) 20 mg tablet, TAKE 1 TABLET BY MOUTH EVERY DAY, Disp: 90 tablet, Rfl: 1    cholecalciferol (VITAMIN D3) 1,000 units tablet, Take 1,000 Units by mouth daily, Disp: , Rfl:     cyclobenzaprine (FLEXERIL) 5 mg tablet, Take 2 tablets (10 mg total) by mouth daily at bedtime, Disp: 180 tablet, Rfl: 1    diclofenac potassium (CATAFLAM) 50 mg tablet, TAKE 1 TABLET BY MOUTH TWICE A DAY, Disp: 60 tablet, Rfl: 1    DULoxetine (CYMBALTA) 60 mg delayed release capsule, TAKE 1 CAPSULE BY MOUTH EVERY DAY, Disp: 90 capsule, Rfl: 1    gabapentin (Neurontin) 600 MG tablet, Take 1 tablet (600 mg total) by mouth 3 (three) times a day, Disp: 90 tablet, Rfl: 5    hydrochlorothiazide (HYDRODIURIL) 12 5 mg tablet, TAKE 1 TABLET BY MOUTH EVERY DAY, Disp: 90 tablet, Rfl: 1    omeprazole (PriLOSEC) 10 mg delayed release capsule, Take 10 mg by mouth daily  , Disp: , Rfl:     rosuvastatin (CRESTOR) 10 MG tablet, TAKE 1 TABLET BY MOUTH EVERY DAY, Disp: 90 tablet, Rfl: 1    SUPER B COMPLEX/C PO, Take 1 tablet by mouth daily  , Disp: , Rfl:     Allergies   Allergen Reactions    Penicillins Hives and Swelling       Physical Exam:    /66   Pulse 92   Temp 97 7 °F (36 5 °C)   Ht 5' 2" (1 575 m)   Wt 80 7 kg (178 lb)   SpO2 97%   BMI 32 56 kg/m²     Constitutional:normal, well developed, well nourished, alert, in no distress and non-toxic and no overt pain behavior  Eyes:anicteric  HEENT:grossly intact  Neck:supple, symmetric, trachea midline and no masses   Pulmonary:even and unlabored  Cardiovascular:No edema or pitting edema present  Skin:Normal without rashes or lesions and well hydrated  Psychiatric:Mood and affect appropriate  Neurologic:Cranial Nerves II-XII grossly intact  Musculoskeletal:Slightly antalgic gait but steady without assistive devices  Equivocal straight leg raise on the right  Internal and external rotation of the right hip reproduce groin pain  Teddy's test on the right reproduces groin pain  Patient unable to complete Stinchfield test secondary to weakness on the right      ImagingStudy Result    Narrative & Impression   MRI LUMBAR SPINE WITHOUT CONTRAST     INDICATION:  Chronic low back pain and bilateral leg pain      COMPARISON:  None      TECHNIQUE:  Sagittal T1, sagittal T2, sagittal inversion recovery, axial T1 and axial T2, coronal T2        IMAGE QUALITY:  Diagnostic     FINDINGS:     ALIGNMENT:  Slight dextroscoliosis lumbar spine      MARROW SIGNAL:  Normal marrow signal is identified within the visualized bony structures    No discrete marrow lesion      DISTAL CORD AND CONUS:  Normal size and signal within the distal cord and conus  The conus ends at the L2 level      PARASPINAL SOFT TISSUES:  Paraspinal soft tissues are unremarkable      SACRUM:  Normal signal within the sacrum  No evidence of insufficiency or stress fracture      LOWER THORACIC DISC SPACES:  Normal disc height and signal   No disc herniation, canal stenosis or foraminal narrowing      LUMBAR DISC SPACES:          L1-L2:  Normal      L2-L3:  Normal      L3-L4:  Moderate facet hypertrophy  Minimal central stenosis without foraminal narrowing      L4-L5:  Broad-based central protrusion type disc herniation with mild facet hypertrophy  There is mild central stenosis  Foramina are patent      L5-S1:  Degenerative disc ossify complex with marginal osteophytes identified  There is a moderate size central superiorly extending protrusion type disc herniation contributing to mild central stenosis and mild right lateral recess stenosis  Moderate   facet hypertrophy noted      IMPRESSION:     Mild central stenosis L4-5 and L5-S1 secondary to protrusion type disc herniation superimposed on annular bulging  At L5-S1, moderate central and slightly right paramedian disc extends slightly superiorly    Moderate L5-S1 facet hypertrophy         Workstation performed: [id number]          MRI lumbar spine without contrast    (Results Pending)         Orders Placed This Encounter   Procedures    XR hip/pelv 2-3 vws right if performed    MRI lumbar spine without contrast

## 2022-08-17 DIAGNOSIS — M79.7 FIBROMYALGIA: ICD-10-CM

## 2022-08-17 DIAGNOSIS — R60.0 LOWER EXTREMITY EDEMA: ICD-10-CM

## 2022-08-17 DIAGNOSIS — I10 BENIGN ESSENTIAL HTN: ICD-10-CM

## 2022-08-17 RX ORDER — HYDROCHLOROTHIAZIDE 12.5 MG/1
TABLET ORAL
Qty: 90 TABLET | Refills: 1 | Status: SHIPPED | OUTPATIENT
Start: 2022-08-17

## 2022-08-17 RX ORDER — DULOXETIN HYDROCHLORIDE 60 MG/1
CAPSULE, DELAYED RELEASE ORAL
Qty: 90 CAPSULE | Refills: 1 | Status: SHIPPED | OUTPATIENT
Start: 2022-08-17

## 2022-08-22 ENCOUNTER — TELEPHONE (OUTPATIENT)
Dept: PAIN MEDICINE | Facility: MEDICAL CENTER | Age: 72
End: 2022-08-22

## 2022-08-22 NOTE — TELEPHONE ENCOUNTER
Pt called in to called in for the XRAY results  Please be advised thank you    Pt can be reached @  [phone]

## 2022-08-22 NOTE — TELEPHONE ENCOUNTER
----- Message from Reji Hidalgo, [address] Daniels sent at 8/22/2022 10:49 AM EDT -----  X-ray of the right hip reveals moderate osteoarthritis    Can offer her right intra-articular hip injection

## 2022-08-24 NOTE — TELEPHONE ENCOUNTER
RN s/w pt regarding previous  Pt placed to see Sarina Spatz 8/26 11:20 for hip injection aware to wear loose comfortable clothes, NPO one hour prior, denied being sick or being on antibiotics, must have a   Denied having a vaccine in the past two weeks or getting one in the next two weeks    F/u appt made with [facility]

## 2022-08-26 ENCOUNTER — HOSPITAL ENCOUNTER (OUTPATIENT)
Dept: MRI IMAGING | Facility: HOSPITAL | Age: 72
End: 2022-08-26
Payer: MEDICARE

## 2022-08-26 ENCOUNTER — HOSPITAL ENCOUNTER (OUTPATIENT)
Dept: RADIOLOGY | Facility: MEDICAL CENTER | Age: 72
End: 2022-08-26
Payer: MEDICARE

## 2022-08-26 VITALS
DIASTOLIC BLOOD PRESSURE: 75 MMHG | HEART RATE: 80 BPM | SYSTOLIC BLOOD PRESSURE: 113 MMHG | OXYGEN SATURATION: 98 % | TEMPERATURE: 96.6 F | RESPIRATION RATE: 20 BRPM

## 2022-08-26 DIAGNOSIS — M25.551 RIGHT HIP PAIN: ICD-10-CM

## 2022-08-26 DIAGNOSIS — M17.0 PRIMARY OSTEOARTHRITIS OF BOTH KNEES: ICD-10-CM

## 2022-08-26 DIAGNOSIS — M54.16 LUMBAR RADICULOPATHY: ICD-10-CM

## 2022-08-26 PROCEDURE — 77002 NEEDLE LOCALIZATION BY XRAY: CPT

## 2022-08-26 PROCEDURE — 20610 DRAIN/INJ JOINT/BURSA W/O US: CPT | Performed by: PHYSICAL MEDICINE & REHABILITATION

## 2022-08-26 PROCEDURE — 77002 NEEDLE LOCALIZATION BY XRAY: CPT | Performed by: PHYSICAL MEDICINE & REHABILITATION

## 2022-08-26 PROCEDURE — A9585 GADOBUTROL INJECTION: HCPCS | Performed by: PHYSICAL MEDICINE & REHABILITATION

## 2022-08-26 PROCEDURE — 72148 MRI LUMBAR SPINE W/O DYE: CPT

## 2022-08-26 RX ORDER — BUPIVACAINE HCL/PF 2.5 MG/ML
3 VIAL (ML) INJECTION ONCE
Status: COMPLETED | OUTPATIENT
Start: 2022-08-26 | End: 2022-08-26

## 2022-08-26 RX ORDER — DICLOFENAC POTASSIUM 50 MG/1
TABLET, FILM COATED ORAL
Qty: 60 TABLET | Refills: 1 | Status: SHIPPED | OUTPATIENT
Start: 2022-08-26 | End: 2022-10-26

## 2022-08-26 RX ORDER — METHYLPREDNISOLONE ACETATE 40 MG/ML
40 INJECTION, SUSPENSION INTRA-ARTICULAR; INTRALESIONAL; INTRAMUSCULAR; PARENTERAL; SOFT TISSUE ONCE
Status: COMPLETED | OUTPATIENT
Start: 2022-08-26 | End: 2022-08-26

## 2022-08-26 RX ADMIN — METHYLPREDNISOLONE ACETATE 40 MG: 40 INJECTION, SUSPENSION INTRA-ARTICULAR; INTRALESIONAL; INTRAMUSCULAR; PARENTERAL; SOFT TISSUE at 11:28

## 2022-08-26 RX ADMIN — GADOBUTROL 1 ML: 604.72 INJECTION INTRAVENOUS at 11:27

## 2022-08-26 RX ADMIN — Medication 3 ML: at 11:28

## 2022-08-26 NOTE — DISCHARGE INSTRUCTIONS
Steroid Joint Injection   WHAT YOU NEED TO KNOW:   A steroid joint injection is a procedure to inject steroid medicine into a joint  Steroid medicine decreases pain and inflammation  The injection may also contain an anesthetic (numbing medicine) to decrease pain  It may be done to treat conditions such as arthritis, gout, or carpal tunnel syndrome  The injections may be given in your knee, ankle, shoulder, elbow, wrist, ankle or sacroiliac joint  Do not apply heat to any area that is numb  If you have discomfort or soreness at the injection site, you may apply ice today, 20 minutes on and 20 minutes off  Tomorrow you may use ice or warm, moist heat  Do not apply ice or heat directly to the skin  You may have an increase or change in the discomfort for 36-48 hours after your treatment  Apply ice and continue with any pain medicine you have been prescribed  Do not do anything strenuous today  You may shower, but no tub baths or hot tubs today  You may resume your normal activities tomorrow, but do not overdo it  Resume normal activities slowly when you are feeling better  If you experience redness, drainage or swelling at the injection site, or if you develop a fever above 100 degrees, please call [facility] at [phone] or go to the Emergency Room  Continue to take all routine medicines prescribed by your primary care physician unless otherwise instructed by our staff  Most blood thinners should be started again according to your regularly scheduled dosing  If you have any questions, please give our office a call  As no general anesthesia was used in today's procedure, you should not experience any side effects related to anesthesia  If you are diabetic, the steroids used in today's injection may temporarily increase your blood sugar levels after the first few days after your injection   Please keep a close eye on your sugars and alert the doctor who manages your diabetes if your sugars are significantly high from your baseline or you are symptomatic  If you have a problem specifically related to your procedure, please call our office at [phone]  Problems not related to your procedure should be directed to your primary care physician

## 2022-08-26 NOTE — DISCHARGE INSTR - LAB
Steroid Joint Injection   WHAT YOU NEED TO KNOW:   A steroid joint injection is a procedure to inject steroid medicine into a joint  Steroid medicine decreases pain and inflammation  The injection may also contain an anesthetic (numbing medicine) to decrease pain  It may be done to treat conditions such as arthritis, gout, or carpal tunnel syndrome  The injections may be given in your knee, ankle, shoulder, elbow, wrist, ankle or sacroiliac joint  Do not apply heat to any area that is numb  If you have discomfort or soreness at the injection site, you may apply ice today, 20 minutes on and 20 minutes off  Tomorrow you may use ice or warm, moist heat  Do not apply ice or heat directly to the skin  You may have an increase or change in the discomfort for 36-48 hours after your treatment  Apply ice and continue with any pain medicine you have been prescribed  Do not do anything strenuous today  You may shower, but no tub baths or hot tubs today  You may resume your normal activities tomorrow, but do not overdo it  Resume normal activities slowly when you are feeling better  If you experience redness, drainage or swelling at the injection site, or if you develop a fever above 100 degrees, please call [facility] at [phone] or go to the Emergency Room  Continue to take all routine medicines prescribed by your primary care physician unless otherwise instructed by our staff  Most blood thinners should be started again according to your regularly scheduled dosing  If you have any questions, please give our office a call  As no general anesthesia was used in today's procedure, you should not experience any side effects related to anesthesia  If you are diabetic, the steroids used in today's injection may temporarily increase your blood sugar levels after the first few days after your injection   Please keep a close eye on your sugars and alert the doctor who manages your diabetes if your sugars are significantly high from your baseline or you are symptomatic  If you have a problem specifically related to your procedure, please call our office at [phone]  Problems not related to your procedure should be directed to your primary care physician

## 2022-08-26 NOTE — H&P
History of Present Illness: The patient is a 74 y o  female who presents with complaints of right hip pain    Patient Active Problem List   Diagnosis    Primary osteoarthritis of both knees    Lumbar disc herniation with radiculopathy    Chronic right-sided low back pain with right-sided sciatica    Fibromyalgia    Benign essential HTN    Fatty liver    Gastroesophageal reflux disease without esophagitis    Lumbar radiculopathy    Sacroiliitis (HCC)    Pes anserine bursitis    Lower extremity edema    Cervical radiculopathy    Cervical spine arthritis    Cervical spinal stenosis    Lumbar spondylosis    Major depressive disorder with single episode, in full remission ([address] )    Mixed hyperlipidemia    IFG (impaired fasting glucose)    Degenerative disc disease, cervical    Diabetes mellitus type 2 in obese ([address] )    Spasm of right piriformis muscle       Past Medical History:   Diagnosis Date    Arthritis     Fatty liver 5/1/2018    Hypertension     Low back pain     Neck pain     Primary localized osteoarthritis of right knee 8/17/2017       No past surgical history on file        Current Outpatient Medications:     benazepril (LOTENSIN) 20 mg tablet, TAKE 1 TABLET BY MOUTH EVERY DAY, Disp: 90 tablet, Rfl: 1    cholecalciferol (VITAMIN D3) 1,000 units tablet, Take 1,000 Units by mouth daily, Disp: , Rfl:     cyclobenzaprine (FLEXERIL) 5 mg tablet, Take 2 tablets (10 mg total) by mouth daily at bedtime, Disp: 180 tablet, Rfl: 1    diclofenac potassium (CATAFLAM) 50 mg tablet, TAKE 1 TABLET BY MOUTH TWICE A DAY, Disp: 60 tablet, Rfl: 1    DULoxetine (CYMBALTA) 60 mg delayed release capsule, TAKE 1 CAPSULE BY MOUTH EVERY DAY, Disp: 90 capsule, Rfl: 1    gabapentin (Neurontin) 600 MG tablet, Take 1 tablet (600 mg total) by mouth 3 (three) times a day, Disp: 90 tablet, Rfl: 5    hydrochlorothiazide (HYDRODIURIL) 12 5 mg tablet, TAKE 1 TABLET BY MOUTH EVERY DAY, Disp: 90 tablet, Rfl: 1   omeprazole (PriLOSEC) 10 mg delayed release capsule, Take 10 mg by mouth daily  , Disp: , Rfl:     rosuvastatin (CRESTOR) 10 MG tablet, TAKE 1 TABLET BY MOUTH EVERY DAY, Disp: 90 tablet, Rfl: 1    SUPER B COMPLEX/C PO, Take 1 tablet by mouth daily  , Disp: , Rfl:     Allergies   Allergen Reactions    Penicillins Hives and Swelling       Physical Exam:   Vitals:    08/26/22 1115   BP: 127/91   Pulse: 87   Resp: 20   Temp: (!) 96 6 °F (35 9 °C)   SpO2: 100%     General: Awake, Alert, Oriented x 3, Mood and affect appropriate  Respiratory: Respirations even and unlabored  Cardiovascular: Peripheral pulses intact; no edema  Musculoskeletal Exam: right hip pain    ASA Score: 2    Patient/Chart Verification  Patient ID Verified: Verbal  ID Band Applied: No  Consents Confirmed: To be obtained in the Pre-Procedure area, Procedural  H&P( within 30 days) Verified: To be obtained in the Pre-Procedure area  Interval H&P(within 24 hr) Complete (required for Outpatients and Surgery Admit only): To be obtained in the Pre-Procedure area  Allergies Reviewed: Yes  Anticoag/NSAID held?: NA  Currently on antibiotics?: No    Assessment:   1   Right hip pain        Plan: RT HIP PAIN

## 2022-09-02 ENCOUNTER — TELEPHONE (OUTPATIENT)
Dept: PAIN MEDICINE | Facility: CLINIC | Age: 72
End: 2022-09-02

## 2022-09-02 NOTE — TELEPHONE ENCOUNTER
Patient reports: 50 % improvement post injection  Pain Level 9/10 I groin area  Patient would like a call about her MRI

## 2022-09-02 NOTE — TELEPHONE ENCOUNTER
Aware and agree with plan  MRI FINDINGS  1  Improved disc herniation at level L5-S1  There is mild canal stenosis, moderate left and mild to moderate right foraminal narrowing at this level  No nerve root compression  2   Grossly stable central disc protrusion at L4-5 with mild canal stenosis      REEVALUATE SYMPTOMS WITH GEOVANNA AT FOLLOW UP VISIT

## 2022-09-07 ENCOUNTER — TELEPHONE (OUTPATIENT)
Dept: OBGYN CLINIC | Facility: MEDICAL CENTER | Age: 72
End: 2022-09-07

## 2022-09-07 NOTE — TELEPHONE ENCOUNTER
Appt confirmed for tomorrow  Patient had a hip injection with Dr Juan Miguel Johnson on 08/26/2022  She wanted to ask Dr Charlotte Gr if she is still eligible for bilat knee steroid injections tomorrow  She is not a diabetic

## 2022-09-08 ENCOUNTER — OFFICE VISIT (OUTPATIENT)
Dept: OBGYN CLINIC | Facility: CLINIC | Age: 72
End: 2022-09-08
Payer: MEDICARE

## 2022-09-08 VITALS
BODY MASS INDEX: 32.87 KG/M2 | SYSTOLIC BLOOD PRESSURE: 137 MMHG | HEIGHT: 62 IN | WEIGHT: 178.6 LBS | HEART RATE: 82 BPM | DIASTOLIC BLOOD PRESSURE: 87 MMHG

## 2022-09-08 DIAGNOSIS — M17.0 BILATERAL PRIMARY OSTEOARTHRITIS OF KNEE: Primary | ICD-10-CM

## 2022-09-08 PROCEDURE — 99213 OFFICE O/P EST LOW 20 MIN: CPT | Performed by: PHYSICIAN ASSISTANT

## 2022-09-08 PROCEDURE — 20610 DRAIN/INJ JOINT/BURSA W/O US: CPT | Performed by: PHYSICIAN ASSISTANT

## 2022-09-08 RX ORDER — BUPIVACAINE HYDROCHLORIDE 5 MG/ML
2 INJECTION, SOLUTION EPIDURAL; INTRACAUDAL
Status: COMPLETED | OUTPATIENT
Start: 2022-09-08 | End: 2022-09-08

## 2022-09-08 RX ORDER — TRIAMCINOLONE ACETONIDE 40 MG/ML
40 INJECTION, SUSPENSION INTRA-ARTICULAR; INTRAMUSCULAR
Status: COMPLETED | OUTPATIENT
Start: 2022-09-08 | End: 2022-09-08

## 2022-09-08 RX ADMIN — BUPIVACAINE HYDROCHLORIDE 2 ML: 5 INJECTION, SOLUTION EPIDURAL; INTRACAUDAL at 11:11

## 2022-09-08 RX ADMIN — TRIAMCINOLONE ACETONIDE 40 MG: 40 INJECTION, SUSPENSION INTRA-ARTICULAR; INTRAMUSCULAR at 11:11

## 2022-09-08 NOTE — PROGRESS NOTES
Assessment/Plan:  1  Bilateral primary osteoarthritis of knee      Orders Placed This Encounter   Procedures    Large joint arthrocentesis: bilateral knee       · Patient has severe bilateral knee osteoarthritis  · Patient received bilateral knee steroid injections today  Post injection instructions reviewed  · Continue diclofenac tabs as needed  · Continue activity as tolerated  · Continue home exercises  Return in about 3 months (around 12/8/2022) for bilat knee CSI  I answered all of the patient's questions during the visit and provided education of the patient's condition during the visit  The patient verbalized understanding of the information given and agrees with the plan  This note was dictated using [employer] software  It may contain errors including improperly dictated words  Please contact physician directly for any questions  Subjective   Chief Complaint:   Chief Complaint   Patient presents with    Left Knee - Follow-up    Right Knee - Follow-up       HPI  Kash Tom is a 74 y o  female who presents for follow up for bilateral knee pain and severe OA  Patient received bilateral knee steroid injections on 6/16/22 and reports good pain relief  Patient states since her last visit she was working at her [employer] on her feet for 10 hours and developed severe right hip pain  She was seen by Dr Lisa Stoddard and received rigth hip IA steroid injection which provided significant ongoing relief  She is now having bilateral knee soreness, right knee worse than left knee  Patient is taking diclofenac tabs as needed for pain  She is active at home with home exercises  Patient would like repeat bilateral knee steroid injections today  Review of Systems  ROS:    See HPI for musculoskeletal review     All other systems reviewed are negative     History:  Past Medical History:   Diagnosis Date    Arthritis     Fatty liver 5/1/2018    Hypertension     Low back pain     Neck pain     Primary localized osteoarthritis of right knee 8/17/2017     No past surgical history on file    Social History   Social History     Substance and Sexual Activity   Alcohol Use No     Social History     Substance and Sexual Activity   Drug Use Never     Social History     Tobacco Use   Smoking Status Never Smoker   Smokeless Tobacco Never Used     Family History:   Family History   Problem Relation Age of Onset    Osteoporosis Mother     Skin cancer Father 54    Stomach cancer Sister 52    No Known Problems Daughter     No Known Problems Maternal Grandmother     No Known Problems Maternal Grandfather     No Known Problems Paternal Grandmother     No Known Problems Paternal Grandfather     No Known Problems Maternal Aunt     No Known Problems Maternal Aunt     No Known Problems Maternal Aunt     No Known Problems Maternal Aunt     No Known Problems Paternal Aunt        Current Outpatient Medications on File Prior to Visit   Medication Sig Dispense Refill    benazepril (LOTENSIN) 20 mg tablet TAKE 1 TABLET BY MOUTH EVERY DAY 90 tablet 1    cholecalciferol (VITAMIN D3) 1,000 units tablet Take 1,000 Units by mouth daily      cyclobenzaprine (FLEXERIL) 5 mg tablet Take 2 tablets (10 mg total) by mouth daily at bedtime 180 tablet 1    diclofenac potassium (CATAFLAM) 50 mg tablet TAKE 1 TABLET BY MOUTH TWICE A DAY 60 tablet 1    DULoxetine (CYMBALTA) 60 mg delayed release capsule TAKE 1 CAPSULE BY MOUTH EVERY DAY 90 capsule 1    gabapentin (Neurontin) 600 MG tablet Take 1 tablet (600 mg total) by mouth 3 (three) times a day 90 tablet 5    hydrochlorothiazide (HYDRODIURIL) 12 5 mg tablet TAKE 1 TABLET BY MOUTH EVERY DAY 90 tablet 1    omeprazole (PriLOSEC) 10 mg delayed release capsule Take 10 mg by mouth daily        rosuvastatin (CRESTOR) 10 MG tablet TAKE 1 TABLET BY MOUTH EVERY DAY 90 tablet 1    SUPER B COMPLEX/C PO Take 1 tablet by mouth daily         No current facility-administered medications on file prior to visit  Allergies   Allergen Reactions    Penicillins Hives and Swelling        Objective     /87   Pulse 82   Ht 5' 2" (1 575 m)   Wt 81 kg (178 lb 9 6 oz)   BMI 32 67 kg/m²      PE:  AAOx 3  WDWN  Hearing intact, no drainage from eyes  no audible wheezing  no abdominal distension  LE compartments soft, skin intact    Ortho Exam:  bilateral Knee:   No erythema  no swelling  no effusion  no warmth  +TTP over medial and lateral joint lines  AROM: right knee 3- 115, left knee 0- 120  Stable to varus/valgus stress      Large joint arthrocentesis: bilateral knee  Universal Protocol:  Consent: Verbal consent obtained    Risks and benefits: risks, benefits and alternatives were discussed  Consent given by: patient  Site marked: the operative site was marked  Supporting Documentation  Indications: pain   Procedure Details  Location: knee - bilateral knee  Preparation: Patient was prepped and draped in the usual sterile fashion  Needle size: 22 G  Ultrasound guidance: no  Approach: anterolateral    Medications (Right): 2 mL bupivacaine (PF) 0 5 %; 40 mg triamcinolone acetonide 40 mg/mLMedications (Left): 2 mL bupivacaine (PF) 0 5 %; 40 mg triamcinolone acetonide 40 mg/mL   Patient tolerance: patient tolerated the procedure well with no immediate complications  Dressing:  Sterile dressing applied              Scribe Attestation    I,:  Parveen Graves PA-C am acting as a scribe while in the presence of the attending physician :       I,:  Dianelys Del Rosario DO personally performed the services described in this documentation    as scribed in my presence :

## 2022-09-22 ENCOUNTER — OFFICE VISIT (OUTPATIENT)
Dept: PAIN MEDICINE | Facility: MEDICAL CENTER | Age: 72
End: 2022-09-22
Payer: MEDICARE

## 2022-09-22 VITALS
SYSTOLIC BLOOD PRESSURE: 135 MMHG | BODY MASS INDEX: 32.02 KG/M2 | WEIGHT: 174 LBS | DIASTOLIC BLOOD PRESSURE: 89 MMHG | HEIGHT: 62 IN | HEART RATE: 92 BPM | OXYGEN SATURATION: 96 %

## 2022-09-22 DIAGNOSIS — M48.062 SPINAL STENOSIS OF LUMBAR REGION WITH NEUROGENIC CLAUDICATION: ICD-10-CM

## 2022-09-22 DIAGNOSIS — M51.16 LUMBAR DISC DISEASE WITH RADICULOPATHY: Primary | ICD-10-CM

## 2022-09-22 DIAGNOSIS — M47.816 LUMBAR SPONDYLOSIS: ICD-10-CM

## 2022-09-22 DIAGNOSIS — M25.551 RIGHT HIP PAIN: ICD-10-CM

## 2022-09-22 PROCEDURE — 99214 OFFICE O/P EST MOD 30 MIN: CPT | Performed by: PHYSICIAN ASSISTANT

## 2022-09-22 NOTE — PROGRESS NOTES
Assessment:  1  Lumbar disc disease with radiculopathy    2  Spinal stenosis of lumbar region with neurogenic claudication    3  Lumbar spondylosis    4  Right hip pain        Plan:  While the patient was in the office today, I did have a thorough conversation regarding their chronic pain syndrome, medication management, and treatment plan options  At discussing options with the patient and upon review of her most recent MRI, I have recommended that she proceed with a transforaminal epidural steroid injection at L4-5 and L5-S1 to address the increasing low back and right lower extremity radicular pain  The hip pain is approximately 50% improved  Consider repeat intra-articular hip injection if needed in the future  Some of this pain however could certainly be emanating from the lumbar spine  Complete risks and benefits including bleeding, infection, tissue reaction, nerve injury and allergic reaction were discussed  The approach was demonstrated using models and literature was provided  Verbal and written consent was obtained  My impressions and treatment recommendations were discussed in detail with the patient who verbalized understanding and had no further questions  Discharge instructions were provided  I personally saw and examined the patient and I agree with the above discussed plan of care  Orders Placed This Encounter   Procedures    FL spine and pain procedure     Standing Status:   Future     Standing Expiration Date:   9/22/2026     Order Specific Question:   Reason for Exam:     Answer:   TFESI L4-5, L5-S1 (right)     Order Specific Question:   Anticoagulant hold needed? Answer:   no     No orders of the defined types were placed in this encounter  History of Present Illness:  Boyd Toribio is a 74 y o  female who presents for a follow up office visit in regards to Hip Pain  The patients current symptoms include chronic low back pain rated as a 6/10 on the pain scale  She states the pain is a constant dull and aching pain in the right low back region with radiation down the lateral aspect of the right lower extremity sometimes down to the level of the ankle  She reports weakness of the leg especially with going up and down stairs  She has intermittent numbness and tingling in the leg as well  MRI dated August 26, 2022 reveals an L4-5 central disc protrusion with slight abutment of the right L5 nerve root  At L5-S1 there is a disc extrusion which has improved from the prior exam, moderate bilateral facet arthropathy with mild-to-moderate right foraminal stenosis  She is status post right intra-articular hip joint injection done on August 26, 2022 with approximately 50% reduction in the hip pain  She states the groin pain is nearly resolved  She is maintained on gabapentin 600 mg t i d  and cyclobenzaprine 10 mg at bedtime and denies side effects from these medications  I have personally reviewed and/or updated the patient's past medical history, past surgical history, family history, social history, current medications, allergies, and vital signs today  Review of Systems   Respiratory: Negative for shortness of breath  Cardiovascular: Negative for chest pain  Gastrointestinal: Negative for constipation, diarrhea, nausea and vomiting  Musculoskeletal: Positive for back pain and gait problem  Negative for arthralgias, joint swelling and myalgias  Skin: Negative for rash  Neurological: Negative for dizziness, seizures and weakness  All other systems reviewed and are negative        Patient Active Problem List   Diagnosis    Primary osteoarthritis of both knees    Lumbar disc herniation with radiculopathy    Chronic right-sided low back pain with right-sided sciatica    Fibromyalgia    Benign essential HTN    Fatty liver    Gastroesophageal reflux disease without esophagitis    Lumbar radiculopathy    Sacroiliitis (HCC)    Pes anserine bursitis  Lower extremity edema    Cervical radiculopathy    Cervical spine arthritis    Cervical spinal stenosis    Lumbar spondylosis    Major depressive disorder with single episode, in full remission ([facility] [address] )    Mixed hyperlipidemia    IFG (impaired fasting glucose)    Degenerative disc disease, cervical    Diabetes mellitus type 2 in obese (HCC)    Spasm of right piriformis muscle    Right hip pain       Past Medical History:   Diagnosis Date    Arthritis     Fatty liver 05/01/2018    Hyperlipidemia     Hypertension     Low back pain     Neck pain     Primary localized osteoarthritis of right knee 08/17/2017       History reviewed  No pertinent surgical history      Family History   Problem Relation Age of Onset    Osteoporosis Mother     Skin cancer Father 54    Stomach cancer Sister 52    No Known Problems Daughter     No Known Problems Maternal Grandmother     No Known Problems Maternal Grandfather     No Known Problems Paternal Grandmother     No Known Problems Paternal Grandfather     No Known Problems Maternal Aunt     No Known Problems Maternal Aunt     No Known Problems Maternal Aunt     No Known Problems Maternal Aunt     No Known Problems Paternal Aunt        Social History     Occupational History    Not on file   Tobacco Use    Smoking status: Never Smoker    Smokeless tobacco: Never Used   Vaping Use    Vaping Use: Never used   Substance and Sexual Activity    Alcohol use: No    Drug use: Never    Sexual activity: Not Currently       Current Outpatient Medications on File Prior to Visit   Medication Sig    benazepril (LOTENSIN) 20 mg tablet TAKE 1 TABLET BY MOUTH EVERY DAY    cholecalciferol (VITAMIN D3) 1,000 units tablet Take 1,000 Units by mouth daily    cyclobenzaprine (FLEXERIL) 5 mg tablet Take 2 tablets (10 mg total) by mouth daily at bedtime    diclofenac potassium (CATAFLAM) 50 mg tablet TAKE 1 TABLET BY MOUTH TWICE A DAY    DULoxetine (CYMBALTA) 60 mg delayed release capsule TAKE 1 CAPSULE BY MOUTH EVERY DAY    gabapentin (Neurontin) 600 MG tablet Take 1 tablet (600 mg total) by mouth 3 (three) times a day    hydrochlorothiazide (HYDRODIURIL) 12 5 mg tablet TAKE 1 TABLET BY MOUTH EVERY DAY    omeprazole (PriLOSEC) 10 mg delayed release capsule Take 10 mg by mouth daily      rosuvastatin (CRESTOR) 10 MG tablet TAKE 1 TABLET BY MOUTH EVERY DAY    SUPER B COMPLEX/C PO Take 1 tablet by mouth daily       No current facility-administered medications on file prior to visit  Allergies   Allergen Reactions    Penicillins Hives and Swelling       Physical Exam:    /89   Pulse 92   Ht 5' 2" (1 575 m)   Wt 78 9 kg (174 lb)   SpO2 96%   BMI 31 83 kg/m²     Constitutional:normal, well developed, well nourished, alert, in no distress and non-toxic and no overt pain behavior  Eyes:anicteric  HEENT:grossly intact  Neck:supple, symmetric, trachea midline and no masses   Pulmonary:even and unlabored  Cardiovascular:No edema or pitting edema present  Skin:Normal without rashes or lesions and well hydrated  Psychiatric:Mood and affect appropriate  Neurologic:Cranial Nerves II-XII grossly intact  Musculoskeletal:  Tenderness palpation over the right paraspinal muscles of the lumbar spine, limited range of motion with extension  Positive straight leg raise test on the right, negative straight leg raise test on left  Patient has difficulty with heel walk  Imaging    MRI LUMBAR SPINE WITHOUT CONTRAST     INDICATION: [id number]: Radiculopathy, lumbar region      COMPARISON:  MRI lumbar spine 10/30/2017     TECHNIQUE:  Sagittal T1, sagittal T2, sagittal inversion recovery, axial T1 and axial T2, coronal T2     IMAGE QUALITY:  Diagnostic     FINDINGS:     VERTEBRAL BODIES:  There are 5 lumbar type vertebral bodies  Dextroscoliosis with apex at L2-3      No abnormal bone marrow signal suspicious discrete marrow lesion      SACRUM:  Normal signal within the sacrum  No evidence of insufficiency or stress fracture      DISTAL CORD AND CONUS:  Normal size and signal within the distal cord and conus      PARASPINAL SOFT TISSUES:  Paraspinal soft tissues are unremarkable      LOWER THORACIC DISC SPACES:  Mild noncompressive lower thoracic degenerative change      LUMBAR DISC SPACES:     L1-L2:  Minimal disc bulge  Mild facet arthropathy  Mild canal narrowing  No significant foraminal stenosis      L2-L3:  No significant disc bulge  Mild facet arthropathy with no canal or foraminal stenosis      L3-L4:  Minor disc bulge progressed from prior exam   Moderate bilateral facet arthropathy  Mild canal narrowing  Mild left foraminal narrowing      L4-L5:  Central disc protrusion resulting in mild lateral recesses narrowing with slight abutment of right L5 nerve root  No nerve root compression  Bilateral facet arthropathy and ligamentum flavum thickening  Mild bilateral foraminal narrowing  No   significant interval change      L5-S1:  Disc bulge and superimposed central disc extrusion with minimal cephalad migration which is improved from prior exam   Moderate bilateral facet arthropathy  Mild canal narrowing  Moderate left and mild to moderate right foraminal stenosis      IMPRESSION:     1  Improved disc herniation at level L5-S1  There is mild canal stenosis, moderate left and mild to moderate right foraminal narrowing at this level  No nerve root compression      2   Grossly stable central disc protrusion at L4-5 with mild canal stenosis

## 2022-09-22 NOTE — PATIENT INSTRUCTIONS
Epidural Steroid Injection   WHAT YOU NEED TO KNOW:   What do I need to know about an epidural steroid injection (CHRISSIE)? An CHRISSIE is a procedure to inject steroid medicine into the epidural space  The epidural space is between your spinal cord and vertebrae  Steroids reduce inflammation and fluid buildup in your spine that may be causing pain  You may be given pain medicine along with the steroids  How do I prepare for an CHRISSIE? Your healthcare provider will talk to you about how to prepare for your procedure  He or she will tell you what medicines to take or not take on the day of your procedure  You may need to stop taking blood thinners or other medicines several days before your procedure  You may need to adjust any diabetes medicine you take on the day of your procedure  Steroid medicine can increase your blood sugar level  Arrange for someone to drive you home when you are discharged  What will happen during an CHRISSIE? You will be given medicine to numb the procedure area  You will be awake for the procedure, but you will not feel pain  You may also be given medicine to help you relax  Contrast liquid will be used to help your healthcare provider see the area better  Tell the healthcare provider if you have ever had an allergic reaction to contrast liquid  Your healthcare provider may place the needle into your neck area, middle of your back, or tailbone area  He may inject the medicine next to the nerves that are causing your pain  He may instead inject the medicine into a larger area of the epidural space  This helps the medicine spread to more nerves  Your healthcare provider will use a fluoroscope to help guide the needle to the right place  A fluoroscope is a type of x-ray  After the procedure, a bandage will be placed over the injection site to prevent infection  What will happen after an CHRISSIE? You will have a bandage over the injection site to prevent infection   Your healthcare provider will tell you when you can bathe and any activity guidelines  You will be able to go home  What are the risks of an CHRISSIE? You may have temporary or permanent nerve damage or paralysis  You may have bleeding or develop a serious infection, such as meningitis (swelling of the brain coverings)  An abscess may also develop  An abscess is a pus-filled area under the skin  You may need surgery to fix the abscess  You may have a seizure, anxiety, or trouble sleeping  If you are a man, you may have temporary erectile dysfunction (not able to have an erection)  CARE AGREEMENT:   You have the right to help plan your care  Learn about your health condition and how it may be treated  Discuss treatment options with your healthcare providers to decide what care you want to receive  You always have the right to refuse treatment  The above information is an  only  It is not intended as medical advice for individual conditions or treatments  Talk to your doctor, nurse or pharmacist before following any medical regimen to see if it is safe and effective for you  © Copyright [employer] [facility] 2022 Information is for End User's use only and may not be sold, redistributed or otherwise used for commercial purposes   All illustrations and images included in CareNotes® are the copyrighted property of [employer]  or [address] [place] [address] [employer]

## 2022-10-07 ENCOUNTER — APPOINTMENT (OUTPATIENT)
Dept: LAB | Facility: CLINIC | Age: 72
End: 2022-10-07
Payer: MEDICARE

## 2022-10-07 DIAGNOSIS — E66.9 DIABETES MELLITUS TYPE 2 IN OBESE (HCC): ICD-10-CM

## 2022-10-07 DIAGNOSIS — E78.2 MIXED HYPERLIPIDEMIA: ICD-10-CM

## 2022-10-07 DIAGNOSIS — I10 BENIGN ESSENTIAL HTN: ICD-10-CM

## 2022-10-07 DIAGNOSIS — E11.69 DIABETES MELLITUS TYPE 2 IN OBESE (HCC): ICD-10-CM

## 2022-10-07 LAB
ALBUMIN SERPL BCP-MCNC: 3.7 G/DL (ref 3.5–5)
ALP SERPL-CCNC: 85 U/L (ref 46–116)
ALT SERPL W P-5'-P-CCNC: 50 U/L (ref 12–78)
ANION GAP SERPL CALCULATED.3IONS-SCNC: 4 MMOL/L (ref 4–13)
AST SERPL W P-5'-P-CCNC: 26 U/L (ref 5–45)
BILIRUB SERPL-MCNC: 0.45 MG/DL (ref 0.2–1)
BUN SERPL-MCNC: 12 MG/DL (ref 5–25)
CALCIUM SERPL-MCNC: 9.5 MG/DL (ref 8.3–10.1)
CHLORIDE SERPL-SCNC: 108 MMOL/L (ref 96–108)
CHOLEST SERPL-MCNC: 124 MG/DL
CO2 SERPL-SCNC: 31 MMOL/L (ref 21–32)
CREAT SERPL-MCNC: 0.82 MG/DL (ref 0.6–1.3)
CREAT UR-MCNC: 164 MG/DL
EST. AVERAGE GLUCOSE BLD GHB EST-MCNC: 134 MG/DL
GFR SERPL CREATININE-BSD FRML MDRD: 70 ML/MIN/1.73SQ M
GLUCOSE P FAST SERPL-MCNC: 90 MG/DL (ref 65–99)
HBA1C MFR BLD: 6.3 %
HDLC SERPL-MCNC: 61 MG/DL
LDLC SERPL CALC-MCNC: 49 MG/DL (ref 0–100)
MICROALBUMIN UR-MCNC: 12.8 MG/L (ref 0–20)
MICROALBUMIN/CREAT 24H UR: 8 MG/G CREATININE (ref 0–30)
NONHDLC SERPL-MCNC: 63 MG/DL
POTASSIUM SERPL-SCNC: 4.4 MMOL/L (ref 3.5–5.3)
PROT SERPL-MCNC: 7.1 G/DL (ref 6.4–8.4)
SODIUM SERPL-SCNC: 143 MMOL/L (ref 135–147)
TRIGL SERPL-MCNC: 71 MG/DL

## 2022-10-07 PROCEDURE — 80053 COMPREHEN METABOLIC PANEL: CPT

## 2022-10-07 PROCEDURE — 82570 ASSAY OF URINE CREATININE: CPT

## 2022-10-07 PROCEDURE — 36415 COLL VENOUS BLD VENIPUNCTURE: CPT

## 2022-10-07 PROCEDURE — 80061 LIPID PANEL: CPT

## 2022-10-07 PROCEDURE — 82043 UR ALBUMIN QUANTITATIVE: CPT

## 2022-10-07 PROCEDURE — 83036 HEMOGLOBIN GLYCOSYLATED A1C: CPT

## 2022-10-12 ENCOUNTER — HOSPITAL ENCOUNTER (OUTPATIENT)
Dept: RADIOLOGY | Facility: MEDICAL CENTER | Age: 72
Discharge: HOME | End: 2022-10-12
Payer: MEDICARE

## 2022-10-12 VITALS
HEART RATE: 83 BPM | TEMPERATURE: 98.3 F | OXYGEN SATURATION: 97 % | DIASTOLIC BLOOD PRESSURE: 95 MMHG | RESPIRATION RATE: 18 BRPM | SYSTOLIC BLOOD PRESSURE: 148 MMHG

## 2022-10-12 DIAGNOSIS — M48.062 SPINAL STENOSIS OF LUMBAR REGION WITH NEUROGENIC CLAUDICATION: ICD-10-CM

## 2022-10-12 DIAGNOSIS — M51.16 LUMBAR DISC DISEASE WITH RADICULOPATHY: ICD-10-CM

## 2022-10-12 PROCEDURE — 64484 NJX AA&/STRD TFRM EPI L/S EA: CPT | Performed by: PHYSICAL MEDICINE & REHABILITATION

## 2022-10-12 PROCEDURE — 64483 NJX AA&/STRD TFRM EPI L/S 1: CPT | Performed by: PHYSICAL MEDICINE & REHABILITATION

## 2022-10-12 RX ORDER — PAPAVERINE HCL 150 MG
15 CAPSULE, EXTENDED RELEASE ORAL ONCE
Status: COMPLETED | OUTPATIENT
Start: 2022-10-12 | End: 2022-10-12

## 2022-10-12 RX ADMIN — IOHEXOL 3 ML: 300 INJECTION, SOLUTION INTRAVENOUS at 09:49

## 2022-10-12 RX ADMIN — DEXAMETHASONE SODIUM PHOSPHATE 15 MG: 10 INJECTION, SOLUTION INTRAMUSCULAR; INTRAVENOUS at 09:49

## 2022-10-12 NOTE — DISCHARGE INSTRUCTIONS
Epidural Steroid Injection   WHAT YOU NEED TO KNOW:   An epidural steroid injection (CHRISSIE) is a procedure to inject steroid medicine into the epidural space  The epidural space is between your spinal cord and vertebrae  Steroids reduce inflammation and fluid buildup in your spine that may be causing pain  You may be given pain medicine along with the steroids  ACTIVITY  Do not drive or operate machinery today  No strenuous activity today - bending, lifting, etc   You may resume normal activites starting tomorrow - start slowly and as tolerated  You may shower today, but no tub baths or hot tubs  You may have numbness for several hours from the local anesthetic  Please use caution and common sense, especially with weight-bearing activities  CARE OF THE INJECTION SITE  If you have soreness or pain, apply ice to the area today (20 minutes on/20 minutes off)  Starting tomorrow, you may use warm, moist heat or ice if needed  You may have an increase or change in your discomfort for 36-48 hours after your treatment  Apply ice and continue with any pain medication you have been prescribed  Notify the [facility] if you have any of the following: redness, drainage, swelling, headache, stiff neck or fever above 100°F     SPECIAL INSTRUCTIONS  Our office will contact you in approximately 7 days for a progress report  MEDICATIONS  Continue to take all routine medications  Our office may have instructed you to hold some medications  As no general anesthesia was used in today's procedure, you should not experience any side effects related to anesthesia  If you are diabetic, the steroids used in today's injection may temporarily increase your blood sugar levels after the first few days after your injection  Please keep a close eye on your sugars and alert the doctor who manages your diabetes if your sugars are significantly high from your baseline or you are symptomatic       If you have a problem specifically related to your procedure, please call our office at [phone]  Problems not related to your procedure should be directed to your primary care physician

## 2022-10-12 NOTE — H&P
History of Present Illness: The patient is a 74 y o  female who presents with complaints of right back and leg pain    Past Medical History:   Diagnosis Date   • Arthritis    • Fatty liver 05/01/2018   • Hyperlipidemia    • Hypertension    • Low back pain    • Neck pain    • Primary localized osteoarthritis of right knee 08/17/2017       No past surgical history on file        Current Outpatient Medications:   •  benazepril (LOTENSIN) 20 mg tablet, TAKE 1 TABLET BY MOUTH EVERY DAY, Disp: 90 tablet, Rfl: 1  •  cholecalciferol (VITAMIN D3) 1,000 units tablet, Take 1,000 Units by mouth daily, Disp: , Rfl:   •  cyclobenzaprine (FLEXERIL) 5 mg tablet, Take 2 tablets (10 mg total) by mouth daily at bedtime, Disp: 180 tablet, Rfl: 1  •  diclofenac potassium (CATAFLAM) 50 mg tablet, TAKE 1 TABLET BY MOUTH TWICE A DAY, Disp: 60 tablet, Rfl: 1  •  DULoxetine (CYMBALTA) 60 mg delayed release capsule, TAKE 1 CAPSULE BY MOUTH EVERY DAY, Disp: 90 capsule, Rfl: 1  •  gabapentin (Neurontin) 600 MG tablet, Take 1 tablet (600 mg total) by mouth 3 (three) times a day, Disp: 90 tablet, Rfl: 5  •  hydrochlorothiazide (HYDRODIURIL) 12 5 mg tablet, TAKE 1 TABLET BY MOUTH EVERY DAY, Disp: 90 tablet, Rfl: 1  •  omeprazole (PriLOSEC) 10 mg delayed release capsule, Take 10 mg by mouth daily  , Disp: , Rfl:   •  rosuvastatin (CRESTOR) 10 MG tablet, TAKE 1 TABLET BY MOUTH EVERY DAY, Disp: 90 tablet, Rfl: 1  •  SUPER B COMPLEX/C PO, Take 1 tablet by mouth daily  , Disp: , Rfl:     Current Facility-Administered Medications:   •  dexamethasone (PF) (DECADRON) injection 15 mg, 15 mg, Epidural, Once, Eliana Franki, DO  •  iohexol (OMNIPAQUE) 300 mg/mL injection 3 mL, 3 mL, Epidural, Once, Eliana Franki, DO    Allergies   Allergen Reactions   • Penicillins Hives and Swelling       Physical Exam:   Vitals:    10/12/22 0933   BP: 133/91   Pulse: 69   Resp: 20   Temp: 98 3 °F (36 8 °C)   SpO2: 95%     General: Awake, Alert, Oriented x 3, Mood and affect appropriate  Respiratory: Respirations even and unlabored  Cardiovascular: Peripheral pulses intact; no edema  Musculoskeletal Exam: right back and leg pain    ASA Score: 3    Patient/Chart Verification  Patient ID Verified: Verbal  ID Band Applied: No  Consents Confirmed: Procedural, To be obtained in the Pre-Procedure area  H&P( within 30 days) Verified: To be obtained in the Pre-Procedure area  Interval H&P(within 24 hr) Complete (required for Outpatients and Surgery Admit only): To be obtained in the Pre-Procedure area  Allergies Reviewed: No  Anticoag/NSAID held?: NA  Currently on antibiotics?: No    Assessment:   1  Lumbar disc disease with radiculopathy    2   Spinal stenosis of lumbar region with neurogenic claudication        Plan: TFESI L4-5, L5-S1 (right)

## 2022-10-17 ENCOUNTER — OFFICE VISIT (OUTPATIENT)
Dept: INTERNAL MEDICINE CLINIC | Facility: CLINIC | Age: 72
End: 2022-10-17
Payer: MEDICARE

## 2022-10-17 VITALS
WEIGHT: 175.8 LBS | DIASTOLIC BLOOD PRESSURE: 90 MMHG | TEMPERATURE: 98.4 F | HEART RATE: 87 BPM | OXYGEN SATURATION: 98 % | HEIGHT: 62 IN | SYSTOLIC BLOOD PRESSURE: 136 MMHG | BODY MASS INDEX: 32.35 KG/M2

## 2022-10-17 DIAGNOSIS — Z23 ENCOUNTER FOR IMMUNIZATION: ICD-10-CM

## 2022-10-17 DIAGNOSIS — K21.9 GASTROESOPHAGEAL REFLUX DISEASE WITHOUT ESOPHAGITIS: ICD-10-CM

## 2022-10-17 DIAGNOSIS — I10 BENIGN ESSENTIAL HTN: ICD-10-CM

## 2022-10-17 DIAGNOSIS — E66.9 DIABETES MELLITUS TYPE 2 IN OBESE (HCC): Primary | ICD-10-CM

## 2022-10-17 DIAGNOSIS — E11.69 DIABETES MELLITUS TYPE 2 IN OBESE (HCC): Primary | ICD-10-CM

## 2022-10-17 DIAGNOSIS — M54.41 CHRONIC RIGHT-SIDED LOW BACK PAIN WITH RIGHT-SIDED SCIATICA: ICD-10-CM

## 2022-10-17 DIAGNOSIS — M17.0 PRIMARY OSTEOARTHRITIS OF BOTH KNEES: ICD-10-CM

## 2022-10-17 DIAGNOSIS — G89.29 CHRONIC RIGHT-SIDED LOW BACK PAIN WITH RIGHT-SIDED SCIATICA: ICD-10-CM

## 2022-10-17 DIAGNOSIS — E78.2 MIXED HYPERLIPIDEMIA: ICD-10-CM

## 2022-10-17 PROCEDURE — 99214 OFFICE O/P EST MOD 30 MIN: CPT | Performed by: INTERNAL MEDICINE

## 2022-10-17 PROCEDURE — G0008 ADMIN INFLUENZA VIRUS VAC: HCPCS | Performed by: INTERNAL MEDICINE

## 2022-10-17 PROCEDURE — 90662 IIV NO PRSV INCREASED AG IM: CPT | Performed by: INTERNAL MEDICINE

## 2022-10-17 NOTE — ASSESSMENT & PLAN NOTE
-fair control  -remain hydrated  -continue hctz 12 5mg daily and benazepril 20mg daily  -monitor BMP

## 2022-10-17 NOTE — ASSESSMENT & PLAN NOTE
-DM2 in [place]    A1c is gradually improving at 6 3  Lab Results   Component Value Date    HGBA1C 6 3 (H) 10/07/2022   -continue on diabetic diet  -on benazepril  -she is up to date with eye exam

## 2022-10-17 NOTE — PROGRESS NOTES
Assessment/Plan:    Problem List Items Addressed This Visit        Digestive    Gastroesophageal reflux disease without esophagitis     -reduce ulcergenic foods  -continue use of omeprazole prn            Endocrine    Diabetes mellitus type 2 in obese ([address] ) - Primary     -DM2 in [place]  A1c is gradually improving at 6 3  Lab Results   Component Value Date    HGBA1C 6 3 (H) 10/07/2022   -continue on diabetic diet  -on benazepril  -she is up to date with eye exam         Relevant Orders    Hemoglobin A1C    Microalbumin / creatinine urine ratio    Comprehensive metabolic panel    TSH, 3rd generation with Free T4 reflex       Cardiovascular and Mediastinum    Benign essential HTN     -fair control  -remain hydrated  -continue hctz 12 5mg daily and benazepril 20mg daily  -monitor BMP         Relevant Orders    Comprehensive metabolic panel    TSH, 3rd generation with Free T4 reflex       Nervous and Auditory    Chronic right-sided low back pain with right-sided sciatica     -s/p R L4-5, L5-S1 TFESI without significant change in discomfort  -follows with Pain Management            Musculoskeletal and Integument    Primary osteoarthritis of both knees     -receives BL CSI injections per Ortho            Other    Mixed hyperlipidemia     -LDL well controlled  -continue rosuvastatin 10mg daily         Relevant Orders    Lipid panel      Other Visit Diagnoses     Encounter for immunization        Relevant Orders    influenza vaccine, high-dose, PF 0 7 mL (FLUZONE HIGH-DOSE) (Completed)          Subjective:      Patient ID: Kelly De La O is a 74 y o  female  HPI  64yo female with DM2, HTN, HLD, fibromyalgia, cervical radiculopathy with arthritis and stenosis, lumbar disc herniation, MDD, knee OA here for follow up care  She presents with her   They are going to [facility] to see their grandson after Thanksgiving  She received R L4-5, L5-S1 TFESI one week ago without significant change in R hip pain    Also had BL knee CSI last month  She made a slight change in her diet, eating less potatoes and less bread  Home BP around 128/76  Gets occasional HA and dizziness  No LE edema  Has intermittent reflux, was trying to take omeprazole less but reflux would return  The following portions of the patient's history were reviewed and updated as appropriate: allergies, current medications, past family history, past medical history, past social history, past surgical history and problem list       Current Outpatient Medications:   •  benazepril (LOTENSIN) 20 mg tablet, TAKE 1 TABLET BY MOUTH EVERY DAY, Disp: 90 tablet, Rfl: 1  •  cholecalciferol (VITAMIN D3) 1,000 units tablet, Take 1,000 Units by mouth daily, Disp: , Rfl:   •  diclofenac potassium (CATAFLAM) 50 mg tablet, TAKE 1 TABLET BY MOUTH TWICE A DAY, Disp: 60 tablet, Rfl: 1  •  DULoxetine (CYMBALTA) 60 mg delayed release capsule, TAKE 1 CAPSULE BY MOUTH EVERY DAY, Disp: 90 capsule, Rfl: 1  •  gabapentin (Neurontin) 600 MG tablet, Take 1 tablet (600 mg total) by mouth 3 (three) times a day, Disp: 90 tablet, Rfl: 5  •  hydrochlorothiazide (HYDRODIURIL) 12 5 mg tablet, TAKE 1 TABLET BY MOUTH EVERY DAY, Disp: 90 tablet, Rfl: 1  •  omeprazole (PriLOSEC) 10 mg delayed release capsule, Take 10 mg by mouth daily  , Disp: , Rfl:   •  rosuvastatin (CRESTOR) 10 MG tablet, TAKE 1 TABLET BY MOUTH EVERY DAY, Disp: 90 tablet, Rfl: 1  •  SUPER B COMPLEX/C PO, Take 1 tablet by mouth daily  , Disp: , Rfl:   •  cyclobenzaprine (FLEXERIL) 5 mg tablet, Take 2 tablets (10 mg total) by mouth daily at bedtime, Disp: 180 tablet, Rfl: 1    Review of Systems   Constitutional: Positive for appetite change and unexpected weight change (mild weight loss)  Negative for activity change  Respiratory: Negative for cough, chest tightness, shortness of breath and wheezing  Cardiovascular: Negative for chest pain, palpitations and leg swelling     Musculoskeletal: Positive for arthralgias and back pain  Neurological: Positive for dizziness (occasional) and headaches (occasional)  Negative for numbness  Objective:    /90 (BP Location: Left arm, Patient Position: Sitting)   Pulse 87   Temp 98 4 °F (36 9 °C) (Tympanic)   Ht 5' 2" (1 575 m)   Wt 79 7 kg (175 lb 12 8 oz)   SpO2 98%   BMI 32 15 kg/m²      Physical Exam  Vitals reviewed  Constitutional:       General: She is not in acute distress  Appearance: She is obese  HENT:      Mouth/Throat:      Comments: Wears dentures  Cardiovascular:      Rate and Rhythm: Normal rate and regular rhythm  Pulses: Normal pulses  Heart sounds: Normal heart sounds  Pulmonary:      Effort: Pulmonary effort is normal  No respiratory distress  Breath sounds: Normal breath sounds  No wheezing  Musculoskeletal:      Right lower leg: No edema  Left lower leg: No edema  Neurological:      Mental Status: She is alert and oriented to person, place, and time  Psychiatric:         Mood and Affect: Mood normal            Recent Results (from the past 504 hour(s))   Hemoglobin A1C    Collection Time: 10/07/22  7:54 AM   Result Value Ref Range    Hemoglobin A1C 6 3 (H) Normal 3 8-5 6%; PreDiabetic 5 7-6 4%;  Diabetic >=6 5%; Glycemic control for adults with diabetes <7 0% %     mg/dl   Comprehensive metabolic panel    Collection Time: 10/07/22  7:54 AM   Result Value Ref Range    Sodium 143 135 - 147 mmol/L    Potassium 4 4 3 5 - 5 3 mmol/L    Chloride 108 96 - 108 mmol/L    CO2 31 21 - 32 mmol/L    ANION GAP 4 4 - 13 mmol/L    BUN 12 5 - 25 mg/dL    Creatinine 0 82 0 60 - 1 30 mg/dL    Glucose, Fasting 90 65 - 99 mg/dL    Calcium 9 5 8 3 - 10 1 mg/dL    AST 26 5 - 45 U/L    ALT 50 12 - 78 U/L    Alkaline Phosphatase 85 46 - 116 U/L    Total Protein 7 1 6 4 - 8 4 g/dL    Albumin 3 7 3 5 - 5 0 g/dL    Total Bilirubin 0 45 0 20 - 1 00 mg/dL    eGFR 70 ml/min/1 73sq m   Lipid panel    Collection Time: 10/07/22  7:54 AM Result Value Ref Range    Cholesterol 124 See Comment mg/dL    Triglycerides 71 See Comment mg/dL    HDL, Direct 61 >=50 mg/dL    LDL Calculated 49 0 - 100 mg/dL    Non-HDL-Chol (CHOL-HDL) 63 mg/dl   Microalbumin / creatinine urine ratio    Collection Time: 10/07/22 10:54 AM   Result Value Ref Range    Creatinine, Ur 164 0 mg/dL    Microalbum  ,U,Random 12 8 0 0 - 20 0 mg/L    Microalb Creat Ratio 8 0 - 30 mg/g creatinine

## 2022-10-26 DIAGNOSIS — M17.0 PRIMARY OSTEOARTHRITIS OF BOTH KNEES: ICD-10-CM

## 2022-10-26 RX ORDER — DICLOFENAC POTASSIUM 50 MG/1
TABLET, FILM COATED ORAL
Qty: 60 TABLET | Refills: 1 | Status: SHIPPED | OUTPATIENT
Start: 2022-10-26

## 2022-11-09 ENCOUNTER — TELEPHONE (OUTPATIENT)
Dept: OBGYN CLINIC | Facility: CLINIC | Age: 72
End: 2022-11-09

## 2022-11-09 NOTE — TELEPHONE ENCOUNTER
Left a message making the patient aware that there was a change in Deann's schedule and I needed to change her appointment from Thursday at 845 to Friday at 845  If the change did not work for her to please call the office to reschedule her appointment

## 2022-11-11 DIAGNOSIS — E78.2 MIXED HYPERLIPIDEMIA: ICD-10-CM

## 2022-11-11 RX ORDER — ROSUVASTATIN CALCIUM 10 MG/1
TABLET, COATED ORAL
Qty: 90 TABLET | Refills: 1 | Status: SHIPPED | OUTPATIENT
Start: 2022-11-11

## 2022-11-18 ENCOUNTER — OFFICE VISIT (OUTPATIENT)
Dept: PAIN MEDICINE | Facility: MEDICAL CENTER | Age: 72
End: 2022-11-18

## 2022-11-18 VITALS
HEART RATE: 87 BPM | SYSTOLIC BLOOD PRESSURE: 99 MMHG | HEIGHT: 62 IN | BODY MASS INDEX: 31.91 KG/M2 | DIASTOLIC BLOOD PRESSURE: 67 MMHG | WEIGHT: 173.4 LBS

## 2022-11-18 DIAGNOSIS — M51.16 LUMBAR DISC DISEASE WITH RADICULOPATHY: Primary | ICD-10-CM

## 2022-11-18 DIAGNOSIS — M16.11 PRIMARY OSTEOARTHRITIS OF RIGHT HIP: ICD-10-CM

## 2022-11-18 DIAGNOSIS — M70.61 TROCHANTERIC BURSITIS OF RIGHT HIP: ICD-10-CM

## 2022-11-18 DIAGNOSIS — M47.816 LUMBAR SPONDYLOSIS: ICD-10-CM

## 2022-11-18 RX ORDER — LIDOCAINE 50 MG/G
1 PATCH TOPICAL DAILY
Qty: 30 PATCH | Refills: 2 | Status: SHIPPED | OUTPATIENT
Start: 2022-11-18

## 2022-11-18 RX ORDER — METHYLPREDNISOLONE 4 MG/1
TABLET ORAL
Qty: 1 EACH | Refills: 0 | Status: SHIPPED | OUTPATIENT
Start: 2022-11-18

## 2022-11-18 NOTE — PROGRESS NOTES
Assessment:  1  Lumbar disc disease with radiculopathy    2  Lumbar spondylosis    3  Primary osteoarthritis of right hip    4  Trochanteric bursitis of right hip        Plan:  While the patient was in the office today, I did have a thorough conversation regarding their chronic pain syndrome, medication management, and treatment plan options  The patient is status post right transforaminal epidural steroid injection L4-5 and L5-S1 performed on 10/12/2022 she reports 50% reduction in low back and lower extremity radicular pain  Her main complaint on today's visit is that of right hip and groin pain  She did have a successful response to an intra-articular hip injection done in August   She was made aware that we could repeat that injection in the future if the pain should worsen  The patient is concerned about the ability to ambulate with an upcoming trip to [facility]  I have provided her with a Medrol Dosepak to be taken as directed as well as Lidoderm 5% patches to apply  Follow up in 2 months or sooner if needed if the pain changes or worsens  My impressions and treatment recommendations were discussed in detail with the patient who verbalized understanding and had no further questions  Discharge instructions were provided  I personally saw and examined the patient and I agree with the above discussed plan of care  No orders of the defined types were placed in this encounter  New Medications Ordered This Visit   Medications   • methylPREDNISolone 4 MG tablet therapy pack     Sig: Use as directed on package     Dispense:  1 each     Refill:  0   • lidocaine (Lidoderm) 5 %     Sig: Apply 1 patch topically daily Remove & Discard patch within 12 hours or as directed by MD     Dispense:  30 patch     Refill:  2       History of Present Illness:  Daryle Ruder is a 74 y o  female who presents for a follow up office visit in regards to back and right hip pain     The patient’s current symptoms include chronic low back pain and right hip pain she currently rates 7/10 on the pain scale and describes it as an intermittent burning, dull, aching pain  She is status post right L4-5 and L5-S1 transforaminal epidural steroid injection done on 10/12/2022 and reports greater than 50% reduction in lower extremity radicular pain and back pain  Her more significant pain at this point is that of right lateral hip and right groin pain  X-ray reveals moderate osteoarthritic changes  She underwent a right intra-articular hip injection done in August with moderate relief for approximately 3 months  She will be doing quite a bit of walking next week as she is traveling to [employer]  She is worried about her pain with the amount of walking she will be doing  I have personally reviewed and/or updated the patient's past medical history, past surgical history, family history, social history, current medications, allergies, and vital signs today  Review of Systems   Musculoskeletal: Positive for gait problem and joint swelling  All other systems reviewed and are negative        Patient Active Problem List   Diagnosis   • Primary osteoarthritis of both knees   • Lumbar disc disease with radiculopathy   • Chronic right-sided low back pain with right-sided sciatica   • Fibromyalgia   • Benign essential HTN   • Fatty liver   • Gastroesophageal reflux disease without esophagitis   • Lumbar radiculopathy   • Sacroiliitis (HCC)   • Pes anserine bursitis   • Lower extremity edema   • Cervical radiculopathy   • Cervical spine arthritis   • Cervical spinal stenosis   • Lumbar spondylosis   • Major depressive disorder with single episode, in full remission ([facility] [address] )   • Mixed hyperlipidemia   • IFG (impaired fasting glucose)   • Degenerative disc disease, cervical   • Diabetes mellitus type 2 in obese [facility])   • Spasm of right piriformis muscle   • Right hip pain   • Spinal stenosis of lumbar region with neurogenic claudication       Past Medical History:   Diagnosis Date   • Arthritis    • Fatty liver 05/01/2018   • Hyperlipidemia    • Hypertension    • Low back pain    • Neck pain    • Primary localized osteoarthritis of right knee 08/17/2017       History reviewed  No pertinent surgical history      Family History   Problem Relation Age of Onset   • Osteoporosis Mother    • Skin cancer Father 54   • Stomach cancer Sister 52   • No Known Problems Daughter    • No Known Problems Maternal Grandmother    • No Known Problems Maternal Grandfather    • No Known Problems Paternal Grandmother    • No Known Problems Paternal Grandfather    • No Known Problems Maternal Aunt    • No Known Problems Maternal Aunt    • No Known Problems Maternal Aunt    • No Known Problems Maternal Aunt    • No Known Problems Paternal Aunt        Social History     Occupational History   • Not on file   Tobacco Use   • Smoking status: Never   • Smokeless tobacco: Never   Vaping Use   • Vaping Use: Never used   Substance and Sexual Activity   • Alcohol use: No   • Drug use: Never   • Sexual activity: Not Currently       Current Outpatient Medications on File Prior to Visit   Medication Sig   • benazepril (LOTENSIN) 20 mg tablet TAKE 1 TABLET BY MOUTH EVERY DAY   • cholecalciferol (VITAMIN D3) 1,000 units tablet Take 1,000 Units by mouth daily   • diclofenac potassium (CATAFLAM) 50 mg tablet TAKE 1 TABLET BY MOUTH TWICE A DAY   • DULoxetine (CYMBALTA) 60 mg delayed release capsule TAKE 1 CAPSULE BY MOUTH EVERY DAY   • gabapentin (Neurontin) 600 MG tablet Take 1 tablet (600 mg total) by mouth 3 (three) times a day   • hydrochlorothiazide (HYDRODIURIL) 12 5 mg tablet TAKE 1 TABLET BY MOUTH EVERY DAY   • omeprazole (PriLOSEC) 10 mg delayed release capsule Take 10 mg by mouth daily     • rosuvastatin (CRESTOR) 10 MG tablet TAKE 1 TABLET BY MOUTH EVERY DAY   • SUPER B COMPLEX/C PO Take 1 tablet by mouth daily     • cyclobenzaprine (FLEXERIL) 5 mg tablet Take 2 tablets (10 mg total) by mouth daily at bedtime     No current facility-administered medications on file prior to visit  Allergies   Allergen Reactions   • Penicillins Hives and Swelling       Physical Exam:    BP 99/67   Pulse 87   Ht 5' 2" (1 575 m) Comment: verbal  Wt 78 7 kg (173 lb 6 4 oz)   BMI 31 72 kg/m²     Constitutional:normal, well developed, well nourished, alert, in no distress and non-toxic and no overt pain behavior  Eyes:anicteric  HEENT:grossly intact  Neck:supple, symmetric, trachea midline and no masses   Pulmonary:even and unlabored  Cardiovascular:No edema or pitting edema present  Skin:Normal without rashes or lesions and well hydrated  Psychiatric:Mood and affect appropriate  Neurologic:Cranial Nerves II-XII grossly intact  Musculoskeletal:normal except for tenderness to palpation over the right lateral hip region  Limited and painful range of motion of the right hip joint      Imaging

## 2022-11-28 ENCOUNTER — TELEPHONE (OUTPATIENT)
Dept: PAIN MEDICINE | Facility: CLINIC | Age: 72
End: 2022-11-28

## 2022-12-08 ENCOUNTER — OFFICE VISIT (OUTPATIENT)
Dept: OBGYN CLINIC | Facility: CLINIC | Age: 72
End: 2022-12-08

## 2022-12-08 VITALS
DIASTOLIC BLOOD PRESSURE: 86 MMHG | WEIGHT: 174.6 LBS | HEIGHT: 62 IN | SYSTOLIC BLOOD PRESSURE: 144 MMHG | BODY MASS INDEX: 32.13 KG/M2 | HEART RATE: 84 BPM

## 2022-12-08 DIAGNOSIS — M17.11 PRIMARY OSTEOARTHRITIS OF RIGHT KNEE: ICD-10-CM

## 2022-12-08 DIAGNOSIS — M16.11 PRIMARY OSTEOARTHRITIS OF ONE HIP, RIGHT: ICD-10-CM

## 2022-12-08 DIAGNOSIS — M17.12 PRIMARY OSTEOARTHRITIS OF LEFT KNEE: Primary | ICD-10-CM

## 2022-12-08 NOTE — PROGRESS NOTES
Assessment/Plan:  1  Primary osteoarthritis of left knee    2  Primary osteoarthritis of right knee    3  Primary osteoarthritis of one hip, right      Orders Placed This Encounter   Procedures   • XR knee 4+ vw left injury   • XR knee 4+ vw right injury   • FL spine and pain procedure       The patient has severe right hip arthritis and severe bilateral knee osteoarthritis  We discussed MYAH and TKA  We would recommend replacing R MYAH prior to R TKA  Patient will select date for June for R anterior MYAH  She will return in Feb/March for MYAH pre op visit  Patient received bilateral knee steroid injections today  Post injection instructions reviewed  · Order placed for right hip IA CSI with Dr Campos Melvin  She will have 3 months between [facility] and MYAH in June  · Patient will coordinate appointment with the dentist prior to her next visit  We will plan to get PCP, cardiology, and dental clearance prior to surgery  · Continue diclofenac tabs as needed for pain  · Continue activity as tolerated  · We will obtain pre op right hip x-rays at next visit  Return in about 3 months (around 3/8/2023) for R MYAH planning  I answered all of the patient's questions during the visit and provided education of the patient's condition during the visit  The patient verbalized understanding of the information given and agrees with the plan  This note was dictated using [employer] software  It may contain errors including improperly dictated words  Please contact physician directly for any questions  Subjective   Chief Complaint:   Chief Complaint   Patient presents with   • Left Knee - Follow-up, Pain   • Right Knee - Follow-up, Pain       HPI  Jazz Cheek is a 74 y o  female who presents for follow up for bilateral knee pain  Patient received bilateral knee steroid injections at her last visit on 9/8/22 and reports good relief for a short time   Patient states the injections do not last long and she is back to baseline pain  Right knee pain is worse than left knee pain  She reports bilateral knee instability  Patient also reports right hip pain that radiates from the buttock into the right groin  Patient has had steroid injections with Dr Virginie Mejias, last on 10/12/22, and reports good relief from these injections  Overall patient's right knee is the most painful  She denies any pain or issues with the left hip  Patient is taking diclofenac tabs as needed for pain with minimal relief  She is doing home exercises but has found these more difficult lately due to pain  Patient would like to move forward with total joint replacement  Patient denies leg length discrepancy but feels her right leg rotates inward due to pain  Review of Systems  ROS:    See HPI for musculoskeletal review  All other systems reviewed are negative     History:  Past Medical History:   Diagnosis Date   • Arthritis    • Fatty liver 05/01/2018   • Hyperlipidemia    • Hypertension    • Low back pain    • Neck pain    • Primary localized osteoarthritis of right knee 08/17/2017     No past surgical history on file    Social History   Social History     Substance and Sexual Activity   Alcohol Use No     Social History     Substance and Sexual Activity   Drug Use Never     Social History     Tobacco Use   Smoking Status Never   Smokeless Tobacco Never     Family History:   Family History   Problem Relation Age of Onset   • Osteoporosis Mother    • Skin cancer Father 54   • Stomach cancer Sister 52   • No Known Problems Daughter    • No Known Problems Maternal Grandmother    • No Known Problems Maternal Grandfather    • No Known Problems Paternal Grandmother    • No Known Problems Paternal Grandfather    • No Known Problems Maternal Aunt    • No Known Problems Maternal Aunt    • No Known Problems Maternal Aunt    • No Known Problems Maternal Aunt    • No Known Problems Paternal Aunt        Current Outpatient Medications on File Prior to Visit   Medication Sig Dispense Refill   • benazepril (LOTENSIN) 20 mg tablet TAKE 1 TABLET BY MOUTH EVERY DAY 90 tablet 1   • cholecalciferol (VITAMIN D3) 1,000 units tablet Take 1,000 Units by mouth daily     • cyclobenzaprine (FLEXERIL) 5 mg tablet Take 2 tablets (10 mg total) by mouth daily at bedtime 180 tablet 1   • diclofenac potassium (CATAFLAM) 50 mg tablet TAKE 1 TABLET BY MOUTH TWICE A DAY 60 tablet 1   • DULoxetine (CYMBALTA) 60 mg delayed release capsule TAKE 1 CAPSULE BY MOUTH EVERY DAY 90 capsule 1   • gabapentin (Neurontin) 600 MG tablet Take 1 tablet (600 mg total) by mouth 3 (three) times a day 90 tablet 5   • hydrochlorothiazide (HYDRODIURIL) 12 5 mg tablet TAKE 1 TABLET BY MOUTH EVERY DAY 90 tablet 1   • lidocaine (Lidoderm) 5 % Apply 1 patch topically daily Remove & Discard patch within 12 hours or as directed by MD 30 patch 2   • methylPREDNISolone 4 MG tablet therapy pack Use as directed on package 1 each 0   • omeprazole (PriLOSEC) 10 mg delayed release capsule Take 10 mg by mouth daily       • rosuvastatin (CRESTOR) 10 MG tablet TAKE 1 TABLET BY MOUTH EVERY DAY 90 tablet 1   • SUPER B COMPLEX/C PO Take 1 tablet by mouth daily         No current facility-administered medications on file prior to visit       Allergies   Allergen Reactions   • Penicillins Hives and Swelling        Objective     /86   Pulse 84   Ht 5' 2" (1 575 m)   Wt 79 2 kg (174 lb 9 6 oz)   BMI 31 93 kg/m²      PE:  AAOx 3  WDWN  Hearing intact, no drainage from eyes  no audible wheezing  no abdominal distension  LE compartments soft, skin intact    Ortho Exam:  bilateral Knee:   No erythema  no swelling  no effusion  no warmth  +TTP over medial joint lines  AROM: right knee 10- 120, left knee 3- 120  Stable to varus/valgus stress      Right hip:  No deformity  PROM: Flexion 90, ER 20, IR 5  +impingement  +stinchfield  Neg SLR  +Jeffery  Sensation intact L4-S1  Palpable pedal pulse  Leg lengths appear equal    Left Hip:  No deformity  No pain with passive ROM  PROM: flexion 110, ER 30, IR 10  Neg impingement       Imaging Studies: I have personally reviewed pertinent films in PACS  X-rays bilateral knee: severe osteoarthritis with bone on bone complete loss of joint space and osteophyte formation      Scribe Attestation    I,:  Italia Denney PA-C am acting as a scribe while in the presence of the attending physician :       I,:  Courtney Ballesteros DO personally performed the services described in this documentation    as scribed in my presence :

## 2022-12-14 DIAGNOSIS — M17.0 PRIMARY OSTEOARTHRITIS OF BOTH KNEES: ICD-10-CM

## 2022-12-14 RX ORDER — DICLOFENAC POTASSIUM 50 MG/1
TABLET, FILM COATED ORAL
Qty: 60 TABLET | Refills: 1 | Status: SHIPPED | OUTPATIENT
Start: 2022-12-14

## 2022-12-29 ENCOUNTER — TELEPHONE (OUTPATIENT)
Dept: PAIN MEDICINE | Facility: MEDICAL CENTER | Age: 72
End: 2022-12-29

## 2022-12-29 DIAGNOSIS — M54.12 CERVICAL RADICULOPATHY: ICD-10-CM

## 2022-12-29 DIAGNOSIS — M47.812 CERVICAL SPINE ARTHRITIS: ICD-10-CM

## 2022-12-29 DIAGNOSIS — M48.02 CERVICAL SPINAL STENOSIS: ICD-10-CM

## 2022-12-29 RX ORDER — CYCLOBENZAPRINE HCL 10 MG
10 TABLET ORAL
Qty: 90 TABLET | Refills: 0 | Status: SHIPPED | OUTPATIENT
Start: 2022-12-29 | End: 2022-12-30 | Stop reason: SDUPTHER

## 2022-12-29 NOTE — TELEPHONE ENCOUNTER
Vaibhav Ornelas from [facility] contacted Call Center requested refill of their medication  Medication Name: Cyclobenzaprine      Dosage of Med: 5 mg      Frequency of Med:   Take 2 tablets (10 mg total) by mouth daily at bedtime         Remaining Medication: ?      Pharmacy and Location: [facility] pharmacy on file         Pt  Preferred Callback Phone Number: [phone]      Thank you

## 2022-12-29 NOTE — TELEPHONE ENCOUNTER
Patient requesting refill of her cyclobenzaprine  She takes 2 tablets at night (10mg in total)  Reports it is working and has no side effects  States she has been out for a few days and noticing increased neck pain in the AM  Last seen with you on 11/18 and next OVS with you is on 1/19/23  Please Advise, refill same?

## 2022-12-30 RX ORDER — CYCLOBENZAPRINE HCL 10 MG
10 TABLET ORAL
Qty: 30 TABLET | Refills: 0 | Status: SHIPPED | OUTPATIENT
Start: 2022-12-30 | End: 2023-01-29

## 2023-01-17 DIAGNOSIS — I10 BENIGN ESSENTIAL HTN: ICD-10-CM

## 2023-01-17 RX ORDER — BENAZEPRIL HYDROCHLORIDE 20 MG/1
TABLET ORAL
Qty: 90 TABLET | Refills: 1 | Status: SHIPPED | OUTPATIENT
Start: 2023-01-17

## 2023-01-19 ENCOUNTER — OFFICE VISIT (OUTPATIENT)
Dept: PAIN MEDICINE | Facility: MEDICAL CENTER | Age: 73
End: 2023-01-19

## 2023-01-19 VITALS
WEIGHT: 172 LBS | SYSTOLIC BLOOD PRESSURE: 116 MMHG | HEART RATE: 82 BPM | BODY MASS INDEX: 31.65 KG/M2 | HEIGHT: 62 IN | OXYGEN SATURATION: 97 % | DIASTOLIC BLOOD PRESSURE: 80 MMHG

## 2023-01-19 DIAGNOSIS — M51.16 LUMBAR DISC HERNIATION WITH RADICULOPATHY: ICD-10-CM

## 2023-01-19 DIAGNOSIS — M47.816 LUMBAR SPONDYLOSIS: ICD-10-CM

## 2023-01-19 DIAGNOSIS — M51.16 LUMBAR DISC DISEASE WITH RADICULOPATHY: ICD-10-CM

## 2023-01-19 DIAGNOSIS — M16.11 PRIMARY OSTEOARTHRITIS OF RIGHT HIP: Primary | ICD-10-CM

## 2023-01-19 RX ORDER — CYCLOBENZAPRINE HCL 10 MG
10 TABLET ORAL
Qty: 30 TABLET | Refills: 5 | Status: SHIPPED | OUTPATIENT
Start: 2023-01-19 | End: 2023-02-18

## 2023-01-19 RX ORDER — GABAPENTIN 600 MG/1
600 TABLET ORAL 3 TIMES DAILY
Qty: 90 TABLET | Refills: 5 | Status: SHIPPED | OUTPATIENT
Start: 2023-01-19

## 2023-01-19 NOTE — PROGRESS NOTES
Assessment:  1  Primary osteoarthritis of right hip    2  Lumbar spondylosis    3  Lumbar disc herniation with radiculopathy    4  Lumbar disc disease with radiculopathy        Plan:  While the patient was in the office today, I did have a thorough conversation regarding their chronic pain syndrome, medication management, and treatment plan options  The patient remains clinically stable and very well controlled since her last back and hip injection  She was made aware that we can certainly repeat injections if needed in the future  Otherwise she feels that her residual pain is well managed on the current medication regimen of gabapentin 600 mg 3 times daily and cyclobenzaprine 10 mg nightly as needed  I have provided 6 months of refills for the patient  She plans on total hip arthroplasty and total knee arthroplasty in the summertime  The patient will follow-up in 6 months or sooner if needed if the pain changes or worsens  My impressions and treatment recommendations were discussed in detail with the patient who verbalized understanding and had no further questions  Discharge instructions were provided  I personally saw and examined the patient and I agree with the above discussed plan of care  No orders of the defined types were placed in this encounter  New Medications Ordered This Visit   Medications   • cyclobenzaprine (FLEXERIL) 10 mg tablet     Sig: Take 1 tablet (10 mg total) by mouth daily at bedtime     Dispense:  30 tablet     Refill:  5   • gabapentin (Neurontin) 600 MG tablet     Sig: Take 1 tablet (600 mg total) by mouth 3 (three) times a day     Dispense:  90 tablet     Refill:  5       History of Present Illness:  Tiara Flores is a 74 y o  female who presents for a follow up office visit in regards to Back Pain and hip pain   The patient’s current symptoms include chronic low back pain and hip pain, right-sided, that she presently rates a 2 out of 10 on the pain scale describes it as constant burning and sharp pain  She denies any lower extremity radicular pain since the transforaminal epidural steroid injection done in October 2022  Prior to that last August she underwent a right hip injection with significant relief  She feels that her residual pain is well controlled on the current medication regimen of gabapentin and cyclobenzaprine  The patient admits to dry mouth but denies any other side effects of the medications  She is planning on total hip arthroplasty and total knee arthroplasty sometime this year  I have personally reviewed and/or updated the patient's past medical history, past surgical history, family history, social history, current medications, allergies, and vital signs today  Review of Systems   Respiratory: Negative for shortness of breath  Cardiovascular: Negative for chest pain  Gastrointestinal: Negative for constipation, diarrhea, nausea and vomiting  Musculoskeletal: Positive for back pain, gait problem and joint swelling  Negative for arthralgias and myalgias  Skin: Negative for rash  Neurological: Negative for dizziness, seizures and weakness  All other systems reviewed and are negative        Patient Active Problem List   Diagnosis   • Primary osteoarthritis of both knees   • Lumbar disc disease with radiculopathy   • Chronic right-sided low back pain with right-sided sciatica   • Fibromyalgia   • Benign essential HTN   • Fatty liver   • Gastroesophageal reflux disease without esophagitis   • Lumbar radiculopathy   • Sacroiliitis (HCC)   • Pes anserine bursitis   • Lower extremity edema   • Cervical radiculopathy   • Cervical spine arthritis   • Cervical spinal stenosis   • Lumbar spondylosis   • Major depressive disorder with single episode, in full remission ([facility] [address] )   • Mixed hyperlipidemia   • IFG (impaired fasting glucose)   • Degenerative disc disease, cervical   • Diabetes mellitus type 2 in obese (HCC)   • Spasm of right piriformis muscle   • Right hip pain   • Spinal stenosis of lumbar region with neurogenic claudication       Past Medical History:   Diagnosis Date   • Arthritis    • Fatty liver 05/01/2018   • Hyperlipidemia    • Hypertension    • Low back pain    • Neck pain    • Primary localized osteoarthritis of right knee 08/17/2017       History reviewed  No pertinent surgical history      Family History   Problem Relation Age of Onset   • Osteoporosis Mother    • Skin cancer Father 54   • Stomach cancer Sister 52   • No Known Problems Daughter    • No Known Problems Maternal Grandmother    • No Known Problems Maternal Grandfather    • No Known Problems Paternal Grandmother    • No Known Problems Paternal Grandfather    • No Known Problems Maternal Aunt    • No Known Problems Maternal Aunt    • No Known Problems Maternal Aunt    • No Known Problems Maternal Aunt    • No Known Problems Paternal Aunt        Social History     Occupational History   • Not on file   Tobacco Use   • Smoking status: Never   • Smokeless tobacco: Never   Vaping Use   • Vaping Use: Never used   Substance and Sexual Activity   • Alcohol use: No   • Drug use: Never   • Sexual activity: Not Currently       Current Outpatient Medications on File Prior to Visit   Medication Sig   • benazepril (LOTENSIN) 20 mg tablet TAKE 1 TABLET BY MOUTH EVERY DAY   • cholecalciferol (VITAMIN D3) 1,000 units tablet Take 1,000 Units by mouth daily   • diclofenac potassium (CATAFLAM) 50 mg tablet TAKE 1 TABLET BY MOUTH TWICE A DAY   • DULoxetine (CYMBALTA) 60 mg delayed release capsule TAKE 1 CAPSULE BY MOUTH EVERY DAY   • hydrochlorothiazide (HYDRODIURIL) 12 5 mg tablet TAKE 1 TABLET BY MOUTH EVERY DAY   • lidocaine (Lidoderm) 5 % Apply 1 patch topically daily Remove & Discard patch within 12 hours or as directed by MD   • omeprazole (PriLOSEC) 10 mg delayed release capsule Take 10 mg by mouth daily     • rosuvastatin (CRESTOR) 10 MG tablet TAKE 1 TABLET BY MOUTH EVERY DAY   • SUPER B COMPLEX/C PO Take 1 tablet by mouth daily     • [DISCONTINUED] cyclobenzaprine (FLEXERIL) 10 mg tablet Take 1 tablet (10 mg total) by mouth daily at bedtime   • [DISCONTINUED] gabapentin (Neurontin) 600 MG tablet Take 1 tablet (600 mg total) by mouth 3 (three) times a day   • methylPREDNISolone 4 MG tablet therapy pack Use as directed on package (Patient not taking: Reported on 1/19/2023)     No current facility-administered medications on file prior to visit  Allergies   Allergen Reactions   • Penicillins Hives and Swelling       Physical Exam:    /80   Pulse 82   Ht 5' 2" (1 575 m)   Wt 78 kg (172 lb)   SpO2 97%   BMI 31 46 kg/m²     Constitutional:normal, well developed, well nourished, alert, in no distress and non-toxic and no overt pain behavior  Eyes:anicteric  HEENT:grossly intact  Neck:supple, symmetric, trachea midline and no masses   Pulmonary:even and unlabored  Cardiovascular:No edema or pitting edema present  Skin:Normal without rashes or lesions and well hydrated  Psychiatric:Mood and affect appropriate  Neurologic:Cranial Nerves II-XII grossly intact  Musculoskeletal: Gait is slightly antalgic, slow but stable      Imaging

## 2023-02-10 DIAGNOSIS — M17.0 PRIMARY OSTEOARTHRITIS OF BOTH KNEES: ICD-10-CM

## 2023-02-10 RX ORDER — DICLOFENAC POTASSIUM 50 MG/1
TABLET, FILM COATED ORAL
Qty: 60 TABLET | Refills: 1 | Status: SHIPPED | OUTPATIENT
Start: 2023-02-10

## 2023-02-12 DIAGNOSIS — R60.0 LOWER EXTREMITY EDEMA: ICD-10-CM

## 2023-02-12 DIAGNOSIS — I10 BENIGN ESSENTIAL HTN: ICD-10-CM

## 2023-02-12 DIAGNOSIS — M79.7 FIBROMYALGIA: ICD-10-CM

## 2023-02-12 RX ORDER — HYDROCHLOROTHIAZIDE 12.5 MG/1
TABLET ORAL
Qty: 90 TABLET | Refills: 1 | Status: SHIPPED | OUTPATIENT
Start: 2023-02-12

## 2023-02-12 RX ORDER — DULOXETIN HYDROCHLORIDE 60 MG/1
CAPSULE, DELAYED RELEASE ORAL
Qty: 90 CAPSULE | Refills: 1 | Status: SHIPPED | OUTPATIENT
Start: 2023-02-12

## 2023-03-03 DIAGNOSIS — E78.2 MIXED HYPERLIPIDEMIA: ICD-10-CM

## 2023-03-03 RX ORDER — ROSUVASTATIN CALCIUM 10 MG/1
TABLET, COATED ORAL
Qty: 90 TABLET | Refills: 1 | Status: SHIPPED | OUTPATIENT
Start: 2023-03-03

## 2023-03-20 ENCOUNTER — HOSPITAL ENCOUNTER (OUTPATIENT)
Dept: RADIOLOGY | Facility: HOSPITAL | Age: 73
Discharge: HOME | End: 2023-03-20

## 2023-03-20 ENCOUNTER — OFFICE VISIT (OUTPATIENT)
Dept: OBGYN CLINIC | Facility: CLINIC | Age: 73
End: 2023-03-20

## 2023-03-20 VITALS
DIASTOLIC BLOOD PRESSURE: 78 MMHG | BODY MASS INDEX: 33.23 KG/M2 | HEART RATE: 74 BPM | SYSTOLIC BLOOD PRESSURE: 130 MMHG | WEIGHT: 180.6 LBS | HEIGHT: 62 IN

## 2023-03-20 DIAGNOSIS — Z01.818 PRE-OP EXAMINATION: ICD-10-CM

## 2023-03-20 DIAGNOSIS — K76.89 OTHER SPECIFIED DISEASES OF LIVER: ICD-10-CM

## 2023-03-20 DIAGNOSIS — E11.9 TYPE 2 DIABETES MELLITUS WITHOUT COMPLICATION, WITHOUT LONG-TERM CURRENT USE OF INSULIN (HCC): ICD-10-CM

## 2023-03-20 DIAGNOSIS — M16.11 PRIMARY OSTEOARTHRITIS OF ONE HIP, RIGHT: ICD-10-CM

## 2023-03-20 DIAGNOSIS — M16.11 PRIMARY OSTEOARTHRITIS OF ONE HIP, RIGHT: Primary | ICD-10-CM

## 2023-03-20 DIAGNOSIS — M25.59 PAIN IN OTHER SPECIFIED JOINT: ICD-10-CM

## 2023-03-20 RX ORDER — FERROUS SULFATE TAB EC 324 MG (65 MG FE EQUIVALENT) 324 (65 FE) MG
324 TABLET DELAYED RESPONSE ORAL
Qty: 30 TABLET | Refills: 1 | Status: SHIPPED | OUTPATIENT
Start: 2023-03-20

## 2023-03-20 RX ORDER — GABAPENTIN 100 MG/1
300 CAPSULE ORAL ONCE
OUTPATIENT
Start: 2023-03-20 | End: 2023-03-20

## 2023-03-20 RX ORDER — SODIUM CHLORIDE, SODIUM LACTATE, POTASSIUM CHLORIDE, CALCIUM CHLORIDE 600; 310; 30; 20 MG/100ML; MG/100ML; MG/100ML; MG/100ML
125 INJECTION, SOLUTION INTRAVENOUS CONTINUOUS
OUTPATIENT
Start: 2023-03-20

## 2023-03-20 RX ORDER — CHLORHEXIDINE GLUCONATE 0.12 MG/ML
15 RINSE ORAL ONCE
OUTPATIENT
Start: 2023-03-20 | End: 2023-03-20

## 2023-03-20 RX ORDER — FOLIC ACID 1 MG/1
1 TABLET ORAL DAILY
Qty: 30 TABLET | Refills: 1 | Status: SHIPPED | OUTPATIENT
Start: 2023-03-20

## 2023-03-20 RX ORDER — ACETAMINOPHEN 325 MG/1
975 TABLET ORAL ONCE
OUTPATIENT
Start: 2023-03-20 | End: 2023-03-20

## 2023-03-20 RX ORDER — ASCORBIC ACID 500 MG
500 TABLET ORAL DAILY
Qty: 30 TABLET | Refills: 1 | Status: SHIPPED | OUTPATIENT
Start: 2023-03-20

## 2023-03-20 RX ORDER — MULTIVITAMIN
1 TABLET ORAL DAILY
Qty: 30 TABLET | Refills: 1 | Status: SHIPPED | OUTPATIENT
Start: 2023-03-20

## 2023-03-20 NOTE — PROGRESS NOTES
Assessment/Plan     1  Primary osteoarthritis of one hip, right    2  Pre-op examination    3  Type 2 diabetes mellitus without complication, without long-term current use of insulin ([facility] [address] )    4  Other specified diseases of liver    5  Pain in other specified joint      Orders Placed This Encounter   Procedures   • XR hip/pelv 2-3 vws right if performed   • Comprehensive metabolic panel   • Hemoglobin A1C W/EAG Estimation   • CBC and differential   • C-reactive protein   • Anemia Panel w/Reflex   • Protime-INR   • APTT   • Type and screen       The patient has severe RIGHT hip arthritis  We discussed treatment options as well as risks and benefits of treatment options  The patient has intolerable pain, feels limited and has tried and failed conservative treatment options at this point  They have elected to proceed with a RIGHT MYAH, anterior approach  The risks of surgery include, but are not limited to infection, blood clot, wound healing problems, blood loss, damage to blood vessels and nerves, persistent pain and stiffness, dislocation, fracture, leg-length discrepancy, need for additional surgery, need for revision surgery, failure of hardware, heart attack, stroke, death  The patient understood and agreed to by oral and written consent  I answered all questions regarding surgery  The patient has the direct phone number to the office for any further questions  Patient will be a same day discharge  DVT prophylaxis:  BID x 6 weeks  Clearance will be obtained from: PCP, cardiology, dentist   She will continue to work on home exercises  · Continue diclofenac tabs prn pain  · Patient would like to consider R TKA as soon as possible after R MYAH  Return for post op appt  I answered all of the patient's questions during the visit and provided education of the patient's condition during the visit  The patient verbalized understanding of the information given and agrees with the plan    This note was dictated using M*[employer] software  It may contain errors including improperly dictated words  Please contact physician directly for any questions  Subjective   Chief Complaint:   Chief Complaint   Patient presents with   • Right Hip - Follow-up       HPI:  Wayne Joel is a 74 y o  female who presents for follow up for right hip pain  Patient reports persistent pain located in the anterior groin area  Patient notes her pain does radiate to the lateral thigh and buttock at times  Pain is worse with prolonged weight bearing and stairs  Patient reports that hip and knee pain interferes with her ADLs  Patient is taking diclofenac tabs as needed for pain  She does home exercises  Patient received bilateral knee steroid injections at her last visit and reports good pain relief for roughly one month  She would like to get her right knee replaced as well  History of MRSA: no  History of blood clots: no  Family history of blood clots: no  History of Hepatitis C:no  History of HIV: no  Are you a smoker:no  Are you diabetic: yes, last A1C 6 3  Do you see a cardiologist: no, has appt scheduled  Have you been vaccinated for COVID:yes  Have you seen a dentist in the past year: no, has appt scheduled  Do you have a leg length discrepancy: no      Review of Systems  See HPI for musculoskeletal review  All other systems reviewed are negative     History:  Past Medical History:   Diagnosis Date   • Arthritis    • Fatty liver 05/01/2018   • Hyperlipidemia    • Hypertension    • Low back pain    • Neck pain    • Primary localized osteoarthritis of right knee 08/17/2017     No past surgical history on file    Social History   Social History     Substance and Sexual Activity   Alcohol Use No     Social History     Substance and Sexual Activity   Drug Use Never     Social History     Tobacco Use   Smoking Status Never   Smokeless Tobacco Never     Family History:   Family History   Problem Relation Age of Onset   • Osteoporosis Mother    • Skin cancer Father 54   • Stomach cancer Sister 52   • No Known Problems Daughter    • No Known Problems Maternal Grandmother    • No Known Problems Maternal Grandfather    • No Known Problems Paternal Grandmother    • No Known Problems Paternal Grandfather    • No Known Problems Maternal Aunt    • No Known Problems Maternal Aunt    • No Known Problems Maternal Aunt    • No Known Problems Maternal Aunt    • No Known Problems Paternal Aunt        Current Outpatient Medications on File Prior to Visit   Medication Sig Dispense Refill   • benazepril (LOTENSIN) 20 mg tablet TAKE 1 TABLET BY MOUTH EVERY DAY 90 tablet 1   • cholecalciferol (VITAMIN D3) 1,000 units tablet Take 1,000 Units by mouth daily     • cyclobenzaprine (FLEXERIL) 10 mg tablet Take 1 tablet (10 mg total) by mouth daily at bedtime 30 tablet 5   • diclofenac potassium (CATAFLAM) 50 mg tablet TAKE 1 TABLET BY MOUTH TWICE A DAY 60 tablet 1   • DULoxetine (CYMBALTA) 60 mg delayed release capsule TAKE 1 CAPSULE BY MOUTH EVERY DAY 90 capsule 1   • gabapentin (Neurontin) 600 MG tablet Take 1 tablet (600 mg total) by mouth 3 (three) times a day 90 tablet 5   • hydrochlorothiazide (HYDRODIURIL) 12 5 mg tablet TAKE 1 TABLET BY MOUTH EVERY DAY 90 tablet 1   • lidocaine (Lidoderm) 5 % Apply 1 patch topically daily Remove & Discard patch within 12 hours or as directed by MD 30 patch 2   • methylPREDNISolone 4 MG tablet therapy pack Use as directed on package (Patient not taking: Reported on 1/19/2023) 1 each 0   • omeprazole (PriLOSEC) 10 mg delayed release capsule Take 10 mg by mouth daily       • rosuvastatin (CRESTOR) 10 MG tablet TAKE 1 TABLET BY MOUTH EVERY DAY 90 tablet 1   • SUPER B COMPLEX/C PO Take 1 tablet by mouth daily         No current facility-administered medications on file prior to visit       Allergies   Allergen Reactions   • Penicillins Hives and Swelling        Objective     /78   Pulse 74   Ht 5' 2" (1 575 m) Wt 81 9 kg (180 lb 9 6 oz)   BMI 33 03 kg/m²      PE:  AAOx 3  WDWN  Hearing intact, no drainage from eyes  no audible wheezing  no abdominal distension  LE compartments soft, skin intact    right hip:   No dislocation/deformity  positive  [facility]  ROM: 0- 90, pain with minimal ER and IR  positive  Jeffery Test  Positive  Impingement test  No TTP over greater trochanter  Abduction: 5/5  Neg   Radha's test  No TTP over SIJ    AT/GS intact  Right leg appears 2-3 mm shorter than left      Imaging Studies: I have personally reviewed pertinent films in PACS  X-rays right hip: severe osteoarthritis with bone on bone joint space narrowing

## 2023-04-03 DIAGNOSIS — M79.7 FIBROMYALGIA: ICD-10-CM

## 2023-04-04 RX ORDER — DULOXETIN HYDROCHLORIDE 60 MG/1
CAPSULE, DELAYED RELEASE ORAL
Qty: 90 CAPSULE | Refills: 2 | Status: SHIPPED | OUTPATIENT
Start: 2023-04-04

## 2023-04-19 PROBLEM — F32.0 CURRENT MILD EPISODE OF MAJOR DEPRESSIVE DISORDER WITHOUT PRIOR EPISODE (HCC): Status: ACTIVE | Noted: 2020-02-10

## 2023-04-25 ENCOUNTER — OFFICE VISIT (OUTPATIENT)
Dept: DENTISTRY | Facility: CLINIC | Age: 73
End: 2023-04-25

## 2023-04-25 VITALS — HEART RATE: 89 BPM | SYSTOLIC BLOOD PRESSURE: 129 MMHG | TEMPERATURE: 97.8 F | DIASTOLIC BLOOD PRESSURE: 87 MMHG

## 2023-04-25 DIAGNOSIS — Z01.20 ENCOUNTER FOR DENTAL EXAMINATION: Primary | ICD-10-CM

## 2023-04-27 NOTE — PROGRESS NOTES
Comprehensive Exam    Godwin Chery presents for a comprehensive exam and needs clearance for multiple orthopedic surgeries scheduled starting in June  Reviewed health history - Patient is ASA II- pt denies hx of chemotherapy, bisphosphonate use, diabetes (states she is pre diabetic although chart says T2D- HbA1C seems controlled)    Perio: severe generalized periodontal disease, severe bone loss on 4 remaining teeth   Pain Scale:0  Caries Assessment: mod  Radiographs: selective PAs      Treatment Plan:  1  Infection control: ext all remaining teeth (risk of infection and perio) before surgery and then fabricate new CUD and CLD  Pt has severely resorbed lower ridge, Knife edged and little to no buccal plate  Explained that teeth need to be ext due to risk of infection, but the new denture will likely be very loose  Pt hasn't been to a dentist in 36 years  Exlained implants are likely not an option since there is little bone in the area    Prognosis is fair  Referrals needed: No  Next visit: ext of remaining teeth before surgery (if not possible at our clinic asked pt to call us and will write her a referral)   Ext can be done any day with any provider

## 2023-04-28 ENCOUNTER — TELEPHONE (OUTPATIENT)
Dept: PAIN MEDICINE | Facility: CLINIC | Age: 73
End: 2023-04-28

## 2023-05-05 ENCOUNTER — OFFICE VISIT (OUTPATIENT)
Dept: CARDIOLOGY CLINIC | Facility: CLINIC | Age: 73
End: 2023-05-05

## 2023-05-05 VITALS
BODY MASS INDEX: 33.64 KG/M2 | HEART RATE: 82 BPM | SYSTOLIC BLOOD PRESSURE: 124 MMHG | DIASTOLIC BLOOD PRESSURE: 82 MMHG | OXYGEN SATURATION: 98 % | WEIGHT: 182.8 LBS | HEIGHT: 62 IN

## 2023-05-05 DIAGNOSIS — R60.0 LOWER EXTREMITY EDEMA: ICD-10-CM

## 2023-05-05 DIAGNOSIS — I10 BENIGN ESSENTIAL HTN: Primary | ICD-10-CM

## 2023-05-05 DIAGNOSIS — M17.0 PRIMARY OSTEOARTHRITIS OF BOTH KNEES: ICD-10-CM

## 2023-05-05 DIAGNOSIS — Z01.810 PREOP CARDIOVASCULAR EXAM: ICD-10-CM

## 2023-05-05 DIAGNOSIS — E78.2 MIXED HYPERLIPIDEMIA: ICD-10-CM

## 2023-05-05 DIAGNOSIS — Z01.810 PREOPERATIVE CARDIOVASCULAR EXAMINATION: ICD-10-CM

## 2023-05-05 NOTE — PROGRESS NOTES
Cardiology Preoperative Visit    Darcie Ernst  1950  [id number]  [facility] [address] [phone]    1  Benign essential HTN        2  Primary osteoarthritis of both knees        3  Preoperative cardiovascular examination        4  Mixed hyperlipidemia        5  Lower extremity edema        6  Preop cardiovascular exam  POCT ECG          Discussion/Summary:      There are no cardiac contraindications to proceeding with the proposed surgery  The patient is at low risk to proceed with no changes to medical therapy and no additional testing  Her blood pressure is currently well controlled with benazepril and hydrochlorothiazide  Dyslipidemia controlled with Crestor  Currently without any symptoms, and no additional testing is indicated  History Of Present Illness:     27-year-old female who has a history of hypertension, diabetes, dyslipidemia  These are controlled with oral medications  She has arthritis of the hip and of the knees  She has seen the orthopedic surgeon and his first plan for a hip surgery and comes to the office today for preoperative evaluation  She is limited by her joints as opposed to any cardiopulmonary symptoms  She has no chest pain  She said many years ago she had symptoms of chest tightness and she was evaluated with a stress test and subsequently a cardiac catheterization which she was told was unremarkable  When she was able to exercise more by walking, she denied any exertional symptoms  She has no PND, orthopnea, or lower extremity edema  Lipid panel is well controlled with 10 mg of Crestor  There has been no recent cardiac testing  Her EKG is unremarkable  Her kidney function is normal  She is not requiring any insulin      Medical Problems     Problem List     Primary osteoarthritis of both knees    Lumbar disc disease with radiculopathy Chronic right-sided low back pain with right-sided sciatica    Fibromyalgia    Benign essential HTN    Fatty liver    Gastroesophageal reflux disease without esophagitis    Lumbar radiculopathy    Sacroiliitis (HCC)    Pes anserine bursitis    Lower extremity edema    Cervical radiculopathy    Cervical spine arthritis    Cervical spinal stenosis    Lumbar spondylosis    Current mild episode of major depressive disorder without prior episode (HCC)    Mixed hyperlipidemia    IFG (impaired fasting glucose)    Degenerative disc disease, cervical    Diabetes mellitus type 2 in obese [facility])      Lab Results   Component Value Date    HGBA1C 6 3 (H) 10/07/2022         Spasm of right piriformis muscle    Primary osteoarthritis of right hip    Spinal stenosis of lumbar region with neurogenic claudication        Past Medical History:   Diagnosis Date    Arthritis     Fatty liver 05/01/2018    Hyperlipidemia     Hypertension     Low back pain     Neck pain     Primary localized osteoarthritis of right knee 08/17/2017     Social History     Tobacco Use    Smoking status: Never    Smokeless tobacco: Never   Vaping Use    Vaping Use: Never used   Substance Use Topics    Alcohol use: No    Drug use: Never      Family History   Problem Relation Age of Onset    Osteoporosis Mother     Skin cancer Father 54    Stomach cancer Sister 52    No Known Problems Daughter     No Known Problems Maternal Grandmother     No Known Problems Maternal Grandfather     No Known Problems Paternal Grandmother     No Known Problems Paternal Grandfather     No Known Problems Maternal Aunt     No Known Problems Maternal Aunt     No Known Problems Maternal Aunt     No Known Problems Maternal Aunt     No Known Problems Paternal Aunt      No past surgical history on file      Current Outpatient Medications:     ascorbic acid (VITAMIN C) 500 mg tablet, TAKE 1 TABLET (500 MG TOTAL) BY MOUTH DAILY BEGIN 30 DAYS PRIOR TO SURGERY , Disp: 90 tablet, Rfl: 1    benazepril (LOTENSIN) 20 mg tablet, TAKE 1 TABLET BY MOUTH EVERY DAY, Disp: 90 tablet, Rfl: 1    cholecalciferol (VITAMIN D3) 1,000 units tablet, Take 1,000 Units by mouth daily, Disp: , Rfl:     cyclobenzaprine (FLEXERIL) 10 mg tablet, Take 1 tablet (10 mg total) by mouth daily at bedtime, Disp: 30 tablet, Rfl: 5    diclofenac potassium (CATAFLAM) 50 mg tablet, Take 1 tablet (50 mg total) by mouth 2 (two) times a day as needed (pain) Take with food  , Disp: 60 tablet, Rfl: 1    DULoxetine (CYMBALTA) 60 mg delayed release capsule, TAKE 1 CAPSULE BY MOUTH EVERY DAY, Disp: 90 capsule, Rfl: 2    ferrous sulfate 324 (65 Fe) mg, TAKE 1 TABLET (324 MG TOTAL) BY MOUTH DAILY BEFORE BREAKFAST BEGIN 30 DAYS PRIOR TO SURGERY , Disp: 90 tablet, Rfl: 1    folic acid (FOLVITE) 1 mg tablet, TAKE 1 TABLET (1 MG TOTAL) BY MOUTH DAILY BEGIN 30 DAYS PRIOR TO SURGERY , Disp: 90 tablet, Rfl: 1    gabapentin (Neurontin) 600 MG tablet, Take 1 tablet (600 mg total) by mouth 3 (three) times a day, Disp: 90 tablet, Rfl: 5    hydrochlorothiazide (HYDRODIURIL) 12 5 mg tablet, TAKE 1 TABLET BY MOUTH EVERY DAY, Disp: 90 tablet, Rfl: 1    lidocaine (Lidoderm) 5 %, Apply 1 patch topically daily Remove & Discard patch within 12 hours or as directed by MD, Disp: 30 patch, Rfl: 2    Multiple Vitamins-Minerals (One-Daily Multi-Vit/Mineral) TABS, TAKE 1 TABLET BY MOUTH DAILY BEGIN 30 DAYS PRIOR TO SURGERY , Disp: 90 tablet, Rfl: 1    omeprazole (PriLOSEC) 10 mg delayed release capsule, Take 10 mg by mouth daily  , Disp: , Rfl:     rosuvastatin (CRESTOR) 10 MG tablet, TAKE 1 TABLET BY MOUTH EVERY DAY, Disp: 90 tablet, Rfl: 1    SUPER B COMPLEX/C PO, Take 1 tablet by mouth daily  , Disp: , Rfl:   Allergies   Allergen Reactions    Penicillins Hives and Swelling       Vitals:    05/05/23 0946   BP: 124/82   BP Location: Left arm   Patient Position: Sitting   Cuff Size: Large   Pulse: 82   SpO2: 98%   Weight: 82 9 kg (182 lb "12 8 oz)   Height: 5' 2\" (1 575 m)     Vitals:    05/05/23 0946   Weight: 82 9 kg (182 lb 12 8 oz)      Height: 5' 2\" (157 5 cm)   Body mass index is 33 43 kg/m²  Physical Exam:   GENERAL: Alert, well appearing, and in no distress  HEENT:  PERRL, EOMI, no scleral icterus, no conjunctival pallor  NECK:  Supple, No elevated JVP, no thyromegaly, no carotid bruits  HEART:  Regular rate and rhythm, normal S1/S2, no S3/S4, no murmur or rub  LUNGS:  Clear to auscultation bilaterally  ABDOMEN:  Soft, non-tender, positive bowel sounds, no rebound or guarding  EXTREMITIES:  No edema  VASCULAR:  Normal pedal pulses   NEURO: No focal deficits,  SKIN: Normal without suspicious lesions on exposed skin      ROS:  Positive for arthritis  Except as noted in HPI, is otherwise reviewed in detail and a 12 point review of systems is negative  Labs:  Lab Results   Component Value Date    SODIUM 143 10/07/2022    K 4 4 10/07/2022     10/07/2022    CREATININE 0 82 10/07/2022    BUN 12 10/07/2022    CO2 31 10/07/2022    ALT 50 10/07/2022    AST 26 10/07/2022    GLUF 90 10/07/2022    HGBA1C 6 3 (H) 10/07/2022    WBC 5 54 04/20/2020    HGB 13 3 04/20/2020    HCT 41 4 04/20/2020     04/20/2020        No results found for: CHOL  Lab Results   Component Value Date    HDL 61 10/07/2022    HDL 53 03/29/2022    HDL 59 11/22/2021     Lab Results   Component Value Date    LDLCALC 49 10/07/2022    LDLCALC 43 03/29/2022    LDLCALC 110 (H) 11/22/2021     Lab Results   Component Value Date    TRIG 71 10/07/2022    TRIG 130 03/29/2022    TRIG 116 11/22/2021     EKG:  Sinus rhythm  82 bpm  Normal EKG    "

## 2023-05-05 NOTE — LETTER
May 5, 2023     Cindy Olivo DO  [address]  [address], [address] [facility]    Patient: Blaise Henderson   YOB: 1950   Date of Visit: 5/5/2023       Dear Dr Blake Mcdonald: Thank you for referring Blaise Henderson to me for evaluation  Below are my notes for this consultation  If you have questions, please do not hesitate to call me  I look forward to following your patient along with you  Sincerely,        David Alfredo MD        CC: No Recipients  David Alfredo MD  5/5/2023 10:43 AM  Sign when Signing Visit                                             Cardiology Preoperative Visit    Blaise Henderson  1950  [id number]  [address] [place]  [address] [id number] [phone]  [phone]    1  Benign essential HTN        2  Primary osteoarthritis of both knees        3  Preoperative cardiovascular examination        4  Mixed hyperlipidemia        5  Lower extremity edema        6  Preop cardiovascular exam  POCT ECG          Discussion/Summary:      There are no cardiac contraindications to proceeding with the proposed surgery  The patient is at low risk to proceed with no changes to medical therapy and no additional testing  Her blood pressure is currently well controlled with benazepril and hydrochlorothiazide  Dyslipidemia controlled with Crestor  Currently without any symptoms, and no additional testing is indicated  History Of Present Illness:     64-year-old female who has a history of hypertension, diabetes, dyslipidemia  These are controlled with oral medications  She has arthritis of the hip and of the knees  She has seen the orthopedic surgeon and his first plan for a hip surgery and comes to the office today for preoperative evaluation  She is limited by her joints as opposed to any cardiopulmonary symptoms  She has no chest pain   She said many years ago she had symptoms of chest tightness and she was evaluated with a stress test and subsequently a cardiac catheterization which she was told was unremarkable  When she was able to exercise more by walking, she denied any exertional symptoms  She has no PND, orthopnea, or lower extremity edema  Lipid panel is well controlled with 10 mg of Crestor  There has been no recent cardiac testing  Her EKG is unremarkable  Her kidney function is normal  She is not requiring any insulin      Medical Problems     Problem List     Primary osteoarthritis of both knees    Lumbar disc disease with radiculopathy    Chronic right-sided low back pain with right-sided sciatica    Fibromyalgia    Benign essential HTN    Fatty liver    Gastroesophageal reflux disease without esophagitis    Lumbar radiculopathy    Sacroiliitis (HCC)    Pes anserine bursitis    Lower extremity edema    Cervical radiculopathy    Cervical spine arthritis    Cervical spinal stenosis    Lumbar spondylosis    Current mild episode of major depressive disorder without prior episode (HCC)    Mixed hyperlipidemia    IFG (impaired fasting glucose)    Degenerative disc disease, cervical    Diabetes mellitus type 2 in obese [facility])      Lab Results   Component Value Date    HGBA1C 6 3 (H) 10/07/2022         Spasm of right piriformis muscle    Primary osteoarthritis of right hip    Spinal stenosis of lumbar region with neurogenic claudication        Past Medical History:   Diagnosis Date    Arthritis     Fatty liver 05/01/2018    Hyperlipidemia     Hypertension     Low back pain     Neck pain     Primary localized osteoarthritis of right knee 08/17/2017     Social History     Tobacco Use    Smoking status: Never    Smokeless tobacco: Never   Vaping Use    Vaping Use: Never used   Substance Use Topics    Alcohol use: No    Drug use: Never      Family History   Problem Relation Age of Onset    Osteoporosis Mother     Skin cancer Father 54    Stomach cancer Sister 52    No Known Problems Daughter     No Known Problems Maternal Grandmother     No Known Problems Maternal Grandfather     No Known Problems Paternal Grandmother     No Known Problems Paternal Grandfather     No Known Problems Maternal Aunt     No Known Problems Maternal Aunt     No Known Problems Maternal Aunt     No Known Problems Maternal Aunt     No Known Problems Paternal Aunt      No past surgical history on file  Current Outpatient Medications:     ascorbic acid (VITAMIN C) 500 mg tablet, TAKE 1 TABLET (500 MG TOTAL) BY MOUTH DAILY BEGIN 30 DAYS PRIOR TO SURGERY , Disp: 90 tablet, Rfl: 1    benazepril (LOTENSIN) 20 mg tablet, TAKE 1 TABLET BY MOUTH EVERY DAY, Disp: 90 tablet, Rfl: 1    cholecalciferol (VITAMIN D3) 1,000 units tablet, Take 1,000 Units by mouth daily, Disp: , Rfl:     cyclobenzaprine (FLEXERIL) 10 mg tablet, Take 1 tablet (10 mg total) by mouth daily at bedtime, Disp: 30 tablet, Rfl: 5    diclofenac potassium (CATAFLAM) 50 mg tablet, Take 1 tablet (50 mg total) by mouth 2 (two) times a day as needed (pain) Take with food  , Disp: 60 tablet, Rfl: 1    DULoxetine (CYMBALTA) 60 mg delayed release capsule, TAKE 1 CAPSULE BY MOUTH EVERY DAY, Disp: 90 capsule, Rfl: 2    ferrous sulfate 324 (65 Fe) mg, TAKE 1 TABLET (324 MG TOTAL) BY MOUTH DAILY BEFORE BREAKFAST BEGIN 30 DAYS PRIOR TO SURGERY , Disp: 90 tablet, Rfl: 1    folic acid (FOLVITE) 1 mg tablet, TAKE 1 TABLET (1 MG TOTAL) BY MOUTH DAILY BEGIN 30 DAYS PRIOR TO SURGERY , Disp: 90 tablet, Rfl: 1    gabapentin (Neurontin) 600 MG tablet, Take 1 tablet (600 mg total) by mouth 3 (three) times a day, Disp: 90 tablet, Rfl: 5    hydrochlorothiazide (HYDRODIURIL) 12 5 mg tablet, TAKE 1 TABLET BY MOUTH EVERY DAY, Disp: 90 tablet, Rfl: 1    lidocaine (Lidoderm) 5 %, Apply 1 patch topically daily Remove & Discard patch within 12 hours or as directed by MD, Disp: 30 patch, Rfl: 2    Multiple Vitamins-Minerals (One-Daily Multi-Vit/Mineral) TABS, TAKE 1 TABLET BY MOUTH "DAILY BEGIN 30 DAYS PRIOR TO SURGERY , Disp: 90 tablet, Rfl: 1    omeprazole (PriLOSEC) 10 mg delayed release capsule, Take 10 mg by mouth daily  , Disp: , Rfl:     rosuvastatin (CRESTOR) 10 MG tablet, TAKE 1 TABLET BY MOUTH EVERY DAY, Disp: 90 tablet, Rfl: 1    SUPER B COMPLEX/C PO, Take 1 tablet by mouth daily  , Disp: , Rfl:   Allergies   Allergen Reactions    Penicillins Hives and Swelling       Vitals:    05/05/23 0946   BP: 124/82   BP Location: Left arm   Patient Position: Sitting   Cuff Size: Large   Pulse: 82   SpO2: 98%   Weight: 82 9 kg (182 lb 12 8 oz)   Height: 5' 2\" (1 575 m)     Vitals:    05/05/23 0946   Weight: 82 9 kg (182 lb 12 8 oz)      Height: 5' 2\" (157 5 cm)   Body mass index is 33 43 kg/m²  Physical Exam:   GENERAL: Alert, well appearing, and in no distress  HEENT:  PERRL, EOMI, no scleral icterus, no conjunctival pallor  NECK:  Supple, No elevated JVP, no thyromegaly, no carotid bruits  HEART:  Regular rate and rhythm, normal S1/S2, no S3/S4, no murmur or rub  LUNGS:  Clear to auscultation bilaterally  ABDOMEN:  Soft, non-tender, positive bowel sounds, no rebound or guarding  EXTREMITIES:  No edema  VASCULAR:  Normal pedal pulses   NEURO: No focal deficits,  SKIN: Normal without suspicious lesions on exposed skin      ROS:  Positive for arthritis  Except as noted in HPI, is otherwise reviewed in detail and a 12 point review of systems is negative      Labs:  Lab Results   Component Value Date    SODIUM 143 10/07/2022    K 4 4 10/07/2022     10/07/2022    CREATININE 0 82 10/07/2022    BUN 12 10/07/2022    CO2 31 10/07/2022    ALT 50 10/07/2022    AST 26 10/07/2022    GLUF 90 10/07/2022    HGBA1C 6 3 (H) 10/07/2022    WBC 5 54 04/20/2020    HGB 13 3 04/20/2020    HCT 41 4 04/20/2020     04/20/2020        No results found for: CHOL  Lab Results   Component Value Date    HDL 61 10/07/2022    HDL 53 03/29/2022    HDL 59 11/22/2021     Lab Results   Component Value Date    " LDLCALC 49 10/07/2022    LDLCALC 43 03/29/2022    LDLCALC 110 (H) 11/22/2021     Lab Results   Component Value Date    TRIG 71 10/07/2022    TRIG 130 03/29/2022    TRIG 116 11/22/2021     EKG:  Sinus rhythm  82 bpm  Normal EKG

## 2023-05-09 ENCOUNTER — OFFICE VISIT (OUTPATIENT)
Dept: DENTISTRY | Facility: CLINIC | Age: 73
End: 2023-05-09

## 2023-05-09 VITALS — SYSTOLIC BLOOD PRESSURE: 125 MMHG | TEMPERATURE: 97.7 F | HEART RATE: 85 BPM | DIASTOLIC BLOOD PRESSURE: 83 MMHG

## 2023-05-09 DIAGNOSIS — K05.30 PERIODONTITIS: Primary | ICD-10-CM

## 2023-05-09 DIAGNOSIS — Z01.89 ENCOUNTER FOR GERIATRIC ASSESSMENT: Primary | ICD-10-CM

## 2023-05-09 NOTE — PROGRESS NOTES
Oral Surgery: Ext of 18,21,27 and 29  Teto Spencer presents for Ext #68,15,34 and 29  [address], patient denies any changes  ASA type 2 significant for diabetes controlled with diet  HbA1c from 10/7 was 6 3  Pt has osteoporosis but does not take and has not taken any bisphosphonate medications  Denies any heart surgeries or radiation therapy  Obtained a direct and personal consent  Risks and complications were explained  Pt agreed and consented  Consent scanned in doc center  Pre-Op BP WNL  Universal Protocol    Other Assisting Provider: Yes, Kelly (assistant)  Verbal consent obtained? YES  Written consent obtained? YES  Risks, benefits and alternatives discussed?: YES  Consent given by: Patient Teto Spencer)    Time Out  Immediately prior to the procedure a time out was called: YES    Time Out:  10:15 am     A time out verifies correct patient, procedure, equipment, support staff and site/side marked as required  Patient states understanding of procedure being performed: YES  Patient's understanding of procedure matches consent: YES  Procedure consent matches procedure scheduled: YES  Test results available and properly labeled: N/A  Site  Verified with the patient  YES  Radiology Images displayed and confirmed  If images not available, report reviewed:  YES  Required items - Required blood products, implants, devices and special equipment available: YES  Patient identity confirmed:  YES    Administered 1 carpule of 2 % Lidocaine w/ 1:100,000 epi via NICHOLAS block and buccal nerve block of left side and 1 carpule of 4% septocaine with 1:100,000 epi via mental nerve block of right side  Adequate anesthesia obtained, used periosteal elevator to separate pdl fibers, elevated with spade elevator, and extracted #29, 27 21 and 18 in that order with premolar forceps  Extracted #18 with universal forceps  Socket irrigated, and 4 0 chromic gut sutures with figure 8 placed on #29 site and #21 site   Gauze and pressure used for hemostasis  Post op PA taken of extraction sites  Upon dismissal, patient received POI, ice, gauze, and RX: over the counter pain medications  Signed dental clearance form for orthopedic surgery (knee replacement) today  NV: Comp exam (need pano to assess maxillary bone  Pt is interested in mandibular denture

## 2023-05-12 ENCOUNTER — HOSPITAL ENCOUNTER (OUTPATIENT)
Dept: MAMMOGRAPHY | Facility: MEDICAL CENTER | Age: 73
Discharge: HOME | End: 2023-05-12

## 2023-05-12 VITALS — HEIGHT: 62 IN | WEIGHT: 182 LBS | BODY MASS INDEX: 33.49 KG/M2

## 2023-05-12 DIAGNOSIS — Z12.31 ENCOUNTER FOR SCREENING MAMMOGRAM FOR BREAST CANCER: ICD-10-CM

## 2023-05-18 ENCOUNTER — CONSULT (OUTPATIENT)
Dept: ANESTHESIOLOGY | Facility: CLINIC | Age: 73
End: 2023-05-18

## 2023-05-18 VITALS — SYSTOLIC BLOOD PRESSURE: 126 MMHG | DIASTOLIC BLOOD PRESSURE: 70 MMHG | HEART RATE: 82 BPM | OXYGEN SATURATION: 99 %

## 2023-05-18 DIAGNOSIS — F32.0 CURRENT MILD EPISODE OF MAJOR DEPRESSIVE DISORDER WITHOUT PRIOR EPISODE (HCC): ICD-10-CM

## 2023-05-18 DIAGNOSIS — M16.11 PRIMARY OSTEOARTHRITIS OF RIGHT HIP: ICD-10-CM

## 2023-05-18 DIAGNOSIS — R73.01 IFG (IMPAIRED FASTING GLUCOSE): ICD-10-CM

## 2023-05-18 DIAGNOSIS — I10 BENIGN ESSENTIAL HTN: ICD-10-CM

## 2023-05-18 DIAGNOSIS — Z01.89 ENCOUNTER FOR GERIATRIC ASSESSMENT: Primary | ICD-10-CM

## 2023-05-18 DIAGNOSIS — M79.7 FIBROMYALGIA: ICD-10-CM

## 2023-05-18 PROBLEM — E11.69 DIABETES MELLITUS TYPE 2 IN OBESE (HCC): Status: RESOLVED | Noted: 2021-12-02 | Resolved: 2023-05-18

## 2023-05-18 PROBLEM — E66.9 DIABETES MELLITUS TYPE 2 IN OBESE (HCC): Status: RESOLVED | Noted: 2021-12-02 | Resolved: 2023-05-18

## 2023-05-18 NOTE — PROGRESS NOTES
[facility]   CONSULT: GERIATRIC SURGERY    Assessment/Plan:  · 74year old female referred to [facility] for pre-surgery geriatric screening  · Consult concerns: age   · She is scheduled on     Case: [id number] Date/Time: 06/19/23 0730   Procedure: ARTHROPLASTY HIP TOTAL ANTERIOR, SAME DAY DISCHARGE (Right: Hip)   Anesthesia type: Spinal   Diagnosis: Primary osteoarthritis of one hip, right [M16 11]   Pre-op diagnosis: Primary osteoarthritis of one hip, right [M16 11]   Location:  OR ROOM 12 [address]   Surgeons: [address] [place] [address] DO Charley       No problem-specific Assessment & Plan notes found for this encounter      LAST ANESTHESIA   · Reports no concerns       Problem List Items Addressed This Visit        Endocrine    IFG (impaired fasting glucose)  · Stable  · Denies diabetes  · Removed from medical history  · Prediabetic hemoglobin A1c 6 3-requested she speak with her PCP about this  · Stable at this time       Cardiovascular and Mediastinum    Benign essential HTN  · Stable today blood pressure 126/70  · Heart rate stable at 82  · Reports no concerns  · Will have medical clearance 5 25 23       Musculoskeletal and Integument    Primary osteoarthritis of right hip  · Seen for surgical optimization  · Seen for geriatric assessment  · Needs medical clearance 5 25 23  · METS 9, active, denies chest pain and denies shortness of breath  · Needs to complete PAT's will do 5/22/2023 -await results for any further needs  · We will have PAT surgical nurse phone call on 5/30/2023  · Started on best protocol  At risk for post-op TOMMY -no  At risk for post-op SSI-no  BEST   Breathing- instructed to exercise lungs prior to surgery via IS  Eat- discussed increasing protein intake prior to surgery   Sleep/stress- encouraged 8-10 sleep @ night, stress reduction, avoid sick contacts and handwashing   · Train- encouraged to remain active        Other Fibromyalgia  · Stable  · Reports no concerns        Current mild episode of major depressive disorder without prior episode (HCC)  · Stable  · Reports no concerns  · Depression screening completed today level 0 indicating no active concerns        Encounter for geriatric assessment - Primary  · This is an outpatient procedure at this time  · Patient wishes to go home after surgery  · If admitted to the hospital would consider geriatric consult due to advanced age of 74  · No cognitive concerns identified today  · No immediate geriatric concerns identified today  Geriatric Assessment   · Cognitive Assessment:   · CAM: no   · TUG <15 sec:yes   · Falls (last 6 months): 0   · Hand  score: 20 lbs   -Attempt 1: 20 lbs   -Attempt 2: 20 lbs   -Attempt 3: 20 lbs   · Timmy Total Score: 19   · PHQ- 9 Depression Scale: 0   · Nutrition Assessment Score: 13   · METS: 6 79   Walks- 15-30 minutes   Incentive- 1500   3 meals:   Breakfast: cereal, cottage cheese, eggs, apple butter, yogurt   Lunch: soup, yogurt   Dinner: chicken fish pork beef   Greens, broccoli, carrots   Potatoes, rice   Snacks: apples peanut butter   Sleep: 3-7 hours   Health goals:  -What are your overall health goals? Lose weight, walk more   -What brings you strength? Grandchildren, family   -What activities are important to you? Sew, paint, walk              Subjective:      Patient ID: Marlena Mcintosh is a 74 y o  female  Who was referred to [facility] for presurgery geriatric screening secondary to advanced age  Patient has been suffering with right hip pain and is electing to undergo a right hip replacement  She is seen today for surgical optimization    I met patient in the [facility]  She arrived with her   She walks independently  No walker and/or no cane use  Her gait is steady  Denies falls  Lives at home with her   She is independent with all ADLs  After her visit today she is going clothing shopping    Denies chest pain and denies shortness of breath  Admits to being in well health today  METS 6  She walks about 15 to 30 minutes a day    She needs to complete her PAT's  States she will do this Monday, 5/22/2023  She needs medical clearance and she is going 5/25/2023  She will have her PAT surgical nurse phone call on 5/30/2023  I do not anticipate any concerns on this upcoming testing and office visit  As always we started our best protocol    The following portions of the patient's history were reviewed and updated as appropriate: current medications, past family history, past medical history, past social history, past surgical history and problem list     Review of Systems   Constitutional: Negative for chills and fever  HENT: Negative for sinus pain and sore throat  Cardiovascular: Negative for chest pain, palpitations and leg swelling  Gastrointestinal: Negative for nausea, rectal pain and vomiting  Genitourinary: Negative for difficulty urinating  Skin: Negative for color change, pallor, rash and wound  Neurological: Negative for dizziness, facial asymmetry and headaches  Psychiatric/Behavioral: Negative for agitation, behavioral problems, confusion and decreased concentration  Objective:      /70   Pulse 82   SpO2 99%          Physical Exam  HENT:      Head: Normocephalic  Nose: Nose normal    Cardiovascular:      Rate and Rhythm: Normal rate  Pulmonary:      Effort: Pulmonary effort is normal    Musculoskeletal:         General: Normal range of motion  Cervical back: Normal range of motion  Neurological:      General: No focal deficit present  Mental Status: She is alert and oriented to person, place, and time  Mental status is at baseline  Psychiatric:         Mood and Affect: Mood normal          Behavior: Behavior normal          Thought Content:  Thought content normal          Judgment: Judgment normal                [facility] on: 5/18   Date of Surgery: 6/19     Geriatric Assessment   · Cognitive Assessment:   · CAM: no   · TUG <15 sec:yes   · Falls (last 6 months): 0   · Hand  score: 20 lbs   -Attempt 1: 20 lbs   -Attempt 2: 20 lbs   -Attempt 3: 20 lbs   · Timmy Total Score: 19   · PHQ- 9 Depression Scale: 0   · Nutrition Assessment Score: 13   · METS: 6 79   Walks- 15-30 minutes   Incentive- 1500   3 meals:   Breakfast: cereal, cottage cheese, eggs, apple butter, yogurt   Lunch: soup, yogurt   Dinner: chicken fish pork beef   Greens, broccoli, carrots   Potatoes, rice   Snacks: apples peanut butter   Sleep: 3-7 hours   Health goals:  -What are your overall health goals? Lose weight, walk more   -What brings you strength? Grandchildren, family   -What activities are important to you? Sew, paint, walk       Patient & I talked about B E S T  Surgery       Breathing exercises    Patient was encouraged to begin lung exercises today  This could be accomplished through deep breathing and cough exercises  Patient was taught how to use an incentive spirometer  Eating/nutrition   Encouraged patient to increase oral protein intake prior to surgery  This can be accomplished by consuming chicken, fish, tuna fish, cottage cheese, cheese, eggs, [place] yogurt, and protein shakes as needed  Sleep/Stress management  Patient was encouraged to rest their body prior to surgery  Encouraged attempting to get 8 hours of sleep at night  Training exercises  Patient was encouraged to remain active as possible  Incentive spirometer teaching completed, instructed to do 30  breathes 1 x a day, IS sent home with patient   Surgical soap was given  B E S T   Shirt was given

## 2023-05-22 ENCOUNTER — APPOINTMENT (OUTPATIENT)
Dept: LAB | Facility: CLINIC | Age: 73
End: 2023-05-22

## 2023-05-22 DIAGNOSIS — I10 BENIGN ESSENTIAL HTN: ICD-10-CM

## 2023-05-22 DIAGNOSIS — Z01.818 PRE-OP EXAMINATION: ICD-10-CM

## 2023-05-22 DIAGNOSIS — E78.2 MIXED HYPERLIPIDEMIA: ICD-10-CM

## 2023-05-22 DIAGNOSIS — M16.11 PRIMARY OSTEOARTHRITIS OF ONE HIP, RIGHT: ICD-10-CM

## 2023-05-22 DIAGNOSIS — E66.9 DIABETES MELLITUS TYPE 2 IN OBESE (HCC): ICD-10-CM

## 2023-05-22 DIAGNOSIS — E11.69 DIABETES MELLITUS TYPE 2 IN OBESE (HCC): ICD-10-CM

## 2023-05-22 LAB
ALBUMIN SERPL BCP-MCNC: 3.8 G/DL (ref 3.5–5)
ALP SERPL-CCNC: 64 U/L (ref 46–116)
ALT SERPL W P-5'-P-CCNC: 37 U/L (ref 12–78)
ANION GAP SERPL CALCULATED.3IONS-SCNC: 1 MMOL/L (ref 4–13)
APTT PPP: 30 SECONDS (ref 23–37)
AST SERPL W P-5'-P-CCNC: 23 U/L (ref 5–45)
BASOPHILS # BLD AUTO: 0.03 THOUSANDS/ÂΜL (ref 0–0.1)
BASOPHILS NFR BLD AUTO: 1 % (ref 0–1)
BILIRUB SERPL-MCNC: 0.48 MG/DL (ref 0.2–1)
BUN SERPL-MCNC: 18 MG/DL (ref 5–25)
CALCIUM SERPL-MCNC: 9.1 MG/DL (ref 8.3–10.1)
CHLORIDE SERPL-SCNC: 103 MMOL/L (ref 96–108)
CHOLEST SERPL-MCNC: 99 MG/DL
CO2 SERPL-SCNC: 29 MMOL/L (ref 21–32)
CREAT SERPL-MCNC: 0.68 MG/DL (ref 0.6–1.3)
CREAT UR-MCNC: 275 MG/DL
CRP SERPL QL: <3 MG/L
EOSINOPHIL # BLD AUTO: 0.09 THOUSAND/ÂΜL (ref 0–0.61)
EOSINOPHIL NFR BLD AUTO: 2 % (ref 0–6)
ERYTHROCYTE [DISTWIDTH] IN BLOOD BY AUTOMATED COUNT: 12.9 % (ref 11.6–15.1)
EST. AVERAGE GLUCOSE BLD GHB EST-MCNC: 126 MG/DL
FERRITIN SERPL-MCNC: 31 NG/ML (ref 11–307)
GFR SERPL CREATININE-BSD FRML MDRD: 85 ML/MIN/1.73SQ M
GLUCOSE P FAST SERPL-MCNC: 96 MG/DL (ref 65–99)
HBA1C MFR BLD: 6 %
HCT VFR BLD AUTO: 38.7 % (ref 34.8–46.1)
HDLC SERPL-MCNC: 56 MG/DL
HGB BLD-MCNC: 12.6 G/DL (ref 11.5–15.4)
IMM GRANULOCYTES # BLD AUTO: 0.02 THOUSAND/UL (ref 0–0.2)
IMM GRANULOCYTES NFR BLD AUTO: 1 % (ref 0–2)
INR PPP: 0.98 (ref 0.84–1.19)
IRON SATN MFR SERPL: 23 % (ref 15–50)
IRON SATN MFR SERPL: 24 % (ref 15–50)
IRON SERPL-MCNC: 78 UG/DL (ref 50–170)
IRON SERPL-MCNC: 85 UG/DL (ref 50–170)
LDLC SERPL CALC-MCNC: 24 MG/DL (ref 0–100)
LYMPHOCYTES # BLD AUTO: 1.41 THOUSANDS/ÂΜL (ref 0.6–4.47)
LYMPHOCYTES NFR BLD AUTO: 34 % (ref 14–44)
MCH RBC QN AUTO: 28.6 PG (ref 26.8–34.3)
MCHC RBC AUTO-ENTMCNC: 32.6 G/DL (ref 31.4–37.4)
MCV RBC AUTO: 88 FL (ref 82–98)
MICROALBUMIN UR-MCNC: 42 MG/L (ref 0–20)
MICROALBUMIN/CREAT 24H UR: 15 MG/G CREATININE (ref 0–30)
MONOCYTES # BLD AUTO: 0.39 THOUSAND/ÂΜL (ref 0.17–1.22)
MONOCYTES NFR BLD AUTO: 9 % (ref 4–12)
NEUTROPHILS # BLD AUTO: 2.24 THOUSANDS/ÂΜL (ref 1.85–7.62)
NEUTS SEG NFR BLD AUTO: 53 % (ref 43–75)
NONHDLC SERPL-MCNC: 43 MG/DL
NRBC BLD AUTO-RTO: 0 /100 WBCS
PLATELET # BLD AUTO: 213 THOUSANDS/UL (ref 149–390)
PMV BLD AUTO: 11 FL (ref 8.9–12.7)
POTASSIUM SERPL-SCNC: 3.4 MMOL/L (ref 3.5–5.3)
PROT SERPL-MCNC: 6.8 G/DL (ref 6.4–8.4)
PROTHROMBIN TIME: 13.2 SECONDS (ref 11.6–14.5)
RBC # BLD AUTO: 4.41 MILLION/UL (ref 3.81–5.12)
RETICS # AUTO: NORMAL 10*3/UL (ref 14097–95744)
RETICS # CALC: 1.03 % (ref 0.37–1.87)
SODIUM SERPL-SCNC: 133 MMOL/L (ref 135–147)
TIBC SERPL-MCNC: 346 UG/DL (ref 250–450)
TIBC SERPL-MCNC: 356 UG/DL (ref 250–450)
TRIGL SERPL-MCNC: 93 MG/DL
TSH SERPL DL<=0.05 MIU/L-ACNC: 0.59 UIU/ML (ref 0.45–4.5)
WBC # BLD AUTO: 4.18 THOUSAND/UL (ref 4.31–10.16)

## 2023-05-23 ENCOUNTER — LAB REQUISITION (OUTPATIENT)
Dept: LAB | Facility: HOSPITAL | Age: 73
End: 2023-05-23

## 2023-05-23 DIAGNOSIS — M16.11 UNILATERAL PRIMARY OSTEOARTHRITIS, RIGHT HIP: ICD-10-CM

## 2023-05-23 LAB
ABO GROUP BLD: NORMAL
BLD GP AB SCN SERPL QL: NEGATIVE
FERRITIN SERPL-MCNC: 37 NG/ML (ref 11–307)
RH BLD: POSITIVE
SPECIMEN EXPIRATION DATE: NORMAL

## 2023-05-24 ENCOUNTER — TELEPHONE (OUTPATIENT)
Dept: OBGYN CLINIC | Facility: HOSPITAL | Age: 73
End: 2023-05-24

## 2023-05-30 RX ORDER — ACETAMINOPHEN 500 MG
500-1000 TABLET ORAL EVERY 6 HOURS PRN
Status: ON HOLD | COMMUNITY
End: 2023-06-19 | Stop reason: SDUPTHER

## 2023-05-30 NOTE — PRE-PROCEDURE INSTRUCTIONS
Pre-Surgery Instructions:   Medication Instructions   • acetaminophen (TYLENOL) 500 mg tablet Uses PRN- OK to take day of surgery   • ascorbic acid (VITAMIN C) 500 mg tablet Hold day of surgery  • benazepril (LOTENSIN) 20 mg tablet Hold day of surgery  • cholecalciferol (VITAMIN D3) 1,000 units tablet Stop taking 7 days prior to surgery  • cyclobenzaprine (FLEXERIL) 10 mg tablet Take night before surgery   • diclofenac potassium (CATAFLAM) 50 mg tablet Stop taking 3 days prior to surgery  • DULoxetine (CYMBALTA) 60 mg delayed release capsule Take day of surgery  • ferrous sulfate 324 (65 Fe) mg Hold day of surgery  • folic acid (FOLVITE) 1 mg tablet Hold day of surgery  • gabapentin (Neurontin) 600 MG tablet Take day of surgery  • hydrochlorothiazide (HYDRODIURIL) 12 5 mg tablet Hold day of surgery  • Multiple Vitamins-Minerals (One-Daily Multi-Vit/Mineral) TABS Hold day of surgery  • omeprazole (PriLOSEC) 10 mg delayed release capsule Take day of surgery  • rosuvastatin (CRESTOR) 10 MG tablet Take day of surgery  • SUPER B COMPLEX/C PO Stop taking 7 days prior to surgery  Medication instructions for day surgery reviewed  Please use only a sip of water to take your instructed medications  Avoid all over the counter vitamins, supplements and NSAIDS for one week prior to surgery per anesthesia guidelines  Tylenol is ok to take as needed  You will receive a call one business day prior to surgery with an arrival time and hospital directions  If your surgery is scheduled on a Monday, the hospital will be calling you on the Friday prior to your surgery  If you have not heard from anyone by 8pm, please call the hospital supervisor through the hospital  at [phone]  Myles Krabbe [phone])  Do not eat or drink anything after midnight the night before your surgery, including candy, mints, lifesavers, or chewing gum  Do not drink alcohol 24hrs before your surgery   Try not to smoke at least 24hrs before your surgery  Follow the pre surgery showering instructions as listed in the [facility] Surgical Experience Booklet” or otherwise provided by your surgeon's office  Do not shave the surgical area 24 hours before surgery  Do not apply any lotions, creams, including makeup, cologne, deodorant, or perfumes after showering on the day of your surgery  No contact lenses, eye make-up, or artificial eyelashes  Remove nail polish, including gel polish, and any artificial, gel, or acrylic nails if possible  Remove all jewelry including rings and body piercing jewelry  Wear causal clothing that is easy to take on and off  Consider your type of surgery  Keep any valuables, jewelry, piercings at home  Please bring any specially ordered equipment (sling, braces) if indicated  Arrange for a responsible person to drive you to and from the hospital on the day of your surgery  Visitor Guidelines discussed  Call the surgeon's office with any new illnesses, exposures, or additional questions prior to surgery  Please reference your [facility] Surgical Experience Booklet” for additional information to prepare for your upcoming surgery

## 2023-05-31 ENCOUNTER — APPOINTMENT (OUTPATIENT)
Dept: LAB | Facility: CLINIC | Age: 73
End: 2023-05-31
Payer: MEDICARE

## 2023-05-31 ENCOUNTER — OFFICE VISIT (OUTPATIENT)
Dept: INTERNAL MEDICINE CLINIC | Facility: CLINIC | Age: 73
End: 2023-05-31

## 2023-05-31 VITALS
OXYGEN SATURATION: 96 % | RESPIRATION RATE: 16 BRPM | DIASTOLIC BLOOD PRESSURE: 78 MMHG | HEIGHT: 62 IN | BODY MASS INDEX: 33.01 KG/M2 | SYSTOLIC BLOOD PRESSURE: 126 MMHG | TEMPERATURE: 98.3 F | HEART RATE: 91 BPM | WEIGHT: 179.4 LBS

## 2023-05-31 DIAGNOSIS — K76.0 FATTY LIVER: ICD-10-CM

## 2023-05-31 DIAGNOSIS — E87.1 HYPONATREMIA: ICD-10-CM

## 2023-05-31 DIAGNOSIS — E87.6 HYPOKALEMIA: ICD-10-CM

## 2023-05-31 DIAGNOSIS — E11.69 DIABETES MELLITUS TYPE 2 IN OBESE (HCC): ICD-10-CM

## 2023-05-31 DIAGNOSIS — M79.7 FIBROMYALGIA: ICD-10-CM

## 2023-05-31 DIAGNOSIS — M16.11 PRIMARY OSTEOARTHRITIS OF RIGHT HIP: ICD-10-CM

## 2023-05-31 DIAGNOSIS — Z01.818 PREOPERATIVE CLEARANCE: Primary | ICD-10-CM

## 2023-05-31 DIAGNOSIS — I10 BENIGN ESSENTIAL HTN: ICD-10-CM

## 2023-05-31 DIAGNOSIS — E78.2 MIXED HYPERLIPIDEMIA: ICD-10-CM

## 2023-05-31 DIAGNOSIS — M17.0 PRIMARY OSTEOARTHRITIS OF BOTH KNEES: ICD-10-CM

## 2023-05-31 DIAGNOSIS — E66.9 DIABETES MELLITUS TYPE 2 IN OBESE (HCC): ICD-10-CM

## 2023-05-31 PROBLEM — M46.1 SACROILIITIS (HCC): Status: RESOLVED | Noted: 2018-08-20 | Resolved: 2023-05-31

## 2023-05-31 LAB
ANION GAP SERPL CALCULATED.3IONS-SCNC: 3 MMOL/L (ref 4–13)
BUN SERPL-MCNC: 21 MG/DL (ref 5–25)
CALCIUM SERPL-MCNC: 9.1 MG/DL (ref 8.3–10.1)
CHLORIDE SERPL-SCNC: 108 MMOL/L (ref 96–108)
CO2 SERPL-SCNC: 26 MMOL/L (ref 21–32)
CREAT SERPL-MCNC: 0.7 MG/DL (ref 0.6–1.3)
GFR SERPL CREATININE-BSD FRML MDRD: 85 ML/MIN/1.73SQ M
GLUCOSE SERPL-MCNC: 124 MG/DL (ref 65–140)
POTASSIUM SERPL-SCNC: 4.1 MMOL/L (ref 3.5–5.3)
SODIUM SERPL-SCNC: 137 MMOL/L (ref 135–147)

## 2023-05-31 PROCEDURE — 80048 BASIC METABOLIC PNL TOTAL CA: CPT

## 2023-05-31 PROCEDURE — 36415 COLL VENOUS BLD VENIPUNCTURE: CPT

## 2023-05-31 RX ORDER — DICLOFENAC POTASSIUM 50 MG/1
50 TABLET, FILM COATED ORAL 2 TIMES DAILY PRN
Qty: 60 TABLET | Refills: 1 | Status: SHIPPED | OUTPATIENT
Start: 2023-05-31

## 2023-05-31 NOTE — ASSESSMENT & PLAN NOTE
-DM2 in obese  A1c well controlled with diet    Lab Results   Component Value Date    HGBA1C 6 0 (H) 05/22/2023   -on benazepril

## 2023-05-31 NOTE — PROGRESS NOTES
Assessment/Plan:    Problem List Items Addressed This Visit        Digestive    Fatty liver       Endocrine    Diabetes mellitus type 2 in obese ([facility] [address] )     -DM2 in obese  A1c well controlled with diet  Lab Results   Component Value Date    HGBA1C 6 0 (H) 05/22/2023   -on benazepril         Relevant Orders    Hemoglobin A1C    Comprehensive metabolic panel       Cardiovascular and Mediastinum    Benign essential HTN     -controlled  -pt to hold benazepril and hctz on day of procedure         Relevant Orders    Comprehensive metabolic panel    CBC       Musculoskeletal and Integument    Primary osteoarthritis of right hip     -failed CSI  -stop diclofenac 7d prior to surgery  -for R MYAH on 6/19/23 by Dr Bree Rossi            Other    Fibromyalgia     -continue duloxetine 60mg daily and gabapentin 600mg TID periop         Mixed hyperlipidemia     -with fatty liver  -LDL well controlled  -continue rosuvastatin 10mg daily periop        Other Visit Diagnoses     Preoperative clearance    -  Primary    -pt is low risk of developing a major adverse clinical event for intermediate risk hip surgery  Hyponatremia        -mild  -repeat nonfasting BMP    Relevant Orders    Basic metabolic panel    Hypokalemia        -mild  -pt on diuretic and ACEI  -repeat BMP now    Relevant Orders    Basic metabolic panel          Subjective:      Patient ID: Miguelina Sol is a 74 y o  female  HPI  73yo female with HTN, R hip OA, DM2, HLD, fibromyalgia, cervical radiculopathy with arthritis and stenosis, lumbar disc herniation, MDD, knee OA here for preop evaluation  She is accompanied by her   Miguelina Sol is a 74 y o  female who presents to the office today for a preoperative consultation at the request of surgeon Dr Bree Rossi who plans on performing R MYAH on June 19  Type of anesthesia to be used spinal   Prior anesthesia adverse reactions no  She received CSI in R hip      Exercise capacity - Able to walk 4 blocks or climb 2 flights of stairs without symptoms - 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  She does not walk blocks due to pain  Easy bleeding/bruising - yes on diclofenac    Chest pain - no    Dyspnea, wheezing, cough - no    Sleep apnea - no    Lifestyle:   reports current alcohol use  reports that she has never smoked  She has never used smokeless tobacco    reports no history of drug use  The following portions of the patient's history were reviewed and updated as appropriate: allergies, current medications, past family history, past medical history, past social history, past surgical history and problem list       Current Outpatient Medications:   •  acetaminophen (TYLENOL) 500 mg tablet, Take 500-1,000 mg by mouth every 6 (six) hours as needed for mild pain, Disp: , Rfl:   •  ascorbic acid (VITAMIN C) 500 mg tablet, TAKE 1 TABLET (500 MG TOTAL) BY MOUTH DAILY BEGIN 30 DAYS PRIOR TO SURGERY , Disp: 90 tablet, Rfl: 1  •  benazepril (LOTENSIN) 20 mg tablet, TAKE 1 TABLET BY MOUTH EVERY DAY, Disp: 90 tablet, Rfl: 1  •  cholecalciferol (VITAMIN D3) 1,000 units tablet, Take 1,000 Units by mouth daily, Disp: , Rfl:   •  diclofenac potassium (CATAFLAM) 50 mg tablet, Take 1 tablet (50 mg total) by mouth 2 (two) times a day as needed (pain) Take with food  , Disp: 60 tablet, Rfl: 1  •  DULoxetine (CYMBALTA) 60 mg delayed release capsule, TAKE 1 CAPSULE BY MOUTH EVERY DAY, Disp: 90 capsule, Rfl: 2  •  ferrous sulfate 324 (65 Fe) mg, TAKE 1 TABLET (324 MG TOTAL) BY MOUTH DAILY BEFORE BREAKFAST BEGIN 30 DAYS PRIOR TO SURGERY , Disp: 90 tablet, Rfl: 1  •  folic acid (FOLVITE) 1 mg tablet, TAKE 1 TABLET (1 MG TOTAL) BY MOUTH DAILY BEGIN 30 DAYS PRIOR TO SURGERY , Disp: 90 tablet, Rfl: 1  •  gabapentin (Neurontin) 600 MG tablet, Take 1 tablet (600 mg total) by mouth 3 (three) times a day, Disp: 90 tablet, Rfl: 5  •  hydrochlorothiazide (HYDRODIURIL) 12 5 mg tablet, TAKE 1 TABLET BY MOUTH EVERY DAY, Disp: 90 tablet, Rfl: "1  •  Multiple Vitamins-Minerals (One-Daily Multi-Vit/Mineral) TABS, TAKE 1 TABLET BY MOUTH DAILY BEGIN 30 DAYS PRIOR TO SURGERY , Disp: 90 tablet, Rfl: 1  •  omeprazole (PriLOSEC) 10 mg delayed release capsule, Take 10 mg by mouth daily  , Disp: , Rfl:   •  rosuvastatin (CRESTOR) 10 MG tablet, TAKE 1 TABLET BY MOUTH EVERY DAY, Disp: 90 tablet, Rfl: 1  •  SUPER B COMPLEX/C PO, Take 1 tablet by mouth daily  , Disp: , Rfl:   •  cyclobenzaprine (FLEXERIL) 10 mg tablet, Take 1 tablet (10 mg total) by mouth daily at bedtime, Disp: 30 tablet, Rfl: 5  •  lidocaine (Lidoderm) 5 %, Apply 1 patch topically daily Remove & Discard patch within 12 hours or as directed by MD (Patient not taking: Reported on 5/30/2023), Disp: 30 patch, Rfl: 2    Review of Systems   Constitutional: Positive for activity change  Negative for appetite change and unexpected weight change  HENT: Positive for congestion, postnasal drip and rhinorrhea  Respiratory: Negative for cough, chest tightness, shortness of breath and wheezing  Cardiovascular: Negative for chest pain, palpitations and leg swelling  Gastrointestinal: Negative for abdominal pain, diarrhea and nausea  Genitourinary: Negative for difficulty urinating  Musculoskeletal: Positive for arthralgias and gait problem  Neurological: Negative for dizziness, numbness and headaches  Objective:    /78 (BP Location: Right arm, Patient Position: Sitting, Cuff Size: Standard)   Pulse 91   Temp 98 3 °F (36 8 °C)   Resp 16   Ht 5' 2\" (1 575 m)   Wt 81 4 kg (179 lb 6 4 oz)   SpO2 96%   BMI 32 81 kg/m²      Physical Exam  Vitals reviewed  Constitutional:       General: She is not in acute distress  Appearance: She is obese  HENT:      Head: Normocephalic  Mouth/Throat:      Comments: Wears dentures  Eyes:      Comments: Wears glasses   Cardiovascular:      Rate and Rhythm: Normal rate and regular rhythm  Pulses: Normal pulses        Heart sounds: " Normal heart sounds  Abdominal:      General: Bowel sounds are normal  There is no distension  Palpations: Abdomen is soft  Tenderness: There is no abdominal tenderness  Musculoskeletal:      Cervical back: Neck supple  Right hip: Decreased range of motion  Normal strength  Left hip: Normal strength  Right lower leg: Edema (trace) present  Left lower leg: Edema (trace) present  Lymphadenopathy:      Cervical: No cervical adenopathy  Neurological:      Mental Status: She is alert  Recent Results (from the past 504 hour(s))   Hemoglobin A1C    Collection Time: 05/22/23  9:34 AM   Result Value Ref Range    Hemoglobin A1C 6 0 (H) Normal 3 8-5 6%; PreDiabetic 5 7-6 4%;  Diabetic >=6 5%; Glycemic control for adults with diabetes <7 0% %     mg/dl   Microalbumin / creatinine urine ratio    Collection Time: 05/22/23  9:34 AM   Result Value Ref Range    Creatinine, Ur 275 0 mg/dL    Albumin,U,Random 42 0 (H) 0 0 - 20 0 mg/L    Albumin Creat Ratio 15 0 - 30 mg/g creatinine   Lipid panel    Collection Time: 05/22/23  9:34 AM   Result Value Ref Range    Cholesterol 99 See Comment mg/dL    Triglycerides 93 See Comment mg/dL    HDL, Direct 56 >=50 mg/dL    LDL Calculated 24 0 - 100 mg/dL    Non-HDL-Chol (CHOL-HDL) 43 mg/dl   Comprehensive metabolic panel    Collection Time: 05/22/23  9:34 AM   Result Value Ref Range    Sodium 133 (L) 135 - 147 mmol/L    Potassium 3 4 (L) 3 5 - 5 3 mmol/L    Chloride 103 96 - 108 mmol/L    CO2 29 21 - 32 mmol/L    ANION GAP 1 (L) 4 - 13 mmol/L    BUN 18 5 - 25 mg/dL    Creatinine 0 68 0 60 - 1 30 mg/dL    Glucose, Fasting 96 65 - 99 mg/dL    Calcium 9 1 8 3 - 10 1 mg/dL    AST 23 5 - 45 U/L    ALT 37 12 - 78 U/L    Alkaline Phosphatase 64 46 - 116 U/L    Total Protein 6 8 6 4 - 8 4 g/dL    Albumin 3 8 3 5 - 5 0 g/dL    Total Bilirubin 0 48 0 20 - 1 00 mg/dL    eGFR 85 ml/min/1 73sq m   TSH, 3rd generation with Free T4 reflex    Collection Time: 05/22/23  9:34 AM   Result Value Ref Range    TSH 3RD GENERATON 0 586 0 450 - 4 500 uIU/mL   CBC and differential    Collection Time: 05/22/23  9:34 AM   Result Value Ref Range    WBC 4 18 (L) 4 31 - 10 16 Thousand/uL    RBC 4 41 3 81 - 5 12 Million/uL    Hemoglobin 12 6 11 5 - 15 4 g/dL    Hematocrit 38 7 34 8 - 46 1 %    MCV 88 82 - 98 fL    MCH 28 6 26 8 - 34 3 pg    MCHC 32 6 31 4 - 37 4 g/dL    RDW 12 9 11 6 - 15 1 %    MPV 11 0 8 9 - 12 7 fL    Platelets 700 821 - 367 Thousands/uL    nRBC 0 /100 WBCs    Neutrophils Relative 53 43 - 75 %    Immat GRANS % 1 0 - 2 %    Lymphocytes Relative 34 14 - 44 %    Monocytes Relative 9 4 - 12 %    Eosinophils Relative 2 0 - 6 %    Basophils Relative 1 0 - 1 %    Neutrophils Absolute 2 24 1 85 - 7 62 Thousands/µL    Immature Grans Absolute 0 02 0 00 - 0 20 Thousand/uL    Lymphocytes Absolute 1 41 0 60 - 4 47 Thousands/µL    Monocytes Absolute 0 39 0 17 - 1 22 Thousand/µL    Eosinophils Absolute 0 09 0 00 - 0 61 Thousand/µL    Basophils Absolute 0 03 0 00 - 0 10 Thousands/µL   C-reactive protein    Collection Time: 05/22/23  9:34 AM   Result Value Ref Range    CRP <3 0 <3 0 mg/L   Protime-INR    Collection Time: 05/22/23  9:34 AM   Result Value Ref Range    Protime 13 2 11 6 - 14 5 seconds    INR 0 98 0 84 - 1 19   APTT    Collection Time: 05/22/23  9:34 AM   Result Value Ref Range    PTT 30 23 - 37 seconds   Iron Saturation %    Collection Time: 05/22/23  9:34 AM   Result Value Ref Range    Iron Saturation 24 15 - 50 %    TIBC 356 250 - 450 ug/dL    Iron 85 50 - 170 ug/dL   Ferritin    Collection Time: 05/22/23  9:34 AM   Result Value Ref Range    Ferritin 31 11 - 307 ng/mL   Iron Saturation %    Collection Time: 05/22/23  9:34 AM   Result Value Ref Range    Iron Saturation 23 15 - 50 %    TIBC 346 250 - 450 ug/dL    Iron 78 50 - 170 ug/dL   Ferritin    Collection Time: 05/22/23  9:34 AM   Result Value Ref Range    Ferritin 37 11 - 307 ng/mL   Reticulocytes    Collection Time: 05/22/23  9:34 AM   Result Value Ref Range    Retic Ct Abs 45,500 14,097 - 95,744    Retic Ct Pct 1 03 0 37 - 1 87 %   Type and screen    Collection Time: 05/22/23  9:34 AM   Result Value Ref Range    ABO Grouping B     Rh Factor Positive     Antibody Screen Negative     Specimen Expiration Date [id number]      Recent Results (from the past 88300 hour(s))   POCT ECG    Impression    Sinus rhythm  82 bpm  Normal EKG

## 2023-05-31 NOTE — H&P (VIEW-ONLY)
Assessment/Plan:    Problem List Items Addressed This Visit        Digestive    Fatty liver       Endocrine    Diabetes mellitus type 2 in obese ([facility] [address] )     -DM2 in obese  A1c well controlled with diet  Lab Results   Component Value Date    HGBA1C 6 0 (H) 05/22/2023   -on benazepril         Relevant Orders    Hemoglobin A1C    Comprehensive metabolic panel       Cardiovascular and Mediastinum    Benign essential HTN     -controlled  -pt to hold benazepril and hctz on day of procedure         Relevant Orders    Comprehensive metabolic panel    CBC       Musculoskeletal and Integument    Primary osteoarthritis of right hip     -failed CSI  -stop diclofenac 7d prior to surgery  -for R MYAH on 6/19/23 by Dr Tree Oneal            Other    Fibromyalgia     -continue duloxetine 60mg daily and gabapentin 600mg TID periop         Mixed hyperlipidemia     -with fatty liver  -LDL well controlled  -continue rosuvastatin 10mg daily periop        Other Visit Diagnoses     Preoperative clearance    -  Primary    -pt is low risk of developing a major adverse clinical event for intermediate risk hip surgery  Hyponatremia        -mild  -repeat nonfasting BMP    Relevant Orders    Basic metabolic panel    Hypokalemia        -mild  -pt on diuretic and ACEI  -repeat BMP now    Relevant Orders    Basic metabolic panel          Subjective:      Patient ID: Val Mccallum is a 74 y o  female  HPI  73yo female with HTN, R hip OA, DM2, HLD, fibromyalgia, cervical radiculopathy with arthritis and stenosis, lumbar disc herniation, MDD, knee OA here for preop evaluation  She is accompanied by her   Val Mccallum is a 74 y o  female who presents to the office today for a preoperative consultation at the request of surgeon Dr Tree Oneal who plans on performing R MYAH on June 19  Type of anesthesia to be used spinal   Prior anesthesia adverse reactions no  She received CSI in R hip      Exercise capacity - Able to walk 4 blocks or climb 2 flights of stairs without symptoms - 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  She does not walk blocks due to pain  Easy bleeding/bruising - yes on diclofenac    Chest pain - no    Dyspnea, wheezing, cough - no    Sleep apnea - no    Lifestyle:   reports current alcohol use  reports that she has never smoked  She has never used smokeless tobacco    reports no history of drug use  The following portions of the patient's history were reviewed and updated as appropriate: allergies, current medications, past family history, past medical history, past social history, past surgical history and problem list       Current Outpatient Medications:   •  acetaminophen (TYLENOL) 500 mg tablet, Take 500-1,000 mg by mouth every 6 (six) hours as needed for mild pain, Disp: , Rfl:   •  ascorbic acid (VITAMIN C) 500 mg tablet, TAKE 1 TABLET (500 MG TOTAL) BY MOUTH DAILY BEGIN 30 DAYS PRIOR TO SURGERY , Disp: 90 tablet, Rfl: 1  •  benazepril (LOTENSIN) 20 mg tablet, TAKE 1 TABLET BY MOUTH EVERY DAY, Disp: 90 tablet, Rfl: 1  •  cholecalciferol (VITAMIN D3) 1,000 units tablet, Take 1,000 Units by mouth daily, Disp: , Rfl:   •  diclofenac potassium (CATAFLAM) 50 mg tablet, Take 1 tablet (50 mg total) by mouth 2 (two) times a day as needed (pain) Take with food  , Disp: 60 tablet, Rfl: 1  •  DULoxetine (CYMBALTA) 60 mg delayed release capsule, TAKE 1 CAPSULE BY MOUTH EVERY DAY, Disp: 90 capsule, Rfl: 2  •  ferrous sulfate 324 (65 Fe) mg, TAKE 1 TABLET (324 MG TOTAL) BY MOUTH DAILY BEFORE BREAKFAST BEGIN 30 DAYS PRIOR TO SURGERY , Disp: 90 tablet, Rfl: 1  •  folic acid (FOLVITE) 1 mg tablet, TAKE 1 TABLET (1 MG TOTAL) BY MOUTH DAILY BEGIN 30 DAYS PRIOR TO SURGERY , Disp: 90 tablet, Rfl: 1  •  gabapentin (Neurontin) 600 MG tablet, Take 1 tablet (600 mg total) by mouth 3 (three) times a day, Disp: 90 tablet, Rfl: 5  •  hydrochlorothiazide (HYDRODIURIL) 12 5 mg tablet, TAKE 1 TABLET BY MOUTH EVERY DAY, Disp: 90 tablet, Rfl: "1  •  Multiple Vitamins-Minerals (One-Daily Multi-Vit/Mineral) TABS, TAKE 1 TABLET BY MOUTH DAILY BEGIN 30 DAYS PRIOR TO SURGERY , Disp: 90 tablet, Rfl: 1  •  omeprazole (PriLOSEC) 10 mg delayed release capsule, Take 10 mg by mouth daily  , Disp: , Rfl:   •  rosuvastatin (CRESTOR) 10 MG tablet, TAKE 1 TABLET BY MOUTH EVERY DAY, Disp: 90 tablet, Rfl: 1  •  SUPER B COMPLEX/C PO, Take 1 tablet by mouth daily  , Disp: , Rfl:   •  cyclobenzaprine (FLEXERIL) 10 mg tablet, Take 1 tablet (10 mg total) by mouth daily at bedtime, Disp: 30 tablet, Rfl: 5  •  lidocaine (Lidoderm) 5 %, Apply 1 patch topically daily Remove & Discard patch within 12 hours or as directed by MD (Patient not taking: Reported on 5/30/2023), Disp: 30 patch, Rfl: 2    Review of Systems   Constitutional: Positive for activity change  Negative for appetite change and unexpected weight change  HENT: Positive for congestion, postnasal drip and rhinorrhea  Respiratory: Negative for cough, chest tightness, shortness of breath and wheezing  Cardiovascular: Negative for chest pain, palpitations and leg swelling  Gastrointestinal: Negative for abdominal pain, diarrhea and nausea  Genitourinary: Negative for difficulty urinating  Musculoskeletal: Positive for arthralgias and gait problem  Neurological: Negative for dizziness, numbness and headaches  Objective:    /78 (BP Location: Right arm, Patient Position: Sitting, Cuff Size: Standard)   Pulse 91   Temp 98 3 °F (36 8 °C)   Resp 16   Ht 5' 2\" (1 575 m)   Wt 81 4 kg (179 lb 6 4 oz)   SpO2 96%   BMI 32 81 kg/m²      Physical Exam  Vitals reviewed  Constitutional:       General: She is not in acute distress  Appearance: She is obese  HENT:      Head: Normocephalic  Mouth/Throat:      Comments: Wears dentures  Eyes:      Comments: Wears glasses   Cardiovascular:      Rate and Rhythm: Normal rate and regular rhythm  Pulses: Normal pulses        Heart sounds: " Normal heart sounds  Abdominal:      General: Bowel sounds are normal  There is no distension  Palpations: Abdomen is soft  Tenderness: There is no abdominal tenderness  Musculoskeletal:      Cervical back: Neck supple  Right hip: Decreased range of motion  Normal strength  Left hip: Normal strength  Right lower leg: Edema (trace) present  Left lower leg: Edema (trace) present  Lymphadenopathy:      Cervical: No cervical adenopathy  Neurological:      Mental Status: She is alert  Recent Results (from the past 504 hour(s))   Hemoglobin A1C    Collection Time: 05/22/23  9:34 AM   Result Value Ref Range    Hemoglobin A1C 6 0 (H) Normal 3 8-5 6%; PreDiabetic 5 7-6 4%;  Diabetic >=6 5%; Glycemic control for adults with diabetes <7 0% %     mg/dl   Microalbumin / creatinine urine ratio    Collection Time: 05/22/23  9:34 AM   Result Value Ref Range    Creatinine, Ur 275 0 mg/dL    Albumin,U,Random 42 0 (H) 0 0 - 20 0 mg/L    Albumin Creat Ratio 15 0 - 30 mg/g creatinine   Lipid panel    Collection Time: 05/22/23  9:34 AM   Result Value Ref Range    Cholesterol 99 See Comment mg/dL    Triglycerides 93 See Comment mg/dL    HDL, Direct 56 >=50 mg/dL    LDL Calculated 24 0 - 100 mg/dL    Non-HDL-Chol (CHOL-HDL) 43 mg/dl   Comprehensive metabolic panel    Collection Time: 05/22/23  9:34 AM   Result Value Ref Range    Sodium 133 (L) 135 - 147 mmol/L    Potassium 3 4 (L) 3 5 - 5 3 mmol/L    Chloride 103 96 - 108 mmol/L    CO2 29 21 - 32 mmol/L    ANION GAP 1 (L) 4 - 13 mmol/L    BUN 18 5 - 25 mg/dL    Creatinine 0 68 0 60 - 1 30 mg/dL    Glucose, Fasting 96 65 - 99 mg/dL    Calcium 9 1 8 3 - 10 1 mg/dL    AST 23 5 - 45 U/L    ALT 37 12 - 78 U/L    Alkaline Phosphatase 64 46 - 116 U/L    Total Protein 6 8 6 4 - 8 4 g/dL    Albumin 3 8 3 5 - 5 0 g/dL    Total Bilirubin 0 48 0 20 - 1 00 mg/dL    eGFR 85 ml/min/1 73sq m   TSH, 3rd generation with Free T4 reflex    Collection Time: 05/22/23  9:34 AM   Result Value Ref Range    TSH 3RD GENERATON 0 586 0 450 - 4 500 uIU/mL   CBC and differential    Collection Time: 05/22/23  9:34 AM   Result Value Ref Range    WBC 4 18 (L) 4 31 - 10 16 Thousand/uL    RBC 4 41 3 81 - 5 12 Million/uL    Hemoglobin 12 6 11 5 - 15 4 g/dL    Hematocrit 38 7 34 8 - 46 1 %    MCV 88 82 - 98 fL    MCH 28 6 26 8 - 34 3 pg    MCHC 32 6 31 4 - 37 4 g/dL    RDW 12 9 11 6 - 15 1 %    MPV 11 0 8 9 - 12 7 fL    Platelets 186 087 - 424 Thousands/uL    nRBC 0 /100 WBCs    Neutrophils Relative 53 43 - 75 %    Immat GRANS % 1 0 - 2 %    Lymphocytes Relative 34 14 - 44 %    Monocytes Relative 9 4 - 12 %    Eosinophils Relative 2 0 - 6 %    Basophils Relative 1 0 - 1 %    Neutrophils Absolute 2 24 1 85 - 7 62 Thousands/µL    Immature Grans Absolute 0 02 0 00 - 0 20 Thousand/uL    Lymphocytes Absolute 1 41 0 60 - 4 47 Thousands/µL    Monocytes Absolute 0 39 0 17 - 1 22 Thousand/µL    Eosinophils Absolute 0 09 0 00 - 0 61 Thousand/µL    Basophils Absolute 0 03 0 00 - 0 10 Thousands/µL   C-reactive protein    Collection Time: 05/22/23  9:34 AM   Result Value Ref Range    CRP <3 0 <3 0 mg/L   Protime-INR    Collection Time: 05/22/23  9:34 AM   Result Value Ref Range    Protime 13 2 11 6 - 14 5 seconds    INR 0 98 0 84 - 1 19   APTT    Collection Time: 05/22/23  9:34 AM   Result Value Ref Range    PTT 30 23 - 37 seconds   Iron Saturation %    Collection Time: 05/22/23  9:34 AM   Result Value Ref Range    Iron Saturation 24 15 - 50 %    TIBC 356 250 - 450 ug/dL    Iron 85 50 - 170 ug/dL   Ferritin    Collection Time: 05/22/23  9:34 AM   Result Value Ref Range    Ferritin 31 11 - 307 ng/mL   Iron Saturation %    Collection Time: 05/22/23  9:34 AM   Result Value Ref Range    Iron Saturation 23 15 - 50 %    TIBC 346 250 - 450 ug/dL    Iron 78 50 - 170 ug/dL   Ferritin    Collection Time: 05/22/23  9:34 AM   Result Value Ref Range    Ferritin 37 11 - 307 ng/mL   Reticulocytes    Collection Time: 05/22/23  9:34 AM   Result Value Ref Range    Retic Ct Abs 45,500 14,097 - 95,744    Retic Ct Pct 1 03 0 37 - 1 87 %   Type and screen    Collection Time: 05/22/23  9:34 AM   Result Value Ref Range    ABO Grouping B     Rh Factor Positive     Antibody Screen Negative     Specimen Expiration Date [id number]      Recent Results (from the past 31486 hour(s))   POCT ECG    Impression    Sinus rhythm  82 bpm  Normal EKG

## 2023-06-01 ENCOUNTER — TELEPHONE (OUTPATIENT)
Dept: OBGYN CLINIC | Facility: MEDICAL CENTER | Age: 73
End: 2023-06-01

## 2023-06-05 NOTE — TELEPHONE ENCOUNTER
Caller: Patient    Doctor: Yahaira Daly    Reason for call: Pt is calling back in regards to the IQ questionnaire she was asked to complete      Call back#: [phone]

## 2023-06-05 NOTE — TELEPHONE ENCOUNTER
Spoke with Pt stating the Patient IQ questionnaire is in regards to your hip   They will ask you questions about your pain/ everyday life  You will get the questionnaire now, 3,6,12 and 24 months after surgery to see how you are progressing  One of the girls will give you a call with in the next two days to complete the questionnaire  Pt states understanding and thank you

## 2023-06-05 NOTE — TELEPHONE ENCOUNTER
Alo Larsen,   Would you be able to advise this patient more regarding this questionnaire? Thank you!

## 2023-06-06 NOTE — TELEPHONE ENCOUNTER
"Preoperative Elective Admission Assessment-S/w pt x14m  Living Situation:  In a 2nd floor apartment, no elevator  To enter apartment must do steps, #7, landing then #7 more w/ handrails  #14 total steps  Pt reports she does \"pretty good with the steps\" now, encouraged to discuss stair training at pre-op PT appt  Encouraged to  throw rugs  Discussed pre-op bathing process  First Floor Setup: after steps to enter, everything is on 1 level     DME:has RW, has BSC  Patient's Current Level of Function: pt uses RW and cane depending on day and distancing  Independent with ADLs  Post-op Caregiver:      Post-op Transport:       Outpatient Physical Therapy Site:  Site: [facility]  pre and post-op appts scheduled? Y, will discuss with OP therapist about how to do steps/ pre-op stair training for increased confidence, encouraged to bring RW with to appt for sizing and pre-op training  Medication Managemen t:   [id number] [address]  Blood Management Vitamin Regimen:taking as prescribed, denies questions or concerns  Post-op anticoagulant: advised per OV note \"DVT prophylaxis:  BID x 6 weeks  \"     DC Plan: pt is agreeable for same day DC to home and to attend OP PT  May be considered for Shruti Reese post-op depending on level of function and ability to do steps into her apartment  Goal is OP PT if independent and able  Barriers to DC identified preoperatively: none     BMI:33 29    Patient Education:  Pt educated on post-op pain, early mobilization (POD0), same day DC, OP PT goal   Patient educated that our goal is to appropriately discharge patient based off their post-op function while striving to maintain maximal independence  The goal is to discharge patient to home and for them to attend outpatient physical therapy      Assigned to care team? Y    CHW/SW: N/A    Pt encouraged to call with any " questions, concerns or issues

## 2023-06-12 ENCOUNTER — EVALUATION (OUTPATIENT)
Dept: PHYSICAL THERAPY | Facility: REHABILITATION | Age: 73
End: 2023-06-12
Payer: MEDICARE

## 2023-06-12 DIAGNOSIS — M25.551 CHRONIC RIGHT HIP PAIN: Primary | ICD-10-CM

## 2023-06-12 DIAGNOSIS — G89.29 CHRONIC RIGHT HIP PAIN: Primary | ICD-10-CM

## 2023-06-12 PROCEDURE — 97162 PT EVAL MOD COMPLEX 30 MIN: CPT | Performed by: PHYSICAL THERAPIST

## 2023-06-12 PROCEDURE — 97112 NEUROMUSCULAR REEDUCATION: CPT | Performed by: PHYSICAL THERAPIST

## 2023-06-12 NOTE — PROGRESS NOTES
PT Evaluation     Today's date: 2023  Patient name: Jake Delcid  : 1950  MRN: [id number]  Referring provider: Chito Eric DO  Dx:   Encounter Diagnosis     ICD-10-CM    1  Chronic right hip pain  M25 551     G89 29                      Assessment  Assessment details: Jake Delcid is a pleasant 74 y o  female who presents for pre-operative evaluation for R MYAH anterior approach to be performed on 23  The patient has appropriate support system at home from her   She has knowledge of how to use a quad cane and standard walker to assist with mobility  She does have 1 flight of steps into the house, but no steps within the house  There are handrails on this flight of steps  Primary movement impairment diagnosis of R hip hypomobility, LE weakness  I spent a significant amount of time discussing post op expectations and rehabilitation process with the patient  I advised her on home management strategies for safety and mobility within her house  An initial HEP to help promote healing and limit neuromuscular inhibition was prescribed  A post-op follow up has been scheduled  Pt  will benefit from skilled PT services that includes manual therapy techniques to enhance tissue extensibility, neuromuscular re-education to facilitate motor control, therapeutic exercise to increase functional mobility, and modalities prn to reduce pain and inflammation  Impairments: abnormal gait, abnormal muscle firing, abnormal muscle tone, abnormal or restricted ROM, activity intolerance, impaired balance, impaired physical strength, lacks appropriate home exercise program, pain with function, weight-bearing intolerance and poor body mechanics    Symptom irritability: moderateUnderstanding of Dx/Px/POC: good   Prognosis: good    Goals  Impairment based goals  Patient will demonstrate full hip PROM  Patient will demonstrate >4/5 LE MMT  Patient will perform 5x STS in <15s     Patient will "perform TUG in <13s  Function based goals  Patient will be independent in comprehensive [facility] upon discharge  Patient will achieve goal FOTO score upon discharge  Patient will perform community ambulation without limitation  Patient will negotiate stairs without limitation  Patient will perform heavy chores at home without limitation  Plan  Patient would benefit from: skilled physical therapy  Planned therapy interventions: activity modification, joint mobilization, manual therapy, motor coordination training, neuromuscular re-education, patient education, self care, therapeutic activities, therapeutic exercise, graded activity, home exercise program, behavior modification, graded exercise, functional ROM exercises and strengthening  Frequency: 2x week  Duration in weeks: 12  Treatment plan discussed with: patient        Subjective Evaluation    History of Present Illness  Mechanism of injury: Patient plans to undergo R hip MYAH anterior approach on   She reports that her symptoms began approx 1 year ago  She began having difficulty with walking, negotiating stairs and bearing weight  She did receive a hip CSI at the time, which only provided slight relief  She also has B/L knee pain and plans to have B/L TKA when she is recovered from the hip surgery  She is limited with walking for extended periods and with performing heavy chores around the house  She has been using a quad cane and a standard walker for the past year for support while walking  She reports feeling more \"weak\" rather than off balance     Pain  Current pain ratin  At best pain ratin  At worst pain ratin  Quality: sharp, tight and radiating  Relieving factors: relaxation and change in position  Aggravating factors: stair climbing, walking, standing, sitting and lifting    Patient Goals  Patient goals for therapy: increased strength, independence with ADLs/IADLs, return to sport/leisure activities, increased motion and " decreased pain          Objective     General Comments:      Hip Comments   Hip Passive Range of Motion:   Flexion: 90 deg pain  Extension: 0 deg pain  Abduction: [facility]  ER @ 90: 20 deg pain  IR @ 90: 5 deg pain     5x STS: 30s    TUs; no AD    Balance:  Single limb stance:  R: unable    Gait: R antalgic; limited step length    Lower Quarter Screen:   Reflexes: intact B/L patellar  Dermatomes: intact to light touch  Myotomes: intact B/L    Manual Muscle Testing:   Hip Flexion (SLR):  R  3+/5    Hip Extension:  R  3-/5     Hip Abduction:  R  3-/5     Hip IR:    R  3-/5    Hip ER:   R  3-/5                                   Precautions: R MYAH anterior       Manuals                                                                 Neuro Re-Ed             Quad sets HEP            Glute sets HEP            SAQ             SLR             Sidelying hip abd             Bridge             Balance                          Education HEP and POC            Ther Ex             Nu-step             Heel slides HEP            PBall hamstring curl             LAQ             [address]             Heel raises                                       Ther Activity             [facility]             [facility]             STS                                       Gait Training                                       Modalities

## 2023-06-17 ENCOUNTER — ANESTHESIA EVENT (OUTPATIENT)
Dept: PERIOP | Facility: HOSPITAL | Age: 73
End: 2023-06-17
Payer: MEDICARE

## 2023-06-17 NOTE — ANESTHESIA PREPROCEDURE EVALUATION
Procedure:  ARTHROPLASTY HIP TOTAL ANTERIOR, SAME DAY DISCHARGE (Right: Hip)    Relevant Problems   CARDIO   (+) Benign essential HTN   (+) Mixed hyperlipidemia      ENDO   (+) Diabetes mellitus type 2 in obese (HCC)      GI/HEPATIC   (+) Fatty liver   (+) Gastroesophageal reflux disease without esophagitis      MUSCULOSKELETAL   (+) Cervical spine arthritis   (+) Chronic right-sided low back pain with right-sided sciatica   (+) Degenerative disc disease, cervical   (+) Fibromyalgia   (+) Lumbar spondylosis   (+) Pes anserine bursitis   (+) Primary osteoarthritis of both knees   (+) Primary osteoarthritis of right hip      NEURO/PSYCH   (+) Chronic right-sided low back pain with right-sided sciatica   (+) Current mild episode of major depressive disorder without prior episode (HCC)   (+) Fibromyalgia        Physical Exam    Airway    Mallampati score: II  TM Distance: >3 FB  Neck ROM: full     Dental       Cardiovascular      Pulmonary      Other Findings        Anesthesia Plan  ASA Score- 2     Anesthesia Type- spinal with ASA Monitors  Additional Monitors:   Airway Plan:           Plan Factors-Exercise tolerance (METS): >4 METS  Chart reviewed  EKG reviewed  Existing labs reviewed  Patient summary reviewed  Induction- intravenous  Postoperative Plan- Plan for postoperative opioid use  Informed Consent- Anesthetic plan and risks discussed with patient  I personally reviewed this patient with the CRNA  Discussed and agreed on the Anesthesia Plan with the CRNA             Lab Results   Component Value Date    HGBA1C 6 0 (H) 05/22/2023       Lab Results   Component Value Date    K 4 1 05/31/2023     05/31/2023    CO2 26 05/31/2023    BUN 21 05/31/2023    CREATININE 0 70 05/31/2023    GLUF 96 05/22/2023    CALCIUM 9 1 05/31/2023    AST 23 05/22/2023    ALT 37 05/22/2023    ALKPHOS 64 05/22/2023    EGFR 85 05/31/2023       Lab Results   Component Value Date    WBC 4 18 (L) 05/22/2023 HGB 12 6 05/22/2023    HCT 38 7 05/22/2023    MCV 88 05/22/2023     05/22/2023   Sinus rhythm  82 bpm  Normal EKG    Anesthesia plan and consent discussed with Rosy who expressed understanding and agreement  Risks/benefits and alternatives discussed with patient including possible PONV, sore throat, damage to teeth/lips/gums and possibility of rare anesthetic and surgical emergencies

## 2023-06-19 ENCOUNTER — APPOINTMENT (OUTPATIENT)
Dept: RADIOLOGY | Facility: HOSPITAL | Age: 73
End: 2023-06-19
Payer: MEDICARE

## 2023-06-19 ENCOUNTER — TELEPHONE (OUTPATIENT)
Dept: PODIATRY | Facility: CLINIC | Age: 73
End: 2023-06-19

## 2023-06-19 ENCOUNTER — ANESTHESIA (OUTPATIENT)
Dept: PERIOP | Facility: HOSPITAL | Age: 73
End: 2023-06-19
Payer: MEDICARE

## 2023-06-19 ENCOUNTER — HOSPITAL ENCOUNTER (OUTPATIENT)
Facility: HOSPITAL | Age: 73
Setting detail: OUTPATIENT SURGERY
Discharge: HOME | End: 2023-06-19
Attending: ORTHOPAEDIC SURGERY | Admitting: ORTHOPAEDIC SURGERY
Payer: MEDICARE

## 2023-06-19 VITALS
TEMPERATURE: 96.9 F | HEIGHT: 62 IN | RESPIRATION RATE: 18 BRPM | DIASTOLIC BLOOD PRESSURE: 82 MMHG | WEIGHT: 182 LBS | HEART RATE: 98 BPM | SYSTOLIC BLOOD PRESSURE: 108 MMHG | OXYGEN SATURATION: 96 % | BODY MASS INDEX: 33.49 KG/M2

## 2023-06-19 DIAGNOSIS — Z96.641 STATUS POST TOTAL HIP REPLACEMENT, RIGHT: Primary | ICD-10-CM

## 2023-06-19 DIAGNOSIS — M47.816 LUMBAR SPONDYLOSIS: ICD-10-CM

## 2023-06-19 LAB
ABO GROUP BLD: NORMAL
BLD GP AB SCN SERPL QL: NEGATIVE
RH BLD: POSITIVE
SPECIMEN EXPIRATION DATE: NORMAL

## 2023-06-19 PROCEDURE — C1776 JOINT DEVICE (IMPLANTABLE): HCPCS | Performed by: ORTHOPAEDIC SURGERY

## 2023-06-19 PROCEDURE — 86900 BLOOD TYPING SEROLOGIC ABO: CPT | Performed by: STUDENT IN AN ORGANIZED HEALTH CARE EDUCATION/TRAINING PROGRAM

## 2023-06-19 PROCEDURE — 97163 PT EVAL HIGH COMPLEX 45 MIN: CPT

## 2023-06-19 PROCEDURE — 73501 X-RAY EXAM HIP UNI 1 VIEW: CPT

## 2023-06-19 PROCEDURE — 27130 TOTAL HIP ARTHROPLASTY: CPT | Performed by: PHYSICIAN ASSISTANT

## 2023-06-19 PROCEDURE — C1713 ANCHOR/SCREW BN/BN,TIS/BN: HCPCS | Performed by: ORTHOPAEDIC SURGERY

## 2023-06-19 PROCEDURE — 86850 RBC ANTIBODY SCREEN: CPT | Performed by: STUDENT IN AN ORGANIZED HEALTH CARE EDUCATION/TRAINING PROGRAM

## 2023-06-19 PROCEDURE — 73502 X-RAY EXAM HIP UNI 2-3 VIEWS: CPT

## 2023-06-19 PROCEDURE — 86901 BLOOD TYPING SEROLOGIC RH(D): CPT | Performed by: STUDENT IN AN ORGANIZED HEALTH CARE EDUCATION/TRAINING PROGRAM

## 2023-06-19 PROCEDURE — 27130 TOTAL HIP ARTHROPLASTY: CPT | Performed by: ORTHOPAEDIC SURGERY

## 2023-06-19 PROCEDURE — 86923 COMPATIBILITY TEST ELECTRIC: CPT

## 2023-06-19 DEVICE — PINNACLE CANCELLOUS BONE SCREW 6.5MM X 20MM
Type: IMPLANTABLE DEVICE | Site: HIP | Status: FUNCTIONAL
Brand: PINNACLE

## 2023-06-19 DEVICE — BIOLOX DELTA CERAMIC FEMORAL HEAD 32MM DIA +1 12/14 TAPER
Type: IMPLANTABLE DEVICE | Site: HIP | Status: FUNCTIONAL
Brand: BIOLOX DELTA

## 2023-06-19 DEVICE — PINNACLE CANCELLOUS BONE SCREW 6.5MM X 15MM
Type: IMPLANTABLE DEVICE | Site: HIP | Status: FUNCTIONAL
Brand: PINNACLE

## 2023-06-19 DEVICE — PINNACLE HIP SOLUTIONS ALTRX POLYETHYLENE ACETABULAR LINER NEUTRAL 32MM ID 48MM OD
Type: IMPLANTABLE DEVICE | Site: HIP | Status: FUNCTIONAL
Brand: PINNACLE ALTRX

## 2023-06-19 DEVICE — ACTIS DUOFIX HIP PROSTHESIS (FEMORAL STEM 12/14 TAPER CEMENTLESS SIZE 5 HIGH COLLAR)  CE
Type: IMPLANTABLE DEVICE | Site: HIP | Status: FUNCTIONAL
Brand: ACTIS

## 2023-06-19 DEVICE — PINNACLE GRIPTION ACETABULAR SHELL MULTI-HOLE 48MM OD
Type: IMPLANTABLE DEVICE | Site: HIP | Status: FUNCTIONAL
Brand: PINNACLE GRIPTION

## 2023-06-19 RX ORDER — PANTOPRAZOLE SODIUM 20 MG/1
20 TABLET, DELAYED RELEASE ORAL
Status: DISCONTINUED | OUTPATIENT
Start: 2023-06-19 | End: 2023-06-19 | Stop reason: HOSPADM

## 2023-06-19 RX ORDER — ONDANSETRON 2 MG/ML
INJECTION INTRAMUSCULAR; INTRAVENOUS AS NEEDED
Status: DISCONTINUED | OUTPATIENT
Start: 2023-06-19 | End: 2023-06-19

## 2023-06-19 RX ORDER — ACETAMINOPHEN 325 MG/1
975 TABLET ORAL EVERY 8 HOURS SCHEDULED
Status: DISCONTINUED | OUTPATIENT
Start: 2023-06-19 | End: 2023-06-19 | Stop reason: HOSPADM

## 2023-06-19 RX ORDER — HYDROMORPHONE HCL IN WATER/PF 6 MG/30 ML
0.2 PATIENT CONTROLLED ANALGESIA SYRINGE INTRAVENOUS
Status: DISCONTINUED | OUTPATIENT
Start: 2023-06-19 | End: 2023-06-19 | Stop reason: HOSPADM

## 2023-06-19 RX ORDER — BUPIVACAINE HYDROCHLORIDE 7.5 MG/ML
INJECTION, SOLUTION INTRASPINAL AS NEEDED
Status: DISCONTINUED | OUTPATIENT
Start: 2023-06-19 | End: 2023-06-19

## 2023-06-19 RX ORDER — HYDROMORPHONE HCL IN WATER/PF 6 MG/30 ML
0.2 PATIENT CONTROLLED ANALGESIA SYRINGE INTRAVENOUS EVERY 4 HOURS PRN
Status: DISCONTINUED | OUTPATIENT
Start: 2023-06-19 | End: 2023-06-19 | Stop reason: HOSPADM

## 2023-06-19 RX ORDER — ATORVASTATIN CALCIUM 20 MG/1
20 TABLET, FILM COATED ORAL
Status: DISCONTINUED | OUTPATIENT
Start: 2023-06-19 | End: 2023-06-19 | Stop reason: HOSPADM

## 2023-06-19 RX ORDER — SODIUM CHLORIDE, SODIUM LACTATE, POTASSIUM CHLORIDE, CALCIUM CHLORIDE 600; 310; 30; 20 MG/100ML; MG/100ML; MG/100ML; MG/100ML
125 INJECTION, SOLUTION INTRAVENOUS CONTINUOUS
Status: DISCONTINUED | OUTPATIENT
Start: 2023-06-19 | End: 2023-06-19

## 2023-06-19 RX ORDER — CYCLOBENZAPRINE HCL 10 MG
10 TABLET ORAL
Status: DISCONTINUED | OUTPATIENT
Start: 2023-06-19 | End: 2023-06-19 | Stop reason: HOSPADM

## 2023-06-19 RX ORDER — SODIUM CHLORIDE, SODIUM LACTATE, POTASSIUM CHLORIDE, CALCIUM CHLORIDE 600; 310; 30; 20 MG/100ML; MG/100ML; MG/100ML; MG/100ML
100 INJECTION, SOLUTION INTRAVENOUS CONTINUOUS
Status: DISCONTINUED | OUTPATIENT
Start: 2023-06-19 | End: 2023-06-19 | Stop reason: HOSPADM

## 2023-06-19 RX ORDER — OXYCODONE HYDROCHLORIDE 5 MG/1
5 TABLET ORAL EVERY 4 HOURS PRN
Qty: 42 TABLET | Refills: 0 | Status: SHIPPED | OUTPATIENT
Start: 2023-06-19

## 2023-06-19 RX ORDER — FOLIC ACID 1 MG/1
1 TABLET ORAL DAILY
Status: DISCONTINUED | OUTPATIENT
Start: 2023-06-19 | End: 2023-06-19 | Stop reason: HOSPADM

## 2023-06-19 RX ORDER — OXYCODONE HYDROCHLORIDE 5 MG/1
5 TABLET ORAL EVERY 4 HOURS PRN
Status: DISCONTINUED | OUTPATIENT
Start: 2023-06-19 | End: 2023-06-19 | Stop reason: HOSPADM

## 2023-06-19 RX ORDER — DOCUSATE SODIUM 100 MG/1
100 CAPSULE, LIQUID FILLED ORAL 2 TIMES DAILY
Status: DISCONTINUED | OUTPATIENT
Start: 2023-06-19 | End: 2023-06-19 | Stop reason: HOSPADM

## 2023-06-19 RX ORDER — DOCUSATE SODIUM 100 MG/1
100 CAPSULE, LIQUID FILLED ORAL 2 TIMES DAILY
Qty: 20 CAPSULE | Refills: 0 | Status: SHIPPED | OUTPATIENT
Start: 2023-06-19

## 2023-06-19 RX ORDER — TRANEXAMIC ACID 10 MG/ML
1000 INJECTION, SOLUTION INTRAVENOUS ONCE
Status: COMPLETED | OUTPATIENT
Start: 2023-06-19 | End: 2023-06-19

## 2023-06-19 RX ORDER — MAGNESIUM HYDROXIDE 1200 MG/15ML
LIQUID ORAL AS NEEDED
Status: DISCONTINUED | OUTPATIENT
Start: 2023-06-19 | End: 2023-06-19 | Stop reason: HOSPADM

## 2023-06-19 RX ORDER — ACETAMINOPHEN 500 MG
1000 TABLET ORAL EVERY 8 HOURS
Refills: 0
Start: 2023-06-19

## 2023-06-19 RX ORDER — CALCIUM CARBONATE 500 MG/1
1000 TABLET, CHEWABLE ORAL DAILY PRN
Status: DISCONTINUED | OUTPATIENT
Start: 2023-06-19 | End: 2023-06-19 | Stop reason: HOSPADM

## 2023-06-19 RX ORDER — DEXAMETHASONE SODIUM PHOSPHATE 10 MG/ML
INJECTION, SOLUTION INTRAMUSCULAR; INTRAVENOUS AS NEEDED
Status: DISCONTINUED | OUTPATIENT
Start: 2023-06-19 | End: 2023-06-19

## 2023-06-19 RX ORDER — GABAPENTIN 300 MG/1
600 CAPSULE ORAL 3 TIMES DAILY
Status: DISCONTINUED | OUTPATIENT
Start: 2023-06-19 | End: 2023-06-19 | Stop reason: HOSPADM

## 2023-06-19 RX ORDER — ASCORBIC ACID 500 MG
1000 TABLET ORAL DAILY
Status: DISCONTINUED | OUTPATIENT
Start: 2023-06-19 | End: 2023-06-19 | Stop reason: HOSPADM

## 2023-06-19 RX ORDER — ONDANSETRON 2 MG/ML
4 INJECTION INTRAMUSCULAR; INTRAVENOUS ONCE AS NEEDED
Status: DISCONTINUED | OUTPATIENT
Start: 2023-06-19 | End: 2023-06-19 | Stop reason: HOSPADM

## 2023-06-19 RX ORDER — EPHEDRINE SULFATE 50 MG/ML
INJECTION INTRAVENOUS AS NEEDED
Status: DISCONTINUED | OUTPATIENT
Start: 2023-06-19 | End: 2023-06-19

## 2023-06-19 RX ORDER — SENNOSIDES 8.6 MG
1 TABLET ORAL DAILY
Status: DISCONTINUED | OUTPATIENT
Start: 2023-06-19 | End: 2023-06-19 | Stop reason: HOSPADM

## 2023-06-19 RX ORDER — SODIUM CHLORIDE, SODIUM LACTATE, POTASSIUM CHLORIDE, CALCIUM CHLORIDE 600; 310; 30; 20 MG/100ML; MG/100ML; MG/100ML; MG/100ML
50 INJECTION, SOLUTION INTRAVENOUS CONTINUOUS
Status: DISCONTINUED | OUTPATIENT
Start: 2023-06-19 | End: 2023-06-19

## 2023-06-19 RX ORDER — DULOXETIN HYDROCHLORIDE 60 MG/1
60 CAPSULE, DELAYED RELEASE ORAL DAILY
Status: DISCONTINUED | OUTPATIENT
Start: 2023-06-19 | End: 2023-06-19 | Stop reason: HOSPADM

## 2023-06-19 RX ORDER — FENTANYL CITRATE 50 UG/ML
INJECTION, SOLUTION INTRAMUSCULAR; INTRAVENOUS AS NEEDED
Status: DISCONTINUED | OUTPATIENT
Start: 2023-06-19 | End: 2023-06-19

## 2023-06-19 RX ORDER — SODIUM CHLORIDE 9 MG/ML
INJECTION, SOLUTION INTRAVENOUS AS NEEDED
Status: DISCONTINUED | OUTPATIENT
Start: 2023-06-19 | End: 2023-06-19 | Stop reason: HOSPADM

## 2023-06-19 RX ORDER — ACETAMINOPHEN 325 MG/1
975 TABLET ORAL ONCE
Status: COMPLETED | OUTPATIENT
Start: 2023-06-19 | End: 2023-06-19

## 2023-06-19 RX ORDER — FENTANYL CITRATE/PF 50 MCG/ML
25 SYRINGE (ML) INJECTION
Status: DISCONTINUED | OUTPATIENT
Start: 2023-06-19 | End: 2023-06-19 | Stop reason: HOSPADM

## 2023-06-19 RX ORDER — GABAPENTIN 100 MG/1
300 CAPSULE ORAL ONCE
Status: DISCONTINUED | OUTPATIENT
Start: 2023-06-19 | End: 2023-06-19 | Stop reason: HOSPADM

## 2023-06-19 RX ORDER — CHLORHEXIDINE GLUCONATE 0.12 MG/ML
15 RINSE ORAL ONCE
Status: COMPLETED | OUTPATIENT
Start: 2023-06-19 | End: 2023-06-19

## 2023-06-19 RX ORDER — KETAMINE HCL IN NACL, ISO-OSM 100MG/10ML
SYRINGE (ML) INJECTION AS NEEDED
Status: DISCONTINUED | OUTPATIENT
Start: 2023-06-19 | End: 2023-06-19

## 2023-06-19 RX ORDER — CLINDAMYCIN PHOSPHATE 600 MG/50ML
600 INJECTION INTRAVENOUS ONCE
Status: COMPLETED | OUTPATIENT
Start: 2023-06-19 | End: 2023-06-19

## 2023-06-19 RX ORDER — ONDANSETRON 2 MG/ML
4 INJECTION INTRAMUSCULAR; INTRAVENOUS EVERY 6 HOURS PRN
Status: DISCONTINUED | OUTPATIENT
Start: 2023-06-19 | End: 2023-06-19 | Stop reason: HOSPADM

## 2023-06-19 RX ORDER — MELATONIN
1000 DAILY
Status: DISCONTINUED | OUTPATIENT
Start: 2023-06-19 | End: 2023-06-19 | Stop reason: HOSPADM

## 2023-06-19 RX ORDER — PROPOFOL 10 MG/ML
INJECTION, EMULSION INTRAVENOUS CONTINUOUS PRN
Status: DISCONTINUED | OUTPATIENT
Start: 2023-06-19 | End: 2023-06-19

## 2023-06-19 RX ORDER — MIDAZOLAM HYDROCHLORIDE 2 MG/2ML
INJECTION, SOLUTION INTRAMUSCULAR; INTRAVENOUS AS NEEDED
Status: DISCONTINUED | OUTPATIENT
Start: 2023-06-19 | End: 2023-06-19

## 2023-06-19 RX ORDER — CLINDAMYCIN HYDROCHLORIDE 300 MG/1
300 CAPSULE ORAL EVERY 12 HOURS
Qty: 2 CAPSULE | Refills: 0 | Status: SHIPPED | OUTPATIENT
Start: 2023-06-19 | End: 2023-06-20

## 2023-06-19 RX ORDER — CLINDAMYCIN PHOSPHATE 300 MG/50ML
300 INJECTION INTRAVENOUS ONCE
Status: DISCONTINUED | OUTPATIENT
Start: 2023-06-19 | End: 2023-06-19 | Stop reason: HOSPADM

## 2023-06-19 RX ORDER — CLINDAMYCIN PHOSPHATE 600 MG/50ML
600 INJECTION INTRAVENOUS EVERY 8 HOURS
Status: DISCONTINUED | OUTPATIENT
Start: 2023-06-19 | End: 2023-06-19 | Stop reason: HOSPADM

## 2023-06-19 RX ORDER — BISACODYL 10 MG
10 SUPPOSITORY, RECTAL RECTAL DAILY PRN
Status: DISCONTINUED | OUTPATIENT
Start: 2023-06-19 | End: 2023-06-19 | Stop reason: HOSPADM

## 2023-06-19 RX ORDER — ENOXAPARIN SODIUM 100 MG/ML
40 INJECTION SUBCUTANEOUS DAILY
Status: DISCONTINUED | OUTPATIENT
Start: 2023-06-19 | End: 2023-06-19 | Stop reason: HOSPADM

## 2023-06-19 RX ADMIN — OXYCODONE HYDROCHLORIDE AND ACETAMINOPHEN 1000 MG: 500 TABLET ORAL at 13:34

## 2023-06-19 RX ADMIN — OXYCODONE HYDROCHLORIDE 5 MG: 5 TABLET ORAL at 13:29

## 2023-06-19 RX ADMIN — EPHEDRINE SULFATE 10 MG: 50 INJECTION INTRAVENOUS at 10:11

## 2023-06-19 RX ADMIN — BUPIVACAINE HYDROCHLORIDE IN DEXTROSE 1.7 ML: 7.5 INJECTION, SOLUTION SUBARACHNOID at 07:40

## 2023-06-19 RX ADMIN — Medication 20 MG: at 07:42

## 2023-06-19 RX ADMIN — SENNOSIDES 8.6 MG: 8.6 TABLET, FILM COATED ORAL at 13:35

## 2023-06-19 RX ADMIN — DOCUSATE SODIUM 100 MG: 100 CAPSULE, LIQUID FILLED ORAL at 13:33

## 2023-06-19 RX ADMIN — EPHEDRINE SULFATE 10 MG: 50 INJECTION INTRAVENOUS at 08:47

## 2023-06-19 RX ADMIN — ACETAMINOPHEN 975 MG: 325 TABLET ORAL at 13:29

## 2023-06-19 RX ADMIN — SODIUM CHLORIDE, SODIUM LACTATE, POTASSIUM CHLORIDE, AND CALCIUM CHLORIDE 125 ML/HR: .6; .31; .03; .02 INJECTION, SOLUTION INTRAVENOUS at 06:19

## 2023-06-19 RX ADMIN — FOLIC ACID 1 MG: 1 TABLET ORAL at 13:32

## 2023-06-19 RX ADMIN — CLINDAMYCIN IN 5 PERCENT DEXTROSE 900 MG: 12 INJECTION, SOLUTION INTRAVENOUS at 07:44

## 2023-06-19 RX ADMIN — DULOXETINE 60 MG: 60 CAPSULE, DELAYED RELEASE ORAL at 13:33

## 2023-06-19 RX ADMIN — EPHEDRINE SULFATE 10 MG: 50 INJECTION INTRAVENOUS at 08:00

## 2023-06-19 RX ADMIN — IRON SUCROSE 300 MG: 20 INJECTION, SOLUTION INTRAVENOUS at 13:40

## 2023-06-19 RX ADMIN — PROPOFOL 60 MCG/KG/MIN: 10 INJECTION, EMULSION INTRAVENOUS at 07:42

## 2023-06-19 RX ADMIN — CHLORHEXIDINE GLUCONATE 0.12% ORAL RINSE 15 ML: 1.2 LIQUID ORAL at 05:53

## 2023-06-19 RX ADMIN — CLINDAMYCIN PHOSPHATE 600 MG: 600 INJECTION, SOLUTION INTRAVENOUS at 16:22

## 2023-06-19 RX ADMIN — Medication 20 MG: at 07:43

## 2023-06-19 RX ADMIN — ATORVASTATIN CALCIUM 20 MG: 20 TABLET, FILM COATED ORAL at 16:23

## 2023-06-19 RX ADMIN — DEXAMETHASONE SODIUM PHOSPHATE 10 MG: 10 INJECTION, SOLUTION INTRAMUSCULAR; INTRAVENOUS at 07:56

## 2023-06-19 RX ADMIN — SODIUM CHLORIDE, SODIUM LACTATE, POTASSIUM CHLORIDE, AND CALCIUM CHLORIDE 100 ML/HR: .6; .31; .03; .02 INJECTION, SOLUTION INTRAVENOUS at 11:28

## 2023-06-19 RX ADMIN — GABAPENTIN 600 MG: 300 CAPSULE ORAL at 13:32

## 2023-06-19 RX ADMIN — ONDANSETRON 4 MG: 2 INJECTION INTRAMUSCULAR; INTRAVENOUS at 07:56

## 2023-06-19 RX ADMIN — OXYCODONE HYDROCHLORIDE 5 MG: 5 TABLET ORAL at 17:22

## 2023-06-19 RX ADMIN — MIDAZOLAM 1 MG: 1 INJECTION INTRAMUSCULAR; INTRAVENOUS at 07:40

## 2023-06-19 RX ADMIN — PANTOPRAZOLE SODIUM 20 MG: 20 TABLET, DELAYED RELEASE ORAL at 13:35

## 2023-06-19 RX ADMIN — SODIUM CHLORIDE, SODIUM LACTATE, POTASSIUM CHLORIDE, AND CALCIUM CHLORIDE: .6; .31; .03; .02 INJECTION, SOLUTION INTRAVENOUS at 09:39

## 2023-06-19 RX ADMIN — SODIUM CHLORIDE, SODIUM LACTATE, POTASSIUM CHLORIDE, AND CALCIUM CHLORIDE: .6; .31; .03; .02 INJECTION, SOLUTION INTRAVENOUS at 10:51

## 2023-06-19 RX ADMIN — FENTANYL CITRATE 25 MCG: 50 INJECTION, SOLUTION INTRAMUSCULAR; INTRAVENOUS at 07:43

## 2023-06-19 RX ADMIN — ACETAMINOPHEN 975 MG: 325 TABLET ORAL at 05:53

## 2023-06-19 RX ADMIN — CHOLECALCIFEROL TAB 25 MCG (1000 UNIT) 1000 UNITS: 25 TAB at 13:34

## 2023-06-19 RX ADMIN — TRANEXAMIC ACID 1000 MG: 10 INJECTION, SOLUTION INTRAVENOUS at 07:55

## 2023-06-19 RX ADMIN — EPHEDRINE SULFATE 10 MG: 50 INJECTION INTRAVENOUS at 08:23

## 2023-06-19 RX ADMIN — MIDAZOLAM 1 MG: 1 INJECTION INTRAMUSCULAR; INTRAVENOUS at 07:29

## 2023-06-19 NOTE — DISCHARGE INSTR - AVS FIRST PAGE
ANTERIOR TOTAL HIP REPLACEMENT DISCHARGE INSTRUCTIONS    Surgical Dressing: You may remove your dressing 7 days from the date of your surgery then change your dressing daily until drainage stops  Do not put any lotions or creams on your incision  If there are any signs of infection such as drainage persisting beyond a few days, unusual looking drainage (yellow, green), increased redness around the incision, or fever/chills let your doctor know  Medications:  Upon discharge you will be given a prescription for an anticoagulant (i e  Ecotrin (Aspirin), Coumadin (Warfarin), Lovenox (Enoxaparin)) and a narcotic pain reliever  Do not take any over the counter NSAIDs (i e  Ibuprofen, Motrin, Advil, Naprosyn, Aleve) while on your anticoagulation medication  Narcotic pain relievers cannot be refilled over the phone  Please be mindful of the number of pills you have left so you can  a prescription for a refill if needed during office hours  If you are on Coumadin (Warfarin) you will need your blood drawn every Monday and Thursday once you are home  Initially your visiting nurse will draw your blood  Later you will go to an outpatient lab  You will receive a phone call the next day and your Coumadin (warfarin) will be dosed based on these results  Take this dosage daily until you hear from us next  Walking:  Use two crutches or a walker for EVERY step  Gradually increase your walking daily  You can progress to a cane as tolerated once advised by your physical therapist       Sleeping Positions: You may not sleep on your stomach  Bathing:  No tub baths  Do not submerge your incision  You may shower  You may sit on a shower chair or stand and shower briefly  Use your crutches/walker to get in and out of the shower  Sexual Relations:  Resume according to your comfort  Swimming:  No swimming or hot tubs until approved by your physician    Swimming will be allowed once your incision is well healed  Driving: You may ride as a passenger now  No driving until your follow-up appointment  To get into the car use the front passenger seat with the seat pushed back as far as possible  Scoot yourself back in the seat  Use your hands to assist your legs into the car  Physical therapy:  When you have finished with home visiting PT, you may begin outpatient PT  Call your surgeon’s office if you have not already received a prescription  A prescription can be faxed to the outpatient center of your choice  Special considerations: To minimize swelling, stiffness, and decrease pain use cold as needed, but not heat  Ice 20 minutes at a time with a cloth between your skin and the ice  Ice after walking, when you have pain, or after you have completed your exercises  Limit your sitting to 60 minutes at one time  Continue to wear your ADOLPH stockings at all times for 2 weeks after surgery, except when washing  You can wear them as needed after that  Follow up:  Call [phone] to make an appointment to see your surgeon within 2 weeks of your surgery if you haven’t done so already  If you have any questions during business hours please call the direct office phone number [phone]  Otherwise please call [phone] for any concerns  For the future:  Prior to any dental work, including routine cleanings, call the office for a prescription for antibiotics to be taken prior to your dental appointment  For any invasive procedures (i e  endoscopy, colonoscopy, etc ) inform the doctor performing the procedure that you have a total knee replacement and will antibiotics just prior to the procedure to protect it

## 2023-06-19 NOTE — ANESTHESIA PROCEDURE NOTES
Spinal Block    Patient location during procedure: OR  Start time: 6/19/2023 7:40 AM  Reason for block: procedure for pain, at surgeon's request and primary anesthetic  Staffing  Performed by: Yomaira Plunkett CRNA  Authorized by: Jg Jeter MD    Preanesthetic Checklist  Completed: patient identified, IV checked, site marked, risks and benefits discussed, surgical consent, monitors and equipment checked, pre-op evaluation and timeout performed  Spinal Block  Patient position: sitting  Prep: ChloraPrep  Patient monitoring: heart rate  Approach: midline  Location: L3-4  Injection technique: single-shot  Needle  Needle type: pencil-tip   Needle gauge: 24 G  Needle length: 5 cm  Assessment  Sensory level: T4  Injection Assessment:  negative aspiration for heme, no paresthesia on injection and positive aspiration for clear CSF    Post-procedure:  site cleaned

## 2023-06-19 NOTE — ANESTHESIA POSTPROCEDURE EVALUATION
Post-Op Assessment Note    CV Status:  Stable  Pain Score: 0    Pain management: adequate     Mental Status:  Alert and awake   Hydration Status:  Euvolemic   PONV Controlled:  Controlled   Airway Patency:  Patent      Post Op Vitals Reviewed: Yes      Staff: CRNA         No notable events documented      BP   117/69   Temp   98 2   Pulse  77   Resp   16   SpO2   98

## 2023-06-19 NOTE — CASE MANAGEMENT
Case Management Progress Note    Patient name Rashel Walden  Location 7T /7T -61 MRN [id number]  : 1950 Date 2023       LOS (days): 0  Geometric Mean LOS (GMLOS) (days):   Days to GMLOS:        OBJECTIVE:        Current admission status: Outpatient Surgery  Preferred Pharmacy:   [address]  Phone: [phone] Fax: [phone]    Primary Care Provider: Jaylin Nava DO    Primary Insurance: MEDICARE  Secondary Insurance: AETNA    PROGRESS NOTE:    CM consulted by orthopedics s/p hip replacement  Per Post Op assessment, pt resides in an apartment with 14 steps to the apartment with spouse and was independent with ADLs prior to admission  Pt has a RW  Therapy recommending OP therapy  No CM needs identified at this time  CM will continue to follow

## 2023-06-19 NOTE — OP NOTE
OPERATIVE REPORT  PATIENT NAME: Brock Mattson  : 1950  MRN: [id number]  Pt Location:  [address] [place] [address] ROOM 12    Surgery Date: 2023    Surgeon(s) and Role:     * Callie Russ, DO - Primary     * Benji Radford, [facility],[address] Floor, [facility], ATC - Assisting    Preop Diagnosis:  Primary osteoarthritis of one hip, right [M16 11]    Post-Op Diagnosis Codes:     * Primary osteoarthritis of one hip, right [M16 11]    Procedure(s):  Right - ARTHROPLASTY HIP TOTAL ANTERIOR    Specimens:  * No specimens in log *    Estimated Blood Loss:   Minimal    Drains:  * No LDAs found *    Anesthesia Type:   Spinal     Operative Indications:  Primary osteoarthritis of one hip, right [M16 11]    Operative Findings:  See below    Complications:   None      Hip Approach: Direct anterior    Procedure and Technique:  Implants: Depuy  Sebastian acetabular sector II cup size 48mm  Actis high offset stem size 5  Neutral acetabular liner  Two 6 5 x 15mm screws, one 6 5 x 20mm  32mm +1 Biolox delta ceramic femoral head    INDICATIONS FOR PROCEDURE:  The patients is a 74years old female who presented to the office with right hip arthritis  Conservative treatments were tried and failed  The patient had debilitating pain and decreased quality of life from their end-stage arthritis  Surgery was then recommended  Extensive counseling in regards to the reasons for surgical intervention as well as the risks and benefits of surgery were reviewed  The risks include, but are not limited to infection, extensive blood clots, blood clots, wound healing problems, damage to blood vessels and nerves, dislocation, leg length discrepancy, fracture, the need for further surgery  The patient understood and agreed to by oral and written consent      OPERATIVE PROCEDURE:  The patient was identified as Brock Mattson by Her ID bracelet by the surgical staff in the preoperative area at [address] [place] [address]   The patient was wheeled back to the surgical room and placed on the operative table  Preoperative antibiotics were given  Anesthesia was administered  The patient was placed in the supine position on the hana table and all bony prominences were carefully protected  The right leg was then prepped and draped in the usual sterile fashion  A timeout was performed where the patient’s name and surgical site were once again identified  An incision was made over  the anterior aspect of the patient’s right hip  Dissection was made through the skin and subcutaneous tissue down to the fascia over the tensor fascia ady  The fascia was incised and the interval between the tensor fascia ady and the sartorius was identified  Retractors were placed  Bleeders were cauterized  We dissected down to the capsule  A capsulotomy was made and the capsule was tagged with suture  We released the capsule from the neck  The femoral neck cut was made and the femoral head was removed  Retractors were placed around the acetabulum  The labrum was removed  Sequential reaming took place and a size 48mm acetabular shell was found to be the best fit  XRs were taken to evaluate the position of the cup  The shell was impacted into place  Two 6 5 x 15 mm screws and one 6 5 x 20mm screw was placed through the cup and the final liner was impacted into placed  The liner was checked and found to be secure  Attention was then paid to the femur  The leg was manipulated for exposure of the femur proximally  Soft tissue dissection was done to reveal the greater trochanter  A canal finder were used to open the femoral canal and excess bone was removed laterally  Sequential broaching took place and it was found that a size 5 femoral broach to be the best fit  The broach was left in place and a size 32mm +1 femoral head with a high offset neck were then trialed  XRs were taken    Computer navigation was used to evaluate leg lengths and offset  The leg was taken through a ROM to evaluate for stability  Stability, leg length, and offset were found to be acceptable  The final femoral stem was impacted into place  The final 48mm +1 femoral head was impacted securely into place  The acetabulum was irrigated and the hip was carefully reduced  Copious amounts of irrigation was used to irrigate the hip  An irrisept wash followed by more irrigation was performed  The capsule was repaired with a #5 Ethibond suture  Irrigation was used throughout the closure  The tensor fascia was repaired with #1-0 running vicryl suture  The subcutaneous fat was closed with a running #1-0 vicryl suture  The skin was closed with #2-0 vicryl and #3-0 monocryl subcutaneously  Skin glue was placed on the incision and a mepilex dressing was placed  The patient was transferred onto a hospital bed and awakened without difficulty  I attest that I was present and performed this procedure  Vladimir Hay PA-C  and Lisa Alfred ATC were present for the entire procedure and provided essential assistance with limb position, patient prepping, and retraction  A qualified resident physician was not available      Patient Disposition:  hemodynamically stable              SIGNATURE: Reina Krishnan DO  DATE: June 19, 2023  TIME: 10:54 AM

## 2023-06-19 NOTE — INTERVAL H&P NOTE
H&P reviewed  After examining the patient I find no changes in the patients condition since the H&P had been written    Heart:  regular rate  Lungs:  no audible wheezing  Abd:  nondistended  RLE:  EHL/AT/GS intact, sensation grossly intact L4, L5, S1, palpable pedal pulse      Vitals:    06/19/23 0602   BP: 152/87   Pulse: 66   Resp: 18   Temp: 98 2 °F (36 8 °C)   SpO2: 96%

## 2023-06-20 LAB
ABO GROUP BLD BPU: NORMAL
ABO GROUP BLD BPU: NORMAL
BPU ID: NORMAL
BPU ID: NORMAL
CROSSMATCH: NORMAL
CROSSMATCH: NORMAL
UNIT DISPENSE STATUS: NORMAL
UNIT DISPENSE STATUS: NORMAL
UNIT PRODUCT CODE: NORMAL
UNIT PRODUCT CODE: NORMAL
UNIT PRODUCT VOLUME: 350 ML
UNIT PRODUCT VOLUME: 350 ML
UNIT RH: NORMAL
UNIT RH: NORMAL

## 2023-06-20 NOTE — PHYSICAL THERAPY NOTE
Physical Therapy Evaluation    Patient's Name: Jesse Cespedes    Admitting Diagnosis  Primary osteoarthritis of one hip, right [M16 11]    Problem List  Patient Active Problem List   Diagnosis    Primary osteoarthritis of both knees    Lumbar disc disease with radiculopathy    Chronic right-sided low back pain with right-sided sciatica    Fibromyalgia    Benign essential HTN    Fatty liver    Gastroesophageal reflux disease without esophagitis    Lumbar radiculopathy    Pes anserine bursitis    Lower extremity edema    Cervical radiculopathy    Cervical spine arthritis    Cervical spinal stenosis    Lumbar spondylosis    Current mild episode of major depressive disorder without prior episode (HCC)    Mixed hyperlipidemia    IFG (impaired fasting glucose)    Degenerative disc disease, cervical    Diabetes mellitus type 2 in obese (HCC)    Spasm of right piriformis muscle    Primary osteoarthritis of right hip    Spinal stenosis of lumbar region with neurogenic claudication    Encounter for geriatric assessment    Status post total hip replacement, right       Past Medical History  Past Medical History:   Diagnosis Date    Arthritis     Fatty liver 05/01/2018    Fibromyalgia, primary     GERD (gastroesophageal reflux disease)     Hyperlipidemia     Hypertension     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    Low back pain     Lumbar stenosis     Neck pain     Neuropathy     feet    No natural teeth     on lower    Primary localized osteoarthritis of right knee 08/17/2017    Sacroiliitis ([address] ) 8/20/2018    Skin cancer     on chest in past - melanoma    Use of cane as ambulatory aid     or walker    Wears dentures     full  uppers       Past Surgical History  Past Surgical History:   Procedure Laterality Date    SKIN CANCER EXCISION      on chest - melanoma    TUBAL LIGATION         Recent Imaging  XR hip/pelv 1 vw right if performed   Final Result by Keo Mcconnell MD (06/19 8516)      Unremarkable appearance of right total hip arthroplasty  Workstation performed: [id number]         XR hip/pelv 2-3 vws right if performed    (Results Pending)       Recent Vital Signs  Vitals:    06/19/23 1445 06/19/23 1545 06/19/23 1645 06/19/23 1700   BP: 143/87 114/72 104/80 108/82   BP Location: Right arm Right arm Right arm Right arm   Pulse: (!) 114 105 (!) 106 98   Resp: 18 18 18 18   Temp: (!) 97 4 °F (36 3 °C) (!) 96 9 °F (36 1 °C) (!) 96 9 °F (36 1 °C) (!) 96 9 °F (36 1 °C)   TempSrc: Temporal Temporal Temporal Temporal   SpO2: 96% 95% 96% 96%   Weight:       Height:            06/19/23 1400   PT Last Visit   PT Visit Date 06/20/23   Note Type   Note type Evaluation   Pain Assessment   Pain Assessment Tool 0-10   Pain Score 6   Pain Location/Orientation Orientation: Right;Location: Leg   Home Living   Type of [address] [facility] [address] One level;Stairs to enter with rails   Bathroom Shower/Tub Tub/shower unit   Bathroom Toilet Standard   Bathroom Equipment Grab bars in shower; Shower chair;Grab bars around toilet   Bathroom Accessibility Accessible via walker   [address] [facility]   Prior Function   Level of [address] with ADLs; Independent with functional mobility   Lives With Spouse   Receives Help From [facility] in the last 6 months 0   General   Family/Caregiver Present No   Cognition   Overall Cognitive Status WFL   Arousal/Participation Alert   Orientation Level Oriented X4   Memory Within functional limits   Following Commands Follows all commands and directions without difficulty   RLE Assessment   RLE Assessment   (3-/5)   LLE Assessment   LLE Assessment   (4/5)   Coordination   Movements are Fluid and Coordinated 0   Coordination and Movement Description antalgic   Sensation WFL   Light Touch   RLE Light Touch Grossly intact   LLE Light Touch Grossly intact   Bed Mobility   Supine to Sit 6  Modified independent   Additional items Increased time required   Sit to Supine 6 Modified independent   Additional items Increased time required   Transfers   Sit to Stand 5  Supervision   Additional items Increased time required   Stand to Sit 5  Supervision   Additional items Increased time required   Additional Comments with RW   Ambulation/Elevation   Gait pattern Decreased toe off;Decreased heel strike; Step through pattern;Decreased foot clearance; Improper Weight shift   Gait Assistance 5  Supervision   Additional items Verbal cues   Assistive Device Rolling walker   Distance 120ft x2   Stair Management Assistance 5  Supervision   Additional items Verbal cues   Stair Management Technique Step to pattern; Foreward; One rail L   Number of Stairs 7   Balance   Static Sitting Fair +   Dynamic Sitting Fair +   Static Standing Fair   Dynamic Standing Fair -   Ambulatory Fair -   Endurance Deficit   Endurance Deficit Yes   Endurance Deficit Description reduced from baseline due to fatigue and RLE pain   Activity Tolerance   Activity Tolerance Patient tolerated treatment well   Medical Staff Made Aware spoke to CM   Nurse Made Aware spoke to RN   Assessment   Prognosis Excellent   Problem List Decreased strength;Decreased range of motion;Decreased endurance; Impaired balance;Decreased mobility; Decreased coordination;Pain;Orthopedic restrictions; Impaired sensation   Barriers to Discharge Inaccessible home environment;Decreased caregiver support   Goals   Patient Goals to go home today   Recommendation   PT Discharge Recommendation Home with outpatient rehabilitation   Equipment Recommended [place] walker   AM-PAC Basic Mobility Inpatient   Turning in Flat Bed Without Bedrails 4   Lying on Back to Sitting on Edge of Flat Bed Without Bedrails 4   Moving Bed to Chair 4   Standing Up From Chair Using Arms 3   Walk in Room 3   Climb 3-5 Stairs With Railing 3   Basic Mobility Inpatient Raw Score 21   Basic Mobility Standardized Score 45 55   Highest Level Of Mobility JH-HLM Goal 6: Walk 10 steps or more   JH-HLM Achieved 7: Walk 25 feet or more   End of Consult   Patient Position at End of Consult Seated edge of bed; All needs within reach         ASSESSMENT                                                                                                                     Paula Rashid is a 74 y o  female admitted to [facility] on [facility]/19/2023 for Status post total hip replacement, right  Pt  has a past medical history of Arthritis, Fatty liver (05/01/2018), Fibromyalgia, primary, GERD (gastroesophageal reflux disease), Hyperlipidemia, Hypertension, Infectious viral hepatitis, Low back pain, Lumbar stenosis, Neck pain, Neuropathy, No natural teeth, Primary localized osteoarthritis of right knee (08/17/2017), Sacroiliitis ([facility] [address] ) (8/20/2018), Skin cancer, Use of cane as ambulatory aid, and Wears dentures    PT was consulted and pt was seen on 6/20/2023 for mobility assessment and d/c planning  Pt presents supine in bed alert and agreeable to therapy  Impairments limiting pt at this time include impaired balance, decreased endurance, decreased coordination, new onset of impairment of functional mobility, decreased sensation, and decreased strength  Pt is currently functioning at a modified independent assistance level for bed mobility, supervision assistance x1 level for transfers, supervision assistance x1 level for ambulation with Rolling Walker, and supervision assistance x1 for elevations  The patient's AM-PAC Basic Mobility Inpatient Short Form Raw Score is 21  A Raw score of greater than 16 suggests the patient may benefit from discharge to home  Please also refer to the recommendation of the Physical Therapist for safe discharge planning      Recommendations                                                                                                                DME: Rolling Walker    Discharge Disposition:  Home with Outpatient Physical Therapy       Tom Wright PT, DPT

## 2023-06-22 ENCOUNTER — OFFICE VISIT (OUTPATIENT)
Dept: PHYSICAL THERAPY | Facility: REHABILITATION | Age: 73
End: 2023-06-22
Payer: MEDICARE

## 2023-06-22 ENCOUNTER — TELEPHONE (OUTPATIENT)
Dept: OBGYN CLINIC | Facility: HOSPITAL | Age: 73
End: 2023-06-22

## 2023-06-22 DIAGNOSIS — M25.551 CHRONIC RIGHT HIP PAIN: Primary | ICD-10-CM

## 2023-06-22 DIAGNOSIS — Z96.641 STATUS POST TOTAL REPLACEMENT OF RIGHT HIP: ICD-10-CM

## 2023-06-22 DIAGNOSIS — G89.29 CHRONIC RIGHT HIP PAIN: Primary | ICD-10-CM

## 2023-06-22 PROCEDURE — 97164 PT RE-EVAL EST PLAN CARE: CPT

## 2023-06-22 PROCEDURE — 97530 THERAPEUTIC ACTIVITIES: CPT

## 2023-06-22 PROCEDURE — 97110 THERAPEUTIC EXERCISES: CPT

## 2023-06-22 PROCEDURE — 97112 NEUROMUSCULAR REEDUCATION: CPT

## 2023-06-22 NOTE — PROGRESS NOTES
PT Re-Evaluation     Today's date: 2023  Patient name: Brock Mattson  : 1950  MRN: [id number]  Referring provider: LUDWIG Garcia  Dx:   Encounter Diagnosis     ICD-10-CM    1  Chronic right hip pain  M25 551     G89 29       2  Status post total replacement of right hip  Z96 641           Start Time: 1115  Stop Time: 1210  Total time in clinic (min): 55 minutes  Assessment  Assessment details: Brock Mattson is a pleasant 74 y o  female who presents for post-operative evaluation for R MYAH anterior approach performed on 23  Primary impairments include increased pain with functional activities, decreased R hip strength, decreased R hip ROM, decreased gait quality, and impaired static/dynamic balance which is limiting her ability to perform ADLs and recreational activities without pain or functional restrictions  Pt was provided with a basic HEP which will be reviewed in the upcoming session  Pt was educated on anatomy and physiology of diagnosis, safety/home mobility, as well as anterior hip precautions and demonstrated verbal understanding  Pt will benefit from skilled PT services that includes manual therapy techniques to enhance tissue extensibility, neuromuscular re-education to facilitate motor control, therapeutic exercise to increase functional mobility, and modalities prn to reduce pain and inflammation  1:1 with Loree Rangel DPT entirety of tx  Impairments: abnormal gait, abnormal muscle firing, abnormal muscle tone, abnormal or restricted ROM, activity intolerance, impaired balance, impaired physical strength, lacks appropriate home exercise program, pain with function, weight-bearing intolerance and poor body mechanics    Symptom irritability: moderateUnderstanding of Dx/Px/POC: good   Prognosis: good    Goals  Impairment based goals  Patient will demonstrate full hip PROM  Patient will demonstrate >4/5 LE MMT  Patient will perform 5x STS in <15s     Patient will "perform TUG in <13s  Function based goals  Patient will be independent in comprehensive [facility] upon discharge  Patient will achieve goal FOTO score upon discharge  Patient will perform community ambulation without limitation  Patient will negotiate stairs without limitation  Patient will perform heavy chores at home without limitation  Plan  Patient would benefit from: skilled physical therapy  Planned therapy interventions: activity modification, joint mobilization, manual therapy, motor coordination training, neuromuscular re-education, patient education, self care, therapeutic activities, therapeutic exercise, graded activity, home exercise program, behavior modification, graded exercise, functional ROM exercises and strengthening  Frequency: 2x week  Duration in weeks: 12  Treatment plan discussed with: patient        Subjective Evaluation    History of Present Illness  Mechanism of injury: Pt had a R hip YMAH anterior approach performed on   She notes that she has been keeping an eye on the bandage and reports no signs of infection  She mentioned that she has been taking her pain medication as prescribed and notes she has most pain with standing up from sitting  She also reports having trouble sleeping in bed so she has been sleeping in a recliner  Her  has been helping with her things if she needs, she notes steps have been going well at home       Pain  Current pain ratin (last took pain meds this morning)  At best pain ratin  At worst pain rating: 10  Quality: soreness and \"pulling\" sensation  Relieving factors: relaxation and change in position  Aggravating factors: stair climbing, walking, standing, sitting and lifting    Patient Goals  Patient goals for therapy: increased strength, independence with ADLs/IADLs, return to sport/leisure activities, increased motion and decreased pain      Objective     General Comments:    Hip Comments   Hip Passive Range of Motion:   Flexion: 90 " deg pain  Extension: 0 deg pain  Abduction: [facility]  ER @ 90: NT  IR @ 90: NT    Functional Strength  5x STS: 30s / 2023: w/ UE assist 56s  TUs; no AD / 2023: w/ RW 28s    Observation: No signs of infection at R hip and RLE  Gait: Ambulating with RW, R antalgic; limited step length, decreased stance time on RLE    Lower Quarter Screen:   Reflexes: intact B/L patellar  Dermatomes: intact to light touch  Myotomes: intact B/L    Manual Muscle Testing:   Hip Flexion (SLR):  R  2+/5    Hip Extension:  R  2+/5     Hip Abduction:  R  2+/5     Hip IR:    R  2+/5    Hip ER:   R  2+/5         HEP: 2023  Access Code: [email or web address]  URL: [email or web address]  Date: 2023  Prepared by: Gely Landry    Exercises  - Seated Long Arc Quad  - 1 x daily - 7 x weekly - 2 sets - 10 reps - 3 second hold  - Sit to Stand with Counter Support  - 1 x daily - 7 x weekly - 3 sets - 5 reps  - Heel Raises with Counter Support  - 1 x daily - 7 x weekly - 3 sets - 10 reps  - Seated March  - 1 x daily - 7 x weekly - 2 sets - 10 reps     Precautions: R MYAH anterior       Manuals                                                                Neuro Re-Ed             Quad sets HEP 10x 5s hold           Glute sets HEP            SAQ  10x 3s hold           SLR             Sidelying hip abd             Bridge             Balance             Sitting Hip Flexion  10x ea           Education HEP and POC            Ther Ex             HEP Review + Pt Edu  10 min           Nu-step             Heel slides HEP            PBall hamstring curl             LAQ  2x10           [address]             Heel raises  2x10                                     Ther Activity             FSU  10x, L side up           LSU             STS  Eccentric @ bar, 10x                                     Gait Training                                       Modalities

## 2023-06-22 NOTE — TELEPHONE ENCOUNTER
"Patient contacted for a postoperative follow up assessment  Patient reports 3/10 pain when sitting and 5/10 when walking with RW  Patient states she experiences burning/pincinhg pain but states \"today is a really good day  \" PT 6/22 at 11:15AM     Patient is taking Tylenol 1000mg every 8 hours, Oxycodone 5mg PRN, ASA 325mg BID, Gabapentin 600mg TID, Colace 100mg BID  Patient has not yet had a BM but is passing gas  Patient reports increase in swelling but dressing is clean, dry and intact  Patient is icing the site regularly  Patient denies nausea, vomiting, abdominal pain, chest pain, shortness of breath, fever, dizziness and calf pain   Patient confirmed post-op appointment with surgeon on 6/29 at 11:45AM  Patient does not have any other questions or concerns at this time   "

## 2023-06-26 ENCOUNTER — OFFICE VISIT (OUTPATIENT)
Dept: PHYSICAL THERAPY | Facility: REHABILITATION | Age: 73
End: 2023-06-26
Payer: MEDICARE

## 2023-06-26 DIAGNOSIS — M25.551 CHRONIC RIGHT HIP PAIN: Primary | ICD-10-CM

## 2023-06-26 DIAGNOSIS — G89.29 CHRONIC RIGHT HIP PAIN: Primary | ICD-10-CM

## 2023-06-26 DIAGNOSIS — Z96.641 STATUS POST TOTAL REPLACEMENT OF RIGHT HIP: ICD-10-CM

## 2023-06-26 PROCEDURE — 97112 NEUROMUSCULAR REEDUCATION: CPT | Performed by: PHYSICAL THERAPIST

## 2023-06-26 PROCEDURE — 97110 THERAPEUTIC EXERCISES: CPT | Performed by: PHYSICAL THERAPIST

## 2023-06-26 PROCEDURE — 97140 MANUAL THERAPY 1/> REGIONS: CPT | Performed by: PHYSICAL THERAPIST

## 2023-06-26 NOTE — PROGRESS NOTES
Daily Note     Today's date: 2023  Patient name: Jenny Edge  : 1950  MRN: [id number]  Referring provider: Arnetta Cranker, CRNP  Dx:   Encounter Diagnosis     ICD-10-CM    1  Chronic right hip pain  M25 551     G89 29       2  Status post total replacement of right hip  Z96 641                      Subjective: Patient has been ambulating with a walker with good tolerance  She reports some muscular soreness in the R leg, she expects this  Objective: See treatment diary below      Assessment: Patient tolerates tx well  No reproduction of pain during PROM  She demonstrates R quad and hip flexion weakness, which is to be expected  Increase in weight bearing volume is tolerated without increase in pain  Patient does require UE assist for balance during standing exercises  Continue to increase strength and weight bearing volume as tolerated  Patient would benefit from continued PT  Plan: Continue per plan of care        Precautions: R MYAH anterior       Manuals           Hip PROM   TB                                                 Neuro Re-Ed             Quad sets HEP 10x 5s hold           Glute sets HEP            SAQ  10x 3s hold 30x; 30s hold          SLR             Sidelying hip abd   20x          Sidelying clamshell   30x          Bridge   30x          Standing marches   30x ea          Balance             Sitting Hip Flexion  10x ea           Education HEP and POC            Ther Ex             HEP Review + Pt Edu  10 min           Nu-step   8 min; lvl 5          Heel slides HEP            PBall hamstring curl             LAQ  2x10           SHC             Heel raises  2x10 3x10                                    Ther Activity             FSU  10x, L side up 3x10; 4in          LSU             STS  Eccentric @ bar, 10x                                     Gait Training                                       Modalities

## 2023-06-29 ENCOUNTER — OFFICE VISIT (OUTPATIENT)
Dept: OBGYN CLINIC | Facility: CLINIC | Age: 73
End: 2023-06-29
Payer: MEDICARE

## 2023-06-29 VITALS
HEART RATE: 89 BPM | DIASTOLIC BLOOD PRESSURE: 70 MMHG | WEIGHT: 181 LBS | HEIGHT: 62 IN | BODY MASS INDEX: 33.31 KG/M2 | SYSTOLIC BLOOD PRESSURE: 112 MMHG

## 2023-06-29 DIAGNOSIS — Z96.641 STATUS POST RIGHT HIP REPLACEMENT: ICD-10-CM

## 2023-06-29 DIAGNOSIS — M17.11 PRIMARY OSTEOARTHRITIS OF RIGHT KNEE: Primary | ICD-10-CM

## 2023-06-29 DIAGNOSIS — Z01.818 PREOPERATIVE TESTING: ICD-10-CM

## 2023-06-29 PROCEDURE — 99024 POSTOP FOLLOW-UP VISIT: CPT | Performed by: ORTHOPAEDIC SURGERY

## 2023-06-29 PROCEDURE — 99213 OFFICE O/P EST LOW 20 MIN: CPT | Performed by: ORTHOPAEDIC SURGERY

## 2023-06-29 NOTE — PROGRESS NOTES
Assessment/Plan:  1  Status post right hip replacement      Orders Placed This Encounter   Procedures   • XR hip/pelv 2-3 vws right if performed       · Patient is doing very well 10 days status post R anterior MYAH on 6/19/2023  · Continue outpatient PT   · Pain control prn- oxycodone, gabapentin, and tylenol  Patient aware to wean away from opioids  Patient will consider taking half tablet oxy as needed for pain  · Continue with  BID for DVT prophylaxis  · ADOLPH stockings as needed for swelling  · May shower and let water run over incision, do not submerge incision  · Continue with anterior hip precautions  · Patient should call ahead for abx prior to dental appts  WE WILL OBTAIN UPDATED KNEE XRs AND SCANOGRAM AT NEXT VISIT     The patient has severe BILATERAL knee arthritis  We discussed treatment options as well as risks and benefits of treatment options  The patient has intolerable pain, feels limited and has tried and failed conservative treatment options at this point  They have elected to proceed with a RIGHT TKA  The risks of surgery include, but are not limited to infection, blood clot, wound healing problems, blood loss, damage to blood vessels and nerves, persistent pain and stiffness, fracture, need for additional surgery, need for revision surgery, failure of hardware, heart attack, stroke, death  The patient understood and agreed to by oral and written consent  I answered all questions regarding surgery  The patient has the direct phone number to the office for any further questions  · We will tentatively plan for R TKA in October then L TKA thereafter either before or after her trip in February  · She will be on ASA after surgery for DVT prophylaxis  · She will get PCP clearance (prefers PCP to [facility]), will only get cardio if develops symptoms or requested by PCP  Patient just had cardio clearance for L MYAH  · ROM encouraged      Reviewed with patient to expect some numbness over the lateral aspect of the knee after surgery  We let the patient know to avoid kneeling while the incision is healing, typically for the first 4-6 weeks  Once incision is healed kneeling if permitted but may still be uncomfortable for some patients long term  Reviewed no driving for 4-6 weeks for right sided surgery once off narcotics  No driving until off narcotics for left sided surgery  · We let the patient know that some people experience constipation after surgery while on narcotics  We suggest to have OTC medications and/or prune juice available if needed  Return in about 4 weeks (around 7/27/2023) for right MYAH post op 2  I answered all of the patient's questions during the visit and provided education of the patient's condition during the visit  The patient verbalized understanding of the information given and agrees with the plan  This note was dictated using [employer] software  It may contain errors including improperly dictated words  Please contact physician directly for any questions  Subjective   Chief Complaint:   Chief Complaint   Patient presents with   • Right Hip - Post-op       Otilio Ford is a 74 y o  female who presents for 10 week follow up s/p right MYAH  Patient is present with her   She notes that her hip is doing well  She states her pain is controlled  Shew is taking tylenol, gabapentin, and oxycodone for pain  She had olesya taking oxy about 3 times per day, but she has weaned down to once or twice per day  Patient reports being complaint with ASA DVT ppx  Patient is attending PT and using a cane for assistance ambulating  Denies fevers, chills, distal numbness, or tingling  Patient is hoping to plan for right total knee by the end of the year  Patient has a trip to [place] planned for Feb 2024 and ideally would like to have both knees replaced prior   Patient has tried non operative treatment including steroid injections which would typically provide short lived relief  Patient has tried oral NSAIDs as well including diclofenac tablets  She notes her bilateral knee pain does limit her daily activity  Review of Systems  ROS:    See HPI for musculoskeletal review     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   • Low back pain    • Lumbar stenosis    • Neck pain    • Neuropathy     feet   • No natural teeth     on lower   • Primary localized osteoarthritis of right knee 08/17/2017   • Sacroiliitis ([address] ) 8/20/2018   • Skin cancer     on chest in past - melanoma   • Use of cane as ambulatory aid     or walker   • Wears dentures     full  uppers     Past Surgical History:   Procedure Laterality Date   • AK ARTHRP ACETBLR/PROX FEM PROSTC AGRFT/ALGRFT Right 6/19/2023    Procedure: ARTHROPLASTY HIP TOTAL ANTERIOR;  Surgeon: Epi Smart DO;  Location: [address] [place] [address];  Service: Orthopedics   • SKIN CANCER EXCISION      on chest - melanoma   • TUBAL LIGATION       Social History   Social History     Substance and Sexual Activity   Alcohol Use Yes    Comment: occ     Social History     Substance and Sexual Activity   Drug Use Never     Social History     Tobacco Use   Smoking Status Never   Smokeless Tobacco Never     Family History:   Family History   Problem Relation Age of Onset   • Osteoporosis Mother    • Skin cancer Father 54   • Stomach cancer Sister 52   • No Known Problems Daughter    • No Known Problems Maternal Grandmother    • No Known Problems Maternal Grandfather    • No Known Problems Paternal Grandmother    • No Known Problems Paternal Grandfather    • No Known Problems Maternal Aunt    • No Known Problems Maternal Aunt    • No Known Problems Maternal Aunt    • No Known Problems Maternal Aunt    • No Known Problems Paternal Aunt        Current Outpatient Medications on File Prior to Visit   Medication Sig Dispense Refill   • acetaminophen (TYLENOL) 500 mg tablet Take 2 tablets (1,000 mg total) by mouth every 8 (eight) hours  0   • ascorbic acid (VITAMIN C) 500 mg tablet TAKE 1 TABLET (500 MG TOTAL) BY MOUTH DAILY BEGIN 30 DAYS PRIOR TO SURGERY  90 tablet 1   • aspirin (ECOTRIN) 325 mg EC tablet Take 1 tablet (325 mg total) by mouth 2 (two) times a day Take with food  84 tablet 0   • benazepril (LOTENSIN) 20 mg tablet TAKE 1 TABLET BY MOUTH EVERY DAY 90 tablet 1   • cholecalciferol (VITAMIN D3) 1,000 units tablet Take 1,000 Units by mouth daily     • cyclobenzaprine (FLEXERIL) 10 mg tablet Take 1 tablet (10 mg total) by mouth daily at bedtime 30 tablet 5   • docusate sodium (COLACE) 100 mg capsule Take 1 capsule (100 mg total) by mouth 2 (two) times a day 20 capsule 0   • DULoxetine (CYMBALTA) 60 mg delayed release capsule TAKE 1 CAPSULE BY MOUTH EVERY DAY 90 capsule 2   • ferrous sulfate 324 (65 Fe) mg TAKE 1 TABLET (324 MG TOTAL) BY MOUTH DAILY BEFORE BREAKFAST BEGIN 30 DAYS PRIOR TO SURGERY  90 tablet 1   • folic acid (FOLVITE) 1 mg tablet TAKE 1 TABLET (1 MG TOTAL) BY MOUTH DAILY BEGIN 30 DAYS PRIOR TO SURGERY  90 tablet 1   • gabapentin (Neurontin) 600 MG tablet Take 1 tablet (600 mg total) by mouth 3 (three) times a day 90 tablet 5   • hydrochlorothiazide (HYDRODIURIL) 12 5 mg tablet TAKE 1 TABLET BY MOUTH EVERY DAY 90 tablet 1   • lidocaine (Lidoderm) 5 % Apply 1 patch topically daily Remove & Discard patch within 12 hours or as directed by MD (Patient not taking: Reported on 5/30/2023) 30 patch 2   • Multiple Vitamins-Minerals (One-Daily Multi-Vit/Mineral) TABS TAKE 1 TABLET BY MOUTH DAILY BEGIN 30 DAYS PRIOR TO SURGERY   90 tablet 1   • omeprazole (PriLOSEC) 10 mg delayed release capsule Take 10 mg by mouth daily       • oxyCODONE (ROXICODONE) 5 immediate release tablet Take 1 tablet "(5 mg total) by mouth every 4 (four) hours as needed for severe pain Max Daily Amount: 30 mg 42 tablet 0   • rosuvastatin (CRESTOR) 10 MG tablet TAKE 1 TABLET BY MOUTH EVERY DAY 90 tablet 1   • SUPER B COMPLEX/C PO Take 1 tablet by mouth daily         No current facility-administered medications on file prior to visit  Allergies   Allergen Reactions   • Penicillins Hives and Swelling        Objective     /70   Pulse 89   Ht 5' 2\" (1 575 m)   Wt 82 1 kg (181 lb)   BMI 33 11 kg/m²      PE:  AAOx 3  WDWN  Hearing intact, no drainage from eyes  no audible wheezing  no abdominal distension  LE compartments soft, AT/GS intact    Ortho Exam:  right hip:   INC: C/D/I, No erythema, mild swelling  No pain with ROM  Sensation intact L4-S1    Imaging Studies: I have personally reviewed pertinent films in PACS  XR right hip:  S/p MYAH, adequate alignment      Scribe Attestation    I,:  Coreen Bianchi PA-C am acting as a scribe while in the presence of the attending physician :       I,:  Ancelmo Alexandre DO personally performed the services described in this documentation    as scribed in my presence  :           "

## 2023-06-30 ENCOUNTER — OFFICE VISIT (OUTPATIENT)
Dept: PHYSICAL THERAPY | Facility: REHABILITATION | Age: 73
End: 2023-06-30
Payer: MEDICARE

## 2023-06-30 DIAGNOSIS — G89.29 CHRONIC RIGHT HIP PAIN: Primary | ICD-10-CM

## 2023-06-30 DIAGNOSIS — M25.551 CHRONIC RIGHT HIP PAIN: Primary | ICD-10-CM

## 2023-06-30 DIAGNOSIS — Z96.641 STATUS POST TOTAL REPLACEMENT OF RIGHT HIP: ICD-10-CM

## 2023-06-30 PROCEDURE — 97112 NEUROMUSCULAR REEDUCATION: CPT

## 2023-06-30 PROCEDURE — 97140 MANUAL THERAPY 1/> REGIONS: CPT

## 2023-06-30 PROCEDURE — 97530 THERAPEUTIC ACTIVITIES: CPT

## 2023-06-30 NOTE — PROGRESS NOTES
"Daily Note     Today's date: 2023  Patient name: Guerrero Lewis  : 1950  MRN: [id number]  Referring provider: LUDWIG Swanson  Dx:   Encounter Diagnosis     ICD-10-CM    1  Chronic right hip pain  M25 551     G89 29       2  Status post total replacement of right hip  Z96 641           Start Time: 1015  Stop Time: 1108  Total time in clinic (min): 53 minutes    Subjective: Patient has been weaning herself off walker and SPC over the last 3 days  States her pain hasn't been high, at most 1-2/10  She's been pushing herself more and more each day  Objective: See treatment diary below      Assessment: Patient tolerated treatment session well today with focus on progressive hip strengthening and functional tolerance  Patient reported a good stretch with slight hip IR/ER at 90 deg hip flexion  She required strap assist to perform hip flexion due to hip weakness  Demonstrated no difficulty with addition of lateral step-ups  Patient will continue to be appropriate for skilled outpatient physical therapy in order to address impairments and post-surgical outcomes  1:1 with Shamir Lewis PT, DPT for entirety of treatment session  Plan: Continue per plan of care        Precautions: R MYAH anterior       Manuals          Hip PROM   TB AD                                                Neuro Re-Ed             Quad sets HEP 10x 5s hold           Glute sets HEP            SAQ  10x 3s hold 30x; 30s hold          SLR    With strap assist  30x         Sidelying hip abd   20x 20x         Sidelying clamshell   30x 30x         Bridge   30x 30x  3\" hold          Standing marches   30x ea 30x         Balance             Sitting Hip Flexion  10x ea           Education HEP and POC            Ther Ex             HEP Review + Pt Edu  10 min           Nu-step   8 min; lvl 5 8 min:  lvl 5         Heel slides HEP            PBall hamstring curl             LAQ  2x10           [address]             Heel " raises  2x10 3x10                                    Ther Activity             FSU  10x, L side up 3x10; 4in 3x10  4 in         LSU    2x10   4 in         STS  Eccentric @ bar, 10x                                     Gait Training                                       Modalities

## 2023-07-03 ENCOUNTER — OFFICE VISIT (OUTPATIENT)
Dept: PHYSICAL THERAPY | Facility: REHABILITATION | Age: 73
End: 2023-07-03
Payer: MEDICARE

## 2023-07-03 DIAGNOSIS — M25.551 CHRONIC RIGHT HIP PAIN: Primary | ICD-10-CM

## 2023-07-03 DIAGNOSIS — Z96.641 STATUS POST TOTAL REPLACEMENT OF RIGHT HIP: ICD-10-CM

## 2023-07-03 DIAGNOSIS — G89.29 CHRONIC RIGHT HIP PAIN: Primary | ICD-10-CM

## 2023-07-03 PROCEDURE — 97140 MANUAL THERAPY 1/> REGIONS: CPT | Performed by: PHYSICAL THERAPIST

## 2023-07-03 PROCEDURE — 97110 THERAPEUTIC EXERCISES: CPT | Performed by: PHYSICAL THERAPIST

## 2023-07-03 NOTE — PROGRESS NOTES
Daily Note     Today's date: 7/3/2023  Patient name: Jayden Camara  : 1950  MRN: [id number]  Referring provider: LUDWIG Jaimes  Dx:   Encounter Diagnosis     ICD-10-CM    1. Chronic right hip pain  M25.551     G89.29       2. Status post total replacement of right hip  Z96.641                      Subjective: Patient reports good tolerance to last tx. She has been doing household ambulation without an assistive device. She brought a [facility] with her today. Objective: See treatment diary below      Assessment: Patient ambulates throughout clinic without assistive device; she does demonstrates a moderate antalgic gait compensation. Patient is demonstrating minimal PROM restriction into end range flexion. Patient is able to perform FSU and LSU exercises without UE assist for 4 in step. Patient is progressing well. Continue to address balance and weight bearing strength deficits. Patient would benefit from continued PT. Plan: Continue per plan of care.       Precautions: R MYAH anterior       Manuals 6/12 6/22 6/26 6/30 7/3        Hip PROM   TB AD TB                                               Neuro Re-Ed             Quad sets HEP 10x 5s hold           Glute sets HEP            SAQ  10x 3s hold 30x; 30s hold          SLR    With strap assist  30x 30x ea        Sidelying hip abd   20x 20x 30x        Sidelying clamshell   30x 30x 30x; otb        Bridge   30x 30x  3" hold  30x  3" hold         Standing marches   30x ea 30x 30x ea        Balance             Sitting Hip Flexion  10x ea           Education HEP and POC            Ther Ex             HEP Review + Pt Edu  10 min           Nu-step   8 min; lvl 5 8 min:  lvl 5 8 min; lvl 5        Heel slides HEP            PBall hamstring curl     30x        LAQ  2x10           [facility]             Heel raises  2x10 3x10                                    Ther Activity             FSU  10x, L side up 3x10; 4in 3x10  4 in 3x10; 4in; no UE 6in nv       LSU 2x10   4 in 2x10; 4in; no UE 6in nv       STS  Eccentric @ bar, 10x   20x; 8#                                  Gait Training                                       Modalities

## 2023-07-06 DIAGNOSIS — Z01.818 PREOPERATIVE TESTING: Primary | ICD-10-CM

## 2023-07-06 RX ORDER — CHLORHEXIDINE GLUCONATE 4 G/100ML
SOLUTION TOPICAL DAILY PRN
OUTPATIENT
Start: 2023-07-06

## 2023-07-06 RX ORDER — CHLORHEXIDINE GLUCONATE 0.12 MG/ML
15 RINSE ORAL ONCE
OUTPATIENT
Start: 2023-07-06 | End: 2023-07-06

## 2023-07-06 RX ORDER — SODIUM CHLORIDE 9 MG/ML
75 INJECTION, SOLUTION INTRAVENOUS CONTINUOUS
OUTPATIENT
Start: 2023-07-06

## 2023-07-06 RX ORDER — ACETAMINOPHEN 325 MG/1
975 TABLET ORAL ONCE
OUTPATIENT
Start: 2023-07-06 | End: 2023-07-06

## 2023-07-06 NOTE — ADDENDUM NOTE
Addended by: Georgiafeliberto [facility] on: 7/6/2023 09:52 AM     Modules accepted: Arnold Salazar

## 2023-07-07 ENCOUNTER — OFFICE VISIT (OUTPATIENT)
Dept: PHYSICAL THERAPY | Facility: REHABILITATION | Age: 73
End: 2023-07-07
Payer: MEDICARE

## 2023-07-07 DIAGNOSIS — M25.551 CHRONIC RIGHT HIP PAIN: Primary | ICD-10-CM

## 2023-07-07 DIAGNOSIS — G89.29 CHRONIC RIGHT HIP PAIN: Primary | ICD-10-CM

## 2023-07-07 DIAGNOSIS — Z96.641 STATUS POST TOTAL REPLACEMENT OF RIGHT HIP: ICD-10-CM

## 2023-07-07 PROCEDURE — 97530 THERAPEUTIC ACTIVITIES: CPT | Performed by: PHYSICAL THERAPIST

## 2023-07-07 PROCEDURE — 97110 THERAPEUTIC EXERCISES: CPT | Performed by: PHYSICAL THERAPIST

## 2023-07-07 NOTE — PROGRESS NOTES
Daily Note     Today's date: 2023  Patient name: Aliza Byrd  : 1950  MRN: [id number]  Referring provider: LUDWIG Brody  Dx:   Encounter Diagnosis     ICD-10-CM    1. Chronic right hip pain  M25.551     G89.29       2. Status post total replacement of right hip  Z96.641                      Subjective: Patient reports continued improvement in her ability to walk around the house. She is feeling more confident without an assistive device. Objective: See treatment diary below      Assessment: Patient still demonstrates some end range hip flexion restriction during PROM. Good tolerance for progression in step height during FSU and LSU. Patient is progressing well with treatment. Next visit attempt introduction of balance interventions. Patient would benefit from continued PT. Plan: Continue per plan of care.       Precautions: R MYAH anterior       Manuals 6/12 6/22 6/26 6/30 7/3 7/7       Hip PROM   TB AD TB TB                                              Neuro Re-Ed             Quad sets HEP 10x 5s hold           Glute sets HEP            SAQ  10x 3s hold 30x; 30s hold          SLR    With strap assist  30x 30x ea 30x        Sidelying hip abd   20x 20x 30x 30x       Sidelying clamshell   30x 30x 30x; otb 30x; otb       Bridge   30x 30x  3" hold  30x  3" hold         Standing marches   30x ea 30x 30x ea        Balance       nv      Sitting Hip Flexion  10x ea           Education HEP and POC            Ther Ex             HEP Review + Pt Edu  10 min           Nu-step   8 min; lvl 5 8 min:  lvl 5 8 min; lvl 5 8 min; lvl 5       Heel slides HEP            PBall hamstring curl     30x 30x       LAQ  2x10           [facility]             Heel raises  2x10 3x10                                    Ther Activity             FSU  10x, L side up 3x10; 4in 3x10  4 in 3x10; 4in; no UE 3x10; 6in; no UE       LSU    2x10   4 in 2x10; 4in; no UE 2x10; 6in; no UE       STS  Eccentric @ bar, 10x   20x; 8# 3x10; 10#                                 Gait Training                                       Modalities

## 2023-07-10 ENCOUNTER — OFFICE VISIT (OUTPATIENT)
Dept: PHYSICAL THERAPY | Facility: REHABILITATION | Age: 73
End: 2023-07-10
Payer: MEDICARE

## 2023-07-10 DIAGNOSIS — M25.551 CHRONIC RIGHT HIP PAIN: Primary | ICD-10-CM

## 2023-07-10 DIAGNOSIS — G89.29 CHRONIC RIGHT HIP PAIN: Primary | ICD-10-CM

## 2023-07-10 DIAGNOSIS — Z96.641 STATUS POST TOTAL REPLACEMENT OF RIGHT HIP: ICD-10-CM

## 2023-07-10 PROCEDURE — 97110 THERAPEUTIC EXERCISES: CPT

## 2023-07-10 PROCEDURE — 97530 THERAPEUTIC ACTIVITIES: CPT

## 2023-07-10 PROCEDURE — 97112 NEUROMUSCULAR REEDUCATION: CPT

## 2023-07-10 NOTE — PROGRESS NOTES
Daily Note     Today's date: 7/10/2023  Patient name: Walter Canseco  : 1950  MRN: [id number]  Referring provider: LUDWIG Rojo  Dx:   Encounter Diagnosis     ICD-10-CM    1. Chronic right hip pain  M25.551     G89.29       2. Status post total replacement of right hip  Z96.641                      Subjective: Pt reports that hip is feeling good, continues to improve with home ambulation. Objective: See treatment diary below      Assessment: Tolerated treatment well. Patient would benefit from continued PT.  Pt. able to complete all exercises with no increase in pain during or after session. Pt doing well with exercises, would benefit from introduction of single leg balance exercises next visit. Pt. 1:1 with PTA for entirety. Plan: Continue per plan of care.       Precautions: R MYAH anterior       Manuals 6/12 6/22 6/26 6/30 7/3 7/7 7/10      Hip PROM   TB AD TB TB KP 8'                                             Neuro Re-Ed             [employer] HEP 10x 5s hold           Glute sets HEP            SAQ  10x 3s hold 30x; 30s hold          SLR    With strap assist  30x 30x ea 30x  3x10      Sidelying hip abd   20x 20x 30x 30x 3x10      Sidelying clamshell   30x 30x 30x; otb 30x; otb 3x10 otb      Bridge   30x 30x  3" hold  30x  3" hold         Standing marches   30x ea 30x 30x ea        Balance       nv      Sitting Hip Flexion  10x ea           Education HEP and POC            Ther Ex             HEP Review + Pt Edu  10 min           Nu-step   8 min; lvl 5 8 min:  lvl 5 8 min; lvl 5 8 min; lvl 5 8' L5      Heel slides HEP            PBall hamstring curl     30x 30x 30x      LAQ  2x10           SHC             Heel raises  2x10 3x10                                    Ther Activity             FSU  10x, L side up 3x10; 4in 3x10  4 in 3x10; 4in; no UE 3x10; 6in; no UE 3x10 6"      LSU    2x10   4 in 2x10; 4in; no UE 2x10; 6in; no UE 2x10 6"       STS  Eccentric @ bar, 10x   20x; 8# 3x10; 10# 3x10 10#                                Gait Training                                       Modalities

## 2023-07-14 ENCOUNTER — OFFICE VISIT (OUTPATIENT)
Dept: PHYSICAL THERAPY | Facility: REHABILITATION | Age: 73
End: 2023-07-14
Payer: MEDICARE

## 2023-07-14 DIAGNOSIS — I10 BENIGN ESSENTIAL HTN: ICD-10-CM

## 2023-07-14 DIAGNOSIS — M25.551 CHRONIC RIGHT HIP PAIN: Primary | ICD-10-CM

## 2023-07-14 DIAGNOSIS — G89.29 CHRONIC RIGHT HIP PAIN: Primary | ICD-10-CM

## 2023-07-14 DIAGNOSIS — Z96.641 STATUS POST TOTAL REPLACEMENT OF RIGHT HIP: ICD-10-CM

## 2023-07-14 PROCEDURE — 97112 NEUROMUSCULAR REEDUCATION: CPT

## 2023-07-14 PROCEDURE — 97140 MANUAL THERAPY 1/> REGIONS: CPT

## 2023-07-14 PROCEDURE — 97110 THERAPEUTIC EXERCISES: CPT

## 2023-07-14 RX ORDER — BENAZEPRIL HYDROCHLORIDE 20 MG/1
TABLET ORAL
Qty: 90 TABLET | Refills: 1 | Status: SHIPPED | OUTPATIENT
Start: 2023-07-14

## 2023-07-14 NOTE — PROGRESS NOTES
Daily Note     Today's date: 2023  Patient name: Escobar Delacruz  : 1950  MRN: [id number]  Referring provider: LUDWIG Potter  Dx:   Encounter Diagnosis     ICD-10-CM    1. Chronic right hip pain  M25.551     G89.29       2. Status post total replacement of right hip  Z96.641           Start Time: 852  Stop Time: 938  Total time in clinic (min): 46 minutes    Subjective: Pt reports her R hip has been feeling "pretty good" lately. Still has difficulty with steps. Objective: See treatment diary below      Assessment: Tolerated treatment well. Continued with program as outline below. Slight compensation with hip flexors during sidelying hip abd, but was able to correct with verbal/tactile cueing. Fatigues with current program, but able to complete all exercise with no significant increase in symptoms. Did require one finger hold for stability during SLS today. Patient would benefit from continued PT to further improve strength and maximize overall function. Plan: Continue per plan of care.       Precautions: R MYAH anterior       Manuals 6/12 6/22 6/26 6/30 7/3 7/7 7/10 7/14     Hip PROM   TB AD TB TB KP 8' AFB                                            Neuro Re-Ed             Quad sets HEP 10x 5s hold           Glute sets HEP            SAQ  10x 3s hold 30x; 30s hold          SLR    With strap assist  30x 30x ea 30x  3x10 3x10     Sidelying hip abd   20x 20x 30x 30x 3x10 3x10     Sidelying clamshell   30x 30x 30x; otb 30x; otb 3x10 otb 3x10 otb     Bridge   30x 30x  3" hold  30x  3" hold         Standing marches   30x ea 30x 30x ea        Balance       nv 15"x3     Sitting Hip Flexion  10x ea           Education HEP and POC            Ther Ex             HEP Review + Pt Edu  10 min           Nu-step   8 min; lvl 5 8 min:  lvl 5 8 min; lvl 5 8 min; lvl 5 8' L5 8' L5     Heel slides HEP            PBall hamstring curl     30x 30x 30x 30x     LAQ  2x10           [address] Heel raises  2x10 3x10                                    Ther Activity             FSU  10x, L side up 3x10; 4in 3x10  4 in 3x10; 4in; no UE 3x10; 6in; no UE 3x10 6" 3x10 6"     LSU    2x10   4 in 2x10; 4in; no UE 2x10; 6in; no UE 2x10 6"  2x10 6"     STS  Eccentric @ bar, 10x   20x; 8# 3x10; 10# 3x10 10# 3x10 10#                               Gait Training                                       Modalities

## 2023-07-17 ENCOUNTER — OFFICE VISIT (OUTPATIENT)
Dept: PHYSICAL THERAPY | Facility: REHABILITATION | Age: 73
End: 2023-07-17
Payer: MEDICARE

## 2023-07-17 DIAGNOSIS — M25.551 CHRONIC RIGHT HIP PAIN: Primary | ICD-10-CM

## 2023-07-17 DIAGNOSIS — Z96.641 STATUS POST TOTAL REPLACEMENT OF RIGHT HIP: ICD-10-CM

## 2023-07-17 DIAGNOSIS — G89.29 CHRONIC RIGHT HIP PAIN: Primary | ICD-10-CM

## 2023-07-17 PROCEDURE — 97140 MANUAL THERAPY 1/> REGIONS: CPT

## 2023-07-17 PROCEDURE — 97530 THERAPEUTIC ACTIVITIES: CPT

## 2023-07-17 PROCEDURE — 97110 THERAPEUTIC EXERCISES: CPT

## 2023-07-17 PROCEDURE — 97112 NEUROMUSCULAR REEDUCATION: CPT

## 2023-07-17 NOTE — PROGRESS NOTES
Daily Note     Today's date: 2023  Patient name: Raquel Dorman  : 1950  MRN: [id number]  Referring provider: LUDWIG Joy  Dx:   Encounter Diagnosis     ICD-10-CM    1. Chronic right hip pain  M25.551     G89.29       2. Status post total replacement of right hip  Z96.641                      Subjective: Pt reports some improvement overall, continues to use step to with gait on steps, but states she did that prior to replacement. Objective: See treatment diary below      Assessment: Tolerated treatment well. Patient would benefit from continued PT.  Pt. able to complete all exercises with no increase in pain during or after session. Pt demonstrates good ROM throughout session. Pt demonstrating improvement with balance. Pt. 1:1 with PTA for entirety. Plan: Continue per plan of care.       Precautions: R MYAH anterior       Manuals 6/12 6/22 6/26 6/30 7/3 7/7 7/10 7/14 7/17    Hip PROM   TB AD TB TB KP 8' AFB KP 8'                                           Neuro Re-Ed             Quad sets HEP 10x 5s hold           Glute sets HEP            SAQ  10x 3s hold 30x; 30s hold          SLR    With strap assist  30x 30x ea 30x  3x10 3x10 3x10    Sidelying hip abd   20x 20x 30x 30x 3x10 3x10 3x10    Sidelying clamshell   30x 30x 30x; otb 30x; otb 3x10 otb 3x10 otb 3x10 otb    Bridge   30x 30x  3" hold  30x  3" hold         Standing marches   30x ea 30x 30x ea        Balance       nv 15"x3 3x15"    Sitting Hip Flexion  10x ea           Education HEP and POC            Ther Ex             HEP Review + Pt Edu  10 min           Nu-step   8 min; lvl 5 8 min:  lvl 5 8 min; lvl 5 8 min; lvl 5 8' L5 8' L5 8' L5    Heel slides HEP            PBall hamstring curl     30x 30x 30x 30x 3x10    LAQ  2x10           [address]             Heel raises  2x10 3x10                                    Ther Activity             FSU  10x, L side up 3x10; 4in 3x10  4 in 3x10; 4in; no UE 3x10; 6in; no UE 3x10 6" 3x10 6" 3x10 6"    LSU    2x10   4 in 2x10; 4in; no UE 2x10; 6in; no UE 2x10 6"  2x10 6" 2x10 6"    STS  Eccentric @ bar, 10x   20x; 8# 3x10; 10# 3x10 10# 3x10 10# 3x10 10#                              Gait Training                                       Modalities

## 2023-07-21 ENCOUNTER — OFFICE VISIT (OUTPATIENT)
Dept: PHYSICAL THERAPY | Facility: REHABILITATION | Age: 73
End: 2023-07-21
Payer: MEDICARE

## 2023-07-21 DIAGNOSIS — Z96.641 STATUS POST TOTAL REPLACEMENT OF RIGHT HIP: ICD-10-CM

## 2023-07-21 DIAGNOSIS — M25.551 CHRONIC RIGHT HIP PAIN: Primary | ICD-10-CM

## 2023-07-21 DIAGNOSIS — G89.29 CHRONIC RIGHT HIP PAIN: Primary | ICD-10-CM

## 2023-07-21 PROCEDURE — 97112 NEUROMUSCULAR REEDUCATION: CPT

## 2023-07-21 PROCEDURE — 97140 MANUAL THERAPY 1/> REGIONS: CPT

## 2023-07-21 PROCEDURE — 97110 THERAPEUTIC EXERCISES: CPT

## 2023-07-21 PROCEDURE — 97530 THERAPEUTIC ACTIVITIES: CPT

## 2023-07-21 NOTE — PROGRESS NOTES
Daily Note     Today's date: 2023  Patient name: Jacky Mcwilliams  : 1950  MRN: [id number]  Referring provider: LUDWIG Moncada  Dx:   Encounter Diagnosis     ICD-10-CM    1. Chronic right hip pain  M25.551     G89.29       2. Status post total replacement of right hip  Z96.641                      Subjective: Pt reports improvement overall, states that home ambulation is going well. Objective: See treatment diary below      Assessment: Tolerated treatment well. Patient would benefit from continued PT.  Pt. able to complete all exercises with no increase in pain during or after session. Pt able to progress exercises this session without complaint. Pt. 1:1 with PTA for entirety. Plan: Continue per plan of care.       Precautions: R MYAH anterior       Manuals 6/12 6/22 6/26 6/30 7/3 7/7 7/10 7/14 7/17 7/21   Hip PROM   TB AD TB TB KP 8' AFB KP 8' KP 8'                                          Neuro Re-Ed             Quad sets HEP 10x 5s hold           Glute sets HEP            SAQ  10x 3s hold 30x; 30s hold          SLR    With strap assist  30x 30x ea 30x  3x10 3x10 3x10 2x10 2#   Sidelying hip abd   20x 20x 30x 30x 3x10 3x10 3x10 2x10 2# R   Sidelying clamshell   30x 30x 30x; otb 30x; otb 3x10 otb 3x10 otb 3x10 otb 3x10 otb   Bridge   30x 30x  3" hold  30x  3" hold         Standing marches   30x ea 30x 30x ea        Balance       nv 15"x3 3x15" 3x20"   Sitting Hip Flexion  10x ea           Education HEP and POC            Ther Ex             HEP Review + Pt Edu  10 min           Nu-step   8 min; lvl 5 8 min:  lvl 5 8 min; lvl 5 8 min; lvl 5 8' L5 8' L5 8' L5 8' L5   Heel slides HEP            PBall hamstring curl     30x 30x 30x 30x 3x10 3x10   LAQ  2x10           [address]             Heel raises  2x10 3x10                                    Ther Activity             FSU  10x, L side up 3x10; 4in 3x10  4 in 3x10; 4in; no UE 3x10; 6in; no UE 3x10 6" 3x10 6" 3x10 6" 3x10 6"   LSU    2x10 4 in 2x10; 4in; no UE 2x10; 6in; no UE 2x10 6"  2x10 6" 2x10 6" 2x10 6"   STS  Eccentric @ bar, 10x   20x; 8# 3x10; 10# 3x10 10# 3x10 10# 3x10 10# 3x10 10#                             Gait Training                                       Modalities

## 2023-07-24 ENCOUNTER — OFFICE VISIT (OUTPATIENT)
Dept: PHYSICAL THERAPY | Facility: REHABILITATION | Age: 73
End: 2023-07-24
Payer: MEDICARE

## 2023-07-24 DIAGNOSIS — M25.551 CHRONIC RIGHT HIP PAIN: Primary | ICD-10-CM

## 2023-07-24 DIAGNOSIS — G89.29 CHRONIC RIGHT HIP PAIN: Primary | ICD-10-CM

## 2023-07-24 DIAGNOSIS — Z96.641 STATUS POST TOTAL REPLACEMENT OF RIGHT HIP: ICD-10-CM

## 2023-07-24 PROCEDURE — 97110 THERAPEUTIC EXERCISES: CPT

## 2023-07-24 PROCEDURE — 97530 THERAPEUTIC ACTIVITIES: CPT

## 2023-07-24 PROCEDURE — 97112 NEUROMUSCULAR REEDUCATION: CPT

## 2023-07-24 NOTE — PROGRESS NOTES
Daily Note     Today's date: 2023  Patient name: Cricket De La Torre  : 1950  MRN: [id number]  Referring provider: LUDWIG Mcknight  Dx:   Encounter Diagnosis     ICD-10-CM    1. Chronic right hip pain  M25.551     G89.29       2. Status post total replacement of right hip  Z96.641                      Subjective: Pt reports improvement overall, notes steps are improving at home. Objective: See treatment diary below      Assessment: Tolerated treatment well. Patient would benefit from continued PT.  Pt. able to complete all exercises with no increase in pain during or after session. Pt able to progress exercises this session without complaint. Pt. 1:1 with PTA for entirety. Plan: Continue per plan of care.       Precautions: R MYAH anterior       Manuals 6/12 6/22 6/26 6/30 7/3 7/7 7/10 7/14 7/17 7/21 7/24   Hip PROM   TB AD TB TB KP 8' AFB KP 8' KP 8' np                                             Neuro Re-Ed              [employer] HEP 10x 5s hold            Glute sets HEP             SAQ  10x 3s hold 30x; 30s hold           SLR    With strap assist  30x 30x ea 30x  3x10 3x10 3x10 2x10 2# 2x15 2# R   Sidelying hip abd   20x 20x 30x 30x 3x10 3x10 3x10 2x10 2# R 2x15 2# R   Sidelying clamshell   30x 30x 30x; otb 30x; otb 3x10 otb 3x10 otb 3x10 otb 3x10 otb 2x15 gtb   Bridge   30x 30x  3" hold  30x  3" hold          Standing marches   30x ea 30x 30x ea         Balance       nv 15"x3 3x15" 3x20" 3x30" on airex   Sitting Hip Flexion  10x ea            Education HEP and POC             Ther Ex              HEP Review + Pt Edu  10 min            Nu-step   8 min; lvl 5 8 min:  lvl 5 8 min; lvl 5 8 min; lvl 5 8' L5 8' L5 8' L5 8' L5 8' L5   Heel slides HEP             PBall hamstring curl     30x 30x 30x 30x 3x10 3x10 2x15   LAQ  2x10            [address]              Heel raises  2x10 3x10                                       Ther Activity              FSU  10x, L side up 3x10; 4in 3x10  4 in 3x10; 4in; no UE 3x10; 6in; no UE 3x10 6" 3x10 6" 3x10 6" 3x10 6" 2x15 9"   LSU    2x10   4 in 2x10; 4in; no UE 2x10; 6in; no UE 2x10 6"  2x10 6" 2x10 6" 2x10 6" 2x10 9"   STS  Eccentric @ bar, 10x   20x; 8# 3x10; 10# 3x10 10# 3x10 10# 3x10 10# 3x10 10# 3x10 10#                               Gait Training                                          Modalities

## 2023-07-27 ENCOUNTER — OFFICE VISIT (OUTPATIENT)
Dept: OBGYN CLINIC | Facility: CLINIC | Age: 73
End: 2023-07-27
Payer: MEDICARE

## 2023-07-27 VITALS
WEIGHT: 176.8 LBS | BODY MASS INDEX: 32.54 KG/M2 | HEART RATE: 103 BPM | HEIGHT: 62 IN | DIASTOLIC BLOOD PRESSURE: 85 MMHG | SYSTOLIC BLOOD PRESSURE: 132 MMHG

## 2023-07-27 DIAGNOSIS — M17.12 PRIMARY OSTEOARTHRITIS OF LEFT KNEE: ICD-10-CM

## 2023-07-27 DIAGNOSIS — M17.11 PRIMARY OSTEOARTHRITIS OF RIGHT KNEE: ICD-10-CM

## 2023-07-27 DIAGNOSIS — Z01.89 ENCOUNTER FOR LOWER EXTREMITY COMPARISON IMAGING STUDY: ICD-10-CM

## 2023-07-27 DIAGNOSIS — Z96.641 STATUS POST TOTAL HIP REPLACEMENT, RIGHT: Primary | ICD-10-CM

## 2023-07-27 PROCEDURE — 20610 DRAIN/INJ JOINT/BURSA W/O US: CPT | Performed by: PHYSICIAN ASSISTANT

## 2023-07-27 RX ORDER — BUPIVACAINE HYDROCHLORIDE 2.5 MG/ML
2 INJECTION, SOLUTION INFILTRATION; PERINEURAL
Status: COMPLETED | OUTPATIENT
Start: 2023-07-27 | End: 2023-07-27

## 2023-07-27 RX ORDER — TRIAMCINOLONE ACETONIDE 40 MG/ML
40 INJECTION, SUSPENSION INTRA-ARTICULAR; INTRAMUSCULAR
Status: COMPLETED | OUTPATIENT
Start: 2023-07-27 | End: 2023-07-27

## 2023-07-27 RX ADMIN — TRIAMCINOLONE ACETONIDE 40 MG: 40 INJECTION, SUSPENSION INTRA-ARTICULAR; INTRAMUSCULAR at 11:45

## 2023-07-27 RX ADMIN — BUPIVACAINE HYDROCHLORIDE 2 ML: 2.5 INJECTION, SOLUTION INFILTRATION; PERINEURAL at 11:45

## 2023-07-27 NOTE — PROGRESS NOTES
Assessment/Plan:  1. Status post total hip replacement, right    2. Primary osteoarthritis of right knee    3. Encounter for lower extremity comparison imaging study      Orders Placed This Encounter   Procedures   • Large joint arthrocentesis   • XR knee 4+ vw right injury   • XR bone length (scanogram)   • XR knee 1 or 2 vw left       · Patient is doing well 5 weeks status post R anterior MYAH on 6/19/23. · Continue PT. She may transition to home exercises once comfortable. · Pain control prn- tylenol. · Complete 6 weeks of ASA DVT ppx. · Patient should call ahead for abx prior to dental appts. · Reviewed plan for R TKA on 10/18/23. Patient will call if she has any questions regarding surgery. She has PCP clearance scheduled for 9/22/23. Consent was previously obtained. · Patient received left knee steroid injection today. Tolerated the procedure well. Post injection instructions reviewed. She is aware she must wait 3 months between CSI and TKA. Return in about 4 weeks (around 8/24/2023) for R MYAH post op 3. I answered all of the patient's questions during the visit and provided education of the patient's condition during the visit. The patient verbalized understanding of the information given and agrees with the plan. This note was dictated using IndigoVision software. It may contain errors including improperly dictated words. Please contact physician directly for any questions. Subjective   Chief Complaint:   Chief Complaint   Patient presents with   • Right Hip - Follow-up   • Right Knee - Pain, Follow-up       Ciro Arroyo is a 74 y.o. female who presents for 5 week follow up s/p right MYAH. Patient states her hip is doing very well. She notes no pain in the hip area. Patient is taking tylenol for pain when needed. She is attending PT and this is going well. She is taking ASA for DVT ppx.      Patient states her knee pain has improved overall since her hip surgery but she still notes soreness and instability in bilateral knees. She would like to proceed with R TKA in October as scheduled. Patient has tried steroid injections in the past which provided some relief. Patient would like to have a left knee steroid injection today as she does not plan to have surgery on the left side in the next 3 months. She has clearance scheduled with her PCP in September. She got dental and cardiac clearance prior to total hip. Review of Systems  ROS:    See [facility] for musculoskeletal review.    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   • Low back pain    • Lumbar stenosis    • Neck pain    • Neuropathy     feet   • No natural teeth     on lower   • Primary localized osteoarthritis of right knee 08/17/2017   • Sacroiliitis ([address]) 8/20/2018   • Skin cancer     on chest in past - melanoma   • Use of cane as ambulatory aid     or walker   • Wears dentures     full  uppers     Past Surgical History:   Procedure Laterality Date   • WA ARTHRP ACETBLR/PROX FEM PROSTC AGRFT/ALGRFT Right 6/19/2023    Procedure: ARTHROPLASTY HIP TOTAL ANTERIOR;  Surgeon: Akbar Baker DO;  Location: [facility] MAIN OR;  Service: Orthopedics   • SKIN CANCER EXCISION      on chest - melanoma   • TUBAL LIGATION       Social History   Social History     Substance and Sexual Activity   Alcohol Use Yes    Comment: occ     Social History     Substance and Sexual Activity   Drug Use Never     Social History     Tobacco Use   Smoking Status Never   Smokeless Tobacco Never     Family History:   Family History   Problem Relation Age of Onset   • Osteoporosis Mother    • Skin cancer Father 54   • Stomach cancer Sister 52   • No Known Problems Daughter    • No Known Problems Maternal Grandmother    • No Known Problems Maternal Grandfather    • No Known Problems Paternal Grandmother    • No Known Problems Paternal Grandfather    • No Known Problems Maternal Aunt    • No Known Problems Maternal Aunt    • No Known Problems Maternal Aunt    • No Known Problems Maternal Aunt    • No Known Problems Paternal Aunt        Current Outpatient Medications on File Prior to Visit   Medication Sig Dispense Refill   • acetaminophen (TYLENOL) 500 mg tablet Take 2 tablets (1,000 mg total) by mouth every 8 (eight) hours  0   • ascorbic acid (VITAMIN C) 500 mg tablet TAKE 1 TABLET (500 MG TOTAL) BY MOUTH DAILY BEGIN 30 DAYS PRIOR TO SURGERY. 90 tablet 1   • aspirin (ECOTRIN) 325 mg EC tablet Take 1 tablet (325 mg total) by mouth 2 (two) times a day Take with food. 84 tablet 0   • benazepril (LOTENSIN) 20 mg tablet TAKE 1 TABLET BY MOUTH EVERY DAY 90 tablet 1   • cholecalciferol (VITAMIN D3) 1,000 units tablet Take 1,000 Units by mouth daily     • DULoxetine (CYMBALTA) 60 mg delayed release capsule TAKE 1 CAPSULE BY MOUTH EVERY DAY 90 capsule 2   • ferrous sulfate 324 (65 Fe) mg TAKE 1 TABLET (324 MG TOTAL) BY MOUTH DAILY BEFORE BREAKFAST BEGIN 30 DAYS PRIOR TO SURGERY. 90 tablet 1   • folic acid (FOLVITE) 1 mg tablet TAKE 1 TABLET (1 MG TOTAL) BY MOUTH DAILY BEGIN 30 DAYS PRIOR TO SURGERY. 90 tablet 1   • gabapentin (Neurontin) 600 MG tablet Take 1 tablet (600 mg total) by mouth 3 (three) times a day 90 tablet 5   • hydrochlorothiazide (HYDRODIURIL) 12.5 mg tablet TAKE 1 TABLET BY MOUTH EVERY DAY 90 tablet 1   • Multiple Vitamins-Minerals (One-Daily Multi-Vit/Mineral) TABS TAKE 1 TABLET BY MOUTH DAILY BEGIN 30 DAYS PRIOR TO SURGERY.  90 tablet 1   • omeprazole (PriLOSEC) 10 mg delayed release capsule Take 10 mg by mouth daily       • rosuvastatin (CRESTOR) 10 MG tablet TAKE 1 TABLET BY MOUTH EVERY DAY 90 tablet 1   • SUPER B COMPLEX/C PO Take 1 tablet by mouth daily       • cyclobenzaprine (FLEXERIL) 10 mg tablet Take 1 tablet (10 mg total) by mouth daily at bedtime 30 tablet 5   • docusate sodium (COLACE) 100 mg capsule Take 1 capsule (100 mg total) by mouth 2 (two) times a day (Patient not taking: Reported on 7/27/2023) 20 capsule 0   • lidocaine (Lidoderm) 5 % Apply 1 patch topically daily Remove & Discard patch within 12 hours or as directed by MD (Patient not taking: Reported on 5/30/2023) 30 patch 2   • oxyCODONE (ROXICODONE) 5 immediate release tablet Take 1 tablet (5 mg total) by mouth every 4 (four) hours as needed for severe pain Max Daily Amount: 30 mg 42 tablet 0     No current facility-administered medications on file prior to visit. Allergies   Allergen Reactions   • Penicillins Hives and Swelling        Objective     /85   Pulse 103   Ht 5' 2" (1.575 m)   Wt 80.2 kg (176 lb 12.8 oz)   BMI 32.34 kg/m²      PE:  AAOx 3  WDWN  Hearing intact, no drainage from eyes  no audible wheezing  no abdominal distension  LE compartments soft, AT/GS intact    Ortho Exam:  right hip:   INC: healed, No erythema, mild swelling  No pain with ROM    Bilateral knees:  No erythema  No ecchymosis  No effusion  +TTP over medial and lateral joint lines  AROM: 3- 115    Imaging:  I have personally reviewed pertinent films in PACS  X-rays right knee- severe osteoarthritis with bone on bone complete loss of joint space and osteophyte formation       Large joint arthrocentesis: L knee  Universal Protocol:  Consent: Verbal consent obtained.   Risks and benefits: risks, benefits and alternatives were discussed  Consent given by: patient  Site marked: the operative site was marked  Supporting Documentation  Indications: pain   Procedure Details  Location: knee - L knee  Preparation: Patient was prepped and draped in the usual sterile fashion  Needle size: 22 G  Ultrasound guidance: no  Approach: anterolateral  Medications administered: 2 mL bupivacaine 0.25 %; 40 mg triamcinolone acetonide 40 mg/mL    Patient tolerance: patient tolerated the procedure well with no immediate complications  Dressing:  Sterile dressing applied              Scribe Attestation    I,:  Geri Ragsdale PA-C am acting as a scribe while in the presence of the attending physician.:       I,:  Diana Mai DO personally performed the services described in this documentation    as scribed in my presence.:

## 2023-07-28 ENCOUNTER — OFFICE VISIT (OUTPATIENT)
Dept: PHYSICAL THERAPY | Facility: REHABILITATION | Age: 73
End: 2023-07-28
Payer: MEDICARE

## 2023-07-28 DIAGNOSIS — M25.551 CHRONIC RIGHT HIP PAIN: Primary | ICD-10-CM

## 2023-07-28 DIAGNOSIS — Z96.641 STATUS POST TOTAL REPLACEMENT OF RIGHT HIP: ICD-10-CM

## 2023-07-28 DIAGNOSIS — G89.29 CHRONIC RIGHT HIP PAIN: Primary | ICD-10-CM

## 2023-07-28 PROCEDURE — 97110 THERAPEUTIC EXERCISES: CPT

## 2023-07-28 PROCEDURE — 97112 NEUROMUSCULAR REEDUCATION: CPT

## 2023-07-28 PROCEDURE — 97530 THERAPEUTIC ACTIVITIES: CPT

## 2023-07-28 NOTE — PROGRESS NOTES
Daily Note     Today's date: 2023  Patient name: Becky Mayberry  : 1950  MRN: [id number]  Referring provider: LUDWIG Lewis  Dx:   Encounter Diagnosis     ICD-10-CM    1. Chronic right hip pain  M25.551     G89.29       2. Status post total replacement of right hip  Z96.641           Start Time: 915  Stop Time: 1000  Total time in clinic (min): 45 minutes    Subjective: Patient reports that her hip is feeling pretty good. Patient rates pain as 0/10. Objective: See treatment diary below      Assessment: Tolerated treatment well. Patient participated in skilled PT session focused on strengthening, stretching, and ROM. Patient able to complete exercise program with no sx. Patient continues to ambulate throughout clinic without assistive device; with no antalgic gait pattern. Continue progress RLE strengthening exercises and balance. Patient would continue to benefit from skilled PT interventions to address strengthening, stretching, and ROM. Patient demonstrated fatigue post treatment and exhibited good technique with therapeutic exercises      Plan: Continue per plan of care.       Precautions: R MYAH anterior       Manuals 7/28 6/22 6/26 6/30 7/3 7/7 7/10 7/14 7/17 7/21 7/24   Hip PROM   TB AD TB TB KP 8' AFB KP 8' KP 8' np                                             Neuro Re-Ed              [employer]  10x 5s hold            Glute sets              SAQ  10x 3s hold 30x; 30s hold           SLR 2# R 2x15   With strap assist  30x 30x ea 30x  3x10 3x10 3x10 2x10 2# 2x15 2# R   Sidelying hip abd 2# R 2x15  20x 20x 30x 30x 3x10 3x10 3x10 2x10 2# R 2x15 2# R   Sidelying clamshell GTB 30x  30x 30x 30x; otb 30x; otb 3x10 otb 3x10 otb 3x10 otb 3x10 otb 2x15 gtb   Bridge   30x 30x  3" hold  30x  3" hold          Standing marches   30x ea 30x 30x ea         Balance SLS on airex 30" 4x       nv 15"x3 3x15" 3x20" 3x30" on airex   Sitting Hip Flexion  10x ea            Education Ther Ex              HEP Review + Pt Edu  10 min            Nu-step L5 8 min  8 min; lvl 5 8 min:  lvl 5 8 min; lvl 5 8 min; lvl 5 8' L5 8' L5 8' L5 8' L5 8' L5   Heel slides              PBall hamstring curl 30x    30x 30x 30x 30x 3x10 3x10 2x15   LAQ  2x10            [address]              Heel raises  2x10 3x10                                       Ther Activity              FSU 9" step 3x10  10x, L side up 3x10; 4in 3x10  4 in 3x10; 4in; no UE 3x10; 6in; no UE 3x10 6" 3x10 6" 3x10 6" 3x10 6" 2x15 9"   LSU 9" step 2x10   2x10   4 in 2x10; 4in; no UE 2x10; 6in; no UE 2x10 6"  2x10 6" 2x10 6" 2x10 6" 2x10 9"   STS 10# 3x10 Eccentric @ bar, 10x   20x; 8# 3x10; 10# 3x10 10# 3x10 10# 3x10 10# 3x10 10# 3x10 10#                               Gait Training                                          Modalities

## 2023-07-29 DIAGNOSIS — R60.0 LOWER EXTREMITY EDEMA: ICD-10-CM

## 2023-07-29 DIAGNOSIS — M51.16 LUMBAR DISC HERNIATION WITH RADICULOPATHY: ICD-10-CM

## 2023-07-29 DIAGNOSIS — I10 BENIGN ESSENTIAL HTN: ICD-10-CM

## 2023-07-29 DIAGNOSIS — Z96.641 STATUS POST TOTAL HIP REPLACEMENT, RIGHT: ICD-10-CM

## 2023-07-29 RX ORDER — HYDROCHLOROTHIAZIDE 12.5 MG/1
TABLET ORAL
Qty: 90 TABLET | Refills: 1 | Status: SHIPPED | OUTPATIENT
Start: 2023-07-29

## 2023-07-31 RX ORDER — ASPIRIN 325 MG
TABLET, DELAYED RELEASE (ENTERIC COATED) ORAL
Qty: 84 TABLET | Refills: 0 | OUTPATIENT
Start: 2023-07-31

## 2023-07-31 RX ORDER — GABAPENTIN 600 MG/1
600 TABLET ORAL 3 TIMES DAILY
Qty: 90 TABLET | Refills: 5 | OUTPATIENT
Start: 2023-07-31

## 2023-08-01 ENCOUNTER — OFFICE VISIT (OUTPATIENT)
Dept: PHYSICAL THERAPY | Facility: REHABILITATION | Age: 73
End: 2023-08-01
Payer: MEDICARE

## 2023-08-01 DIAGNOSIS — G89.29 CHRONIC RIGHT HIP PAIN: Primary | ICD-10-CM

## 2023-08-01 DIAGNOSIS — M25.551 CHRONIC RIGHT HIP PAIN: Primary | ICD-10-CM

## 2023-08-01 DIAGNOSIS — Z96.641 STATUS POST TOTAL REPLACEMENT OF RIGHT HIP: ICD-10-CM

## 2023-08-01 PROCEDURE — 97530 THERAPEUTIC ACTIVITIES: CPT | Performed by: PHYSICAL THERAPIST

## 2023-08-01 PROCEDURE — 97110 THERAPEUTIC EXERCISES: CPT | Performed by: PHYSICAL THERAPIST

## 2023-08-01 NOTE — PROGRESS NOTES
PT Discharge     Today's date: 2023  Patient name: Nichole Palmer  : 1950  MRN: [id number]  Referring provider: LUDWIG Brenner  Dx:   Encounter Diagnosis     ICD-10-CM    1. Chronic right hip pain  M25.551     G89.29       2. Status post total replacement of right hip  Z96.641                      Subjective: Patient reports feeling very good. She is able to perform community activities, and stairs without limitation. Pain: 0/10  Objective: See treatment diary below    Hip Passive Range of Motion: WFL all planes    5x STS: <15s     TUG: <13s; no AD    Manual Muscle Testing:   Hip Flexion (SLR):  R  4+/5    Hip Extension:  R  4+/5     Hip Abduction:  R  4+/5     Hip IR:    R  4+/5    Hip ER:   R  4+/5       Assessment: Nichole Palmer has been compliant with attending PT and home exercise program since initial eval.  Adriana Graves  has made improvements in objective data since initial evalulation and has achieved all goals. Patient reports having returned to their prior level or function. Patient provided with updated Home Exercise Program, all questions answered, verbalized understanding and agreement to plan of care. Thus it was mutually decided to discontinue this episode of care and transition to Home Exercise Program.    Goals  Impairment based goals  Patient will demonstrate full hip PROM. Complete  Patient will demonstrate >4/5 LE MMT. Complete  Patient will perform 5x STS in <15s. Complete  Patient will perform TUG in <13s. Complete    Function based goals  Patient will be independent in comprehensive HEP upon discharge. Complete  Patient will achieve goal FOTO score upon discharge. Complete  Patient will perform community ambulation without limitation. Complete  Patient will negotiate stairs without limitation. Complete  Patient will perform heavy chores at home without limitation. Complete    Plan: D/C to HEP.       Precautions: R MYAH anterior       Manuals 7/28 8/1 6/22 6/26 6/30 7/3 7/7 7/10 7/14 7/17 7/21 7/24   Hip PROM    TB AD TB TB KP 8' AFB KP 8' KP 8' np                                                Neuro Re-Ed               [employer]   10x 5s hold            Glute sets               SAQ   10x 3s hold 30x; 30s hold           SLR 2# R 2x15 2# R 2x15   With strap assist  30x 30x ea 30x  3x10 3x10 3x10 2x10 2# 2x15 2# R   Sidelying hip abd 2# R 2x15 2# R 2x15  20x 20x 30x 30x 3x10 3x10 3x10 2x10 2# R 2x15 2# R   Sidelying clamshell GTB 30x GTB 30x  30x 30x 30x; otb 30x; otb 3x10 otb 3x10 otb 3x10 otb 3x10 otb 2x15 gtb   Bridge    30x 30x  3" hold  30x  3" hold          Standing marches    30x ea 30x 30x ea         Balance SLS on airex 30" 4x        nv 15"x3 3x15" 3x20" 3x30" on airex   Sitting Hip Flexion   10x ea            Education               Ther Ex               HEP Review + Pt Edu   10 min            Nu-step L5 8 min 8 min; lvl 7  8 min; lvl 5 8 min:  lvl 5 8 min; lvl 5 8 min; lvl 5 8' L5 8' L5 8' L5 8' L5 8' L5   Heel slides               PBall hamstring curl 30x     30x 30x 30x 30x 3x10 3x10 2x15   LAQ   2x10            [address]               Heel raises   2x10 3x10                                         Ther Activity               FSU 9" step 3x10  20x; 9in 10x, L side up 3x10; 4in 3x10  4 in 3x10; 4in; no UE 3x10; 6in; no UE 3x10 6" 3x10 6" 3x10 6" 3x10 6" 2x15 9"   LSU 9" step 2x10 20x; 9in   2x10   4 in 2x10; 4in; no UE 2x10; 6in; no UE 2x10 6"  2x10 6" 2x10 6" 2x10 6" 2x10 9"   STS 10# 3x10  Eccentric @ bar, 10x   20x; 8# 3x10; 10# 3x10 10# 3x10 10# 3x10 10# 3x10 10# 3x10 10#                                 Gait Training                                             Modalities

## 2023-08-02 ENCOUNTER — TELEPHONE (OUTPATIENT)
Age: 73
End: 2023-08-02

## 2023-08-02 DIAGNOSIS — M51.16 LUMBAR DISC HERNIATION WITH RADICULOPATHY: ICD-10-CM

## 2023-08-02 DIAGNOSIS — M47.816 LUMBAR SPONDYLOSIS: ICD-10-CM

## 2023-08-02 RX ORDER — CYCLOBENZAPRINE HCL 10 MG
10 TABLET ORAL
Qty: 180 TABLET | Refills: 0 | Status: SHIPPED | OUTPATIENT
Start: 2023-08-02 | End: 2023-09-01

## 2023-08-02 RX ORDER — GABAPENTIN 600 MG/1
600 TABLET ORAL 3 TIMES DAILY
Qty: 90 TABLET | Refills: 0 | Status: SHIPPED | OUTPATIENT
Start: 2023-08-02

## 2023-08-02 NOTE — TELEPHONE ENCOUNTER
Medication Cyclobenzaprine 10mg  Dosage 10mg   Qty 180/90dsp  Remaining left unknown  Pharmacy [facility] [address] ([facility])

## 2023-08-02 NOTE — TELEPHONE ENCOUNTER
Medication Gabapentin 600mg    Quantity 90    Pharmacy [facility] in [facility]    PCP/Speciality S&P    Enough for 3 days - No, patient is out of medication.

## 2023-08-03 ENCOUNTER — APPOINTMENT (OUTPATIENT)
Dept: PHYSICAL THERAPY | Facility: REHABILITATION | Age: 73
End: 2023-08-03
Payer: MEDICARE

## 2023-08-04 ENCOUNTER — OFFICE VISIT (OUTPATIENT)
Dept: PAIN MEDICINE | Facility: MEDICAL CENTER | Age: 73
End: 2023-08-04

## 2023-08-04 VITALS
WEIGHT: 173 LBS | SYSTOLIC BLOOD PRESSURE: 123 MMHG | HEART RATE: 103 BPM | DIASTOLIC BLOOD PRESSURE: 69 MMHG | BODY MASS INDEX: 31.83 KG/M2 | OXYGEN SATURATION: 97 % | HEIGHT: 62 IN

## 2023-08-04 DIAGNOSIS — M47.816 LUMBAR SPONDYLOSIS: ICD-10-CM

## 2023-08-04 DIAGNOSIS — M54.16 LUMBAR RADICULOPATHY: ICD-10-CM

## 2023-08-04 DIAGNOSIS — M17.0 PRIMARY OSTEOARTHRITIS OF BOTH KNEES: ICD-10-CM

## 2023-08-04 DIAGNOSIS — M79.18 MYOFASCIAL PAIN SYNDROME: ICD-10-CM

## 2023-08-04 DIAGNOSIS — G89.4 CHRONIC PAIN SYNDROME: Primary | ICD-10-CM

## 2023-08-04 NOTE — PROGRESS NOTES
Assessment:  1. Chronic pain syndrome    2. Lumbar spondylosis    3. Lumbar radiculopathy    4. Myofascial pain syndrome    5. Primary osteoarthritis of both knees        Plan:  While the patient was in the office today, I did have a thorough conversation regarding their chronic pain syndrome, medication management, and treatment plan options. The patient's pain remains well controlled on the current medication regimen of gabapentin 600 mg 3 times daily and cyclobenzaprine 10 mg nightly as needed. Patient denies any side effects from the medication regimen. We will continue the medications as prescribed; she does not require refills on today's visit. The patient continues to follow-up with our orthopedic department, status post right total hip arthroplasty and is doing quite well. She is scheduled for the right total knee arthroplasty in October. Consider right SI joint injection in the future if indicated. Otherwise the patient will follow-up in 6 months or sooner if needed. My impressions and treatment recommendations were discussed in detail with the patient who verbalized understanding and had no further questions. Discharge instructions were provided. I personally saw and examined the patient and I agree with the above discussed plan of care. No orders of the defined types were placed in this encounter. No orders of the defined types were placed in this encounter. History of Present Illness:  Elesa Galeazzi is a 74 y.o. female who presents for a follow up office visit in regards to chronic pain. The patient’s current symptoms include chronic neck pain and low back pain that she presently rates a 2 out of 10 on the pain scale and describes it as an occasional burning and dull, aching type of pain. She underwent right total hip arthroplasty recently and has recovered nicely from that. She is scheduled for right total knee in October.   Due to her antalgic gait pattern, she does note some slight increase in right-sided sacroiliac mediated pain. Overall however she remains clinically stable on the current medication regimen of gabapentin and cyclobenzaprine. She denies any side effects and has no new symptoms or acute issues on today's visit. I have personally reviewed and/or updated the patient's past medical history, past surgical history, family history, social history, current medications, allergies, and vital signs today. Review of Systems   Respiratory: Negative for shortness of breath. Cardiovascular: Negative for chest pain. Gastrointestinal: Negative for constipation, diarrhea, nausea and vomiting. Musculoskeletal: Positive for back pain, joint swelling, myalgias and neck pain. Negative for arthralgias and gait problem. Skin: Negative for rash. Neurological: Negative for dizziness, seizures and weakness. All other systems reviewed and are negative.       Patient Active Problem List   Diagnosis   • Primary osteoarthritis of both knees   • Lumbar disc disease with radiculopathy   • Chronic right-sided low back pain with right-sided sciatica   • Fibromyalgia   • Benign essential HTN   • Fatty liver   • Gastroesophageal reflux disease without esophagitis   • Lumbar radiculopathy   • Pes anserine bursitis   • Lower extremity edema   • Cervical radiculopathy   • Cervical spine arthritis   • Cervical spinal stenosis   • Lumbar spondylosis   • Current mild episode of major depressive disorder without prior episode (HCC)   • Mixed hyperlipidemia   • IFG (impaired fasting glucose)   • Degenerative disc disease, cervical   • Diabetes mellitus type 2 in obese [facility])   • Spasm of right piriformis muscle   • Primary osteoarthritis of right hip   • Spinal stenosis of lumbar region with neurogenic claudication   • Encounter for geriatric assessment   • Status post total hip replacement, right       Past Medical History:   Diagnosis Date   • Arthritis    • Fatty liver 05/01/2018 • Fibromyalgia, primary    • GERD (gastroesophageal reflux disease)    • Hyperlipidemia    • Hypertension    • 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   • Low back pain    • Lumbar stenosis    • Neck pain    • Neuropathy     feet   • No natural teeth     on lower   • Primary localized osteoarthritis of right knee 08/17/2017   • Sacroiliitis ([address]) 8/20/2018   • Skin cancer     on chest in past - melanoma   • Use of cane as ambulatory aid     or walker   • Wears dentures     full  uppers       Past Surgical History:   Procedure Laterality Date   • CT ARTHRP ACETBLR/PROX FEM PROSTC AGRFT/ALGRFT Right 6/19/2023    Procedure: ARTHROPLASTY HIP TOTAL ANTERIOR;  Surgeon: Lena Ly DO;  Location: [address] [place] [address];  Service: Orthopedics   • SKIN CANCER EXCISION      on chest - melanoma   • TUBAL LIGATION         Family History   Problem Relation Age of Onset   • Osteoporosis Mother    • Skin cancer Father 54   • Stomach cancer Sister 52   • No Known Problems Daughter    • No Known Problems Maternal Grandmother    • No Known Problems Maternal Grandfather    • No Known Problems Paternal Grandmother    • No Known Problems Paternal Grandfather    • No Known Problems Maternal Aunt    • No Known Problems Maternal Aunt    • No Known Problems Maternal Aunt    • No Known Problems Maternal Aunt    • No Known Problems Paternal Aunt        Social History     Occupational History   • Not on file   Tobacco Use   • Smoking status: Never   • Smokeless tobacco: Never   Vaping Use   • Vaping Use: Never used   Substance and Sexual Activity   • Alcohol use: Yes     Comment: occ   • Drug use: Never   • Sexual activity: Not Currently       Current Outpatient Medications on File Prior to Visit   Medication Sig   • acetaminophen (TYLENOL) 500 mg tablet Take 2 tablets (1,000 mg total) by mouth every 8 (eight) hours   • ascorbic acid (VITAMIN C) 500 mg tablet TAKE 1 TABLET (500 MG TOTAL) BY MOUTH DAILY BEGIN 30 DAYS PRIOR TO SURGERY. • aspirin (ECOTRIN) 325 mg EC tablet Take 1 tablet (325 mg total) by mouth 2 (two) times a day Take with food. • benazepril (LOTENSIN) 20 mg tablet TAKE 1 TABLET BY MOUTH EVERY DAY   • cholecalciferol (VITAMIN D3) 1,000 units tablet Take 1,000 Units by mouth daily   • cyclobenzaprine (FLEXERIL) 10 mg tablet Take 1 tablet (10 mg total) by mouth daily at bedtime   • DULoxetine (CYMBALTA) 60 mg delayed release capsule TAKE 1 CAPSULE BY MOUTH EVERY DAY   • gabapentin (Neurontin) 600 MG tablet Take 1 tablet (600 mg total) by mouth 3 (three) times a day   • hydrochlorothiazide (HYDRODIURIL) 12.5 mg tablet TAKE 1 TABLET BY MOUTH EVERY DAY   • Multiple Vitamins-Minerals (One-Daily Multi-Vit/Mineral) TABS TAKE 1 TABLET BY MOUTH DAILY BEGIN 30 DAYS PRIOR TO SURGERY. • omeprazole (PriLOSEC) 10 mg delayed release capsule Take 10 mg by mouth daily     • rosuvastatin (CRESTOR) 10 MG tablet TAKE 1 TABLET BY MOUTH EVERY DAY   • SUPER B COMPLEX/C PO Take 1 tablet by mouth daily     • docusate sodium (COLACE) 100 mg capsule Take 1 capsule (100 mg total) by mouth 2 (two) times a day (Patient not taking: Reported on 7/27/2023)   • ferrous sulfate 324 (65 Fe) mg TAKE 1 TABLET (324 MG TOTAL) BY MOUTH DAILY BEFORE BREAKFAST BEGIN 30 DAYS PRIOR TO SURGERY. (Patient not taking: Reported on 9/8/7749)   • folic acid (FOLVITE) 1 mg tablet TAKE 1 TABLET (1 MG TOTAL) BY MOUTH DAILY BEGIN 30 DAYS PRIOR TO SURGERY. (Patient not taking: Reported on 8/4/2023)   • lidocaine (Lidoderm) 5 % Apply 1 patch topically daily Remove & Discard patch within 12 hours or as directed by MD (Patient not taking: Reported on 5/30/2023)   • oxyCODONE (ROXICODONE) 5 immediate release tablet Take 1 tablet (5 mg total) by mouth every 4 (four) hours as needed for severe pain Max Daily Amount: 30 mg     No current facility-administered medications on file prior to visit.        Allergies   Allergen Reactions   • Penicillins Hives and Swelling       Physical Exam:    /69   Pulse 103   Ht 5' 2" (1.575 m)   Wt 78.5 kg (173 lb)   SpO2 97%   BMI 31.64 kg/m²     Constitutional:normal, well developed, well nourished, alert, in no distress and non-toxic and no overt pain behavior.   Eyes:anicteric  HEENT:grossly intact  Neck:supple, symmetric, trachea midline and no masses   Pulmonary:even and unlabored  Cardiovascular:No edema or pitting edema present  Skin:Normal without rashes or lesions and well hydrated  Psychiatric:Mood and affect appropriate  Neurologic:Cranial Nerves II-XII grossly intact  Musculoskeletal:normal    Imaging

## 2023-08-04 NOTE — PATIENT INSTRUCTIONS
Gabapentin (By mouth)   Gabapentin (kalia-a-PEN-tin)  Treats seizures and pain caused by shingles. Brand Name(s): FusePaq Fanatrex, Neurontin   There may be other brand names for this medicine. When This Medicine Should Not Be Used: This medicine is not right for everyone. Do not use it if you had an allergic reaction to gabapentin. How to Use This Medicine:   Capsule, Liquid, Tablet  Take your medicine as directed. Your dose may need to be changed several times to find what works best for you. If you have epilepsy, do not allow more than 12 hours to pass between doses. Capsule: Swallow the capsule whole with plenty of water. Do not open, crush, or chew it. Gralise® tablet: Swallow the tablet whole . Do not crush, break, or chew it. Neurontin® tablet: If you break a tablet into 2 pieces, use the second half as your next dose. Do not use the half-tablet if the whole tablet has been cut or broken after 28 days. Oral liquid: Measure the oral liquid medicine with a marked measuring spoon, oral syringe, or medicine cup. This medicine should come with a Medication Guide. Ask your pharmacist for a copy if you do not have one. Missed dose: Take a dose as soon as you remember. If it is almost time for your next dose, wait until then and take a regular dose. Do not take extra medicine to make up for a missed dose. Store the medicine in a closed container at room temperature, away from heat, moisture, and direct light. Store the Neurontin® oral liquid in the refrigerator. Do not freeze. Drugs and Foods to Avoid:   Ask your doctor or pharmacist before using any other medicine, including over-the-counter medicines, vitamins, and herbal products. Some medicines can affect how gabapentin works. Tell your doctor if you also using hydrocodone or morphine. If you take an antacid, wait at least 2 hours before you take gabapentin. Do not drink alcohol while you are using this medicine.   Tell your doctor if you use anything else that makes you sleepy. Some examples are allergy medicine, narcotic pain medicine, and alcohol. Tell your doctor if you are also using lorazepam, oxycodone, or zolpidem. Warnings While Using This Medicine:   Tell your doctor if you are pregnant or breastfeeding, or if you have kidney problems (including patients receiving dialysis) or lung problems. Tell your doctor if you have a history of depression or mental health problems. This medicine may cause the following problems:  Drug reaction with eosinophilia and systemic symptoms (DRESS) or multiorgan hypersensitivity, which may damage the liver, kidney, blood, heart, or muscles  Changes in mood or behavior, including suicidal thoughts or behavior  Respiratory depression (serious breathing problem that can be life-threatening), when used with narcotic pain medicines  Do not stop using this medicine suddenly. Your doctor will need to slowly decrease your dose before you stop it completely. This medicine may make you dizzy or drowsy. Do not drive or do anything else that could be dangerous until you know how this medicine affects you. Tell any doctor or dentist who treats you that you are using this medicine. This medicine may affect certain medical test results. Your doctor will check your progress and the effects of this medicine at regular visits. Keep all appointments. Keep all medicine out of the reach of children. Never share your medicine with anyone. Possible Side Effects While Using This Medicine:   Call your doctor right away if you notice any of these side effects:   Allergic reaction: Itching or hives, swelling in your face or hands, swelling or tingling in your mouth or throat, chest tightness, trouble breathing  Behavior problems, aggression, restlessness, trouble concentrating, moodiness (especially in children)  Blistering, peeling, red skin rash  Blue lips, fingernails, or skin, chest pain, fast heartbeat, trouble breathing  Change in how much or how often you urinate, bloody or cloudy urine  Dark urine or pale stools, nausea, vomiting, loss of appetite, stomach pain, yellow skin or eyes  Fever, chills, cough, sore throat, body aches  Problems with coordination, shakiness, unsteadiness, unusual eye movement  Rapid weight gain, swelling in your hands, ankles, or feet  Rash, swollen or tender glands in the neck, armpit, or groin  Unusual moods or behaviors, thoughts of hurting yourself, feeling depressed  If you notice these less serious side effects, talk with your doctor:   Dizziness, drowsiness, sleepiness, tiredness  If you notice other side effects that you think are caused by this medicine, tell your doctor. Call your doctor for medical advice about side effects. You may report side effects to FDA at 6-132-FDA-3369  © Copyright Eduarda Olive 2022 Information is for End User's use only and may not be sold, redistributed or otherwise used for commercial purposes. The above information is an  only. It is not intended as medical advice for individual conditions or treatments. Talk to your doctor, nurse or pharmacist before following any medical regimen to see if it is safe and effective for you.

## 2023-08-07 ENCOUNTER — APPOINTMENT (OUTPATIENT)
Dept: PHYSICAL THERAPY | Facility: REHABILITATION | Age: 73
End: 2023-08-07
Payer: MEDICARE

## 2023-08-11 ENCOUNTER — APPOINTMENT (OUTPATIENT)
Dept: PHYSICAL THERAPY | Facility: REHABILITATION | Age: 73
End: 2023-08-11
Payer: MEDICARE

## 2023-08-14 ENCOUNTER — APPOINTMENT (OUTPATIENT)
Dept: PHYSICAL THERAPY | Facility: REHABILITATION | Age: 73
End: 2023-08-14
Payer: MEDICARE

## 2023-08-18 ENCOUNTER — APPOINTMENT (OUTPATIENT)
Dept: PHYSICAL THERAPY | Facility: REHABILITATION | Age: 73
End: 2023-08-18
Payer: MEDICARE

## 2023-08-22 ENCOUNTER — APPOINTMENT (OUTPATIENT)
Dept: PHYSICAL THERAPY | Facility: REHABILITATION | Age: 73
End: 2023-08-22
Payer: MEDICARE

## 2023-08-24 ENCOUNTER — OFFICE VISIT (OUTPATIENT)
Dept: OBGYN CLINIC | Facility: CLINIC | Age: 73
End: 2023-08-24

## 2023-08-24 VITALS
WEIGHT: 175.2 LBS | SYSTOLIC BLOOD PRESSURE: 126 MMHG | HEIGHT: 62 IN | BODY MASS INDEX: 32.24 KG/M2 | HEART RATE: 79 BPM | DIASTOLIC BLOOD PRESSURE: 78 MMHG

## 2023-08-24 DIAGNOSIS — Z96.641 STATUS POST TOTAL HIP REPLACEMENT, RIGHT: Primary | ICD-10-CM

## 2023-08-24 DIAGNOSIS — M17.11 PRIMARY OSTEOARTHRITIS OF RIGHT KNEE: ICD-10-CM

## 2023-08-24 PROCEDURE — 99024 POSTOP FOLLOW-UP VISIT: CPT | Performed by: ORTHOPAEDIC SURGERY

## 2023-08-24 NOTE — PROGRESS NOTES
Assessment/Plan:  1. Status post total hip replacement, right    2. Primary osteoarthritis of right knee      No orders of the defined types were placed in this encounter. 9 week follow up s/p right MYAH performed on 06/19/2023  Transition to home exercises  Pain control prn- OTC pain meds  Patient should call ahead for abx prior to dental appts. Proceed with right total knee arthroplasty as previously scheduled for 10/18/2023    Return for 2 weeks after right total knee surgery. I answered all of the patient's questions during the visit and provided education of the patient's condition during the visit. The patient verbalized understanding of the information given and agrees with the plan. This note was dictated using Limundo software. It may contain errors including improperly dictated words. Please contact physician directly for any questions. Subjective   Chief Complaint: No chief complaint on file. Salud Whitley is a 74 y.o. female who presents for 9 week follow up s/p right MYAH performed on 06/19/2023. She reports doing well overall. She notes some discomfort over the distal aspect of her incision. She is happy with her progress thus far. She is planning for her right total knee arthroplasty in October. Review of Systems  ROS:    See HPI for musculoskeletal review.    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   • Low back pain    • Lumbar stenosis    • Neck pain    • Neuropathy     feet   • No natural teeth     on lower   • Primary localized osteoarthritis of right knee 08/17/2017   • Sacroiliitis ([address]) 8/20/2018   • Skin cancer     on chest in past - melanoma   • Use of cane as ambulatory aid     or walker   • Wears dentures full  uppers     Past Surgical History:   Procedure Laterality Date   • TX ARTHRP ACETBLR/PROX FEM PROSTC AGRFT/ALGRFT Right 6/19/2023    Procedure: ARTHROPLASTY HIP TOTAL ANTERIOR;  Surgeon: Magno Campuzano DO;  Location: [address] [place] [address] MAIN OR;  Service: Orthopedics   • SKIN CANCER EXCISION      on chest - melanoma   • TUBAL LIGATION       Social History   Social History     Substance and Sexual Activity   Alcohol Use Yes    Comment: occ     Social History     Substance and Sexual Activity   Drug Use Never     Social History     Tobacco Use   Smoking Status Never   Smokeless Tobacco Never     Family History:   Family History   Problem Relation Age of Onset   • Osteoporosis Mother    • Skin cancer Father 54   • Stomach cancer Sister 52   • No Known Problems Daughter    • No Known Problems Maternal Grandmother    • No Known Problems Maternal Grandfather    • No Known Problems Paternal Grandmother    • No Known Problems Paternal Grandfather    • No Known Problems Maternal Aunt    • No Known Problems Maternal Aunt    • No Known Problems Maternal Aunt    • No Known Problems Maternal Aunt    • No Known Problems Paternal Aunt        Current Outpatient Medications on File Prior to Visit   Medication Sig Dispense Refill   • acetaminophen (TYLENOL) 500 mg tablet Take 2 tablets (1,000 mg total) by mouth every 8 (eight) hours  0   • ascorbic acid (VITAMIN C) 500 mg tablet TAKE 1 TABLET (500 MG TOTAL) BY MOUTH DAILY BEGIN 30 DAYS PRIOR TO SURGERY. 90 tablet 1   • aspirin (ECOTRIN) 325 mg EC tablet Take 1 tablet (325 mg total) by mouth 2 (two) times a day Take with food.  84 tablet 0   • benazepril (LOTENSIN) 20 mg tablet TAKE 1 TABLET BY MOUTH EVERY DAY 90 tablet 1   • cholecalciferol (VITAMIN D3) 1,000 units tablet Take 1,000 Units by mouth daily     • cyclobenzaprine (FLEXERIL) 10 mg tablet Take 1 tablet (10 mg total) by mouth daily at bedtime 180 tablet 0   • docusate sodium (COLACE) 100 mg capsule Take 1 capsule (100 mg total) by mouth 2 (two) times a day (Patient not taking: Reported on 7/27/2023) 20 capsule 0   • DULoxetine (CYMBALTA) 60 mg delayed release capsule TAKE 1 CAPSULE BY MOUTH EVERY DAY 90 capsule 2   • ferrous sulfate 324 (65 Fe) mg TAKE 1 TABLET (324 MG TOTAL) BY MOUTH DAILY BEFORE BREAKFAST BEGIN 30 DAYS PRIOR TO SURGERY. (Patient not taking: Reported on 8/4/2023) 90 tablet 1   • folic acid (FOLVITE) 1 mg tablet TAKE 1 TABLET (1 MG TOTAL) BY MOUTH DAILY BEGIN 30 DAYS PRIOR TO SURGERY. (Patient not taking: Reported on 8/4/2023) 90 tablet 1   • gabapentin (Neurontin) 600 MG tablet Take 1 tablet (600 mg total) by mouth 3 (three) times a day 90 tablet 0   • hydrochlorothiazide (HYDRODIURIL) 12.5 mg tablet TAKE 1 TABLET BY MOUTH EVERY DAY 90 tablet 1   • lidocaine (Lidoderm) 5 % Apply 1 patch topically daily Remove & Discard patch within 12 hours or as directed by MD (Patient not taking: Reported on 5/30/2023) 30 patch 2   • Multiple Vitamins-Minerals (One-Daily Multi-Vit/Mineral) TABS TAKE 1 TABLET BY MOUTH DAILY BEGIN 30 DAYS PRIOR TO SURGERY. 90 tablet 1   • omeprazole (PriLOSEC) 10 mg delayed release capsule Take 10 mg by mouth daily       • oxyCODONE (ROXICODONE) 5 immediate release tablet Take 1 tablet (5 mg total) by mouth every 4 (four) hours as needed for severe pain Max Daily Amount: 30 mg 42 tablet 0   • rosuvastatin (CRESTOR) 10 MG tablet TAKE 1 TABLET BY MOUTH EVERY DAY 90 tablet 1   • SUPER B COMPLEX/C PO Take 1 tablet by mouth daily         No current facility-administered medications on file prior to visit.      Allergies   Allergen Reactions   • Penicillins Hives and Swelling        Objective     /78   Pulse 79   Ht 5' 2" (1.575 m)   Wt 79.5 kg (175 lb 3.2 oz)   BMI 32.04 kg/m²      PE:  AAOx 3  WDWN  Hearing intact, no drainage from eyes  no audible wheezing  no abdominal distension  LE compartments soft, AT/GS intact    Ortho Exam:  right hip:   INC: healed, No erythema  No pain with ROM      Scribe Attestation    I,:  Wanda Neil am acting as a scribe while in the presence of the attending physician.:       I,:  Ramone Stevens, DO personally performed the services described in this documentation    as scribed in my presence.:

## 2023-08-25 ENCOUNTER — APPOINTMENT (OUTPATIENT)
Dept: PHYSICAL THERAPY | Facility: REHABILITATION | Age: 73
End: 2023-08-25
Payer: MEDICARE

## 2023-08-28 ENCOUNTER — APPOINTMENT (OUTPATIENT)
Dept: PHYSICAL THERAPY | Facility: REHABILITATION | Age: 73
End: 2023-08-28
Payer: MEDICARE

## 2023-09-04 DIAGNOSIS — M51.16 LUMBAR DISC HERNIATION WITH RADICULOPATHY: ICD-10-CM

## 2023-09-05 RX ORDER — GABAPENTIN 600 MG/1
600 TABLET ORAL 3 TIMES DAILY
Qty: 90 TABLET | Refills: 0 | OUTPATIENT
Start: 2023-09-05

## 2023-09-06 DIAGNOSIS — M51.16 LUMBAR DISC HERNIATION WITH RADICULOPATHY: ICD-10-CM

## 2023-09-06 RX ORDER — GABAPENTIN 600 MG/1
600 TABLET ORAL 3 TIMES DAILY
Qty: 90 TABLET | Refills: 0 | Status: SHIPPED | OUTPATIENT
Start: 2023-09-06

## 2023-09-06 NOTE — TELEPHONE ENCOUNTER
Reason for call:   [x] Refill   [] Prior Auth  [] Other:     Office:   [] PCP/Provider -   [x] Speciality/Provider - [facility]    Medication: Gabapentin    Dose: 600mg    Quantity: 90    [facility] [address] [place] [address] [address]   [phone]    Does the patient have enough for 3 days? [x] Yes   [] No - Send as HP to POD    Is the patient having symptoms?    [x] No   [] Yes -

## 2023-09-15 ENCOUNTER — APPOINTMENT (OUTPATIENT)
Dept: LAB | Facility: CLINIC | Age: 73
End: 2023-09-15
Payer: MEDICARE

## 2023-09-15 DIAGNOSIS — E11.69 DIABETES MELLITUS TYPE 2 IN OBESE: ICD-10-CM

## 2023-09-15 DIAGNOSIS — I10 BENIGN ESSENTIAL HTN: ICD-10-CM

## 2023-09-15 DIAGNOSIS — E66.9 DIABETES MELLITUS TYPE 2 IN OBESE: ICD-10-CM

## 2023-09-15 LAB
ALBUMIN SERPL BCP-MCNC: 4.6 G/DL (ref 3.5–5)
ALP SERPL-CCNC: 78 U/L (ref 34–104)
ALT SERPL W P-5'-P-CCNC: 26 U/L (ref 7–52)
ANION GAP SERPL CALCULATED.3IONS-SCNC: 6 MMOL/L
AST SERPL W P-5'-P-CCNC: 23 U/L (ref 13–39)
BILIRUB SERPL-MCNC: 0.38 MG/DL (ref 0.2–1)
BUN SERPL-MCNC: 17 MG/DL (ref 5–25)
CALCIUM SERPL-MCNC: 9.7 MG/DL (ref 8.4–10.2)
CHLORIDE SERPL-SCNC: 102 MMOL/L (ref 96–108)
CO2 SERPL-SCNC: 32 MMOL/L (ref 21–32)
CREAT SERPL-MCNC: 0.66 MG/DL (ref 0.6–1.3)
ERYTHROCYTE [DISTWIDTH] IN BLOOD BY AUTOMATED COUNT: 12.6 % (ref 11.6–15.1)
EST. AVERAGE GLUCOSE BLD GHB EST-MCNC: 131 MG/DL
GFR SERPL CREATININE-BSD FRML MDRD: 86 ML/MIN/1.73SQ M
GLUCOSE P FAST SERPL-MCNC: 104 MG/DL (ref 65–99)
HBA1C MFR BLD: 6.2 %
HCT VFR BLD AUTO: 43.6 % (ref 34.8–46.1)
HGB BLD-MCNC: 14.1 G/DL (ref 11.5–15.4)
MCH RBC QN AUTO: 28.7 PG (ref 26.8–34.3)
MCHC RBC AUTO-ENTMCNC: 32.3 G/DL (ref 31.4–37.4)
MCV RBC AUTO: 89 FL (ref 82–98)
PLATELET # BLD AUTO: 198 THOUSANDS/UL (ref 149–390)
PMV BLD AUTO: 10.1 FL (ref 8.9–12.7)
POTASSIUM SERPL-SCNC: 4.1 MMOL/L (ref 3.5–5.3)
PROT SERPL-MCNC: 7.2 G/DL (ref 6.4–8.4)
RBC # BLD AUTO: 4.91 MILLION/UL (ref 3.81–5.12)
SODIUM SERPL-SCNC: 140 MMOL/L (ref 135–147)
WBC # BLD AUTO: 4.03 THOUSAND/UL (ref 4.31–10.16)

## 2023-09-15 PROCEDURE — 83036 HEMOGLOBIN GLYCOSYLATED A1C: CPT

## 2023-09-15 PROCEDURE — 85027 COMPLETE CBC AUTOMATED: CPT

## 2023-09-15 PROCEDURE — 80053 COMPREHEN METABOLIC PANEL: CPT

## 2023-09-15 PROCEDURE — 36415 COLL VENOUS BLD VENIPUNCTURE: CPT

## 2023-09-18 ENCOUNTER — TELEPHONE (OUTPATIENT)
Dept: OBGYN CLINIC | Facility: HOSPITAL | Age: 73
End: 2023-09-18

## 2023-09-22 ENCOUNTER — OFFICE VISIT (OUTPATIENT)
Dept: INTERNAL MEDICINE CLINIC | Facility: CLINIC | Age: 73
End: 2023-09-22
Payer: MEDICARE

## 2023-09-22 VITALS
HEIGHT: 62 IN | DIASTOLIC BLOOD PRESSURE: 62 MMHG | HEART RATE: 93 BPM | WEIGHT: 171 LBS | BODY MASS INDEX: 31.47 KG/M2 | OXYGEN SATURATION: 97 % | TEMPERATURE: 99.2 F | SYSTOLIC BLOOD PRESSURE: 108 MMHG

## 2023-09-22 DIAGNOSIS — M17.11 PRIMARY OSTEOARTHRITIS OF RIGHT KNEE: ICD-10-CM

## 2023-09-22 DIAGNOSIS — E11.69 DIABETES MELLITUS TYPE 2 IN OBESE: ICD-10-CM

## 2023-09-22 DIAGNOSIS — M79.7 FIBROMYALGIA: ICD-10-CM

## 2023-09-22 DIAGNOSIS — I10 BENIGN ESSENTIAL HTN: ICD-10-CM

## 2023-09-22 DIAGNOSIS — Z23 ENCOUNTER FOR IMMUNIZATION: ICD-10-CM

## 2023-09-22 DIAGNOSIS — E66.9 DIABETES MELLITUS TYPE 2 IN OBESE: ICD-10-CM

## 2023-09-22 DIAGNOSIS — Z00.8 ENCOUNTER FOR ELECTROCARDIOGRAM: ICD-10-CM

## 2023-09-22 DIAGNOSIS — F32.0 CURRENT MILD EPISODE OF MAJOR DEPRESSIVE DISORDER WITHOUT PRIOR EPISODE (HCC): ICD-10-CM

## 2023-09-22 DIAGNOSIS — Z01.818 PREOPERATIVE CLEARANCE: Primary | ICD-10-CM

## 2023-09-22 PROBLEM — Z01.89 ENCOUNTER FOR GERIATRIC ASSESSMENT: Status: RESOLVED | Noted: 2023-05-18 | Resolved: 2023-09-22

## 2023-09-22 PROCEDURE — 90662 IIV NO PRSV INCREASED AG IM: CPT | Performed by: INTERNAL MEDICINE

## 2023-09-22 PROCEDURE — G0008 ADMIN INFLUENZA VIRUS VAC: HCPCS | Performed by: INTERNAL MEDICINE

## 2023-09-22 PROCEDURE — 99214 OFFICE O/P EST MOD 30 MIN: CPT | Performed by: INTERNAL MEDICINE

## 2023-09-22 PROCEDURE — 93000 ELECTROCARDIOGRAM COMPLETE: CPT | Performed by: INTERNAL MEDICINE

## 2023-09-22 NOTE — H&P (VIEW-ONLY)
Assessment/Plan:    Problem List Items Addressed This Visit        Endocrine    Diabetes mellitus type 2 in obese ([address])     -DM2 in obese. A1c overall controlled with diet. Lab Results   Component Value Date    HGBA1C 6.2 (H) 09/15/2023   -hold benazepril one day prior to surgery            Cardiovascular and Mediastinum    Benign essential HTN     -normotensive  -hold benazepril and hctz day before surgery            Other    Fibromyalgia     -continue duloxetine and gabapentin periop         Current mild episode of major depressive disorder without prior episode ([address])     -continue duloxetine periop        Other Visit Diagnoses     Preoperative clearance    -  Primary    -pt is low risk of developing a major adverse clinical event for intermediate risk knee surgery    Primary osteoarthritis of right knee        -for R TKA on 10/18/23 by Dr. Anders Garrido    Encounter for electrocardiogram        Relevant Orders    POCT ECG (Completed)    Encounter for immunization        Relevant Orders    influenza vaccine, high-dose, PF 0.7 mL (FLUZONE HIGH-DOSE) (Completed)          Subjective:      Patient ID: Evelyn Kemp is a 74 y.o. female. HPI     75yo female with DM2, HTN, HLD, fibromyalgia, cervical arthritis with stenosis, lumbar disc herniation, MDD, knee OA here for preop evaluation. She is accompanied by her . She completed rehab for R hip s/p MYAH in June. Evelyn Kemp is a 74 y.o. female who presents to the office today for a preoperative consultation at the request of surgeon Dr. Anders Garrido who plans on performing R TKA on October 18. Type of anesthesia to be used Spinal.  Prior anesthesia adverse reactions no. She has BL knee arthritis, failed CSI and hyaluronic injections. Exercise capacity - Able to walk 4 blocks or climb 2 flights of stairs without symptoms - yes. Goes up one step at a time due to knee pain. She is able to vacuum her apartment.     Easy bleeding/bruising - yes, pt is on ASA    Chest pain - no    Dyspnea, wheezing, cough - no    Sleep apnea - no    Lifestyle:   reports current alcohol use. reports that she has never smoked. She has never used smokeless tobacco.   reports no history of drug use. The following portions of the patient's history were reviewed and updated as appropriate: allergies, current medications, past family history, past medical history, past social history, past surgical history and problem list.      Current Outpatient Medications:   •  acetaminophen (TYLENOL) 500 mg tablet, Take 2 tablets (1,000 mg total) by mouth every 8 (eight) hours, Disp: , Rfl: 0  •  ascorbic acid (VITAMIN C) 500 mg tablet, TAKE 1 TABLET (500 MG TOTAL) BY MOUTH DAILY BEGIN 30 DAYS PRIOR TO SURGERY., Disp: 90 tablet, Rfl: 1  •  aspirin (ECOTRIN) 325 mg EC tablet, Take 1 tablet (325 mg total) by mouth 2 (two) times a day Take with food. , Disp: 84 tablet, Rfl: 0  •  benazepril (LOTENSIN) 20 mg tablet, TAKE 1 TABLET BY MOUTH EVERY DAY, Disp: 90 tablet, Rfl: 1  •  cholecalciferol (VITAMIN D3) 1,000 units tablet, Take 1,000 Units by mouth daily, Disp: , Rfl:   •  DULoxetine (CYMBALTA) 60 mg delayed release capsule, TAKE 1 CAPSULE BY MOUTH EVERY DAY, Disp: 90 capsule, Rfl: 2  •  gabapentin (Neurontin) 600 MG tablet, Take 1 tablet (600 mg total) by mouth 3 (three) times a day, Disp: 90 tablet, Rfl: 0  •  hydrochlorothiazide (HYDRODIURIL) 12.5 mg tablet, TAKE 1 TABLET BY MOUTH EVERY DAY, Disp: 90 tablet, Rfl: 1  •  Multiple Vitamins-Minerals (One-Daily Multi-Vit/Mineral) TABS, TAKE 1 TABLET BY MOUTH DAILY BEGIN 30 DAYS PRIOR TO SURGERY., Disp: 90 tablet, Rfl: 1  •  omeprazole (PriLOSEC) 10 mg delayed release capsule, Take 10 mg by mouth daily  , Disp: , Rfl:   •  rosuvastatin (CRESTOR) 10 MG tablet, TAKE 1 TABLET BY MOUTH EVERY DAY, Disp: 90 tablet, Rfl: 1  •  SUPER B COMPLEX/C PO, Take 1 tablet by mouth daily  , Disp: , Rfl:   •  cyclobenzaprine (FLEXERIL) 10 mg tablet, Take 1 tablet (10 mg total) by mouth daily at bedtime, Disp: 180 tablet, Rfl: 0  •  docusate sodium (COLACE) 100 mg capsule, Take 1 capsule (100 mg total) by mouth 2 (two) times a day (Patient not taking: Reported on 7/27/2023), Disp: 20 capsule, Rfl: 0  •  ferrous sulfate 324 (65 Fe) mg, TAKE 1 TABLET (324 MG TOTAL) BY MOUTH DAILY BEFORE BREAKFAST BEGIN 30 DAYS PRIOR TO SURGERY. (Patient not taking: Reported on 8/4/2023), Disp: 90 tablet, Rfl: 1  •  folic acid (FOLVITE) 1 mg tablet, TAKE 1 TABLET (1 MG TOTAL) BY MOUTH DAILY BEGIN 30 DAYS PRIOR TO SURGERY. (Patient not taking: Reported on 8/4/2023), Disp: 90 tablet, Rfl: 1  •  lidocaine (Lidoderm) 5 %, Apply 1 patch topically daily Remove & Discard patch within 12 hours or as directed by MD (Patient not taking: Reported on 5/30/2023), Disp: 30 patch, Rfl: 2    Review of Systems   Constitutional: Positive for activity change, appetite change and unexpected weight change (weight loss). HENT: Negative for congestion, rhinorrhea and sinus pain. Respiratory: Negative for cough, chest tightness, shortness of breath and wheezing. Cardiovascular: Positive for leg swelling. Negative for chest pain and palpitations. Gastrointestinal: Negative for abdominal pain, constipation, diarrhea, nausea and vomiting. Genitourinary: Negative for difficulty urinating. Musculoskeletal: Positive for arthralgias, back pain and joint swelling. Neurological: Negative for dizziness, numbness and headaches. Psychiatric/Behavioral: Positive for dysphoric mood. Objective:    /62 (BP Location: Left arm, Patient Position: Sitting, Cuff Size: Standard)   Pulse 93   Temp 99.2 °F (37.3 °C) (Tympanic Core)   Ht 5' 2" (1.575 m)   Wt 77.6 kg (171 lb)   SpO2 97%   BMI 31.28 kg/m²      Physical Exam  Vitals reviewed. Constitutional:       General: She is not in acute distress. Appearance: She is obese. HENT:      Head: Normocephalic.       Right Ear: Tympanic membrane and ear canal normal.      Left Ear: Tympanic membrane and ear canal normal.      Nose: Nose normal.      Mouth/Throat:      Mouth: Mucous membranes are moist.      Comments: Wears dentures  Eyes:      Extraocular Movements: Extraocular movements intact. Conjunctiva/sclera: Conjunctivae normal.      Comments: Wears glasses   Neck:      Vascular: No carotid bruit. Cardiovascular:      Rate and Rhythm: Normal rate and regular rhythm. Pulses: Normal pulses. Heart sounds: Normal heart sounds. Pulmonary:      Effort: Pulmonary effort is normal. No respiratory distress. Breath sounds: Normal breath sounds. No wheezing. Abdominal:      General: Bowel sounds are normal. There is no distension. Palpations: Abdomen is soft. Tenderness: There is no abdominal tenderness. Musculoskeletal:      Cervical back: Neck supple. No tenderness. Right knee: Deformity (warm) and crepitus present. Decreased range of motion. Left knee: Deformity present. Decreased range of motion. Right lower leg: No edema. Left lower leg: No edema. Lymphadenopathy:      Cervical: No cervical adenopathy. Neurological:      Mental Status: She is alert and oriented to person, place, and time. Psychiatric:         Mood and Affect: Mood normal.           Recent Results (from the past 336 hour(s))   Hemoglobin A1C    Collection Time: 09/15/23  8:14 AM   Result Value Ref Range    Hemoglobin A1C 6.2 (H) Normal 4.0-5.6%; PreDiabetic 5.7-6.4%;  Diabetic >=6.5%; Glycemic control for adults with diabetes <7.0% %     mg/dl   Comprehensive metabolic panel    Collection Time: 09/15/23  8:14 AM   Result Value Ref Range    Sodium 140 135 - 147 mmol/L    Potassium 4.1 3.5 - 5.3 mmol/L    Chloride 102 96 - 108 mmol/L    CO2 32 21 - 32 mmol/L    ANION GAP 6 mmol/L    BUN 17 5 - 25 mg/dL    Creatinine 0.66 0.60 - 1.30 mg/dL    Glucose, Fasting 104 (H) 65 - 99 mg/dL    Calcium 9.7 8.4 - 10.2 mg/dL    AST 23 13 - 39 U/L ALT 26 7 - 52 U/L    Alkaline Phosphatase 78 34 - 104 U/L    Total Protein 7.2 6.4 - 8.4 g/dL    Albumin 4.6 3.5 - 5.0 g/dL    Total Bilirubin 0.38 0.20 - 1.00 mg/dL    eGFR 86 ml/min/1.73sq m   CBC    Collection Time: 09/15/23  8:14 AM   Result Value Ref Range    WBC 4.03 (L) 4.31 - 10.16 Thousand/uL    RBC 4.91 3.81 - 5.12 Million/uL    Hemoglobin 14.1 11.5 - 15.4 g/dL    Hematocrit 43.6 34.8 - 46.1 %    MCV 89 82 - 98 fL    MCH 28.7 26.8 - 34.3 pg    MCHC 32.3 31.4 - 37.4 g/dL    RDW 12.6 11.6 - 15.1 %    Platelets 838 519 - 707 Thousands/uL    MPV 10.1 8.9 - 12.7 fL     EKG: NSR at 84bpm, normal axis, normal EKG.

## 2023-09-22 NOTE — PROGRESS NOTES
Assessment/Plan:    Problem List Items Addressed This Visit        Endocrine    Diabetes mellitus type 2 in obese ([address])     -DM2 in obese. A1c overall controlled with diet. Lab Results   Component Value Date    HGBA1C 6.2 (H) 09/15/2023   -hold benazepril one day prior to surgery            Cardiovascular and Mediastinum    Benign essential HTN     -normotensive  -hold benazepril and hctz day before surgery            Other    Fibromyalgia     -continue duloxetine and gabapentin periop         Current mild episode of major depressive disorder without prior episode ([address])     -continue duloxetine periop        Other Visit Diagnoses     Preoperative clearance    -  Primary    -pt is low risk of developing a major adverse clinical event for intermediate risk knee surgery    Primary osteoarthritis of right knee        -for R TKA on 10/18/23 by Dr. Amanda Schilling    Encounter for electrocardiogram        Relevant Orders    POCT ECG (Completed)    Encounter for immunization        Relevant Orders    influenza vaccine, high-dose, PF 0.7 mL (FLUZONE HIGH-DOSE) (Completed)          Subjective:      Patient ID: Nereyda Beal is a 74 y.o. female. HPI     75yo female with DM2, HTN, HLD, fibromyalgia, cervical arthritis with stenosis, lumbar disc herniation, MDD, knee OA here for preop evaluation. She is accompanied by her . She completed rehab for R hip s/p MYAH in June. Nereyda Beal is a 74 y.o. female who presents to the office today for a preoperative consultation at the request of surgeon Dr. Amanda Schilling who plans on performing R TKA on October 18. Type of anesthesia to be used Spinal.  Prior anesthesia adverse reactions no. She has BL knee arthritis, failed CSI and hyaluronic injections. Exercise capacity - Able to walk 4 blocks or climb 2 flights of stairs without symptoms - yes. Goes up one step at a time due to knee pain. She is able to vacuum her apartment.     Easy bleeding/bruising - yes, pt is on ASA    Chest pain - no    Dyspnea, wheezing, cough - no    Sleep apnea - no    Lifestyle:   reports current alcohol use. reports that she has never smoked. She has never used smokeless tobacco.   reports no history of drug use. The following portions of the patient's history were reviewed and updated as appropriate: allergies, current medications, past family history, past medical history, past social history, past surgical history and problem list.      Current Outpatient Medications:   •  acetaminophen (TYLENOL) 500 mg tablet, Take 2 tablets (1,000 mg total) by mouth every 8 (eight) hours, Disp: , Rfl: 0  •  ascorbic acid (VITAMIN C) 500 mg tablet, TAKE 1 TABLET (500 MG TOTAL) BY MOUTH DAILY BEGIN 30 DAYS PRIOR TO SURGERY., Disp: 90 tablet, Rfl: 1  •  aspirin (ECOTRIN) 325 mg EC tablet, Take 1 tablet (325 mg total) by mouth 2 (two) times a day Take with food. , Disp: 84 tablet, Rfl: 0  •  benazepril (LOTENSIN) 20 mg tablet, TAKE 1 TABLET BY MOUTH EVERY DAY, Disp: 90 tablet, Rfl: 1  •  cholecalciferol (VITAMIN D3) 1,000 units tablet, Take 1,000 Units by mouth daily, Disp: , Rfl:   •  DULoxetine (CYMBALTA) 60 mg delayed release capsule, TAKE 1 CAPSULE BY MOUTH EVERY DAY, Disp: 90 capsule, Rfl: 2  •  gabapentin (Neurontin) 600 MG tablet, Take 1 tablet (600 mg total) by mouth 3 (three) times a day, Disp: 90 tablet, Rfl: 0  •  hydrochlorothiazide (HYDRODIURIL) 12.5 mg tablet, TAKE 1 TABLET BY MOUTH EVERY DAY, Disp: 90 tablet, Rfl: 1  •  Multiple Vitamins-Minerals (One-Daily Multi-Vit/Mineral) TABS, TAKE 1 TABLET BY MOUTH DAILY BEGIN 30 DAYS PRIOR TO SURGERY., Disp: 90 tablet, Rfl: 1  •  omeprazole (PriLOSEC) 10 mg delayed release capsule, Take 10 mg by mouth daily  , Disp: , Rfl:   •  rosuvastatin (CRESTOR) 10 MG tablet, TAKE 1 TABLET BY MOUTH EVERY DAY, Disp: 90 tablet, Rfl: 1  •  SUPER B COMPLEX/C PO, Take 1 tablet by mouth daily  , Disp: , Rfl:   •  cyclobenzaprine (FLEXERIL) 10 mg tablet, Take 1 tablet (10 mg total) by mouth daily at bedtime, Disp: 180 tablet, Rfl: 0  •  docusate sodium (COLACE) 100 mg capsule, Take 1 capsule (100 mg total) by mouth 2 (two) times a day (Patient not taking: Reported on 7/27/2023), Disp: 20 capsule, Rfl: 0  •  ferrous sulfate 324 (65 Fe) mg, TAKE 1 TABLET (324 MG TOTAL) BY MOUTH DAILY BEFORE BREAKFAST BEGIN 30 DAYS PRIOR TO SURGERY. (Patient not taking: Reported on 8/4/2023), Disp: 90 tablet, Rfl: 1  •  folic acid (FOLVITE) 1 mg tablet, TAKE 1 TABLET (1 MG TOTAL) BY MOUTH DAILY BEGIN 30 DAYS PRIOR TO SURGERY. (Patient not taking: Reported on 8/4/2023), Disp: 90 tablet, Rfl: 1  •  lidocaine (Lidoderm) 5 %, Apply 1 patch topically daily Remove & Discard patch within 12 hours or as directed by MD (Patient not taking: Reported on 5/30/2023), Disp: 30 patch, Rfl: 2    Review of Systems   Constitutional: Positive for activity change, appetite change and unexpected weight change (weight loss). HENT: Negative for congestion, rhinorrhea and sinus pain. Respiratory: Negative for cough, chest tightness, shortness of breath and wheezing. Cardiovascular: Positive for leg swelling. Negative for chest pain and palpitations. Gastrointestinal: Negative for abdominal pain, constipation, diarrhea, nausea and vomiting. Genitourinary: Negative for difficulty urinating. Musculoskeletal: Positive for arthralgias, back pain and joint swelling. Neurological: Negative for dizziness, numbness and headaches. Psychiatric/Behavioral: Positive for dysphoric mood. Objective:    /62 (BP Location: Left arm, Patient Position: Sitting, Cuff Size: Standard)   Pulse 93   Temp 99.2 °F (37.3 °C) (Tympanic Core)   Ht 5' 2" (1.575 m)   Wt 77.6 kg (171 lb)   SpO2 97%   BMI 31.28 kg/m²      Physical Exam  Vitals reviewed. Constitutional:       General: She is not in acute distress. Appearance: She is obese. HENT:      Head: Normocephalic.       Right Ear: Tympanic membrane and ear canal normal.      Left Ear: Tympanic membrane and ear canal normal.      Nose: Nose normal.      Mouth/Throat:      Mouth: Mucous membranes are moist.      Comments: Wears dentures  Eyes:      Extraocular Movements: Extraocular movements intact. Conjunctiva/sclera: Conjunctivae normal.      Comments: Wears glasses   Neck:      Vascular: No carotid bruit. Cardiovascular:      Rate and Rhythm: Normal rate and regular rhythm. Pulses: Normal pulses. Heart sounds: Normal heart sounds. Pulmonary:      Effort: Pulmonary effort is normal. No respiratory distress. Breath sounds: Normal breath sounds. No wheezing. Abdominal:      General: Bowel sounds are normal. There is no distension. Palpations: Abdomen is soft. Tenderness: There is no abdominal tenderness. Musculoskeletal:      Cervical back: Neck supple. No tenderness. Right knee: Deformity (warm) and crepitus present. Decreased range of motion. Left knee: Deformity present. Decreased range of motion. Right lower leg: No edema. Left lower leg: No edema. Lymphadenopathy:      Cervical: No cervical adenopathy. Neurological:      Mental Status: She is alert and oriented to person, place, and time. Psychiatric:         Mood and Affect: Mood normal.           Recent Results (from the past 336 hour(s))   Hemoglobin A1C    Collection Time: 09/15/23  8:14 AM   Result Value Ref Range    Hemoglobin A1C 6.2 (H) Normal 4.0-5.6%; PreDiabetic 5.7-6.4%;  Diabetic >=6.5%; Glycemic control for adults with diabetes <7.0% %     mg/dl   Comprehensive metabolic panel    Collection Time: 09/15/23  8:14 AM   Result Value Ref Range    Sodium 140 135 - 147 mmol/L    Potassium 4.1 3.5 - 5.3 mmol/L    Chloride 102 96 - 108 mmol/L    CO2 32 21 - 32 mmol/L    ANION GAP 6 mmol/L    BUN 17 5 - 25 mg/dL    Creatinine 0.66 0.60 - 1.30 mg/dL    Glucose, Fasting 104 (H) 65 - 99 mg/dL    Calcium 9.7 8.4 - 10.2 mg/dL    AST 23 13 - 39 U/L ALT 26 7 - 52 U/L    Alkaline Phosphatase 78 34 - 104 U/L    Total Protein 7.2 6.4 - 8.4 g/dL    Albumin 4.6 3.5 - 5.0 g/dL    Total Bilirubin 0.38 0.20 - 1.00 mg/dL    eGFR 86 ml/min/1.73sq m   CBC    Collection Time: 09/15/23  8:14 AM   Result Value Ref Range    WBC 4.03 (L) 4.31 - 10.16 Thousand/uL    RBC 4.91 3.81 - 5.12 Million/uL    Hemoglobin 14.1 11.5 - 15.4 g/dL    Hematocrit 43.6 34.8 - 46.1 %    MCV 89 82 - 98 fL    MCH 28.7 26.8 - 34.3 pg    MCHC 32.3 31.4 - 37.4 g/dL    RDW 12.6 11.6 - 15.1 %    Platelets 890 583 - 319 Thousands/uL    MPV 10.1 8.9 - 12.7 fL     EKG: NSR at 84bpm, normal axis, normal EKG.

## 2023-09-22 NOTE — ASSESSMENT & PLAN NOTE
-DM2 in obese. A1c overall controlled with diet.   Lab Results   Component Value Date    HGBA1C 6.2 (H) 09/15/2023   -hold benazepril one day prior to surgery

## 2023-09-25 ENCOUNTER — APPOINTMENT (OUTPATIENT)
Dept: LAB | Facility: CLINIC | Age: 73
End: 2023-09-25
Payer: MEDICARE

## 2023-09-25 ENCOUNTER — LAB REQUISITION (OUTPATIENT)
Dept: LAB | Facility: HOSPITAL | Age: 73
End: 2023-09-25
Payer: MEDICARE

## 2023-09-25 DIAGNOSIS — M17.11 UNILATERAL PRIMARY OSTEOARTHRITIS, RIGHT KNEE: ICD-10-CM

## 2023-09-25 DIAGNOSIS — Z01.818 PREOPERATIVE TESTING: ICD-10-CM

## 2023-09-25 DIAGNOSIS — M17.11 PRIMARY OSTEOARTHRITIS OF RIGHT KNEE: ICD-10-CM

## 2023-09-25 LAB
ABO GROUP BLD: NORMAL
ALBUMIN SERPL BCP-MCNC: 4.3 G/DL (ref 3.5–5)
ALP SERPL-CCNC: 72 U/L (ref 34–104)
ALT SERPL W P-5'-P-CCNC: 26 U/L (ref 7–52)
ANION GAP SERPL CALCULATED.3IONS-SCNC: 6 MMOL/L
APTT PPP: 32 SECONDS (ref 23–37)
AST SERPL W P-5'-P-CCNC: 23 U/L (ref 13–39)
BASOPHILS # BLD AUTO: 0.04 THOUSANDS/ÂΜL (ref 0–0.1)
BASOPHILS NFR BLD AUTO: 1 % (ref 0–1)
BILIRUB SERPL-MCNC: 0.38 MG/DL (ref 0.2–1)
BLD GP AB SCN SERPL QL: NEGATIVE
BUN SERPL-MCNC: 13 MG/DL (ref 5–25)
CALCIUM SERPL-MCNC: 9.8 MG/DL (ref 8.4–10.2)
CHLORIDE SERPL-SCNC: 104 MMOL/L (ref 96–108)
CO2 SERPL-SCNC: 32 MMOL/L (ref 21–32)
CREAT SERPL-MCNC: 0.66 MG/DL (ref 0.6–1.3)
CRP SERPL QL: 2.5 MG/L
EOSINOPHIL # BLD AUTO: 0.16 THOUSAND/ÂΜL (ref 0–0.61)
EOSINOPHIL NFR BLD AUTO: 4 % (ref 0–6)
ERYTHROCYTE [DISTWIDTH] IN BLOOD BY AUTOMATED COUNT: 12.6 % (ref 11.6–15.1)
EST. AVERAGE GLUCOSE BLD GHB EST-MCNC: 137 MG/DL
GFR SERPL CREATININE-BSD FRML MDRD: 86 ML/MIN/1.73SQ M
GLUCOSE P FAST SERPL-MCNC: 100 MG/DL (ref 65–99)
HBA1C MFR BLD: 6.4 %
HCT VFR BLD AUTO: 43.7 % (ref 34.8–46.1)
HGB BLD-MCNC: 13.9 G/DL (ref 11.5–15.4)
IMM GRANULOCYTES # BLD AUTO: 0.01 THOUSAND/UL (ref 0–0.2)
IMM GRANULOCYTES NFR BLD AUTO: 0 % (ref 0–2)
INR PPP: 0.97 (ref 0.84–1.19)
LYMPHOCYTES # BLD AUTO: 1.44 THOUSANDS/ÂΜL (ref 0.6–4.47)
LYMPHOCYTES NFR BLD AUTO: 34 % (ref 14–44)
MCH RBC QN AUTO: 28.5 PG (ref 26.8–34.3)
MCHC RBC AUTO-ENTMCNC: 31.8 G/DL (ref 31.4–37.4)
MCV RBC AUTO: 90 FL (ref 82–98)
MONOCYTES # BLD AUTO: 0.4 THOUSAND/ÂΜL (ref 0.17–1.22)
MONOCYTES NFR BLD AUTO: 9 % (ref 4–12)
NEUTROPHILS # BLD AUTO: 2.24 THOUSANDS/ÂΜL (ref 1.85–7.62)
NEUTS SEG NFR BLD AUTO: 52 % (ref 43–75)
NRBC BLD AUTO-RTO: 0 /100 WBCS
PLATELET # BLD AUTO: 204 THOUSANDS/UL (ref 149–390)
PMV BLD AUTO: 10.2 FL (ref 8.9–12.7)
POTASSIUM SERPL-SCNC: 4.2 MMOL/L (ref 3.5–5.3)
PROT SERPL-MCNC: 7.2 G/DL (ref 6.4–8.4)
PROTHROMBIN TIME: 13 SECONDS (ref 11.6–14.5)
RBC # BLD AUTO: 4.88 MILLION/UL (ref 3.81–5.12)
RH BLD: POSITIVE
SODIUM SERPL-SCNC: 142 MMOL/L (ref 135–147)
SPECIMEN EXPIRATION DATE: NORMAL
WBC # BLD AUTO: 4.29 THOUSAND/UL (ref 4.31–10.16)

## 2023-09-25 PROCEDURE — 36415 COLL VENOUS BLD VENIPUNCTURE: CPT

## 2023-09-25 PROCEDURE — 85610 PROTHROMBIN TIME: CPT

## 2023-09-25 PROCEDURE — 80053 COMPREHEN METABOLIC PANEL: CPT

## 2023-09-25 PROCEDURE — 85025 COMPLETE CBC W/AUTO DIFF WBC: CPT

## 2023-09-25 PROCEDURE — 86140 C-REACTIVE PROTEIN: CPT

## 2023-09-25 PROCEDURE — 86850 RBC ANTIBODY SCREEN: CPT | Performed by: ORTHOPAEDIC SURGERY

## 2023-09-25 PROCEDURE — 85730 THROMBOPLASTIN TIME PARTIAL: CPT

## 2023-09-25 PROCEDURE — 86900 BLOOD TYPING SEROLOGIC ABO: CPT | Performed by: ORTHOPAEDIC SURGERY

## 2023-09-25 PROCEDURE — 83036 HEMOGLOBIN GLYCOSYLATED A1C: CPT

## 2023-09-25 PROCEDURE — 86901 BLOOD TYPING SEROLOGIC RH(D): CPT | Performed by: ORTHOPAEDIC SURGERY

## 2023-09-28 NOTE — PRE-PROCEDURE INSTRUCTIONS
Pre-Surgery Instructions:   Medication Instructions    acetaminophen (TYLENOL) 500 mg tablet Uses PRN- OK to take day of surgery    ascorbic acid (VITAMIN C) 500 mg tablet Hold day of surgery. aspirin (ECOTRIN) 325 mg EC tablet Stop taking 7 days prior to surgery. benazepril (LOTENSIN) 20 mg tablet Hold day of surgery. cholecalciferol (VITAMIN D3) 1,000 units tablet Stop taking 7 days prior to surgery. cyclobenzaprine (FLEXERIL) 10 mg tablet Uses PRN- OK to take day of surgery    docusate sodium (COLACE) 100 mg capsule Hold day of surgery. DULoxetine (CYMBALTA) 60 mg delayed release capsule Take day of surgery. ferrous sulfate 324 (65 Fe) mg Hold day of surgery. folic acid (FOLVITE) 1 mg tablet Hold day of surgery. gabapentin (Neurontin) 600 MG tablet Take day of surgery. hydrochlorothiazide (HYDRODIURIL) 12.5 mg tablet Hold day of surgery. Multiple Vitamins-Minerals (One-Daily Multi-Vit/Mineral) TABS Hold day of surgery. omeprazole (PriLOSEC) 10 mg delayed release capsule Take day of surgery. rosuvastatin (CRESTOR) 10 MG tablet Take day of surgery. SUPER B COMPLEX/C PO Stop taking 7 days prior to surgery. Medication instructions for day surgery reviewed. Please use only a sip of water to take your instructed medications. Avoid all over the counter vitamins, supplements and NSAIDS for one week prior to surgery per anesthesia guidelines. Tylenol is ok to take as needed. You will receive a call one business day prior to surgery with an arrival time and hospital directions. If your surgery is scheduled on a Monday, the hospital will be calling you on the Friday prior to your surgery. If you have not heard from anyone by 8pm, please call the hospital supervisor through the hospital  at [phone]. Ledy Ground [phone]). Do not eat or drink anything after midnight the night before your surgery, including candy, mints, lifesavers, or chewing gum.  Do not drink alcohol 24hrs before your surgery. Try not to smoke at least 24hrs before your surgery. Follow the pre surgery showering instructions as listed in the [facility] Surgical Experience Booklet” or otherwise provided by your surgeon's office. Do not shave the surgical area 24 hours before surgery. Do not apply any lotions, creams, including makeup, cologne, deodorant, or perfumes after showering on the day of your surgery. No contact lenses, eye make-up, or artificial eyelashes. Remove nail polish, including gel polish, and any artificial, gel, or acrylic nails if possible. Remove all jewelry including rings and body piercing jewelry. Wear causal clothing that is easy to take on and off. Consider your type of surgery. Keep any valuables, jewelry, piercings at home. Please bring any specially ordered equipment (sling, braces) if indicated. Arrange for a responsible person to drive you to and from the hospital on the day of your surgery. Visitor Guidelines discussed. Call the surgeon's office with any new illnesses, exposures, or additional questions prior to surgery. Please reference your [facility] Surgical Experience Booklet” for additional information to prepare for your upcoming surgery. See Geriatric Assessment below. ..     Cognitive Assessment:   CAM: 2 + 1 with prompting  TUG <15 sec: yes  Falls (last 6 months): none  Timmy Total Score: 21  PHQ- 9 Depression Scale: 0.00  Nutrition Assessment Score: 14  METS: 9.25  Health goals:  -What are your overall health goals? (quit smoking, wt. loss, rest, decrease stress)  To go for a walk and not hurt in my knees  -What brings you strength? (family, friends, Taoism, health)  My laura  -What activities are important to you? (exercise, reading, travel, work)   Taking walks, artwork, decorating

## 2023-10-02 DIAGNOSIS — M51.16 LUMBAR DISC HERNIATION WITH RADICULOPATHY: ICD-10-CM

## 2023-10-02 RX ORDER — GABAPENTIN 600 MG/1
600 TABLET ORAL 3 TIMES DAILY
Qty: 90 TABLET | Refills: 2 | Status: SHIPPED | OUTPATIENT
Start: 2023-10-02

## 2023-10-02 NOTE — TELEPHONE ENCOUNTER
S/W pt. Pt requesting refill of Gabapentin as listed in her chart. She denies side effects and it helps her. Please advise.

## 2023-10-02 NOTE — TELEPHONE ENCOUNTER
Reason for call:   [x] Refill   [] Prior Auth  [] Other:     Office:   [] PCP/Provider -   [x] Speciality/Provider - [facility]    Medication: GABAPENTIN    Dose/Frequency: 600mg    Quantity: #90    Pharmacy: [facility]

## 2023-10-05 ENCOUNTER — TELEPHONE (OUTPATIENT)
Age: 73
End: 2023-10-05

## 2023-10-05 NOTE — TELEPHONE ENCOUNTER
Caller: Patient     Doctor: Luz Maria Mederos    Reason for call: Asked to speak with a nurse, nurse will call patient back   Call back#: [phone]

## 2023-10-15 DIAGNOSIS — M16.11 PRIMARY OSTEOARTHRITIS OF ONE HIP, RIGHT: ICD-10-CM

## 2023-10-17 ENCOUNTER — EVALUATION (OUTPATIENT)
Dept: PHYSICAL THERAPY | Facility: REHABILITATION | Age: 73
End: 2023-10-17
Payer: MEDICARE

## 2023-10-17 ENCOUNTER — ANESTHESIA EVENT (OUTPATIENT)
Dept: PERIOP | Facility: HOSPITAL | Age: 73
End: 2023-10-17
Payer: MEDICARE

## 2023-10-17 DIAGNOSIS — Z01.818 PREOPERATIVE TESTING: ICD-10-CM

## 2023-10-17 DIAGNOSIS — M17.11 PRIMARY OSTEOARTHRITIS OF RIGHT KNEE: ICD-10-CM

## 2023-10-17 PROCEDURE — 97161 PT EVAL LOW COMPLEX 20 MIN: CPT

## 2023-10-17 PROCEDURE — 97110 THERAPEUTIC EXERCISES: CPT

## 2023-10-17 RX ORDER — FERROUS SULFATE 324(65)MG
324 TABLET, DELAYED RELEASE (ENTERIC COATED) ORAL
Qty: 90 TABLET | Refills: 1 | Status: SHIPPED | OUTPATIENT
Start: 2023-10-17

## 2023-10-17 RX ORDER — FOLIC ACID 1 MG/1
1 TABLET ORAL DAILY
Qty: 90 TABLET | Refills: 1 | Status: SHIPPED | OUTPATIENT
Start: 2023-10-17

## 2023-10-17 RX ORDER — ASCORBIC ACID 500 MG
TABLET ORAL
Qty: 90 TABLET | Refills: 1 | Status: SHIPPED | OUTPATIENT
Start: 2023-10-17

## 2023-10-17 NOTE — PROGRESS NOTES
PT Evaluation     Today's date: 10/17/2023  Patient name: Ksenia Sullivan  : 1950  MRN: [id number]  Referring provider: Madina Jordan  Dx:   Encounter Diagnosis     ICD-10-CM    1. Primary osteoarthritis of right knee  M17.11 Ambulatory referral to Physical Therapy      2. Preoperative testing  Z01.818 Ambulatory referral to Physical Therapy          Start Time: 7512  Stop Time: 1025  Total time in clinic (min): 40 minutes    Assessment  Assessment details: Ksenia Sullivan is a 74y.o. year old female with a referred dx of Primary osteoarthritis of right knee and preoperative testing. Pt scheduled for R TKA tomorrow, 10/18 with Dr. Lord Briseno, presenting for pre-op PT consult. Pt presents with pain with functional activities such as walking/standing>15 minutes and negotiating stairs at home. Upon further clinical testing, pt demonstrates decreased LE strength, limited knee flexion/ext ROM, and poor gait quality. Pt would benefit from skilled OP PT to address these, and the below impairments in order to optimize outcomes and promote return to functional baseline. Pt able to demonstrate post-op HEP with good technique and no pain. Educated pt to stop any exercises causing pain increase, pt verbalizes understanding. Impairments: abnormal gait, abnormal or restricted ROM, activity intolerance, impaired balance, impaired physical strength, lacks appropriate home exercise program, pain with function, weight-bearing intolerance and poor body mechanics    Goals  Short Term Goals: In 4 weeks, the patient will:  1. Decrease worst pain by 2 points for improved QOL. 2. Improve LE strength by 1/2 grade for improved LE fx.  3. Supervision with HEP for self care. Long Term Goals: In 8 weeks, the patient will:  1. Improve LE strength by 1 grade for improved LE fx.  2. FOTO to greater than predicted value. 3. Independent with comprehensive HEP upon discharge.   4. Improve both knee flex/ext ROM by 10 degrees for improved functional ROM for ADL's.  5. D/c AD for return to functional baseline. 6. Negotiate steps with step-over-step pattern to navigate home environment. Plan  Plan details: POC start on 10/20/23 at first post-op PT visit. Patient would benefit from: skilled physical therapy  Referral necessary: No  Planned modality interventions: cryotherapy  Planned therapy interventions: activity modification, ADL retraining, joint mobilization, manual therapy, neuromuscular re-education, patient education, postural training, strengthening, stretching, therapeutic activities, therapeutic exercise, home exercise program, graded exercise, functional ROM exercises, flexibility, gait training, body mechanics training, balance/weight bearing training and balance  Frequency: 2x week  Duration in weeks: 8  Plan of Care beginning date: 10/20/2023  Plan of Care expiration date: 12/15/2023  Treatment plan discussed with: patient        Subjective Evaluation    History of Present Illness  Mechanism of injury: Pt scheduled for R TKA tomorrow, 10/18 with Dr. Anthony Holland, presenting for pre-op PT consult. Pt reports chronic hx of R knee pain, was receiving injections for pain. Pt notes difficulty performing ADL's such as standing/walking >15 minutes, stair negotiation, and getting up out of a chair. Pt denies any relief with OTC meds or ice.  Ambulates with SPC "off and on."   Patient Goals  Patient goals for therapy: decreased pain, increased strength, independence with ADLs/IADLs and return to sport/leisure activities (improve walking tolerance, increase yard/house work)    Pain  Current pain ratin  At best pain ratin  At worst pain rating: 10  Quality: burning  Progression: worsening    Social Support  Steps to enter house: yes (1 flight up steps, no elevator)  Stairs in house: no   Lives in: apartment (on 2nd floor)  Lives with: spouse ( in good health, can help)    Employment status: not working  Treatments  Previous treatment: injection treatment        Objective    Flowsheet Rows      Flowsheet Row Most Recent Value   PT/OT G-Codes    Current Score 44   Projected Score 58            OBJECTIVE:    Standing Posture & Lower Extremity Alignment:  Structure/Joint         Pelvis (Tilt)  Anterior x Neutral  Posterior   Iliac Crests  Right Elevated x Neutral  Left Elevated   Knee - Frontal   Genuvalgum x Neutral  Genuvarum   Knee - Sagittal  Genurecurvatum x Neutral       Range of Motion: Goniometric revealed the following findings (in degrees). Joint Motion Right: 10/17/2023 Left: 10/17/2023   Knee Extension 10* 5   Knee Flexion 106* 112   Ankle Dorsiflexion 5 5   Ankle Plantarflexion [facility]     Strength: MMT revealed the following findings.   Joint Motion Right: 10/17/2023 Left: 10/17/2023   Hip Flexion 4-/5* 4/5   Hip Abduction 3+/5* 3+/5   Hip Extension 3+/5* 3+/5   Knee Extension 4+/5* 5/5   Knee Flexion 4+/5* 4+/5   Ankle Plantarflexion 4/5 4/5   Ankle Dorsiflexion 4+/5 4+/5   *=indicates pain with testing    Additional Assessments:  Palpation: TTP over medial and lateral joint lines  Observation: no visible deformity/swelling  Gait Pattern: decreased speed, poor WB on RLE, decreased step length  Balance: unable to perform SLS  Joint Mobility: WFL patella  Squat: unable (pain)    Lower quarter screen: unremarkable       Pertinent Findings and Outcome Measures:                                                                                                                                                                         Test / Measure  10/17/2023 (pre-op)    TUG 15.7 sec, no AD    5xSTS 16.3 sec, chair    Knee ext ROM 10 deg    Knee flex  106 deg      Precautions: s/p R TKA 10/18/23    POC expiration: 12/15/2023    HEP access code: [email or web address]    Manuals 10/17 (pre-op)    R knee PROM     R patella mobs               Neuro Re-Ed     QS 5"x10 hep    Glute sets 5"x10 hep    Balance     Mini squats Ther Ex     Bike for ROM     Heel slides 5"x10 supine +strap    Ankle pumps hep    Bridge     Stand hip 3-way     HR/TR     LAQ     [facility]     Leg press     STS's     Knee flexion stretch               Ther Activity     Tband side stepping     FSU     LSU          Gait Training     AD training          Modalities

## 2023-10-17 NOTE — ANESTHESIA PREPROCEDURE EVALUATION
Procedure:  ARTHROPLASTY KNEE TOTAL, potential same day discharge (Right: Knee)    Relevant Problems   CARDIO   (+) Benign essential HTN   (+) Mixed hyperlipidemia      ENDO   (+) Diabetes mellitus type 2 in obese       GI/HEPATIC   (+) Fatty liver   (+) Gastroesophageal reflux disease without esophagitis      MUSCULOSKELETAL   (+) Cervical spine arthritis   (+) Chronic right-sided low back pain with right-sided sciatica   (+) Degenerative disc disease, cervical   (+) Fibromyalgia   (+) Lumbar spondylosis   (+) Primary osteoarthritis of both knees   (+) Primary osteoarthritis of right hip      NEURO/PSYCH   (+) Chronic right-sided low back pain with right-sided sciatica   (+) Current mild episode of major depressive disorder without prior episode (HCC)   (+) Fibromyalgia        Physical Exam    Airway    Mallampati score: II  TM Distance: >3 FB  Neck ROM: full     Dental        Cardiovascular  Cardiovascular exam normal    Pulmonary  Pulmonary exam normal     Other Findings        Anesthesia Plan  ASA Score- 3     Anesthesia Type- spinal with ASA Monitors. Additional Monitors:     Airway Plan:     Comment: Adductor block . Plan Factors-Exercise tolerance (METS): >4 METS. Chart reviewed. EKG reviewed. Existing labs reviewed. Patient summary reviewed. Patient is not a current smoker. Patient not instructed to abstain from smoking on day of procedure. Patient did not smoke on day of surgery. Induction-     Postoperative Plan- Plan for postoperative opioid use. Informed Consent- Anesthetic plan and risks discussed with patient. I personally reviewed this patient with the CRNA. Discussed and agreed on the Anesthesia Plan with the CRNA. .              Lab Results   Component Value Date    HGBA1C 6.4 (H) 09/25/2023       Lab Results   Component Value Date    K 4.2 09/25/2023     09/25/2023    CO2 32 09/25/2023    BUN 13 09/25/2023    CREATININE 0.66 09/25/2023    GLUF 100 (H) 09/25/2023    CALCIUM 9.8 09/25/2023    AST 23 09/25/2023    ALT 26 09/25/2023    ALKPHOS 72 09/25/2023    EGFR 86 09/25/2023       Lab Results   Component Value Date    WBC 4.29 (L) 09/25/2023    HGB 13.9 09/25/2023    HCT 43.7 09/25/2023    MCV 90 09/25/2023     09/25/2023   EKG nsr

## 2023-10-18 ENCOUNTER — ANESTHESIA (OUTPATIENT)
Dept: PERIOP | Facility: HOSPITAL | Age: 73
End: 2023-10-18
Payer: MEDICARE

## 2023-10-18 ENCOUNTER — HOSPITAL ENCOUNTER (OUTPATIENT)
Facility: HOSPITAL | Age: 73
Setting detail: OUTPATIENT SURGERY
Discharge: HOME | End: 2023-10-19
Attending: ORTHOPAEDIC SURGERY | Admitting: ORTHOPAEDIC SURGERY
Payer: MEDICARE

## 2023-10-18 DIAGNOSIS — Z96.641 STATUS POST TOTAL HIP REPLACEMENT, RIGHT: ICD-10-CM

## 2023-10-18 DIAGNOSIS — Z96.651 STATUS POST TOTAL KNEE REPLACEMENT, RIGHT: Primary | ICD-10-CM

## 2023-10-18 PROCEDURE — C1776 JOINT DEVICE (IMPLANTABLE): HCPCS | Performed by: ORTHOPAEDIC SURGERY

## 2023-10-18 PROCEDURE — C9290 INJ, BUPIVACAINE LIPOSOME: HCPCS | Performed by: ANESTHESIOLOGY

## 2023-10-18 PROCEDURE — C1713 ANCHOR/SCREW BN/BN,TIS/BN: HCPCS | Performed by: ORTHOPAEDIC SURGERY

## 2023-10-18 PROCEDURE — 97163 PT EVAL HIGH COMPLEX 45 MIN: CPT

## 2023-10-18 DEVICE — ATTUNE PATELLA MEDIALIZED DOME 35MM CEMENTED AOX
Type: IMPLANTABLE DEVICE | Site: KNEE | Status: FUNCTIONAL
Brand: ATTUNE

## 2023-10-18 DEVICE — ATTUNE KNEE SYSTEM FEMORAL CRUCIATE RETAINING NARROW SIZE 5N RIGHT CEMENTED
Type: IMPLANTABLE DEVICE | Site: KNEE | Status: FUNCTIONAL
Brand: ATTUNE

## 2023-10-18 DEVICE — ATTUNE KNEE SYSTEM TIBIAL INSERT FIXED BEARING MEDIAL STABILIZED RIGHT AOX 5, 6MM
Type: IMPLANTABLE DEVICE | Site: KNEE | Status: FUNCTIONAL
Brand: ATTUNE

## 2023-10-18 DEVICE — ATTUNE KNEE SYSTEM TIBIAL BASE FIXED BEARING SIZE 4 CEMENTED
Type: IMPLANTABLE DEVICE | Site: KNEE | Status: FUNCTIONAL
Brand: ATTUNE

## 2023-10-18 DEVICE — SMARTSET HIGH PERFORMANCE MV MEDIUM VISCOSITY BONE CEMENT 40G
Type: IMPLANTABLE DEVICE | Site: KNEE | Status: FUNCTIONAL
Brand: SMARTSET

## 2023-10-18 RX ORDER — BUPIVACAINE HYDROCHLORIDE 5 MG/ML
INJECTION, SOLUTION EPIDURAL; INTRACAUDAL
Status: COMPLETED | OUTPATIENT
Start: 2023-10-18 | End: 2023-10-18

## 2023-10-18 RX ORDER — DEXAMETHASONE SODIUM PHOSPHATE 10 MG/ML
INJECTION, SOLUTION INTRAMUSCULAR; INTRAVENOUS AS NEEDED
Status: DISCONTINUED | OUTPATIENT
Start: 2023-10-18 | End: 2023-10-18

## 2023-10-18 RX ORDER — ONDANSETRON 2 MG/ML
INJECTION INTRAMUSCULAR; INTRAVENOUS AS NEEDED
Status: DISCONTINUED | OUTPATIENT
Start: 2023-10-18 | End: 2023-10-18

## 2023-10-18 RX ORDER — ENOXAPARIN SODIUM 100 MG/ML
40 INJECTION SUBCUTANEOUS DAILY
Status: DISCONTINUED | OUTPATIENT
Start: 2023-10-18 | End: 2023-10-19 | Stop reason: HOSPADM

## 2023-10-18 RX ORDER — CLINDAMYCIN HYDROCHLORIDE 300 MG/1
300 CAPSULE ORAL EVERY 12 HOURS
Qty: 2 CAPSULE | Refills: 0 | Status: SHIPPED | OUTPATIENT
Start: 2023-10-18 | End: 2023-10-19

## 2023-10-18 RX ORDER — HYDROMORPHONE HCL/PF 1 MG/ML
0.25 SYRINGE (ML) INJECTION
Status: DISCONTINUED | OUTPATIENT
Start: 2023-10-18 | End: 2023-10-18 | Stop reason: HOSPADM

## 2023-10-18 RX ORDER — CLINDAMYCIN PHOSPHATE 600 MG/50ML
600 INJECTION INTRAVENOUS EVERY 8 HOURS
Status: COMPLETED | OUTPATIENT
Start: 2023-10-18 | End: 2023-10-19

## 2023-10-18 RX ORDER — SODIUM CHLORIDE, SODIUM LACTATE, POTASSIUM CHLORIDE, CALCIUM CHLORIDE 600; 310; 30; 20 MG/100ML; MG/100ML; MG/100ML; MG/100ML
50 INJECTION, SOLUTION INTRAVENOUS CONTINUOUS
Status: DISCONTINUED | OUTPATIENT
Start: 2023-10-18 | End: 2023-10-18

## 2023-10-18 RX ORDER — CHLORHEXIDINE GLUCONATE 4 G/100ML
SOLUTION TOPICAL DAILY PRN
Status: DISCONTINUED | OUTPATIENT
Start: 2023-10-18 | End: 2023-10-18 | Stop reason: HOSPADM

## 2023-10-18 RX ORDER — CLINDAMYCIN PHOSPHATE 900 MG/50ML
900 INJECTION INTRAVENOUS ONCE
Status: COMPLETED | OUTPATIENT
Start: 2023-10-18 | End: 2023-10-18

## 2023-10-18 RX ORDER — DULOXETIN HYDROCHLORIDE 60 MG/1
60 CAPSULE, DELAYED RELEASE ORAL DAILY
Status: DISCONTINUED | OUTPATIENT
Start: 2023-10-18 | End: 2023-10-19 | Stop reason: HOSPADM

## 2023-10-18 RX ORDER — GABAPENTIN 300 MG/1
600 CAPSULE ORAL 3 TIMES DAILY
Status: DISCONTINUED | OUTPATIENT
Start: 2023-10-18 | End: 2023-10-19 | Stop reason: HOSPADM

## 2023-10-18 RX ORDER — MAGNESIUM HYDROXIDE 1200 MG/15ML
LIQUID ORAL AS NEEDED
Status: DISCONTINUED | OUTPATIENT
Start: 2023-10-18 | End: 2023-10-18 | Stop reason: HOSPADM

## 2023-10-18 RX ORDER — MELATONIN
1000 DAILY
Status: DISCONTINUED | OUTPATIENT
Start: 2023-10-18 | End: 2023-10-19 | Stop reason: HOSPADM

## 2023-10-18 RX ORDER — EPHEDRINE SULFATE 50 MG/ML
INJECTION INTRAVENOUS AS NEEDED
Status: DISCONTINUED | OUTPATIENT
Start: 2023-10-18 | End: 2023-10-18

## 2023-10-18 RX ORDER — HYDROMORPHONE HCL IN WATER/PF 6 MG/30 ML
0.2 PATIENT CONTROLLED ANALGESIA SYRINGE INTRAVENOUS EVERY 4 HOURS PRN
Status: DISCONTINUED | OUTPATIENT
Start: 2023-10-18 | End: 2023-10-19 | Stop reason: HOSPADM

## 2023-10-18 RX ORDER — FENTANYL CITRATE/PF 50 MCG/ML
25 SYRINGE (ML) INJECTION
Status: DISCONTINUED | OUTPATIENT
Start: 2023-10-18 | End: 2023-10-18 | Stop reason: HOSPADM

## 2023-10-18 RX ORDER — SODIUM CHLORIDE, SODIUM LACTATE, POTASSIUM CHLORIDE, CALCIUM CHLORIDE 600; 310; 30; 20 MG/100ML; MG/100ML; MG/100ML; MG/100ML
100 INJECTION, SOLUTION INTRAVENOUS CONTINUOUS
Status: DISCONTINUED | OUTPATIENT
Start: 2023-10-18 | End: 2023-10-19 | Stop reason: HOSPADM

## 2023-10-18 RX ORDER — ONDANSETRON 2 MG/ML
4 INJECTION INTRAMUSCULAR; INTRAVENOUS EVERY 6 HOURS PRN
Status: DISCONTINUED | OUTPATIENT
Start: 2023-10-18 | End: 2023-10-19 | Stop reason: HOSPADM

## 2023-10-18 RX ORDER — ACETAMINOPHEN 325 MG/1
975 TABLET ORAL ONCE
Status: COMPLETED | OUTPATIENT
Start: 2023-10-18 | End: 2023-10-18

## 2023-10-18 RX ORDER — MIDAZOLAM HYDROCHLORIDE 2 MG/2ML
INJECTION, SOLUTION INTRAMUSCULAR; INTRAVENOUS
Status: COMPLETED | OUTPATIENT
Start: 2023-10-18 | End: 2023-10-18

## 2023-10-18 RX ORDER — PANTOPRAZOLE SODIUM 20 MG/1
20 TABLET, DELAYED RELEASE ORAL
Status: DISCONTINUED | OUTPATIENT
Start: 2023-10-18 | End: 2023-10-19 | Stop reason: HOSPADM

## 2023-10-18 RX ORDER — MIDAZOLAM HYDROCHLORIDE 2 MG/2ML
INJECTION, SOLUTION INTRAMUSCULAR; INTRAVENOUS AS NEEDED
Status: DISCONTINUED | OUTPATIENT
Start: 2023-10-18 | End: 2023-10-18

## 2023-10-18 RX ORDER — PROPOFOL 10 MG/ML
INJECTION, EMULSION INTRAVENOUS CONTINUOUS PRN
Status: DISCONTINUED | OUTPATIENT
Start: 2023-10-18 | End: 2023-10-18

## 2023-10-18 RX ORDER — TRANEXAMIC ACID 10 MG/ML
1000 INJECTION, SOLUTION INTRAVENOUS ONCE
Status: COMPLETED | OUTPATIENT
Start: 2023-10-18 | End: 2023-10-18

## 2023-10-18 RX ORDER — PROMETHAZINE HYDROCHLORIDE 12.5 MG/1
12.5 TABLET ORAL EVERY 6 HOURS PRN
Qty: 12 TABLET | Refills: 0 | Status: SHIPPED | OUTPATIENT
Start: 2023-10-18

## 2023-10-18 RX ORDER — SODIUM CHLORIDE 9 MG/ML
INJECTION, SOLUTION INTRAVENOUS AS NEEDED
Status: DISCONTINUED | OUTPATIENT
Start: 2023-10-18 | End: 2023-10-18 | Stop reason: HOSPADM

## 2023-10-18 RX ORDER — CHLORHEXIDINE GLUCONATE ORAL RINSE 1.2 MG/ML
15 SOLUTION DENTAL ONCE
Status: COMPLETED | OUTPATIENT
Start: 2023-10-18 | End: 2023-10-18

## 2023-10-18 RX ORDER — BISACODYL 10 MG
10 SUPPOSITORY, RECTAL RECTAL DAILY PRN
Status: DISCONTINUED | OUTPATIENT
Start: 2023-10-18 | End: 2023-10-19 | Stop reason: HOSPADM

## 2023-10-18 RX ORDER — ASCORBIC ACID 500 MG
500 TABLET ORAL DAILY
Status: DISCONTINUED | OUTPATIENT
Start: 2023-10-18 | End: 2023-10-19 | Stop reason: HOSPADM

## 2023-10-18 RX ORDER — ONDANSETRON 2 MG/ML
4 INJECTION INTRAMUSCULAR; INTRAVENOUS ONCE AS NEEDED
Status: DISCONTINUED | OUTPATIENT
Start: 2023-10-18 | End: 2023-10-18 | Stop reason: HOSPADM

## 2023-10-18 RX ORDER — DOCUSATE SODIUM 100 MG/1
100 CAPSULE, LIQUID FILLED ORAL 2 TIMES DAILY
Qty: 30 CAPSULE | Refills: 0 | Status: SHIPPED | OUTPATIENT
Start: 2023-10-18

## 2023-10-18 RX ORDER — PRAVASTATIN SODIUM 40 MG
80 TABLET ORAL
Status: DISCONTINUED | OUTPATIENT
Start: 2023-10-18 | End: 2023-10-19 | Stop reason: HOSPADM

## 2023-10-18 RX ORDER — SENNOSIDES 8.6 MG
1 TABLET ORAL DAILY
Status: DISCONTINUED | OUTPATIENT
Start: 2023-10-18 | End: 2023-10-19 | Stop reason: HOSPADM

## 2023-10-18 RX ORDER — FENTANYL CITRATE 50 UG/ML
INJECTION, SOLUTION INTRAMUSCULAR; INTRAVENOUS
Status: COMPLETED | OUTPATIENT
Start: 2023-10-18 | End: 2023-10-18

## 2023-10-18 RX ORDER — BUPIVACAINE HYDROCHLORIDE 7.5 MG/ML
INJECTION, SOLUTION INTRASPINAL AS NEEDED
Status: DISCONTINUED | OUTPATIENT
Start: 2023-10-18 | End: 2023-10-18

## 2023-10-18 RX ORDER — CALCIUM CARBONATE 500 MG/1
1000 TABLET, CHEWABLE ORAL DAILY PRN
Status: DISCONTINUED | OUTPATIENT
Start: 2023-10-18 | End: 2023-10-19 | Stop reason: HOSPADM

## 2023-10-18 RX ORDER — OXYCODONE HYDROCHLORIDE 5 MG/1
5 TABLET ORAL EVERY 4 HOURS PRN
Status: DISCONTINUED | OUTPATIENT
Start: 2023-10-18 | End: 2023-10-19 | Stop reason: HOSPADM

## 2023-10-18 RX ORDER — ACETAMINOPHEN 325 MG/1
975 TABLET ORAL EVERY 8 HOURS SCHEDULED
Status: DISCONTINUED | OUTPATIENT
Start: 2023-10-18 | End: 2023-10-19 | Stop reason: HOSPADM

## 2023-10-18 RX ORDER — DOCUSATE SODIUM 100 MG/1
100 CAPSULE, LIQUID FILLED ORAL 2 TIMES DAILY
Status: DISCONTINUED | OUTPATIENT
Start: 2023-10-18 | End: 2023-10-19 | Stop reason: HOSPADM

## 2023-10-18 RX ORDER — FOLIC ACID 1 MG/1
1 TABLET ORAL DAILY
Status: DISCONTINUED | OUTPATIENT
Start: 2023-10-18 | End: 2023-10-19 | Stop reason: HOSPADM

## 2023-10-18 RX ORDER — OXYCODONE HYDROCHLORIDE 5 MG/1
5 TABLET ORAL EVERY 4 HOURS PRN
Qty: 42 TABLET | Refills: 0 | Status: SHIPPED | OUTPATIENT
Start: 2023-10-18

## 2023-10-18 RX ORDER — SODIUM CHLORIDE 9 MG/ML
75 INJECTION, SOLUTION INTRAVENOUS CONTINUOUS
Status: DISCONTINUED | OUTPATIENT
Start: 2023-10-18 | End: 2023-10-18

## 2023-10-18 RX ADMIN — SENNOSIDES 8.6 MG: 8.6 TABLET, FILM COATED ORAL at 11:37

## 2023-10-18 RX ADMIN — BUPIVACAINE 20 ML: 13.3 INJECTION, SUSPENSION, LIPOSOMAL INFILTRATION at 07:25

## 2023-10-18 RX ADMIN — MIDAZOLAM 1 MG: 1 INJECTION INTRAMUSCULAR; INTRAVENOUS at 07:44

## 2023-10-18 RX ADMIN — DOCUSATE SODIUM 100 MG: 100 CAPSULE, LIQUID FILLED ORAL at 20:37

## 2023-10-18 RX ADMIN — SODIUM CHLORIDE, SODIUM LACTATE, POTASSIUM CHLORIDE, AND CALCIUM CHLORIDE 50 ML/HR: .6; .31; .03; .02 INJECTION, SOLUTION INTRAVENOUS at 06:01

## 2023-10-18 RX ADMIN — CLINDAMYCIN PHOSPHATE 600 MG: 600 INJECTION, SOLUTION INTRAVENOUS at 14:30

## 2023-10-18 RX ADMIN — DULOXETINE HYDROCHLORIDE 60 MG: 60 CAPSULE, DELAYED RELEASE PELLETS ORAL at 11:37

## 2023-10-18 RX ADMIN — OXYCODONE HYDROCHLORIDE AND ACETAMINOPHEN 500 MG: 500 TABLET ORAL at 11:37

## 2023-10-18 RX ADMIN — ENOXAPARIN SODIUM 40 MG: 40 INJECTION SUBCUTANEOUS at 21:31

## 2023-10-18 RX ADMIN — FENTANYL CITRATE 50 MCG: 50 INJECTION, SOLUTION INTRAMUSCULAR; INTRAVENOUS at 07:20

## 2023-10-18 RX ADMIN — EPHEDRINE SULFATE 5 MG: 50 INJECTION INTRAVENOUS at 08:18

## 2023-10-18 RX ADMIN — PROPOFOL 80 MCG/KG/MIN: 10 INJECTION, EMULSION INTRAVENOUS at 07:55

## 2023-10-18 RX ADMIN — DEXAMETHASONE SODIUM PHOSPHATE 10 MG: 10 INJECTION, SOLUTION INTRAMUSCULAR; INTRAVENOUS at 08:00

## 2023-10-18 RX ADMIN — CHLORHEXIDINE GLUCONATE 0.12% ORAL RINSE 15 ML: 1.2 LIQUID ORAL at 06:01

## 2023-10-18 RX ADMIN — GABAPENTIN 600 MG: 300 CAPSULE ORAL at 11:37

## 2023-10-18 RX ADMIN — ACETAMINOPHEN 975 MG: 325 TABLET ORAL at 06:01

## 2023-10-18 RX ADMIN — SODIUM CHLORIDE, SODIUM LACTATE, POTASSIUM CHLORIDE, AND CALCIUM CHLORIDE 100 ML/HR: .6; .31; .03; .02 INJECTION, SOLUTION INTRAVENOUS at 20:12

## 2023-10-18 RX ADMIN — CHOLECALCIFEROL TAB 25 MCG (1000 UNIT) 1000 UNITS: 25 TAB at 11:37

## 2023-10-18 RX ADMIN — SODIUM CHLORIDE, SODIUM LACTATE, POTASSIUM CHLORIDE, AND CALCIUM CHLORIDE: .6; .31; .03; .02 INJECTION, SOLUTION INTRAVENOUS at 07:44

## 2023-10-18 RX ADMIN — ACETAMINOPHEN 975 MG: 325 TABLET ORAL at 14:20

## 2023-10-18 RX ADMIN — ACETAMINOPHEN 975 MG: 325 TABLET ORAL at 21:30

## 2023-10-18 RX ADMIN — IRON SUCROSE 300 MG: 20 INJECTION, SOLUTION INTRAVENOUS at 11:36

## 2023-10-18 RX ADMIN — BUPIVACAINE HYDROCHLORIDE 10 ML: 5 INJECTION, SOLUTION EPIDURAL; INTRACAUDAL at 07:25

## 2023-10-18 RX ADMIN — EPHEDRINE SULFATE 5 MG: 50 INJECTION INTRAVENOUS at 08:25

## 2023-10-18 RX ADMIN — TRANEXAMIC ACID 1000 MG: 10 INJECTION, SOLUTION INTRAVENOUS at 07:59

## 2023-10-18 RX ADMIN — PRAVASTATIN SODIUM 80 MG: 40 TABLET ORAL at 18:03

## 2023-10-18 RX ADMIN — EPHEDRINE SULFATE 5 MG: 50 INJECTION INTRAVENOUS at 09:30

## 2023-10-18 RX ADMIN — BUPIVACAINE HYDROCHLORIDE IN DEXTROSE 1.7 ML: 7.5 INJECTION, SOLUTION SUBARACHNOID at 07:52

## 2023-10-18 RX ADMIN — ONDANSETRON 4 MG: 2 INJECTION INTRAMUSCULAR; INTRAVENOUS at 07:59

## 2023-10-18 RX ADMIN — CLINDAMYCIN PHOSPHATE 900 MG: 900 INJECTION, SOLUTION INTRAVENOUS at 07:44

## 2023-10-18 RX ADMIN — PANTOPRAZOLE SODIUM 20 MG: 20 TABLET, DELAYED RELEASE ORAL at 11:37

## 2023-10-18 RX ADMIN — FOLIC ACID 1 MG: 1 TABLET ORAL at 11:37

## 2023-10-18 RX ADMIN — MIDAZOLAM 1 MG: 1 INJECTION INTRAMUSCULAR; INTRAVENOUS at 07:20

## 2023-10-18 RX ADMIN — CLINDAMYCIN PHOSPHATE 600 MG: 600 INJECTION, SOLUTION INTRAVENOUS at 23:37

## 2023-10-18 RX ADMIN — GABAPENTIN 600 MG: 300 CAPSULE ORAL at 23:37

## 2023-10-18 RX ADMIN — DOCUSATE SODIUM 100 MG: 100 CAPSULE, LIQUID FILLED ORAL at 11:37

## 2023-10-18 RX ADMIN — PHENYLEPHRINE HYDROCHLORIDE 20 MCG/MIN: 10 INJECTION INTRAVENOUS at 08:14

## 2023-10-18 RX ADMIN — GABAPENTIN 600 MG: 300 CAPSULE ORAL at 18:01

## 2023-10-18 RX ADMIN — SODIUM CHLORIDE, SODIUM LACTATE, POTASSIUM CHLORIDE, AND CALCIUM CHLORIDE 100 ML/HR: .6; .31; .03; .02 INJECTION, SOLUTION INTRAVENOUS at 11:41

## 2023-10-18 NOTE — ANESTHESIA PROCEDURE NOTES
Peripheral Block    Patient location during procedure: holding area  Start time: 10/18/2023 7:15 AM  Reason for block: at surgeon's request and post-op pain management  Staffing  Performed by: Aminta Matthews MD  Authorized by: Aminta Matthews MD    Preanesthetic Checklist  Completed: patient identified, IV checked, site marked, risks and benefits discussed, surgical consent, monitors and equipment checked, pre-op evaluation and timeout performed  Peripheral Block  Prep: ChloraPrep  Patient monitoring: frequent blood pressure checks, continuous pulse oximetry and heart rate  Block type: Adductor Canal  Laterality: right  Injection technique: single-shot  Procedures: ultrasound guided, Ultrasound guidance required for the procedure to increase accuracy and safety of medication placement and decrease risk of complications.   Ultrasound permanent image savedbupivacaine (PF) (MARCAINE) 0.5 % injection 20 mL - Perineural   10 mL - 10/18/2023 7:25:00 AM  bupivacaine liposomal (EXPAREL) 1.3 % injection 20 mL - Perineural   20 mL - 10/18/2023 7:25:00 AM  fentanyl citrate (PF) 100 MCG/2ML 50 mcg - Intravenous   50 mcg - 10/18/2023 7:20:00 AM  midazolam (VERSED) injection 0.5 mg - Intravenous   1 mg - 10/18/2023 7:20:00 AM  Needle  Needle type: Stimuplex   Needle gauge: 20 G  Needle localization: anatomical landmarks and ultrasound guidance  Assessment  Injection assessment: incremental injection, frequent aspiration, injected with ease, negative aspiration, negative for heart rate change, no paresthesia on injection, no symptoms of intraneural/intravenous injection and needle tip visualized at all times  Paresthesia pain: none  Post-procedure:  site cleaned  patient tolerated the procedure well with no immediate complications

## 2023-10-18 NOTE — PHYSICAL THERAPY NOTE
Physical Therapy Evaluation    Patient's Name: Evelyn Kemp    Admitting Diagnosis  Primary osteoarthritis of right knee [M17.11]    Problem List  Patient Active Problem List   Diagnosis    Primary osteoarthritis of both knees    Lumbar disc disease with radiculopathy    Chronic right-sided low back pain with right-sided sciatica    Fibromyalgia    Benign essential HTN    Fatty liver    Gastroesophageal reflux disease without esophagitis    Lumbar radiculopathy    Pes anserine bursitis    Lower extremity edema    Cervical radiculopathy    Cervical spine arthritis    Cervical spinal stenosis    Lumbar spondylosis    Current mild episode of major depressive disorder without prior episode (HCC)    Mixed hyperlipidemia    IFG (impaired fasting glucose)    Degenerative disc disease, cervical    Diabetes mellitus type 2 in obese     Spasm of right piriformis muscle    Primary osteoarthritis of right hip    Spinal stenosis of lumbar region with neurogenic claudication    Status post total hip replacement, right    Status post total knee replacement, right       Past Medical History  Past Medical History:   Diagnosis Date    Arthritis     Encounter for geriatric assessment 05/18/2023    Fatty liver 05/01/2018    Fibromyalgia, primary     GERD (gastroesophageal reflux disease)     Hyperlipidemia     Hypertension     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    Low back pain     Lumbar stenosis     Neck pain     Neuropathy     feet    No natural teeth     on lower    Primary localized osteoarthritis of right knee 08/17/2017    Sacroiliitis ([address]) 08/20/2018    Skin cancer     on chest in past - melanoma    Sleep apnea     resolved with weight loss    Use of cane as ambulatory aid     or walker    Wears dentures     full  uppers       Past Surgical History  Past Surgical History:   Procedure Laterality Date    JOINT REPLACEMENT Right 06/2023    AR ARTHRP ACETBLR/PROX FEM PROSTC AGRFT/ALGRFT Right 06/19/2023 Procedure: ARTHROPLASTY HIP TOTAL ANTERIOR;  Surgeon: Chris Haro DO;  Location: [address] [place] [address] OR;  Service: Orthopedics    SKIN CANCER EXCISION      on chest - melanoma    TUBAL LIGATION         Recent Imaging  No orders to display       Recent Vital Signs  Vitals:    10/18/23 1215 10/18/23 1245 10/18/23 1345 10/18/23 1445   BP: 140/87 126/82 131/88 131/88   BP Location: Right arm Right arm Right arm Right arm   Pulse: 84 96 104 104   Resp: 18 18 18 18   Temp: (!) 97.1 °F (36.2 °C) (!) 97.2 °F (36.2 °C) (!) 97.3 °F (36.3 °C) (!) 97.4 °F (36.3 °C)   TempSrc: Temporal Temporal Temporal Temporal   SpO2: 97% 96% 95% 96%   Weight:       Height:            10/18/23 1540   PT Last Visit   PT Visit Date 10/18/23   Note Type   Note type Evaluation   Pain Assessment   Pain Assessment Tool 0-10   Pain Score No Pain   Restrictions/Precautions   Weight Bearing Precautions Per Order No   Other Precautions Fall Risk   Home Living   Type of Home Apartment   Home Layout One level;Stairs to enter with rails  (7+7 YULISSA)   Bathroom Shower/Tub Tub/shower unit   Bathroom Equipment Shower chair;Commode   Bathroom Accessibility Accessible via walker   [place]   Prior Function   Level of [place] Independent with ADLs; Independent with functional mobility   Lives With Spouse   Receives Help From [facility] in the last 6 months 0   General   Family/Caregiver Present Yes   Cognition   Overall Cognitive Status WFL   Arousal/Participation Alert   Orientation Level Oriented X4   Memory Within functional limits   Following Commands Follows all commands and directions without difficulty   RLE Assessment   RLE Assessment   (3/5)   LLE Assessment   LLE Assessment WFL   Coordination   Movements are Fluid and Coordinated 0   Coordination and Movement Description decreased proprioception in the RLE with knee buckling during gait   Sensation X   Light Touch   RLE Light Touch Impaired   RLE Light Touch Comments decreased throughout likely due to nerve block   LLE Light Touch Impaired   LLE Light Touch Comments tingling distally which is chronic   Proprioception   RLE Proprioception Impaired   Bed Mobility   Supine to Sit 5  Supervision   Additional items Increased time required   Sit to Supine 5  Supervision   Additional items Increased time required   Transfers   Sit to Stand 4  Minimal assistance   Additional items Assist x 1; Armrests; Increased time required;Verbal cues   Stand to Sit 4  Minimal assistance   Additional items Assist x 1; Armrests; Increased time required;Verbal cues   Additional Comments assistance needed due to knee buckling   Ambulation/Elevation   Gait pattern Decreased toe off;Decreased heel strike; Step to;R Knee Laura; Improper Weight shift;Decreased foot clearance   Gait Assistance 4  Minimal assist   Additional items Assist x 1;Verbal cues; Tactile cues   Assistive Device Rolling walker   Distance 40ft x2   Stair Management Assistance 4  Minimal assist   Additional items Assist x 1;Verbal cues; Tactile cues; Increased time required   Stair Management Technique Step to pattern; Foreward; Two rails   Number of Stairs 4   Balance   Static Sitting Fair   Dynamic Sitting Fair -   Static Standing Poor +   Dynamic Standing Poor +   Ambulatory Poor +   Endurance Deficit   Endurance Deficit Yes   Endurance Deficit Description post op fatigue   Activity Tolerance   Activity Tolerance Patient tolerated treatment well   Medical Staff Made Aware spoke to CM   Nurse Made Aware spoke to RN   Assessment   Prognosis Good   Problem List Decreased strength;Decreased range of motion;Decreased endurance; Impaired balance;Decreased mobility; Decreased coordination; Impaired sensation   Barriers to Discharge Inaccessible home environment   Goals   Patient Goals to return home when knee stops buckling   STG Expiration Date 10/28/23   Short Term Goal #1 see eval note   PT Treatment Day 0   Plan   Treatment/Interventions ADL retraining;Functional transfer training;LE strengthening/ROM; Elevations; Therapeutic exercise; Endurance training;Patient/family training;Bed mobility; Equipment eval/education;Gait training;Spoke to nursing;Spoke to case management;OT   PT Frequency Twice a day   Discharge Recommendation   PT Discharge Recommendation Home with outpatient rehabilitation   Equipment Recommended [facility] Recommended Wheeled walker   AM-PAC Basic Mobility Inpatient   Turning in Flat Bed Without Bedrails 4   Lying on Back to Sitting on Edge of Flat Bed Without Bedrails 4   Moving Bed to Chair 3   Standing Up From Chair Using Arms 3   Walk in Room 3   Climb 3-5 Stairs With Railing 2   Basic Mobility Inpatient Raw Score 19   Basic Mobility Standardized Score 42.48   Highest Level Of Mobility   JH-HLM Goal 6: Walk 10 steps or more   JH-HLM Achieved 7: Walk 25 feet or more   End of Consult   Patient Position at End of Consult Bedside chair; All needs within reach         ASSESSMENT                                                                                                                     Al Barney is a 74 y.o. female admitted to [facility] on 10/18/2023 for Status post total knee replacement, right. Pt  has a past medical history of Arthritis, Encounter for geriatric assessment (05/18/2023), Fatty liver (05/01/2018), Fibromyalgia, primary, GERD (gastroesophageal reflux disease), Hyperlipidemia, Hypertension, Infectious viral hepatitis, Low back pain, Lumbar stenosis, Neck pain, Neuropathy, No natural teeth, Primary localized osteoarthritis of right knee (08/17/2017), Sacroiliitis ([address]) (08/20/2018), Skin cancer, Sleep apnea, Use of cane as ambulatory aid, and Wears dentures. . PT was consulted and pt was seen on 10/18/2023 for mobility assessment and d/c planning.    Impairments limiting pt at this time include decreased ROM, impaired balance, decreased endurance, decreased coordination, increased fall risk, new onset of impairment of functional mobility, decreased ADLS, decreased IADLS, decreased activity tolerance, decreased sensation, and decreased strength. Pt is currently functioning at a supervision assistance x1 level for bed mobility, minimum assistance x1 level for transfers, minimum assistance x1 level for ambulation with Rolling Walker, and minimum assistance x1 for elevations. The patient's AM-PAC Basic Mobility Inpatient Short Form Raw Score is 19. A Raw score of greater than 16 suggests the patient may benefit from discharge to home. Please also refer to the recommendation of the Physical Therapist for safe discharge planning. Goals                                                                                                                                    1) Bed mobility skills with modified independent assistance to facilitate safe return to previous living environment 2) Functional transfers with modified independent assistance to facilitate safe return to previous living environment  3) Ambulation with least restrictive AD modified independent assistance without LOB and stable vitals for safe ambulation home/ community distances. 4) Stair training up/down flight 7 step/s with appropriate rail/s  and modified independent assistance for safe access to previous living environment. 5) Improve balance grades to fair + to reduce risk of falls. 6)Improve LE strength grades by 1 to increase independence w/ transfers and gait. 7) PT for ongoing pt and family education; DME needs and D/C planning to promote highest level of function in least restrictive environment. Recommendations                                                                                                              Pt will benefit from continued skilled IP PT to address the above mentioned impairments in order to maximize recovery and increase functional independence when completing mobility and ADLs. See flow sheet for goals and POC. DME: Donna León    Discharge Disposition:  Home with Outpatient Physical Therapy , when cleared for D/C by FELY Waters Shin PT, DPT clear

## 2023-10-18 NOTE — PLAN OF CARE
Problem: PHYSICAL THERAPY ADULT  Goal: Performs mobility at highest level of function for planned discharge setting. See evaluation for individualized goals. Description: Treatment/Interventions: ADL retraining, Functional transfer training, LE strengthening/ROM, Elevations, Therapeutic exercise, Endurance training, Patient/family training, Bed mobility, Equipment eval/education, Gait training, Spoke to nursing, Spoke to case management, OT  Equipment Recommended: Dania Hicks       See flowsheet documentation for full assessment, interventions and recommendations. Outcome: Progressing  Note: Prognosis: Good  Problem List: Decreased strength, Decreased range of motion, Decreased endurance, Impaired balance, Decreased mobility, Decreased coordination, Impaired sensation     Barriers to Discharge: Inaccessible home environment     PT Discharge Recommendation: Home with outpatient rehabilitation    See flowsheet documentation for full assessment.

## 2023-10-18 NOTE — INTERVAL H&P NOTE
H&P reviewed. After examining the patient I find no changes in the patients condition since the H&P had been written.   Heart:  regular rate  Lungs:  no audible wheezing  Abd:  nondistended  RLE:  EHL/AT/GS intact, sensation grossly intact L4, L5, S1, palpable pedal pulse      Vitals:    10/18/23 0727   BP: 105/68   Pulse:    Resp:    Temp:    SpO2: 99%

## 2023-10-18 NOTE — ANESTHESIA POSTPROCEDURE EVALUATION
Post-Op Assessment Note    CV Status:  Stable  Pain Score: 0    Pain management: adequate     Mental Status:  Alert and awake   Hydration Status:  Euvolemic   PONV Controlled:  Controlled   Airway Patency:  Patent      Post Op Vitals Reviewed: Yes      Staff: CRNA         No notable events documented.     BP 98/59 (10/18/23 1017)    Temp 97.8 °F (36.6 °C) (10/18/23 1017)    Pulse 70 (10/18/23 1017)   Resp 16 (10/18/23 1017)    SpO2 98 % (10/18/23 1017)

## 2023-10-18 NOTE — ANESTHESIA PROCEDURE NOTES
Spinal Block    Patient location during procedure: OR  Start time: 10/18/2023 7:50 AM  Reason for block: procedure for pain and at surgeon's request  Staffing  Performed by: Lawyer Paul MD  Authorized by: Lawyer Paul MD    Preanesthetic Checklist  Completed: patient identified, IV checked, site marked, risks and benefits discussed, surgical consent, monitors and equipment checked, pre-op evaluation and timeout performed  Spinal Block  Patient position: sitting  Prep: ChloraPrep  Patient monitoring: cardiac monitor and frequent blood pressure checks  Approach: midline  Location: L3-4  Injection technique: single-shot  Needle  Needle type: pencil-tip   Needle gauge: 25 G  Needle length: 10 cm  Assessment  Sensory level: T4  Injection Assessment:  negative aspiration for heme, no paresthesia on injection and positive aspiration for clear CSF.   Post-procedure:  adhesive bandage applied, pressure dressing applied, secured with tape, site cleaned and sterile dressing applied

## 2023-10-18 NOTE — OP NOTE
OPERATIVE REPORT  PATIENT NAME: Alvin Costa  : 1950  MRN: [id number]  Pt Location:  [address] [place] [address] OR ROOM 12    Surgery Date: 10/18/2023    Surgeon(s) and Role:     * Luciana Teresa, DO - Primary     * JOANA Umaña-C - [address] [place] [address], ATC - Assisting    Preop Diagnosis:  Primary osteoarthritis of right knee [M17.11]    Post-Op Diagnosis Codes:     * Primary osteoarthritis of right knee [M17.11]    Procedure(s):  Right - RIGHT TOTAL KNEE ARTHROPLASTY    Specimens:  * No specimens in log *    Estimated Blood Loss:   Minimal    Drains:  * No LDAs found *    Anesthesia Type:   Spinal     Operative Indications:  Primary osteoarthritis of right knee [M17.11]    Operative Findings:  See below    Complications:   None      Knee Approach: Medial Parapatellar    Procedure and Technique:  Implants:  Depuy Attune  CR femoral component size 5 narrow  Tibial base fixed bearing size 4  Tibial insert fixed bearing MS size 5, 6mm  Dome patella size 35mm    INDICATIONS FOR PROCEDURE:  The patients is a 74 y.o. female who presented to the office with  knee OA. Conservative treatment was attempted for quite some time and ultimately failed. She has severe progressive pain, stiffness, and disability and now elects to proceed with right total knee replacement surgery. Extensive counseling in regards to the reasons for surgical intervention as well as the risks and benefits of surgery were reviewed. The risks include, but are not limited to infection, extensive blood loss, blood clots, wound healing problems, fracture, need for additional surgery, need for revision surgery, failure of hardware, persistent pain and stiffness, heart attack, stroke, death. The patient understood and agreed to by oral and written consent.     OPERATIVE PROCEDURE:  The patient was identified as Alvin Costa by Her ID bracelet by the surgical staff in the preoperative area at [facility].  The patient was wheeled back to the surgical room and placed on the operative table. Anesthesia was administered. Preoperative antibiotics were given. The patient remained in the supine position and all bony prominences were carefully protected. A tourniquet was applied to the patient’s right upper thigh. The right leg was then prepped and draped in the usual sterile fashion. A timeout was performed where the patient’s name and surgical site were once again identified. The incision was marked out. The leg was elevated and the tourniquet was inflated to 250 mmHg. An incision was made over anterior aspect of the knee. Dissection was made through the skin and subcutaneous tissue. A medial parapatellar arthrotomy was made and the medial tissues were elevated while protecting the MCL. Remnants of the ACL, medial and lateral menisci were removed and retractors were placed. Severe osteoarthritis was noted. The femoral canal was opened with a drill and the contents were suctioned and the canal was irrigated. We used an intramedullary guide on the femoral side and resected 9mm of distal femur in 5 degrees of valgus. Osteophytes were removed. We then subluxated the tibia forward and opened the tibial canal with a drill. The contents of the canal were then suctioned and the canal was irrigated. We placed an intramedullary guide and resected 6mm of bone based on the highest point of the lateral tibial plateau perpendicular to the mechanical axis of the tibia. Next, the flexion and extension gaps were analyzed with a spacer block and visualization. Erin’s line and the epicondylar axis were then identified. The 4 in 1 cutting block was placed in 3 degrees of external rotation and resections were made. Posterior osteophytes and any remnants of the medial and lateral menisci were removed. Trials were then placed and the knee was felt to be stable and well balanced throughout the arc of motion.       The patella was measured and a portion of the articular aspect of the patella was removed. The trial for the patella was placed and patellar tracking was checked and found to be adequate with the other components in place. Attention was directed back to the tibia. External rotation of the tibial guide was set and the final preparations of the tibia were performed. The components were removed and the knee was copiously irrigated with pulse lavage and then dried. The components were then cemented in place and excess cement was removed. Once the cement was dried the trial insert was trialed. A size  5, 6mm  tibial insert was confirmed to be the correct size. The final polyethylene component was placed and the tourniquet was let down. Any bleeders were cauterized and then the knee was irrigated. An irrisept wash was performed and then the knee was once again copiously irrigated. Marcaine, morphine, and toradol was injected periarticularly throughout the case. The arthrotomy was closed with vicryl suture. The skin and subcutaneous tissues were closed with vicryl and then a running monocryl stitch. A sterile dressing was placed. The patient was awakened and transferred to a hospital bed and then to the recovery room in stable condition. I attest that I was present and performed this procedure. Glen Varela PA-C and Ree Salcido ATC were present for the entire procedure and provided essential assistance with limb position, patient prepping, and retraction. A qualified resident physician was not available.     Patient Disposition:  hemodynamically stable            SIGNATURE: Koko Carvalho DO  DATE: October 18, 2023  TIME: 10:40 AM

## 2023-10-18 NOTE — DISCHARGE INSTR - AVS FIRST PAGE
TOTAL KNEE REPLACEMENT DISCHARGE INSTRUCTIONS    Surgical Dressing: You may leave your dressing in place for 1 week. If it starts lifting off you can change it prior to that starting 2 days after your surgery. Once you start changing your dressing you should change your dressing daily until drainage stops. Do not remove your steri-strips. Do not put any lotions or creams on your incision. If there are any signs of infection such as drainage persisting beyond 7 days, unusual looking drainage (yellow, green), increased redness around the incision, or fever/chills let your doctor know. Do not get your incision wet. Continue to wear your ADOLPH stockings 2 weeks after surgery. You can remove at night them to wash, hang to dry, and reapply in the morning after sponge bath or shower (do not get your incision wet). You can wear them as needed after that. If the stockings are too tight at the top you can cut the elastic. Medications:  Upon discharge you will be given a prescription for an anticoagulant (i.e. Ecotrin (Aspirin), Coumadin (Warfarin), Lovenox (Enoxaparin)). Take as prescribed. Do not take any over the counter NSAIDs (i.e. Ibuprofen, Motrin, Advil, Naprosyn, Aleve) while on your anticoagulation medication unless otherwise specified by your surgeon. You will also be given Gabapentin if appropriate and a narcotic pain reliever for pain. Please be mindful of the number of pills you have left. You can only get a refill Monday-Friday during business hours from your surgeon or the physician assistant. You will not be given any refills on nights and weekends. Call ahead if needed. Please include tylenol in your pain regimen as well. You will also be given Colace to prevent constipation that can be caused by narcotics. Please include other over- the- counter medications for constipation if needed.      If you are on Coumadin (Warfarin) you will need your blood drawn every Monday and Thursday once you are home. Initially your visiting nurse will draw your blood. Later you will go to an outpatient lab. You will receive a phone call the next day and your Coumadin (warfarin) will be dosed based on these results. Take this dosage daily until you hear from us next. Walking:  Use two crutches or a walker for EVERY step. Gradually increase your walking daily. You can progress to a cane as tolerated once advised by your physical therapist.  Be sure to work on advancing your range of motion daily. Bathing:  Do not get your incision wet until follow up in the office. No tub baths. Do not submerge your incision. You may shower but you must cover your incision so it does NOT get wet. Gauze and Tegaderm dressing can be used to cover your incision. Once your original dressing comes off. These can be found at the drug store. Use your crutches/walker to get in and out of the shower. Use a chair if needed. Sexual Relations:  Resume according to your comfort. Swimming:  No swimming or hot tubs until approved by your physician. Swimming will be allowed once your incision is well healed. Driving: You may ride as a passenger now. No driving until your follow-up appointment. To get into the car use the front passenger seat with the seat pushed back as far as possible. Scoot yourself back in the seat. Use your hands to assist your legs into the car. Physical therapy:  Continue with exercises at home. Plan on going to outpatient physical therapy within a couple days of your surgery once you are home. If you are doing home physical therapy please call the office for a prescription once you are ready to transition to outpatient physical therapy. Special considerations: To minimize swelling, stiffness, and decrease pain use cold as needed, but not heat. Ice 20 minutes at a time with a cloth between your skin and the ice.   Ice after walking, when you have pain, or after you have completed your exercises. Limit your sitting to 60 minutes at one time. Continue to wear your ADOLPH stockings 2 weeks after surgery. You can remove at night them to wash, hang to dry, and reapply in the morning after sponge bath or shower (do not get your incision wet). You can wear them as needed after that. If the stockings are too tight at the top you can cut the elastic. Follow up:  Call [phone] to make an appointment to see your surgeon within 2 weeks of your surgery if you haven’t done so already. If you have any questions please call [phone] for any concerns as well. For the future:  Antibiotics for dental appointments is controversial.  If you have multiple medical problems we would recommend for you to call the office for a prescription for antibiotics to be taken one hour prior to your dental appointment. If you do not have multiple medical problems it is your choice if you would like to take antibiotics prior to dental appointments. We would also recommend you talking to your dentist prior to your appointment as well. For any invasive procedures (i.e. endoscopy, colonoscopy, etc.) inform the doctor performing the procedure that you have a total knee replacement and they will give you antibiotics just prior to the procedure.

## 2023-10-19 VITALS
HEART RATE: 75 BPM | HEIGHT: 62 IN | TEMPERATURE: 97.5 F | DIASTOLIC BLOOD PRESSURE: 70 MMHG | WEIGHT: 181 LBS | OXYGEN SATURATION: 96 % | BODY MASS INDEX: 33.31 KG/M2 | RESPIRATION RATE: 18 BRPM | SYSTOLIC BLOOD PRESSURE: 108 MMHG

## 2023-10-19 LAB
ANION GAP SERPL CALCULATED.3IONS-SCNC: 8 MMOL/L
BUN SERPL-MCNC: 15 MG/DL (ref 5–25)
CALCIUM SERPL-MCNC: 8.4 MG/DL (ref 8.4–10.2)
CHLORIDE SERPL-SCNC: 106 MMOL/L (ref 96–108)
CO2 SERPL-SCNC: 26 MMOL/L (ref 21–32)
CREAT SERPL-MCNC: 0.6 MG/DL (ref 0.6–1.3)
ERYTHROCYTE [DISTWIDTH] IN BLOOD BY AUTOMATED COUNT: 13 % (ref 11.6–15.1)
GFR SERPL CREATININE-BSD FRML MDRD: 89 ML/MIN/1.73SQ M
GLUCOSE SERPL-MCNC: 175 MG/DL (ref 65–140)
HCT VFR BLD AUTO: 26.8 % (ref 34.8–46.1)
HGB BLD-MCNC: 8.7 G/DL (ref 11.5–15.4)
MCH RBC QN AUTO: 28.4 PG (ref 26.8–34.3)
MCHC RBC AUTO-ENTMCNC: 32.5 G/DL (ref 31.4–37.4)
MCV RBC AUTO: 88 FL (ref 82–98)
PLATELET # BLD AUTO: 150 THOUSANDS/UL (ref 149–390)
PMV BLD AUTO: 10.5 FL (ref 8.9–12.7)
POTASSIUM SERPL-SCNC: 3.8 MMOL/L (ref 3.5–5.3)
RBC # BLD AUTO: 3.06 MILLION/UL (ref 3.81–5.12)
SODIUM SERPL-SCNC: 140 MMOL/L (ref 135–147)
WBC # BLD AUTO: 8.77 THOUSAND/UL (ref 4.31–10.16)

## 2023-10-19 PROCEDURE — 97116 GAIT TRAINING THERAPY: CPT

## 2023-10-19 PROCEDURE — 85027 COMPLETE CBC AUTOMATED: CPT | Performed by: PHYSICIAN ASSISTANT

## 2023-10-19 PROCEDURE — 97167 OT EVAL HIGH COMPLEX 60 MIN: CPT

## 2023-10-19 PROCEDURE — 80048 BASIC METABOLIC PNL TOTAL CA: CPT | Performed by: PHYSICIAN ASSISTANT

## 2023-10-19 RX ADMIN — CHOLECALCIFEROL TAB 25 MCG (1000 UNIT) 1000 UNITS: 25 TAB at 08:00

## 2023-10-19 RX ADMIN — DOCUSATE SODIUM 100 MG: 100 CAPSULE, LIQUID FILLED ORAL at 08:00

## 2023-10-19 RX ADMIN — OXYCODONE HYDROCHLORIDE AND ACETAMINOPHEN 500 MG: 500 TABLET ORAL at 08:00

## 2023-10-19 RX ADMIN — PANTOPRAZOLE SODIUM 20 MG: 20 TABLET, DELAYED RELEASE ORAL at 05:22

## 2023-10-19 RX ADMIN — ENOXAPARIN SODIUM 40 MG: 40 INJECTION SUBCUTANEOUS at 08:00

## 2023-10-19 RX ADMIN — FOLIC ACID 1 MG: 1 TABLET ORAL at 08:00

## 2023-10-19 RX ADMIN — DULOXETINE HYDROCHLORIDE 60 MG: 60 CAPSULE, DELAYED RELEASE PELLETS ORAL at 08:00

## 2023-10-19 RX ADMIN — SENNOSIDES 8.6 MG: 8.6 TABLET, FILM COATED ORAL at 08:00

## 2023-10-19 RX ADMIN — ACETAMINOPHEN 975 MG: 325 TABLET ORAL at 05:22

## 2023-10-19 RX ADMIN — GABAPENTIN 600 MG: 300 CAPSULE ORAL at 08:00

## 2023-10-19 NOTE — CASE MANAGEMENT
Case Management Assessment & Discharge Planning Note    Patient name Davi Smith  Location [facility]-01 MRN [id number]  : 1950 Date 10/19/2023       Current Admission Date: 10/18/2023  Current Admission Diagnosis:Status post total knee replacement, right   Patient Active Problem List    Diagnosis Date Noted    Status post total knee replacement, right 10/18/2023    Status post total hip replacement, right 2023    Spinal stenosis of lumbar region with neurogenic claudication     Primary osteoarthritis of right hip     Spasm of right piriformis muscle     Diabetes mellitus type 2 in obese  2021    Degenerative disc disease, cervical     Mixed hyperlipidemia 2020    IFG (impaired fasting glucose) 2020    Current mild episode of major depressive disorder without prior episode ([address]) 02/10/2020    Lumbar spondylosis     Cervical spinal stenosis     Cervical radiculopathy 2019    Cervical spine arthritis 2019    Lower extremity edema 10/05/2018    Pes anserine bursitis 2018    Lumbar radiculopathy     Benign essential HTN 2018    Fatty liver 2018    Gastroesophageal reflux disease without esophagitis 2018    Lumbar disc disease with radiculopathy 2017    Primary osteoarthritis of both knees 2017    Chronic right-sided low back pain with right-sided sciatica 2017    Fibromyalgia 2017      LOS (days): 0  Geometric Mean LOS (GMLOS) (days):   Days to GMLOS:     OBJECTIVE:         Current admission status: Outpatient Surgery      Preferred Pharmacy:   [facility], [address] [place] [address]  Phone: [phone] Fax: [phone]    Primary Care Provider: Tavo Arshad DO    Primary Insurance: MEDICARE  Secondary Insurance: AETNA    ASSESSMENT:  Active Health Care Proxies    There are no active Health Care Proxies on file.        Advance Directives  Does patient have a [address]?: No  Was patient offered paperwork?: Yes (Provided to patient)  Does patient currently have a Health Care decision maker?: No  Does patient have Advance Directives?: No  Was patient offered paperwork?: Yes (Provided to patient)  Primary Contact: Tj Gibson (Spouse) [phone] (Mobile)      Readmission Root Cause  30 Day Readmission: No    Patient Information  Admitted from[de-identified] Home  Mental Status: Alert  During Assessment patient was accompanied by: Not accompanied during assessment  Assessment information provided by[de-identified] Patient  Primary Caregiver: Self  Support Systems: Spouse/significant other, Self, Family members, [address] of Residence: [address] [place] [address] do you live in?: [facility] entry access options.  Select all that apply.: Stairs  Number of steps to enter home.:  (14.  7 steps landing then 7 more steps)  Do the steps have railings?: Yes  Type of Current Residence: Apartment  Floor Level: 2  Upon entering residence, is there a bedroom on the main floor (no further steps)?: Yes  Upon entering residence, is there a bathroom on the main floor (no further steps)?: Yes  In the last 12 months, was there a time when you were not able to pay the mortgage or rent on time?: No  In the last 12 months, how many places have you lived?: 1  In the last 12 months, was there a time when you did not have a steady place to sleep or slept in a shelter (including now)?: No  Homeless/housing insecurity resource given?: N/A  Living Arrangements: Lives w/ Spouse/significant other  Is patient a ?: No    Activities of Daily Living Prior to Admission  Functional Status: Independent  Completes ADLs independently?: Yes  Ambulates independently?: Yes  Does patient use assisted devices?: No  Does patient currently own DME?: Yes  What DME does the patient currently own?: Lynnette Mcdaniel  Does patient have a history of Outpatient Therapy (PT/OT)?: Yes ([facility])  Does the patient have a history of Short-Term Rehab?: No  Does patient have a history of HHC?: No  Does patient currently have [facility]?: No      Patient Information Continued  Income Source: Pension/custodial  Does patient have prescription coverage?: Yes  Within the past 12 months, you worried that your food would run out before you got the money to buy more.: Never true  Within the past 12 months, the food you bought just didn't last and you didn't have money to get more.: Never true  Food insecurity resource given?: N/A  Does patient receive dialysis treatments?: No  Does patient have a history of substance abuse?: No  Does patient have a history of Mental Health Diagnosis?: No      Means of Transportation  Means of Transport to Appts[de-identified] Drives Self  In the past 12 months, has lack of transportation kept you from medical appointments or from getting medications?: No  In the past 12 months, has lack of transportation kept you from meetings, work, or from getting things needed for daily living?: No    DISCHARGE DETAILS:    Discharge planning discussed with[de-identified] Patient  Freedom of Choice: Yes     CM contacted family/caregiver?: No- see comments (AAO x3)  Were Treatment Team discharge recommendations reviewed with patient/caregiver?: Yes  Did patient/caregiver verbalize understanding of patient care needs?: Yes  Were patient/caregiver advised of the risks associated with not following Treatment Team discharge recommendations?: Yes    Treatment Team Recommendation: Home  Discharge Destination Plan[de-identified] Home       Additional Comments: Met with patient at bedside. Patient has a [employer] at home from recent [facility]. Has OP therapy scheduled tomorrow. Spouse will transport patient home.

## 2023-10-19 NOTE — OCCUPATIONAL THERAPY NOTE
Occupational Therapy Evaluation     Patient Name: Kentrell Aguilar  Today's Date: 10/19/2023  Problem List  Principal Problem:    Status post total knee replacement, right    Past Medical History  Past Medical History:   Diagnosis Date    Arthritis     Encounter for geriatric assessment 05/18/2023    Fatty liver 05/01/2018    Fibromyalgia, primary     GERD (gastroesophageal reflux disease)     Hyperlipidemia     Hypertension     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    Low back pain     Lumbar stenosis     Neck pain     Neuropathy     feet    No natural teeth     on lower    Primary localized osteoarthritis of right knee 08/17/2017    Sacroiliitis ([address]) 08/20/2018    Skin cancer     on chest in past - melanoma    Sleep apnea     resolved with weight loss    Use of cane as ambulatory aid     or walker    Wears dentures     full  uppers     Past Surgical History  Past Surgical History:   Procedure Laterality Date    JOINT REPLACEMENT Right 06/2023    MN ARTHRP ACETBLR/PROX FEM PROSTC AGRFT/ALGRFT Right 06/19/2023    Procedure: ARTHROPLASTY HIP TOTAL ANTERIOR;  Surgeon: Brayan Mercer DO;  Location: [address] [place] [address] MAIN OR;  Service: Orthopedics    MN ARTHRP KNE CONDYLE&PLATU MEDIAL&LAT COMPARTMENTS Right 10/18/2023    Procedure: RIGHT TOTAL KNEE ARTHROPLASTY;  Surgeon: Brayan Mercer DO;  Location:  MAIN OR;  Service: Orthopedics    SKIN CANCER EXCISION      on chest - melanoma    TUBAL LIGATION             10/19/23 0950   OT Last Visit   OT Visit Date 10/19/23   Note Type   Note type Evaluation   Pain Assessment   Pain Assessment Tool 0-10   Pain Score 2   Pain Location/Orientation Orientation: Right;Location: Knee   Pain Onset/Description Onset: Ongoing; Descriptor: Aching;Descriptor: Dull   Patient's Stated Pain Goal No pain   Hospital Pain Intervention(s) Repositioned; Rest   Restrictions/Precautions   Weight Bearing Precautions Per Order Yes   RLE Weight Bearing Per Order WBAT   Other Precautions Pain   Home Living   Type of [address] [facility] One level;Stairs to enter with rails  (7+7 YULISSA)   Bathroom Shower/Tub Tub/shower unit   [employer] Tub transfer bench;Commode   Bathroom Accessibility Accessible via walker   [place]   Prior Function   Level of [address] with ADLs; Independent with functional mobility; Independent with IADLS   Lives With Spouse   Receives Help From [facility] in the last 6 months 0   Lifestyle   Reciprocal Relationships supportive spouse   General   Family/Caregiver Present Yes   Subjective   Subjective "I am excited to go home today"   ADL   Eating Assistance 7  Independent   Grooming Assistance 7  Independent   UB Bathing Assistance 7  Independent    [address] [facility] 5  [address] 7  [address] [address]  [address] [place] [address]  5  Supervision/Setup   Additional Comments Demonstrates adequate trunk flexion and functional reach to engage in LB ADL tasks   Bed Mobility   Additional Comments Pt received OOB   Transfers   Sit to Stand 6  Modified independent   Additional items Armrests; Increased time required   Stand to Sit 6  Modified independent   Additional items Armrests; Increased time required   Additional Comments completed with RW   Functional Mobility   Functional Mobility 6  Modified independent   Additional Comments household distances >200 ft   Additional items Rolling walker   Balance   Static Sitting Fair +   Dynamic Sitting Fair +   Static Standing Fair   Dynamic Standing Fair   Ambulatory Fair   Activity Tolerance   Activity Tolerance Patient tolerated treatment well   Medical Staff Made Aware Spoke to PT   Nurse Made Aware Spoke to RN   RUE Assessment   RUE Assessment WFL   LUE Assessment   LUE Assessment WFL   Hand Function   Gross Motor Coordination Functional   Fine Motor Coordination Functional   Perception   Inattention/Neglect Appears intact   Cognition   Overall Cognitive Status WFL   Arousal/Participation Alert; Cooperative   Attention Within functional limits   Orientation Level Oriented X4   Memory Within functional limits   Following Commands Follows all commands and directions without difficulty   Assessment   Limitation Decreased ADL status; Decreased endurance   Prognosis Good   Assessment Pt is a 74 y.o. female seen for OT evaluation s/p admit to [facility] on 10/18/2023 s/p R TKA (10/18/2023). OT completed an extensive review of pt's medical and social history. Pt  has a past medical history of Arthritis, Fatty liver, Fibromyalgia, primary, GERD, Hyperlipidemia, Hypertension, Infectious viral hepatitis, Low back pain, Lumbar stenosis, Neck pain, Neuropathy, Primary localized osteoarthritis of right knee, Sacroiliitis, Skin cancer, Sleep apnea. As per pt report, pta living with spouse in 1st floor apartment with 7+7 YULISSA. Previously independent with Adls/IADLs and mobility using RW. Upon evaluation, pt MI with transfers and mobility, ambulating household distances ~200 ft using RW. Pt currently MI UB ADLs, Supervision LB ADLs, and Supervision. Performance limited by weakness, decreased balance, decreased tolerance, and increased pain. Current functional performance appropriate for d/c home with support from spouse. Pt familiar with tub bench, denies concerns for completion at home. Based on OT evaluation, assessment(s), performance deficits listed, and current level of function, pt identified as a High complexity evaluation. From OT standpoint, recommendation at time of d/c would be home with social support, OP PT.     Goals   Patient Goals to go home today and start OP therapy tomorrow   Plan   OT Frequency Eval only   Discharge Recommendation   OT Discharge Recommendation No rehabilitation needs  (OP PT)   AM-PAC Daily Activity Inpatient   Lower Body Dressing 3   Bathing 3   Toileting 3   Upper Body Dressing 4   Grooming 4   Eating 4   Daily Activity Raw Score 21   Daily Activity Standardized Score (Calc for Raw Score >=11) 44.27   End of Consult   Education Provided Family or social support of family present for education by provider;Yes   Patient Position at End of Consult Bedside chair; All needs within reach   Nurse Communication Nurse aware of consult        Deidre Mathias MS, OTR/L

## 2023-10-19 NOTE — PROGRESS NOTES
Progress Note - Orthopedics   Meron Escobedo 74 y.o. female MRN: [id number]  Unit/Bed#: 7T [facility] 701-01 Encounter: [id number]    Assessment:  73 yo Female POD1 Right knee s/p total knee arthroplasty, ABLA    Plan:  -WBAT RLE  -PT/OT  -Pain control PRN  -24hr of post-op Abx completed  -Morning labs reviewed, Hgb 8.7, Greater than 2 gram drop which qualifies for diagnosis of acute blood loss anemia, will monitor and administer IVF/prbc as indicated   -Continue DVT ppx, TEDs, SCDs, and Lovenox while in house but will DC on ASA  -DC planning, planning to DC to home today pending PT clearance      Subjective: 75-year-old female postop day 1 right knee status post total knee arthroplasty. Patient was seen and examined at bedside. Patient states that she is overall feeling well. Patient denies having pain this morning. Patient denies have any numbness or tingling in the right lower extremity. Patient denies any fevers chills or sweats. Patient denies feeling ill. Patient denies bowel movement but states that she is passing gas. Patient did work with physical therapy yesterday and states that it was going well but she was experiencing bilateral knee buckling making her unsafe to go home. Patient states that this morning she did walk herself to her commode and states that the knee buckled slightly but definitely improved compared to yesterday. Patient is hopeful that after this morning's PT session that she will be cleared to go home. Vitals: Blood pressure 108/70, pulse 75, temperature 97.5 °F (36.4 °C), temperature source Temporal, resp. rate 18, height 5' 2" (1.575 m), weight 82.1 kg (181 lb), SpO2 96 %. ,Body mass index is 33.11 kg/m².       Intake/Output Summary (Last 24 hours) at 10/19/2023 0721  Last data filed at 10/18/2023 2035  Gross per 24 hour   Intake 2241.67 ml   Output 1100 ml   Net 1141.67 ml       Invasive Devices       Peripheral Intravenous Line  Duration             Peripheral IV 10/18/23 Left Antecubital 1 day                    Physical Exam: General appearance: alert and oriented, in no acute distress  Head: Normocephalic, without obvious abnormality, atraumatic  Eyes: conjunctivae/corneas clear. PERRL, EOM's intact. Lungs: No stridor or wheezing  Heart: No apparent distress    Ortho Exam:   Right Lower Extremity Exam  Alignment:  Leg lengths appear equal.  Inspection:   Mepilex dressing is clean dry and intact over the anterior aspect of the knee. ADOLPH stockings are in place. There is erythema and ecchymosis visualized about the medial lateral aspect of the incisions. Palpation: Effusion, compartments are soft and compressible  Of the upper and lower leg  Strength:  Anterior tibialis 5/5. Gastroc/Soleus 5/5. EHL 5/5. FHL 5/5. Neurovascular:  Sensation intact in DP/SP/Rodriguez/Sa/T nerve distributions. Palpable DP pulse. Brisk capillary refill in all toes. Lab, Imaging and other studies: I have personally reviewed pertinent lab results.   CBC:   Lab Results   Component Value Date    WBC 8.77 10/19/2023    HGB 8.7 (L) 10/19/2023    HCT 26.8 (L) 10/19/2023    MCV 88 10/19/2023     10/19/2023    RBC 3.06 (L) 10/19/2023    MCH 28.4 10/19/2023    MCHC 32.5 10/19/2023    RDW 13.0 10/19/2023    MPV 10.5 10/19/2023     CMP:   Lab Results   Component Value Date    SODIUM 140 10/19/2023     10/19/2023    CO2 26 10/19/2023    BUN 15 10/19/2023    CREATININE 0.60 10/19/2023    CALCIUM 8.4 10/19/2023    EGFR 89 10/19/2023      [place], [place]

## 2023-10-19 NOTE — PLAN OF CARE
Problem: PAIN - ADULT  Goal: Verbalizes/displays adequate comfort level or baseline comfort level  Description: Interventions:  - Encourage patient to monitor pain and request assistance  - Assess pain using appropriate pain scale  - Administer analgesics based on type and severity of pain and evaluate response  - Implement non-pharmacological measures as appropriate and evaluate response  - Consider cultural and social influences on pain and pain management  - Notify physician/advanced practitioner if interventions unsuccessful or patient reports new pain  Outcome: Progressing     Problem: INFECTION - ADULT  Goal: Absence or prevention of progression during hospitalization  Description: INTERVENTIONS:  - Assess and monitor for signs and symptoms of infection  - Monitor lab/diagnostic results  - Monitor all insertion sites, i.e. indwelling lines, tubes, and drains  - Monitor endotracheal if appropriate and nasal secretions for changes in amount and color  - Phillips appropriate cooling/warming therapies per order  - Administer medications as ordered  - Instruct and encourage patient and family to use good hand hygiene technique  - Identify and instruct in appropriate isolation precautions for identified infection/condition  Outcome: Progressing     Problem: MOBILITY - ADULT  Goal: Maintain or return to baseline ADL function  Description: INTERVENTIONS:  -  Assess patient's ability to carry out ADLs; assess patient's baseline for ADL function and identify physical deficits which impact ability to perform ADLs (bathing, care of mouth/teeth, toileting, grooming, dressing, etc.)  - Assess/evaluate cause of self-care deficits   - Assess range of motion  - Assess patient's mobility; develop plan if impaired  - Assess patient's need for assistive devices and provide as appropriate  - Encourage maximum independence but intervene and supervise when necessary  - Involve family in performance of ADLs  - Assess for home care needs following discharge   - Consider OT consult to assist with ADL evaluation and planning for discharge  - Provide patient education as appropriate  Outcome: Progressing     Problem: Knowledge Deficit  Goal: Patient/family/caregiver demonstrates understanding of disease process, treatment plan, medications, and discharge instructions  Description: Complete learning assessment and assess knowledge base.   Interventions:  - Provide teaching at level of understanding  - Provide teaching via preferred learning methods  Outcome: Progressing     Problem: DISCHARGE PLANNING  Goal: Discharge to home or other facility with appropriate resources  Description: INTERVENTIONS:  - Identify barriers to discharge w/patient and caregiver  - Arrange for needed discharge resources and transportation as appropriate  - Identify discharge learning needs (meds, wound care, etc.)  - Arrange for interpretive services to assist at discharge as needed  - Refer to Case Management Department for coordinating discharge planning if the patient needs post-hospital services based on physician/advanced practitioner order or complex needs related to functional status, cognitive ability, or social support system  Outcome: Progressing

## 2023-10-19 NOTE — PLAN OF CARE
Problem: MOBILITY - ADULT  Goal: Maintain or return to baseline ADL function  Description: INTERVENTIONS:  -  Assess patient's ability to carry out ADLs; assess patient's baseline for ADL function and identify physical deficits which impact ability to perform ADLs (bathing, care of mouth/teeth, toileting, grooming, dressing, etc.)  - Assess/evaluate cause of self-care deficits   - Assess range of motion  - Assess patient's mobility; develop plan if impaired  - Assess patient's need for assistive devices and provide as appropriate  - Encourage maximum independence but intervene and supervise when necessary  - Involve family in performance of ADLs  - Assess for home care needs following discharge   - Consider OT consult to assist with ADL evaluation and planning for discharge  - Provide patient education as appropriate  Outcome: Progressing     Problem: MOBILITY - ADULT  Goal: Maintains/Returns to pre admission functional level  Description: INTERVENTIONS:  - Perform BMAT or MOVE assessment daily.   - Set and communicate daily mobility goal to care team and patient/family/caregiver. - Collaborate with rehabilitation services on mobility goals if consulted  - Perform Range of Motion  times a day. - Reposition patient every  hours.   - Dangle patient  times a day  - Stand patient  times a day  - Ambulate patient  times a day  - Out of bed to chair  times a day   - Out of bed for meals  times a day  - Out of bed for toileting  - Record patient progress and toleration of activity level   Outcome: Progressing     Problem: PAIN - ADULT  Goal: Verbalizes/displays adequate comfort level or baseline comfort level  Description: Interventions:  - Encourage patient to monitor pain and request assistance  - Assess pain using appropriate pain scale  - Administer analgesics based on type and severity of pain and evaluate response  - Implement non-pharmacological measures as appropriate and evaluate response  - Consider cultural and social influences on pain and pain management  - Notify physician/advanced practitioner if interventions unsuccessful or patient reports new pain  Outcome: Progressing     Problem: SKIN/TISSUE INTEGRITY - ADULT  Goal: Incision(s), wounds(s) or drain site(s) healing without S/S of infection  Description: INTERVENTIONS  - Assess and document dressing, incision, wound bed, drain sites and surrounding tissue  - Provide patient and family education  - Perform skin care/dressing changes every   Outcome: Progressing

## 2023-10-20 ENCOUNTER — TELEPHONE (OUTPATIENT)
Dept: OBGYN CLINIC | Facility: HOSPITAL | Age: 73
End: 2023-10-20

## 2023-10-20 ENCOUNTER — OFFICE VISIT (OUTPATIENT)
Dept: PHYSICAL THERAPY | Facility: REHABILITATION | Age: 73
End: 2023-10-20
Payer: MEDICARE

## 2023-10-20 DIAGNOSIS — M17.11 PRIMARY OSTEOARTHRITIS OF RIGHT KNEE: Primary | ICD-10-CM

## 2023-10-20 PROCEDURE — 97112 NEUROMUSCULAR REEDUCATION: CPT

## 2023-10-20 PROCEDURE — 97140 MANUAL THERAPY 1/> REGIONS: CPT

## 2023-10-20 PROCEDURE — 97164 PT RE-EVAL EST PLAN CARE: CPT

## 2023-10-20 PROCEDURE — 97110 THERAPEUTIC EXERCISES: CPT

## 2023-10-20 NOTE — PROGRESS NOTES
PT Re-Evaluation    Today's date: 10/20/2023  Patient name: Octaviano Mcneal  : 1950  MRN: [id number]  Referring provider: Ny Devi  Dx:   Encounter Diagnosis     ICD-10-CM    1. Primary osteoarthritis of right knee  M17.11           Start Time: 1002  Stop Time: 1055  Total time in clinic (min): 53 minutes    Subjective: Pt is now post-op R TKA 10/18 with Dr. Maite Chau. Currently ambulating with RW. Negotiating steps at home with "up with good, down with the bad" pattern. Pt reports she has been sleeping "good." Pt does report compliance to HEP. Pt is occasionally taking prescription pain meds from surgeon and tylenol. Pt is elevating and using ice. Pain is well-controled per pt. Pain  Current: 2  Best: 0  Worst: 2    Objective: See treatment diary below    Range of Motion: Goniometric revealed the following findings (in degrees). Joint Motion Right:  Left:    Knee Extension 10* 5   Knee Flexion 103* 112   Ankle Dorsiflexion 5 5   Ankle Plantarflexion [facility]     Strength: MMT revealed the following findings. Joint Motion Right:  Left:    Hip Flexion 3+/5 4/5   Hip Abduction 3+/5 3+/5   Hip Extension 3+/5 3+/5   Knee Extension 3+/5* 5/5   Knee Flexion 3+/5 4+/5   Ankle Plantarflexion 3/5 4/5   Ankle Dorsiflexion 4-/5 4+/5   *=indicates pain with testing    Additional Assessments:  Observation: visible swelling secondary to post-op, incision not observed secondary to dressing/pt instructed on post op dressing care/changes  Gait Pattern: decreased speed, poor WB on RLE, decreased step length, RW  Balance: unable to perform SLS  Joint Mobility: hypomobilie patella  Squat: unable    Assessment: Octaviano Mcneal is a 74y.o. year old female with a referred dx of Primary osteoarthritis of right knee. Pt now post-op R TKA 10/18 with Dr. Maite Chau. Pt presents with difficulty performing ADL's such as walking, stair negotiation, and getting up from chair.  Upon further clinical testing, pt demonstrates decreased LE strength, limited knee flexion/ext ROM, and ambulating with more restrictive AD compared to baseline. Pt would continue to benefit from skilled OP PT to address these, and the below impairments in order to optimize surgical outcomes and promote return to functional baseline. HEP updated, pt able to demonstrate updated HEP with good technique and no pain. Educated pt to stop any exercises causing pain increase, pt verbalizes understanding. Educated pt on potential for DOMS following first tx, pt verbalizes understanding. Monitor response to initial tx session at f/u. Impairments: abnormal gait, abnormal or restricted ROM, activity intolerance, impaired balance, impaired physical strength, lacks appropriate home exercise program, pain with function, weight-bearing intolerance and poor body mechanics    Goals  Short Term Goals: In 4 weeks, the patient will:  1. Decrease worst pain by 2 points for improved QOL. - not met  2. Improve LE strength by 1/2 grade for improved LE fx. - not met  3. Supervision with HEP for self care. - not met    Long Term Goals: In 8 weeks, the patient will:  1. Improve LE strength by 1 grade for improved LE fx. - not met  2. FOTO to greater than predicted value. - not met  3. Independent with comprehensive HEP upon discharge. - not met  4. Improve both knee flex/ext ROM by 10 degrees for improved functional ROM for ADL's. - not met  5. D/c AD for return to functional baseline. - not met  6. Negotiate steps with step-over-step pattern to navigate home environment. - not met      Plan: Continue per plan of care.  Per previously established POC  Patient would benefit from: skilled physical therapy  Referral necessary: No  Planned modality interventions: cryotherapy  Planned therapy interventions: activity modification, ADL retraining, joint mobilization, manual therapy, neuromuscular re-education, patient education, postural training, strengthening, stretching, therapeutic activities, therapeutic exercise, home exercise program, graded exercise, functional ROM exercises, flexibility, gait training, body mechanics training, balance/weight bearing training and balance  Frequency: 2x week  Duration in weeks: 8  Plan of Care beginning date: 10/20/2023  Plan of Care expiration date: 12/15/2023  Treatment plan discussed with: patient     Pertinent Findings and Outcome Measures:                                                                                                                                                                         Test / Measure  10/17/2023 (pre-op) 10/20/2023 (post-op)   TUG 15.7 sec, no AD 22.9 sec w/ RW, chair +1 foam   5xSTS 16.3 sec, chair 16.1 sec chair +1 foam   Knee ext ROM 10 deg 10 deg   Knee flex  106 deg 103 deg     Precautions: s/p R TKA 10/18/23    POC expiration: 12/15/2023    HEP access code: Elvia Blood    Manuals 10/17 (pre-op) 10/20 (post-op)   R knee PROM  CM 8'   R patella mobs               Neuro Re-Ed     QS 5"x10 hep 5" 2x10 hep   Glute sets 5"x10 hep    Balance     Mini squats  2x10                  Ther Ex     Bike for ROM  8' rocking, seat 10 showing   Heel slides 5"x10 supine +strap 5" 2x8 supine +strap hep   Ankle pumps hep    Bridge  2x10    Stand hip 3-way     HR/TR  2x10 ea, TR back to wall for TKE   LAQ  2x10 hep   SAQ  2x10   SHC     Leg press     STS's     Knee flexion stretch               Ther Activity     Tband side stepping     FSU     LSU          Gait Training     AD training          Modalities

## 2023-10-20 NOTE — TELEPHONE ENCOUNTER
Patient contacted for a postoperative follow up assessment. Patient reports doing "really good"     Patient states current pain level of a 2/10  when sitting and 2/10 when walking with RW. Patient states they have minimal pain and it is relieved with medication regimen. Patient denies increase in swelling and dressing is clean, dry and intact. Patient is icing the site regularly. We reviewed patients AVS medication list. Patient is taking Tylenol 1000mg every 8 hours, Oxycodone 5mg PRN, ASA 325mg BID, Colace 100mg BID. Patient has not yet had a BM but is passing gas. Patient denies nausea, vomiting, abdominal pain, chest pain, shortness of breath, fever, dizziness and calf pain. Patient confirmed post-op appointment with PT today, 10/20 and post-op appointment with the surgeon on 11/02 . Patient does not have any other questions or concerns at this time. Pt was encouraged to call with any questions, concerns or issues.

## 2023-10-21 NOTE — PHYSICAL THERAPY NOTE
Physical TherapyTreatment Note    Patient's Name: Ron Ochoa    Admitting Diagnosis  Primary osteoarthritis of right knee [M17.11]    Problem List  Patient Active Problem List   Diagnosis    Primary osteoarthritis of both knees    Lumbar disc disease with radiculopathy    Chronic right-sided low back pain with right-sided sciatica    Fibromyalgia    Benign essential HTN    Fatty liver    Gastroesophageal reflux disease without esophagitis    Lumbar radiculopathy    Pes anserine bursitis    Lower extremity edema    Cervical radiculopathy    Cervical spine arthritis    Cervical spinal stenosis    Lumbar spondylosis    Current mild episode of major depressive disorder without prior episode (HCC)    Mixed hyperlipidemia    IFG (impaired fasting glucose)    Degenerative disc disease, cervical    Diabetes mellitus type 2 in obese     Spasm of right piriformis muscle    Primary osteoarthritis of right hip    Spinal stenosis of lumbar region with neurogenic claudication    Status post total hip replacement, right    Status post total knee replacement, right       Past Medical History  Past Medical History:   Diagnosis Date    Arthritis     Encounter for geriatric assessment 05/18/2023    Fatty liver 05/01/2018    Fibromyalgia, primary     GERD (gastroesophageal reflux disease)     Hyperlipidemia     Hypertension     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    Low back pain     Lumbar stenosis     Neck pain     Neuropathy     feet    No natural teeth     on lower    Primary localized osteoarthritis of right knee 08/17/2017    Sacroiliitis ([address]) 08/20/2018    Skin cancer     on chest in past - melanoma    Sleep apnea     resolved with weight loss    Use of cane as ambulatory aid     or walker    Wears dentures     full  uppers       Past Surgical History  Past Surgical History:   Procedure Laterality Date    JOINT REPLACEMENT Right 06/2023    [place] ARTHRP ACETBLR/PROX FEM PROSTC AGRFT/ALGRFT Right 06/19/2023 Procedure: ARTHROPLASTY HIP TOTAL ANTERIOR;  Surgeon: Ree Vasques DO;  Location: [facility] MAIN OR;  Service: Orthopedics    IN ARTHRP KNE CONDYLE&PLATU MEDIAL&LAT COMPARTMENTS Right 10/18/2023    Procedure: RIGHT TOTAL KNEE ARTHROPLASTY;  Surgeon: Ree Vasques DO;  Location: [facility] MAIN OR;  Service: Orthopedics    SKIN CANCER EXCISION      on chest - melanoma    TUBAL LIGATION         Recent Imaging  No orders to display       Recent Vital Signs  Vitals:    10/18/23 2245 10/18/23 2330 10/19/23 0400 10/19/23 0717   BP: 116/60  107/68 108/70   BP Location: Right arm Right arm Right arm Right arm   Pulse: 92 92 85 75   Resp: 18 18 18 18   Temp: 97.5 °F (36.4 °C) 97.5 °F (36.4 °C) 98 °F (36.7 °C) 97.5 °F (36.4 °C)   TempSrc: Temporal Temporal Temporal Temporal   SpO2: 94%  97% 96%   Weight:       Height:            10/19/23 0951   PT Last Visit   PT Visit Date 10/19/23   Pain Assessment   Pain Assessment Tool 0-10   Pain Score 2   Transfers   Sit to Stand 6  Modified independent   Stand to Sit 6  Modified independent   Ambulation/Elevation   Gait pattern Step through pattern;Decreased toe off;Decreased heel strike;Decreased foot clearance   Gait Assistance 6  Modified independent   Additional items Verbal cues   Assistive Device Rolling walker   Distance 220ft, 100ft   Stair Management Assistance 6  Modified independent   Additional items Verbal cues; Increased time required   Stair Management Technique Step to pattern; Foreward; Two rails   Number of Stairs 8   Balance   Static Sitting Fair +   Dynamic Sitting Fair +   Static Standing Fair   Dynamic Standing Fair   Ambulatory Fair   Endurance Deficit   Endurance Deficit Yes   Endurance Deficit Description post op fatigue   Activity Tolerance   Activity Tolerance Patient tolerated treatment well   Medical Staff Made Aware spoke to CM   Nurse Made Aware spoke to RN   Assessment   Prognosis Good   Problem List Decreased strength;Decreased range of motion;Decreased endurance; Impaired balance;Decreased mobility; Decreased coordination; Impaired sensation   Assessment Pt has progressed to ind with RW for all mobility. Cleared for D/C home at this time   Barriers to Discharge Inaccessible home environment   Goals   Patient Goals go home today   STG Expiration Date 10/28/23   Short Term Goal #1 see eval note   Plan   Treatment/Interventions ADL retraining;Functional transfer training;LE strengthening/ROM; Elevations; Therapeutic exercise; Endurance training;Patient/family training;Equipment eval/education; Bed mobility;Gait training;Spoke to nursing;Spoke to case management;OT   Progress Progressing toward goals   PT Frequency Twice a day   Discharge Recommendation   PT Discharge Recommendation Home with outpatient rehabilitation   Equipment Recommended [facility] Recommended Wheeled walker   AM-PAC Basic Mobility Inpatient   Turning in Flat Bed Without Bedrails 4   Lying on Back to Sitting on Edge of Flat Bed Without Bedrails 4   Moving Bed to Chair 4   Standing Up From Chair Using Arms 4   Walk in Room 4   Climb 3-5 Stairs With Railing 3   Basic Mobility Inpatient Raw Score 23   Basic Mobility Standardized Score 50.88   Highest Level Of Mobility   JH-HLM Goal 7: Walk 25 feet or more   JH-HLM Achieved 8: Walk 250 feet ot more   Education   Education Provided Mobility training;Home exercise program;Assistive device   Patient Explanation/teachback used;Demonstrates verbal understanding   End of Consult   Patient Position at End of Consult Bedside chair; All needs within reach         [facility] PT, DPT

## 2023-10-24 ENCOUNTER — OFFICE VISIT (OUTPATIENT)
Dept: PHYSICAL THERAPY | Facility: REHABILITATION | Age: 73
End: 2023-10-24
Payer: MEDICARE

## 2023-10-24 DIAGNOSIS — M17.11 PRIMARY OSTEOARTHRITIS OF RIGHT KNEE: Primary | ICD-10-CM

## 2023-10-24 PROCEDURE — 97112 NEUROMUSCULAR REEDUCATION: CPT

## 2023-10-24 PROCEDURE — 97110 THERAPEUTIC EXERCISES: CPT

## 2023-10-24 PROCEDURE — 97140 MANUAL THERAPY 1/> REGIONS: CPT

## 2023-10-24 NOTE — PROGRESS NOTES
PT Re-Evaluation    Today's date: 10/24/2023  Patient name: Meron Escobedo  : 1950  MRN: [id number]  Referring provider: Jannette Carvalho  Dx:   Encounter Diagnosis     ICD-10-CM    1. Primary osteoarthritis of right knee  M17.11           Start Time: 1209  Stop Time: 1302  Total time in clinic (min): 53 minutes    Subjective: Pt reports adherence to HEP, has progressed to walking with [address] for community distance and no AD for household ambulation. Objective: See treatment diary below      Assessment: Pt tolerated tx well. Improved quad strength as noted today with decreased quad lag during SLR. Pt continues to lack TKE during gait. Educated pt to continue with SPC at household ambulation, appropriate gait with SPC observed today. Improved TKE post-tx noted. Pt would continue to benefit from skilled PT. 1:1 with Amaris Metz DPT for entirety of tx. Plan: Continue per plan of care.       Pertinent Findings and Outcome Measures:                                                                                                                                                                         Test / Measure  10/17/2023 (pre-op) 10/20/2023 (post-op)   TUG 15.7 sec, no AD 22.9 sec w/ RW, chair +1 foam   5xSTS 16.3 sec, chair 16.1 sec chair +1 foam   Knee ext ROM 10 deg 10 deg   Knee flex  106 deg 103 deg     Precautions: s/p R TKA 10/18/23    POC expiration: 12/15/2023    HEP access code: [employer]    Manuals 10/17 (pre-op) 10/20 (post-op) 10/24   R knee PROM  CM 8' CM 4'   R patella mobs   CM 4'               Neuro Re-Ed      QS 5"x10 hep 5" 2x10 hep 5"x20    2x10 +SLR   Glute sets 5"x10 hep     Balance      Mini squats  2x10    TKE                  Ther Ex      Bike for ROM  8' rocking, seat 10 showing 10' rocking   Heel slides 5"x10 supine +strap 5" 2x8 supine +strap hep    Ankle pumps hep     Bridge  2x10  2x10 5#   Stand hip 3-way      HR/TR  2x10 ea, TR back to wall for TKE x20 ea LAQ  2x10 hep 2x10 3#   SAQ  2x10    [address]   X20 3#   Leg press      STS's      Knee flexion stretch      LLLD   3' supine 5#   Gastroc stretch   20"x3 R   Ther Activity      Tband side stepping      FSU      LSU            Gait Training      AD training            Modalities

## 2023-10-27 ENCOUNTER — OFFICE VISIT (OUTPATIENT)
Dept: PHYSICAL THERAPY | Facility: REHABILITATION | Age: 73
End: 2023-10-27
Payer: MEDICARE

## 2023-10-27 DIAGNOSIS — M17.11 PRIMARY OSTEOARTHRITIS OF RIGHT KNEE: Primary | ICD-10-CM

## 2023-10-27 PROCEDURE — 97110 THERAPEUTIC EXERCISES: CPT

## 2023-10-27 PROCEDURE — 97140 MANUAL THERAPY 1/> REGIONS: CPT

## 2023-10-27 PROCEDURE — 97112 NEUROMUSCULAR REEDUCATION: CPT

## 2023-10-27 NOTE — PROGRESS NOTES
PT Re-Evaluation    Today's date: 10/27/2023  Patient name: Salud Whitley  : 1950  MRN: [id number]  Referring provider: Vivian Rivera  Dx:   Encounter Diagnosis     ICD-10-CM    1. Primary osteoarthritis of right knee  M17.11           Start Time: 1338  Stop Time: 1416  Total time in clinic (min): 38 minutes    Subjective: Pt reports she tripped over SPC 2 days ago and stepped out to L to catch her balance. She feels RLE is sore now, and is ambulating with RW "to be safe."       Objective: See treatment diary below      Assessment: Pt tolerated tx well. Minimal progressions today secondary to soreness, although pt not limited during today's session. Improving ROM and quad strength noted today. Notes fatigue post-tx, no pain increase throughout. Pt would continue to benefit from skilled PT. 1:1 with Katherine Dejesus DPT for entirety of tx. Plan: Continue per plan of care.       Pertinent Findings and Outcome Measures:                                                                                                                                                                         Test / Measure  10/17/2023 (pre-op) 10/20/2023 (post-op)   TUG 15.7 sec, no AD 22.9 sec w/ RW, chair +1 foam   5xSTS 16.3 sec, chair 16.1 sec chair +1 foam   Knee ext ROM 10 deg 10 deg   Knee flex  106 deg 103 deg     Precautions: s/p R TKA 10/18/23    POC expiration: 12/15/2023    HEP access code: Jaime Yodio    Manuals 10/17 (pre-op) 10/20 (post-op) 10/24 10/27   R knee PROM  CM 8' CM 4' CM 4'   R patella mobs   CM 4' CM 4'                 Neuro Re-Ed       QS 5"x10 hep 5" 2x10 hep 5"x20    2x10 +SLR 2x10 +SLR   Glute sets 5"x10 hep      Balance       Mini squats  2x10     TKE                     Ther Ex       Bike for ROM  8' rocking, seat 10 showing 10' rocking 10' rocking   Heel slides 5"x10 supine +strap 5" 2x8 supine +strap hep     Ankle pumps hep      Bridge  2x10  2x10 5# 2x10 5#   Stand hip 3-way HR/TR  2x10 ea, TR back to wall for TKE x20 ea    LAQ  2x10 hep 2x10 3# 2x10 5#   SAQ  2x10     [address]   X20 3# 2x10 5#   Leg press       STS's    2x6 chair +foam   Knee flexion stretch       LLLD   3' supine 5#    Gastroc stretch   20"x3 R    Ther Activity       Tband side stepping       FSU       LSU              Gait Training       AD training              Modalities

## 2023-10-31 ENCOUNTER — OFFICE VISIT (OUTPATIENT)
Dept: PHYSICAL THERAPY | Facility: REHABILITATION | Age: 73
End: 2023-10-31
Payer: MEDICARE

## 2023-10-31 DIAGNOSIS — Z01.818 PREOPERATIVE TESTING: ICD-10-CM

## 2023-10-31 DIAGNOSIS — M17.11 PRIMARY OSTEOARTHRITIS OF RIGHT KNEE: Primary | ICD-10-CM

## 2023-10-31 PROCEDURE — 97110 THERAPEUTIC EXERCISES: CPT

## 2023-10-31 PROCEDURE — 97140 MANUAL THERAPY 1/> REGIONS: CPT

## 2023-10-31 NOTE — PROGRESS NOTES
Daily Note     Today's date: 10/31/2023  Patient name: Shirin Koehler  : 1950  MRN: [id number]  Referring provider: Alma Hwang  Dx:   Encounter Diagnosis     ICD-10-CM    1. Primary osteoarthritis of right knee  M17.11       2. Preoperative testing  Z01.818                      Subjective: Pt notes overall improvement upon arrival today      Objective: See treatment diary below      Assessment: Tolerated treatment well. Patient would benefit from continued PT.  Pt. able to complete all exercises with no increase in pain during or after session. Pt able to progress exercises this session without complaint. Pt 1:1 with PTA [id number], IEP for remainder. Plan: Continue per plan of care.       Pertinent Findings and Outcome Measures:                                                                                                                                                                         Test / Measure  10/17/2023 (pre-op) 10/20/2023 (post-op)   TUG 15.7 sec, no AD 22.9 sec w/ RW, chair +1 foam   5xSTS 16.3 sec, chair 16.1 sec chair +1 foam   Knee ext ROM 10 deg 10 deg   Knee flex  106 deg 103 deg     Precautions: s/p R TKA 10/18/23    POC expiration: 12/15/2023    HEP access code: Alisia Louie    Manuals 10/17 (pre-op) 10/20 (post-op) 10/24 10/27 10/31   R knee PROM  CM 8' CM 4' CM 4' KP 6'   R patella mobs   CM 4' CM 4' KP 2'                   Neuro Re-Ed        QS 5"x10 hep 5" 2x10 hep 5"x20    2x10 +SLR 2x10 +SLR 2x10 SLR   Glute sets 5"x10 hep       Balance        Mini squats  2x10      TKE                        Ther Ex        Bike for ROM  8' rocking, seat 10 showing 10' rocking 10' rocking 10' full rotations   Heel slides 5"x10 supine +strap 5" 2x8 supine +strap hep      Ankle pumps hep       Bridge  2x10  2x10 5# 2x10 5#    Stand hip 3-way     2x10 ea abd/ext b/l   HR/TR  2x10 ea, TR back to wall for TKE x20 ea     LAQ  2x10 hep 2x10 3# 2x10 5# 2x10 5#   SAQ  2x10      [address] X20 3# 2x10 5# 2x10 5#   Leg press        STS's    2x6 chair +foam 2x10 chair + foam   Knee flexion stretch        LLLD   3' supine 5#     Gastroc stretch   20"x3 R     Ther Activity        Tband side stepping        FSU        LSU                Gait Training        AD training     8' SPC clinical amb.            Modalities

## 2023-11-02 ENCOUNTER — OFFICE VISIT (OUTPATIENT)
Dept: OBGYN CLINIC | Facility: CLINIC | Age: 73
End: 2023-11-02

## 2023-11-02 ENCOUNTER — HOSPITAL ENCOUNTER (OUTPATIENT)
Dept: NON INVASIVE DIAGNOSTICS | Facility: CLINIC | Age: 73
Discharge: HOME | End: 2023-11-02
Payer: MEDICARE

## 2023-11-02 VITALS
SYSTOLIC BLOOD PRESSURE: 130 MMHG | WEIGHT: 167.6 LBS | HEIGHT: 62 IN | HEART RATE: 76 BPM | BODY MASS INDEX: 30.84 KG/M2 | DIASTOLIC BLOOD PRESSURE: 74 MMHG

## 2023-11-02 DIAGNOSIS — M79.604 PAIN IN RIGHT LEG: ICD-10-CM

## 2023-11-02 DIAGNOSIS — Z96.651 STATUS POST TOTAL KNEE REPLACEMENT, RIGHT: Primary | ICD-10-CM

## 2023-11-02 DIAGNOSIS — R22.40 LOCALIZED SWELLING OF LOWER LEG: ICD-10-CM

## 2023-11-02 DIAGNOSIS — Z96.651 STATUS POST TOTAL KNEE REPLACEMENT, RIGHT: ICD-10-CM

## 2023-11-02 PROCEDURE — 99024 POSTOP FOLLOW-UP VISIT: CPT | Performed by: ORTHOPAEDIC SURGERY

## 2023-11-02 PROCEDURE — 93971 EXTREMITY STUDY: CPT

## 2023-11-02 PROCEDURE — 93971 EXTREMITY STUDY: CPT | Performed by: SURGERY

## 2023-11-02 NOTE — PROGRESS NOTES
Assessment/Plan:  1. Status post total knee replacement, right    2. Localized swelling of lower leg    3. Pain in right leg      Orders Placed This Encounter   Procedures    XR knee 3 vw right non injury       Patient is 2 weeks status post R TKA on 10/18/23. Order placed for STAT venous duplex given right lower leg pain and swelling. No SOB or chest pain. Patient aware we will call with results. Continue outpatient PT. Pain control prn- oxycodone, gabapentin, and tylenol. Patient aware to wean away from opioids. Patient aware to call next week if she requires a refill. Continue with  BID for DVT prophylaxis. ADOLPH stockings as needed for swelling. May shower and let water run over incision, do not submerge incision. Patient should call ahead for abx prior to dental appts. Return in about 4 weeks (around 11/30/2023) for R TKA post op 2. I answered all of the patient's questions during the visit and provided education of the patient's condition during the visit. The patient verbalized understanding of the information given and agrees with the plan. This note was dictated using [employer] software. It may contain errors including improperly dictated words. Please contact physician directly for any questions. Subjective   Chief Complaint:   Chief Complaint   Patient presents with    Right Knee - Post-op       Alvin Costa is a 74 y.o. female who presents for 2 week follow up s/p right TKA. Patient reports she is doing ok overall. Patient states she had a mis step when she tripped on her cane and feels that she strained her low back. Patient has pain over the low back that radiates down the right leg. Patient also notes cramping pain in the right calf that is not relieved with medications. Patient is taking tylenol, gabapentin, and oxycodone. Patient takes oxy 2.5 mg typically 4 times per day. Patient reports being compliant with ASA DVT ppx. She denies missing any doses of ASA.  Denies SOB, chest pain, hx of clots. Patient is attending outpatient PT. Review of Systems  ROS:    See HPI for musculoskeletal review.    All other systems reviewed are negative     History:  Past Medical History:   Diagnosis Date    Arthritis     Encounter for geriatric assessment 05/18/2023    Fatty liver 05/01/2018    Fibromyalgia, primary     GERD (gastroesophageal reflux disease)     Hyperlipidemia     Hypertension     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    Low back pain     Lumbar stenosis     Neck pain     Neuropathy     feet    No natural teeth     on lower    Primary localized osteoarthritis of right knee 08/17/2017    Sacroiliitis ([address]) 08/20/2018    Skin cancer     on chest in past - melanoma    Sleep apnea     resolved with weight loss    Use of cane as ambulatory aid     or walker    Wears dentures     full  uppers     Past Surgical History:   Procedure Laterality Date    JOINT REPLACEMENT Right 06/2023    FL ARTHRP ACETBLR/PROX FEM PROSTC AGRFT/ALGRFT Right 06/19/2023    Procedure: ARTHROPLASTY HIP TOTAL ANTERIOR;  Surgeon: Bard Cassius DO;  Location: [address] [place] [address] OR;  Service: Orthopedics    FL ARTHRP KNE CONDYLE&PLATU MEDIAL&LAT COMPARTMENTS Right 10/18/2023    Procedure: RIGHT TOTAL KNEE ARTHROPLASTY;  Surgeon: Bard Cassius DO;  Location: [address] [place] [address] OR;  Service: Orthopedics    SKIN CANCER EXCISION      on chest - melanoma    TUBAL LIGATION       Social History   Social History     Substance and Sexual Activity   Alcohol Use Yes    Comment: rarely     Social History     Substance and Sexual Activity   Drug Use Never     Social History     Tobacco Use   Smoking Status Never   Smokeless Tobacco Never     Family History:   Family History   Problem Relation Age of Onset    Osteoporosis Mother     Skin cancer Father 54    Stomach cancer Sister 52    No Known Problems Daughter     No Known Problems Maternal Grandmother     No Known Problems Maternal Grandfather     No Known Problems Paternal Grandmother     No Known Problems Paternal Grandfather     No Known Problems Maternal Aunt     No Known Problems Maternal Aunt     No Known Problems Maternal Aunt     No Known Problems Maternal Aunt     No Known Problems Paternal Aunt        Current Outpatient Medications on File Prior to Visit   Medication Sig Dispense Refill    acetaminophen (TYLENOL) 500 mg tablet Take 2 tablets (1,000 mg total) by mouth every 8 (eight) hours  0    ascorbic acid (VITAMIN C) 500 mg tablet TAKE 1 TABLET (500 MG TOTAL) BY MOUTH DAILY BEGIN 30 DAYS PRIOR TO SURGERY. 90 tablet 1    aspirin (ECOTRIN) 325 mg EC tablet Take 1 tablet (325 mg total) by mouth 2 (two) times a day Take with food. 84 tablet 0    benazepril (LOTENSIN) 20 mg tablet TAKE 1 TABLET BY MOUTH EVERY DAY 90 tablet 1    cholecalciferol (VITAMIN D3) 1,000 units tablet Take 1,000 Units by mouth daily      docusate sodium (COLACE) 100 mg capsule Take 1 capsule (100 mg total) by mouth 2 (two) times a day 30 capsule 0    DULoxetine (CYMBALTA) 60 mg delayed release capsule TAKE 1 CAPSULE BY MOUTH EVERY DAY 90 capsule 2    ferrous sulfate 324 (65 Fe) mg TAKE 1 TABLET (324 MG TOTAL) BY MOUTH DAILY BEFORE BREAKFAST BEGIN 30 DAYS PRIOR TO SURGERY. 90 tablet 1    folic acid (FOLVITE) 1 mg tablet TAKE 1 TABLET (1 MG TOTAL) BY MOUTH DAILY BEGIN 30 DAYS PRIOR TO SURGERY. 90 tablet 1    gabapentin (Neurontin) 600 MG tablet Take 1 tablet (600 mg total) by mouth 3 (three) times a day 90 tablet 2    hydrochlorothiazide (HYDRODIURIL) 12.5 mg tablet TAKE 1 TABLET BY MOUTH EVERY DAY 90 tablet 1    lidocaine (Lidoderm) 5 % Apply 1 patch topically daily Remove & Discard patch within 12 hours or as directed by MD (Patient not taking: Reported on 5/30/2023) 30 patch 2    Multiple Vitamins-Minerals (One-Daily Multi-Vit/Mineral) TABS TAKE 1 TABLET BY MOUTH DAILY BEGIN 30 DAYS PRIOR TO SURGERY.  90 tablet 1    omeprazole (PriLOSEC) 10 mg delayed release capsule Take 10 mg by mouth daily        oxyCODONE (ROXICODONE) 5 immediate release tablet Take 1 tablet (5 mg total) by mouth every 4 (four) hours as needed for severe pain Max Daily Amount: 30 mg 42 tablet 0    promethazine (PHENERGAN) 12.5 MG tablet Take 1 tablet (12.5 mg total) by mouth every 6 (six) hours as needed for nausea or vomiting 12 tablet 0    rosuvastatin (CRESTOR) 10 MG tablet TAKE 1 TABLET BY MOUTH EVERY DAY 90 tablet 1    SUPER B COMPLEX/C PO Take 1 tablet by mouth daily         No current facility-administered medications on file prior to visit.      Allergies   Allergen Reactions    Penicillins Hives and Itching        Objective     /74   Pulse 76   Ht 5' 2" (1.575 m)   Wt 76 kg (167 lb 9.6 oz)   BMI 30.65 kg/m²      PE:  AAOx 3  WDWN  Hearing intact, no drainage from eyes  no audible wheezing  no abdominal distension  LE compartments soft, AT/GS intact    Ortho Exam:  right Knee:   INC: C/D/I, No erythema, mild swelling  +TTP over the calf  AROM:3 - 95 degrees  +Homans    Imaging Studies: I have personally reviewed pertinent films in PACS  XR right knee:  S/p TKA, adequate alignment       Scribe Attestation      I,:  Prudence Rosenbaum PA-C am acting as a scribe while in the presence of the attending physician.:       I,:  Vivian Rivera DO personally performed the services described in this documentation    as scribed in my presence.:

## 2023-11-03 ENCOUNTER — OFFICE VISIT (OUTPATIENT)
Dept: PHYSICAL THERAPY | Facility: REHABILITATION | Age: 73
End: 2023-11-03
Payer: MEDICARE

## 2023-11-03 DIAGNOSIS — E78.2 MIXED HYPERLIPIDEMIA: ICD-10-CM

## 2023-11-03 DIAGNOSIS — M79.7 FIBROMYALGIA: ICD-10-CM

## 2023-11-03 DIAGNOSIS — Z96.641 STATUS POST TOTAL HIP REPLACEMENT, RIGHT: ICD-10-CM

## 2023-11-03 DIAGNOSIS — I10 BENIGN ESSENTIAL HTN: ICD-10-CM

## 2023-11-03 DIAGNOSIS — M17.11 PRIMARY OSTEOARTHRITIS OF RIGHT KNEE: Primary | ICD-10-CM

## 2023-11-03 PROCEDURE — 97112 NEUROMUSCULAR REEDUCATION: CPT

## 2023-11-03 PROCEDURE — 97110 THERAPEUTIC EXERCISES: CPT

## 2023-11-03 PROCEDURE — 97140 MANUAL THERAPY 1/> REGIONS: CPT

## 2023-11-03 RX ORDER — DOCUSATE SODIUM 100 MG/1
100 CAPSULE, LIQUID FILLED ORAL 2 TIMES DAILY
Qty: 30 CAPSULE | Refills: 0 | Status: SHIPPED | OUTPATIENT
Start: 2023-11-03

## 2023-11-03 RX ORDER — BENAZEPRIL HYDROCHLORIDE 20 MG/1
TABLET ORAL
Qty: 90 TABLET | Refills: 1 | Status: SHIPPED | OUTPATIENT
Start: 2023-11-03

## 2023-11-03 RX ORDER — ROSUVASTATIN CALCIUM 10 MG/1
TABLET, COATED ORAL
Qty: 90 TABLET | Refills: 1 | Status: SHIPPED | OUTPATIENT
Start: 2023-11-03

## 2023-11-03 RX ORDER — DULOXETIN HYDROCHLORIDE 60 MG/1
CAPSULE, DELAYED RELEASE ORAL
Qty: 90 CAPSULE | Refills: 2 | Status: SHIPPED | OUTPATIENT
Start: 2023-11-03

## 2023-11-03 NOTE — PROGRESS NOTES
Daily Note     Today's date: 11/3/2023  Patient name: Salud Whitley  : 1950  MRN: [id number]  Referring provider: LUDWIG Harley  Dx:   Encounter Diagnosis     ICD-10-CM    1. Primary osteoarthritis of right knee  M17.11           Start Time: 1102  Stop Time: 1157  Total time in clinic (min): 55 minutes    Subjective: Pt now ambulating with quad cane again after returning to . Pt notes she would like to get [facility] due to feeling she kicks the quad cane. Pt followed up with ortho, sent for DVT due to c/o "calf tightness" but test was negative. Objective: See treatment diary below      Assessment: Tolerated treatment well. Pt continues to demonstrate limited knee ext ROM at end-range, although improving tolerance from self-OP or PT OP. Added seated self-OP into ext with improved ROM noted after. Introduced leg press with deep knee flexion, good tolerance to functional strengthening. Pt notes fatigue post-tx, no pain. Patient would benefit from continued PT. 1:1 with Katherine Dejesus DPT for entirety of tx. Plan: Continue per plan of care.       Pertinent Findings and Outcome Measures:                                                                                                                                                                         Test / Measure  10/17/2023 (pre-op) 10/20/2023 (post-op)   TUG 15.7 sec, no AD 22.9 sec w/ RW, chair +1 foam   5xSTS 16.3 sec, chair 16.1 sec chair +1 foam   Knee ext ROM 10 deg 10 deg   Knee flex  106 deg 103 deg     Precautions: s/p R TKA 10/18/23    HEP access code: [email or web address]    POC expires Unit limit Auth Expiration date PT/OT + Visit Limit?   12/15/23 N/a  N/a none     VISIT 1 2 3 4 5 6   Manuals 10/17 (pre-op) 10/20 (post-op) 10/24 10/27 10/31 11/3   R knee PROM  CM 8' CM 4' CM 4' KP 6' CM 4'   R patella mobs   CM 4' CM 4' KP 2' CM 4'                     Neuro Re-Ed         QS 5"x10 hep 5" 2x10 hep 5"x20    2x10 +SLR 2x10 +SLR 2x10 SLR 2x10 SLR 2#   Glute sets 5"x10 hep        Balance         Mini squats  2x10       TKE                           Ther Ex         Bike for ROM  8' rocking, seat 10 showing 10' rocking 10' rocking 10' full rotations 8' NS, bike NV   Heel slides 5"x10 supine +strap 5" 2x8 supine +strap hep       Ankle pumps hep        Bridge  2x10  2x10 5# 2x10 5#  2x10 gtb at knees, 8#   Stand hip 3-way     2x10 ea abd/ext b/l D/c   HR/TR  2x10 ea, TR back to wall for TKE x20 ea   2x15 HR off foam   LAQ  2x10 hep 2x10 3# 2x10 5# 2x10 5# 2x10 8#   SAQ  2x10       [address]   X20 3# 2x10 5# 2x10 5#    Leg press      X10 70#  2x10 85#   STS's    2x6 chair +foam 2x10 chair + foam    LLLD   3' supine 5#   10"x10 seated w/ leg propped on stool, pt OP   Gastroc stretch   20"x3 R   30"x3 R   SL hip abd      2x10    Ther Activity         Tband side stepping         FSU         LSU                  Gait Training         AD training     8' SPC clinical amb.              Modalities

## 2023-11-06 ENCOUNTER — OFFICE VISIT (OUTPATIENT)
Dept: PHYSICAL THERAPY | Facility: REHABILITATION | Age: 73
End: 2023-11-06
Payer: MEDICARE

## 2023-11-06 DIAGNOSIS — M17.11 PRIMARY OSTEOARTHRITIS OF RIGHT KNEE: Primary | ICD-10-CM

## 2023-11-06 DIAGNOSIS — Z96.651 STATUS POST TOTAL KNEE REPLACEMENT, RIGHT: ICD-10-CM

## 2023-11-06 PROCEDURE — 97530 THERAPEUTIC ACTIVITIES: CPT

## 2023-11-06 PROCEDURE — 97112 NEUROMUSCULAR REEDUCATION: CPT

## 2023-11-06 PROCEDURE — 97110 THERAPEUTIC EXERCISES: CPT

## 2023-11-06 NOTE — PROGRESS NOTES
Daily Note     Today's date: 2023  Patient name: Shashank Robin  : 1950  MRN: [id number]  Referring provider: LUDWIG Anderson  Dx:   Encounter Diagnosis     ICD-10-CM    1. Primary osteoarthritis of right knee  M17.11             Start Time: 1058  Stop Time: 1151  Total time in clinic (min): 53 minutes    Subjective: Pt reports updated HEP going well. Continues to ambulate with quad cane. Objective: See treatment diary below      Assessment: Tolerated treatment well. Introduced steps with good tolerance, due to pt reporting she continues to have functional difficulty with these at home. Good tolerance to this, continues to progress functional strength and knee ROM well. Pt notes fatigue post-tx, no pain. Continue to progress as tolerated. Patient would benefit from continued PT. 1:1 with Jose Mortimer, DPT for entirety of tx. Plan: Continue per plan of care.       Pertinent Findings and Outcome Measures:                                                                                                                                                                         Test / Measure  10/17/2023 (pre-op) 10/20/2023 (post-op)   TUG 15.7 sec, no AD 22.9 sec w/ RW, chair +1 foam   5xSTS 16.3 sec, chair 16.1 sec chair +1 foam   Knee ext ROM 10 deg 10 deg   Knee flex  106 deg 103 deg     Precautions: s/p R TKA 10/18/23  FOTO: 81 on 23 (d/c, goal met)    HEP access code: [email or web address]    POC expires Unit limit Auth Expiration date PT/OT + Visit Limit?   12/15/23 N/a  N/a none     VISIT 1 2 3 4 5 6 7   Manuals 10/17 (pre-op) 10/20 (post-op) 10/24 10/27 10/31 11/3 11/6   R knee PROM  CM 8' CM 4' CM 4' KP 6' CM 4' CM 8' ext   R patella mobs   CM 4' CM 4' KP 2' CM 4' np             RE  CM        Neuro Re-Ed          QS 5"x10 hep 5" 2x10 hep 5"x20    2x10 +SLR 2x10 +SLR 2x10 SLR 2x10 SLR 2# 5"x15    2x10 SLR 2#   Glute sets 5"x10 hep         Balance          Mini squats  2x10        TKE 2x10 gtb manual                       Ther Ex          Bike for ROM  8' rocking, seat 10 showing 10' rocking 10' rocking 10' full rotations 8' NS, bike NV 10' full   Heel slides 5"x10 supine +strap 5" 2x8 supine +strap hep        Ankle pumps hep         Bridge  2x10  2x10 5# 2x10 5#  2x10 gtb at knees, 8# 2x10 gtb at knees 10#   Stand hip 3-way     2x10 ea abd/ext b/l D/c    HR/TR  2x10 ea, TR back to wall for TKE x20 ea   2x15 HR off foam 2x15 HR off foam   LAQ  2x10 hep 2x10 3# 2x10 5# 2x10 5# 2x10 8# 2x10 10#   SAQ  2x10        [address]   X20 3# 2x10 5# 2x10 5#     Leg press      X10 70#  2x10 85# 2x10 85#   STS's    2x6 chair +foam 2x10 chair + foam     LLLD   3' supine 5#   10"x10 seated w/ leg propped on stool, pt OP np   Gastroc stretch   20"x3 R   30"x3 R    SL hip abd      2x10  2x10 3#   Ther Activity          Tband side stepping          FSU       2x10 R 6"   LSU       2x10 R 6"             Gait Training          AD training     8' SPC clinical amb.                Modalities

## 2023-11-06 NOTE — TELEPHONE ENCOUNTER
Reason for call:   [x] Refill   [] Prior Auth  [] Other:     Office:   [] PCP/Provider -   [x] Specialty/Provider - Ortho / Jaime BERNARD    Medication: oxycodone    Dose/Frequency: 5 mg/ Q4H as needed    Quantity: 30D    Pharmacy: [facility] on file    Does the patient have enough for 3 days?    [x] Yes   [] No - Send as HP to POD

## 2023-11-08 ENCOUNTER — OFFICE VISIT (OUTPATIENT)
Dept: PHYSICAL THERAPY | Facility: REHABILITATION | Age: 73
End: 2023-11-08
Payer: MEDICARE

## 2023-11-08 DIAGNOSIS — M17.11 PRIMARY OSTEOARTHRITIS OF RIGHT KNEE: Primary | ICD-10-CM

## 2023-11-08 PROCEDURE — 97140 MANUAL THERAPY 1/> REGIONS: CPT

## 2023-11-08 PROCEDURE — 97112 NEUROMUSCULAR REEDUCATION: CPT

## 2023-11-08 PROCEDURE — 97110 THERAPEUTIC EXERCISES: CPT

## 2023-11-08 RX ORDER — OXYCODONE HYDROCHLORIDE 5 MG/1
5 TABLET ORAL EVERY 8 HOURS PRN
Qty: 21 TABLET | Refills: 0 | Status: SHIPPED | OUTPATIENT
Start: 2023-11-08

## 2023-11-08 NOTE — PROGRESS NOTES
Daily Note     Today's date: 2023  Patient name: Rene Cannon  : 1950  MRN: [id number]  Referring provider: LUDWIG Leslie  Dx:   Encounter Diagnosis     ICD-10-CM    1. Primary osteoarthritis of right knee  M17.11               Start Time:   Stop Time: 916  Total time in clinic (min): 53 minutes    Subjective: Pt reports she's doing well overall, continues to ambulate with quad cane. Notes onset of "ache" pain with sleep last night. Objective: See treatment diary below      Assessment: Tolerated treatment well. Pt able to tolerate increased force during PROM to promote knee ext, improved ROM noted following. Prop heel NV for increased TKE emphasis during manual therapy. Good tolerance to addition of prone ext stretch off table edge. Pt continues to descend steps with RLE leading only at home due to fear and poor eccentric strength. Introduced FSD today with LLE leading, pt tolerated very well. Patient would benefit from continued PT. 1:1 with Alessandra Ocampo DPT for entirety of tx. Plan: Continue per plan of care.       Pertinent Findings and Outcome Measures:                                                                                                                                                                         Test / Measure  10/17/2023 (pre-op) 10/20/2023 (post-op)   TUG 15.7 sec, no AD 22.9 sec w/ RW, chair +1 foam   5xSTS 16.3 sec, chair 16.1 sec chair +1 foam   Knee ext ROM 10 deg 10 deg   Knee flex  106 deg 103 deg     Precautions: s/p R TKA 10/18/23  FOTO: 81 on 23 (d/c, goal met)    HEP access code: [email or web address]    POC expires Unit limit Auth Expiration date PT/OT + Visit Limit?   12/15/23 N/a  N/a None, RE at 10 visits     VISIT 2 3 4 5 6 7 8   Manuals 10/20 (post-op) 10/24 10/27 10/31 11/3 11/6 11/8   R knee PROM CM 8' CM 4' CM 4' KP 6' CM 4' CM 8' ext CM 8' ext, on wedge NV   R patella mobs  CM 4' CM 4' KP 2' CM 4' np              RE CM Neuro Re-Ed          QS 5" 2x10 hep 5"x20    2x10 +SLR 2x10 +SLR 2x10 SLR 2x10 SLR 2# 5"x15    2x10 SLR 2# 5"x15    2x10 SLR 3#   Glute sets          Balance          Mini squats 2x10         TKE      2x10 gtb manual                        Ther Ex          Bike for ROM 8' rocking, seat 10 showing 10' rocking 10' rocking 10' full rotations 8' NS, bike NV 10' full 10'    Heel slides 5" 2x8 supine +strap hep         Ankle pumps          Bridge 2x10  2x10 5# 2x10 5#  2x10 gtb at knees, 8# 2x10 gtb at knees 10# 2x10 btb at knees 10#   Stand hip 3-way    2x10 ea abd/ext b/l D/c     HR/TR 2x10 ea, TR back to wall for TKE x20 ea   2x15 HR off foam 2x15 HR off foam NV   LAQ 2x10 hep 2x10 3# 2x10 5# 2x10 5# 2x10 8# 2x10 10# 2x15 10#   SAQ 2x10         [address]  X20 3# 2x10 5# 2x10 5#      Leg press     X10 70#  2x10 85# 2x10 85# X10 100#  X10 115#   STS's   2x6 chair +foam 2x10 chair + foam      LLLD  3' supine 5#   10"x10 seated w/ leg propped on stool, pt OP np 3' prone off table, 5#aw   Gastroc stretch  20"x3 R   30"x3 R     SL hip abd     2x10  2x10 3# 2x10 3#   Ther Activity          Tband side stepping          FSU      2x10 R 6" 2x10 up R, down L fwd, 6" step   LSU      2x10 R 6"              Gait Training          AD training    8' SPC clinical amb.                 Modalities

## 2023-11-13 ENCOUNTER — OFFICE VISIT (OUTPATIENT)
Dept: PHYSICAL THERAPY | Facility: REHABILITATION | Age: 73
End: 2023-11-13
Payer: MEDICARE

## 2023-11-13 DIAGNOSIS — M17.11 PRIMARY OSTEOARTHRITIS OF RIGHT KNEE: Primary | ICD-10-CM

## 2023-11-13 PROCEDURE — 97530 THERAPEUTIC ACTIVITIES: CPT

## 2023-11-13 PROCEDURE — 97110 THERAPEUTIC EXERCISES: CPT

## 2023-11-13 PROCEDURE — 97112 NEUROMUSCULAR REEDUCATION: CPT

## 2023-11-13 NOTE — PROGRESS NOTES
Daily Note     Today's date: 2023  Patient name: Octaviano Mcneal  : 1950  MRN: [id number]  Referring provider: Twanna Krabbe, CRNP  Dx:   Encounter Diagnosis     ICD-10-CM    1. Primary osteoarthritis of right knee  M17.11               Start Time: 915  Stop Time: 1000  Total time in clinic (min): 45 minutes    Subjective: Pt reports feeling good, obtained SPC and notes good tolerance with ambulation. Objective: See treatment diary below      Assessment: Tolerated treatment well. Continues to demonstrate improved knee ext ROM with manual therapy. Improving functional strength with standing exercises, notes fatigue post-tx and no pain. Patient would benefit from continued PT. 1:1 with Rosendo Gannon DPT for entirety of tx. Plan: Continue per plan of care.       Pertinent Findings and Outcome Measures:                                                                                                                                                                         Test / Measure  10/17/2023 (pre-op) 10/20/2023 (post-op)   TUG 15.7 sec, no AD 22.9 sec w/ RW, chair +1 foam   5xSTS 16.3 sec, chair 16.1 sec chair +1 foam   Knee ext ROM 10 deg 10 deg   Knee flex  106 deg 103 deg     Precautions: s/p R TKA 10/18/23  FOTO: 81 on 23 (d/c, goal met)    HEP access code: [email or web address]    POC expires Unit limit Auth Expiration date PT/OT + Visit Limit?   12/15/23 N/a  N/a None, RE at 10 visits     VISIT 2 3 4 5 6 7 8 9   Manuals 10/20 (post-op) 10/24 10/27 10/31 11/3 11/6 11/8 11/13   R knee PROM CM 8' CM 4' CM 4' KP 6' CM 4' CM 8' ext CM 8' ext, on wedge NV CM 8' ext OP on wedge   R patella mobs  CM 4' CM 4' KP 2' CM 4' np                RE CM          Neuro Re-Ed           QS 5" 2x10 hep 5"x20    2x10 +SLR 2x10 +SLR 2x10 SLR 2x10 SLR 2# 5"x15    2x10 SLR 2# 5"x15    2x10 SLR 3# 5"x10    2x10 SLR 4#   Glute sets           Balance           Mini squats 2x10          TKE      2x10 gtb manual Ecc lat step down        2x10 6" step              Ther Ex           Bike for ROM 8' rocking, seat 10 showing 10' rocking 10' rocking 10' full rotations 8' NS, bike NV 10' full 10'  10' L3   Heel slides 5" 2x8 supine +strap hep          Ankle pumps           Bridge 2x10  2x10 5# 2x10 5#  2x10 gtb at knees, 8# 2x10 gtb at knees 10# 2x10 btb at knees 10#    Stand hip 3-way    2x10 ea abd/ext b/l D/c      HR/TR 2x10 ea, TR back to wall for TKE x20 ea   2x15 HR off foam 2x15 HR off foam NV    LAQ 2x10 hep 2x10 3# 2x10 5# 2x10 5# 2x10 8# 2x10 10# 2x15 10# 2x10 13#, d/c NV   SAQ 2x10          [address]  X20 3# 2x10 5# 2x10 5#       Leg press     X10 70#  2x10 85# 2x10 85# X10 100#  X10 115#    STS's   2x6 chair +foam 2x10 chair + foam       LLLD  3' supine 5#   10"x10 seated w/ leg propped on stool, pt OP np 3' prone off table, 5#aw np   Gastroc stretch  20"x3 R   30"x3 R      SL hip abd     2x10  2x10 3# 2x10 3#    Ther Activity           Tband side stepping        X3 laps gtb   FSU      2x10 R 6" 2x10 up R, down L fwd, 6" step 2x10 R 9"   LSU      2x10 R 6"  2x10 R 9"              Gait Training           AD training    8' SPC clinical amb.                   Modalities

## 2023-11-16 ENCOUNTER — APPOINTMENT (OUTPATIENT)
Dept: PHYSICAL THERAPY | Facility: REHABILITATION | Age: 73
End: 2023-11-16
Payer: MEDICARE

## 2023-11-20 ENCOUNTER — OFFICE VISIT (OUTPATIENT)
Dept: PHYSICAL THERAPY | Facility: REHABILITATION | Age: 73
End: 2023-11-20
Payer: MEDICARE

## 2023-11-20 DIAGNOSIS — M17.11 PRIMARY OSTEOARTHRITIS OF RIGHT KNEE: Primary | ICD-10-CM

## 2023-11-20 PROCEDURE — 97140 MANUAL THERAPY 1/> REGIONS: CPT | Performed by: PHYSICAL THERAPIST

## 2023-11-20 PROCEDURE — 97110 THERAPEUTIC EXERCISES: CPT | Performed by: PHYSICAL THERAPIST

## 2023-11-20 NOTE — PROGRESS NOTES
Progress Note     Today's date: 2023  Patient name: Shannon Gr  : 1950  MRN: [id number]  Referring provider: LUDWIG Santos  Dx:   Encounter Diagnosis     ICD-10-CM    1. Primary osteoarthritis of right knee  M17.11                            Subjective: Patient reports that her knee is "doing well". She is please with progress thus far. Pain is well controlled, reports that her L knee often causes more pain than R knee. Objective: See treatment diary below      Assessment: Patient is progressing well with post op rehab. Functional strength and balance are improving with TUG and 5x STS. Knee flexion PROM is normalized; knee extension PROM still has 8 deg deficit. I recommend continuing physical therapy to address this ROM deficit and to help patient wean from use of assistive device for community ambulation. Plan: Continue per plan of care.       Pertinent Findings and Outcome Measures:                                                                                                                                                                         Test / Measure  10/17/2023 (pre-op) 10/20/2023 (post-op) 2023   TUG 15.7 sec, no AD 22.9 sec w/ RW, chair +1 foam 15 sec; no AD   5xSTS 16.3 sec, chair 16.1 sec chair +1 foam 20 sec; chair   Knee ext ROM 10 deg 10 deg 8 deg   Knee flex  106 deg 103 deg 125 deg     Precautions: s/p R TKA 10/18/23  FOTO: 81 on 23 (d/c, goal met)    HEP access code: [email or web address]    POC expires Unit limit Auth Expiration date PT/OT + Visit Limit?   12/15/23 N/a  N/a None, RE at 10 visits     VISIT 2 3 4 5 6 7 8 9 10   Manuals 10/20 (post-op) 10/24 10/27 10/31 11/3 11/6 11/8 11/13 11/20   R knee PROM CM 8' CM 4' CM 4' KP 6' CM 4' CM 8' ext CM 8' ext, on wedge NV CM 8' ext OP on wedge TB ext OP on wedge   R patella mobs  CM 4' CM 4' KP 2' CM 4' np                  RE CM           Neuro Re-Ed            QS 5" 2x10 hep 5"x20    2x10 +SLR 2x10 +SLR 2x10 SLR 2x10 SLR 2# 5"x15    2x10 SLR 2# 5"x15    2x10 SLR 3# 5"x10    2x10 SLR 4# 3x10 4#; SLR   Glute sets            Balance            Mini squats 2x10           TKE      2x10 gtb manual      Ecc lat step down        2x10 6" step 2x10; 6in               Ther Ex            Bike for ROM 8' rocking, seat 10 showing 10' rocking 10' rocking 10' full rotations 8' NS, bike NV 10' full 10'  10' L3 10 min; lvl 3   Heel slides 5" 2x8 supine +strap hep           Ankle pumps            Bridge 2x10  2x10 5# 2x10 5#  2x10 gtb at knees, 8# 2x10 gtb at knees 10# 2x10 btb at knees 10#     Stand hip 3-way    2x10 ea abd/ext b/l D/c       HR/TR 2x10 ea, TR back to wall for TKE x20 ea   2x15 HR off foam 2x15 HR off foam NV     LAQ 2x10 hep 2x10 3# 2x10 5# 2x10 5# 2x10 8# 2x10 10# 2x15 10# 2x10 13#, d/c NV    SAQ 2x10           [address]  X20 3# 2x10 5# 2x10 5#        Leg press     X10 70#  2x10 85# 2x10 85# X10 100#  X10 115#     STS's   2x6 chair +foam 2x10 chair + foam        LLLD  3' supine 5#   10"x10 seated w/ leg propped on stool, pt OP np 3' prone off table, 5#aw np    Gastroc stretch  20"x3 R   30"x3 R       SL hip abd     2x10  2x10 3# 2x10 3#     Ther Activity            Tband side stepping        X3 laps gtb 3 laps gtb   FSU      2x10 R 6" 2x10 up R, down L fwd, 6" step 2x10 R 9" 2x10 9in   LSU      2x10 R 6"  2x10 R 9" 2x10 9in               Gait Training            AD training    8' SPC clinical amb.                     Modalities 31-Oct-2022 06:59 cannabis and tobacco use in remission, moderate persistent asthma

## 2023-11-22 ENCOUNTER — OFFICE VISIT (OUTPATIENT)
Dept: PHYSICAL THERAPY | Facility: REHABILITATION | Age: 73
End: 2023-11-22
Payer: MEDICARE

## 2023-11-22 DIAGNOSIS — M17.11 PRIMARY OSTEOARTHRITIS OF RIGHT KNEE: Primary | ICD-10-CM

## 2023-11-22 DIAGNOSIS — Z01.818 PREOPERATIVE TESTING: ICD-10-CM

## 2023-11-22 PROCEDURE — 97112 NEUROMUSCULAR REEDUCATION: CPT

## 2023-11-22 PROCEDURE — 97140 MANUAL THERAPY 1/> REGIONS: CPT

## 2023-11-22 PROCEDURE — 97110 THERAPEUTIC EXERCISES: CPT

## 2023-11-22 NOTE — PROGRESS NOTES
Daily Note     Today's date: 2023  Patient name: Ksenia Sullivan  : 1950  MRN: [id number]  Referring provider: LUDWIG Chance  Dx:   Encounter Diagnosis     ICD-10-CM    1. Primary osteoarthritis of right knee  M17.11       2. Preoperative testing  Z01.818           Start Time: 2335  Stop Time: 0943  Total time in clinic (min): 45 minutes    Subjective: Patient reports that she feels pretty good and denies any complaints of pain presently or after previous session. Objective: See treatment diary below      Assessment: Added TKE with GTB as indicated below and increased reps with step up series. Patient appears to tolerate treatment well and has appropriate muscular fatigue noted afterwards. Palpable tightness along medial hamstring. Good response to addition of STM to medial hamstring and manual hamstring stretching with no deficit to knee extension noted after manuals. Patient demonstrated fatigue post treatment, exhibited good technique with therapeutic exercises, and would benefit from continued PT      Plan: Continue per plan of care. Progress treatment as tolerated.        Pertinent Findings and Outcome Measures:                                                                                                                                                                         Test / Measure  10/17/2023 (pre-op) 10/20/2023 (post-op) 2023   TUG 15.7 sec, no AD 22.9 sec w/ RW, chair +1 foam 15 sec; no AD   5xSTS 16.3 sec, chair 16.1 sec chair +1 foam 20 sec; chair   Knee ext ROM 10 deg 10 deg 8 deg   Knee flex  106 deg 103 deg 125 deg     Precautions: s/p R TKA 10/18/23  FOTO: 81 on 23 (d/c, goal met)    HEP access code: [email or web address]    POC expires Unit limit Auth Expiration date PT/OT + Visit Limit?   12/15/23 N/a  N/a None, RE at 10 visits     VISIT 2 3 4 5 6 7 8 9 10 11   Manuals 10/20 (post-op) 10/24 10/27 10/31 11/3 11/6 11/8 11/13 11/20 11/22   R knee PROM CM 8' CM 4' CM 4' KP 6' CM 4' CM 8' ext CM 8' ext, on wedge NV CM 8' ext OP on wedge TB ext OP on wedge JL: Manual HS Stretch, STM HS, PROM Knee Ext   R patella mobs  CM 4' CM 4' KP 2' CM 4' np                    RE CM            Neuro Re-Ed             QS 5" 2x10 hep 5"x20    2x10 +SLR 2x10 +SLR 2x10 SLR 2x10 SLR 2# 5"x15    2x10 SLR 2# 5"x15    2x10 SLR 3# 5"x10    2x10 SLR 4# 3x10 4#; SLR 4# 3x10 SLR   Glute sets             Balance             Mini squats 2x10            TKE      2x10 gtb manual    GTB 5"x20   Ecc lat step down        2x10 6" step 2x10; 6in 6in 2x10                Ther Ex             Bike for ROM 8' rocking, seat 10 showing 10' rocking 10' rocking 10' full rotations 8' NS, bike NV 10' full 10'  10' L3 10 min; lvl 3 L3 10min   Heel slides 5" 2x8 supine +strap hep            Ankle pumps             Bridge 2x10  2x10 5# 2x10 5#  2x10 gtb at knees, 8# 2x10 gtb at knees 10# 2x10 btb at knees 10#      Stand hip 3-way    2x10 ea abd/ext b/l D/c        HR/TR 2x10 ea, TR back to wall for TKE x20 ea   2x15 HR off foam 2x15 HR off foam NV      LAQ 2x10 hep 2x10 3# 2x10 5# 2x10 5# 2x10 8# 2x10 10# 2x15 10# 2x10 13#, d/c NV     SAQ 2x10            [address]  X20 3# 2x10 5# 2x10 5#         Leg press     X10 70#  2x10 85# 2x10 85# X10 100#  X10 115#      STS's   2x6 chair +foam 2x10 chair + foam         LLLD  3' supine 5#   10"x10 seated w/ leg propped on stool, pt OP np 3' prone off table, 5#aw np     Gastroc stretch  20"x3 R   30"x3 R        SL hip abd     2x10  2x10 3# 2x10 3#      Ther Activity             Tband side stepping        X3 laps gtb 3 laps gtb GTB 3 laps at bar   FSU      2x10 R 6" 2x10 up R, down L fwd, 6" step 2x10 R 9" 2x10 9in 9in 25x   LSU      2x10 R 6"  2x10 R 9" 2x10 9in 9in 25x                Gait Training             AD training    8' SPC clinical amb.                       Modalities

## 2023-11-27 DIAGNOSIS — Z96.641 STATUS POST TOTAL HIP REPLACEMENT, RIGHT: ICD-10-CM

## 2023-11-27 RX ORDER — ASPIRIN 325 MG
TABLET, DELAYED RELEASE (ENTERIC COATED) ORAL
Qty: 84 TABLET | Refills: 1 | Status: SHIPPED | OUTPATIENT
Start: 2023-11-27

## 2023-11-29 ENCOUNTER — APPOINTMENT (OUTPATIENT)
Dept: PHYSICAL THERAPY | Facility: REHABILITATION | Age: 73
End: 2023-11-29
Payer: MEDICARE

## 2023-11-30 ENCOUNTER — OFFICE VISIT (OUTPATIENT)
Dept: OBGYN CLINIC | Facility: CLINIC | Age: 73
End: 2023-11-30
Payer: MEDICARE

## 2023-11-30 VITALS
WEIGHT: 168.8 LBS | BODY MASS INDEX: 31.06 KG/M2 | DIASTOLIC BLOOD PRESSURE: 80 MMHG | SYSTOLIC BLOOD PRESSURE: 122 MMHG | HEIGHT: 62 IN | HEART RATE: 91 BPM

## 2023-11-30 DIAGNOSIS — M16.11 PRIMARY OSTEOARTHRITIS OF ONE HIP, RIGHT: ICD-10-CM

## 2023-11-30 DIAGNOSIS — G89.29 CHRONIC PAIN OF LEFT KNEE: ICD-10-CM

## 2023-11-30 DIAGNOSIS — Z01.818 PREOPERATIVE TESTING: ICD-10-CM

## 2023-11-30 DIAGNOSIS — M25.562 CHRONIC PAIN OF LEFT KNEE: ICD-10-CM

## 2023-11-30 DIAGNOSIS — M17.12 ARTHRITIS OF LEFT KNEE: Primary | ICD-10-CM

## 2023-11-30 DIAGNOSIS — M25.59 PAIN IN OTHER SPECIFIED JOINT: ICD-10-CM

## 2023-11-30 DIAGNOSIS — Z96.651 STATUS POST TOTAL KNEE REPLACEMENT, RIGHT: ICD-10-CM

## 2023-11-30 PROCEDURE — 99213 OFFICE O/P EST LOW 20 MIN: CPT | Performed by: ORTHOPAEDIC SURGERY

## 2023-11-30 RX ORDER — ASCORBIC ACID 500 MG
500 TABLET ORAL DAILY
Qty: 90 TABLET | Refills: 1 | Status: SHIPPED | OUTPATIENT
Start: 2023-11-30

## 2023-11-30 RX ORDER — ACETAMINOPHEN 325 MG/1
975 TABLET ORAL ONCE
OUTPATIENT
Start: 2023-11-30 | End: 2023-11-30

## 2023-11-30 RX ORDER — FOLIC ACID 1 MG/1
1 TABLET ORAL DAILY
Qty: 90 TABLET | Refills: 1 | Status: SHIPPED | OUTPATIENT
Start: 2023-11-30

## 2023-11-30 RX ORDER — CHLORHEXIDINE GLUCONATE 4 G/100ML
SOLUTION TOPICAL DAILY PRN
OUTPATIENT
Start: 2023-11-30

## 2023-11-30 RX ORDER — SODIUM CHLORIDE 9 MG/ML
75 INJECTION, SOLUTION INTRAVENOUS CONTINUOUS
OUTPATIENT
Start: 2023-11-30

## 2023-11-30 RX ORDER — CHLORHEXIDINE GLUCONATE ORAL RINSE 1.2 MG/ML
15 SOLUTION DENTAL ONCE
OUTPATIENT
Start: 2023-11-30 | End: 2023-11-30

## 2023-11-30 RX ORDER — FERROUS SULFATE 324(65)MG
324 TABLET, DELAYED RELEASE (ENTERIC COATED) ORAL
Qty: 90 TABLET | Refills: 1 | Status: SHIPPED | OUTPATIENT
Start: 2023-11-30

## 2023-11-30 RX ORDER — MULTIVIT-MIN/FERROUS SULFATE 4.5 MG
1 TABLET ORAL DAILY
Qty: 90 TABLET | Refills: 1 | Status: SHIPPED | OUTPATIENT
Start: 2023-11-30

## 2023-11-30 NOTE — PROGRESS NOTES
Assessment/Plan:  1. Arthritis of left knee    2. Status post total knee replacement, right    3. Chronic pain of left knee    4. Primary osteoarthritis of one hip, right    5. Preoperative testing    6. Pain in other specified joint      Orders Placed This Encounter   Procedures    XR knee 4+ vw left injury    Comprehensive metabolic panel    Hemoglobin A1C W/EAG Estimation    CBC and differential    C-reactive protein    Anemia Panel w/Reflex    Protime-INR    APTT    Ambulatory referral to Physical Therapy    Ambulatory referral to Internal Medicine    EKG 12 lead    Type and screen       Patient is doing well 6 weeks status post R TKA on 10/18/23. Continue PT. Pain control prn- tylenol prn  Patient should call ahead for abx prior to dental appts. The patient has severe LEFT knee arthritis. We discussed treatment options as well as risks and benefits of treatment options. The patient has intolerable pain, feels limited and has tried and failed conservative treatment options at this point. They have elected to proceed with a LEFT TKA. The risks of surgery include, but are not limited to infection, blood clot, wound healing problems, blood loss, damage to blood vessels and nerves, persistent pain and stiffness, fracture, need for additional surgery, need for revision surgery, failure of hardware, heart attack, stroke, death. The patient understood and agreed to by oral and written consent. I answered all questions regarding surgery. The patient has the direct phone number to the office for any further questions. Currently scheduled for 1/9/24. DVT prophylaxis:  BID x 6 weeks  Clearance will be obtained from: [facility]  cardiology(clearance prior to [id number] [facility] on 5/5/23) per [facility] if need to go back  Same day discharge anticipated. Pre op supplements refilled. Return in about 4 weeks (around 12/28/2023) for R TKA post op 3.     I answered all of the patient's questions during the visit and provided education of the patient's condition during the visit. The patient verbalized understanding of the information given and agrees with the plan. This note was dictated using [employer] software. It may contain errors including improperly dictated words. Please contact physician directly for any questions. Subjective   Chief Complaint:   Chief Complaint   Patient presents with    Right Knee - Post-op, Follow-up       Al Barney is a 74 y.o. female who presents for 6 week follow up s/p right TKA. Patient notes her right knee is doing very well. He has minimal to no pain in the right knee. She is doing PT and feels she is progressing. Patient is taking tylenol and gabapentin for pain when needed. Patient completed ASA. Patient is overall happy with her right knee. Patient states she continues to have progressively worsening left knee pain that limits her daily activities. Pain is located anterior and worse with weight bearing and climbing stairs. Patient has tried steroid injections in the past with some benefit. Patient is signed up for L TKA on 1/8/24. Patient previously had cardiac and PCP clearance prior to R MYAH. History of MRSA: no  History of blood clots: no  Family history of blood clots: no  History of Hepatitis C:no  History of HIV: no  Are you a smoker:no  Are you diabetic:yes  Do you see a cardiologist: no, saw for pre op clearance prior to MYAH  Have you been vaccinated for COVID:yes  Have you seen a dentist in the past year: yes for pre op clearance prior to MYAH    Review of Systems  ROS:    See HPI for musculoskeletal review.    All other systems reviewed are negative     History:  Past Medical History:   Diagnosis Date    Arthritis     Encounter for geriatric assessment 05/18/2023    Fatty liver 05/01/2018    Fibromyalgia, primary     GERD (gastroesophageal reflux disease)     Hyperlipidemia     Hypertension     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    Low back pain Lumbar stenosis     Neck pain     Neuropathy     feet    No natural teeth     on lower    Primary localized osteoarthritis of right knee 08/17/2017    Sacroiliitis ([address]) 08/20/2018    Skin cancer     on chest in past - melanoma    Sleep apnea     resolved with weight loss    Use of cane as ambulatory aid     or walker    Wears dentures     full  uppers     Past Surgical History:   Procedure Laterality Date    JOINT REPLACEMENT Right 06/2023    MI ARTHRP ACETBLR/PROX FEM PROSTC AGRFT/ALGRFT Right 06/19/2023    Procedure: ARTHROPLASTY HIP TOTAL ANTERIOR;  Surgeon: Ramone Stevens DO;  Location: [facility] [address] MAIN OR;  Service: Orthopedics    MI ARTHRP KNE CONDYLE&PLATU MEDIAL&LAT COMPARTMENTS Right 10/18/2023    Procedure: RIGHT TOTAL KNEE ARTHROPLASTY;  Surgeon: Ramone Stevens DO;  Location: [facility] [address] MAIN OR;  Service: Orthopedics    SKIN CANCER EXCISION      on chest - melanoma    TUBAL LIGATION       Social History   Social History     Substance and Sexual Activity   Alcohol Use Yes    Comment: rarely     Social History     Substance and Sexual Activity   Drug Use Never     Social History     Tobacco Use   Smoking Status Never   Smokeless Tobacco Never     Family History:   Family History   Problem Relation Age of Onset    Osteoporosis Mother     Skin cancer Father 54    Stomach cancer Sister 52    No Known Problems Daughter     No Known Problems Maternal Grandmother     No Known Problems Maternal Grandfather     No Known Problems Paternal Grandmother     No Known Problems Paternal Grandfather     No Known Problems Maternal Aunt     No Known Problems Maternal Aunt     No Known Problems Maternal Aunt     No Known Problems Maternal Aunt     No Known Problems Paternal Aunt        Current Outpatient Medications on File Prior to Visit   Medication Sig Dispense Refill    acetaminophen (TYLENOL) 500 mg tablet Take 2 tablets (1,000 mg total) by mouth every 8 (eight) hours  0    aspirin (ECOTRIN) 325 mg EC tablet TAKE 1 TABLET (325 MG TOTAL) BY MOUTH 2 (TWO) TIMES A DAY TAKE WITH FOOD. 84 tablet 1    benazepril (LOTENSIN) 20 mg tablet TAKE 1 TABLET BY MOUTH EVERY DAY 90 tablet 1    cholecalciferol (VITAMIN D3) 1,000 units tablet Take 1,000 Units by mouth daily      docusate sodium (COLACE) 100 mg capsule TAKE 1 CAPSULE BY MOUTH TWICE A DAY 30 capsule 0    DULoxetine (CYMBALTA) 60 mg delayed release capsule TAKE 1 CAPSULE BY MOUTH EVERY DAY 90 capsule 2    gabapentin (Neurontin) 600 MG tablet Take 1 tablet (600 mg total) by mouth 3 (three) times a day 90 tablet 2    hydrochlorothiazide (HYDRODIURIL) 12.5 mg tablet TAKE 1 TABLET BY MOUTH EVERY DAY 90 tablet 1    lidocaine (Lidoderm) 5 % Apply 1 patch topically daily Remove & Discard patch within 12 hours or as directed by MD (Patient not taking: Reported on 5/30/2023) 30 patch 2    omeprazole (PriLOSEC) 10 mg delayed release capsule Take 10 mg by mouth daily        oxyCODONE (ROXICODONE) 5 immediate release tablet Take 1 tablet (5 mg total) by mouth every 8 (eight) hours as needed for severe pain Max Daily Amount: 15 mg 21 tablet 0    promethazine (PHENERGAN) 12.5 MG tablet Take 1 tablet (12.5 mg total) by mouth every 6 (six) hours as needed for nausea or vomiting 12 tablet 0    rosuvastatin (CRESTOR) 10 MG tablet TAKE 1 TABLET BY MOUTH EVERY DAY 90 tablet 1    SUPER B COMPLEX/C PO Take 1 tablet by mouth daily        [DISCONTINUED] ascorbic acid (VITAMIN C) 500 mg tablet TAKE 1 TABLET (500 MG TOTAL) BY MOUTH DAILY BEGIN 30 DAYS PRIOR TO SURGERY. 90 tablet 1    [DISCONTINUED] ferrous sulfate 324 (65 Fe) mg TAKE 1 TABLET (324 MG TOTAL) BY MOUTH DAILY BEFORE BREAKFAST BEGIN 30 DAYS PRIOR TO SURGERY. 90 tablet 1    [DISCONTINUED] folic acid (FOLVITE) 1 mg tablet TAKE 1 TABLET (1 MG TOTAL) BY MOUTH DAILY BEGIN 30 DAYS PRIOR TO SURGERY. 90 tablet 1    [DISCONTINUED] Multiple Vitamins-Minerals (One-Daily Multi-Vit/Mineral) TABS TAKE 1 TABLET BY MOUTH DAILY BEGIN 30 DAYS PRIOR TO SURGERY.  [address] [place] [address],[facility]. 5943 tablet 1     No current facility-administered medications on file prior to visit.      Allergies   Allergen Reactions    Penicillins Hives and Itching        Objective     /80   Pulse 91   Ht 5' 2" (1.575 m)   Wt 76.6 kg (168 lb 12.8 oz)   BMI 30.87 kg/m²      PE:  AAOx 3  WDWN  Hearing intact, no drainage from eyes  no audible wheezing  no abdominal distension  LE compartments soft, AT/GS intact    Ortho Exam:  right Knee:   INC: healed, No erythema, mild swelling  AROM: 2 - 115 degrees    Left knee:  No ecchymosis  No effusion  No swelling  +TTP over medial joint line  AROM: 0- 115 degrees         Scribe Attestation      I,:  Varghese Brownlee PA-C am acting as a scribe while in the presence of the attending physician.:       I,:  Aleida Rollins DO personally performed the services described in this documentation    as scribed in my presence.:

## 2023-12-01 ENCOUNTER — APPOINTMENT (OUTPATIENT)
Dept: PHYSICAL THERAPY | Facility: REHABILITATION | Age: 73
End: 2023-12-01
Payer: MEDICARE

## 2023-12-02 DIAGNOSIS — I10 BENIGN ESSENTIAL HTN: ICD-10-CM

## 2023-12-02 DIAGNOSIS — R60.0 LOWER EXTREMITY EDEMA: ICD-10-CM

## 2023-12-02 RX ORDER — HYDROCHLOROTHIAZIDE 12.5 MG/1
TABLET ORAL
Qty: 90 TABLET | Refills: 1 | Status: SHIPPED | OUTPATIENT
Start: 2023-12-02

## 2023-12-06 ENCOUNTER — OFFICE VISIT (OUTPATIENT)
Dept: PHYSICAL THERAPY | Facility: REHABILITATION | Age: 73
End: 2023-12-06
Payer: MEDICARE

## 2023-12-06 DIAGNOSIS — M17.11 PRIMARY OSTEOARTHRITIS OF RIGHT KNEE: Primary | ICD-10-CM

## 2023-12-06 PROCEDURE — 97112 NEUROMUSCULAR REEDUCATION: CPT

## 2023-12-06 PROCEDURE — 97110 THERAPEUTIC EXERCISES: CPT

## 2023-12-06 PROCEDURE — 97530 THERAPEUTIC ACTIVITIES: CPT

## 2023-12-06 NOTE — PROGRESS NOTES
Daily Note     Today's date: 2023  Patient name: Sarah Guerrero  : 1950  MRN: [id number]  Referring provider: LUDWIG Garcia  Dx:   Encounter Diagnosis     ICD-10-CM    1. Primary osteoarthritis of right knee  M17.11           Start Time: 08  Stop Time: 0900  Total time in clinic (min): 39 minutes    Subjective: Patient reports she continues to ambulate without AD, feels very good and has been compliant with HEP. Objective: See treatment diary below      Assessment: Patient tolerated treatment well. Continues to progress functional strength very well, several progressions today with good tolerance. No pain throughout, plan for RE at f/u to determine need for future skilled PT. Patient would benefit from continued PT. 1:1 with Willa Vargas DPT for entirety of tx. Plan: Continue per plan of care. RE at f/u.       Pertinent Findings and Outcome Measures:                                                                                                                                                                         Test / Measure  10/17/2023 (pre-op) 10/20/2023 (post-op) 2023   TUG 15.7 sec, no AD 22.9 sec w/ RW, chair +1 foam 15 sec; no AD   5xSTS 16.3 sec, chair 16.1 sec chair +1 foam 20 sec; chair   Knee ext ROM 10 deg 10 deg 8 deg   Knee flex  106 deg 103 deg 125 deg     Precautions: s/p R TKA 10/18/23  FOTO: 81 on 23 (d/c, goal met)    HEP access code: [email or web address]    POC expires Unit limit Auth Expiration date PT/OT + Visit Limit?   12/15/23 N/a  N/a None, RE at 10 visits     VISIT 2 3 4 5 6 7 8 9 10 11 12   Manuals 10/20 (post-op) 10/24 10/27 10/31 11/3 11/6 11/8 11/13 11/20 11/22 12/6   R knee PROM CM 8' CM 4' CM 4' KP 6' CM 4' CM 8' ext CM 8' ext, on wedge NV CM 8' ext OP on wedge TB ext OP on wedge JL: Manual HS Stretch, STM HS, PROM Knee Ext np   R patella mobs  CM 4' CM 4' KP 2' CM 4' np                      RE CM             Neuro Re-Ed              QS 5" 2x10 hep 5"x20    2x10 +SLR 2x10 +SLR 2x10 SLR 2x10 SLR 2# 5"x15    2x10 SLR 2# 5"x15    2x10 SLR 3# 5"x10    2x10 SLR 4# 3x10 4#; SLR 4# 3x10 SLR np   Glute sets              Balance              Mini squats 2x10          Tap chair +foam 2x10 8#   TKE      2x10 gtb manual    GTB 5"x20    Ecc lat step down        2x10 6" step 2x10; 6in 6in 2x10                  Ther Ex              Bike for ROM 8' rocking, seat 10 showing 10' rocking 10' rocking 10' full rotations 8' NS, bike NV 10' full 10'  10' L3 10 min; lvl 3 L3 10min 10' L3   Heel slides 5" 2x8 supine +strap hep             Ankle pumps              Bridge 2x10  2x10 5# 2x10 5#  2x10 gtb at knees, 8# 2x10 gtb at knees 10# 2x10 btb at knees 10#       Stand hip 3-way    2x10 ea abd/ext b/l D/c         HR/TR 2x10 ea, TR back to wall for TKE x20 ea   2x15 HR off foam 2x15 HR off foam NV    2x10 R only   LAQ 2x10 hep 2x10 3# 2x10 5# 2x10 5# 2x10 8# 2x10 10# 2x15 10# 2x10 13#, d/c NV      SAQ 2x10             [address]  X20 3# 2x10 5# 2x10 5#          Leg press     X10 70#  2x10 85# 2x10 85# X10 100#  X10 115#    X15 115#  2x10 R only 55#   STS's   2x6 chair +foam 2x10 chair + foam          LLLD  3' supine 5#   10"x10 seated w/ leg propped on stool, pt OP np 3' prone off table, 5#aw np      Gastroc stretch  20"x3 R   30"x3 R         SL hip abd     2x10  2x10 3# 2x10 3#       Ther Activity              Tband side stepping        X3 laps gtb 3 laps gtb GTB 3 laps at bar    FSU      2x10 R 6" 2x10 up R, down L fwd, 6" step 2x10 R 9" 2x10 9in 9in 25x 2x10 R 9" 8#   LSU      2x10 R 6"  2x10 R 9" 2x10 9in 9in 25x 2x10 R 9" 8#   Tandem walk           X3 laps no HHA   Gait Training              AD training    8' SPC clinical amb.                         Modalities

## 2023-12-08 ENCOUNTER — EVALUATION (OUTPATIENT)
Dept: PHYSICAL THERAPY | Facility: REHABILITATION | Age: 73
End: 2023-12-08
Payer: MEDICARE

## 2023-12-08 DIAGNOSIS — M17.11 PRIMARY OSTEOARTHRITIS OF RIGHT KNEE: Primary | ICD-10-CM

## 2023-12-08 PROCEDURE — 97164 PT RE-EVAL EST PLAN CARE: CPT

## 2023-12-08 PROCEDURE — 97530 THERAPEUTIC ACTIVITIES: CPT

## 2023-12-08 PROCEDURE — 97112 NEUROMUSCULAR REEDUCATION: CPT

## 2023-12-08 PROCEDURE — 97110 THERAPEUTIC EXERCISES: CPT

## 2023-12-08 NOTE — PROGRESS NOTES
PT Re-Evaluation / Discharge    Today's date: 2023  Patient name: Sabrina Armijo  : 1950  MRN: [id number]  Referring provider: LUDWIG Whitehead  Dx:   Encounter Diagnosis     ICD-10-CM    1. Primary osteoarthritis of right knee  M17.11             Start Time: 1145  Stop Time: 1230  Total time in clinic (min): 45 minutes    Subjective: Patient reports overall improvement in function since participating in formal PT. Pt reports improvements in stair negotiation, ambulation, and balance. Pt no longer using AD for balance. No longer is having pain. Pt feels comfortable with HEP to self-manage. Pain  Current: 0  Best: 0  Worst: 0    Objective: See treatment diary below    Range of Motion: Goniometric revealed the following findings (in degrees). Joint Motion Right:    Knee Extension 5   Knee Flexion 120   Ankle Dorsiflexion 5   Ankle Plantarflexion WFL     Strength: MMT revealed the following findings. Joint Motion Right:    Hip Flexion 5/5   Hip Abduction 5/5   Hip Extension 5/5   Knee Extension 5/5   Knee Flexion 5/5   Ankle Plantarflexion 5/5   Ankle Dorsiflexion 5/5   *=indicates pain with testing      Assessment: Sabrina Armijo is a 74y.o. year old female with a referred dx of Primary osteoarthritis of right knee, and is s/p R TKA 10/18/23. Pt has demonstrated improvements in overall strength, ROM, and dynamic balance. Pt is independent with HEP, and would benefit from d/c from skilled PT, transition to self-management with HEP. All PT goals met. Pt in agreement with plan. 1:1 with Adenike Russ DPT for entirety of tx. Goals  Short Term Goals: In 4 weeks, the patient will:  1. Decrease worst pain by 2 points for improved QOL. - MET  2. Improve LE strength by 1/2 grade for improved LE fx. - MET  3. Supervision with HEP for self care. - MET    Long Term Goals: In 8 weeks, the patient will:  1. Improve LE strength by 1 grade for improved LE fx. - MET  2.  FOTO to greater than predicted value. - MET  3. Independent with comprehensive HEP upon discharge. - MET  4. Improve both knee flex/ext ROM by 10 degrees for improved functional ROM for ADL's. - MET  5.  D/c AD for return to functional baseline. - MET  6. Negotiate steps with step-over-step pattern to navigate home environment.  - MET      Plan:  d/c to hep     Pertinent Findings and Outcome Measures:                                                                                                                                                                         Test / Measure  10/17/2023 (pre-op) 10/20/2023 (post-op) 11/20/2023 12/8/23   TUG 15.7 sec, no AD 22.9 sec w/ RW, chair +1 foam 15 sec; no AD 10.8 sec, no AD   5xSTS 16.3 sec, chair 16.1 sec chair +1 foam 20 sec; chair 14.4 sec, chair   Knee ext ROM 10 deg 10 deg 8 deg 5 deg   Knee flex  106 deg 103 deg 125 deg 120 deg     Precautions: s/p R TKA 10/18/23  FOTO: 81 on 11/6/23 (d/c, goal met)    HEP access code: [email or web address]    POC expires Unit limit Auth Expiration date PT/OT + Visit Limit?   12/15/23 N/a  N/a None, RE at 10 visits     VISIT 5 6 7 8 9 10 11 12 13   Manuals 10/31 11/3 11/6 11/8 11/13 11/20 11/22 12/6 12/8   R knee PROM KP 6' CM 4' CM 8' ext CM 8' ext, on wedge NV CM 8' ext OP on wedge TB ext OP on wedge JL: Manual HS Stretch, STM HS, PROM Knee Ext np    R patella mobs KP 2' CM 4' np                     RE            Neuro Re-Ed            QS 2x10 SLR 2x10 SLR 2# 5"x15    2x10 SLR 2# 5"x15    2x10 SLR 3# 5"x10    2x10 SLR 4# 3x10 4#; SLR 4# 3x10 SLR np    Glute sets            Balance            Mini squats        Tap chair +foam 2x10 8# Tap chair +foam 3x10 8# hep   TKE   2x10 gtb manual    GTB 5"x20     Ecc lat step down     2x10 6" step 2x10; 6in 6in 2x10                 Ther Ex            Bike for ROM 10' full rotations 8' NS, bike NV 10' full 10'  10' L3 10 min; lvl 3 L3 10min 10' L3 10' L3   Heel slides            Ankle pumps            Bridge  2x10 gtb at knees, 8# 2x10 gtb at knees 10# 2x10 btb at knees 10#        Stand hip 3-way 2x10 ea abd/ext b/l D/c          HR/TR  2x15 HR off foam 2x15 HR off foam NV    2x10 R only 2x10 R only hep   LAQ 2x10 5# 2x10 8# 2x10 10# 2x15 10# 2x10 13#, d/c NV       SAQ            [address] 2x10 5#           Leg press  X10 70#  2x10 85# 2x10 85# X10 100#  X10 115#    X15 115#  2x10 R only 55# 2x10 130#    2x10 55#   STS's 2x10 chair + foam           LLLD  10"x10 seated w/ leg propped on stool, pt OP np 3' prone off table, 5#aw np       Gastroc stretch  30"x3 R          SL hip abd  2x10  2x10 3# 2x10 3#        Ther Activity            Tband side stepping     X3 laps gtb 3 laps gtb GTB 3 laps at bar     FSU   2x10 R 6" 2x10 up R, down L fwd, 6" step 2x10 R 9" 2x10 9in 9in 25x 2x10 R 9" 8#    LSU   2x10 R 6"  2x10 R 9" 2x10 9in 9in 25x 2x10 R 9" 8# 2x10 L 9" step   Tandem walk        X3 laps no HHA    Gait Training            AD training 8' SPC clinical amb.                        Modalities

## 2023-12-12 ENCOUNTER — TELEPHONE (OUTPATIENT)
Dept: OTHER | Facility: OTHER | Age: 73
End: 2023-12-12

## 2023-12-12 PROBLEM — D53.9 ANEMIA ASSOCIATED WITH NUTRITIONAL DEFICIENCY: Status: ACTIVE | Noted: 2023-12-12

## 2023-12-12 NOTE — TELEPHONE ENCOUNTER
Patient is calling regarding cancelling an appointment. Date/Time: 12/12/2023, 12:40 PM    Patient was rescheduled: YES [] NO [x]    Patient requesting call back to reschedule: YES [] NO [x]    Patient reports that her  is in the hospital and is not doing well. She reports that she will call back when she can to reschedule.

## 2023-12-13 ENCOUNTER — APPOINTMENT (OUTPATIENT)
Dept: PHYSICAL THERAPY | Facility: REHABILITATION | Age: 73
End: 2023-12-13
Payer: MEDICARE

## 2023-12-14 ENCOUNTER — TELEPHONE (OUTPATIENT)
Dept: OBGYN CLINIC | Facility: HOSPITAL | Age: 73
End: 2023-12-14

## 2023-12-14 NOTE — TELEPHONE ENCOUNTER
Preoperative Elective Admission Assessment-S/w pt      MYAH in June 2023. Living Situation:  In a 2nd floor apartment, no elevator. To enter apartment must do steps, #7, landing then #7 more w/ handrails. #14 total steps. Pt reports she does "pretty good with the steps" now, Encouraged to  throw rugs. Discussed pre-op bathing process. First Floor Setup: after steps to enter, everything is on 1 level- Reports she had no issues with [facility]      DME:has RW, cane and has BSC. Patient's Current Level of Function: Independent with ADLs and ambulation      Post-op Caregiver:       Post-op Transport:       Outpatient Physical Therapy Site:  Site: [facility]  pre and post-op appts scheduled? No- will send to [address] t:   Preferred Pharmacy for Post-op Medications: [id number] [place] [address]  Blood Management Vitamin Regimen:taking as prescribed, denies questions or concerns. Post-op anticoagulant: advised per OV note "DVT prophylaxis:  BID x 6 weeks."     DC Plan: pt is agreeable for same day DC to home and to attend OP PT. May be considered for [address] post-op depending on level of function and ability to do steps into her apartment. Goal is OP PT if independent and able. Barriers to DC identified preoperatively: none      BMI:33.29     Patient Education:  Pt educated on post-op pain, early mobilization (POD0), same day DC, OP PT goal.  Patient educated that our goal is to appropriately discharge patient based off their post-op function while striving to maintain maximal independence. The goal is to discharge patient to home and for them to attend outpatient physical therapy. Assigned to care team? Y        Pt encouraged to call with any questions, concerns or issues.

## 2023-12-15 ENCOUNTER — TELEPHONE (OUTPATIENT)
Dept: OTHER | Facility: OTHER | Age: 73
End: 2023-12-15

## 2023-12-15 NOTE — TELEPHONE ENCOUNTER
Patient is calling in to let the office know that she will be cancelling her procedure for 1/9/24.   Please call back to reschedule

## 2023-12-19 ENCOUNTER — TELEPHONE (OUTPATIENT)
Dept: OBGYN CLINIC | Facility: MEDICAL CENTER | Age: 73
End: 2023-12-19

## 2023-12-19 NOTE — TELEPHONE ENCOUNTER
Nurse navigator received call from patient requesting to cancel surgery due to the passing of her . Patient was scheduled for L TKA on 1/9/24. We will see patient back in the office at her convenience to discuss next steps for her.

## 2023-12-20 ENCOUNTER — APPOINTMENT (OUTPATIENT)
Dept: PHYSICAL THERAPY | Facility: REHABILITATION | Age: 73
End: 2023-12-20
Payer: MEDICARE

## 2023-12-22 ENCOUNTER — APPOINTMENT (OUTPATIENT)
Dept: PHYSICAL THERAPY | Facility: REHABILITATION | Age: 73
End: 2023-12-22
Payer: MEDICARE

## 2023-12-27 ENCOUNTER — APPOINTMENT (OUTPATIENT)
Dept: PHYSICAL THERAPY | Facility: REHABILITATION | Age: 73
End: 2023-12-27
Payer: MEDICARE

## 2023-12-29 ENCOUNTER — APPOINTMENT (OUTPATIENT)
Dept: PHYSICAL THERAPY | Facility: REHABILITATION | Age: 73
End: 2023-12-29
Payer: MEDICARE

## 2024-01-10 DIAGNOSIS — M51.16 LUMBAR DISC HERNIATION WITH RADICULOPATHY: ICD-10-CM

## 2024-01-10 RX ORDER — GABAPENTIN 600 MG/1
600 TABLET ORAL 3 TIMES DAILY
Qty: 90 TABLET | Refills: 2 | Status: SHIPPED | OUTPATIENT
Start: 2024-01-10

## 2024-01-10 NOTE — TELEPHONE ENCOUNTER
S/W pt.  Pt requesting refill of Gabapentin as listed below.  It helps her and she denies side effects.  Please advise.

## 2024-01-10 NOTE — TELEPHONE ENCOUNTER
Reason for call:   [x] Refill   [] Prior Auth  [] Other:     Office:   [] PCP/Provider -   [x] Specialty/Provider - spine and pain/ aj     Medication: gabapentin     Dose/Frequency: 600 mg/ 1 tab three times daily     Quantity: 30 day supply     [facility]     Does the patient have enough for 3 days?   [x] Yes   [] No - Send as HP to POD

## 2024-02-01 ENCOUNTER — OFFICE VISIT (OUTPATIENT)
Dept: PAIN MEDICINE | Facility: MEDICAL CENTER | Age: 74
End: 2024-02-01
Payer: MEDICARE

## 2024-02-01 VITALS
HEIGHT: 62 IN | SYSTOLIC BLOOD PRESSURE: 136 MMHG | OXYGEN SATURATION: 96 % | DIASTOLIC BLOOD PRESSURE: 90 MMHG | BODY MASS INDEX: 28.34 KG/M2 | HEART RATE: 77 BPM | WEIGHT: 154 LBS

## 2024-02-01 DIAGNOSIS — M48.062 SPINAL STENOSIS OF LUMBAR REGION WITH NEUROGENIC CLAUDICATION: ICD-10-CM

## 2024-02-01 DIAGNOSIS — M79.18 MYOFASCIAL PAIN SYNDROME: ICD-10-CM

## 2024-02-01 DIAGNOSIS — M46.1 SACROILIITIS (HCC): Primary | ICD-10-CM

## 2024-02-01 DIAGNOSIS — G89.11 ACUTE PAIN OF RIGHT SHOULDER DUE TO TRAUMA: ICD-10-CM

## 2024-02-01 DIAGNOSIS — M25.511 ACUTE PAIN OF RIGHT SHOULDER DUE TO TRAUMA: ICD-10-CM

## 2024-02-01 DIAGNOSIS — M47.816 LUMBAR SPONDYLOSIS: ICD-10-CM

## 2024-02-01 PROCEDURE — 99214 OFFICE O/P EST MOD 30 MIN: CPT

## 2024-02-01 RX ORDER — CYCLOBENZAPRINE HCL 10 MG
10 TABLET ORAL
Qty: 90 TABLET | Refills: 1 | Status: SHIPPED | OUTPATIENT
Start: 2024-02-01

## 2024-02-01 NOTE — PROGRESS NOTES
Assessment:  1. Sacroiliitis (HCC)    2. Acute pain of right shoulder due to trauma    3. Myofascial pain syndrome    4. Lumbar spondylosis    5. Spinal stenosis of lumbar region with neurogenic claudication        Plan:  Continue cyclobenzaprine 10 mg as prescribed for muscle spasms. Refills of this medication were sent to the patient's pharmacy today.  Continue gabapentin 600 mg 1 tablet 3 times daily. The patient did not require refills of this medication today.  Offered right sacroiliac joint injection as recommended at previous office visits, as the patient is still experiencing pain in this area. Patient declined.  Offered right shoulder x-ray due to subacute right shoulder pain after falling about 1 month ago.  Patient declined. I advised her to follow-up with her PCP regarding this if the pain worsens or fails to resolve.  Follow up in 6 months or sooner if needed for medication refill and reevaluation.    My impressions and treatment recommendations were discussed in detail with the patient who verbalized understanding and had no further questions.  Discharge instructions were provided. I personally saw and examined the patient and I agree with the above discussed plan of care.    New Medications Ordered This Visit   Medications    cyclobenzaprine (FLEXERIL) 10 mg tablet     Sig: Take 1 tablet (10 mg total) by mouth daily at bedtime     Dispense:  90 tablet     Refill:  1       History of Present Illness:  Rosy Gibson is a 73 y.o. female with a history of lumbar spondylosis, lumbar radiculopathy, and myofascial pain syndrome who presents for a follow up office visit for medication refill and reevaluation.  Patient reports that her pain is well-controlled on current medication regimen.  She states that her pain is intermittent, and currently rates it a 2/10 in intensity.  She describes the quality of this pain as dull/aching.    The patient reports that she fell in her home about 1 month ago and bumped  her right shoulder. She states that she is experiencing ongoing soreness and pain in this area.    Current pain medication regimen consists of gabapentin 600 mg 1 tablet 3 times daily and cyclobenzaprine 10 mg 1 tablet once daily at bedtime.  Patient reports that she tolerates this medication well without side effects.  This regimen reduces her pain by about 50%.    I have personally reviewed and/or updated the patient's past medical history, past surgical history, family history, social history, current medications, allergies, and vital signs today.     Review of Systems   Respiratory:  Negative for shortness of breath.    Cardiovascular:  Negative for chest pain.   Gastrointestinal:  Negative for constipation, diarrhea, nausea and vomiting.   Musculoskeletal:  Positive for myalgias, neck pain and neck stiffness. Negative for arthralgias, gait problem and joint swelling.   Skin:  Negative for rash.   Neurological:  Negative for dizziness, seizures and weakness.   All other systems reviewed and are negative.      Patient Active Problem List   Diagnosis    Primary osteoarthritis of both knees    Lumbar disc disease with radiculopathy    Chronic right-sided low back pain with right-sided sciatica    Fibromyalgia    Benign essential HTN    Fatty liver    Gastroesophageal reflux disease without esophagitis    Lumbar radiculopathy    Pes anserine bursitis    Lower extremity edema    Cervical radiculopathy    Cervical spine arthritis    Cervical spinal stenosis    Lumbar spondylosis    Current mild episode of major depressive disorder without prior episode (HCC)    Mixed hyperlipidemia    IFG (impaired fasting glucose)    Degenerative disc disease, cervical    Diabetes mellitus type 2 in obese     Spasm of right piriformis muscle    Primary osteoarthritis of right hip    Spinal stenosis of lumbar region with neurogenic claudication    Status post total hip replacement, right    Status post total knee replacement, right     Anemia associated with nutritional deficiency       Past Medical History:   Diagnosis Date    Arthritis     Encounter for geriatric assessment 05/18/2023    Fatty liver 05/01/2018    Fibromyalgia, primary     GERD (gastroesophageal reflux disease)     Hyperlipidemia     Hypertension     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    Low back pain     Lumbar stenosis     Neck pain     Neuropathy     feet    No natural teeth     on lower    Primary localized osteoarthritis of right knee 08/17/2017    Sacroiliitis (HCC) 08/20/2018    Skin cancer     on chest in past - melanoma    Sleep apnea     resolved with weight loss    Use of cane as ambulatory aid     or walker    Wears dentures     full  uppers       Past Surgical History:   Procedure Laterality Date    JOINT REPLACEMENT Right 06/2023    TX ARTHRP ACETBLR/PROX FEM PROSTC AGRFT/ALGRFT Right 06/19/2023    Procedure: ARTHROPLASTY HIP TOTAL ANTERIOR;  Surgeon: Liss Whitehead DO;  Location:  MAIN OR;  Service: Orthopedics    TX ARTHRP KNE CONDYLE&PLATU MEDIAL&LAT COMPARTMENTS Right 10/18/2023    Procedure: RIGHT TOTAL KNEE ARTHROPLASTY;  Surgeon: Liss Whiteehad DO;  Location:  MAIN OR;  Service: Orthopedics    SKIN CANCER EXCISION      on chest - melanoma    TUBAL LIGATION         Family History   Problem Relation Age of Onset    Osteoporosis Mother     Skin cancer Father 55    Stomach cancer Sister 49    No Known Problems Daughter     No Known Problems Maternal Grandmother     No Known Problems Maternal Grandfather     No Known Problems Paternal Grandmother     No Known Problems Paternal Grandfather     No Known Problems Maternal Aunt     No Known Problems Maternal Aunt     No Known Problems Maternal Aunt     No Known Problems Maternal Aunt     No Known Problems Paternal Aunt        Social History     Occupational History    Not on file   Tobacco Use    Smoking status: Never    Smokeless tobacco: Never   Vaping Use    Vaping status:  Never Used   Substance and Sexual Activity    Alcohol use: Yes     Comment: rarely    Drug use: Never    Sexual activity: Not Currently       Current Outpatient Medications on File Prior to Visit   Medication Sig    acetaminophen (TYLENOL) 500 mg tablet Take 2 tablets (1,000 mg total) by mouth every 8 (eight) hours    ascorbic acid (VITAMIN C) 500 mg tablet Take 1 tablet (500 mg total) by mouth daily Begin 30 days prior to surgery.    aspirin (ECOTRIN) 325 mg EC tablet TAKE 1 TABLET (325 MG TOTAL) BY MOUTH 2 (TWO) TIMES A DAY TAKE WITH FOOD.    benazepril (LOTENSIN) 20 mg tablet TAKE 1 TABLET BY MOUTH EVERY DAY    cholecalciferol (VITAMIN D3) 1,000 units tablet Take 1,000 Units by mouth daily    docusate sodium (COLACE) 100 mg capsule TAKE 1 CAPSULE BY MOUTH TWICE A DAY    DULoxetine (CYMBALTA) 60 mg delayed release capsule TAKE 1 CAPSULE BY MOUTH EVERY DAY    ferrous sulfate 324 (65 Fe) mg Take 1 tablet (324 mg total) by mouth daily before breakfast Begin 30 days prior to surgery.    folic acid (FOLVITE) 1 mg tablet Take 1 tablet (1 mg total) by mouth daily Begin 30 days prior to surgery.    gabapentin (Neurontin) 600 MG tablet Take 1 tablet (600 mg total) by mouth 3 (three) times a day    hydrochlorothiazide (HYDRODIURIL) 12.5 mg tablet TAKE 1 TABLET BY MOUTH EVERY DAY    Multiple Vitamins-Minerals (One-Daily Multi-Vit/Mineral) TABS Take 1 tablet by mouth in the morning    omeprazole (PriLOSEC) 10 mg delayed release capsule Take 10 mg by mouth daily      promethazine (PHENERGAN) 12.5 MG tablet Take 1 tablet (12.5 mg total) by mouth every 6 (six) hours as needed for nausea or vomiting    rosuvastatin (CRESTOR) 10 MG tablet TAKE 1 TABLET BY MOUTH EVERY DAY    SUPER B COMPLEX/C PO Take 1 tablet by mouth daily      lidocaine (Lidoderm) 5 % Apply 1 patch topically daily Remove & Discard patch within 12 hours or as directed by MD (Patient not taking: Reported on 5/30/2023)    oxyCODONE (ROXICODONE) 5 immediate release  "tablet Take 1 tablet (5 mg total) by mouth every 8 (eight) hours as needed for severe pain Max Daily Amount: 15 mg (Patient not taking: Reported on 2/1/2024)     No current facility-administered medications on file prior to visit.       Allergies   Allergen Reactions    Penicillins Hives and Itching       Physical Exam:    /90   Pulse 77   Ht 5' 2\" (1.575 m)   Wt 69.9 kg (154 lb)   SpO2 96%   BMI 28.17 kg/m²     Constitutional:normal, well developed, well nourished, alert, in no distress and non-toxic and no overt pain behavior.  Eyes:anicteric  HEENT:grossly intact  Neck:supple, symmetric, trachea midline and no masses   Pulmonary:even and unlabored  Cardiovascular:No edema or pitting edema present  Skin:Normal without rashes or lesions and well hydrated  Psychiatric:Mood and affect appropriate  Neurologic:Cranial Nerves II-XII grossly intact  Musculoskeletal:normal gait. Tender to palpation over bilateral trapezius musculature and posterior aspect of right shoulder. Tender to palpation over right sacroiliac joint.  Mildly tender to palpation over right GTB.  Right shoulder ROM is minimally limited in all planes and painful.    "

## 2024-02-02 ENCOUNTER — OFFICE VISIT (OUTPATIENT)
Dept: OBGYN CLINIC | Facility: MEDICAL CENTER | Age: 74
End: 2024-02-02
Payer: MEDICARE

## 2024-02-02 ENCOUNTER — APPOINTMENT (OUTPATIENT)
Dept: RADIOLOGY | Facility: MEDICAL CENTER | Age: 74
End: 2024-02-02
Payer: MEDICARE

## 2024-02-02 ENCOUNTER — TELEPHONE (OUTPATIENT)
Age: 74
End: 2024-02-02

## 2024-02-02 VITALS — HEIGHT: 62 IN | BODY MASS INDEX: 28.34 KG/M2 | WEIGHT: 154 LBS

## 2024-02-02 DIAGNOSIS — M17.11 PRIMARY OSTEOARTHRITIS OF RIGHT KNEE: ICD-10-CM

## 2024-02-02 DIAGNOSIS — M25.59 PAIN IN OTHER SPECIFIED JOINT: ICD-10-CM

## 2024-02-02 DIAGNOSIS — Z01.818 PREOPERATIVE TESTING: ICD-10-CM

## 2024-02-02 DIAGNOSIS — Z96.641 STATUS POST TOTAL HIP REPLACEMENT, RIGHT: ICD-10-CM

## 2024-02-02 DIAGNOSIS — M17.12 ARTHRITIS OF LEFT KNEE: ICD-10-CM

## 2024-02-02 DIAGNOSIS — M25.562 CHRONIC PAIN OF LEFT KNEE: ICD-10-CM

## 2024-02-02 DIAGNOSIS — G89.29 CHRONIC PAIN OF LEFT KNEE: ICD-10-CM

## 2024-02-02 DIAGNOSIS — M17.12 ARTHRITIS OF LEFT KNEE: Primary | ICD-10-CM

## 2024-02-02 PROCEDURE — 73502 X-RAY EXAM HIP UNI 2-3 VIEWS: CPT

## 2024-02-02 PROCEDURE — 77073 BONE LENGTH STUDIES: CPT

## 2024-02-02 PROCEDURE — 73564 X-RAY EXAM KNEE 4 OR MORE: CPT

## 2024-02-02 PROCEDURE — 99213 OFFICE O/P EST LOW 20 MIN: CPT | Performed by: PHYSICIAN ASSISTANT

## 2024-02-02 PROCEDURE — 20610 DRAIN/INJ JOINT/BURSA W/O US: CPT | Performed by: PHYSICIAN ASSISTANT

## 2024-02-02 RX ORDER — DICLOFENAC SODIUM 75 MG/1
75 TABLET, DELAYED RELEASE ORAL DAILY PRN
Qty: 30 TABLET | Refills: 0 | Status: SHIPPED | OUTPATIENT
Start: 2024-02-02

## 2024-02-02 RX ADMIN — LIDOCAINE HYDROCHLORIDE 3 ML: 10 INJECTION, SOLUTION INFILTRATION; PERINEURAL at 15:00

## 2024-02-02 RX ADMIN — METHYLPREDNISOLONE ACETATE 2 ML: 40 INJECTION, SUSPENSION INTRA-ARTICULAR; INTRALESIONAL; INTRAMUSCULAR; SOFT TISSUE at 15:00

## 2024-02-02 NOTE — PROGRESS NOTES
Assessment/Plan     1. Arthritis of left knee    2. Status post total hip replacement, right    3. Chronic pain of left knee    4. Preoperative testing    5. Pain in other specified joint      Orders Placed This Encounter   Procedures    Large joint arthrocentesis: L knee    XR hip/pelv 2-3 vws right if performed    XR knee 4+ vw left injury    XR bone length (scanogram)    Comprehensive metabolic panel    Hemoglobin A1C W/EAG Estimation    CBC and differential    C-reactive protein    Anemia Panel w/Reflex    Protime-INR    APTT    Ambulatory referral to Physical Therapy    Ambulatory referral to Internal Medicine    EKG 12 lead    Type and screen         Mrs. Gibson has severe left knee arthritis.  She has tried conservative treatment without adequate relief.  We discussed treatment options as well as risks and benefits of treatment options.  The patient would like to proceed with a left total knee arthroplasty.  The risks of surgery include, but are not limited to infection, blood clot, wound healing problems, blood loss, damage to blood vessels and nerves, persistent pain and stiffness, fracture, need for additional surgery, need for revision surgery, failure of hardware, heart attack, stroke, death.  The patient understood and agreed to by oral and written consent, previously signed on 11/30/23.  I answered all questions regarding surgery.  Reviewed with patient to expect some numbness over the lateral aspect of the knee after surgery.  We let the patient know to avoid kneeling while the incision is healing, typically for the first 4-6 weeks.  Once incision is healed kneeling if permitted but may still be uncomfortable for some patients long term.    Reviewed no driving for 4-6 weeks for right sided surgery once off opioids.  No driving until off opioids for left sided surgery.    Preoperative vitamins were prescribed.  Patient instructed to  what they are not already taking and start 30 days before  surgery.  We let the patient know that some people experience constipation while on iron.  If that occurs can take iron every other day.  If still an issue can stop the iron.  Can also add in OTC medication and/or prune juice for constipation.    We also let the patient know that some people experience constipation after surgery while on opioids.  We suggest to have OTC medications and/or prune juice available if needed.    Patient aware she cannot have L TKA prior to 5/2/2024 due to CSI today.   DVT Prophylaxis:  BID x 6 weeks  The patient will obtain clearance from: SOC  Same day discharge anticipated.    Patient received left knee steroid injection today. Tolerated the procedure well. Post injection instructions reviewed.   Patient had R ant MYAH on 6/19/23 and R TKA on 10/18/23, both doing well.   Refill provided for diclofenac tabs which patient should take sparingly due to her age.       Return for post op appt.    I answered all of the patient's questions during the visit and provided education of the patient's condition during the visit.  The patient verbalized understanding of the information given and agrees with the plan.  This note was dictated using [employer] software.  It may contain errors including improperly dictated words.  Please contact physician directly for any questions.    History of Present Illness   Chief complaint:   Chief Complaint   Patient presents with    Left Knee - Follow-up       HPI: Rosy Gibson is a 73 y.o. female that c/o left knee pain.      Mechanism of Injury: patient had a fall roughly 2 weeks ago at home. She turned and her feet got caught causing her to hit her left arm and right hip. Patient was able to stand and weight bear after the fall.   Pain Description: chronic left knee pain located anteriorly, intermittent. She has had some minor right hip pain since the fall, located laterally.   Palliating Factors: rest  Provoking Factors: prolonged activity  Associated  Symptoms: none  Medications: diclofenac tabs provided good relief in the past, would like a refill to continue   Able to take NSAIDs? If not, why: yes, but sparingly due to age 72 yo  Physical Therapy or Home Exercises: completed PT after MYAH and TKA  Injections: last left knee CSI was on 7/2/7/23 provided good pain relief  Bracing: not wearing one currently   Previous Surgery: none for left knee, L TKA was previously scheduled for 1/9/24   Miscellaneous:  passed in Dec, living with daughter now      History of MRSA: no  History of blood clots: no  Family history of blood clots: no  History of Hepatitis C:no  History of HIV: no  Are you a smoker:no  Are you diabetic:yes, last A1C 6.4  Do you see a cardiologist: no, but did see cardio for clearance prior to MYAH  Have you seen a dentist in the past year: no        ROS:    See HPI for musculoskeletal review.   All other systems reviewed are negative     Historical Information   Past Medical History:   Diagnosis Date    Arthritis     Encounter for geriatric assessment 05/18/2023    Fatty liver 05/01/2018    Fibromyalgia, primary     GERD (gastroesophageal reflux disease)     Hyperlipidemia     Hypertension     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    Low back pain     Lumbar stenosis     Neck pain     Neuropathy     feet    No natural teeth     on lower    Primary localized osteoarthritis of right knee 08/17/2017    Sacroiliitis (HCC) 08/20/2018    Skin cancer     on chest in past - melanoma    Sleep apnea     resolved with weight loss    Use of cane as ambulatory aid     or walker    Wears dentures     full  uppers     Past Surgical History:   Procedure Laterality Date    JOINT REPLACEMENT Right 06/2023    UT ARTHRP ACETBLR/PROX FEM PROSTC AGRFT/ALGRFT Right 06/19/2023    Procedure: ARTHROPLASTY HIP TOTAL ANTERIOR;  Surgeon: Liss Whitehead DO;  Location:  MAIN OR;  Service: Orthopedics    UT ARTHRP KNE CONDYLE&PLATU MEDIAL&LAT  COMPARTMENTS Right 10/18/2023    Procedure: RIGHT TOTAL KNEE ARTHROPLASTY;  Surgeon: Liss Whitehead DO;  Location:  MAIN OR;  Service: Orthopedics    SKIN CANCER EXCISION      on chest - melanoma    TUBAL LIGATION       Social History   Social History     Substance and Sexual Activity   Alcohol Use Yes    Comment: rarely     Social History     Substance and Sexual Activity   Drug Use Never     Social History     Tobacco Use   Smoking Status Never   Smokeless Tobacco Never     Family History:   Family History   Problem Relation Age of Onset    Osteoporosis Mother     Skin cancer Father 55    Stomach cancer Sister 49    No Known Problems Daughter     No Known Problems Maternal Grandmother     No Known Problems Maternal Grandfather     No Known Problems Paternal Grandmother     No Known Problems Paternal Grandfather     No Known Problems Maternal Aunt     No Known Problems Maternal Aunt     No Known Problems Maternal Aunt     No Known Problems Maternal Aunt     No Known Problems Paternal Aunt        Current Outpatient Medications on File Prior to Visit   Medication Sig Dispense Refill    acetaminophen (TYLENOL) 500 mg tablet Take 2 tablets (1,000 mg total) by mouth every 8 (eight) hours  0    ascorbic acid (VITAMIN C) 500 mg tablet Take 1 tablet (500 mg total) by mouth daily Begin 30 days prior to surgery. 90 tablet 1    aspirin (ECOTRIN) 325 mg EC tablet TAKE 1 TABLET (325 MG TOTAL) BY MOUTH 2 (TWO) TIMES A DAY TAKE WITH FOOD. 84 tablet 1    benazepril (LOTENSIN) 20 mg tablet TAKE 1 TABLET BY MOUTH EVERY DAY 90 tablet 1    cholecalciferol (VITAMIN D3) 1,000 units tablet Take 1,000 Units by mouth daily      cyclobenzaprine (FLEXERIL) 10 mg tablet Take 1 tablet (10 mg total) by mouth daily at bedtime 90 tablet 1    docusate sodium (COLACE) 100 mg capsule TAKE 1 CAPSULE BY MOUTH TWICE A DAY 30 capsule 0    DULoxetine (CYMBALTA) 60 mg delayed release capsule TAKE 1 CAPSULE BY MOUTH EVERY DAY 90 capsule 2     ferrous sulfate 324 (65 Fe) mg Take 1 tablet (324 mg total) by mouth daily before breakfast Begin 30 days prior to surgery. 90 tablet 1    folic acid (FOLVITE) 1 mg tablet Take 1 tablet (1 mg total) by mouth daily Begin 30 days prior to surgery. 90 tablet 1    hydrochlorothiazide (HYDRODIURIL) 12.5 mg tablet TAKE 1 TABLET BY MOUTH EVERY DAY 90 tablet 1    lidocaine (Lidoderm) 5 % Apply 1 patch topically daily Remove & Discard patch within 12 hours or as directed by MD (Patient not taking: Reported on 5/30/2023) 30 patch 2    Multiple Vitamins-Minerals (One-Daily Multi-Vit/Mineral) TABS Take 1 tablet by mouth in the morning 90 tablet 1    omeprazole (PriLOSEC) 10 mg delayed release capsule Take 10 mg by mouth daily        oxyCODONE (ROXICODONE) 5 immediate release tablet Take 1 tablet (5 mg total) by mouth every 8 (eight) hours as needed for severe pain Max Daily Amount: 15 mg (Patient not taking: Reported on 2/1/2024) 21 tablet 0    promethazine (PHENERGAN) 12.5 MG tablet Take 1 tablet (12.5 mg total) by mouth every 6 (six) hours as needed for nausea or vomiting 12 tablet 0    rosuvastatin (CRESTOR) 10 MG tablet TAKE 1 TABLET BY MOUTH EVERY DAY 90 tablet 1    SUPER B COMPLEX/C PO Take 1 tablet by mouth daily         No current facility-administered medications on file prior to visit.     Allergies   Allergen Reactions    Penicillins Hives and Itching       Current Outpatient Medications on File Prior to Visit   Medication Sig Dispense Refill    acetaminophen (TYLENOL) 500 mg tablet Take 2 tablets (1,000 mg total) by mouth every 8 (eight) hours  0    ascorbic acid (VITAMIN C) 500 mg tablet Take 1 tablet (500 mg total) by mouth daily Begin 30 days prior to surgery. 90 tablet 1    aspirin (ECOTRIN) 325 mg EC tablet TAKE 1 TABLET (325 MG TOTAL) BY MOUTH 2 (TWO) TIMES A DAY TAKE WITH FOOD. 84 tablet 1    benazepril (LOTENSIN) 20 mg tablet TAKE 1 TABLET BY MOUTH EVERY DAY 90 tablet 1    cholecalciferol (VITAMIN D3) 1,000  "units tablet Take 1,000 Units by mouth daily      cyclobenzaprine (FLEXERIL) 10 mg tablet Take 1 tablet (10 mg total) by mouth daily at bedtime 90 tablet 1    docusate sodium (COLACE) 100 mg capsule TAKE 1 CAPSULE BY MOUTH TWICE A DAY 30 capsule 0    DULoxetine (CYMBALTA) 60 mg delayed release capsule TAKE 1 CAPSULE BY MOUTH EVERY DAY 90 capsule 2    ferrous sulfate 324 (65 Fe) mg Take 1 tablet (324 mg total) by mouth daily before breakfast Begin 30 days prior to surgery. 90 tablet 1    folic acid (FOLVITE) 1 mg tablet Take 1 tablet (1 mg total) by mouth daily Begin 30 days prior to surgery. 90 tablet 1    hydrochlorothiazide (HYDRODIURIL) 12.5 mg tablet TAKE 1 TABLET BY MOUTH EVERY DAY 90 tablet 1    lidocaine (Lidoderm) 5 % Apply 1 patch topically daily Remove & Discard patch within 12 hours or as directed by MD (Patient not taking: Reported on 5/30/2023) 30 patch 2    Multiple Vitamins-Minerals (One-Daily Multi-Vit/Mineral) TABS Take 1 tablet by mouth in the morning 90 tablet 1    omeprazole (PriLOSEC) 10 mg delayed release capsule Take 10 mg by mouth daily        oxyCODONE (ROXICODONE) 5 immediate release tablet Take 1 tablet (5 mg total) by mouth every 8 (eight) hours as needed for severe pain Max Daily Amount: 15 mg (Patient not taking: Reported on 2/1/2024) 21 tablet 0    promethazine (PHENERGAN) 12.5 MG tablet Take 1 tablet (12.5 mg total) by mouth every 6 (six) hours as needed for nausea or vomiting 12 tablet 0    rosuvastatin (CRESTOR) 10 MG tablet TAKE 1 TABLET BY MOUTH EVERY DAY 90 tablet 1    SUPER B COMPLEX/C PO Take 1 tablet by mouth daily         No current facility-administered medications on file prior to visit.       Objective   Vitals: Height 5' 2\" (1.575 m), weight 69.9 kg (154 lb).,Body mass index is 28.17 kg/m².    PE:  AAOx 3  WDWN  Hearing intact, no drainage from eyes  Regular rate  no audible wheezing  no abdominal distension  LE compartments soft, skin intact    leftknee:  "   Appearance:  No  swelling   No  ecchymosis  No  obvious joint deformity   No  effusion   Palpation/Tenderness:  No  TTP over medial joint line  No  TTP over lateral joint line   No  TTP over patella  No  TTP over patellar tendon  No  TTP over pes anserine bursa  Active Range of Motion:  AROM: 0- 120  Special Tests:  Valgus Stress Test: negative  Varus Stress Test: negative    Right knee:  Scar well healed   AROM: 0- 115  Stable to varus and valgus stress at 0 and 30 degrees    Right hip:  No TTP  No pain with active hip flexion, IR or ER  Neg impingement    Imaging Studies:  I have personally reviewed pertinent films in PACS  X-rayleft knee: severe osteoarthritis with complete bone on bone loss of joint space and osteophyte formation   X-ray right hip: status post R total hip replacement, hardware intact and well aligned, no acute osseous deformity      Large joint arthrocentesis: L knee  Universal Protocol:  Consent: Verbal consent obtained.  Risks and benefits: risks, benefits and alternatives were discussed  Consent given by: patient  Site marked: the operative site was marked  Supporting Documentation  Indications: pain   Procedure Details  Location: knee - L knee  Preparation: Patient was prepped and draped in the usual sterile fashion  Needle size: 22 G  Ultrasound guidance: no  Approach: anterolateral  Medications administered: 3 mL lidocaine 1 %; 2 mL methylPREDNISolone acetate 40 mg/mL    Patient tolerance: patient tolerated the procedure well with no immediate complications  Dressing:  Sterile dressing applied            Scribe Attestation      I,:  Lily Milian PA-C am acting as a scribe while in the presence of the attending physician.:       I,:  Liss Whitehead DO personally performed the services described in this documentation    as scribed in my presence.:

## 2024-02-02 NOTE — TELEPHONE ENCOUNTER
Caller:Rosy     Doctor: Angela     Reason for call: Patient calling to give update pharmacy [facility] [address] [place]     Call back#: [phone]

## 2024-02-02 NOTE — TELEPHONE ENCOUNTER
RN attempted to reach pt regarding previous. VMMLOM with c/b number office hours and location provided.    If pt calls back RN added the new pharmacy that the pt requested, does she want [facility] that [facility] currently [facility] file to be removed?

## 2024-02-02 NOTE — TELEPHONE ENCOUNTER
Caller: pt    Doctor: kyler    Reason for call: I let the pt know [facility]    Call back#: [phone]

## 2024-02-06 DIAGNOSIS — M51.16 LUMBAR DISC HERNIATION WITH RADICULOPATHY: ICD-10-CM

## 2024-02-06 RX ORDER — GABAPENTIN 600 MG/1
600 TABLET ORAL 3 TIMES DAILY
Qty: 90 TABLET | Refills: 0 | Status: SHIPPED | OUTPATIENT
Start: 2024-02-06

## 2024-02-06 NOTE — TELEPHONE ENCOUNTER
RN attempted to reach pt regarding previous. Pt was seen on 1/10 per CG and gabapentin was ordered with 2 refills sent to :    [facility] [id number] [facility] - [place] - [address]  [address] [place] [address]  Phone: [phone]  Fax: [phone]     If pt calls back please have her reach put to the [facility] to refill same thank you

## 2024-02-06 NOTE — TELEPHONE ENCOUNTER
Caller: bran Gallegos    Doctor: Chiara    Reason for call: pt stated that she has switched pharmacies and [facility] will not [facility] to the new pharmacy.  Pt was told by [facility] that she had to reach out to the dr's office    New pharmacy [facility] - [place], [place] - [facility] [address] [phone]          Call back#: [phone]

## 2024-02-06 NOTE — TELEPHONE ENCOUNTER
Attempted to reach pt. VMMLOM with Cb# and OH.    [employer]ban [employer] message sent advising pt of same.

## 2024-02-06 NOTE — TELEPHONE ENCOUNTER
CG, would you be able to re send the pt's gabapentin scripts with refills to the [facility], per pt [facility].

## 2024-02-06 NOTE — TELEPHONE ENCOUNTER
Changing pharmacies.    Reason for call:   [x] Refill   [] Prior Auth  [] Other:     Office:   [] PCP/Provider -   [x] Specialty/Provider - spine & pain    Medication: gabapentin 600 mg 3 times daily    Quantity: 90    Pharmacy: [facility] - [place], [place] - [address] [phone]     Does the patient have enough for 3 days?   [x] Yes   [] No - Send as HP to POD

## 2024-02-12 ENCOUNTER — OFFICE VISIT (OUTPATIENT)
Dept: INTERNAL MEDICINE CLINIC | Facility: CLINIC | Age: 74
End: 2024-02-12
Payer: MEDICARE

## 2024-02-12 VITALS
RESPIRATION RATE: 16 BRPM | WEIGHT: 152 LBS | OXYGEN SATURATION: 99 % | HEIGHT: 62 IN | BODY MASS INDEX: 27.97 KG/M2 | SYSTOLIC BLOOD PRESSURE: 120 MMHG | HEART RATE: 77 BPM | DIASTOLIC BLOOD PRESSURE: 82 MMHG

## 2024-02-12 DIAGNOSIS — E66.9 DIABETES MELLITUS TYPE 2 IN OBESE: Primary | ICD-10-CM

## 2024-02-12 DIAGNOSIS — E78.2 MIXED HYPERLIPIDEMIA: ICD-10-CM

## 2024-02-12 DIAGNOSIS — M17.12 PRIMARY OSTEOARTHRITIS OF LEFT KNEE: ICD-10-CM

## 2024-02-12 DIAGNOSIS — E11.69 DIABETES MELLITUS TYPE 2 IN OBESE: Primary | ICD-10-CM

## 2024-02-12 DIAGNOSIS — I10 BENIGN ESSENTIAL HTN: ICD-10-CM

## 2024-02-12 DIAGNOSIS — F32.9 CURRENT EPISODE OF MAJOR DEPRESSIVE DISORDER WITHOUT PRIOR EPISODE, UNSPECIFIED DEPRESSION EPISODE SEVERITY: ICD-10-CM

## 2024-02-12 PROCEDURE — 99214 OFFICE O/P EST MOD 30 MIN: CPT | Performed by: INTERNAL MEDICINE

## 2024-02-12 RX ORDER — SODIUM CHLORIDE 9 MG/ML
75 INJECTION, SOLUTION INTRAVENOUS CONTINUOUS
OUTPATIENT
Start: 2024-02-12

## 2024-02-12 RX ORDER — LIDOCAINE HYDROCHLORIDE 10 MG/ML
3 INJECTION, SOLUTION INFILTRATION; PERINEURAL
Status: COMPLETED | OUTPATIENT
Start: 2024-02-02 | End: 2024-02-02

## 2024-02-12 RX ORDER — ACETAMINOPHEN 325 MG/1
975 TABLET ORAL ONCE
OUTPATIENT
Start: 2024-02-12 | End: 2024-02-12

## 2024-02-12 RX ORDER — METHYLPREDNISOLONE ACETATE 40 MG/ML
2 INJECTION, SUSPENSION INTRA-ARTICULAR; INTRALESIONAL; INTRAMUSCULAR; SOFT TISSUE
Status: COMPLETED | OUTPATIENT
Start: 2024-02-02 | End: 2024-02-02

## 2024-02-12 RX ORDER — TRANEXAMIC ACID 10 MG/ML
1000 INJECTION, SOLUTION INTRAVENOUS ONCE
OUTPATIENT
Start: 2024-02-12 | End: 2024-02-12

## 2024-02-12 RX ORDER — CHLORHEXIDINE GLUCONATE ORAL RINSE 1.2 MG/ML
15 SOLUTION DENTAL ONCE
OUTPATIENT
Start: 2024-02-12 | End: 2024-02-12

## 2024-02-12 RX ORDER — CLINDAMYCIN PHOSPHATE 900 MG/50ML
900 INJECTION, SOLUTION INTRAVENOUS ONCE
OUTPATIENT
Start: 2024-02-12 | End: 2024-02-12

## 2024-02-12 RX ORDER — CHLORHEXIDINE GLUCONATE 4 G/100ML
SOLUTION TOPICAL DAILY PRN
OUTPATIENT
Start: 2024-02-12

## 2024-02-12 NOTE — ASSESSMENT & PLAN NOTE
-DM2 in obese.  A1c remains controlled.  Lab Results   Component Value Date    HGBA1C 6.4 (H) 09/25/2023   -continue diabetic diet  -foot exam performed  -on benazepril

## 2024-02-12 NOTE — PROGRESS NOTES
Assessment/Plan:    Problem List Items Addressed This Visit     Primary osteoarthritis of left knee     -anticipates L TKA in May 2024         Benign essential HTN     -improved  -has had significant weight loss due to loss of her .  -continue hctz 12.5mg daily and benazepril 20mg daily  -monitor BMP         Relevant Orders    TSH, 3rd generation with Free T4 reflex    Current episode of major depressive disorder without prior episode     -with bereavement  -encouraged to go to therapy  -continue duloxetine 60mg daily         Mixed hyperlipidemia    Relevant Orders    Lipid panel    Diabetes mellitus type 2 in obese  - Primary     -DM2 in obese.  A1c remains controlled.  Lab Results   Component Value Date    HGBA1C 6.4 (H) 09/25/2023   -continue diabetic diet  -foot exam performed  -on benazepril         Relevant Orders    Albumin / creatinine urine ratio       Subjective:      Patient ID: Rosy Gibson is a 73 y.o. female.    HPI  72yo female with DM2 in obese, HTN, fibromyalgia, MDD, cervical arthritis with stenosis, lumbar disc herniation, OA and HLD here for follow up care.  Had R TKA 10/18/23.  L TKA rescheduled from January to May 2024.    Her  passed away in Dec 2023. who was in hospice.  She has not gone for counseling.  She is just now starting to realize what had happened and settle down.   Her kids moved her out of the apartment and now lives with her daughter and her  in [place].  She is unable to cry.  She is sleeping well.  She has lost weight since her 's hospitalization due to poor appetite.    .  She has not monitored her blood pressures.    She is doing well back pain.  She has not needed SI joint injection.      The following portions of the patient's history were reviewed and updated as appropriate: allergies, current medications, past family history, past medical history, past social history, past surgical history and problem list.      Current Outpatient  Medications:   •  acetaminophen (TYLENOL) 500 mg tablet, Take 2 tablets (1,000 mg total) by mouth every 8 (eight) hours, Disp: , Rfl: 0  •  ascorbic acid (VITAMIN C) 500 mg tablet, Take 1 tablet (500 mg total) by mouth daily Begin 30 days prior to surgery., Disp: 90 tablet, Rfl: 1  •  aspirin (ECOTRIN) 325 mg EC tablet, TAKE 1 TABLET (325 MG TOTAL) BY MOUTH 2 (TWO) TIMES A DAY TAKE WITH FOOD., Disp: 84 tablet, Rfl: 1  •  benazepril (LOTENSIN) 20 mg tablet, TAKE 1 TABLET BY MOUTH EVERY DAY, Disp: 90 tablet, Rfl: 1  •  cholecalciferol (VITAMIN D3) 1,000 units tablet, Take 1,000 Units by mouth daily, Disp: , Rfl:   •  cyclobenzaprine (FLEXERIL) 10 mg tablet, Take 1 tablet (10 mg total) by mouth daily at bedtime, Disp: 90 tablet, Rfl: 1  •  diclofenac (VOLTAREN) 75 mg EC tablet, Take 1 tablet (75 mg total) by mouth daily as needed (pain) Take with food., Disp: 30 tablet, Rfl: 0  •  docusate sodium (COLACE) 100 mg capsule, TAKE 1 CAPSULE BY MOUTH TWICE A DAY, Disp: 30 capsule, Rfl: 0  •  DULoxetine (CYMBALTA) 60 mg delayed release capsule, TAKE 1 CAPSULE BY MOUTH EVERY DAY, Disp: 90 capsule, Rfl: 2  •  ferrous sulfate 324 (65 Fe) mg, Take 1 tablet (324 mg total) by mouth daily before breakfast Begin 30 days prior to surgery., Disp: 90 tablet, Rfl: 1  •  folic acid (FOLVITE) 1 mg tablet, Take 1 tablet (1 mg total) by mouth daily Begin 30 days prior to surgery., Disp: 90 tablet, Rfl: 1  •  gabapentin (Neurontin) 600 MG tablet, Take 1 tablet (600 mg total) by mouth 3 (three) times a day, Disp: 90 tablet, Rfl: 0  •  hydrochlorothiazide (HYDRODIURIL) 12.5 mg tablet, TAKE 1 TABLET BY MOUTH EVERY DAY, Disp: 90 tablet, Rfl: 1  •  Multiple Vitamins-Minerals (One-Daily Multi-Vit/Mineral) TABS, Take 1 tablet by mouth in the morning, Disp: 90 tablet, Rfl: 1  •  omeprazole (PriLOSEC) 10 mg delayed release capsule, Take 10 mg by mouth daily  , Disp: , Rfl:   •  promethazine (PHENERGAN) 12.5 MG tablet, Take 1 tablet (12.5 mg total) by  "mouth every 6 (six) hours as needed for nausea or vomiting, Disp: 12 tablet, Rfl: 0  •  rosuvastatin (CRESTOR) 10 MG tablet, TAKE 1 TABLET BY MOUTH EVERY DAY, Disp: 90 tablet, Rfl: 1  •  SUPER B COMPLEX/C PO, Take 1 tablet by mouth daily  , Disp: , Rfl:   •  lidocaine (Lidoderm) 5 %, Apply 1 patch topically daily Remove & Discard patch within 12 hours or as directed by MD (Patient not taking: Reported on 5/30/2023), Disp: 30 patch, Rfl: 2  •  oxyCODONE (ROXICODONE) 5 immediate release tablet, Take 1 tablet (5 mg total) by mouth every 8 (eight) hours as needed for severe pain Max Daily Amount: 15 mg (Patient not taking: Reported on 2/1/2024), Disp: 21 tablet, Rfl: 0    Review of Systems   Constitutional:  Positive for appetite change and unexpected weight change. Negative for activity change.   Respiratory:  Negative for cough, chest tightness, shortness of breath and wheezing.    Cardiovascular:  Negative for chest pain, palpitations and leg swelling.   Gastrointestinal:  Negative for abdominal pain, constipation, diarrhea, nausea and vomiting.   Musculoskeletal:  Positive for arthralgias. Negative for back pain.   Neurological:  Positive for headaches. Negative for dizziness.   Psychiatric/Behavioral:  Positive for dysphoric mood. Negative for sleep disturbance.          Objective:    /82 (BP Location: Left arm, Patient Position: Sitting, Cuff Size: Standard)   Pulse 77   Resp 16   Ht 5' 2\" (1.575 m)   Wt 68.9 kg (152 lb)   SpO2 99%   BMI 27.80 kg/m²      Physical Exam  Vitals reviewed.   Cardiovascular:      Rate and Rhythm: Normal rate and regular rhythm.      Pulses: Normal pulses. no weak pulses          Dorsalis pedis pulses are 2+ on the right side and 2+ on the left side.        Posterior tibial pulses are 2+ on the right side and 2+ on the left side.      Heart sounds: Normal heart sounds.   Pulmonary:      Effort: Pulmonary effort is normal. No respiratory distress.      Breath sounds: Normal " breath sounds. No wheezing.   Musculoskeletal:      Right lower leg: No edema.      Left lower leg: No edema.   Feet:      Right foot:      Skin integrity: Dry skin present.      Left foot:      Skin integrity: Dry skin present.   Skin:     General: Skin is dry.   Neurological:      Mental Status: She is alert and oriented to person, place, and time.   Psychiatric:         Attention and Perception: Attention normal.         Mood and Affect: Mood is depressed.         Patient's shoes and socks removed.    Right Foot/Ankle   Right Foot Inspection  Skin Exam: skin intact and dry skin.     Toe Exam: right toe deformity.     Sensory   Monofilament testing: intact    Vascular  Capillary refills: < 3 seconds  The right DP pulse is 2+. The right PT pulse is 2+.     Left Foot/Ankle  Left Foot Inspection  Skin Exam: skin intact and dry skin.     Toe Exam: left toe deformity.     Sensory   Vibration: intact  Monofilament testing: intact    Vascular  Capillary refills: < 3 seconds  The left DP pulse is 2+. The left PT pulse is 2+.     Assign Risk Category  Deformity present  No loss of protective sensation  No weak pulses  Risk: 0        Recent Results (from the past 4032 hour(s))   Hemoglobin A1C    Collection Time: 09/15/23  8:14 AM   Result Value Ref Range    Hemoglobin A1C 6.2 (H) Normal 4.0-5.6%; PreDiabetic 5.7-6.4%; Diabetic >=6.5%; Glycemic control for adults with diabetes <7.0% %     mg/dl   Comprehensive metabolic panel    Collection Time: 09/15/23  8:14 AM   Result Value Ref Range    Sodium 140 135 - 147 mmol/L    Potassium 4.1 3.5 - 5.3 mmol/L    Chloride 102 96 - 108 mmol/L    CO2 32 21 - 32 mmol/L    ANION GAP 6 mmol/L    BUN 17 5 - 25 mg/dL    Creatinine 0.66 0.60 - 1.30 mg/dL    Glucose, Fasting 104 (H) 65 - 99 mg/dL    Calcium 9.7 8.4 - 10.2 mg/dL    AST 23 13 - 39 U/L    ALT 26 7 - 52 U/L    Alkaline Phosphatase 78 34 - 104 U/L    Total Protein 7.2 6.4 - 8.4 g/dL    Albumin 4.6 3.5 - 5.0 g/dL    Total  Bilirubin 0.38 0.20 - 1.00 mg/dL    eGFR 86 ml/min/1.73sq m   CBC    Collection Time: 09/15/23  8:14 AM   Result Value Ref Range    WBC 4.03 (L) 4.31 - 10.16 Thousand/uL    RBC 4.91 3.81 - 5.12 Million/uL    Hemoglobin 14.1 11.5 - 15.4 g/dL    Hematocrit 43.6 34.8 - 46.1 %    MCV 89 82 - 98 fL    MCH 28.7 26.8 - 34.3 pg    MCHC 32.3 31.4 - 37.4 g/dL    RDW 12.6 11.6 - 15.1 %    Platelets 198 149 - 390 Thousands/uL    MPV 10.1 8.9 - 12.7 fL   Comprehensive metabolic panel    Collection Time: 09/25/23  8:30 AM   Result Value Ref Range    Sodium 142 135 - 147 mmol/L    Potassium 4.2 3.5 - 5.3 mmol/L    Chloride 104 96 - 108 mmol/L    CO2 32 21 - 32 mmol/L    ANION GAP 6 mmol/L    BUN 13 5 - 25 mg/dL    Creatinine 0.66 0.60 - 1.30 mg/dL    Glucose, Fasting 100 (H) 65 - 99 mg/dL    Calcium 9.8 8.4 - 10.2 mg/dL    AST 23 13 - 39 U/L    ALT 26 7 - 52 U/L    Alkaline Phosphatase 72 34 - 104 U/L    Total Protein 7.2 6.4 - 8.4 g/dL    Albumin 4.3 3.5 - 5.0 g/dL    Total Bilirubin 0.38 0.20 - 1.00 mg/dL    eGFR 86 ml/min/1.73sq m   CBC and differential    Collection Time: 09/25/23  8:30 AM   Result Value Ref Range    WBC 4.29 (L) 4.31 - 10.16 Thousand/uL    RBC 4.88 3.81 - 5.12 Million/uL    Hemoglobin 13.9 11.5 - 15.4 g/dL    Hematocrit 43.7 34.8 - 46.1 %    MCV 90 82 - 98 fL    MCH 28.5 26.8 - 34.3 pg    MCHC 31.8 31.4 - 37.4 g/dL    RDW 12.6 11.6 - 15.1 %    MPV 10.2 8.9 - 12.7 fL    Platelets 204 149 - 390 Thousands/uL    nRBC 0 /100 WBCs    Neutrophils Relative 52 43 - 75 %    Immat GRANS % 0 0 - 2 %    Lymphocytes Relative 34 14 - 44 %    Monocytes Relative 9 4 - 12 %    Eosinophils Relative 4 0 - 6 %    Basophils Relative 1 0 - 1 %    Neutrophils Absolute 2.24 1.85 - 7.62 Thousands/µL    Immature Grans Absolute 0.01 0.00 - 0.20 Thousand/uL    Lymphocytes Absolute 1.44 0.60 - 4.47 Thousands/µL    Monocytes Absolute 0.40 0.17 - 1.22 Thousand/µL    Eosinophils Absolute 0.16 0.00 - 0.61 Thousand/µL    Basophils Absolute  0.04 0.00 - 0.10 Thousands/µL   C-reactive protein    Collection Time: 09/25/23  8:30 AM   Result Value Ref Range    CRP 2.5 <3.0 mg/L   Protime-INR    Collection Time: 09/25/23  8:30 AM   Result Value Ref Range    Protime 13.0 11.6 - 14.5 seconds    INR 0.97 0.84 - 1.19   APTT    Collection Time: 09/25/23  8:30 AM   Result Value Ref Range    PTT 32 23 - 37 seconds   Hemoglobin A1C    Collection Time: 09/25/23  8:30 AM   Result Value Ref Range    Hemoglobin A1C 6.4 (H) Normal 4.0-5.6%; PreDiabetic 5.7-6.4%; Diabetic >=6.5%; Glycemic control for adults with diabetes <7.0% %     mg/dl   Type and screen    Collection Time: 09/25/23  8:30 AM   Result Value Ref Range    ABO Grouping B     Rh Factor Positive     Antibody Screen Negative     Specimen Expiration Date 20231026    Basic metabolic panel    Collection Time: 10/19/23  4:35 AM   Result Value Ref Range    Sodium 140 135 - 147 mmol/L    Potassium 3.8 3.5 - 5.3 mmol/L    Chloride 106 96 - 108 mmol/L    CO2 26 21 - 32 mmol/L    ANION GAP 8 mmol/L    BUN 15 5 - 25 mg/dL    Creatinine 0.60 0.60 - 1.30 mg/dL    Glucose 175 (H) 65 - 140 mg/dL    Calcium 8.4 8.4 - 10.2 mg/dL    eGFR 89 ml/min/1.73sq m   CBC and Platelet    Collection Time: 10/19/23  4:35 AM   Result Value Ref Range    WBC 8.77 4.31 - 10.16 Thousand/uL    RBC 3.06 (L) 3.81 - 5.12 Million/uL    Hemoglobin 8.7 (L) 11.5 - 15.4 g/dL    Hematocrit 26.8 (L) 34.8 - 46.1 %    MCV 88 82 - 98 fL    MCH 28.4 26.8 - 34.3 pg    MCHC 32.5 31.4 - 37.4 g/dL    RDW 13.0 11.6 - 15.1 %    Platelets 150 149 - 390 Thousands/uL    MPV 10.5 8.9 - 12.7 fL   Prepare Leukoreduced RBC: 2 Units    Collection Time: 10/20/23  9:00 AM   Result Value Ref Range    Unit Product Code C6774T25     Unit Number [id number]     Unit ABO B     Unit RH POS     Crossmatch Compatible     Unit Dispense Status Return to Inv     Unit Product Volume 350 ml    Unit Product Code I5613N19     Unit Number [id number]     Unit ABO B     Unit  RH NEG     Crossmatch Compatible     Unit Dispense Status Return to Inv     Unit Product Volume 350 ml

## 2024-02-12 NOTE — ASSESSMENT & PLAN NOTE
-improved  -has had significant weight loss due to loss of her .  -continue hctz 12.5mg daily and benazepril 20mg daily  -monitor BMP

## 2024-02-19 DIAGNOSIS — Z96.641 STATUS POST TOTAL HIP REPLACEMENT, RIGHT: ICD-10-CM

## 2024-02-19 RX ORDER — ASPIRIN 325 MG
TABLET, DELAYED RELEASE (ENTERIC COATED) ORAL
Qty: 84 TABLET | Refills: 1 | Status: SHIPPED | OUTPATIENT
Start: 2024-02-19

## 2024-02-28 DIAGNOSIS — M17.12 ARTHRITIS OF LEFT KNEE: ICD-10-CM

## 2024-02-28 RX ORDER — DICLOFENAC SODIUM 75 MG/1
75 TABLET, DELAYED RELEASE ORAL DAILY PRN
Qty: 30 TABLET | Refills: 0 | Status: SHIPPED | OUTPATIENT
Start: 2024-02-28

## 2024-03-18 DIAGNOSIS — M51.16 LUMBAR DISC HERNIATION WITH RADICULOPATHY: ICD-10-CM

## 2024-03-18 NOTE — TELEPHONE ENCOUNTER
Reason for call:   [x] Refill   [] Prior Auth  [] Other:     Office:   [] PCP/Provider -   [x] Specialty/Provider - KP bridges    Medication:   Gabapentin 600 mg, 1 tid, 90      Pharmacy:   [facility]    Does the patient have enough for 3 days?   [x] Yes   [] No - Send as HP to POD

## 2024-03-19 RX ORDER — GABAPENTIN 600 MG/1
600 TABLET ORAL 3 TIMES DAILY
Qty: 90 TABLET | Refills: 2 | Status: SHIPPED | OUTPATIENT
Start: 2024-03-19

## 2024-03-25 DIAGNOSIS — M17.12 ARTHRITIS OF LEFT KNEE: ICD-10-CM

## 2024-03-26 RX ORDER — DICLOFENAC SODIUM 75 MG/1
75 TABLET, DELAYED RELEASE ORAL DAILY PRN
Qty: 30 TABLET | Refills: 0 | Status: SHIPPED | OUTPATIENT
Start: 2024-03-26

## 2024-04-01 ENCOUNTER — OFFICE VISIT (OUTPATIENT)
Dept: DENTISTRY | Facility: CLINIC | Age: 74
End: 2024-04-01

## 2024-04-01 VITALS — SYSTOLIC BLOOD PRESSURE: 136 MMHG | TEMPERATURE: 98.6 F | HEART RATE: 80 BPM | DIASTOLIC BLOOD PRESSURE: 82 MMHG

## 2024-04-01 DIAGNOSIS — K08.109 TEETH MISSING: Primary | ICD-10-CM

## 2024-04-01 PROCEDURE — D0330 PANORAMIC RADIOGRAPHIC IMAGE: HCPCS | Performed by: DENTIST

## 2024-04-01 PROCEDURE — D0150 COMPREHENSIVE ORAL EVALUATION - NEW OR ESTABLISHED PATIENT: HCPCS | Performed by: DENTIST

## 2024-04-01 NOTE — DENTAL PROCEDURE DETAILS
"Comprehensive Oral Evaluation and WALTER.   Rosy Gibson presents for a Comprehensive exam. Verbal consent for treatment given in addition to the forms.  Consents signed: Yes  Reviewed health history - no changes.  Patient is ASA II  Pain Scale: 0  Chief complain: \"I need dentures\".   HPI: patient reported she has all lower teeth been extracted. Patient reported she is currently wearing upper complete denture that was done 35 years ago. Now is broken and loose. Patient reported she is ready for new set of dentures.   Dental History: last dental visit was in 5/9/2023 for teeth extraction by other provider.     Perio: Normal gum and oral mucosa. Soft tissues is WNL.   Caries Assessment: no dentitions.   Radiographs: Panorex     Oral Hygiene instruction reviewed and given.  Recommended recall visits with the Rosy.     Treatment Plan:  1.  Infection control: referred for   2.  Periodontal therapy: no dentition.   3.  Caries control: no dentition.   4.  Occlusal evaluation: fully edentulous upper and lower dental arches. Moderate to severe bone loss.   5.  Case Difficulty Type 2  6.  Recommended upper and lower complete dentures. Preauth is requested.   Prognosis is Good.  Referrals needed: No  DX: Fully edentulous upper and lower dental arches. Loss of VDO and bone loss.     Explained denture making process to patient, including the necessary number of visits and timeframe to make denture. Reviewed denture expectations, adjustment period, and hygiene. Patient aware, understood and approved.     POI is given. Patient left satisfied and ambulatory.     NV: Preliminary impressions.     "

## 2024-04-08 ENCOUNTER — TELEPHONE (OUTPATIENT)
Age: 74
End: 2024-04-08

## 2024-04-08 DIAGNOSIS — M17.12 ARTHRITIS OF LEFT KNEE: ICD-10-CM

## 2024-04-08 DIAGNOSIS — M25.562 CHRONIC PAIN OF LEFT KNEE: ICD-10-CM

## 2024-04-08 DIAGNOSIS — G89.29 CHRONIC PAIN OF LEFT KNEE: ICD-10-CM

## 2024-04-08 RX ORDER — MULTIVIT-MIN/FERROUS SULFATE 4.5 MG
1 TABLET ORAL DAILY
Qty: 90 TABLET | Refills: 1 | Status: SHIPPED | OUTPATIENT
Start: 2024-04-08

## 2024-04-08 RX ORDER — ASCORBIC ACID 500 MG
500 TABLET ORAL DAILY
Qty: 90 TABLET | Refills: 1 | Status: SHIPPED | OUTPATIENT
Start: 2024-04-08

## 2024-04-08 RX ORDER — FOLIC ACID 1 MG/1
1 TABLET ORAL DAILY
Qty: 90 TABLET | Refills: 1 | Status: SHIPPED | OUTPATIENT
Start: 2024-04-08

## 2024-04-08 RX ORDER — FERROUS SULFATE 324(65)MG
324 TABLET, DELAYED RELEASE (ENTERIC COATED) ORAL
Qty: 90 TABLET | Refills: 1 | Status: SHIPPED | OUTPATIENT
Start: 2024-04-08

## 2024-04-08 NOTE — TELEPHONE ENCOUNTER
Caller: Patient     Doctor: Charley     Reason for call: Missed call from our number. Advised her of the nurse's msg re: pre-op vitamins    Call back#: n/a

## 2024-04-08 NOTE — TELEPHONE ENCOUNTER
Caller: Patient     Doctor: Charley     Reason for call: Patient is asking if she will get a script for the pre-op vitamins. Her sx is scheduled for 6/11/24. Please call patient to advise.    Call back#: [phone]

## 2024-04-17 DIAGNOSIS — E78.2 MIXED HYPERLIPIDEMIA: ICD-10-CM

## 2024-04-17 RX ORDER — ROSUVASTATIN CALCIUM 10 MG/1
10 TABLET, COATED ORAL DAILY
Qty: 90 TABLET | Refills: 1 | Status: SHIPPED | OUTPATIENT
Start: 2024-04-17

## 2024-04-20 DIAGNOSIS — M17.12 ARTHRITIS OF LEFT KNEE: ICD-10-CM

## 2024-04-22 RX ORDER — DICLOFENAC SODIUM 75 MG/1
75 TABLET, DELAYED RELEASE ORAL DAILY PRN
Qty: 30 TABLET | Refills: 0 | Status: SHIPPED | OUTPATIENT
Start: 2024-04-22

## 2024-05-01 ENCOUNTER — ANESTHESIA EVENT (OUTPATIENT)
Dept: PERIOP | Facility: HOSPITAL | Age: 74
End: 2024-05-01
Payer: MEDICARE

## 2024-05-07 ENCOUNTER — TELEPHONE (OUTPATIENT)
Dept: OBGYN CLINIC | Facility: HOSPITAL | Age: 74
End: 2024-05-07

## 2024-05-07 LAB
DME PARACHUTE DELIVERY DATE REQUESTED: NORMAL
DME PARACHUTE ITEM DESCRIPTION: NORMAL
DME PARACHUTE ORDER STATUS: NORMAL
DME PARACHUTE SUPPLIER NAME: NORMAL
DME PARACHUTE SUPPLIER PHONE: NORMAL

## 2024-05-07 NOTE — TELEPHONE ENCOUNTER
Preoperative Elective Admission Assessment    Living Situation:    Who does pt live with:  Daughter and son    What kind of home: multi-level  How do they enter the home: front  How many levels in home: 2   # of steps to enter home: 5-6  # of steps to second floor: 12-14  Are there handrails: Yes  Are there landings: No  Sleeping arrangement: second floor  Where is Bathroom: first and second floor   Where is the tub or shower: second floor, walk in shower   Dogs or other pets: 1 dog, Daughter and son will assist in pet care postop     First Floor Setup:   Is there a bathroom: Yes, 1/2 bath  Where would pt sleep: couch     DME:  Pt has a RW and a cane. Pt is interested in a shower chair. DME ordered, shower chair 5/7/24. Instructed pt to bring RW on DOS, verbalizes understanding.     We discussed clearing pathways in the home and making sure there is accessibly to use the walker, for example, removing throw rugs.      Patient's Current Level of Function: Ambulates: Independently and ADLs: Independent    Post-op Caregiver: child (Daughter and son) -- (Pt's  passed away 12/2023 and she moved shortly after.)    Caregiver Name and phone number for Inpatient discharge needs: Son (Jake or Duane)  Currently receive any HHC/aides/community supports: No     Post-op Transport: child  To/from hospital: child (Jake or Du[facility])   To/from PT 2-3x/week: child   Uses community transport now: No     Outpatient Physical Therapy Site:  Site: [facility]   pre and post-op appts scheduled? No, will route      Medication Management: self  Preferred Pharmacy for Post-op Medications: [facility] - [place], [place] - [address] [[id number]]   Blood Management Vitamin Regimen:  Pt confirms she has BM vitamins at home and has started taking them.   Post-op anticoagulant: to be determined by surgical team postoperatively     DC Plan: Pt plans to be discharged home    Barriers to DC identified preoperatively: none  identified    BMI: 27.80     Patient Education:  Pt educated on post-op pain, early mobilization (POD0), LOS goals, OP PT goals, and preoperative bathing. Patient educated that our goal is to appropriately discharge patient based off their post-op function while striving to maintain maximal independence. The goal is to discharge patient to home and for them to attend outpatient physical therapy.    Assigned to care team? Yes

## 2024-05-15 ENCOUNTER — OFFICE VISIT (OUTPATIENT)
Dept: LAB | Facility: HOSPITAL | Age: 74
End: 2024-05-15
Payer: MEDICARE

## 2024-05-15 ENCOUNTER — APPOINTMENT (OUTPATIENT)
Dept: LAB | Facility: HOSPITAL | Age: 74
End: 2024-05-15
Payer: MEDICARE

## 2024-05-15 DIAGNOSIS — E66.9 TYPE 2 DIABETES MELLITUS WITH OBESITY  (HCC): ICD-10-CM

## 2024-05-15 DIAGNOSIS — E11.69 TYPE 2 DIABETES MELLITUS WITH OBESITY  (HCC): ICD-10-CM

## 2024-05-15 DIAGNOSIS — I10 BENIGN ESSENTIAL HTN: ICD-10-CM

## 2024-05-15 DIAGNOSIS — M25.59 PAIN IN OTHER SPECIFIED JOINT: ICD-10-CM

## 2024-05-15 DIAGNOSIS — Z01.818 PREOPERATIVE TESTING: ICD-10-CM

## 2024-05-15 DIAGNOSIS — M17.12 ARTHRITIS OF LEFT KNEE: ICD-10-CM

## 2024-05-15 DIAGNOSIS — E78.2 MIXED HYPERLIPIDEMIA: ICD-10-CM

## 2024-05-15 LAB
ALBUMIN SERPL BCP-MCNC: 4.7 G/DL (ref 3.5–5)
ALP SERPL-CCNC: 70 U/L (ref 34–104)
ALT SERPL W P-5'-P-CCNC: 18 U/L (ref 7–52)
ANION GAP SERPL CALCULATED.3IONS-SCNC: 12 MMOL/L (ref 4–13)
APTT PPP: 35 SECONDS (ref 23–37)
AST SERPL W P-5'-P-CCNC: 18 U/L (ref 13–39)
BASOPHILS # BLD AUTO: 0.04 THOUSANDS/ÂΜL (ref 0–0.1)
BASOPHILS NFR BLD AUTO: 1 % (ref 0–1)
BILIRUB SERPL-MCNC: 0.7 MG/DL (ref 0.2–1)
BUN SERPL-MCNC: 15 MG/DL (ref 5–25)
CALCIUM SERPL-MCNC: 10.2 MG/DL (ref 8.4–10.2)
CHLORIDE SERPL-SCNC: 99 MMOL/L (ref 96–108)
CHOLEST SERPL-MCNC: 176 MG/DL
CO2 SERPL-SCNC: 28 MMOL/L (ref 21–32)
CREAT SERPL-MCNC: 0.71 MG/DL (ref 0.6–1.3)
CREAT UR-MCNC: 246.7 MG/DL
CRP SERPL QL: 2.1 MG/L
EOSINOPHIL # BLD AUTO: 0.06 THOUSAND/ÂΜL (ref 0–0.61)
EOSINOPHIL NFR BLD AUTO: 2 % (ref 0–6)
ERYTHROCYTE [DISTWIDTH] IN BLOOD BY AUTOMATED COUNT: 12.4 % (ref 11.6–15.1)
EST. AVERAGE GLUCOSE BLD GHB EST-MCNC: 111 MG/DL
FERRITIN SERPL-MCNC: 85 NG/ML (ref 11–307)
GFR SERPL CREATININE-BSD FRML MDRD: 82 ML/MIN/1.73SQ M
GLUCOSE P FAST SERPL-MCNC: 87 MG/DL (ref 65–99)
HBA1C MFR BLD: 5.5 %
HCT VFR BLD AUTO: 45.4 % (ref 34.8–46.1)
HDLC SERPL-MCNC: 59 MG/DL
HGB BLD-MCNC: 14.6 G/DL (ref 11.5–15.4)
IMM GRANULOCYTES # BLD AUTO: 0 THOUSAND/UL (ref 0–0.2)
IMM GRANULOCYTES NFR BLD AUTO: 0 % (ref 0–2)
INR PPP: 0.98 (ref 0.84–1.19)
IRON SATN MFR SERPL: 32 % (ref 15–50)
IRON SERPL-MCNC: 112 UG/DL (ref 50–212)
LDLC SERPL CALC-MCNC: 91 MG/DL (ref 0–100)
LYMPHOCYTES # BLD AUTO: 1.61 THOUSANDS/ÂΜL (ref 0.6–4.47)
LYMPHOCYTES NFR BLD AUTO: 42 % (ref 14–44)
MCH RBC QN AUTO: 29.4 PG (ref 26.8–34.3)
MCHC RBC AUTO-ENTMCNC: 32.2 G/DL (ref 31.4–37.4)
MCV RBC AUTO: 91 FL (ref 82–98)
MICROALBUMIN UR-MCNC: 24 MG/L
MICROALBUMIN/CREAT 24H UR: 10 MG/G CREATININE (ref 0–30)
MONOCYTES # BLD AUTO: 0.35 THOUSAND/ÂΜL (ref 0.17–1.22)
MONOCYTES NFR BLD AUTO: 9 % (ref 4–12)
NEUTROPHILS # BLD AUTO: 1.74 THOUSANDS/ÂΜL (ref 1.85–7.62)
NEUTS SEG NFR BLD AUTO: 46 % (ref 43–75)
NONHDLC SERPL-MCNC: 117 MG/DL
NRBC BLD AUTO-RTO: 0 /100 WBCS
PLATELET # BLD AUTO: 227 THOUSANDS/UL (ref 149–390)
PMV BLD AUTO: 10.8 FL (ref 8.9–12.7)
POTASSIUM SERPL-SCNC: 4.1 MMOL/L (ref 3.5–5.3)
PROT SERPL-MCNC: 7.6 G/DL (ref 6.4–8.4)
PROTHROMBIN TIME: 13.1 SECONDS (ref 11.6–14.5)
RBC # BLD AUTO: 4.97 MILLION/UL (ref 3.81–5.12)
SODIUM SERPL-SCNC: 139 MMOL/L (ref 135–147)
TIBC SERPL-MCNC: 355 UG/DL (ref 250–450)
TRIGL SERPL-MCNC: 128 MG/DL
TSH SERPL DL<=0.05 MIU/L-ACNC: 0.75 UIU/ML (ref 0.45–4.5)
UIBC SERPL-MCNC: 243 UG/DL (ref 155–355)
WBC # BLD AUTO: 3.8 THOUSAND/UL (ref 4.31–10.16)

## 2024-05-15 PROCEDURE — 93005 ELECTROCARDIOGRAM TRACING: CPT

## 2024-05-15 PROCEDURE — 85610 PROTHROMBIN TIME: CPT

## 2024-05-15 PROCEDURE — 82043 UR ALBUMIN QUANTITATIVE: CPT

## 2024-05-15 PROCEDURE — 85025 COMPLETE CBC W/AUTO DIFF WBC: CPT

## 2024-05-15 PROCEDURE — 86901 BLOOD TYPING SEROLOGIC RH(D): CPT | Performed by: ORTHOPAEDIC SURGERY

## 2024-05-15 PROCEDURE — 82728 ASSAY OF FERRITIN: CPT

## 2024-05-15 PROCEDURE — 86850 RBC ANTIBODY SCREEN: CPT | Performed by: ORTHOPAEDIC SURGERY

## 2024-05-15 PROCEDURE — 36415 COLL VENOUS BLD VENIPUNCTURE: CPT

## 2024-05-15 PROCEDURE — 86900 BLOOD TYPING SEROLOGIC ABO: CPT | Performed by: ORTHOPAEDIC SURGERY

## 2024-05-15 PROCEDURE — 84443 ASSAY THYROID STIM HORMONE: CPT

## 2024-05-15 PROCEDURE — 86140 C-REACTIVE PROTEIN: CPT

## 2024-05-15 PROCEDURE — 80053 COMPREHEN METABOLIC PANEL: CPT

## 2024-05-15 PROCEDURE — 85730 THROMBOPLASTIN TIME PARTIAL: CPT

## 2024-05-15 PROCEDURE — 82570 ASSAY OF URINE CREATININE: CPT

## 2024-05-15 PROCEDURE — 83540 ASSAY OF IRON: CPT

## 2024-05-15 PROCEDURE — 83550 IRON BINDING TEST: CPT

## 2024-05-15 PROCEDURE — 80061 LIPID PANEL: CPT

## 2024-05-15 PROCEDURE — 83036 HEMOGLOBIN GLYCOSYLATED A1C: CPT

## 2024-05-15 NOTE — H&P (VIEW-ONLY)
Internal Medicine Pre-Operative Evaluation:     Reason for Visit: Pre-operative Evaluation for Risk Stratification and Optimization    Patient ID: Rosy Gibson is a 74 y.o. female.     Surgery: Arthroplasty of left knee  Referring Provider: Dr. Whitehead      Recommendations to Proceed withSurgery    Patient is considered to be Low risk for Medium risk procedure.     After evaluation and discussion with patient with emphasis that all surgery has some degree of inherent risk it is acknowledged by patient this risk is Acceptable.    Patient is optimized and may proceed with planned procedure.     Assessment    Pre-operative Medical Evaluation for planned surgery  Recommendations as listed in PLAN section below  Contact surgical nurse  navigator with any questions regarding preoperative plan or schedule.      Problem List Items Addressed This Visit          Cardiovascular and Mediastinum    Benign essential HTN     Stable  Monitor post operative BP   Avoid hypotension if at all possible  Refer to PAT instructions regarding medication administration the morning of surgery              Endocrine    Type 2 diabetes mellitus with obesity  (HCC)       Lab Results   Component Value Date    HGBA1C 6.4 (H) 09/25/2023     Monitor post operative BS   Continue ADA diet              Musculoskeletal and Integument    Primary osteoarthritis of left knee     Failed outpatient conservative measures  Electing to undergo arthroplasty              Behavioral Health    Current episode of major depressive disorder without prior episode     Loss of  recently  Continue medications as prescribed            Surgery/Wound/Pain    Status post total knee replacement, right     10/2023  No post operative issues          Other Visit Diagnoses       Preoperative clearance    -  Primary                 Plan:     1. Further preoperative workup as follows:   - none no further testing required may proceed with surgery    2. Preoperative  "Medication Management Review performed by [facility] nursing  YES    3. Patient requires further consultation with:   No Consults Required    4. Discharge Planning / Barriers to Discharge  none identified - patients has post discharge therapy plan in place, transportation arranged for discharge day, adequate family support at home to assist with discharge to home.        Subjective:           History of Present Illness:     Rosy Gibson is a 74 y.o. female who presents to the office today for a preoperative consultation at the request of surgeon. The patient understands this is an elective procedure and not emergent. They are electing to undergo planned procedure with an understanding that all surgery has inherent risk. They have worked with their surgeon and failed conservative treatment measures. Today they present for preoperative risk assessment and recommendations for optimization in preparation for surgery.    Pt seen in surgical optimization center for upcoming proposed surgery. They have failed previous conservative measures and have elected surgical intervention.     Pt meets presurgical lab and BMI optimization goals.    Upon interview questioning patient is able to perform greater than 4 METs workload in daily life without any reported cardio-pulmonary symptoms.    Pt had recent R TKA in October without any post operative issues          ROS: No TIA's or unusual headaches, no dysphagia.  No prolonged cough. No dyspnea or chest pain on exertion.  No abdominal pain, change in bowel habits, black or bloody stools.  No urinary tract or BPH symptoms.  Positive reported pain in arthritic joint. Positive difficulty with gait. No skin rashes or issues.      Objective:    /87   Pulse 85   Ht 5' 2\" (1.575 m)   Wt 67.1 kg (148 lb)   BMI 27.07 kg/m²       General Appearance: no distress, conversive  HEENT: PERRLA, conjuctiva normal; oropharynx clear; mucous membranes moist;   Neck:  Supple, no lymphadenopathy " or thyromegaly  Lungs: breath sounds normal, normal respiratory effort, no retractions, expiratory effort normal  CV: normal heart sounds S1/S2, PMI normal   ABD: soft non tender, no masses , no hepatic or splenomegaly  EXT: DP pulses intact, no lymphadenopathy, no edema  Skin: normal turgor, normal texture, no rash  Psych: affect normal, mood normal  Neuro: AAOx3        The following portions of the patient's history were reviewed and updated as appropriate: allergies, current medications, past family history, past medical history, past social history, past surgical history and problem list.     Past History:       Past Medical History:   Diagnosis Date    Arthritis     Encounter for geriatric assessment 05/18/2023    Fatty liver 05/01/2018    Fibromyalgia, primary     GERD (gastroesophageal reflux disease)     Hyperlipidemia     Hypertension     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    Low back pain     Lumbar stenosis     Neck pain     Neuropathy     feet    No natural teeth     on lower    Primary localized osteoarthritis of right knee 08/17/2017    Sacroiliitis (HCC) 08/20/2018    Skin cancer     on chest in past - melanoma    Sleep apnea     resolved with weight loss    Use of cane as ambulatory aid     or walker    Wears dentures     full  uppers    Past Surgical History:   Procedure Laterality Date    JOINT REPLACEMENT Right 06/2023    RTK RTH    TX ARTHRP ACETBLR/PROX FEM PROSTC AGRFT/ALGRFT Right 06/19/2023    Procedure: ARTHROPLASTY HIP TOTAL ANTERIOR;  Surgeon: Liss Whitehead DO;  Location:  MAIN OR;  Service: Orthopedics    TX ARTHRP KNE CONDYLE&PLATU MEDIAL&LAT COMPARTMENTS Right 10/18/2023    Procedure: RIGHT TOTAL KNEE ARTHROPLASTY;  Surgeon: Liss Whitehead DO;  Location:  MAIN OR;  Service: Orthopedics    SKIN CANCER EXCISION      on chest - melanoma    TUBAL LIGATION            Social History     Tobacco Use    Smoking status: Never    Smokeless tobacco: Never    Vaping Use    Vaping status: Never Used   Substance Use Topics    Alcohol use: Yes     Comment: rarely    Drug use: Never     Family History   Problem Relation Age of Onset    Osteoporosis Mother     Skin cancer Father 55    Stomach cancer Sister 49    No Known Problems Daughter     No Known Problems Maternal Grandmother     No Known Problems Maternal Grandfather     No Known Problems Paternal Grandmother     No Known Problems Paternal Grandfather     No Known Problems Maternal Aunt     No Known Problems Maternal Aunt     No Known Problems Maternal Aunt     No Known Problems Maternal Aunt     No Known Problems Paternal Aunt           Allergies:     Allergies   Allergen Reactions    Penicillins Hives and Itching        Current Medications:     Current Outpatient Medications   Medication Instructions    acetaminophen (TYLENOL) 1,000 mg, Oral, Every 8 hours    ascorbic acid (VITAMIN C) 500 mg, Oral, Daily, Begin 30 days prior to surgery.    aspirin (ECOTRIN) 325 mg EC tablet TAKE 1 TABLET (325 MG TOTAL) BY MOUTH 2 (TWO) TIMES A DAY TAKE WITH FOOD.    benazepril (LOTENSIN) 20 mg tablet TAKE 1 TABLET BY MOUTH EVERY DAY    cholecalciferol (VITAMIN D3) 1,000 Units, Oral, Daily    cyclobenzaprine (FLEXERIL) 10 mg, Oral, Daily at bedtime    diclofenac (VOLTAREN) 75 mg, Oral, Daily PRN, Take with food.    docusate sodium (COLACE) 100 mg, Oral, 2 times daily    DULoxetine (CYMBALTA) 60 mg delayed release capsule TAKE 1 CAPSULE BY MOUTH EVERY DAY    ferrous sulfate 324 mg, Oral, Daily before breakfast, Begin 30 days prior to surgery.    folic acid (FOLVITE) 1 mg, Oral, Daily, Begin 30 days prior to surgery.    gabapentin (NEURONTIN) 600 mg, Oral, 3 times daily    hydrochlorothiazide (HYDRODIURIL) 12.5 mg tablet TAKE 1 TABLET BY MOUTH EVERY DAY    lidocaine (Lidoderm) 5 % 1 patch, Topical, Daily, Remove & Discard patch within 12 hours or as directed by MD    Multiple Vitamins-Minerals (One-Daily Multi-Vit/Mineral) TABS 1  "tablet, Oral, Daily    omeprazole (PRILOSEC) 10 mg, Oral, Daily    oxyCODONE (ROXICODONE) 5 mg, Oral, Every 8 hours PRN    promethazine (PHENERGAN) 12.5 mg, Oral, Every 6 hours PRN    rosuvastatin (CRESTOR) 10 mg, Oral, Daily    SUPER B COMPLEX/C PO 1 tablet, Oral, Daily           PRE-OP WORKSHEET DATA    Assessment of Pre-Operative Risks     MLJ Quality Hard Stops:    BMI (<40) : Estimated body mass index is 27.07 kg/m² as calculated from the following:    Height as of this encounter: 5' 2\" (1.575 m).    Weight as of this encounter: 67.1 kg (148 lb).    Hgb ( >11):   Lab Results   Component Value Date    HGB 14.6 05/15/2024    HGB 8.7 (L) 10/19/2023    HGB 13.9 09/25/2023       HbA1c (<7.5) :   Lab Results   Component Value Date    HGBA1C 5.5 05/15/2024       GFR (>60) (Less then 45 = Nephrology consult):    Lab Results   Component Value Date    EGFR 82 05/15/2024    EGFR 89 10/19/2023    EGFR 86 09/25/2023         Active Decompensated Chronic Conditions which would delay surgery  No acutely decompensated medical issues such as recent CVA, MI, new onset arrhythmia, severe aortic stenosis, CHF, uncontrolled COPD       Functional capacity: CLIMBING 2 FLIGHTS STAIRS                                                     4 METS  Pick the highest level patient can comfortably perform   (if less the 4 mets consider functional assessment via cardiac stress testing or consultation)    Assessment of intra and post operative respiratory, hemodynamic and thrombotic risks     Prior Anesthesia Reactions: No     Personal history of venous thromboembolic disease? No    History of steroid use > 5 mg for >2 weeks within last year? No      The patient's risk factors for cardiac complications include :  none    Rosy Gibson has an IN HOSPITAL cardiac risk of RCI RISK CLASS I (0 risk factors, risk of major cardiac compl. appr. 0.5%) based on RCRI calculator    Cardiac Risk Estimation: per the Revised Cardiac Risk Index (Circ. " 100:1043, 1999),        Pre-Op Data Reviewed:       Laboratory Results: I have personally reviewed the pertinent laboratory results/reports     EKG:I have personally reviewed pertinent reports.  . I personally reviewed and interpreted available tracings in the electronic medical record    Encounter Date: 05/15/24   ECG 12 lead   Result Value    Ventricular Rate 74    Atrial Rate 74    NJ Interval 168    QRSD Interval 94    QT Interval 412    QTC Interval 457    P Axis 52    QRS Axis 9    T Wave Axis 42    Narrative    Normal sinus rhythm  Normal ECG  No previous ECGs available  Confirmed by Kamar Osborne ([id number]) on 5/16/2024 6:08:14 AM       OLD RECORDS: reviewed old records in the chart review section if EHR on day of visit.    Previous cardiopulmonary studies within the past year:  Echocardiogram: no   Cardiac Catheterization: no  Stress Test: no      Time of visit including pre-visit chart review, visit and post-visit coordination of plan and care , review of pre-surgical lab work, preparation and time spent documenting note in electronic medical record, time spent face-to-face in physical examination answering patient questions by care team 35 minutes             [facility]

## 2024-05-15 NOTE — PROGRESS NOTES
Internal Medicine Pre-Operative Evaluation:     Reason for Visit: Pre-operative Evaluation for Risk Stratification and Optimization    Patient ID: Rosy Gibson is a 74 y.o. female.     Surgery: Arthroplasty of left knee  Referring Provider: Dr. Whitehead      Recommendations to Proceed withSurgery    Patient is considered to be Low risk for Medium risk procedure.     After evaluation and discussion with patient with emphasis that all surgery has some degree of inherent risk it is acknowledged by patient this risk is Acceptable.    Patient is optimized and may proceed with planned procedure.     Assessment    Pre-operative Medical Evaluation for planned surgery  Recommendations as listed in PLAN section below  Contact surgical nurse  navigator with any questions regarding preoperative plan or schedule.      Problem List Items Addressed This Visit          Cardiovascular and Mediastinum    Benign essential HTN     Stable  Monitor post operative BP   Avoid hypotension if at all possible  Refer to PAT instructions regarding medication administration the morning of surgery              Endocrine    Type 2 diabetes mellitus with obesity  (HCC)       Lab Results   Component Value Date    HGBA1C 6.4 (H) 09/25/2023     Monitor post operative BS   Continue ADA diet              Musculoskeletal and Integument    Primary osteoarthritis of left knee     Failed outpatient conservative measures  Electing to undergo arthroplasty              Behavioral Health    Current episode of major depressive disorder without prior episode     Loss of  recently  Continue medications as prescribed            Surgery/Wound/Pain    Status post total knee replacement, right     10/2023  No post operative issues          Other Visit Diagnoses       Preoperative clearance    -  Primary                 Plan:     1. Further preoperative workup as follows:   - none no further testing required may proceed with surgery    2. Preoperative  "Medication Management Review performed by [facility] nursing  YES    3. Patient requires further consultation with:   No Consults Required    4. Discharge Planning / Barriers to Discharge  none identified - patients has post discharge therapy plan in place, transportation arranged for discharge day, adequate family support at home to assist with discharge to home.        Subjective:           History of Present Illness:     Rosy Gibson is a 74 y.o. female who presents to the office today for a preoperative consultation at the request of surgeon. The patient understands this is an elective procedure and not emergent. They are electing to undergo planned procedure with an understanding that all surgery has inherent risk. They have worked with their surgeon and failed conservative treatment measures. Today they present for preoperative risk assessment and recommendations for optimization in preparation for surgery.    Pt seen in surgical optimization center for upcoming proposed surgery. They have failed previous conservative measures and have elected surgical intervention.     Pt meets presurgical lab and BMI optimization goals.    Upon interview questioning patient is able to perform greater than 4 METs workload in daily life without any reported cardio-pulmonary symptoms.    Pt had recent R TKA in October without any post operative issues          ROS: No TIA's or unusual headaches, no dysphagia.  No prolonged cough. No dyspnea or chest pain on exertion.  No abdominal pain, change in bowel habits, black or bloody stools.  No urinary tract or BPH symptoms.  Positive reported pain in arthritic joint. Positive difficulty with gait. No skin rashes or issues.      Objective:    /87   Pulse 85   Ht 5' 2\" (1.575 m)   Wt 67.1 kg (148 lb)   BMI 27.07 kg/m²       General Appearance: no distress, conversive  HEENT: PERRLA, conjuctiva normal; oropharynx clear; mucous membranes moist;   Neck:  Supple, no lymphadenopathy " or thyromegaly  Lungs: breath sounds normal, normal respiratory effort, no retractions, expiratory effort normal  CV: normal heart sounds S1/S2, PMI normal   ABD: soft non tender, no masses , no hepatic or splenomegaly  EXT: DP pulses intact, no lymphadenopathy, no edema  Skin: normal turgor, normal texture, no rash  Psych: affect normal, mood normal  Neuro: AAOx3        The following portions of the patient's history were reviewed and updated as appropriate: allergies, current medications, past family history, past medical history, past social history, past surgical history and problem list.     Past History:       Past Medical History:   Diagnosis Date    Arthritis     Encounter for geriatric assessment 05/18/2023    Fatty liver 05/01/2018    Fibromyalgia, primary     GERD (gastroesophageal reflux disease)     Hyperlipidemia     Hypertension     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    Low back pain     Lumbar stenosis     Neck pain     Neuropathy     feet    No natural teeth     on lower    Primary localized osteoarthritis of right knee 08/17/2017    Sacroiliitis (HCC) 08/20/2018    Skin cancer     on chest in past - melanoma    Sleep apnea     resolved with weight loss    Use of cane as ambulatory aid     or walker    Wears dentures     full  uppers    Past Surgical History:   Procedure Laterality Date    JOINT REPLACEMENT Right 06/2023    RTK RTH    [place] ARTHRP ACETBLR/PROX FEM PROSTC AGRFT/ALGRFT Right 06/19/2023    Procedure: ARTHROPLASTY HIP TOTAL ANTERIOR;  Surgeon: Liss Whitehead DO;  Location:  MAIN OR;  Service: Orthopedics    IA ARTHRP KNE CONDYLE&PLATU MEDIAL&LAT COMPARTMENTS Right 10/18/2023    Procedure: RIGHT TOTAL KNEE ARTHROPLASTY;  Surgeon: Liss Whitehead DO;  Location:  MAIN OR;  Service: Orthopedics    SKIN CANCER EXCISION      on chest - melanoma    TUBAL LIGATION            Social History     Tobacco Use    Smoking status: Never    Smokeless tobacco: Never    Vaping Use    Vaping status: Never Used   Substance Use Topics    Alcohol use: Yes     Comment: rarely    Drug use: Never     Family History   Problem Relation Age of Onset    Osteoporosis Mother     Skin cancer Father 55    Stomach cancer Sister 49    No Known Problems Daughter     No Known Problems Maternal Grandmother     No Known Problems Maternal Grandfather     No Known Problems Paternal Grandmother     No Known Problems Paternal Grandfather     No Known Problems Maternal Aunt     No Known Problems Maternal Aunt     No Known Problems Maternal Aunt     No Known Problems Maternal Aunt     No Known Problems Paternal Aunt           Allergies:     Allergies   Allergen Reactions    Penicillins Hives and Itching        Current Medications:     Current Outpatient Medications   Medication Instructions    acetaminophen (TYLENOL) 1,000 mg, Oral, Every 8 hours    ascorbic acid (VITAMIN C) 500 mg, Oral, Daily, Begin 30 days prior to surgery.    aspirin (ECOTRIN) 325 mg EC tablet TAKE 1 TABLET (325 MG TOTAL) BY MOUTH 2 (TWO) TIMES A DAY TAKE WITH FOOD.    benazepril (LOTENSIN) 20 mg tablet TAKE 1 TABLET BY MOUTH EVERY DAY    cholecalciferol (VITAMIN D3) 1,000 Units, Oral, Daily    cyclobenzaprine (FLEXERIL) 10 mg, Oral, Daily at bedtime    diclofenac (VOLTAREN) 75 mg, Oral, Daily PRN, Take with food.    docusate sodium (COLACE) 100 mg, Oral, 2 times daily    DULoxetine (CYMBALTA) 60 mg delayed release capsule TAKE 1 CAPSULE BY MOUTH EVERY DAY    ferrous sulfate 324 mg, Oral, Daily before breakfast, Begin 30 days prior to surgery.    folic acid (FOLVITE) 1 mg, Oral, Daily, Begin 30 days prior to surgery.    gabapentin (NEURONTIN) 600 mg, Oral, 3 times daily    hydrochlorothiazide (HYDRODIURIL) 12.5 mg tablet TAKE 1 TABLET BY MOUTH EVERY DAY    lidocaine (Lidoderm) 5 % 1 patch, Topical, Daily, Remove & Discard patch within 12 hours or as directed by MD    Multiple Vitamins-Minerals (One-Daily Multi-Vit/Mineral) TABS 1  "tablet, Oral, Daily    omeprazole (PRILOSEC) 10 mg, Oral, Daily    oxyCODONE (ROXICODONE) 5 mg, Oral, Every 8 hours PRN    promethazine (PHENERGAN) 12.5 mg, Oral, Every 6 hours PRN    rosuvastatin (CRESTOR) 10 mg, Oral, Daily    SUPER B COMPLEX/C PO 1 tablet, Oral, Daily           PRE-OP WORKSHEET DATA    Assessment of Pre-Operative Risks     MLJ Quality Hard Stops:    BMI (<40) : Estimated body mass index is 27.07 kg/m² as calculated from the following:    Height as of this encounter: 5' 2\" (1.575 m).    Weight as of this encounter: 67.1 kg (148 lb).    Hgb ( >11):   Lab Results   Component Value Date    HGB 14.6 05/15/2024    HGB 8.7 (L) 10/19/2023    HGB 13.9 09/25/2023       HbA1c (<7.5) :   Lab Results   Component Value Date    HGBA1C 5.5 05/15/2024       GFR (>60) (Less then 45 = Nephrology consult):    Lab Results   Component Value Date    EGFR 82 05/15/2024    EGFR 89 10/19/2023    EGFR 86 09/25/2023         Active Decompensated Chronic Conditions which would delay surgery  No acutely decompensated medical issues such as recent CVA, MI, new onset arrhythmia, severe aortic stenosis, CHF, uncontrolled COPD       Functional capacity: CLIMBING 2 FLIGHTS STAIRS                                                     4 METS  Pick the highest level patient can comfortably perform   (if less the 4 mets consider functional assessment via cardiac stress testing or consultation)    Assessment of intra and post operative respiratory, hemodynamic and thrombotic risks     Prior Anesthesia Reactions: No     Personal history of venous thromboembolic disease? No    History of steroid use > 5 mg for >2 weeks within last year? No      The patient's risk factors for cardiac complications include :  none    Rosy Gibson has an IN HOSPITAL cardiac risk of RCI RISK CLASS I (0 risk factors, risk of major cardiac compl. appr. 0.5%) based on RCRI calculator    Cardiac Risk Estimation: per the Revised Cardiac Risk Index (Circ. " 100:1043, 1999),        Pre-Op Data Reviewed:       Laboratory Results: I have personally reviewed the pertinent laboratory results/reports     EKG:I have personally reviewed pertinent reports.  . I personally reviewed and interpreted available tracings in the electronic medical record    Encounter Date: 05/15/24   ECG 12 lead   Result Value    Ventricular Rate 74    Atrial Rate 74    MI Interval 168    QRSD Interval 94    QT Interval 412    QTC Interval 457    P Axis 52    QRS Axis 9    T Wave Axis 42    Narrative    Normal sinus rhythm  Normal ECG  No previous ECGs available  Confirmed by Kamar Osborne ([id number]) on 5/16/2024 6:08:14 AM       OLD RECORDS: reviewed old records in the chart review section if EHR on day of visit.    Previous cardiopulmonary studies within the past year:  Echocardiogram: no   Cardiac Catheterization: no  Stress Test: no      Time of visit including pre-visit chart review, visit and post-visit coordination of plan and care , review of pre-surgical lab work, preparation and time spent documenting note in electronic medical record, time spent face-to-face in physical examination answering patient questions by care team 35 minutes             [facility]

## 2024-05-15 NOTE — ASSESSMENT & PLAN NOTE
Lab Results   Component Value Date    HGBA1C 6.4 (H) 09/25/2023     Monitor post operative BS   Continue ADA diet    
10/2023  No post operative issues  
Failed outpatient conservative measures  Electing to undergo arthroplasty    
Loss of  recently  Continue medications as prescribed  
Stable  Monitor post operative BP   Avoid hypotension if at all possible  Refer to PAT instructions regarding medication administration the morning of surgery    
Not applicable

## 2024-05-15 NOTE — PATIENT INSTRUCTIONS
BEFORE SURGERY    Contact surgical nurse  navigator with any questions regarding preoperative plan or schedule.  Stop all over the counter supplements, herbal, naturopathic  medications for 1 week prior to surgery UNLESS prescribed by your surgeon  Hold NSAIDS (i.e. advil, alleve, motrin, ibuprofen, celebrex) minimum 5 days prior to surgery  Follow presurgical medication instructions provided by preadmission nursing team reviewed during your presurgery phone call  Strategies for optimizing your surgery through breathing exercises, nutrition and physical activity can be found at [email or web address]  Call [phone] with any presurgical concerns or medications questions    AFTER SURGERY    Recommend using Tylenol ( acetaminophen ) 1000 mg every eight hours during the first week post discharge along with icing the area for 20 mins every 3-4 hours while awake can be helpful in reducing your need for post operative opioid use. This opioid sparing plan can be used along side your surgeons pain plan.  Use stool softener over the counter (colace) daily after surgery during the first 1-2 weeks to avoid post operative constipation issues  If no bowel movement within 3 days after surgery then use over the counter Miralax in addition to your stool softener   If cleared by your surgical team for activity then early and often walking is encouraged and can be important in prevention of post surgical blood clots. Additionally spend as much time out of bed as possible and allowed by your surgical team  Use your incentive spirometer twice per hour in the first seven days after surgery to help prevent post surgery lung complications and infections  Call [phone] with any post discharge concerns or medical issues

## 2024-05-16 ENCOUNTER — LAB REQUISITION (OUTPATIENT)
Dept: LAB | Facility: HOSPITAL | Age: 74
End: 2024-05-16
Payer: MEDICARE

## 2024-05-16 DIAGNOSIS — Z01.818 ENCOUNTER FOR OTHER PREPROCEDURAL EXAMINATION: ICD-10-CM

## 2024-05-16 DIAGNOSIS — M17.12 UNILATERAL PRIMARY OSTEOARTHRITIS, LEFT KNEE: ICD-10-CM

## 2024-05-16 LAB
ABO GROUP BLD: NORMAL
ATRIAL RATE: 74 BPM
BLD GP AB SCN SERPL QL: NEGATIVE
P AXIS: 52 DEGREES
PR INTERVAL: 168 MS
QRS AXIS: 9 DEGREES
QRSD INTERVAL: 94 MS
QT INTERVAL: 412 MS
QTC INTERVAL: 457 MS
RH BLD: POSITIVE
SPECIMEN EXPIRATION DATE: NORMAL
T WAVE AXIS: 42 DEGREES
VENTRICULAR RATE: 74 BPM

## 2024-05-16 PROCEDURE — 93010 ELECTROCARDIOGRAM REPORT: CPT | Performed by: INTERNAL MEDICINE

## 2024-05-17 DIAGNOSIS — M17.12 ARTHRITIS OF LEFT KNEE: ICD-10-CM

## 2024-05-17 RX ORDER — DICLOFENAC SODIUM 75 MG/1
75 TABLET, DELAYED RELEASE ORAL DAILY PRN
Qty: 30 TABLET | Refills: 0 | Status: SHIPPED | OUTPATIENT
Start: 2024-05-17

## 2024-05-20 DIAGNOSIS — G89.29 CHRONIC PAIN OF LEFT KNEE: ICD-10-CM

## 2024-05-20 DIAGNOSIS — M25.562 CHRONIC PAIN OF LEFT KNEE: ICD-10-CM

## 2024-05-20 DIAGNOSIS — M17.12 ARTHRITIS OF LEFT KNEE: ICD-10-CM

## 2024-05-20 RX ORDER — ASCORBIC ACID 500 MG
500 TABLET ORAL DAILY
Qty: 90 TABLET | Refills: 1 | Status: SHIPPED | OUTPATIENT
Start: 2024-05-20

## 2024-05-20 NOTE — PRE-PROCEDURE INSTRUCTIONS
Pre-Surgery Instructions:   Medication Instructions    acetaminophen (TYLENOL) 500 mg tablet Uses PRN- OK to take day of surgery    ascorbic acid (VITAMIN C) 500 mg tablet Hold day of surgery.    benazepril (LOTENSIN) 20 mg tablet Hold day of surgery.    cholecalciferol (VITAMIN D3) 1,000 units tablet Stop taking 7 days prior to surgery.    cyclobenzaprine (FLEXERIL) 10 mg tablet Uses PRN- OK to take day of surgery    diclofenac (VOLTAREN) 75 mg EC tablet Stop taking 3 days prior to surgery.    docusate sodium (COLACE) 100 mg capsule Hold day of surgery.    DULoxetine (CYMBALTA) 60 mg delayed release capsule Take day of surgery.    ferrous sulfate 324 (65 Fe) mg Hold day of surgery.    folic acid (FOLVITE) 1 mg tablet Hold day of surgery.    gabapentin (Neurontin) 600 MG tablet Take day of surgery.    hydrochlorothiazide (HYDRODIURIL) 12.5 mg tablet Hold day of surgery.    Multiple Vitamins-Minerals (One-Daily Multi-Vit/Mineral) TABS Hold day of surgery.    omeprazole (PriLOSEC) 10 mg delayed release capsule Take day of surgery.    rosuvastatin (CRESTOR) 10 MG tablet Take day of surgery.    SUPER B COMPLEX/C PO Stop taking 7 days prior to surgery.   Medication instructions for day surgery reviewed. Please use only a sip of water to take your instructed medications. Avoid all over the counter vitamins, supplements and NSAIDS for one week prior to surgery per anesthesia guidelines. Tylenol is ok to take as needed.     You will receive a call one business day prior to surgery with an arrival time and hospital directions. If your surgery is scheduled on a Monday, the hospital will be calling you on the Friday prior to your surgery. If you have not heard from anyone by 8pm, please call the hospital supervisor through the hospital  at [phone]. ([place] [phone] or [place] [phone]).    Do not eat or drink anything after midnight the night before your surgery, including candy, mints, lifesavers, or  chewing gum. Do not drink alcohol 24hrs before your surgery. Try not to smoke at least 24hrs before your surgery.       Follow the pre surgery showering instructions as listed in the “My Surgical Experience Booklet” or otherwise provided by your surgeon's office. Do not use a blade to shave the surgical area 1 week before surgery. It is okay to use a clean electric clippers up to 24 hours before surgery. Do not apply any lotions, creams, including makeup, cologne, deodorant, or perfumes after showering on the day of your surgery. Do not use dry shampoo, hair spray, hair gel, or any type of hair products.     No contact lenses, eye make-up, or artificial eyelashes. Remove nail polish, including gel polish, and any artificial, gel, or acrylic nails if possible. Remove all jewelry including rings and body piercing jewelry.     Wear causal clothing that is easy to take on and off. Consider your type of surgery.    Keep any valuables, jewelry, piercings at home. Please bring any specially ordered equipment (sling, braces) if indicated.    Arrange for a responsible person to drive you to and from the hospital on the day of your surgery. Please confirm the visitor policy for the day of your procedure when you receive your phone call with an arrival time.     Call the surgeon's office with any new illnesses, exposures, or additional questions prior to surgery.    Please reference your “My Surgical Experience Booklet” for additional information to prepare for your upcoming surgery. Bring walker  See Geriatric Assessment below...  Cognitive Assessment:   CAM:   TUG <15 sec:  Falls (last 6 months):   Hand  score:  -Attempt 1:  -Attempt 2:  -Attempt 3:  Timmy Total Score:   PHQ- 9 Depression Scale:  Nutrition Assessment Score:  METS: 14  Incentive Spirometry Level:   Health goals:  -What are your overall health goals? (quit smoking, wt. loss, rest, decrease stress)  Get back to walking,  continue with gardening  -What  brings you strength? (family, friends, Restorationist, health)  Family, grandchildren  -What activities are important to you? (exercise, reading, travel, work)  Like keeping active, reading adventure and murder stories

## 2024-05-22 ENCOUNTER — OFFICE VISIT (OUTPATIENT)
Age: 74
End: 2024-05-22
Payer: MEDICARE

## 2024-05-22 VITALS
DIASTOLIC BLOOD PRESSURE: 87 MMHG | SYSTOLIC BLOOD PRESSURE: 125 MMHG | BODY MASS INDEX: 27.23 KG/M2 | WEIGHT: 148 LBS | HEART RATE: 85 BPM | HEIGHT: 62 IN

## 2024-05-22 DIAGNOSIS — M17.12 ARTHRITIS OF LEFT KNEE: ICD-10-CM

## 2024-05-22 DIAGNOSIS — M17.12 PRIMARY OSTEOARTHRITIS OF LEFT KNEE: ICD-10-CM

## 2024-05-22 DIAGNOSIS — E11.69 TYPE 2 DIABETES MELLITUS WITH OBESITY  (HCC): ICD-10-CM

## 2024-05-22 DIAGNOSIS — Z01.818 PREOPERATIVE TESTING: ICD-10-CM

## 2024-05-22 DIAGNOSIS — I10 BENIGN ESSENTIAL HTN: ICD-10-CM

## 2024-05-22 DIAGNOSIS — F32.9 CURRENT EPISODE OF MAJOR DEPRESSIVE DISORDER WITHOUT PRIOR EPISODE, UNSPECIFIED DEPRESSION EPISODE SEVERITY: ICD-10-CM

## 2024-05-22 DIAGNOSIS — Z01.818 PREOPERATIVE CLEARANCE: Primary | ICD-10-CM

## 2024-05-22 DIAGNOSIS — Z96.651 STATUS POST TOTAL KNEE REPLACEMENT, RIGHT: ICD-10-CM

## 2024-05-22 DIAGNOSIS — E66.9 TYPE 2 DIABETES MELLITUS WITH OBESITY  (HCC): ICD-10-CM

## 2024-05-22 PROCEDURE — 99215 OFFICE O/P EST HI 40 MIN: CPT | Performed by: INTERNAL MEDICINE

## 2024-05-22 PROCEDURE — G2211 COMPLEX E/M VISIT ADD ON: HCPCS | Performed by: INTERNAL MEDICINE

## 2024-06-03 ENCOUNTER — EVALUATION (OUTPATIENT)
Dept: PHYSICAL THERAPY | Facility: CLINIC | Age: 74
End: 2024-06-03
Payer: MEDICARE

## 2024-06-03 DIAGNOSIS — M17.12 ARTHRITIS OF LEFT KNEE: Primary | ICD-10-CM

## 2024-06-03 DIAGNOSIS — M51.16 LUMBAR DISC HERNIATION WITH RADICULOPATHY: ICD-10-CM

## 2024-06-03 DIAGNOSIS — Z01.818 PREOPERATIVE TESTING: ICD-10-CM

## 2024-06-03 PROCEDURE — 97161 PT EVAL LOW COMPLEX 20 MIN: CPT | Performed by: PHYSICAL MEDICINE & REHABILITATION

## 2024-06-03 PROCEDURE — 97110 THERAPEUTIC EXERCISES: CPT | Performed by: PHYSICAL MEDICINE & REHABILITATION

## 2024-06-03 PROCEDURE — 97530 THERAPEUTIC ACTIVITIES: CPT | Performed by: PHYSICAL MEDICINE & REHABILITATION

## 2024-06-03 NOTE — PROGRESS NOTES
PT Evaluation     Today's date: 6/3/2024  Patient name: Rosy Gibson  : 1950  MRN: [id number]  Referring provider: Liss Whitehead,*  Dx:   Encounter Diagnosis     ICD-10-CM    1. Arthritis of left knee  M17.12 Ambulatory referral to Physical Therapy      2. Preoperative testing  Z01.818 Ambulatory referral to Physical Therapy                     Assessment  Impairments: abnormal gait, abnormal muscle firing, abnormal muscle tone, abnormal or restricted ROM, abnormal movement, activity intolerance, impaired balance, impaired physical strength, lacks appropriate home exercise program, pain with function and weight-bearing intolerance  Symptom irritability: moderate    Assessment details: Pt is a 75 yo F presenting to outpatient physical therapy with noted impairments including pain, impaired soft tissue mobility, reduced range of motion, reduced strength, reduced joint positional awareness, altered gait, imbalance/ joint instability, and reduced activity tolerance. Signs and symptoms at present are consistent with referring diagnosis of L knee pain 2* arthritis. She is having a TKA on  and presents to PT for pre-operative evaluation. Due to noted impairments,  the patient's present functional limitations include difficulty with ADLs with increased need for assistance, imbalance/increased fall risk, reliance on medication and/or modalities for pain relief, reduced tolerance for functional mobility and activity, and difficulty completing home and self care responsibilities. Increased time was spent today reviewing home preparation checklist and virtual home assessment. Increased time was spent initiating and reviewing post op HEP/exercise program. Also spent increased time educating pt on operative/post operative expectations, post op pain control with CP and ROM/exercise, importance of elevation and controlling swelling, and importance of mobility and ROM of  L knee post op. Reviewed home  "preparation pre- surgery and reviewed post op mobility with ADLs. All pt questions/concerns were answered to her satisfaction. Patient to benefit from skilled outpatient physical therapy to begin post operatively 2x/week for 6-8 weeks in order to reduce pain, maximize pain free range of motion, increase strength and stability, and improve functional mobility/functional activity in order to maximize return to prior level of function with reduced limitations. Home exercise program was provided and all questions answered to patient's level of satisfaction. Thank you for your referral.     Understanding of Dx/Px/POC: good     Prognosis: fair    Goals  Goals to be achieved post op:     STGs to be achieved in 4 weeks:  1. Pt to demonstrate reduced subjective pain ratin g \"at worst\" by at least 2-3 points from Initial Eval in order to allow for reduced pain with ADLs and improved functional activity tolerance.   2. Pt to demonstrate increased AROM of L knee flexion to at least 100 degrees and knee extension to no > -5*  in order to allow for greater ease and independence with ADLs and functional mobility.   3. Pt to demonstrate increased MMT of L knee grossly by at least 1/2-1 grade in order to improve safety and stability with ADLs and functional mobility.   4.Pt will demonstrate ability to safely ambulate at least 100ft with least restrictive AD with minimized gait deviations, independently, and without evidence of instability.      LTGs to be achieved in 8-10 weeks:  1. Pt will be I with HEP in order to continue to improve quality of life and independence and reduce risk for re-injury.   2. Pt to demonstrate return to PLOF without limitations or restrictions.   3. Pt to demonstrate improved function as noted by achieving or exceeding predicted score on FOTO outcomes assessment tool.         Plan  Patient would benefit from: skilled physical therapy  Planned modality interventions: cryotherapy and thermotherapy: " hydrocollator packs    Planned therapy interventions: manual therapy, motor coordination training, neuromuscular re-education, patient education, postural training, self care, strengthening, stretching, therapeutic activities, therapeutic exercise, gait training, home exercise program, functional ROM exercises and balance    Frequency: 2x week  Duration in visits: 12  Duration in weeks: 6  Treatment plan discussed with: patient      Subjective Evaluation    History of Present Illness  Mechanism of injury: Pt notes chronic hx of L knee pain. Has been getting injections L knee without ultimate benefit. Pt notes she has also been doing exercises at home that she had done with her R knee post TKA. Pt notes L  knee is bone on bone. F/u with Dr. Whitehead; Recommended L TKA at this time. Scheduled for L TKA 24. Pt notes L knee pain is constant vs intermittent; pain can be bad at night. Pain is worse with standing, walking, stairs, bending down etc. Pain and stiffness when first getting up. L knee can buckle on her at times; causes her to loses her balance at times; takes cane with her longer distances or outdoors.   TKA scheduled 24- plans to d/c home. Lives in daughter's home. Has no steps to enter home; will live on 1st level at home. Pt's son will be home to help her post op. Grandchildren also nearby. Pt has SPC, walker, BSC, and shower chair at home. Has a walk in shower now. Has post op PT scheduled already. Eager to get back to PLOF and be able to walk without pain or AD.   Quality of life: good    Patient Goals  Patient goals for therapy: decreased pain, improved balance, increased strength, increased motion, return to sport/leisure activities and independence with ADLs/IADLs  Patient's goals regarding treatment: be able to walk without pain and without AD.  Pain  Current pain ratin  At best pain ratin  At worst pain rating: 10  Location: L knee - anterior and medial  Quality: dull ache, sharp,  tight and knife-like  Relieving factors: rest, support, relaxation, ice and medications  Aggravating factors: standing, walking and stair climbing (bending)  Progression: worsening    Social Support  Lives in: multiple-level home  Lives with: adult children (In daughters home)    Employment status: not working    Diagnostic Tests  X-ray: abnormal  Treatments  Previous treatment: physical therapy, medication and injection treatment  Current treatment: physical therapy  Current treatment comments: Post op TKA PT- upcoming.       Objective     Tenderness   Left Knee   Tenderness in the lateral joint line, LCL (distal), LCL (proximal), MCL (distal), MCL (proximal), medial joint line, medial retinaculum, patellar tendon and popliteal fossa.     Additional Tenderness Details  Mild ttp diffusely about L knee     Lumbar Screen  Lumbar range of motion within normal limits.    Active Range of Motion   Left Hip   Flexion: WFL  Extension: WFL  Abduction: WFL  External rotation (90/90): WFL  Internal rotation (90/90): WFL    Right Hip   Flexion: WFL  Extension: WFL  Abduction: WFL  External rotation (90/90): WFL  Internal rotation (90/90): WFL  Left Knee   Flexion: 119 degrees   Extension: -7 degrees     Right Knee   Flexion: WFL  Extension: WFL  Left Ankle/Foot   Dorsiflexion (ke): WFL  Plantar flexion: WFL    Right Ankle/Foot   Dorsiflexion (ke): WFL  Plantar flexion: WFL    Additional Active Range of Motion Details  Stiffness > pain at end ranges  Mild discomfort end range flex L knee > ext     Passive Range of Motion   Left Hip   Flexion: WFL  Extension: WFL  Abduction: WFL  External rotation (90/90): WFL  Internal rotation (90/90): WFL    Right Hip   Flexion: WFL  Extension: WFL  Abduction: WFL  External rotation (90/90): WFL  Internal rotation (90/90): WFL    Right Knee   Flexion: WFL  Extension: WFL    Additional Passive Range of Motion Details  TBA post op       Mobility   Patellar Mobility:   Left Knee   Hypomobile:  left medial, left lateral, left superior and left inferior    Strength/Myotome Testing     Left Hip   Planes of Motion   Flexion: 4-  Extension: WFL  Abduction: 4  Adduction: 4+  External rotation: WFL    Right Hip   Planes of Motion   Flexion: WFL  Extension: WFL  Abduction: WFL  External rotation: WFL    Left Knee   Flexion: 4- (pain L knee)  Extension: 4- (pain L knee; 3+-4-)  Quadriceps contraction: fair    Right Knee   Flexion: 4  Extension: 4    Left Ankle/Foot   Dorsiflexion: 4+  Plantar flexion: 4+    Additional Strength Details  L knee pain with L hip MMT screen seated    MMT screen seated EOT     Tests     Left Hip   SLR: Negative.     Right Hip   SLR: Negative.     Ambulation   Weight-Bearing Status   Weight-Bearing Status (Left): weight-bearing as tolerated   Weight-Bearing Status (Right): full weight-bearing    Assistive device used: none    Additional Weight-Bearing Status Details  SPC use intermittently outdoors only per pt     Ambulation: Level Surfaces   Ambulation with assistive device: independent  Ambulation without assistive device: independent    Ambulation: Stairs   Ascend stairs: independent  Pattern: non-reciprocal  Railings: one rail  Descend stairs: independent  Pattern: non-reciprocal  Railings: one rail    Observational Gait   Gait: antalgic and asymmetric   Decreased walking speed, stride length and left stance time.   Left arm swing: decreased  Base of support: increased    Additional Observational Gait Details  Reduced L stance  Slight lateral flex with L stance  L knee varus noted mid stance with slight lateral shift L knee mid stance     Functional Assessment        Comments  Risk Assessment and Prediction Tool Score = 9/12  Car transfers independent  Timed Up and Go = NT     Virtual Home Assessment and Home Preparation Checklist were both reviewed with the patient               Precautions: Hx R TKA, Hx R MYAH, HTN, neuropathy, back pain, neck pain, hx skin CA          Re-eval Date:  NV    Date 6/3       Visit Count 1       FOTO NV       Pain In See IE       Pain Out See IE           Manuals 6/3       Upcoming- L patella mobs, L knee flex/ext with gentle stretch end range as leonora                                 Neuro Re-Ed        Balance         Perturbation training                                                 Ther Ex        APs      Heel slides Reviewed HEP       Reviewed HEP        Glute sets      Quad sets Reviewed HEP       Reviewed HEP        LAQ      Hip abd slides  Reviewed HEP     Reviewed HEP        Seated knee flexion AAROM     Seated static knee ext  Reviewed HEP      Reviewed HEP        SLRs      SAQ-->LAQ        HS curls      Bridges    HR/TRs        Mini squats      Step ups        Leg press         Ther Activity 10' total pt education regarding post operative expectations in terms of pain, mobility, total joint precautions, etc; education regarding role of HEP in pain management and restoring function; AD recommendations for post surgery such as RW, elevated toilet seat, and shower chair; review of precautions and safe mobility/transfers /how to transfer safely, etc ; reviewed home preparation checklist                        Gait Training                        Modalities prn                         6/3 - HEP was issued and reviewed this date for above noted exercises. Pt demonstrated understanding without incident and without questions/concerns. Will continue to update upcoming.

## 2024-06-04 DIAGNOSIS — M51.16 LUMBAR DISC HERNIATION WITH RADICULOPATHY: ICD-10-CM

## 2024-06-04 RX ORDER — GABAPENTIN 600 MG/1
600 TABLET ORAL 3 TIMES DAILY
Qty: 90 TABLET | Refills: 2 | Status: SHIPPED | OUTPATIENT
Start: 2024-06-04

## 2024-06-04 RX ORDER — GABAPENTIN 600 MG/1
600 TABLET ORAL 3 TIMES DAILY
Qty: 90 TABLET | Refills: 2 | OUTPATIENT
Start: 2024-06-04

## 2024-06-04 NOTE — TELEPHONE ENCOUNTER
S/w Pt, Pt requesting refill of Gabapentin medication. Pt reports taking Gabapentin 600 mg TID with relief and no negative s/e. Pt's preferred pharmacy is [facility]. Please review and advise.

## 2024-06-11 ENCOUNTER — ANESTHESIA (OUTPATIENT)
Dept: PERIOP | Facility: HOSPITAL | Age: 74
End: 2024-06-11
Payer: MEDICARE

## 2024-06-11 ENCOUNTER — HOSPITAL ENCOUNTER (OUTPATIENT)
Facility: HOSPITAL | Age: 74
Setting detail: OUTPATIENT SURGERY
Discharge: HOME | End: 2024-06-11
Attending: ORTHOPAEDIC SURGERY | Admitting: ORTHOPAEDIC SURGERY
Payer: MEDICARE

## 2024-06-11 VITALS
DIASTOLIC BLOOD PRESSURE: 84 MMHG | WEIGHT: 148 LBS | HEIGHT: 62 IN | HEART RATE: 78 BPM | OXYGEN SATURATION: 97 % | TEMPERATURE: 97.5 F | BODY MASS INDEX: 27.23 KG/M2 | SYSTOLIC BLOOD PRESSURE: 120 MMHG | RESPIRATION RATE: 18 BRPM

## 2024-06-11 DIAGNOSIS — Z96.651 STATUS POST TOTAL KNEE REPLACEMENT, RIGHT: ICD-10-CM

## 2024-06-11 DIAGNOSIS — Z96.652 STATUS POST TOTAL KNEE REPLACEMENT, LEFT: Primary | ICD-10-CM

## 2024-06-11 DIAGNOSIS — Z96.641 STATUS POST TOTAL HIP REPLACEMENT, RIGHT: ICD-10-CM

## 2024-06-11 LAB — GLUCOSE SERPL-MCNC: 75 MG/DL (ref 65–140)

## 2024-06-11 PROCEDURE — C1776 JOINT DEVICE (IMPLANTABLE): HCPCS | Performed by: ORTHOPAEDIC SURGERY

## 2024-06-11 PROCEDURE — C1713 ANCHOR/SCREW BN/BN,TIS/BN: HCPCS | Performed by: ORTHOPAEDIC SURGERY

## 2024-06-11 PROCEDURE — 86923 COMPATIBILITY TEST ELECTRIC: CPT

## 2024-06-11 PROCEDURE — 97116 GAIT TRAINING THERAPY: CPT

## 2024-06-11 PROCEDURE — 97163 PT EVAL HIGH COMPLEX 45 MIN: CPT

## 2024-06-11 PROCEDURE — 82948 REAGENT STRIP/BLOOD GLUCOSE: CPT

## 2024-06-11 PROCEDURE — 27447 TOTAL KNEE ARTHROPLASTY: CPT | Performed by: PHYSICIAN ASSISTANT

## 2024-06-11 PROCEDURE — 27447 TOTAL KNEE ARTHROPLASTY: CPT | Performed by: ORTHOPAEDIC SURGERY

## 2024-06-11 DEVICE — ATTUNE KNEE SYSTEM TIBIAL INSERT FIXED BEARING MEDIAL STABILIZED LEFT AOX 5, 5MM
Type: IMPLANTABLE DEVICE | Site: KNEE | Status: FUNCTIONAL
Brand: ATTUNE

## 2024-06-11 DEVICE — SMARTSET HIGH PERFORMANCE MV MEDIUM VISCOSITY BONE CEMENT 40G
Type: IMPLANTABLE DEVICE | Site: KNEE | Status: FUNCTIONAL
Brand: SMARTSET

## 2024-06-11 DEVICE — ATTUNE KNEE SYSTEM TIBIAL BASE FIXED BEARING SIZE 4 CEMENTED
Type: IMPLANTABLE DEVICE | Site: KNEE | Status: FUNCTIONAL
Brand: ATTUNE

## 2024-06-11 DEVICE — ATTUNE KNEE SYSTEM FEMORAL CRUCIATE RETAINING NARROW SIZE 5N LEFT CEMENTED
Type: IMPLANTABLE DEVICE | Site: KNEE | Status: FUNCTIONAL
Brand: ATTUNE

## 2024-06-11 DEVICE — ATTUNE PATELLA MEDIALIZED DOME 35MM CEMENTED AOX
Type: IMPLANTABLE DEVICE | Site: PATELLA | Status: FUNCTIONAL
Brand: ATTUNE

## 2024-06-11 RX ORDER — SENNOSIDES 8.6 MG
1 TABLET ORAL DAILY
Status: DISCONTINUED | OUTPATIENT
Start: 2024-06-11 | End: 2024-06-11 | Stop reason: HOSPADM

## 2024-06-11 RX ORDER — BISACODYL 10 MG
10 SUPPOSITORY, RECTAL RECTAL DAILY PRN
Status: DISCONTINUED | OUTPATIENT
Start: 2024-06-11 | End: 2024-06-11 | Stop reason: HOSPADM

## 2024-06-11 RX ORDER — OXYCODONE HYDROCHLORIDE 5 MG/1
5 TABLET ORAL EVERY 4 HOURS PRN
Status: DISCONTINUED | OUTPATIENT
Start: 2024-06-11 | End: 2024-06-11 | Stop reason: HOSPADM

## 2024-06-11 RX ORDER — ENOXAPARIN SODIUM 100 MG/ML
40 INJECTION SUBCUTANEOUS DAILY
Status: DISCONTINUED | OUTPATIENT
Start: 2024-06-12 | End: 2024-06-11 | Stop reason: HOSPADM

## 2024-06-11 RX ORDER — TRANEXAMIC ACID 10 MG/ML
1000 INJECTION, SOLUTION INTRAVENOUS ONCE
Status: COMPLETED | OUTPATIENT
Start: 2024-06-11 | End: 2024-06-11

## 2024-06-11 RX ORDER — ACETAMINOPHEN 325 MG/1
975 TABLET ORAL ONCE
Status: COMPLETED | OUTPATIENT
Start: 2024-06-11 | End: 2024-06-11

## 2024-06-11 RX ORDER — FENTANYL CITRATE/PF 50 MCG/ML
50 SYRINGE (ML) INJECTION
Status: DISCONTINUED | OUTPATIENT
Start: 2024-06-11 | End: 2024-06-11 | Stop reason: HOSPADM

## 2024-06-11 RX ORDER — DEXAMETHASONE SODIUM PHOSPHATE 10 MG/ML
INJECTION, SOLUTION INTRAMUSCULAR; INTRAVENOUS AS NEEDED
Status: DISCONTINUED | OUTPATIENT
Start: 2024-06-11 | End: 2024-06-11

## 2024-06-11 RX ORDER — ASCORBIC ACID 500 MG
500 TABLET ORAL DAILY
Status: DISCONTINUED | OUTPATIENT
Start: 2024-06-11 | End: 2024-06-11 | Stop reason: HOSPADM

## 2024-06-11 RX ORDER — SODIUM CHLORIDE, SODIUM LACTATE, POTASSIUM CHLORIDE, CALCIUM CHLORIDE 600; 310; 30; 20 MG/100ML; MG/100ML; MG/100ML; MG/100ML
100 INJECTION, SOLUTION INTRAVENOUS CONTINUOUS
Status: DISCONTINUED | OUTPATIENT
Start: 2024-06-11 | End: 2024-06-11 | Stop reason: HOSPADM

## 2024-06-11 RX ORDER — ONDANSETRON 2 MG/ML
4 INJECTION INTRAMUSCULAR; INTRAVENOUS ONCE AS NEEDED
Status: DISCONTINUED | OUTPATIENT
Start: 2024-06-11 | End: 2024-06-11 | Stop reason: HOSPADM

## 2024-06-11 RX ORDER — SODIUM CHLORIDE, SODIUM LACTATE, POTASSIUM CHLORIDE, CALCIUM CHLORIDE 600; 310; 30; 20 MG/100ML; MG/100ML; MG/100ML; MG/100ML
125 INJECTION, SOLUTION INTRAVENOUS CONTINUOUS
Status: DISCONTINUED | OUTPATIENT
Start: 2024-06-11 | End: 2024-06-11 | Stop reason: HOSPADM

## 2024-06-11 RX ORDER — PROMETHAZINE HYDROCHLORIDE 12.5 MG/1
12.5 TABLET ORAL EVERY 6 HOURS PRN
Qty: 12 TABLET | Refills: 0 | Status: SHIPPED | OUTPATIENT
Start: 2024-06-11 | End: 2024-06-11

## 2024-06-11 RX ORDER — BUPIVACAINE HYDROCHLORIDE 5 MG/ML
INJECTION, SOLUTION EPIDURAL; INTRACAUDAL
Status: COMPLETED | OUTPATIENT
Start: 2024-06-11 | End: 2024-06-11

## 2024-06-11 RX ORDER — ASPIRIN 325 MG
325 TABLET, DELAYED RELEASE (ENTERIC COATED) ORAL 2 TIMES DAILY
Qty: 84 TABLET | Refills: 0 | Status: SHIPPED | OUTPATIENT
Start: 2024-06-11 | End: 2024-06-11

## 2024-06-11 RX ORDER — DOCUSATE SODIUM 100 MG/1
100 CAPSULE, LIQUID FILLED ORAL 2 TIMES DAILY
Status: DISCONTINUED | OUTPATIENT
Start: 2024-06-11 | End: 2024-06-11 | Stop reason: HOSPADM

## 2024-06-11 RX ORDER — ONDANSETRON 2 MG/ML
4 INJECTION INTRAMUSCULAR; INTRAVENOUS EVERY 6 HOURS PRN
Status: DISCONTINUED | OUTPATIENT
Start: 2024-06-11 | End: 2024-06-11 | Stop reason: HOSPADM

## 2024-06-11 RX ORDER — CLINDAMYCIN PHOSPHATE 600 MG/50ML
600 INJECTION, SOLUTION INTRAVENOUS EVERY 8 HOURS
Status: DISCONTINUED | OUTPATIENT
Start: 2024-06-11 | End: 2024-06-11 | Stop reason: HOSPADM

## 2024-06-11 RX ORDER — CHLORHEXIDINE GLUCONATE ORAL RINSE 1.2 MG/ML
15 SOLUTION DENTAL ONCE
Status: COMPLETED | OUTPATIENT
Start: 2024-06-11 | End: 2024-06-11

## 2024-06-11 RX ORDER — PROMETHAZINE HYDROCHLORIDE 12.5 MG/1
12.5 TABLET ORAL EVERY 6 HOURS PRN
Qty: 12 TABLET | Refills: 0 | Status: SHIPPED | OUTPATIENT
Start: 2024-06-11

## 2024-06-11 RX ORDER — ACETAMINOPHEN 325 MG/1
975 TABLET ORAL EVERY 8 HOURS SCHEDULED
Status: DISCONTINUED | OUTPATIENT
Start: 2024-06-11 | End: 2024-06-11 | Stop reason: HOSPADM

## 2024-06-11 RX ORDER — PROPOFOL 10 MG/ML
INJECTION, EMULSION INTRAVENOUS CONTINUOUS PRN
Status: DISCONTINUED | OUTPATIENT
Start: 2024-06-11 | End: 2024-06-11

## 2024-06-11 RX ORDER — DOCUSATE SODIUM 100 MG/1
100 CAPSULE, LIQUID FILLED ORAL 2 TIMES DAILY
Qty: 30 CAPSULE | Refills: 0 | Status: SHIPPED | OUTPATIENT
Start: 2024-06-11 | End: 2024-06-21

## 2024-06-11 RX ORDER — GABAPENTIN 300 MG/1
600 CAPSULE ORAL 3 TIMES DAILY
Status: DISCONTINUED | OUTPATIENT
Start: 2024-06-11 | End: 2024-06-11 | Stop reason: HOSPADM

## 2024-06-11 RX ORDER — HYDROMORPHONE HCL/PF 1 MG/ML
0.2 SYRINGE (ML) INJECTION EVERY 4 HOURS PRN
Status: DISCONTINUED | OUTPATIENT
Start: 2024-06-11 | End: 2024-06-11 | Stop reason: HOSPADM

## 2024-06-11 RX ORDER — CLINDAMYCIN HYDROCHLORIDE 300 MG/1
300 CAPSULE ORAL EVERY 12 HOURS
Qty: 2 CAPSULE | Refills: 0 | Status: SHIPPED | OUTPATIENT
Start: 2024-06-11 | End: 2024-06-11

## 2024-06-11 RX ORDER — DULOXETIN HYDROCHLORIDE 60 MG/1
60 CAPSULE, DELAYED RELEASE ORAL DAILY
Status: DISCONTINUED | OUTPATIENT
Start: 2024-06-11 | End: 2024-06-11 | Stop reason: HOSPADM

## 2024-06-11 RX ORDER — OXYCODONE HYDROCHLORIDE 5 MG/1
5 TABLET ORAL EVERY 4 HOURS PRN
Qty: 42 TABLET | Refills: 0 | Status: SHIPPED | OUTPATIENT
Start: 2024-06-11 | End: 2024-06-11

## 2024-06-11 RX ORDER — PRAVASTATIN SODIUM 40 MG
80 TABLET ORAL
Status: DISCONTINUED | OUTPATIENT
Start: 2024-06-11 | End: 2024-06-11 | Stop reason: HOSPADM

## 2024-06-11 RX ORDER — CHLORHEXIDINE GLUCONATE 40 MG/ML
SOLUTION TOPICAL DAILY PRN
Status: DISCONTINUED | OUTPATIENT
Start: 2024-06-11 | End: 2024-06-11 | Stop reason: HOSPADM

## 2024-06-11 RX ORDER — FOLIC ACID 1 MG/1
1 TABLET ORAL DAILY
Status: DISCONTINUED | OUTPATIENT
Start: 2024-06-11 | End: 2024-06-11 | Stop reason: HOSPADM

## 2024-06-11 RX ORDER — OXYCODONE HYDROCHLORIDE 5 MG/1
5 TABLET ORAL EVERY 4 HOURS PRN
Qty: 42 TABLET | Refills: 0 | Status: SHIPPED | OUTPATIENT
Start: 2024-06-11 | End: 2024-06-21

## 2024-06-11 RX ORDER — ONDANSETRON 2 MG/ML
INJECTION INTRAMUSCULAR; INTRAVENOUS AS NEEDED
Status: DISCONTINUED | OUTPATIENT
Start: 2024-06-11 | End: 2024-06-11

## 2024-06-11 RX ORDER — MAGNESIUM HYDROXIDE 1200 MG/15ML
LIQUID ORAL AS NEEDED
Status: DISCONTINUED | OUTPATIENT
Start: 2024-06-11 | End: 2024-06-11 | Stop reason: HOSPADM

## 2024-06-11 RX ORDER — HYDROMORPHONE HCL IN WATER/PF 6 MG/30 ML
0.2 PATIENT CONTROLLED ANALGESIA SYRINGE INTRAVENOUS
Status: DISCONTINUED | OUTPATIENT
Start: 2024-06-11 | End: 2024-06-11 | Stop reason: HOSPADM

## 2024-06-11 RX ORDER — SODIUM CHLORIDE 9 MG/ML
75 INJECTION, SOLUTION INTRAVENOUS CONTINUOUS
Status: DISCONTINUED | OUTPATIENT
Start: 2024-06-11 | End: 2024-06-11 | Stop reason: HOSPADM

## 2024-06-11 RX ORDER — CYCLOBENZAPRINE HCL 10 MG
10 TABLET ORAL
Status: DISCONTINUED | OUTPATIENT
Start: 2024-06-11 | End: 2024-06-11 | Stop reason: HOSPADM

## 2024-06-11 RX ORDER — DOCUSATE SODIUM 100 MG/1
100 CAPSULE, LIQUID FILLED ORAL 2 TIMES DAILY
Qty: 30 CAPSULE | Refills: 0 | Status: SHIPPED | OUTPATIENT
Start: 2024-06-11 | End: 2024-06-11

## 2024-06-11 RX ORDER — CLINDAMYCIN HYDROCHLORIDE 300 MG/1
300 CAPSULE ORAL EVERY 12 HOURS
Qty: 2 CAPSULE | Refills: 0 | Status: SHIPPED | OUTPATIENT
Start: 2024-06-11 | End: 2024-06-12

## 2024-06-11 RX ORDER — BUPIVACAINE HYDROCHLORIDE 7.5 MG/ML
INJECTION, SOLUTION INTRASPINAL AS NEEDED
Status: DISCONTINUED | OUTPATIENT
Start: 2024-06-11 | End: 2024-06-11

## 2024-06-11 RX ORDER — PANTOPRAZOLE SODIUM 20 MG/1
20 TABLET, DELAYED RELEASE ORAL
Status: DISCONTINUED | OUTPATIENT
Start: 2024-06-12 | End: 2024-06-11 | Stop reason: HOSPADM

## 2024-06-11 RX ORDER — CALCIUM CARBONATE 500 MG/1
1000 TABLET, CHEWABLE ORAL DAILY PRN
Status: DISCONTINUED | OUTPATIENT
Start: 2024-06-11 | End: 2024-06-11 | Stop reason: HOSPADM

## 2024-06-11 RX ORDER — ASPIRIN 325 MG
325 TABLET, DELAYED RELEASE (ENTERIC COATED) ORAL 2 TIMES DAILY
Qty: 84 TABLET | Refills: 0 | Status: SHIPPED | OUTPATIENT
Start: 2024-06-11

## 2024-06-11 RX ORDER — FENTANYL CITRATE 50 UG/ML
INJECTION, SOLUTION INTRAMUSCULAR; INTRAVENOUS
Status: COMPLETED | OUTPATIENT
Start: 2024-06-11 | End: 2024-06-11

## 2024-06-11 RX ORDER — CLINDAMYCIN PHOSPHATE 900 MG/50ML
900 INJECTION, SOLUTION INTRAVENOUS ONCE
Status: COMPLETED | OUTPATIENT
Start: 2024-06-11 | End: 2024-06-11

## 2024-06-11 RX ADMIN — PHENYLEPHRINE HYDROCHLORIDE 20 MCG/MIN: 10 INJECTION INTRAVENOUS at 11:59

## 2024-06-11 RX ADMIN — CLINDAMYCIN PHOSPHATE 600 MG: 600 INJECTION, SOLUTION INTRAVENOUS at 17:29

## 2024-06-11 RX ADMIN — FOLIC ACID 1 MG: 1 TABLET ORAL at 15:09

## 2024-06-11 RX ADMIN — DOCUSATE SODIUM 100 MG: 100 CAPSULE, LIQUID FILLED ORAL at 17:53

## 2024-06-11 RX ADMIN — PRAVASTATIN SODIUM 80 MG: 40 TABLET ORAL at 16:26

## 2024-06-11 RX ADMIN — CHLORHEXIDINE GLUCONATE 0.12% ORAL RINSE 15 ML: 1.2 LIQUID ORAL at 08:37

## 2024-06-11 RX ADMIN — ACETAMINOPHEN 975 MG: 325 TABLET ORAL at 08:37

## 2024-06-11 RX ADMIN — TRANEXAMIC ACID 1000 MG: 10 INJECTION, SOLUTION INTRAVENOUS at 12:05

## 2024-06-11 RX ADMIN — ACETAMINOPHEN 975 MG: 325 TABLET ORAL at 15:16

## 2024-06-11 RX ADMIN — CHOLECALCIFEROL TAB 25 MCG (1000 UNIT) 1000 UNITS: 25 TAB at 15:15

## 2024-06-11 RX ADMIN — SODIUM CHLORIDE, SODIUM LACTATE, POTASSIUM CHLORIDE, AND CALCIUM CHLORIDE 100 ML/HR: .6; .31; .03; .02 INJECTION, SOLUTION INTRAVENOUS at 15:17

## 2024-06-11 RX ADMIN — CLINDAMYCIN IN 5 PERCENT DEXTROSE 900 MG: 18 INJECTION, SOLUTION INTRAVENOUS at 11:56

## 2024-06-11 RX ADMIN — GABAPENTIN 600 MG: 300 CAPSULE ORAL at 15:15

## 2024-06-11 RX ADMIN — BUPIVACAINE HYDROCHLORIDE 20 ML: 5 INJECTION, SOLUTION EPIDURAL; INTRACAUDAL; PERINEURAL at 10:46

## 2024-06-11 RX ADMIN — OXYCODONE HYDROCHLORIDE AND ACETAMINOPHEN 500 MG: 500 TABLET ORAL at 15:15

## 2024-06-11 RX ADMIN — DEXAMETHASONE SODIUM PHOSPHATE 10 MG: 10 INJECTION, SOLUTION INTRAMUSCULAR; INTRAVENOUS at 12:41

## 2024-06-11 RX ADMIN — ONDANSETRON 4 MG: 2 INJECTION INTRAMUSCULAR; INTRAVENOUS at 12:41

## 2024-06-11 RX ADMIN — BUPIVACAINE HYDROCHLORIDE IN DEXTROSE 1.5 ML: 7.5 INJECTION, SOLUTION SUBARACHNOID at 11:57

## 2024-06-11 RX ADMIN — SODIUM CHLORIDE, SODIUM LACTATE, POTASSIUM CHLORIDE, AND CALCIUM CHLORIDE: .6; .31; .03; .02 INJECTION, SOLUTION INTRAVENOUS at 11:49

## 2024-06-11 RX ADMIN — SENNOSIDES 8.6 MG: 8.6 TABLET, FILM COATED ORAL at 15:16

## 2024-06-11 RX ADMIN — FENTANYL CITRATE 50 MCG: 50 INJECTION, SOLUTION INTRAMUSCULAR; INTRAVENOUS at 10:45

## 2024-06-11 RX ADMIN — PROPOFOL 90 MCG/KG/MIN: 10 INJECTION, EMULSION INTRAVENOUS at 11:58

## 2024-06-11 RX ADMIN — DULOXETINE HYDROCHLORIDE 60 MG: 60 CAPSULE, DELAYED RELEASE ORAL at 15:16

## 2024-06-11 RX ADMIN — IRON SUCROSE 300 MG: 20 INJECTION, SOLUTION INTRAVENOUS at 15:17

## 2024-06-11 NOTE — PLAN OF CARE
Problem: PHYSICAL THERAPY ADULT  Goal: Performs mobility at highest level of function for planned discharge setting.  See evaluation for individualized goals.  Description: Treatment/Interventions: Functional transfer training, LE strengthening/ROM, Elevations, Therapeutic exercise, Endurance training, Patient/family training, Equipment eval/education, Bed mobility, Gait training, Compensatory technique education, Spoke to nursing          See flowsheet documentation for full assessment, interventions and recommendations.  Note: Prognosis: Excellent  Problem List: Decreased strength, Decreased range of motion, Decreased endurance, Impaired balance, Decreased mobility  Assessment: Pt is 74 y.o. female seen for a PT evaluation s/p admit to  [place]  on 6/11/2024, s/p L TKA. Please see above for other active problem list / PMH. PT now consulted to assess functional mobility and needs for safe d/c planning. Prior to admission, pt was I w/ ambulation w/o AD, lives w/ son in a 2SH w/ 3 YULISSA. Currently pt requires S for bed skills; S for functional transfers; CGA -> CS for ambulation w/ RW; CGA for stair negotiation. Pt presents functioning below baseline and w/ overall mobility deficits 2* to: decreased LE strength; decreased L knee ROM; decreased endurance; impaired balance; gait deviations; multiple lines. Pt will continue to benefit from skilled PT interventions to address stated impairments; to maximize functional potential; for ongoing pt/ family training; and DME needs. PT is currently recommending OPPT.        Rehab Resource Intensity Level, PT: III (Minimum Resource Intensity)    See flowsheet documentation for full assessment.

## 2024-06-11 NOTE — ANESTHESIA PROCEDURE NOTES
Peripheral Block    Patient location during procedure: PACU  Start time: 6/11/2024 10:45 AM  Reason for block: at surgeon's request and post-op pain management  Staffing  Performed by: Alexis Gordillo MD  Authorized by: Alexis Gordillo MD    Preanesthetic Checklist  Completed: patient identified, IV checked, site marked, risks and benefits discussed, surgical consent, monitors and equipment checked, pre-op evaluation and timeout performed  Peripheral Block  Patient position: supine  Prep: ChloraPrep  Block type: Adductor Canal  Laterality: left  Injection technique: single-shot  Procedures: ultrasound guided, Ultrasound guidance required for the procedure to increase accuracy and safety of medication placement and decrease risk of complications.  Ultrasound permanent image saved  bupivacaine (PF) (MARCAINE) 0.5 % injection 20 mL - Perineural   20 mL - 6/11/2024 10:46:00 AM  fentanyl citrate (PF) 100 MCG/2ML 50 mcg - Intravenous   50 mcg - 6/11/2024 10:45:00 AM  Needle  Needle type: Stimuplex   Needle gauge: 20 G  Needle length: 4 in  Needle localization: ultrasound guidance  Needle insertion depth: 5 cm  Assessment  Injection assessment: frequent aspiration, injected with ease, negative aspiration, incremental injection, needle tip visualized at all times, negative for heart rate change and no symptoms of intraneural/intravenous injection  Paresthesia pain: immediately resolved  Post-procedure:  site cleaned  patient tolerated the procedure well with no immediate complications

## 2024-06-11 NOTE — ANESTHESIA PREPROCEDURE EVALUATION
Procedure:  ARTHROPLASTY KNEE TOTAL, potential same day discharge (Left: Knee)    Relevant Problems   CARDIO   (+) Benign essential HTN   (+) Mixed hyperlipidemia      ENDO   (+) Type 2 diabetes mellitus with obesity  (HCC)      GI/HEPATIC   (+) Fatty liver   (+) Gastroesophageal reflux disease without esophagitis      HEMATOLOGY   (+) Anemia associated with nutritional deficiency      MUSCULOSKELETAL   (+) Cervical spine arthritis   (+) Chronic right-sided low back pain with right-sided sciatica   (+) Degenerative disc disease, cervical   (+) Fibromyalgia   (+) Lumbar spondylosis   (+) Primary osteoarthritis of left knee   (+) Primary osteoarthritis of right hip      NEURO/PSYCH   (+) Chronic right-sided low back pain with right-sided sciatica   (+) Current episode of major depressive disorder without prior episode   (+) Fibromyalgia      Endocrine   (+) IFG (impaired fasting glucose)      Rheumatology   (+) Lumbar disc disease with radiculopathy      Surgery/Wound/Pain   (+) Lower extremity edema      Orthopedic/Musculoskeletal   (+) Cervical radiculopathy   (+) Lumbar radiculopathy   (+) Pes anserine bursitis   (+) Spasm of right piriformis muscle   (+) Spinal stenosis of lumbar region with neurogenic claudication   (+) Status post total hip replacement, right   (+) Status post total knee replacement, right      Other   (+) Teeth missing        Physical Exam    Airway    Mallampati score: II  TM Distance: >3 FB  Neck ROM: full     Dental   Comment: Missing all teeth     Cardiovascular  Cardiovascular exam normal    Pulmonary  Pulmonary exam normal     Other Findings  post-pubertal.      Anesthesia Plan  ASA Score- 2     Anesthesia Type- spinal with ASA Monitors.         Additional Monitors:     Airway Plan: ETT.           Plan Factors-Exercise tolerance (METS): >4 METS.    Chart reviewed. EKG reviewed.  Existing labs reviewed.     Patient is not a current smoker. Patient not instructed to abstain from smoking on  day of procedure. Patient did not smoke on day of surgery.            Induction- intravenous.    Postoperative Plan-         Informed Consent- Anesthetic plan and risks discussed with patient.  I personally reviewed this patient with the CRNA. Discussed and agreed on the Anesthesia Plan with the CRNA..

## 2024-06-11 NOTE — NURSING NOTE
Discharge instructions given to pt. Pt verbalizes the understanding of discharge instructions. All belongings given to pt. Pt in no distress and no concerns at this time.  Medication was sent to [facility] pharmacy due to pts local pharmacy  closing while resident was still admitted in the hospital. This nurse advised pt and family to ensure that medication was at [facility] pharmacy before leaving the hospital. Family refused and left before confirming medication was available.

## 2024-06-11 NOTE — PLAN OF CARE
Problem: PAIN - ADULT  Goal: Verbalizes/displays adequate comfort level or baseline comfort level  Description: Interventions:  - Encourage patient to monitor pain and request assistance  - Assess pain using appropriate pain scale  - Administer analgesics based on type and severity of pain and evaluate response  - Implement non-pharmacological measures as appropriate and evaluate response  - Consider cultural and social influences on pain and pain management  - Notify physician/advanced practitioner if interventions unsuccessful or patient reports new pain  Outcome: Progressing     Problem: INFECTION - ADULT  Goal: Absence or prevention of progression during hospitalization  Description: INTERVENTIONS:  - Assess and monitor for signs and symptoms of infection  - Monitor lab/diagnostic results  - Monitor all insertion sites, i.e. indwelling lines, tubes, and drains  - Monitor endotracheal if appropriate and nasal secretions for changes in amount and color  - [place] appropriate cooling/warming therapies per order  - Administer medications as ordered  - Instruct and encourage patient and family to use good hand hygiene technique  - Identify and instruct in appropriate isolation precautions for identified infection/condition  Outcome: Progressing  Goal: Absence of fever/infection during neutropenic period  Description: INTERVENTIONS:  - Monitor WBC    Outcome: Progressing     Problem: SAFETY ADULT  Goal: Patient will remain free of falls  Description: INTERVENTIONS:  - Educate patient/family on patient safety including physical limitations  - Instruct patient to call for assistance with activity   - Consult OT/PT to assist with strengthening/mobility   - Keep Call bell within reach  - Keep bed low and locked with side rails adjusted as appropriate  - Keep care items and personal belongings within reach  - Initiate and maintain comfort rounds  - Make Fall Risk Sign visible to staff  - Apply yellow socks and bracelet  for high fall risk patients  - Consider moving patient to room near nurses station  Outcome: Progressing  Goal: Maintain or return to baseline ADL function  Description: INTERVENTIONS:  -  Assess patient's ability to carry out ADLs; assess patient's baseline for ADL function and identify physical deficits which impact ability to perform ADLs (bathing, care of mouth/teeth, toileting, grooming, dressing, etc.)  - Assess/evaluate cause of self-care deficits   - Assess range of motion  - Assess patient's mobility; develop plan if impaired  - Assess patient's need for assistive devices and provide as appropriate  - Encourage maximum independence but intervene and supervise when necessary  - Involve family in performance of ADLs  - Assess for home care needs following discharge   - Consider OT consult to assist with ADL evaluation and planning for discharge  - Provide patient education as appropriate  Outcome: Progressing  Goal: Maintains/Returns to pre admission functional level  Description: INTERVENTIONS:  - Perform AM-PAC 6 Click Basic Mobility/ Daily Activity assessment daily.  - Set and communicate daily mobility goal to care team and patient/family/caregiver.   - Collaborate with rehabilitation services on mobility goals if consulted  - Out of bed for toileting  - Record patient progress and toleration of activity level   Outcome: Progressing     Problem: DISCHARGE PLANNING  Goal: Discharge to home or other facility with appropriate resources  Description: INTERVENTIONS:  - Identify barriers to discharge w/patient and caregiver  - Arrange for needed discharge resources and transportation as appropriate  - Identify discharge learning needs (meds, wound care, etc.)  - Arrange for interpretive services to assist at discharge as needed  - Refer to Case Management Department for coordinating discharge planning if the patient needs post-hospital services based on physician/advanced practitioner order or complex needs  related to functional status, cognitive ability, or social support system  Outcome: Progressing     Problem: Knowledge Deficit  Goal: Patient/family/caregiver demonstrates understanding of disease process, treatment plan, medications, and discharge instructions  Description: Complete learning assessment and assess knowledge base.  Interventions:  - Provide teaching at level of understanding  - Provide teaching via preferred learning methods  Outcome: Progressing

## 2024-06-11 NOTE — ANESTHESIA PROCEDURE NOTES
Spinal Block    Patient location during procedure: OR  Start time: 6/11/2024 11:57 AM  Reason for block: primary anesthetic  Staffing  Performed by: Low Rosario CRNA  Authorized by: Alexis Gordillo MD    Preanesthetic Checklist  Completed: patient identified, IV checked, risks and benefits discussed, surgical consent, monitors and equipment checked, pre-op evaluation and timeout performed  Spinal Block  Patient position: sitting  Prep: ChloraPrep  Patient monitoring: continuous pulse ox and frequent blood pressure checks  Approach: midline  Location: L3-4  Needle  Needle type: Pencil Point and Pencan   Needle gauge: 24 G  Needle length: 4 in  Assessment  Sensory level: T10  Injection Assessment:  negative aspiration for heme, no paresthesia on injection and positive aspiration for clear CSF.

## 2024-06-11 NOTE — OP NOTE
OPERATIVE REPORT  PATIENT NAME: Rosy Gibson  : 1950  MRN: [id number]  Pt Location:   OR ROOM 12    Surgery Date: 2024    Surgeons and Role:     * Liss Whitehead, DO - Primary     * SEAN BoltonC - Assisting      * Vincent Petty, ATC - Assisting    Preop Diagnosis:  Arthritis of left knee [M17.12]    Post-Op Diagnosis Codes:     * Arthritis of left knee [M17.12]    Procedure(s):  Left - ARTHROPLASTY KNEE TOTAL. potential same day discharge    Specimens:  * No specimens in log *    Estimated Blood Loss:   50 mL    Drains:  * No LDAs found *    Anesthesia Type:   Spinal     Operative Indications:  Arthritis of left knee [M17.12]    Operative Findings:  See below    Complications:   None      Knee Approach: Medial Parapatellar    Procedure and Technique:  Implants:  Depuy Attune  CR femoral component size 5 narrow  Tibial base fixed bearing size 4  Tibial insert fixed bearing MS size 5, 5mm  Dome patella size 35mm    INDICATIONS FOR PROCEDURE:  The patients is a 74 y.o. female who presented to the office with  knee OA.  Conservative treatment was attempted for quite some time and ultimately failed.  She has severe progressive pain, stiffness, and disability and now elects to proceed with left total knee replacement surgery.  Extensive counseling in regards to the reasons for surgical intervention as well as the risks and benefits of surgery were reviewed.  The risks include, but are not limited to infection, extensive blood loss, blood clots, wound healing problems, fracture, need for additional surgery, need for revision surgery, failure of hardware, persistent pain and stiffness, heart attack, stroke, death.  The patient understood and agreed to by oral and written consent.    OPERATIVE PROCEDURE:  The patient was identified as Rosy Gibson by Her ID bracelet by the surgical staff in the preoperative area at [facility].  The patient was wheeled back to the  surgical room and placed on the operative table.  Anesthesia was administered.  Preoperative antibiotics were given.    The patient remained in the supine position and all bony prominences were carefully protected.  A tourniquet was applied to the patient’s left upper thigh.  The left leg was then prepped and draped in the usual sterile fashion.  A timeout was performed where the patient’s name and surgical site were once again identified.  The incision was marked out.  The leg was elevated and the tourniquet was inflated to 250 mmHg.      An incision was made over anterior aspect of the knee.  Dissection was made through the skin and subcutaneous tissue.   A medial parapatellar arthrotomy was made and the medial tissues were elevated while protecting the MCL.  Remnants of the ACL, medial and lateral menisci were removed and retractors were placed.  Severe osteoarthritis was noted.  The femoral canal was opened with a drill and the contents were suctioned and the canal was irrigated.  We used an intramedullary guide on the femoral side and resected 9mm of distal femur in 5 degrees of valgus.  Osteophytes were removed.  We then subluxated the tibia forward and opened the tibial canal with a drill.  The contents of the canal were then suctioned and the canal was irrigated.      We placed an intramedullary guide and measured our resection of the tibial plateau keeping it perpendicular to the mechanical axis of the tibia.  Next, the flexion and extension gaps were analyzed with a spacer block and visualization.  [place]’s line and the epicondylar axis were then identified.  The 4 in 1 cutting block was placed in 3 degrees of external rotation and resections were made.  Posterior osteophytes and any remnants of the medial and lateral menisci were removed.  Trials were then placed and the knee was felt to be stable and well balanced throughout the arc of motion.      The patella was measured and a portion of the  articular aspect of the patella was removed.  The trial for the patella was placed and patellar tracking was checked and found to be adequate with the other components in place.      Attention was directed back to the tibia.  External rotation of the tibial guide was set and the final preparations of the tibia were performed.      The components were removed and the knee was copiously irrigated with pulse lavage and then dried.  The components were then cemented in place and excess cement was removed.  Once the cement was dried the trial insert was trialed.  A size  5, 5mm  tibial insert was confirmed to be the correct size.  The final polyethylene component was placed and the tourniquet was let down.  Any bleeders were cauterized and then the knee was irrigated.  An irrisept wash was performed and then the knee was once again copiously irrigated.  Marcaine, morphine, and toradol was injected periarticularly throughout the case.  The arthrotomy was closed with vicryl suture.  The skin and subcutaneous tissues were closed with vicryl and then a running monocryl stitch.  A sterile dressing was placed. The patient was awakened and transferred to a hospital bed and then to the recovery room in stable condition.      I attest that I was present and performed this procedure. Lily Milian PA-C and Vincent Petty ATC were present for the entire procedure and provided essential assistance with limb position, patient prepping, and retraction.     A qualified resident physician was not available.    Patient Disposition:  hemodynamically stable            SIGNATURE: Liss Whitehead,   DATE: June 11, 2024  TIME: 2:11 PM

## 2024-06-11 NOTE — DISCHARGE INSTR - AVS FIRST PAGE
TOTAL KNEE REPLACEMENT DISCHARGE INSTRUCTIONS    Surgical Dressing:  You may leave your dressing in place for 1 week.  If it starts lifting off you can change it prior to that starting 2 days after your surgery.  Once you start changing your dressing you should change your dressing daily until drainage stops.  Do not remove your steri-strips.  Do not put any lotions or creams on your incision.  If there are any signs of infection such as drainage persisting beyond 7 days, unusual looking drainage (yellow, green), increased redness around the incision, or fever/chills let your doctor know.      Do not get your incision wet.      Continue to wear your ADOLPH stockings 2 weeks after surgery.  You can remove at night them to wash, hang to dry, and reapply in the morning after sponge bath or shower (do not get your incision wet).  You can wear them as needed after that.  If the stockings are too tight at the top you can cut the elastic.     Medications:  Upon discharge you will be given a prescription for an anticoagulant (i.e. Ecotrin (Aspirin), Coumadin (Warfarin), Lovenox (Enoxaparin)).  Take as prescribed.  Do not take any over the counter NSAIDs (i.e. Ibuprofen, Motrin, Advil, Naprosyn, Aleve) while on your anticoagulation medication unless otherwise specified by your surgeon.  You will also be given Gabapentin if appropriate and a narcotic pain reliever for pain.  Please be mindful of the number of pills you have left.  You can only get a refill Monday-Friday during business hours from your surgeon or the physician assistant.  You will not be given any refills on nights and weekends.  Call ahead if needed.  Please include tylenol in your pain regimen as well.  You will also be given Colace to prevent constipation that can be caused by narcotics.  Please include other over- the- counter medications for constipation if needed.     If you are on Coumadin (Warfarin) you will need your blood drawn every Monday and Thursday  once you are home.  Initially your visiting nurse will draw your blood.  Later you will go to an outpatient lab.  You will receive a phone call the next day and your Coumadin (warfarin) will be dosed based on these results.  Take this dosage daily until you hear from us next.      Walking:  Use two crutches or a walker for EVERY step.  Gradually increase your walking daily.  You can progress to a cane as tolerated once advised by your physical therapist.  Be sure to work on advancing your range of motion daily.      Bathing:  Do not get your incision wet until follow up in the office.  No tub baths.  Do not submerge your incision.  You may shower but you must cover your incision so it does NOT get wet.  Gauze and Tegaderm dressing can be used to cover your incision.  Once your original dressing comes off.  These can be found at the drug store.  Use your crutches/walker to get in and out of the shower.  Use a chair if needed.      Sexual Relations:  Resume according to your comfort.    Swimming:  No swimming or hot tubs until approved by your physician.  Swimming will be allowed once your incision is well healed.      Driving:  You may ride as a passenger now.  No driving until your follow-up appointment.  To get into the car use the front passenger seat with the seat pushed back as far as possible.  Scoot yourself back in the seat.  Use your hands to assist your legs into the car.      Physical therapy:  Continue with exercises at home.  Plan on going to outpatient physical therapy within a couple days of your surgery once you are home.  If you are doing home physical therapy please call the office for a prescription once you are ready to transition to outpatient physical therapy.      Special considerations:  To minimize swelling, stiffness, and decrease pain use cold as needed, but not heat.  Ice 20 minutes at a time with a cloth between your skin and the ice.  Ice after walking, when you have pain, or after you  have completed your exercises.  Limit your sitting to 60 minutes at one time.  Continue to wear your ADOLPH stockings 2 weeks after surgery.  You can remove at night them to wash, hang to dry, and reapply in the morning after sponge bath or shower (do not get your incision wet).  You can wear them as needed after that.  If the stockings are too tight at the top you can cut the elastic.     Follow up:  Call [phone] to make an appointment to see your surgeon within 2 weeks of your surgery if you haven’t done so already.  If you have any questions please call [phone] for any concerns as well.      For the future:  Antibiotics for dental appointments is controversial.  If you have multiple medical problems we would recommend for you to call the office for a prescription for antibiotics to be taken one hour prior to your dental appointment.  If you do not have multiple medical problems it is your choice if you would like to take antibiotics prior to dental appointments.  We would also recommend you talking to your dentist prior to your appointment as well.  For any invasive procedures (i.e. endoscopy, colonoscopy, etc.) inform the doctor performing the procedure that you have a total knee replacement and they will give you antibiotics just prior to the procedure.

## 2024-06-11 NOTE — PHYSICAL THERAPY NOTE
Physical Therapy Evaluation    Patient's Name: Rosy Gibson    Admitting Diagnosis  Arthritis of left knee [M17.12]    Problem List  Patient Active Problem List   Diagnosis    Primary osteoarthritis of left knee    Lumbar disc disease with radiculopathy    Chronic right-sided low back pain with right-sided sciatica    Fibromyalgia    Benign essential HTN    Fatty liver    Gastroesophageal reflux disease without esophagitis    Lumbar radiculopathy    Pes anserine bursitis    Lower extremity edema    Cervical radiculopathy    Cervical spine arthritis    Cervical spinal stenosis    Lumbar spondylosis    Current episode of major depressive disorder without prior episode    Mixed hyperlipidemia    IFG (impaired fasting glucose)    Degenerative disc disease, cervical    Type 2 diabetes mellitus with obesity  (HCC)    Spasm of right piriformis muscle    Primary osteoarthritis of right hip    Spinal stenosis of lumbar region with neurogenic claudication    Status post total hip replacement, right    Status post total knee replacement, right    Anemia associated with nutritional deficiency    Teeth missing    Status post total knee replacement, left       Past Medical History  Past Medical History:   Diagnosis Date    Arthritis     Encounter for geriatric assessment 05/18/2023    Fatty liver 05/01/2018    Fibromyalgia, primary     GERD (gastroesophageal reflux disease)     Hyperlipidemia     Hypertension     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    Low back pain     Lumbar stenosis     Neck pain     Neuropathy     feet    No natural teeth     on lower    Primary localized osteoarthritis of right knee 08/17/2017    Sacroiliitis (HCC) 08/20/2018    Skin cancer     on chest in past - melanoma    Sleep apnea     resolved with weight loss    Use of cane as ambulatory aid     or walker    Wears dentures     full  uppers       Past Surgical History  Past Surgical History:   Procedure Laterality Date    JOINT  REPLACEMENT Right 06/2023    RTK RTH    DE ARTHRP ACETBLR/PROX FEM PROSTC AGRFT/ALGRFT Right 06/19/2023    Procedure: ARTHROPLASTY HIP TOTAL ANTERIOR;  Surgeon: Liss Whitehead DO;  Location:  MAIN OR;  Service: Orthopedics    DE ARTHRP KNE CONDYLE&PLATU MEDIAL&LAT COMPARTMENTS Right 10/18/2023    Procedure: RIGHT TOTAL KNEE ARTHROPLASTY;  Surgeon: Liss Whitehead DO;  Location:  MAIN OR;  Service: Orthopedics    SKIN CANCER EXCISION      on chest - melanoma    TUBAL LIGATION          06/11/24 1705   PT Last Visit   PT Visit Date 06/11/24   Note Type   Note type Evaluation  (+ treatment)   Pain Assessment   Pain Assessment Tool 0-10   Pain Score No Pain   Restrictions/Precautions   Weight Bearing Precautions Per Order Yes   LLE Weight Bearing Per Order WBAT   Other Precautions Fall Risk;Multiple lines   Home Living   Type of Home House   Home Layout Two level;Stairs to enter with rails  (2-3 YULISSA w/ L HR)   Home Equipment Walker;Cane   Prior Function   Level of [place] Independent with ADLs;Independent with functional mobility  (I ambulation w/o AD)   Lives With Son   Receives Help From Family   Falls in the last 6 months 0   General   Family/Caregiver Present Yes  (son)   Cognition   Arousal/Participation Alert   Orientation Level Oriented X4   Comments pleasant + cooperative   Subjective   Subjective Agreeable to mobilize.   RLE Assessment   RLE Assessment WFL   LLE Assessment   LLE Assessment   (grossly 3/5)   Coordination   Movements are Fluid and Coordinated 1   Sensation WFL   Bed Mobility   Supine to Sit 5  Supervision   Additional items HOB elevated;Increased time required;Verbal cues   Additional Comments Pt greeted in supine.   Transfers   Sit to Stand 5  Supervision   Additional items Increased time required;Verbal cues;Armrests   Stand to Sit 5  Supervision   Additional items Increased time required;Verbal cues;Armrests   Additional Comments RW   Ambulation/Elevation   Gait pattern  Excessively slow;Short stride;Forward Flexion   Gait Assistance   (CGA -> CS)   Additional items Assist x 1;Verbal cues;Tactile cues   Assistive Device Rolling walker   Distance 75'x2   Stair Management Assistance   (CGA)   Additional items Assist x 1;Verbal cues;Tactile cues   Stair Management Technique Two rails;One rail L;With cane;Nonreciprocal   Number of Stairs 10   Ambulation/Elevation Additional Comments Up + over 5 stairs w/ 2 HR + up + over 5 stairs w/ L handrail + SPC.   Balance   Static Sitting Good   Dynamic Sitting Fair +   Static Standing Fair   Dynamic Standing Fair -   Ambulatory Fair -  (RW)   Endurance Deficit   Endurance Deficit Yes   Endurance Deficit Description fatigue   Activity Tolerance   Activity Tolerance Patient tolerated treatment well   Medical Staff Made Aware ortho AP Lily   Nurse Made Aware yes - cleared + updated   Assessment   Prognosis Excellent   Problem List Decreased strength;Decreased range of motion;Decreased endurance;Impaired balance;Decreased mobility   Assessment Pt is 74 y.o. female seen for a PT evaluation s/p admit to  [place]  on 6/11/2024, s/p L TKA. Please see above for other active problem list / PMH.     PT now consulted to assess functional mobility and needs for safe d/c planning. Prior to admission, pt was I w/ ambulation w/o AD, lives w/ son in a 2SH w/ 3 YULISSA.     Currently pt requires S for bed skills; S for functional transfers; CGA -> CS for ambulation w/ RW; CGA for stair negotiation. Pt presents functioning below baseline and w/ overall mobility deficits 2* to: decreased LE strength; decreased L knee ROM; decreased endurance; impaired balance; gait deviations; multiple lines.     Pt will continue to benefit from skilled PT interventions to address stated impairments; to maximize functional potential; for ongoing pt/ family training; and DME needs. PT is currently recommending OPPT.   Goals   Patient Goals to walk without pain   STG Expiration Date  06/21/24   Short Term Goal #1 In 10 days pt will complete: 1) Bed mobility skills with I to facilitate safe return to previous living environment. 2) Functional transfers with [place] to facilitate safe return to previous living environment. 3) Ambulation with ' w/ [place] without LOB for safe ambulation in home/community environment. 4) Improve balance scores by 1 grade to decrease fall risk. 5) Improve LE strength grades by 1 to increase independence w/ all functional mobility, transfers and gait. 6) PT for ongoing pt and family education; DME needs and D/C planning to promote highest level of function in least restrictive environment. 7) Stair training up/ down 12 steps with most appropriate technique and [place] for safe access to previous living environment and to increase community access.   PT Treatment Day 0   Plan   Treatment/Interventions Functional transfer training;LE strengthening/ROM;Elevations;Therapeutic exercise;Endurance training;Patient/family training;Equipment eval/education;Bed mobility;Gait training;Compensatory technique education;Spoke to nursing   PT Frequency Twice a day   Discharge Recommendation   Rehab Resource Intensity Level, PT III (Minimum Resource Intensity)   AM-PAC Basic Mobility Inpatient   Turning in Flat Bed Without Bedrails 3   Lying on Back to Sitting on Edge of Flat Bed Without Bedrails 3   Moving Bed to Chair 3   Standing Up From Chair Using Arms 3   Walk in Room 3   Climb 3-5 Stairs With Railing 3   Basic Mobility Inpatient Raw Score 18   Basic Mobility Standardized Score 41.05   [facility] Highest Level Of Mobility   JH-HLM Goal 6: Walk 10 steps or more   JH-HLM Achieved 7: Walk 25 feet or more   Additional Treatment Session   Start Time 1450   End Time 1705   Treatment Assessment Gait training an additional 75' w/ RW + S, cues for sequencing.   End of Consult   Patient Position at End of Consult Bedside chair;All needs within reach  (BLE elevated, all lines in tact, son  at bedside)     Sivan Black, PT, DPT

## 2024-06-11 NOTE — INTERVAL H&P NOTE
H&P reviewed. After examining the patient I find no changes in the patients condition since the H&P had been written.  Heart:  regular rate  Lungs:  no audible wheezing  Abd:  nondistended  LLE:  EHL/AT/GS intact, sensation grossly intact L4, L5, S1, palpable pedal pulse    Vitals:    06/11/24 1115   BP: 133/84   Pulse: 75   Resp: 13   Temp:    SpO2: 97%

## 2024-06-12 ENCOUNTER — TELEPHONE (OUTPATIENT)
Dept: OBGYN CLINIC | Facility: HOSPITAL | Age: 74
End: 2024-06-12

## 2024-06-12 NOTE — TELEPHONE ENCOUNTER
"Patient contacted for a postoperative follow up assessment. Patient reports doing \"pretty good\". Patient states current pain level of a  8/10  when sitting and 8/10 when walking with RW. Patient reports slight swelling around incision and dressing is clean, dry and intact. Patient is icing the site regularly.     We reviewed patients AVS medication list. Patient is taking Tylenol 1000mg every 8 hours, Oxycodone 5mg PRN, ASA 325mg BID, Colace 100mg BID. Patient has not yet had a BM but is passing gas.  Encouraged pt to increase water and fiber intake and add miralax into regimen if needed.     Patient denies nausea, vomiting, abdominal pain, chest pain, shortness of breath, fever, dizziness and calf pain. Patient confirmed post-op appointment with PT 6/13 and surgeon on 6/21. Patient does not have any other questions or concerns at this time. Pt was encouraged to call with any questions, concerns or issues.    "

## 2024-06-13 ENCOUNTER — OFFICE VISIT (OUTPATIENT)
Dept: PHYSICAL THERAPY | Facility: CLINIC | Age: 74
End: 2024-06-13
Payer: MEDICARE

## 2024-06-13 DIAGNOSIS — Z96.652 HISTORY OF TOTAL KNEE ARTHROPLASTY, LEFT: ICD-10-CM

## 2024-06-13 DIAGNOSIS — I10 BENIGN ESSENTIAL HTN: ICD-10-CM

## 2024-06-13 DIAGNOSIS — M17.12 ARTHRITIS OF LEFT KNEE: Primary | ICD-10-CM

## 2024-06-13 DIAGNOSIS — R26.9 GAIT ABNORMALITY: ICD-10-CM

## 2024-06-13 PROCEDURE — 97164 PT RE-EVAL EST PLAN CARE: CPT | Performed by: PHYSICAL MEDICINE & REHABILITATION

## 2024-06-13 PROCEDURE — 97110 THERAPEUTIC EXERCISES: CPT | Performed by: PHYSICAL MEDICINE & REHABILITATION

## 2024-06-13 RX ORDER — BENAZEPRIL HYDROCHLORIDE 20 MG/1
20 TABLET ORAL DAILY
Qty: 90 TABLET | Refills: 1 | Status: SHIPPED | OUTPATIENT
Start: 2024-06-13

## 2024-06-13 NOTE — PROGRESS NOTES
PT Re-Evaluation     Today's date: 2024  Patient name: Rosy Gibson  : 1950  MRN: [id number]  Referring provider: Liss Whitehead,*  Dx:   Encounter Diagnosis     ICD-10-CM    1. Arthritis of left knee  M17.12       2. Gait abnormality  R26.9       3. History of total knee arthroplasty, left  Z96.652                        Assessment  Impairments: abnormal gait, abnormal muscle firing, abnormal muscle tone, abnormal or restricted ROM, abnormal movement, activity intolerance, impaired balance, impaired physical strength, lacks appropriate home exercise program, pain with function and weight-bearing intolerance  Symptom irritability: moderate    Assessment details: Pt is a 73 yo F presenting to outpatient physical therapy with noted impairments including pain, impaired soft tissue mobility, reduced range of motion, reduced strength, reduced joint positional awareness, altered gait, imbalance/ joint instability, and reduced activity tolerance. Signs and symptoms at present are consistent with referring diagnosis of L knee pain 2* s/p L TKA 24 . She presents to PT for post operative eval/tx. She is doing well post op overall. Due to noted impairments,  the patient's present functional limitations include difficulty with ADLs with increased need for assistance, imbalance/increased fall risk, reliance on medication and/or modalities for pain relief, reduced tolerance for functional mobility and activity, reliance on AD for mobility, and difficulty completing home and self care responsibilities. Increased time was spent initiating and reviewing post op HEP/exercise program. Also spent increased time educating pt on operative/post operative expectations, post op pain control with CP and ROM/exercise, importance of elevation and controlling swelling, and importance of gentle mobility and ROM of  L knee post op. Initiated light exercise/ROM this date as noted; pt tolerated well without incident.  Please order, not sure if Chantix is covered, but pharmacy will notify us If needs authorization "All pt questions/concerns were answered to her satisfaction. Patient to benefit from skilled outpatient physical therapy 2x/week for 6-8 weeks in order to reduce pain, maximize pain free range of motion, increase strength and stability, and improve functional mobility/functional activity in order to maximize return to prior level of function with reduced limitations. Home exercise program was provided and all questions answered to patient's level of satisfaction. Thank you for your referral.     Understanding of Dx/Px/POC: good     Prognosis: fair    Goals  Goals to be achieved post op:     STGs to be achieved in 4 weeks:  1. Pt to demonstrate reduced subjective pain rating \"at worst\" by at least 2-3 points from Initial Eval in order to allow for reduced pain with ADLs and improved functional activity tolerance.   2. Pt to demonstrate increased AROM of L knee flexion to at least 110 degrees and knee extension to no > -5*  in order to allow for greater ease and independence with ADLs and functional mobility.   3. Pt to demonstrate increased MMT of L knee grossly by at least 1/2-1 grade in order to improve safety and stability with ADLs and functional mobility.   4.Pt will demonstrate ability to safely ambulate at least 100ft with least restrictive AD with minimized gait deviations, independently, and without evidence of instability.      LTGs to be achieved in 8-10 weeks:  1. Pt will be I with HEP in order to continue to improve quality of life and independence and reduce risk for re-injury.   2. Pt to demonstrate return to PLOF without limitations or restrictions.   3. Pt to demonstrate improved function as noted by achieving or exceeding predicted score on FOTO outcomes assessment tool.         Plan  Patient would benefit from: skilled physical therapy  Planned modality interventions: cryotherapy and thermotherapy: hydrocollator packs    Planned therapy interventions: manual therapy, motor coordination training, " neuromuscular re-education, patient education, postural training, self care, strengthening, stretching, therapeutic activities, therapeutic exercise, gait training, home exercise program, functional ROM exercises and balance    Frequency: 2x week  Duration in weeks: 6  Treatment plan discussed with: patient        Subjective Evaluation    History of Present Illness  Mechanism of injury: Pt is s/p L TKA . D/c Home that day. Home with her son.   Has been doing well so far. WBAT LLE with RW; also using SPC. Feels her pain is well controlled at this point; takes Oxycodone as Rx in addition to tylenol as rx. Finishing anti biotics today. Taking Aspirin also. Elevates the knee and puts ice on it. Getting around pretty well per pt. Off balance first getting up. Doing her exercises at home; going well; feels she is able to bend the knee more and more each day. Getting better overall. Still gets pain in R knee/across the front; worse with sitting much/too long; better getting up/moving/doing exercises etc. Has been walking around her house and outside. Still has swelling and some bruising R knee. Post op dressing intact. No present signs/sx of infection per pt. RTD upcoming. Surprised with how well she is doing.   Quality of life: good    Patient Goals  Patient goals for therapy: decreased pain, improved balance, increased strength, increased motion, return to sport/leisure activities and independence with ADLs/IADLs  Patient's goals regarding treatment: be able to walk without pain and without AD.  Pain  Current pain ratin  At best pain ratin  At worst pain rating: 10  Location: L knee  Quality: dull ache, sharp, tight, knife-like and throbbing  Relieving factors: rest, support, relaxation, ice and medications  Aggravating factors: standing, walking, stair climbing and sitting (bending)  Progression: improved (improving since surgery)    Social Support  Lives in: multiple-level home  Lives with: adult children  (In daughters home)    Employment status: not working    Diagnostic Tests  X-ray: abnormal  Treatments  Previous treatment: physical therapy, medication and injection treatment  Current treatment: physical therapy        Objective     Observations     Additional Observation Details  Post L TKA  Post op dressing intact  No present signs/sx of infection  Bruising about anterior/medial knee into lower leg and distal medal thigh   Pt wearing compression stockings to thigh B    Tenderness   Left Knee   Tenderness in the lateral joint line, LCL (distal), LCL (proximal), MCL (distal), MCL (proximal), medial joint line, medial retinaculum, patellar tendon and popliteal fossa.     Additional Tenderness Details  Mild ttp diffusely about L knee     Lumbar Screen  Lumbar range of motion within normal limits.    Neurological Testing     Additional Neurological Details  Diminished light touch sensation L vs R proximal to L knee/TKA only      Active Range of Motion   Left Hip   Flexion: WFL  Extension: WFL  Abduction: WFL  External rotation (90/90): WFL  Internal rotation (90/90): WFL    Right Hip   Flexion: WFL  Extension: WFL  Abduction: WFL  External rotation (90/90): WFL  Internal rotation (90/90): WFL  Left Knee   Flexion: 89 degrees   Extension: -11 degrees     Right Knee   Flexion: WFL  Extension: WFL  Left Ankle/Foot   Dorsiflexion (ke): WFL  Plantar flexion: WFL    Right Ankle/Foot   Dorsiflexion (ke): WFL  Plantar flexion: WFL    Additional Active Range of Motion Details  Stiffness > pain at end ranges  Mild discomfort end range flex L knee > ext     Passive Range of Motion   Left Hip   Flexion: WFL  Extension: WFL  Abduction: WFL  External rotation (90/90): WFL  Internal rotation (90/90): WFL    Right Hip   Flexion: WFL  Extension: WFL  Abduction: WFL  External rotation (90/90): WFL  Internal rotation (90/90): WFL  Left Knee   Flexion: Left knee passive flexion: 100* AAROM L knee flex with heel slides.     Right Knee    Flexion: WFL  Extension: WFL    Additional Passive Range of Motion Details  TBA post       Mobility   Patellar Mobility:   Left Knee   Hypomobile: left medial, left lateral, left superior and left inferior    Additional Mobility Details  Decreased patellar mobility with  L knee swelling post op  Will cont to assess     Strength/Myotome Testing     Left Hip   Planes of Motion   Flexion: 4-  Extension: WFL  Abduction: 4-  Adduction: 4  External rotation: WFL    Right Hip   Planes of Motion   Flexion: WFL  Abduction: WFL  Adduction: WFL  External rotation: WFL    Left Knee   Flexion: 3 (3--3)  Extension: 3 (3)  Quadriceps contraction: poor    Right Knee   Flexion: 4  Extension: 4    Left Ankle/Foot   Dorsiflexion: 4+  Plantar flexion: 4+    Additional Strength Details      Decreased flexibility L HS , calf, and hip flexor/quad     Tests     Left Hip   SLR: Negative.     Right Hip   SLR: Negative.     Ambulation   Weight-Bearing Status   Weight-Bearing Status (Left): weight-bearing as tolerated   Weight-Bearing Status (Right): full weight-bearing    Assistive device used: none    Additional Weight-Bearing Status Details  WBAT LLE  Uses RW vs SPC per pt      Ambulation: Level Surfaces   Ambulation with assistive device: independent  Ambulation without assistive device: independent    Ambulation: Stairs   Ascend stairs: contact guard assist  Pattern: non-reciprocal  Railings: one rail  Descend stairs: contact guard assist  Pattern: non-reciprocal  Railings: one rail    Additional Stairs Ambulation Details  Step to step with R LE and hR vs SPC   Close S    Observational Gait   Gait: antalgic and asymmetric   Decreased walking speed, stride length and left stance time.   Left arm swing: decreased  Base of support: increased    Additional Observational Gait Details  WBAT with SPC into PT today   Reduced L stance  Slight lateral flex with L stance  Lacks L TKE with L stance  Decreased L knee flex with swing  "        Functional Assessment        Comments  Risk Assessment and Prediction Tool Score = 9/12  Car transfers independent  Timed Up and Go = NT     Virtual Home Assessment and Home Preparation Checklist were both reviewed with the patient                 Precautions: Hx R TKA, Hx R MYAH, HTN, neuropathy, back pain, neck pain, hx skin CA          Re-eval Date: NV    Date 6/3 6/13      Visit Count 1 2      FOTO NV **      Pain In See IE 4      Pain Out See IE 3          Manuals 6/3 6/13      Upcoming- L patella mobs, L knee flex/ext with gentle stretch end range as leonora   NV                              Neuro Re-Ed        Balance         Perturbation training                                                 Ther Ex        APs      Heel slides Reviewed HEP       Reviewed HEP  Seated 2x10 ea     Supine  2x10 AAROM       Glute sets      Quad sets Reviewed HEP       Reviewed HEP  20x/5\"      Supine   2x10/5\"      LAQ      Hip abd slides  Reviewed HEP     Reviewed HEP  Seated hip ADD  2x10/5\" iso     LAQ  Reviewed HEP       Seated knee flexion AAROM     Seated static knee ext  Reviewed HEP      Reviewed HEP  Reviewed HEP      1x1'      SLRs      SAQ-->LAQ  Seated HS/calf stretch  4x 15-20\" ea L       HS curls      Bridges    HR/TRs        Mini squats      Step ups        Leg press       Nustep AAROM L knee           NV      Ther Activity 10' total pt education regarding post operative expectations in terms of pain, mobility, total joint precautions, etc; education regarding role of HEP in pain management and restoring function; AD recommendations for post surgery such as RW, elevated toilet seat, and shower chair; review of precautions and safe mobility/transfers /how to transfer safely, etc ; reviewed home preparation checklist  Reviewed                         Gait Training                        Modalities prn                         6/13 - HEP was issued and reviewed this date for above noted exercises. Pt demonstrated " understanding without incident and without questions/concerns. Will continue to update upcoming.

## 2024-06-17 ENCOUNTER — OFFICE VISIT (OUTPATIENT)
Dept: PHYSICAL THERAPY | Facility: CLINIC | Age: 74
End: 2024-06-17
Payer: MEDICARE

## 2024-06-17 DIAGNOSIS — Z96.652 HISTORY OF TOTAL KNEE ARTHROPLASTY, LEFT: Primary | ICD-10-CM

## 2024-06-17 PROCEDURE — 97110 THERAPEUTIC EXERCISES: CPT

## 2024-06-17 PROCEDURE — 97140 MANUAL THERAPY 1/> REGIONS: CPT

## 2024-06-17 NOTE — PROGRESS NOTES
"Daily Note     Today's date: 2024  Patient name: Rosy Gibson  : 1950  MRN: [id number]  Referring provider: Liss Whitehead,*  Dx:   Encounter Diagnosis     ICD-10-CM    1. History of total knee arthroplasty, left  Z96.652                      Subjective:   Pt. denies any pain or sx @ L knee at this present time. Also notes she came w/o her AD today, as she feels she doesn't need it for walking.       Objective: See treatment diary below      Assessment: Tolerated treatment well.  Pt doing very well thus far. Patient exhibited good technique with therapeutic exercises and would benefit from continued PT.      Plan: Continue per plan of care.  Progress treatment as tolerated.             Precautions: Hx R TKA, Hx R MYAH, HTN, neuropathy, back pain, neck pain, hx skin CA          Re-eval Date: NV    Date 6/3 6/13 6.17     Visit Count 1 2 3     FOTO NV **      Pain In See IE 4 0/10     Pain Out See IE 3 0/10         Manuals 6/3 6/13 6.17     Upcoming- L patella mobs, L knee flex/ext with gentle stretch end range as leonora   NV **MJD    *Pat Mobs    L knee flex/ext  with Gentle stretch end range                             Neuro Re-Ed        Balance         Perturbation training                                                 Ther Ex   6.17     APs      Heel slides Reviewed HEP       Reviewed HEP  Seated 2x10 ea     Supine  2x10 AAROM  Seated   L 30x    Supine  2x10        Glute sets      Quad sets Reviewed HEP       Reviewed HEP  20x/5\"      Supine   2x10/5\" 20x/5\"      Supine   2x10/5\"       LAQ      Hip abd slides  Reviewed HEP     Reviewed HEP  Seated hip ADD  2x10/5\" iso     LAQ  Reviewed HEP  Seated hip ADD  2x10/5\" iso    LAQ 30x     Seated knee flexion AAROM     Seated static knee ext  Reviewed HEP      Reviewed HEP  Reviewed HEP      1x1'         1x1'     SLRs      SAQ-->LAQ  Seated HS/calf stretch  4x 15-20\" ea L  Long-Seated Ham Stretch L 4x/30\"  calf stretch on Incline  4x 20\"  L      HS " julius Dobbins    HR/TRs   **Stand L 30x        Seated   B 30x     Mini squats      Step ups        Leg press       Nustep AAROM L knee           NV       **NS   AAROM L knee   L1 x 10 min       Ther Activity 10' total pt education regarding post operative expectations in terms of pain, mobility, total joint precautions, etc; education regarding role of HEP in pain management and restoring function; AD recommendations for post surgery such as RW, elevated toilet seat, and shower chair; review of precautions and safe mobility/transfers /how to transfer safely, etc ; reviewed home preparation checklist  Reviewed                         Gait Training                        Modalities prn  6.17        *Pt declined offer for CP applic;  prefers to apply @ home               6/13 - HEP was issued and reviewed this date for above noted exercises. Pt demonstrated understanding without incident and without questions/concerns. Will continue to update upcoming.

## 2024-06-20 ENCOUNTER — OFFICE VISIT (OUTPATIENT)
Dept: PHYSICAL THERAPY | Facility: CLINIC | Age: 74
End: 2024-06-20
Payer: MEDICARE

## 2024-06-20 DIAGNOSIS — Z96.652 HISTORY OF TOTAL KNEE ARTHROPLASTY, LEFT: Primary | ICD-10-CM

## 2024-06-20 PROCEDURE — 97110 THERAPEUTIC EXERCISES: CPT

## 2024-06-20 PROCEDURE — 97140 MANUAL THERAPY 1/> REGIONS: CPT

## 2024-06-20 NOTE — PROGRESS NOTES
"Daily Note     Today's date: 2024  Patient name: Rosy Gibson  : 1950  MRN: [id number]  Referring provider: Liss Whitehead,*  Dx:   Encounter Diagnosis     ICD-10-CM    1. History of total knee arthroplasty, left  Z96.652                      Subjective:   Pt. denies any pain or  sx @ L knee at this present time.      Objective: See treatment diary below      Assessment: Tolerated treatment well. Patient exhibited good technique with therapeutic exercises and would benefit from continued PT.  Progressing quite well thus far.       Plan: Continue per plan of care.  Progress treatment as tolerated.             Precautions: Hx R TKA, Hx R MYAH, HTN, neuropathy, back pain, neck pain, hx skin CA          Re-eval Date: NV    Date 6/3 6/13 6.17 6.20    Visit Count 1 2 3 4    FOTO NV **      Pain In See IE 4 0/10 0/10    Pain Out See IE 3 0/10 0/10        Manuals 6/3 6/13 6.17 6.20    Upcoming- L patella mobs, L knee flex/ext with gentle stretch end range as leonora   NV **MJD    *Pat Mobs    L knee flex/ext  with Gentle stretch end range *MJD    *Pat Mobs    L knee flex/ext  with Gentle stretch end range                            Neuro Re-Ed        Balance         Perturbation training                                                 Ther Ex   6.17 6.20    APs      Heel slides Reviewed HEP       Reviewed HEP  Seated 2x10 ea     Supine  2x10 AAROM  Seated   L 30x    Supine  2x10    Seated   L 30x    Supine  3x10     Glute sets      Quad sets Reviewed HEP       Reviewed HEP  20x/5\"      Supine   2x10/5\" 20x/5\"      Supine   2x10/5\"   25x/5\"      Supine   25x/5\"    LAQ      Hip abd slides  Reviewed HEP     Reviewed HEP  Seated hip ADD  2x10/5\" iso     LAQ  Reviewed HEP  Seated hip ADD  2x10/5\" iso    LAQ 30x Seated hip ADD  3x10/5\" iso    LAQ 30x    Seated knee flexion AAROM     Seated static knee ext  Reviewed HEP      Reviewed HEP  Reviewed HEP      1x1'         1x1'         1x1'    SLRs      SAQ-->LAQ  " "Seated HS/calf stretch  4x 15-20\" ea L  Long-Seated Ham Stretch L 4x/30\"  calf stretch on Incline  4x 20\"  L  Long-Seated Ham Stretch L 4x/30\"    calf stretch on Incline  4x 20\"  L    HS curls      Bridges    HR/TRs   **Stand L 30x        Seated   B 30x Stand L 30x          Stand  B 30x    Mini squats      Step ups        Leg press       Nustep AAROM L knee           NV       **NS   AAROM L knee   L1 x 10 min         NS   AAROM L knee   L2 x 10 min    Ther Activity 10' total pt education regarding post operative expectations in terms of pain, mobility, total joint precautions, etc; education regarding role of HEP in pain management and restoring function; AD recommendations for post surgery such as RW, elevated toilet seat, and shower chair; review of precautions and safe mobility/transfers /how to transfer safely, etc ; reviewed home preparation checklist  Reviewed                         Gait Training                        Modalities prn  6.17 6.20       *Pt declined offer for CP applic;  prefers to apply @ home *CP x 10 min to   L knee              6/13 - HEP was issued and reviewed this date for above noted exercises. Pt demonstrated understanding without incident and without questions/concerns. Will continue to update upcoming.            "

## 2024-06-21 ENCOUNTER — OFFICE VISIT (OUTPATIENT)
Dept: OBGYN CLINIC | Facility: MEDICAL CENTER | Age: 74
End: 2024-06-21

## 2024-06-21 ENCOUNTER — APPOINTMENT (OUTPATIENT)
Dept: RADIOLOGY | Facility: MEDICAL CENTER | Age: 74
End: 2024-06-21
Payer: MEDICARE

## 2024-06-21 VITALS
DIASTOLIC BLOOD PRESSURE: 79 MMHG | BODY MASS INDEX: 27.94 KG/M2 | SYSTOLIC BLOOD PRESSURE: 139 MMHG | HEART RATE: 72 BPM | HEIGHT: 62 IN | WEIGHT: 151.8 LBS

## 2024-06-21 DIAGNOSIS — Z96.652 STATUS POST TOTAL KNEE REPLACEMENT, LEFT: Primary | ICD-10-CM

## 2024-06-21 DIAGNOSIS — Z96.652 STATUS POST TOTAL KNEE REPLACEMENT, LEFT: ICD-10-CM

## 2024-06-21 DIAGNOSIS — Z96.641 STATUS POST TOTAL HIP REPLACEMENT, RIGHT: ICD-10-CM

## 2024-06-21 PROCEDURE — 73562 X-RAY EXAM OF KNEE 3: CPT

## 2024-06-21 PROCEDURE — 99024 POSTOP FOLLOW-UP VISIT: CPT | Performed by: ORTHOPAEDIC SURGERY

## 2024-06-21 RX ORDER — DOCUSATE SODIUM 100 MG/1
100 CAPSULE, LIQUID FILLED ORAL 2 TIMES DAILY
Qty: 180 CAPSULE | Refills: 1 | Status: SHIPPED | OUTPATIENT
Start: 2024-06-21

## 2024-06-21 NOTE — PROGRESS NOTES
Assessment/Plan:  1. Status post total knee replacement, left      Orders Placed This Encounter   Procedures    XR knee 3 vw left non injury       Continue with physical therapy per TKA protocol   Pain control prn- oxycodone and tylenol, patient aware to wean away from narcotics  Continue with 325 ASA for DVT prophylaxis next 4 weeks  ADOLPH stockings as needed for swelling  May shower and let water run over incision, do not submerge incision  Patient aware can call ahead for abx prior to dental appts.   See back in 4 weeks     Return in about 4 weeks (around 7/19/2024).    I answered all of the patient's questions during the visit and provided education of the patient's condition during the visit.  The patient verbalized understanding of the information given and agrees with the plan.  This note was dictated using [employer] software.  It may contain errors including improperly dictated words.  Please contact physician directly for any questions.    Subjective   Chief Complaint:   Chief Complaint   Patient presents with    Left Knee - Post-op       Rosy Gibson is a 74 y.o. female who presents s/p left total knee replacement 6/11/24. Post op dressing intact with no evidence of drainage or infection. She states she only used oxy first night home and then only using tylenol as needed. She is not using ambulatory device. Physical therapy is going well. 325 ASA for dvt prophylaxis.    Review of Systems  ROS:    See HPI for musculoskeletal review.   All other systems reviewed are negative     History:  Past Medical History:   Diagnosis Date    Arthritis     Encounter for geriatric assessment 05/18/2023    Fatty liver 05/01/2018    Fibromyalgia, primary     GERD (gastroesophageal reflux disease)     Hyperlipidemia     Hypertension     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    Low back pain     Lumbar stenosis     Neck pain     Neuropathy     feet    No natural teeth     on lower    Primary localized  osteoarthritis of right knee 08/17/2017    Sacroiliitis (HCC) 08/20/2018    Skin cancer     on chest in past - melanoma    Sleep apnea     resolved with weight loss    Use of cane as ambulatory aid     or walker    Wears dentures     full  uppers     Past Surgical History:   Procedure Laterality Date    JOINT REPLACEMENT Right 06/2023    RTK RTH    WY ARTHRP ACETBLR/PROX FEM PROSTC AGRFT/ALGRFT Right 06/19/2023    Procedure: ARTHROPLASTY HIP TOTAL ANTERIOR;  Surgeon: Liss Whitehead DO;  Location:  MAIN OR;  Service: Orthopedics    WY ARTHRP KNE CONDYLE&PLATU MEDIAL&LAT COMPARTMENTS Right 10/18/2023    Procedure: RIGHT TOTAL KNEE ARTHROPLASTY;  Surgeon: Liss Whitehead DO;  Location:  MAIN OR;  Service: Orthopedics    WY ARTHRP KNE CONDYLE&PLATU MEDIAL&LAT COMPARTMENTS Left 6/11/2024    Procedure: ARTHROPLASTY KNEE TOTAL, potential same day discharge;  Surgeon: Liss Whitehead DO;  Location:  MAIN OR;  Service: Orthopedics    SKIN CANCER EXCISION      on chest - melanoma    TUBAL LIGATION       Social History   Social History     Substance and Sexual Activity   Alcohol Use Yes    Comment: rarely     Social History     Substance and Sexual Activity   Drug Use Never     Social History     Tobacco Use   Smoking Status Never   Smokeless Tobacco Never     Family History:   Family History   Problem Relation Age of Onset    Osteoporosis Mother     Skin cancer Father 55    Stomach cancer Sister 49    No Known Problems Daughter     No Known Problems Maternal Grandmother     No Known Problems Maternal Grandfather     No Known Problems Paternal Grandmother     No Known Problems Paternal Grandfather     No Known Problems Maternal Aunt     No Known Problems Maternal Aunt     No Known Problems Maternal Aunt     No Known Problems Maternal Aunt     No Known Problems Paternal Aunt        Meds/Allergies   Not in a hospital admission.  Allergies   Allergen Reactions    Penicillins Hives and Itching       "    Objective     /79   Pulse 72   Ht 5' 2\" (1.575 m)   Wt 68.9 kg (151 lb 12.8 oz)   BMI 27.76 kg/m²      PE:  AAOx 3  WDWN  Hearing intact, no drainage from eyes  no audible wheezing  no abdominal distension  LE compartments soft, AT/GS intact    Ortho Exam:  left Knee:   INC: C/D/I, No erythema, mild swelling  AROM:5 - 100 degrees      Imaging Studies: I have personally reviewed pertinent films in PACS  left knee:  S/p TKA, adequate alignment       Scribe Attestation      I,:  Vincent Petty am acting as a scribe while in the presence of the attending physician.:       I,:  Liss Whitehead, DO personally performed the services described in this documentation    as scribed in my presence.:            "

## 2024-06-25 ENCOUNTER — OFFICE VISIT (OUTPATIENT)
Dept: PHYSICAL THERAPY | Facility: CLINIC | Age: 74
End: 2024-06-25
Payer: MEDICARE

## 2024-06-25 DIAGNOSIS — Z96.652 HISTORY OF TOTAL KNEE ARTHROPLASTY, LEFT: Primary | ICD-10-CM

## 2024-06-25 PROCEDURE — 97110 THERAPEUTIC EXERCISES: CPT | Performed by: PHYSICAL MEDICINE & REHABILITATION

## 2024-06-25 NOTE — PROGRESS NOTES
Daily Note     Today's date: 2024  Patient name: Rosy Gibson  : 1950  MRN: [id number]  Referring provider: Liss Whitehead,*  Dx:   Encounter Diagnosis     ICD-10-CM    1. History of total knee arthroplasty, left  Z96.652                      Subjective: Pt with no new sx/complaints. Denies pain L knee. F/u with MD recently; went well; she is pleased with her progress per pt. Pt is doing Hep without incident. Walking more without AD. Denies knee instability.       Objective: See treatment diary below      Assessment: Tolerated treatment well. Progressing well with program. Ambulating without AD with mild gait deviations; slight lack of TKE with L stance and slight lateral flex with L stance. No evidence for imbalance during session with standing/gait. Cont to demo mild lack of TKE on L. L knee flex steadily improving;  L knee max AAROM 111* this date without pain/sx. Improving quad/VMO tone with PREs. No complaints after session. Cont with mild ROM limitations and strength/endurance deficits L thigh/knee. No pain/sx with or following today's session. Hep reviewed.  Patient demonstrated fatigue post treatment and would benefit from continued PT      Plan: Continue per plan of care.  Progress treatment as tolerated.       Precautions: Hx R TKA, Hx R MYAH, HTN, neuropathy, back pain, neck pain, hx skin CA          Re-eval Date: NV    Date 6/3 6/13 6.17 6.20 6/25   Visit Count 1 2 3 4 5   FOTO NV **      Pain In See IE 4 0/10 0/10 0/10   Pain Out See IE 3 0/10 0/10 0/10       Manuals 6/3 6/13 6. 6.   Upcoming- L patella mobs, L knee flex/ext with gentle stretch end range as leonora   NV **MJD    *Pat Mobs    L knee flex/ext  with Gentle stretch end range *MJD    *Pat Mobs    L knee flex/ext  with Gentle stretch end range Motion check                            Neuro Re-Ed        Balance         Perturbation training                                                 Ther Ex      "  APs      Heel slides Reviewed HEP       Reviewed HEP  Seated 2x10 ea     Supine  2x10 AAROM  Seated   L 30x    Supine  2x10    Seated   L 30x    Supine  3x10        Supine  3x10   Strap/board    Glute sets      Quad sets Reviewed HEP       Reviewed HEP  20x/5\"      Supine   2x10/5\" 20x/5\"      Supine   2x10/5\"   25x/5\"      Supine   25x/5\" With hip add    Roll under ankle  2x10/10\"    LAQ      Hip abd slides  Reviewed HEP     Reviewed HEP  Seated hip ADD  2x10/5\" iso     LAQ  Reviewed HEP  Seated hip ADD  2x10/5\" iso    LAQ 30x Seated hip ADD  3x10/5\" iso    LAQ 30x Supine hip ADD  3x10/5\" iso      LAQ 0 #   3x10    Seated knee flexion AAROM     Seated static knee ext  Reviewed HEP      Reviewed HEP  Reviewed HEP      1x1'         1x1'         1x1' Hip ABD hook lying Blue 2x10/5\"     1x1' 0#   SLRs      SAQ-->LAQ  Seated HS/calf stretch  4x 15-20\" ea L  Long-Seated Ham Stretch L 4x/30\"  calf stretch on Incline  4x 20\"  L  Long-Seated Ham Stretch L 4x/30\"    calf stretch on Incline  4x 20\"  L Long-Seated Ham Stretch L 4x/30\"    calf stretch on Incline  4x 30\"  L   HS curls      Bridges    HR/TRs   **Stand L 30x        Seated   B 30x Stand L 30x          Stand  B 30x Stand L 30x          Stand  B 30xea   Mini squats      Step ups        Leg press       Nustep AAROM L knee           NV       **NS   AAROM L knee   L1 x 10 min         NS   AAROM L knee   L2 x 10 min          AAROM L knee   L2 x 10 min   Ther Activity 10' total pt education regarding post operative expectations in terms of pain, mobility, total joint precautions, etc; education regarding role of HEP in pain management and restoring function; AD recommendations for post surgery such as RW, elevated toilet seat, and shower chair; review of precautions and safe mobility/transfers /how to transfer safely, etc ; reviewed home preparation checklist  Reviewed                         Gait Training                        Modalities prn  6.17 6.20       *Pt " declined offer for CP applic;  prefers to apply @ home *CP x 10 min to   L knee Pt deferred to home              6/13 - HEP was issued and reviewed this date for above noted exercises. Pt demonstrated understanding without incident and without questions/concerns. Will continue to update upcoming.

## 2024-06-27 ENCOUNTER — OFFICE VISIT (OUTPATIENT)
Dept: PHYSICAL THERAPY | Facility: CLINIC | Age: 74
End: 2024-06-27
Payer: MEDICARE

## 2024-06-27 DIAGNOSIS — R26.9 GAIT ABNORMALITY: ICD-10-CM

## 2024-06-27 DIAGNOSIS — M17.12 ARTHRITIS OF LEFT KNEE: ICD-10-CM

## 2024-06-27 DIAGNOSIS — Z96.652 HISTORY OF TOTAL KNEE ARTHROPLASTY, LEFT: Primary | ICD-10-CM

## 2024-06-27 DIAGNOSIS — Z01.818 PREOPERATIVE TESTING: ICD-10-CM

## 2024-06-27 PROCEDURE — 97110 THERAPEUTIC EXERCISES: CPT | Performed by: PHYSICAL MEDICINE & REHABILITATION

## 2024-06-27 NOTE — PROGRESS NOTES
"Daily Note     Today's date: 2024  Patient name: Rosy Gibson  : 1950  MRN: [id number]  Referring provider: Liss Whitehead,*  Dx:   Encounter Diagnosis     ICD-10-CM    1. History of total knee arthroplasty, left  Z96.652       2. Arthritis of left knee  M17.12       3. Gait abnormality  R26.9       4. Preoperative testing  Z01.818                      Subjective: Pt notes she \"forgot\" and attempted to go up her stairs leading with the L LE; noted brief sharp knee pain; has had a few twinges in the knee since then. No additional sx/complaints. No knee buckling. Still walking without cane without issue. No present knee pain. No swelling.       Objective: See treatment diary below      Assessment: Tolerated treatment well. Able to complete full session without incident. No knee pain/sx with or following tx. Continues to maintain excellent L knee ROM; AROM 112* this date flex without pain/sx; slight limited end range TKE; stiffness only. Added SLR flex supine with 0#; pt tolerated well; min ext lag last few reps 2* mm fatigue L quad; no pain/sx. No complaints or pain/sx after session. HEP reviewed. Patient demonstrated fatigue post treatment and would benefit from continued PT      Plan: Continue per plan of care.  Progress treatment as tolerated.       Precautions: Hx R TKA, Hx R MYAH, HTN, neuropathy, back pain, neck pain, hx skin CA          Re-eval Date: NV    Date  6.17 6.   Visit Count 6 2 3 4 5   FOTO NV*       Pain In 0 4 0/10 0/10 0/10   Pain Out 0 3 0/10 0/10 0/10       Manuals  6.17 6.20    Upcoming- L patella mobs, L knee flex/ext with gentle stretch end range as leonora  Motion check NV **MJD    *Pat Mobs    L knee flex/ext  with Gentle stretch end range *MJD    *Pat Mobs    L knee flex/ext  with Gentle stretch end range Motion check                            Neuro Re-Ed        Balance         Perturbation training                                            " "     Ther Ex   6.17 6.20    APs      Heel slides Reviewed HEP     Supine  2x10   Strap/board Seated 2x10 ea     Supine  2x10 AAROM  Seated   L 30x    Supine  2x10    Seated   L 30x    Supine  3x10        Supine  3x10   Strap/board    Glute sets      Quad sets With hip add    Roll under ankle  2-3x10/10\"  20x/5\"      Supine   2x10/5\" 20x/5\"      Supine   2x10/5\"   25x/5\"      Supine   25x/5\" With hip add    Roll under ankle  2x10/10\"    LAQ      Hip abd slides  Supine hip ADD  3x10/5\" iso      LAQ 0 #   3x10  Seated hip ADD  2x10/5\" iso     LAQ  Reviewed HEP  Seated hip ADD  2x10/5\" iso    LAQ 30x Seated hip ADD  3x10/5\" iso    LAQ 30x Supine hip ADD  3x10/5\" iso      LAQ 0 #   3x10    Seated knee flexion AAROM     Seated static knee ext  Hip ABD hook lying Blue 2x10/5\"       Reviewed hEP    Reviewed HEP      1x1'         1x1'         1x1' Hip ABD hook lying Blue 2x10/5\"     1x1' 0#   SLRs      SAQ-->LAQ Reviewed HEP- HS stretch       calf stretch on Incline  4x 30\"  L     Seated HS/calf stretch  4x 15-20\" ea L  Long-Seated Ham Stretch L 4x/30\"  calf stretch on Incline  4x 20\"  L  Long-Seated Ham Stretch L 4x/30\"    calf stretch on Incline  4x 20\"  L Long-Seated Ham Stretch L 4x/30\"    calf stretch on Incline  4x 30\"  L   HS curls      Bridges    HR/TRs Stand L 30x        Stand  B 30xea  **Stand L 30x        Seated   B 30x Stand L 30x          Stand  B 30x Stand L 30x          Stand  B 30xea   Mini squats      Step ups Standing SLR 3 way 0#  L 2x10/5\" ea        Leg press       Nustep AAROM L knee  Supine SLR flex L 0 #  2x10/5\" cues         Nustep AAROM L knee   L2 x 10 min         NV       **NS   AAROM L knee   L1 x 10 min         NS   AAROM L knee   L2 x 10 min          AAROM L knee   L2 x 10 min   Ther Activity  Reviewed                         Gait Training                        Modalities prn  6.17 6.20     Pt deferred   *Pt declined offer for CP applic;  prefers to apply @ home *CP x 10 min to   L knee Pt " deferred to home              6/13 - HEP was issued and reviewed this date for above noted exercises. Pt demonstrated understanding without incident and without questions/concerns. Will continue to update upcoming.

## 2024-07-01 ENCOUNTER — OFFICE VISIT (OUTPATIENT)
Dept: PHYSICAL THERAPY | Facility: CLINIC | Age: 74
End: 2024-07-01
Payer: MEDICARE

## 2024-07-01 DIAGNOSIS — Z96.652 HISTORY OF TOTAL KNEE ARTHROPLASTY, LEFT: Primary | ICD-10-CM

## 2024-07-01 DIAGNOSIS — M17.12 ARTHRITIS OF LEFT KNEE: ICD-10-CM

## 2024-07-01 DIAGNOSIS — R26.9 GAIT ABNORMALITY: ICD-10-CM

## 2024-07-01 PROCEDURE — 97110 THERAPEUTIC EXERCISES: CPT | Performed by: PHYSICAL MEDICINE & REHABILITATION

## 2024-07-01 NOTE — PROGRESS NOTES
"Daily Note     Today's date: 2024  Patient name: Rosy Gibson  : 1950  MRN: [id number]  Referring provider: Liss Whitehead,*  Dx:   Encounter Diagnosis     ICD-10-CM    1. History of total knee arthroplasty, left  Z96.652       2. Arthritis of left knee  M17.12       3. Gait abnormality  R26.9                      Subjective: Pt notes she still has been getting \"random\" twinges of pain L lateral knee since going up the stairs at home last week. No constant pain. No consistent triggers. No knee instability. No present knee pain/sx; comes/goes. Was able to go up the steps \"normal without thinking about it\" the other day without pain/sx. No new sx/complaints.       Objective: See treatment diary below      Assessment: Tolerated treatment well. No knee pain/sx with or following /tx. Able to add 1# with SLRs and HS curls and LAQ without incident. Added step ups and bridges as well without incident. Appropriate mm fatigue only.  L quad fatigue noted with step ups and SLRs supine. Cont to maintain good ROM L knee; max flex AAROM 112* this date without pain/sx. No complaints after tx.  Patient demonstrated fatigue post treatment and would benefit from continued PT to address cont mm strength/endurance deficits L quad/thigh and limited end ROM L knee contributing to cont'd mobility deficits. Hep was reviewed.       Plan: Continue per plan of care.  Progress treatment as tolerated.       Precautions: Hx R TKA, Hx R MYAH, HTN, neuropathy, back pain, neck pain, hx skin CA          Re-eval Date:     Date  6.17 6.   Visit Count 6 7 3 4 5   FOTO NV*  **     Pain In 0 0 0/10 0/10 0/10   Pain Out 0 0 0/10 0/10 0/10       Manuals  6.17 6.20    Upcoming- L patella mobs, L knee flex/ext with gentle stretch end range as leonora  Motion check Motion check  **MJD    *Pat Mobs    L knee flex/ext  with Gentle stretch end range *MJD    *Pat Mobs    L knee flex/ext  with Gentle stretch end " "range Motion check                            Neuro Re-Ed        Balance         Perturbation training                                                 Ther Ex   6.17 6.20    APs      Heel slides Reviewed HEP     Supine  2x10   Strap/board       Supine  2x10   Strap/board Seated   L 30x    Supine  2x10    Seated   L 30x    Supine  3x10        Supine  3x10   Strap/board    Glute sets      Quad sets With hip add    Roll under ankle  2-3x10/10\"        Roll under ankle  2-3x10/10 20x/5\"      Supine   2x10/5\"   25x/5\"      Supine   25x/5\" With hip add    Roll under ankle  2x10/10\"    LAQ      Hip abd slides  Supine hip ADD  3x10/5\" iso      LAQ 0 #   3x10           LAQ  1#  3x10   2# NV   Seated hip ADD  2x10/5\" iso    LAQ 30x Seated hip ADD  3x10/5\" iso    LAQ 30x Supine hip ADD  3x10/5\" iso      LAQ 0 #   3x10    Seated knee flexion AAROM     Seated static knee ext  Hip ABD hook lying Blue 2x10/5\"       Reviewed hEP                QS roll under ankle  10x10\"          1x1'         1x1' Hip ABD hook lying Blue 2x10/5\"     1x1' 0#   SLRs      SAQ-->LAQ Reviewed HEP- HS stretch       calf stretch on Incline  4x 30\"  L     Seated HS/calf stretch  HEP    Calf stretch   HEP  Long-Seated Ham Stretch L 4x/30\"  calf stretch on Incline  4x 20\"  L  Long-Seated Ham Stretch L 4x/30\"    calf stretch on Incline  4x 20\"  L Long-Seated Ham Stretch L 4x/30\"    calf stretch on Incline  4x 30\"  L   HS curls      Bridges        HR/TRs Stand L 30x              Stand  B 30xea Stand L 1# 30x    Supine  2x10/5\"        Stand  B 30xea **Stand L 30x        Seated   B 30x Stand L 30x          Stand  B 30x Stand L 30x          Stand  B 30xea   Mini squats                    Step ups Standing SLR 3 way 0#  L 2x10/5\" ea  Standing SLR 3 way 1#  L 2x10/5\" ea         Bottom step 1-2 HHA  2x10 L       Leg press       Nustep AAROM L knee  Supine SLR flex L 0 #  2x10/5\" cues         Nustep AAROM L knee   L2 x 10 min Supine SLR flex L 0 #  2x10/5\" " cues        Nustep AAROM L knee   L4 x 10 min        **NS   AAROM L knee   L1 x 10 min         NS   AAROM L knee   L2 x 10 min          AAROM L knee   L2 x 10 min   Ther Activity  Reviewed                         Gait Training                        Modalities prn  6.17 6.20     Pt deferred   *Pt declined offer for CP applic;  prefers to apply @ home *CP x 10 min to   L knee Pt deferred to home              6/13 - HEP was issued and reviewed this date for above noted exercises. Pt demonstrated understanding without incident and without questions/concerns. Will continue to update upcoming.

## 2024-07-03 ENCOUNTER — OFFICE VISIT (OUTPATIENT)
Dept: PHYSICAL THERAPY | Facility: CLINIC | Age: 74
End: 2024-07-03
Payer: MEDICARE

## 2024-07-03 DIAGNOSIS — Z96.652 HISTORY OF TOTAL KNEE ARTHROPLASTY, LEFT: Primary | ICD-10-CM

## 2024-07-03 DIAGNOSIS — M17.12 ARTHRITIS OF LEFT KNEE: ICD-10-CM

## 2024-07-03 DIAGNOSIS — R26.9 GAIT ABNORMALITY: ICD-10-CM

## 2024-07-03 PROCEDURE — 97110 THERAPEUTIC EXERCISES: CPT | Performed by: PHYSICAL MEDICINE & REHABILITATION

## 2024-07-03 NOTE — PROGRESS NOTES
"Daily Note     Today's date: 7/3/2024  Patient name: Rosy Gibson  : 1950  MRN: [id number]  Referring provider: Liss Whitehead,*  Dx:   Encounter Diagnosis     ICD-10-CM    1. History of total knee arthroplasty, left  Z96.652       2. Arthritis of left knee  M17.12       3. Gait abnormality  R26.9                      Subjective: Pt with no new sx/complaints. Notes she rolled over in bed the other night her L knee felt \"weird\"; not really painful per pt; feels like it moved or shifted; feels her R knee did this as well post op TKA;  no issues following this; no knee pain, swelling, stiffness, instability etc. No complaints after last session. Feels she's doing very well.       Objective: See treatment diary below      Assessment: Tolerated treatment well. No pain/sx noted with or following tx. No knee instability noted. Cont to maintain good ROM L knee; AAROM L knee flex 113* this date. Lacking ~5* active L knee ext; PROM approaching 0* knee ext with gentle overpressure without pain/sx. Added mini squats with cues for appropriate WS/WB and form with good return demo; no pain/sx. Improving L quad and hip strength ; continues with appropriate mm fatigue with program. Cont with strength/endurance deficits L quad. No complaints after session. Patient demonstrated fatigue post treatment and would benefit from continued PT      Plan: Continue per plan of care.  Progress treatment as tolerated.       Precautions: Hx R TKA, Hx R MYAH, HTN, neuropathy, back pain, neck pain, hx skin CA          Re-eval Date:     Date 6/27 7/1 7/3 6.   Visit Count 6 7 8 4 5   FOTO NV*   **    Pain In 0 0 0/10 0/10 0/10   Pain Out 0 0 0/10 0/10 0/10       Manuals 6/27 7/1 7/3 6.   Upcoming- L patella mobs, L knee flex/ext with gentle stretch end range as leonora  Motion check Motion check   L TKE ext   With gentle OP as leonora supine  , l patellar mobs  5' as leonora    *MJD    *Pat Mobs    L knee flex/ext  with Gentle " "stretch end range Motion check                            Neuro Re-Ed        Balance         Perturbation training                                                 Ther Ex    6.20    APs      Heel slides Reviewed HEP     Supine  2x10   Strap/board       Supine  2x10   Strap/board         Supine strap/board  2x10 /5\"   Seated   L 30x    Supine  3x10        Supine  3x10   Strap/board    Glute sets      Quad sets With hip add    Roll under ankle  2-3x10/10\"        Roll under ankle  2-3x10/10     Roll under ankle  2-3x10/10   25x/5\"      Supine   25x/5\" With hip add    Roll under ankle  2x10/10\"    LAQ      Hip abd slides  Supine hip ADD  3x10/5\" iso      LAQ 0 #   3x10           LAQ  1#  3x10   2# NV         LLAQ    3x10 /5\"  2#  Seated hip ADD  3x10/5\" iso    LAQ 30x Supine hip ADD  3x10/5\" iso      LAQ 0 #   3x10    Seated knee flexion AAROM     Seated static knee ext  Hip ABD hook lying Blue 2x10/5\"       Reviewed hEP                QS roll under ankle  10x10\"              QS roll under ankle  10x10\"          1x1' Hip ABD hook lying Blue 2x10/5\"     1x1' 0#   SLRs      SAQ-->LAQ Reviewed HEP- HS stretch       calf stretch on Incline  4x 30\"  L     Seated HS/calf stretch  HEP    Calf stretch   HEP   Long-Seated Ham Stretch L 4x/30\"    calf stretch on Incline  4x 20\"  L Long-Seated Ham Stretch L 4x/30\"    calf stretch on Incline  4x 30\"  L   HS curls      Bridges        HR/TRs Stand L 30x              Stand  B 30xea Stand L 1# 30x    Supine  2x10/5\"        Stand  B 30xea Stand L 1.5# 30x    Supine  2x10/5\"        Stand  B 30xea Stand L 30x          Stand  B 30x Stand L 30x          Stand  B 30xea   Mini squats                    Step ups Standing SLR 3 way 0#  L 2x10/5\" ea  Standing SLR 3 way 1#  L 2x10/5\" ea         Bottom step 1-2 HHA  2x10 L  Standing SLR 3 way 1.5#  L 2x10/5\" ea           Bottom step 1-2 HHA  2x10 L      Leg press       Nustep AAROM L knee  Supine SLR flex L 0 #  2x10/5\" cues         Nustep AAROM L " "knee   L2 x 10 min Supine SLR flex L 0 #  2x10/5\" cues        Nustep AAROM L knee   L4 x 10 min  Supine SLR flex L 0 #  2x10/5\" cues            Nustep AAROM L knee   L4 x 10 min      Mini squats  2x10  Cues for form           NS   AAROM L knee   L2 x 10 min          AAROM L knee   L2 x 10 min   Ther Activity  Reviewed                         Gait Training                        Modalities prn   6.20     Pt deferred   Pt deferred  *CP x 10 min to   L knee Pt deferred to home              6/13 - HEP was issued and reviewed this date for above noted exercises. Pt demonstrated understanding without incident and without questions/concerns. Will continue to update upcoming.                    "

## 2024-07-05 ENCOUNTER — TELEPHONE (OUTPATIENT)
Dept: PAIN MEDICINE | Facility: MEDICAL CENTER | Age: 74
End: 2024-07-05

## 2024-07-05 DIAGNOSIS — M79.7 FIBROMYALGIA: ICD-10-CM

## 2024-07-05 DIAGNOSIS — I10 BENIGN ESSENTIAL HTN: ICD-10-CM

## 2024-07-05 DIAGNOSIS — M47.816 LUMBAR SPONDYLOSIS: ICD-10-CM

## 2024-07-05 DIAGNOSIS — R60.0 LOWER EXTREMITY EDEMA: ICD-10-CM

## 2024-07-05 RX ORDER — DULOXETIN HYDROCHLORIDE 60 MG/1
60 CAPSULE, DELAYED RELEASE ORAL DAILY
Qty: 90 CAPSULE | Refills: 2 | Status: SHIPPED | OUTPATIENT
Start: 2024-07-05

## 2024-07-05 RX ORDER — HYDROCHLOROTHIAZIDE 12.5 MG/1
12.5 TABLET ORAL DAILY
Qty: 90 TABLET | Refills: 1 | Status: SHIPPED | OUTPATIENT
Start: 2024-07-05

## 2024-07-08 DIAGNOSIS — M47.816 LUMBAR SPONDYLOSIS: ICD-10-CM

## 2024-07-08 RX ORDER — CYCLOBENZAPRINE HCL 10 MG
10 TABLET ORAL
Qty: 90 TABLET | Refills: 1 | Status: SHIPPED | OUTPATIENT
Start: 2024-07-08

## 2024-07-08 RX ORDER — CYCLOBENZAPRINE HCL 10 MG
10 TABLET ORAL
Qty: 90 TABLET | Refills: 1 | OUTPATIENT
Start: 2024-07-08

## 2024-07-08 NOTE — TELEPHONE ENCOUNTER
Caller: Rosy     Doctor: Chiara     Reason for call: patient needs refill on Flexeril 10 mg   Works well no side effects  4 tabs left      Please send to:  [facility] - [place] - [address]  [address], [place] [address]  Phone: [phone]  Fax: [phone]  [facility] #: [id number]      Call back#: [phone]

## 2024-07-08 NOTE — TELEPHONE ENCOUNTER
Caller: Rosy    Doctor: Chiara    Reason for call: patient needs refill on Flexeril 10 mg   Works well no side effects  4 tabs left     Please send to:  [facility] - [place] - [address]  [address], [place] [address]  Phone: [phone]  Fax: [phone]  [facility] #: [id number]     Call back#: [phone]

## 2024-07-08 NOTE — TELEPHONE ENCOUNTER
Attempted to contact Pt. [facility]. Provided with CB# and [place].

## 2024-07-09 ENCOUNTER — OFFICE VISIT (OUTPATIENT)
Dept: PHYSICAL THERAPY | Facility: CLINIC | Age: 74
End: 2024-07-09
Payer: MEDICARE

## 2024-07-09 DIAGNOSIS — R26.9 GAIT ABNORMALITY: ICD-10-CM

## 2024-07-09 DIAGNOSIS — M17.12 ARTHRITIS OF LEFT KNEE: ICD-10-CM

## 2024-07-09 DIAGNOSIS — Z96.652 HISTORY OF TOTAL KNEE ARTHROPLASTY, LEFT: Primary | ICD-10-CM

## 2024-07-09 PROCEDURE — 97110 THERAPEUTIC EXERCISES: CPT

## 2024-07-09 NOTE — PROGRESS NOTES
"Daily Note     Today's date: 2024  Patient name: Rosy Gibson  : 1950  MRN: [id number]  Referring provider: Liss Whitehead,*  Dx:   Encounter Diagnosis     ICD-10-CM    1. History of total knee arthroplasty, left  Z96.652       2. Arthritis of left knee  M17.12       3. Gait abnormality  R26.9           Start Time: 1035  Stop Time: 1140  Total time in clinic (min): 65 minutes    Subjective: \"I am doing good.\"      Objective: See treatment diary below      Assessment: Tolerated treatment well.  Able to complete program without incident. Added weight machines with good tolerance.  Good motion with minimal to no pain at end range.  Will continue to monitor and progress as able.  Patient demonstrated fatigue post treatment and would benefit from continued PT      Plan: Continue per plan of care.  Progress treatment as tolerated.       Precautions: Hx R TKA, Hx R MYAH, HTN, neuropathy, back pain, neck pain, hx skin CA          Re-eval Date:     Date 6/27 7/1 7/3 7/9    Visit Count 6 7 8 9    FOTO NV*   **    Pain In 0 0 0/10 0/10    Pain Out 0 0 0/10 0/10        Manuals 6/27 7/1 7/3 7/9    Upcoming- L patella mobs, L knee flex/ext with gentle stretch end range as leonora  Motion check Motion check   L TKE ext   With gentle OP as leonora supine  , l patellar mobs  5' as leonora                                Neuro Re-Ed        Balance         Perturbation training                                                 Ther Ex        APs      Heel slides Reviewed HEP     Supine  2x10   Strap/board       Supine  2x10   Strap/board         Supine strap/board  2x10 /5\"         Supine strap/board  2x10 /5\"           Glute sets      Quad sets With hip add    Roll under ankle  2-3x10/10\"        Roll under ankle  2-3x10/10     Roll under ankle  2-3x10/10       LAQ      Hip abd slides  Supine hip ADD  3x10/5\" iso      LAQ 0 #   3x10           LAQ  1#  3x10   2# NV         LLAQ    3x10 /5\"  2#  Knee ext 22# 2x10    Knee " "flex  22# 2x10    Seated knee flexion AAROM     Seated static knee ext  Hip ABD hook lying Blue 2x10/5\"       Reviewed hEP                QS roll under ankle  10x10\"              QS roll under ankle  10x10\"              QS roll under ankle  10x10\"       SLRs      SAQ-->LAQ Reviewed HEP- HS stretch       calf stretch on Incline  4x 30\"  L     Seated HS/calf stretch  HEP    Calf stretch   HEP       calf stretch on Incline  4x 20\"  L    HS curls      Bridges        HR/TRs Stand L 30x              Stand  B 30xea Stand L 1# 30x    Supine  2x10/5\"        Stand  B 30xea Stand L 1.5# 30x    Supine  2x10/5\"        Stand  B 30xea             Stand  B 30x              Mini squats                    Step ups Standing SLR 3 way 0#  L 2x10/5\" ea  Standing SLR 3 way 1#  L 2x10/5\" ea         Bottom step 1-2 HHA  2x10 L  Standing SLR 3 way 1.5#  L 2x10/5\" ea           Bottom step 1-2 HHA  2x10 L                  Bottom step 1-2 HHA  2x10 L     Leg press       Nustep AAROM L knee  Supine SLR flex L 0 #  2x10/5\" cues         Nustep AAROM L knee   L2 x 10 min Supine SLR flex L 0 #  2x10/5\" cues        Nustep AAROM L knee   L4 x 10 min  Supine SLR flex L 0 #  2x10/5\" cues            Nustep AAROM L knee   L4 x 10 min      Mini squats  2x10  Cues for form     Supine SLR flex L 0 #  2x10/5\" cues        Nustep AAROM L knee   L4 x 10 min      Mini squats  2x10  Cues for form      Ther Activity  Reviewed                         Gait Training                        Modalities prn        Pt deferred   Pt deferred  CP 8' L knee              6/13 - HEP was issued and reviewed this date for above noted exercises. Pt demonstrated understanding without incident and without questions/concerns. Will continue to update upcoming.                      "

## 2024-07-11 ENCOUNTER — EVALUATION (OUTPATIENT)
Dept: PHYSICAL THERAPY | Facility: CLINIC | Age: 74
End: 2024-07-11
Payer: MEDICARE

## 2024-07-11 DIAGNOSIS — R26.9 GAIT ABNORMALITY: ICD-10-CM

## 2024-07-11 DIAGNOSIS — Z96.652 HISTORY OF TOTAL KNEE ARTHROPLASTY, LEFT: Primary | ICD-10-CM

## 2024-07-11 DIAGNOSIS — M17.12 ARTHRITIS OF LEFT KNEE: ICD-10-CM

## 2024-07-11 PROCEDURE — 97110 THERAPEUTIC EXERCISES: CPT | Performed by: PHYSICAL MEDICINE & REHABILITATION

## 2024-07-11 NOTE — PROGRESS NOTES
PT Re-Evaluation     Today's date: 2024  Patient name: Rosy Gibson  : 1950  MRN: [id number]  Referring provider: Liss Whitehead,*  Dx:   Encounter Diagnosis     ICD-10-CM    1. History of total knee arthroplasty, left  Z96.652       2. Arthritis of left knee  M17.12       3. Gait abnormality  R26.9                      Assessment  Impairments: abnormal gait, abnormal muscle firing, abnormal muscle tone, abnormal or restricted ROM, activity intolerance, impaired balance, impaired physical strength and lacks appropriate home exercise program  Symptom irritability: moderate    Assessment details: Rosy has been compliant with attending PT and completing home exercise program since initial eval and reports overall % improvement in pain/sx and function since start of care.  Rosy reports and demonstrates decreased pain, increased strength, increased ROM, improved flexibility, improved joint mobility, improved postural awareness, and improved balance since initial eval, but is still limited compared to prior level of function. She continues with mild deficits into end range L knee ext > flex ROM post TKA with c/o stiffness only. She continues with decreased L hip and quad strength and mm endurance however this is improving vs SOC. Continues with decreased eccentric strength L quad > concentric strength with stair climbing, walking inclines etc. Her progress has been overall excellent to date without incident. Rosy continues with above listed impairments and would benefit from additional skilled PT to address these deficits to return to prior level of function. Plan to transition to HEP over next few weeks pending pt f/u with surgeon. HEP reviewed. Pt with no questions/concerns end of session.          Understanding of Dx/Px/POC: good     Prognosis: fair    Goals  Goals to be achieved post op:     STGs to be achieved in 4 weeks:  1. Pt to demonstrate reduced subjective pain maris pastor  "\"at worst\" by at least 2-3 points from Initial Eval in order to allow for reduced pain with ADLs and improved functional activity tolerance. - met   2. Pt to demonstrate increased AROM of L knee flexion to at least 100 degrees and knee extension to no > -5*  in order to allow for greater ease and independence with ADLs and functional mobility. - MET,--> improve L knee flex to > 115* by next RE/DC to max ROM for normal mobility.   3. Pt to demonstrate increased MMT of L knee grossly by at least 1/2-1 grade in order to improve safety and stability with ADLs and functional mobility. - MET   4.Pt will demonstrate ability to safely ambulate at least 100ft with least restrictive AD with minimized gait deviations, independently, and without evidence of instability.- MET       LTGs to be achieved in 8-10 weeks:  1. Pt will be I with HEP in order to continue to improve quality of life and independence and reduce risk for re-injury. - progressing   2. Pt to demonstrate return to PLOF without limitations or restrictions. - partially met   3. Pt to demonstrate improved function as noted by achieving or exceeding predicted score on FOTO outcomes assessment tool. - progressing         Plan  Patient would benefit from: skilled physical therapy  Planned modality interventions: cryotherapy and thermotherapy: hydrocollator packs    Planned therapy interventions: manual therapy, motor coordination training, neuromuscular re-education, patient education, postural training, self care, strengthening, stretching, therapeutic activities, therapeutic exercise, gait training, home exercise program, functional ROM exercises and balance    Frequency: 2x week  Duration in weeks: 4  Treatment plan discussed with: patient  Plan details: Transition to HEP pending f/u with MD       Subjective Evaluation    History of Present Illness  Mechanism of injury: Pt notes overall improvement in her knee since SOC with PT post L TKA. Pt notes overall " improvements in her knee pain, ROM, strength, etc. Also notes improved walking and stair climbing. No longer using AD. No knee instability. No imbalance per pt. Pt notes she can go up/down some stairs now step over step but notes the knee /leg is still not back to 100% strength/endurance yet; still gets tired with walking longer distances or doing too much. Has been able to resume her Adls that she could not do prior to surgery such as standing to wash her dishes. Does get random pains L knee at times; can be sharp; but pain is brief per pt; does not last long; seems to be with moving a certain way, rolling etc. RTD upcoming. Pleased with her progress to date. No new sx/complaints.   Quality of life: good    Patient Goals  Patient goals for therapy: decreased pain, improved balance, increased strength, increased motion, return to sport/leisure activities and independence with ADLs/IADLs  Patient's goals regarding treatment: be able to walk without pain and without AD.  Pain  Current pain ratin  At best pain ratin  At worst pain ratin (brief sharp pains at times; not much lately per pt.)  Location: L knee - anterior and medial  Quality: sharp  Relieving factors: rest, support, relaxation, ice, medications and change in position  Exacerbated by: doing t0o much, twisting at times.  Progression: improved    Social Support  Lives in: multiple-level home  Lives with: adult children (In daughters home)    Employment status: not working    Diagnostic Tests  X-ray: abnormal  Treatments  Previous treatment: physical therapy, medication and injection treatment  Current treatment: physical therapy  Current treatment comments: Post op TKA PT- upcoming.       Objective     Tenderness     Additional Tenderness Details  No ttp noted today     Lumbar Screen  Lumbar range of motion within normal limits.    Active Range of Motion   Left Hip   Flexion: WFL  Extension: WFL  Abduction: WFL  External rotation (90/90):  WFL  Internal rotation (90/90): WFL    Right Hip   Flexion: WFL  Extension: WFL  Abduction: WFL  External rotation (90/90): WFL  Internal rotation (90/90): WFL  Left Knee   Flexion: 120 degrees   Extension: -3 degrees     Right Knee   Flexion: WFL  Extension: WFL  Left Ankle/Foot   Dorsiflexion (ke): WFL  Plantar flexion: WFL    Right Ankle/Foot   Dorsiflexion (ke): WFL  Plantar flexion: WFL    Additional Active Range of Motion Details  Stiffness (min) at end ranges only   NO pain       Passive Range of Motion   Left Hip   Flexion: WFL  Extension: WFL  Abduction: WFL  External rotation (90/90): WFL  Internal rotation (90/90): WFL    Right Hip   Flexion: WFL  Extension: WFL  Abduction: WFL  External rotation (90/90): WFL  Internal rotation (90/90): WFL    Right Knee   Flexion: WFL  Extension: WFL    Additional Passive Range of Motion Details  TBA post op       Mobility   Patellar Mobility:   Left Knee   WFL: medial, lateral, superior and inferior.     Strength/Myotome Testing     Left Hip   Planes of Motion   Flexion: 4- (4--4)  Extension: WFL  Abduction: 4  Adduction: 4+  External rotation: WFL    Right Hip   Planes of Motion   Flexion: WFL  Extension: WFL  Abduction: WFL  External rotation: WFL    Left Knee   Flexion: 4 (4--4)  Extension: 4  Quadriceps contraction: fair (fair-good)    Right Knee   Flexion: 4  Extension: 4    Left Ankle/Foot   Dorsiflexion: 4+  Plantar flexion: 4+    Additional Strength Details  MMT screen seated EOT   No pain    Tests     Left Hip   SLR: Negative.     Right Hip   SLR: Negative.     Ambulation   Weight-Bearing Status   Weight-Bearing Status (Left): weight-bearing as tolerated   Weight-Bearing Status (Right): full weight-bearing    Assistive device used: none    Additional Weight-Bearing Status Details  No ad    Ambulation: Level Surfaces   Ambulation with assistive device: independent  Ambulation without assistive device: independent    Ambulation: Stairs   Ascend stairs:  "independent  Pattern: non-reciprocal  Railings: one rail  Descend stairs: independent  Pattern: non-reciprocal  Railings: one rail    Additional Stairs Ambulation Details  Some stairs can go step over step  Notes weakness L knee vs R however especially going down stairs with L   No pain per pt     Observational Gait   Gait: asymmetric   Decreased walking speed, stride length and left stance time.     Additional Observational Gait Details  Reduced L stance- minimal and intermittently only   Slight lateral flex with L stance- very minimal and intermittent only       Functional Assessment        Comments  Risk Assessment and Prediction Tool Score = 9/12  Car transfers independent  Timed Up and Go = NT     Virtual Home Assessment and Home Preparation Checklist were both reviewed with the patient               Precautions: Hx R TKA, Hx R MYAH, HTN, neuropathy, back pain, neck pain, hx skin CA          Re-eval Date: 8/10    Date 6/27 7/1 7/3 7/9 7/11   Visit Count 6 7 8 9 1/10   FOTO NV*   **    Pain In 0 0 0/10 0/10 0   Pain Out 0 0 0/10 0/10 0       Manuals 6/27 7/1 7/3 7/9 7/11   Upcoming- L patella mobs, L knee flex/ext with gentle stretch end range as leonora  Motion check Motion check   L TKE ext   With gentle OP as leonora supine  , l patellar mobs  5' as leonora     Motion check                            Neuro Re-Ed        Balance         Perturbation training                                                 Ther Ex        APs      Heel slides Reviewed HEP     Supine  2x10   Strap/board       Supine  2x10   Strap/board         Supine strap/board  2x10 /5\"         Supine strap/board  2x10 /5\"       Supine strap/board  2x10 /5\"        Glute sets      Quad sets With hip add    Roll under ankle  2-3x10/10\"        Roll under ankle  2-3x10/10     Roll under ankle  2-3x10/10       LAQ      Hip abd slides  Supine hip ADD  3x10/5\" iso      LAQ 0 #   3x10           LAQ  1#  3x10   2# NV         LLAQ    3x10 /5\"  2#  Knee ext 22# " "2x10    Knee flex  22# 2x10 Knee ext 22# 2x10    Knee flex  22# 2x10   Seated knee flexion AAROM     Seated static knee ext  Hip ABD hook lying Blue 2x10/5\"       Reviewed hEP                QS roll under ankle  10x10\"              QS roll under ankle  10x10\"              QS roll under ankle  10x10\"                QS roll under ankle  10x10\"    SLRs      SAQ-->LAQ Reviewed HEP- HS stretch       calf stretch on Incline  4x 30\"  L     Seated HS/calf stretch  HEP    Calf stretch   HEP       calf stretch on Incline  4x 20\"  L      HS curls      Bridges        HR/TRs Stand L 30x              Stand  B 30xea Stand L 1# 30x    Supine  2x10/5\"        Stand  B 30xea Stand L 1.5# 30x    Supine  2x10/5\"        Stand  B 30xea             Stand  B 30x             Stand  B 30x               Mini squats                    Step ups Standing SLR 3 way 0#  L 2x10/5\" ea  Standing SLR 3 way 1#  L 2x10/5\" ea         Bottom step 1-2 HHA  2x10 L  Standing SLR 3 way 1.5#  L 2x10/5\" ea           Bottom step 1-2 HHA  2x10 L                  Bottom step 1-2 HHA  2x10 L  Standing SLR 3 way 2#  L 2x10/5\" ea            Bottom step 1-2 HHA  3x10 L    Leg press       Nustep AAROM L knee  Supine SLR flex L 0 #  2x10/5\" cues         Nustep AAROM L knee   L2 x 10 min Supine SLR flex L 0 #  2x10/5\" cues        Nustep AAROM L knee   L4 x 10 min  Supine SLR flex L 0 #  2x10/5\" cues            Nustep AAROM L knee   L4 x 10 min      Mini squats  2x10  Cues for form     Supine SLR flex L 0 #  2x10/5\" cues        Nustep AAROM L knee   L4 x 10 min      Mini squats  2x10  Cues for form   Supine SLR flex L 0 #  3x10/5\" cues        Nustep AAROM L knee   L4 x 10 min      Mini squats  2x10  Cues for form     Ther Activity  Reviewed                         Gait Training                        Modalities prn        Pt deferred   Pt deferred  CP 8' L knee deferred             7/11 - HEP was issued and reviewed this date for above noted exercises. Pt demonstrated " understanding without incident and without questions/concerns. Will continue to update upcoming.

## 2024-07-15 ENCOUNTER — EVALUATION (OUTPATIENT)
Dept: PHYSICAL THERAPY | Facility: CLINIC | Age: 74
End: 2024-07-15
Payer: MEDICARE

## 2024-07-15 DIAGNOSIS — M17.12 ARTHRITIS OF LEFT KNEE: ICD-10-CM

## 2024-07-15 DIAGNOSIS — Z96.652 HISTORY OF TOTAL KNEE ARTHROPLASTY, LEFT: Primary | ICD-10-CM

## 2024-07-15 DIAGNOSIS — R26.9 GAIT ABNORMALITY: ICD-10-CM

## 2024-07-15 DIAGNOSIS — Z01.818 PREOPERATIVE TESTING: ICD-10-CM

## 2024-07-15 PROCEDURE — 97110 THERAPEUTIC EXERCISES: CPT | Performed by: PHYSICAL MEDICINE & REHABILITATION

## 2024-07-15 NOTE — PROGRESS NOTES
Daily Note     Today's date: 7/15/2024  Patient name: Rosy Gibson  : 1950  MRN: [id number]  Referring provider: Liss Whitehead,*  Dx:   Encounter Diagnosis     ICD-10-CM    1. History of total knee arthroplasty, left  Z96.652       2. Arthritis of left knee  M17.12       3. Gait abnormality  R26.9       4. Preoperative testing  Z01.818                      Subjective: Pt notes no new sx/complaints. Still feeling good. No knee pain. No knee instability. Pleased with her progress. RTD next week.       Objective: See treatment diary below      Assessment: Tolerated treatment well. Pt tolerated full session without incident. Added SL hip ABD SLR with 0# and added 1# to SLR flex; pt tolerated without incident also. Able to increase Nustep level/resistance with appropriate mm fatigue only. Improving strength L quad /VMO with ability to perform more reps and/or more resistance with less fatigue. Maintaining good ROM L knee; 120* L knee flex AAROM this date without pain/sx. Can attain full ext L knee however cont to demo very slight knee flex. No complaints after session. Improvement in gait noted without compensation with L stance. No complaints after session. HEP reviewed. Patient demonstrated fatigue post treatment and would benefit from continued PT      Plan: Continue per plan of care.  Progress treatment as tolerated.       Precautions: Hx R TKA, Hx R MYAH, HTN, neuropathy, back pain, neck pain, hx skin CA          Re-eval Date: 8/10    Date 7/15 7/1 7/3 7/9 7/11   Visit Count 2/10 7 8 9 1/10   FOTO NV*   **    Pain In 0 0 0/10 0/10 0   Pain Out 0 0 0/10 0/10 0       Manuals 7/15 7/1 7/3 7/9 7/11   Upcoming- L patella mobs, L knee flex/ext with gentle stretch end range as leonora  Motion check Motion check   L TKE ext   With gentle OP as leonora supine  , l patellar mobs  5' as leonora     Motion check                            Neuro Re-Ed        Balance         Perturbation training                            "                      Ther Ex        APs      Heel slides       Supine  2x10 /10\"  Strap/board       Supine  2x10   Strap/board         Supine strap/board  2x10 /5\"         Supine strap/board  2x10 /5\"       Supine strap/board  2x10 /5\"        Glute sets      Quad sets         Roll under ankle  2-3x10/10     Roll under ankle  2-3x10/10       LAQ      Hip abd slides  LE machines:  Knee ext 22# 2x10    Knee flex  22# 2x10          LAQ  1#  3x10   2# NV         LLAQ    3x10 /5\"  2#  Knee ext 22# 2x10    Knee flex  22# 2x10 Knee ext 22# 2x10    Knee flex  22# 2x10   Seated knee flexion AAROM     Seated static knee ext  Supine bridges   3x10/5\"                QS roll under ankle  10x10\"              QS roll under ankle  10x10\"              QS roll under ankle  10x10\"                QS roll under ankle  10x10\"    SLRs      SAQ-->LAQ Supine SLR flex 1 #    3x10     SL hip ABD 0#  3x10    Seated HS/calf stretch  HEP    Calf stretch   HEP       calf stretch on Incline  4x 20\"  L      HS curls      Bridges        HR/TRs               Stand  B 30xea Stand L 1# 30x    Supine  2x10/5\"        Stand  B 30xea Stand L 1.5# 30x    Supine  2x10/5\"        Stand  B 30xea             Stand  B 30x             Stand  B 30x               Mini squats                    Step ups 3x10                 Bottom step   3x10 L  Standing SLR 3 way 1#  L 2x10/5\" ea         Bottom step 1-2 HHA  2x10 L  Standing SLR 3 way 1.5#  L 2x10/5\" ea           Bottom step 1-2 HHA  2x10 L                  Bottom step 1-2 HHA  2x10 L  Standing SLR 3 way 2#  L 2x10/5\" ea            Bottom step 1-2 HHA  3x10 L    Leg press       Nustep AAROM L knee          Nustep AAROM L knee   L5 x 10 min Supine SLR flex L 0 #  2x10/5\" cues        Nustep AAROM L knee   L4 x 10 min  Supine SLR flex L 0 #  2x10/5\" cues            Nustep AAROM L knee   L4 x 10 min      Mini squats  2x10  Cues for form     Supine SLR flex L 0 #  2x10/5\" cues        Nustep AAROM L knee   L4 x 10 " "min      Mini squats  2x10  Cues for form   Supine SLR flex L 0 #  3x10/5\" cues        Nustep AAROM L knee   L4 x 10 min      Mini squats  2x10  Cues for form     Ther Activity  Reviewed                         Gait Training                        Modalities prn        Pt deferred   Pt deferred  CP 8' L knee deferred             7/11 - HEP was issued and reviewed this date for above noted exercises. Pt demonstrated understanding without incident and without questions/concerns. Will continue to update upcoming.            "

## 2024-07-17 ENCOUNTER — APPOINTMENT (OUTPATIENT)
Dept: PHYSICAL THERAPY | Facility: CLINIC | Age: 74
End: 2024-07-17
Payer: MEDICARE

## 2024-07-18 ENCOUNTER — OFFICE VISIT (OUTPATIENT)
Dept: PHYSICAL THERAPY | Facility: CLINIC | Age: 74
End: 2024-07-18
Payer: MEDICARE

## 2024-07-18 DIAGNOSIS — R26.9 GAIT ABNORMALITY: ICD-10-CM

## 2024-07-18 DIAGNOSIS — M17.12 ARTHRITIS OF LEFT KNEE: ICD-10-CM

## 2024-07-18 DIAGNOSIS — Z96.652 HISTORY OF TOTAL KNEE ARTHROPLASTY, LEFT: Primary | ICD-10-CM

## 2024-07-18 PROCEDURE — 97110 THERAPEUTIC EXERCISES: CPT | Performed by: PHYSICAL MEDICINE & REHABILITATION

## 2024-07-18 NOTE — PROGRESS NOTES
Daily Note     Today's date: 2024  Patient name: Rosy Gibson  : 1950  MRN: [id number]  Referring provider: Liss Whitehead,*  Dx:   Encounter Diagnosis     ICD-10-CM    1. History of total knee arthroplasty, left  Z96.652       2. Arthritis of left knee  M17.12       3. Gait abnormality  R26.9                      Subjective: Pt notes no new sx/complaints. Knee is still doing well. Feels her strength and ROM are improving. NO recent episodes of knee pain or instability. RTD next week.       Objective: See treatment diary below      Assessment: Tolerated treatment well. Continues to make excellent progress to date. Completed full program without incident. Demonstrates cont'd improvements in L hip/knee strength and mm endurance with PREs; completing more weight and/or reps ve prior sessions. Improvement in gait quality noted with improved L stance time and reduced lateral flex with L stance; improved TKE also noted; no AD; no evidence of imbalance noted. Cont with min end ROM deficits L knee flex/ext but overall ROM L knee is good. Cont with strength/endurance deficits L quad/VMO vs R but steadily improving; mild L ext lag with SLR flex with 1# with mm fatigue only; improved with cues. Pt RTD next week. No complaints after session.  Patient demonstrated fatigue post treatment and would benefit from continued PT      Plan: Continue per plan of care.  Progress treatment as tolerated.   Potential d/c to HEP next week after f/u with MD.      Precautions: Hx R TKA, Hx R MYAH, HTN, neuropathy, back pain, neck pain, hx skin CA          Re-eval Date: 8/10    Date 7/15 7/18 7/3 7/9 7/11   Visit Count 2/10 3/10 8 9 1/10   FOTO NV*   **    Pain In 0 0 0/10 0/10 0   Pain Out 0 0 0/10 0/10 0       Manuals 7/15 7/18 7/3 7/9 7/11   Upcoming- L patella mobs, L knee flex/ext with gentle stretch end range as leonora  Motion check Motion check   L TKE ext   With gentle OP as leonora supine  , l patellar mobs  5' as leonora  "    Motion check                            Neuro Re-Ed        Balance         Perturbation training                                                 Ther Ex        APs      Heel slides       Supine  2x10 /10\"  Strap/board       Supine  2x10   Strap/board         Supine strap/board  2x10 /5\"         Supine strap/board  2x10 /5\"       Supine strap/board  2x10 /5\"        Glute sets      Quad sets            Roll under ankle  2-3x10/10       LAQ      Hip abd slides  LE machines:  Knee ext 22# 2x10    Knee flex  22# 2x10 LE machines:  Knee ext 22# 2-310    Knee flex  22# 2-3x10       LLAQ    3x10 /5\"  2#  Knee ext 22# 2x10    Knee flex  22# 2x10 Knee ext 22# 2x10    Knee flex  22# 2x10   Seated knee flexion AAROM     Seated static knee ext  Supine bridges   3x10/5\"    Supine bridges   3x10/5\"                    QS roll under ankle  10x10\"              QS roll under ankle  10x10\"                QS roll under ankle  10x10\"    SLRs      SAQ-->LAQ Supine SLR flex 1 #    3x10     SL hip ABD 0#  3x10    Supine SLR flex 1 #    3x10     SL hip ABD 1#  3x10       calf stretch on Incline  4x 20\"  L      HS curls      Bridges        HR/TRs               Stand  B 30xea               Stand  B 30xea Stand L 1.5# 30x    Supine  2x10/5\"        Stand  B 30xea             Stand  B 30x             Stand  B 30x               Mini squats                    Step ups 3x10                 Bottom step   3x10 L  3x10                Bottom step 1-2 HHA  2x10 L  Standing SLR 3 way 1.5#  L 2x10/5\" ea           Bottom step 1-2 HHA  2x10 L                  Bottom step 1-2 HHA  2x10 L  Standing SLR 3 way 2#  L 2x10/5\" ea            Bottom step 1-2 HHA  3x10 L    Leg press       Nustep AAROM L knee          Nustep AAROM L knee   L5 x 10 min         Nustep AAROM L knee   L5 x 10 min  Supine SLR flex L 0 #  2x10/5\" cues            Nustep AAROM L knee   L4 x 10 min      Mini squats  2x10  Cues for form     Supine SLR flex L 0 #  2x10/5\" cues        Nustep " "AAROM L knee   L4 x 10 min      Mini squats  2x10  Cues for form   Supine SLR flex L 0 #  3x10/5\" cues        Nustep AAROM L knee   L4 x 10 min      Mini squats  2x10  Cues for form     Ther Activity  Reviewed                         Gait Training                        Modalities prn        Pt deferred   Pt deferred  CP 8' L knee deferred             7/11 - HEP was issued and reviewed this date for above noted exercises. Pt demonstrated understanding without incident and without questions/concerns. Will continue to update upcoming.              "

## 2024-07-22 ENCOUNTER — OFFICE VISIT (OUTPATIENT)
Dept: PHYSICAL THERAPY | Facility: CLINIC | Age: 74
End: 2024-07-22
Payer: MEDICARE

## 2024-07-22 DIAGNOSIS — M17.12 ARTHRITIS OF LEFT KNEE: ICD-10-CM

## 2024-07-22 DIAGNOSIS — Z96.652 HISTORY OF TOTAL KNEE ARTHROPLASTY, LEFT: Primary | ICD-10-CM

## 2024-07-22 DIAGNOSIS — R26.9 GAIT ABNORMALITY: ICD-10-CM

## 2024-07-22 PROCEDURE — 97110 THERAPEUTIC EXERCISES: CPT | Performed by: PHYSICAL MEDICINE & REHABILITATION

## 2024-07-22 NOTE — PROGRESS NOTES
Daily Note     Today's date: 2024  Patient name: Rosy Gibson  : 1950  MRN: [id number]  Referring provider: Liss Whitehead,*  Dx:   Encounter Diagnosis     ICD-10-CM    1. History of total knee arthroplasty, left  Z96.652       2. Arthritis of left knee  M17.12       3. Gait abnormality  R26.9                      Subjective: Pt with no new sx/complaints. No noted knee pain or instability L knee. Pleased with her progress. RTD this week. Feels she is ready to transition to HEP at this time. Feels she is back to PLOF. Reports no limitations with Adls, mobility, community mobility etc. Feels her balance is good. No complaints at this time. I with HEP.       Objective: See treatment diary below      Assessment: Tolerated treatment well. Pt continues to complete full program without incident; no pain/sx with or following session. Pt continues to make steady gains in A/PROM L knee and in improving muscular strength and endurance L hip/knee. L knee AAROM -2-120* this date with c/o min stiffness only at end ranges. Can attain full PROM knee ext with mild overpressure without pain/sx. Pt's gait has normalized; no evidence of imbalance in PT; no longer using AD. Able to negotiate steps step over step ascending and descending with HR without pain/sx/complaints. Hep was reviewed ;pt noted understanding with no questions/concerns. Pt feels she is ready to transition to HEP. Pt is back to all ADLs/IADLs without limitations. RTD this week. Patient demonstrated fatigue post treatment; no pain/sx.       Plan: Potential d/c to HEP pending pt f/u with MD. Pt will call PT after MD visit to let PT know if she is cont with PT vs d/c. Pt in agreement with plan.      Precautions: Hx R TKA, Hx R MYAH, HTN, neuropathy, back pain, neck pain, hx skin CA          Re-eval Date: 8/10    Date 7/15 7/18 7/22 7/9 7/11   Visit Count 2/10 3/10 4/10 9 1/10   FOTO NV*   **    Pain In 0 0 0/10 0/10 0   Pain Out 0 0 0/10 0/10 0  "      Manuals 7/15 7/18 7/22 7/9 7/11   Upcoming- L patella mobs, L knee flex/ext with gentle stretch end range as leonora  Motion check Motion check   Motion check     Motion check                            Neuro Re-Ed        Balance         Perturbation training                                                 Ther Ex        APs      Heel slides       Supine  2x10 /10\"  Strap/board       Supine  2x10   Strap/board         Supine strap/board  2x10 /5\"-10\"         Supine strap/board  2x10 /5\"       Supine strap/board  2x10 /5\"        Glute sets      Quad sets            Roll under ankle  2x10/10\"       LAQ      Hip abd slides  LE machines:  Knee ext 22# 2x10    Knee flex  22# 2x10 LE machines:  Knee ext 22# 2-310    Knee flex  22# 2-3x10 LE machines:  Knee ext 22# 3x10    Knee flex  22# 3x10  Knee ext 22# 2x10    Knee flex  22# 2x10 Knee ext 22# 2x10    Knee flex  22# 2x10   Seated knee flexion AAROM     Seated static knee ext  Supine bridges   3x10/5\"    Supine bridges   3x10/5\"        Supine bridges   3x10/5\"                         QS roll under ankle  10x10\"                QS roll under ankle  10x10\"    SLRs      SAQ-->LAQ Supine SLR flex 1 #    3x10     SL hip ABD 0#  3x10    Supine SLR flex 1 #    3x10     SL hip ABD 1#  3x10  Supine SLR flex 1 #    3x10     SL hip ABD 1#  3x10      calf stretch on Incline  4x 20\"  L      HS curls      Bridges        HR/TRs               Stand  B 30xea               Stand  B 30xea               Stand  B 30xea             Stand  B 30x             Stand  B 30x               Mini squats                    Step ups 3x10                 Bottom step   3x10 L  3x10                Bottom step 1-2 HHA  2x10 L  3x10               Bottom step 1-2 HHA  3x10 L                  Bottom step 1-2 HHA  2x10 L  Standing SLR 3 way 2#  L 2x10/5\" ea            Bottom step 1-2 HHA  3x10 L    Leg press       Nustep AAROM L knee          Nustep AAROM L knee   L5 x 10 min         Nustep AAROM L knee   L5 x " "10 min          Nustep AAROM L knee   L4 x 10 min           Supine SLR flex L 0 #  2x10/5\" cues        Nustep AAROM L knee   L4 x 10 min      Mini squats  2x10  Cues for form   Supine SLR flex L 0 #  3x10/5\" cues        Nustep AAROM L knee   L4 x 10 min      Mini squats  2x10  Cues for form     Ther Activity  Reviewed                         Gait Training                        Modalities prn        Pt deferred   Pt deferred  CP 8' L knee deferred             7/22 - HEP was issued and reviewed this date for above noted exercises. Pt demonstrated understanding without incident and without questions/concerns. Will continue to update upcoming.                "

## 2024-07-23 ENCOUNTER — OFFICE VISIT (OUTPATIENT)
Dept: OBGYN CLINIC | Facility: MEDICAL CENTER | Age: 74
End: 2024-07-23

## 2024-07-23 VITALS
DIASTOLIC BLOOD PRESSURE: 77 MMHG | BODY MASS INDEX: 27.2 KG/M2 | HEART RATE: 70 BPM | SYSTOLIC BLOOD PRESSURE: 120 MMHG | WEIGHT: 147.8 LBS | HEIGHT: 62 IN

## 2024-07-23 DIAGNOSIS — Z96.652 STATUS POST TOTAL KNEE REPLACEMENT, LEFT: Primary | ICD-10-CM

## 2024-07-23 DIAGNOSIS — Z96.641 STATUS POST TOTAL HIP REPLACEMENT, RIGHT: ICD-10-CM

## 2024-07-23 PROCEDURE — 99024 POSTOP FOLLOW-UP VISIT: CPT | Performed by: ORTHOPAEDIC SURGERY

## 2024-07-23 RX ORDER — ASPIRIN 325 MG
325 TABLET, DELAYED RELEASE (ENTERIC COATED) ORAL 2 TIMES DAILY
Qty: 84 TABLET | Refills: 1 | Status: SHIPPED | OUTPATIENT
Start: 2024-07-23

## 2024-07-23 NOTE — PROGRESS NOTES
Assessment/Plan:  1. Status post total knee replacement, left      No orders of the defined types were placed in this encounter.      Patient is doing very well 6 weeks status post L TKA on 6/11/24. She is also status post R TKA on 10/18/23 and R MYAH on 6/19/23.   May transition to home exercises.   Pain control prn- tylenol if needed.   Patient should call ahead for abx prior to dental appts.       Return in about 4 weeks (around 8/20/2024) for L TKA post op 3.    I answered all of the patient's questions during the visit and provided education of the patient's condition during the visit.  The patient verbalized understanding of the information given and agrees with the plan.  This note was dictated using [employer] software.  It may contain errors including improperly dictated words.  Please contact physician directly for any questions.    Subjective   Chief Complaint:   Chief Complaint   Patient presents with    Left Knee - Post-op, Follow-up       Rosy Gibson is a 74 y.o. female who presents for 6 week follow up s/p left TKA.  Patient is doing well. Patient states she is not having any pain at this time. Patient is able to do the activities that she would like including all of her cleaning, yard work, and walking. Patient is not taking anything for pain. She was dc from outpatient PT and continues her home exercises. She is not using an assistive device. She is overall very happy with her total joints.       Review of Systems  ROS:    See HPI for musculoskeletal review.   All other systems reviewed are negative     History:  Past Medical History:   Diagnosis Date    Arthritis     Encounter for geriatric assessment 05/18/2023    Fatty liver 05/01/2018    Fibromyalgia, primary     GERD (gastroesophageal reflux disease)     Hyperlipidemia     Hypertension     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    Low back pain     Lumbar stenosis     Neck pain     Neuropathy     feet    No natural teeth     on  lower    Primary localized osteoarthritis of right knee 08/17/2017    Sacroiliitis (HCC) 08/20/2018    Skin cancer     on chest in past - melanoma    Sleep apnea     resolved with weight loss    Use of cane as ambulatory aid     or walker    Wears dentures     full  uppers     Past Surgical History:   Procedure Laterality Date    JOINT REPLACEMENT Right 06/2023    RTK RTH    [place] ARTHRP ACETBLR/PROX FEM PROSTC AGRFT/ALGRFT Right 06/19/2023    Procedure: ARTHROPLASTY HIP TOTAL ANTERIOR;  Surgeon: Liss Whitehead DO;  Location:  MAIN OR;  Service: Orthopedics    [place] ARTHRP KNE CONDYLE&PLATU MEDIAL&LAT COMPARTMENTS Right 10/18/2023    Procedure: RIGHT TOTAL KNEE ARTHROPLASTY;  Surgeon: Liss Whitehead DO;  Location:  MAIN OR;  Service: Orthopedics    [place] ARTHRP KNE CONDYLE&PLATU MEDIAL&LAT COMPARTMENTS Left 6/11/2024    Procedure: ARTHROPLASTY KNEE TOTAL, potential same day discharge;  Surgeon: Liss Whitehead DO;  Location:  MAIN OR;  Service: Orthopedics    SKIN CANCER EXCISION      on chest - melanoma    TUBAL LIGATION       Social History   Social History     Substance and Sexual Activity   Alcohol Use Yes    Comment: rarely     Social History     Substance and Sexual Activity   Drug Use Never     Social History     Tobacco Use   Smoking Status Never   Smokeless Tobacco Never     Family History:   Family History   Problem Relation Age of Onset    Osteoporosis Mother     Skin cancer Father 55    Stomach cancer Sister 49    No Known Problems Daughter     No Known Problems Maternal Grandmother     No Known Problems Maternal Grandfather     No Known Problems Paternal Grandmother     No Known Problems Paternal Grandfather     No Known Problems Maternal Aunt     No Known Problems Maternal Aunt     No Known Problems Maternal Aunt     No Known Problems Maternal Aunt     No Known Problems Paternal Aunt        Current Outpatient Medications on File Prior to Visit   Medication Sig Dispense Refill     acetaminophen (TYLENOL) 500 mg tablet Take 2 tablets (1,000 mg total) by mouth every 8 (eight) hours  0    ascorbic acid (VITAMIN C) 500 mg tablet Take 1 tablet (500 mg total) by mouth daily Begin 30 days prior to surgery. 90 tablet 1    aspirin (ECOTRIN) 325 mg EC tablet Take 1 tablet (325 mg total) by mouth 2 (two) times a day Take with food. 84 tablet 0    benazepril (LOTENSIN) 20 mg tablet TAKE ONE TABLET BY MOUTH DAILY 90 tablet 1    cholecalciferol (VITAMIN D3) 1,000 units tablet Take 1,000 Units by mouth daily      cyclobenzaprine (FLEXERIL) 10 mg tablet Take 1 tablet (10 mg total) by mouth daily at bedtime 90 tablet 1    docusate sodium (COLACE) 100 mg capsule TAKE 1 CAPSULE (100 MG TOTAL) BY MOUTH 2 (TWO) TIMES A  capsule 1    DULoxetine (CYMBALTA) 60 mg delayed release capsule TAKE ONE CAPSULE BY MOUTH DAILY 90 capsule 2    ferrous sulfate 324 (65 Fe) mg Take 1 tablet (324 mg total) by mouth daily before breakfast Begin 30 days prior to surgery. 90 tablet 1    folic acid (FOLVITE) 1 mg tablet Take 1 tablet (1 mg total) by mouth daily Begin 30 days prior to surgery. 90 tablet 1    gabapentin (Neurontin) 600 MG tablet Take 1 tablet (600 mg total) by mouth 3 (three) times a day 90 tablet 2    hydroCHLOROthiazide 12.5 mg tablet TAKE ONE TABLET BY MOUTH DAILY 90 tablet 1    lidocaine (Lidoderm) 5 % Apply 1 patch topically daily Remove & Discard patch within 12 hours or as directed by MD (Patient not taking: Reported on 5/30/2023) 30 patch 2    Multiple Vitamins-Minerals (One-Daily Multi-Vit/Mineral) TABS Take 1 tablet by mouth in the morning 90 tablet 1    omeprazole (PriLOSEC) 10 mg delayed release capsule Take 10 mg by mouth daily        promethazine (PHENERGAN) 12.5 MG tablet Take 1 tablet (12.5 mg total) by mouth every 6 (six) hours as needed for nausea or vomiting 12 tablet 0    rosuvastatin (CRESTOR) 10 MG tablet TAKE ONE TABLET BY MOUTH DAILY 90 tablet 1    SUPER B COMPLEX/C PO Take 1 tablet by  "mouth daily         No current facility-administered medications on file prior to visit.     Allergies   Allergen Reactions    Penicillins Hives and Itching        Objective     /77   Pulse 70   Ht 5' 2\" (1.575 m)   Wt 67 kg (147 lb 12.8 oz)   BMI 27.03 kg/m²      PE:  AAOx 3  WDWN  Hearing intact, no drainage from eyes  no audible wheezing  no abdominal distension  LE compartments soft, AT/GS intact    Ortho Exam:  left Knee:   INC: healed, No erythema, mild swelling  AROM: 2 - 115 degrees         "

## 2024-08-01 ENCOUNTER — OFFICE VISIT (OUTPATIENT)
Dept: PAIN MEDICINE | Facility: MEDICAL CENTER | Age: 74
End: 2024-08-01
Payer: MEDICARE

## 2024-08-01 VITALS
WEIGHT: 150 LBS | SYSTOLIC BLOOD PRESSURE: 131 MMHG | BODY MASS INDEX: 27.6 KG/M2 | HEIGHT: 62 IN | DIASTOLIC BLOOD PRESSURE: 85 MMHG | HEART RATE: 76 BPM

## 2024-08-01 DIAGNOSIS — M51.16 LUMBAR DISC HERNIATION WITH RADICULOPATHY: Primary | ICD-10-CM

## 2024-08-01 DIAGNOSIS — M48.062 SPINAL STENOSIS OF LUMBAR REGION WITH NEUROGENIC CLAUDICATION: ICD-10-CM

## 2024-08-01 DIAGNOSIS — M47.816 LUMBAR SPONDYLOSIS: ICD-10-CM

## 2024-08-01 DIAGNOSIS — M54.41 CHRONIC RIGHT-SIDED LOW BACK PAIN WITH RIGHT-SIDED SCIATICA: ICD-10-CM

## 2024-08-01 DIAGNOSIS — G89.29 CHRONIC RIGHT-SIDED LOW BACK PAIN WITH RIGHT-SIDED SCIATICA: ICD-10-CM

## 2024-08-01 PROCEDURE — G2211 COMPLEX E/M VISIT ADD ON: HCPCS

## 2024-08-01 PROCEDURE — 99213 OFFICE O/P EST LOW 20 MIN: CPT

## 2024-08-01 RX ORDER — GABAPENTIN 600 MG/1
600 TABLET ORAL 3 TIMES DAILY
Qty: 90 TABLET | Refills: 2 | Status: SHIPPED | OUTPATIENT
Start: 2024-08-01

## 2024-08-01 NOTE — PROGRESS NOTES
Assessment:  1. Lumbar disc herniation with radiculopathy    2. Lumbar spondylosis    3. Chronic right-sided low back pain with right-sided sciatica    4. Spinal stenosis of lumbar region with neurogenic claudication        Plan:  Patient remains stable and her pain is well-controlled on cyclobenzaprine 10 mg as needed gabapentin 600 mg 1 tablet 3 times daily.  Refills of gabapentin were sent to the patient's pharmacy today.  She did not require refills of cyclobenzaprine.  Overall, her pain is well-controlled and she has no complaints today.  Follow-up in 6 months or sooner if needed for medication refill and reevaluation.    This patient is being managed for a complex and serious condition that requires ongoing, intensive medical management. The nature of this condition demands constant vigilance and a nuanced approach to treatment. The condition necessitates an in-depth and focused approach to management, including regular monitoring and potential coordination with other healthcare professionals.     Detailed Description of Visit Complexity: This visit involved an intricate evaluation and management of the patient's condition. The complexity of the visit was due to the need for a detailed assessment of the current state, consideration of potential complications, and a careful balancing of treatment options to manage the condition effectively.     Patient's Health Status and History: This patient has a significant pain history which requires regular and detailed management. The condition's impact on their life and health is substantial, necessitating a comprehensive and tailored approach to chronic ongoing care.     My impressions and treatment recommendations were discussed in detail with the patient who verbalized understanding and had no further questions.  Discharge instructions were provided. I personally saw and examined the patient and I agree with the above discussed plan of care.    No orders of the  defined types were placed in this encounter.    New Medications Ordered This Visit   Medications    gabapentin (Neurontin) 600 MG tablet     Sig: Take 1 tablet (600 mg total) by mouth 3 (three) times a day     Dispense:  90 tablet     Refill:  2       History of Present Illness:  Rosy Gibson is a 74 y.o. female  with a history of lumbar spondylosis, lumbar radiculopathy, and myofascial pain syndrome who presents for a follow up office visit for medication refill and reevaluation.  Patient reports that her pain is well-controlled on current medication regimen.  She states that her pain is intermittent, and currently rates it a 0/10 in intensity on average.  She describes the quality of this pain as dull/aching.    Current pain medication regimen consists of gabapentin 600 mg 1 tablet 3 times daily and cyclobenzaprine 10 mg 1 tablet once daily at bedtime.  Patient reports that she tolerates this medication well without side effects.  This regimen reduces her pain by over 50%.     I have personally reviewed and/or updated the patient's past medical history, past surgical history, family history, social history, current medications, allergies, and vital signs today.     Review of Systems   Constitutional:  Negative for fever.   HENT:  Negative for hearing loss.    Eyes:  Negative for visual disturbance.   Respiratory:  Negative for cough.    Cardiovascular:  Negative for leg swelling.   Gastrointestinal:  Negative for abdominal pain and nausea.   Endocrine: Negative for polydipsia.   Genitourinary:  Negative for difficulty urinating.   Musculoskeletal:  Positive for neck pain and neck stiffness. Negative for gait problem and myalgias.   Skin:  Negative for rash.   Neurological:  Negative for numbness.   Hematological:  Does not bruise/bleed easily.   Psychiatric/Behavioral:  Negative for dysphoric mood and sleep disturbance.        Patient Active Problem List   Diagnosis    Primary osteoarthritis of left knee     Lumbar disc disease with radiculopathy    Chronic right-sided low back pain with right-sided sciatica    Fibromyalgia    Benign essential HTN    Fatty liver    Gastroesophageal reflux disease without esophagitis    Lumbar radiculopathy    Pes anserine bursitis    Lower extremity edema    Cervical radiculopathy    Cervical spine arthritis    Cervical spinal stenosis    Lumbar spondylosis    Current episode of major depressive disorder without prior episode    Mixed hyperlipidemia    IFG (impaired fasting glucose)    Degenerative disc disease, cervical    Type 2 diabetes mellitus with obesity  (HCC)    Spasm of right piriformis muscle    Primary osteoarthritis of right hip    Spinal stenosis of lumbar region with neurogenic claudication    Status post total hip replacement, right    Status post total knee replacement, right    Anemia associated with nutritional deficiency    Teeth missing    Status post total knee replacement, left       Past Medical History:   Diagnosis Date    Arthritis     Encounter for geriatric assessment 05/18/2023    Fatty liver 05/01/2018    Fibromyalgia, primary     GERD (gastroesophageal reflux disease)     Hyperlipidemia     Hypertension     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    Low back pain     Lumbar stenosis     Neck pain     Neuropathy     feet    No natural teeth     on lower    Primary localized osteoarthritis of right knee 08/17/2017    Sacroiliitis (HCC) 08/20/2018    Skin cancer     on chest in past - melanoma    Sleep apnea     resolved with weight loss    Use of cane as ambulatory aid     or walker    Wears dentures     full  uppers       Past Surgical History:   Procedure Laterality Date    JOINT REPLACEMENT Right 06/2023    RTK RTH    MS ARTHRP ACETBLR/PROX FEM PROSTC AGRFT/ALGRFT Right 06/19/2023    Procedure: ARTHROPLASTY HIP TOTAL ANTERIOR;  Surgeon: Liss Whitehead DO;  Location:  MAIN OR;  Service: Orthopedics    MS ARTHRP KNE CONDYLE&PLATU  MEDIAL&LAT COMPARTMENTS Right 10/18/2023    Procedure: RIGHT TOTAL KNEE ARTHROPLASTY;  Surgeon: Liss Whitehead DO;  Location:  MAIN OR;  Service: Orthopedics    IA ARTHRP KNE CONDYLE&PLATU MEDIAL&LAT COMPARTMENTS Left 6/11/2024    Procedure: ARTHROPLASTY KNEE TOTAL, potential same day discharge;  Surgeon: Liss Whitehead DO;  Location:  MAIN OR;  Service: Orthopedics    SKIN CANCER EXCISION      on chest - melanoma    TUBAL LIGATION         Family History   Problem Relation Age of Onset    Osteoporosis Mother     Skin cancer Father 55    Stomach cancer Sister 49    No Known Problems Daughter     No Known Problems Maternal Grandmother     No Known Problems Maternal Grandfather     No Known Problems Paternal Grandmother     No Known Problems Paternal Grandfather     No Known Problems Maternal Aunt     No Known Problems Maternal Aunt     No Known Problems Maternal Aunt     No Known Problems Maternal Aunt     No Known Problems Paternal Aunt        Social History     Occupational History    Not on file   Tobacco Use    Smoking status: Never    Smokeless tobacco: Never   Vaping Use    Vaping status: Never Used   Substance and Sexual Activity    Alcohol use: Yes     Comment: rarely    Drug use: Never    Sexual activity: Not Currently       Current Outpatient Medications on File Prior to Visit   Medication Sig    acetaminophen (TYLENOL) 500 mg tablet Take 2 tablets (1,000 mg total) by mouth every 8 (eight) hours    ascorbic acid (VITAMIN C) 500 mg tablet Take 1 tablet (500 mg total) by mouth daily Begin 30 days prior to surgery.    aspirin (ECOTRIN) 325 mg EC tablet TAKE 1 TABLET (325 MG TOTAL) BY MOUTH 2 (TWO) TIMES A DAY TAKE WITH FOOD.    benazepril (LOTENSIN) 20 mg tablet TAKE ONE TABLET BY MOUTH DAILY    cholecalciferol (VITAMIN D3) 1,000 units tablet Take 1,000 Units by mouth daily    cyclobenzaprine (FLEXERIL) 10 mg tablet Take 1 tablet (10 mg total) by mouth daily at bedtime    docusate sodium  "(COLACE) 100 mg capsule TAKE 1 CAPSULE (100 MG TOTAL) BY MOUTH 2 (TWO) TIMES A DAY    DULoxetine (CYMBALTA) 60 mg delayed release capsule TAKE ONE CAPSULE BY MOUTH DAILY    ferrous sulfate 324 (65 Fe) mg Take 1 tablet (324 mg total) by mouth daily before breakfast Begin 30 days prior to surgery.    folic acid (FOLVITE) 1 mg tablet Take 1 tablet (1 mg total) by mouth daily Begin 30 days prior to surgery.    hydroCHLOROthiazide 12.5 mg tablet TAKE ONE TABLET BY MOUTH DAILY    Multiple Vitamins-Minerals (One-Daily Multi-Vit/Mineral) TABS Take 1 tablet by mouth in the morning    omeprazole (PriLOSEC) 10 mg delayed release capsule Take 10 mg by mouth daily      promethazine (PHENERGAN) 12.5 MG tablet Take 1 tablet (12.5 mg total) by mouth every 6 (six) hours as needed for nausea or vomiting    rosuvastatin (CRESTOR) 10 MG tablet TAKE ONE TABLET BY MOUTH DAILY    SUPER B COMPLEX/C PO Take 1 tablet by mouth daily      [DISCONTINUED] gabapentin (Neurontin) 600 MG tablet Take 1 tablet (600 mg total) by mouth 3 (three) times a day    lidocaine (Lidoderm) 5 % Apply 1 patch topically daily Remove & Discard patch within 12 hours or as directed by MD (Patient not taking: Reported on 5/30/2023)     No current facility-administered medications on file prior to visit.       Allergies   Allergen Reactions    Penicillins Hives and Itching       Physical Exam:    /85   Pulse 76   Ht 5' 2\" (1.575 m)   Wt 68 kg (150 lb)   BMI 27.44 kg/m²     Constitutional:normal, well developed, well nourished, alert, in no distress and non-toxic and no overt pain behavior.  Eyes:anicteric  HEENT:grossly intact  Neck:supple, symmetric, trachea midline and no masses   Pulmonary:even and unlabored  Cardiovascular:No edema or pitting edema present  Skin:Normal without rashes or lesions and well hydrated  Psychiatric:Mood and affect appropriate  Neurologic:Cranial Nerves II-XII grossly intact  Musculoskeletal:normal    "

## 2024-08-07 ENCOUNTER — OFFICE VISIT (OUTPATIENT)
Dept: INTERNAL MEDICINE CLINIC | Facility: CLINIC | Age: 74
End: 2024-08-07
Payer: MEDICARE

## 2024-08-07 VITALS
HEART RATE: 90 BPM | WEIGHT: 149 LBS | RESPIRATION RATE: 16 BRPM | BODY MASS INDEX: 27.42 KG/M2 | HEIGHT: 62 IN | OXYGEN SATURATION: 99 % | SYSTOLIC BLOOD PRESSURE: 132 MMHG | DIASTOLIC BLOOD PRESSURE: 74 MMHG

## 2024-08-07 DIAGNOSIS — Z00.00 MEDICARE ANNUAL WELLNESS VISIT, SUBSEQUENT: ICD-10-CM

## 2024-08-07 DIAGNOSIS — F32.1 CURRENT MODERATE EPISODE OF MAJOR DEPRESSIVE DISORDER WITHOUT PRIOR EPISODE (HCC): ICD-10-CM

## 2024-08-07 DIAGNOSIS — E66.9 TYPE 2 DIABETES MELLITUS WITH OBESITY  (HCC): ICD-10-CM

## 2024-08-07 DIAGNOSIS — Z12.12 SCREENING FOR COLORECTAL CANCER: ICD-10-CM

## 2024-08-07 DIAGNOSIS — Z12.11 SCREENING FOR COLORECTAL CANCER: ICD-10-CM

## 2024-08-07 DIAGNOSIS — Z12.31 ENCOUNTER FOR SCREENING MAMMOGRAM FOR BREAST CANCER: ICD-10-CM

## 2024-08-07 DIAGNOSIS — E11.69 TYPE 2 DIABETES MELLITUS WITH OBESITY  (HCC): ICD-10-CM

## 2024-08-07 DIAGNOSIS — I10 BENIGN ESSENTIAL HTN: ICD-10-CM

## 2024-08-07 DIAGNOSIS — E78.2 MIXED HYPERLIPIDEMIA: Primary | ICD-10-CM

## 2024-08-07 PROCEDURE — G0439 PPPS, SUBSEQ VISIT: HCPCS | Performed by: INTERNAL MEDICINE

## 2024-08-07 PROCEDURE — 99214 OFFICE O/P EST MOD 30 MIN: CPT | Performed by: INTERNAL MEDICINE

## 2024-08-07 NOTE — PROGRESS NOTES
Ambulatory Visit  Name: Rosy Gibson      : 1950      MRN: [id number]  Encounter Provider: Alma Lara DO  Encounter Date: 2024   Encounter department: [facility] INTERNAL MEDICINE    Assessment & Plan   1. Mixed hyperlipidemia  Assessment & Plan:  -self discontinued rosuvastatin  -continue lifestyle modifications  -recheck levels prior to next visit  Orders:  -     Lipid panel; Future; Expected date: 2024  -     Comprehensive metabolic panel; Future; Expected date: 2024  2. Type 2 diabetes mellitus with obesity  (HCC)  Assessment & Plan:  -DM2.  A1c is normal  Lab Results   Component Value Date    HGBA1C 5.5 05/15/2024   -continue lifestyle modifications  Orders:  -     Hemoglobin A1C; Future; Expected date: 2024  -     Comprehensive metabolic panel; Future; Expected date: 2024  -     Albumin / creatinine urine ratio; Future; Expected date: 2024  3. Current moderate episode of major depressive disorder without prior episode (HCC)  Assessment & Plan:  -moderate in severity  -continue group counseling for bereavement  -continue duloxetine  Orders:  -     CBC; Future; Expected date: 2024  4. Benign essential HTN  Assessment & Plan:  -fair control  -continue benazepril 20mg daily, hctz 12.5mg daily  Orders:  -     Lipid panel; Future; Expected date: 2024  -     CBC; Future; Expected date: 2024  -     Comprehensive metabolic panel; Future; Expected date: 2024  5. Medicare annual wellness visit, subsequent  6. Encounter for screening mammogram for breast cancer  -     Mammo screening bilateral w 3d & cad; Future  7. Screening for colorectal cancer  -     Cologuard      Depression Screening and Follow-up Plan: Patient's depression screening was positive with a PHQ-9 score of 10. Patient with underlying depression and was advised to continue current medications as prescribed. Continue duloxetine      Preventive health issues were  "discussed with patient, and age appropriate screening tests were ordered as noted in patient's After Visit Summary. Personalized health advice and appropriate referrals for health education or preventive services given if needed, as noted in patient's After Visit Summary.    History of Present Illness       She resides in [place] with her family since her  passed away.  She is in group counseling.    Had L TKA 24.  She is more active and can go up and down steps.    She stopped taking rosuvastatin but she felt \"bothered\" lately.    PHQ-2/9 Depression Screening    Little interest or pleasure in doing things: 3 - nearly every day  Feeling down, depressed, or hopeless: 1 - several days  Trouble falling or staying asleep, or sleeping too much: 0 - not at all  Feeling tired or having little energy: 2 - more than half the days  Poor appetite or overeatin - several days  Feeling bad about yourself - or that you are a failure or have let   yourself or your family down: 0 - not at all  Trouble concentrating on things, such as reading the newspaper or watching   television: 3 - nearly every day  Moving or speaking so slowly that other people could have noticed. Or the   opposite - being so fidgety or restless that you have been moving around a   lot more than usual: 0 - not at all  Thoughts that you would be better off dead, or of hurting yourself in some   way: 0 - not at all  PHQ-9 Score: 10  PHQ-9 Interpretation: Moderate depression            Patient Care Team:  Alma Lara DO as PCP - General (Internal Medicine)  DO Alfonso Dutta DO (Pain Medicine)  Kamar Bourgeois DO (Ophthalmology)  Pati Wylie, RN as Nurse Navigator (Orthopedics)    Review of Systems   Constitutional:  Positive for activity change. Negative for unexpected weight change.   HENT:  Positive for postnasal drip and rhinorrhea.    Respiratory:  Negative for cough, shortness of breath and wheezing. "    Cardiovascular:  Negative for chest pain, palpitations and leg swelling.   Gastrointestinal:  Negative for abdominal pain, constipation, diarrhea, nausea and vomiting.   Musculoskeletal:  Positive for arthralgias, back pain and joint swelling.   Neurological:  Negative for dizziness and headaches.   Psychiatric/Behavioral:  Positive for dysphoric mood.        Medical History Reviewed by provider this encounter:  [facility]       Annual Wellness Visit Questionnaire   Rosy is here for her Subsequent Wellness visit. Last Medicare Wellness visit information reviewed, patient interviewed and updates made to the record today.      Health Risk Assessment:   Patient rates overall health as excellent. Patient feels that their physical health rating is slightly better. Patient is satisfied with their life. Eyesight was rated as same. Hearing was rated as same. Patient feels that their emotional and mental health rating is same. Patients states they are sometimes angry. Patient states they are often unusually tired/fatigued. Pain experienced in the last 7 days has been none. Patient states that she has experienced no weight loss or gain in last 6 months.     Depression Screening:   PHQ-9 Score: 10      Fall Risk Screening:   In the past year, patient has experienced: no history of falling in past year      Urinary Incontinence Screening:   Patient has not leaked urine accidently in the last six months.     Home Safety:  Patient does not have trouble with stairs inside or outside of their home. Patient has working smoke alarms and has working carbon monoxide detector. Home safety hazards include: none.     Nutrition:   Current diet is Regular.     Medications:   Patient is currently taking over-the-counter supplements. OTC medications include: see medication list. Patient is able to manage medications.     Activities of Daily Living (ADLs)/Instrumental Activities of Daily Living (IADLs):   Walk and transfer into and out of  bed and chair?: Yes  Dress and groom yourself?: Yes    Bathe or shower yourself?: Yes    Feed yourself? Yes  Do your laundry/housekeeping?: Yes  Manage your money, pay your bills and track your expenses?: Yes  Make your own meals?: Yes    Do your own shopping?: Yes    Durable Medical Equipment Suppliers  pt does not know    Previous Hospitalizations:   Any hospitalizations or ED visits within the last 12 months?: No      Advance Care Planning:   Living will: No    Durable POA for healthcare: No    Advanced directive: No    Advanced directive counseling given: Yes    ACP document given: Yes      Cognitive Screening:   Provider or family/friend/caregiver concerned regarding cognition?: No    PREVENTIVE SCREENINGS      Cardiovascular Screening:    General: Screening Not Indicated and History Lipid Disorder      Diabetes Screening:     General: Screening Not Indicated and History Diabetes      Colorectal Cancer Screening:     General: Screening Not Indicated      Breast Cancer Screening:     General: Screening Current      Cervical Cancer Screening:    General: Screening Not Indicated      Osteoporosis Screening:    General: Screening Current      Abdominal Aortic Aneurysm (AAA) Screening:        General: Screening Not Indicated      Lung Cancer Screening:     General: Screening Not Indicated      Hepatitis C Screening:    General: Screening Current    Screening, Brief Intervention, and Referral to Treatment (SBIRT)    Screening  Typical number of drinks in a day: 0  Typical number of drinks in a week: 0  Interpretation: Low risk drinking behavior.    Single Item Drug Screening:  How often have you used an illegal drug (including marijuana) or a prescription medication for non-medical reasons in the past year? never    Single Item Drug Screen Score: 0  Interpretation: Negative screen for possible drug use disorder    Brief Intervention  Alcohol & drug use screenings were reviewed. No concerns regarding substance use  "disorder identified.     Time Spent  Time spent screening/evaluating the patient for alcohol misuse: 1 minutes.     Other Counseling Topics:   Car/seat belt/driving safety, skin self-exam, sunscreen and regular weightbearing exercise and calcium and vitamin D intake. Immunizations discussed.  Recommend yearly influenza vaccine, updated COVID vaccine, RSV vaccine, tdap.    Social Determinants of Health     Financial Resource Strain: Low Risk  (4/19/2023)    Overall Financial Resource Strain (CARDIA)    • Difficulty of Paying Living Expenses: Not hard at all   Food Insecurity: No Food Insecurity (8/7/2024)    Hunger Vital Sign    • Worried About Running Out of Food in the Last Year: Never true    • Ran Out of Food in the Last Year: Never true   Transportation Needs: No Transportation Needs (8/7/2024)    PRAPARE - Transportation    • Lack of Transportation (Medical): No    • Lack of Transportation (Non-Medical): No   Housing Stability: Low Risk  (8/7/2024)    Housing Stability Vital Sign    • Unable to Pay for Housing in the Last Year: No    • Number of Times Moved in the Last Year: 1    • Homeless in the Last Year: No   Utilities: Not At Risk (8/7/2024)    [facility] Utilities    • Threatened with loss of utilities: No     No results found.    Objective     /74 (BP Location: Left arm, Patient Position: Sitting, Cuff Size: Standard)   Pulse 90   Resp 16   Ht 5' 2\" (1.575 m)   Wt 67.6 kg (149 lb)   SpO2 99%   BMI 27.25 kg/m²     Physical Exam  Vitals reviewed.   Constitutional:       General: She is not in acute distress.  HENT:      Head: Normocephalic.      Right Ear: Tympanic membrane and ear canal normal.      Left Ear: Tympanic membrane and ear canal normal.      Nose: Nose normal.      Mouth/Throat:      Mouth: Mucous membranes are moist.      Comments: Wears dentures  Eyes:      Extraocular Movements: Extraocular movements intact.      Conjunctiva/sclera: Conjunctivae normal.      Pupils: Pupils are equal, " round, and reactive to light.      Comments: Wears glasses   Neck:      Thyroid: No thyromegaly or thyroid tenderness.      Vascular: No carotid bruit.   Cardiovascular:      Rate and Rhythm: Normal rate and regular rhythm.      Pulses: Normal pulses.      Heart sounds: Normal heart sounds.   Pulmonary:      Effort: Pulmonary effort is normal. No respiratory distress.      Breath sounds: Normal breath sounds. No wheezing.   Abdominal:      General: Bowel sounds are normal. There is no distension.      Palpations: Abdomen is soft.      Tenderness: There is no abdominal tenderness.   Musculoskeletal:      Cervical back: Neck supple. No tenderness.      Left knee: Swelling and deformity present. No erythema.      Right lower leg: No edema.      Left lower leg: No edema.   Lymphadenopathy:      Cervical: No cervical adenopathy.   Skin:     Coloration: Skin is not pale.   Neurological:      Mental Status: She is alert and oriented to person, place, and time.   Psychiatric:         Mood and Affect: Mood normal.         Las from 5/17/24 were reviewed and discussed with patient.

## 2024-08-07 NOTE — PATIENT INSTRUCTIONS
Medicare Preventive Visit Patient Instructions  Thank you for completing your Welcome to Medicare Visit or Medicare Annual Wellness Visit today. Your next wellness visit will be due in one year (8/8/2025).  The screening/preventive services that you may require over the next 5-10 years are detailed below. Some tests may not apply to you based off risk factors and/or age. Screening tests ordered at today's visit but not completed yet may show as past due. Also, please note that scanned in results may not display below.  Preventive Screenings:  Service Recommendations Previous Testing/Comments   Colorectal Cancer Screening  * Colonoscopy    * Fecal Occult Blood Test (FOBT)/Fecal Immunochemical Test (FIT)  * Fecal DNA/Cologuard Test  * Flexible Sigmoidoscopy Age: 45-75 years old   Colonoscopy: every 10 years (may be performed more frequently if at higher risk)  OR  FOBT/FIT: every 1 year  OR  Cologuard: every 3 years  OR  Sigmoidoscopy: every 5 years  Screening may be recommended earlier than age 45 if at higher risk for colorectal cancer. Also, an individualized decision between you and your healthcare provider will decide whether screening between the ages of 76-85 would be appropriate. Colonoscopy: Not on file  FOBT/FIT: Not on file  Cologuard: Not on file  Sigmoidoscopy: Not on file          Breast Cancer Screening Age: 40+ years old  Frequency: every 1-2 years  Not required if history of left and right mastectomy Mammogram: 05/12/2023    Screening Current   Cervical Cancer Screening Between the ages of 21-29, pap smear recommended once every 3 years.   Between the ages of 30-65, can perform pap smear with HPV co-testing every 5 years.   Recommendations may differ for women with a history of total hysterectomy, cervical cancer, or abnormal pap smears in past. Pap Smear: Not on file    Screening Not Indicated   Hepatitis C Screening Once for adults born between 1945 and 1965  More frequently in patients at high risk  for Hepatitis C Hep C Antibody: 03/16/2021    Screening Current   Diabetes Screening 1-2 times per year if you're at risk for diabetes or have pre-diabetes Fasting glucose: 87 mg/dL (5/15/2024)  A1C: 5.5 % (5/15/2024)  Screening Not Indicated  History Diabetes   Cholesterol Screening Once every 5 years if you don't have a lipid disorder. May order more often based on risk factors. Lipid panel: 05/15/2024    Screening Not Indicated  History Lipid Disorder     Other Preventive Screenings Covered by Medicare:  Abdominal Aortic Aneurysm (AAA) Screening: covered once if your at risk. You're considered to be at risk if you have a family history of AAA.  Lung Cancer Screening: covers low dose CT scan once per year if you meet all of the following conditions: (1) Age 55-77; (2) No signs or symptoms of lung cancer; (3) Current smoker or have quit smoking within the last 15 years; (4) You have a tobacco smoking history of at least 20 pack years (packs per day multiplied by number of years you smoked); (5) You get a written order from a healthcare provider.  Glaucoma Screening: covered annually if you're considered high risk: (1) You have diabetes OR (2) Family history of glaucoma OR (3)  aged 50 and older OR (4)  American aged 65 and older  Osteoporosis Screening: covered every 2 years if you meet one of the following conditions: (1) You're estrogen deficient and at risk for osteoporosis based off medical history and other findings; (2) Have a vertebral abnormality; (3) On glucocorticoid therapy for more than 3 months; (4) Have primary hyperparathyroidism; (5) On osteoporosis medications and need to assess response to drug therapy.   Last bone density test (DXA Scan): 10/21/2019.  HIV Screening: covered annually if you're between the age of 15-65. Also covered annually if you are younger than 15 and older than 65 with risk factors for HIV infection. For pregnant patients, it is covered up to 3 times  per pregnancy.    Immunizations:  Immunization Recommendations   Influenza Vaccine Annual influenza vaccination during flu season is recommended for all persons aged >= 6 months who do not have contraindications   Pneumococcal Vaccine   * Pneumococcal conjugate vaccine = PCV13 (Prevnar 13), PCV15 (Vaxneuvance), PCV20 (Prevnar 20)  * Pneumococcal polysaccharide vaccine = PPSV23 (Pneumovax) Adults 19-63 yo with certain risk factors or if 65+ yo  If never received any pneumonia vaccine: recommend Prevnar 20 (PCV20)  Give PCV20 if previously received 1 dose of PCV13 or PPSV23   Hepatitis B Vaccine 3 dose series if at intermediate or high risk (ex: diabetes, end stage renal disease, liver disease)   Respiratory syncytial virus (RSV) Vaccine - COVERED BY MEDICARE PART D  * RSVPreF3 (Arexvy) CDC recommends that adults 60 years of age and older may receive a single dose of RSV vaccine using shared clinical decision-making (SCDM)   Tetanus (Td) Vaccine - COST NOT COVERED BY MEDICARE PART B Following completion of primary series, a booster dose should be given every 10 years to maintain immunity against tetanus. Td may also be given as tetanus wound prophylaxis.   Tdap Vaccine - COST NOT COVERED BY MEDICARE PART B Recommended at least once for all adults. For pregnant patients, recommended with each pregnancy.   Shingles Vaccine (Shingrix) - COST NOT COVERED BY MEDICARE PART B  2 shot series recommended in those 19 years and older who have or will have weakened immune systems or those 50 years and older     Health Maintenance Due:      Topic Date Due   • Breast Cancer Screening: Mammogram  05/12/2024   • Hepatitis C Screening  Completed   • Colorectal Cancer Screening  Discontinued     Immunizations Due:      Topic Date Due   • Pneumococcal Vaccine: 65+ Years (2 of 2 - PPSV23 or PCV20) 01/19/2021   • Influenza Vaccine (1) 09/01/2024     Advance Directives   What are advance directives?  Advance directives are legal documents  that state your wishes and plans for medical care. These plans are made ahead of time in case you lose your ability to make decisions for yourself. Advance directives can apply to any medical decision, such as the treatments you want, and if you want to donate organs.   What are the types of advance directives?  There are many types of advance directives, and each state has rules about how to use them. You may choose a combination of any of the following:  Living will:  This is a written record of the treatment you want. You can also choose which treatments you do not want, which to limit, and which to stop at a certain time. This includes surgery, medicine, IV fluid, and tube feedings.   Durable power of  for healthcare (DPAHC):  This is a written record that states who you want to make healthcare choices for you when you are unable to make them for yourself. This person, called a proxy, is usually a family member or a friend. You may choose more than 1 proxy.  Do not resuscitate (DNR) order:  A DNR order is used in case your heart stops beating or you stop breathing. It is a request not to have certain forms of treatment, such as CPR. A DNR order may be included in other types of advance directives.  Medical directive:  This covers the care that you want if you are in a coma, near death, or unable to make decisions for yourself. You can list the treatments you want for each condition. Treatment may include pain medicine, surgery, blood transfusions, dialysis, IV or tube feedings, and a ventilator (breathing machine).  Values history:  This document has questions about your views, beliefs, and how you feel and think about life. This information can help others choose the care that you would choose.  Why are advance directives important?  An advance directive helps you control your care. Although spoken wishes may be used, it is better to have your wishes written down. Spoken wishes can be misunderstood, or  not followed. Treatments may be given even if you do not want them. An advance directive may make it easier for your family to make difficult choices about your care.   Weight Management   Why it is important to manage your weight:  Being overweight increases your risk of health conditions such as heart disease, high blood pressure, type 2 diabetes, and certain types of cancer. It can also increase your risk for osteoarthritis, sleep apnea, and other respiratory problems. Aim for a slow, steady weight loss. Even a small amount of weight loss can lower your risk of health problems.  How to lose weight safely:  A safe and healthy way to lose weight is to eat fewer calories and get regular exercise. You can lose up about 1 pound a week by decreasing the number of calories you eat by 500 calories each day.   Healthy meal plan for weight management:  A healthy meal plan includes a variety of foods, contains fewer calories, and helps you stay healthy. A healthy meal plan includes the following:  Eat whole-grain foods more often.  A healthy meal plan should contain fiber. Fiber is the part of grains, fruits, and vegetables that is not broken down by your body. Whole-grain foods are healthy and provide extra fiber in your diet. Some examples of whole-grain foods are whole-wheat breads and pastas, oatmeal, brown rice, and bulgur.  Eat a variety of vegetables every day.  Include dark, leafy greens such as spinach, kale, jah greens, and mustard greens. Eat yellow and orange vegetables such as carrots, sweet potatoes, and winter squash.   Eat a variety of fruits every day.  Choose fresh or canned fruit (canned in its own juice or light syrup) instead of juice. Fruit juice has very little or no fiber.  Eat low-fat dairy foods.  Drink fat-free (skim) milk or 1% milk. Eat fat-free yogurt and low-fat cottage cheese. Try low-fat cheeses such as mozzarella and other reduced-fat cheeses.  Choose meat and other protein foods that  are low in fat.  Choose beans or other legumes such as split peas or lentils. Choose fish, skinless poultry (chicken or turkey), or lean cuts of red meat (beef or pork). Before you cook meat or poultry, cut off any visible fat.   Use less fat and oil.  Try baking foods instead of frying them. Add less fat, such as margarine, sour cream, regular salad dressing and mayonnaise to foods. Eat fewer high-fat foods. Some examples of high-fat foods include french fries, doughnuts, ice cream, and cakes.  Eat fewer sweets.  Limit foods and drinks that are high in sugar. This includes candy, cookies, regular soda, and sweetened drinks.  Exercise:  Exercise at least 30 minutes per day on most days of the week. Some examples of exercise include walking, biking, dancing, and swimming. You can also fit in more physical activity by taking the stairs instead of the elevator or parking farther away from stores. Ask your healthcare provider about the best exercise plan for you.      © Copyright [employer] 2018 Information is for End User's use only and may not be sold, redistributed or otherwise used for commercial purposes. All illustrations and images included in CareNotes® are the copyrighted property of [employer] or [employer]      Medicare Preventive Visit Patient Instructions  Thank you for completing your Welcome to Medicare Visit or Medicare Annual Wellness Visit today. Your next wellness visit will be due in one year (8/8/2025).  The screening/preventive services that you may require over the next 5-10 years are detailed below. Some tests may not apply to you based off risk factors and/or age. Screening tests ordered at today's visit but not completed yet may show as past due. Also, please note that scanned in results may not display below.  Preventive Screenings:  Service Recommendations Previous Testing/Comments   Colorectal Cancer Screening  * Colonoscopy    * Fecal Occult Blood Test (FOBT)/Fecal  Immunochemical Test (FIT)  * Fecal DNA/Cologuard Test  * Flexible Sigmoidoscopy Age: 45-75 years old   Colonoscopy: every 10 years (may be performed more frequently if at higher risk)  OR  FOBT/FIT: every 1 year  OR  Cologuard: every 3 years  OR  Sigmoidoscopy: every 5 years  Screening may be recommended earlier than age 45 if at higher risk for colorectal cancer. Also, an individualized decision between you and your healthcare provider will decide whether screening between the ages of 76-85 would be appropriate. Colonoscopy: Not on file  FOBT/FIT: Not on file  Cologuard: Not on file  Sigmoidoscopy: Not on file          Breast Cancer Screening Age: 40+ years old  Frequency: every 1-2 years  Not required if history of left and right mastectomy Mammogram: 05/12/2023    Screening Current   Cervical Cancer Screening Between the ages of 21-29, pap smear recommended once every 3 years.   Between the ages of 30-65, can perform pap smear with HPV co-testing every 5 years.   Recommendations may differ for women with a history of total hysterectomy, cervical cancer, or abnormal pap smears in past. Pap Smear: Not on file    Screening Not Indicated   Hepatitis C Screening Once for adults born between 1945 and 1965  More frequently in patients at high risk for Hepatitis C Hep C Antibody: 03/16/2021    Screening Current   Diabetes Screening 1-2 times per year if you're at risk for diabetes or have pre-diabetes Fasting glucose: 87 mg/dL (5/15/2024)  A1C: 5.5 % (5/15/2024)  Screening Not Indicated  History Diabetes   Cholesterol Screening Once every 5 years if you don't have a lipid disorder. May order more often based on risk factors. Lipid panel: 05/15/2024    Screening Not Indicated  History Lipid Disorder     Other Preventive Screenings Covered by Medicare:  Abdominal Aortic Aneurysm (AAA) Screening: covered once if your at risk. You're considered to be at risk if you have a family history of AAA.  Lung Cancer Screening: covers  low dose CT scan once per year if you meet all of the following conditions: (1) Age 55-77; (2) No signs or symptoms of lung cancer; (3) Current smoker or have quit smoking within the last 15 years; (4) You have a tobacco smoking history of at least 20 pack years (packs per day multiplied by number of years you smoked); (5) You get a written order from a healthcare provider.  Glaucoma Screening: covered annually if you're considered high risk: (1) You have diabetes OR (2) Family history of glaucoma OR (3)  aged 50 and older OR (4)  American aged 65 and older  Osteoporosis Screening: covered every 2 years if you meet one of the following conditions: (1) You're estrogen deficient and at risk for osteoporosis based off medical history and other findings; (2) Have a vertebral abnormality; (3) On glucocorticoid therapy for more than 3 months; (4) Have primary hyperparathyroidism; (5) On osteoporosis medications and need to assess response to drug therapy.   Last bone density test (DXA Scan): 10/21/2019.  HIV Screening: covered annually if you're between the age of 15-65. Also covered annually if you are younger than 15 and older than 65 with risk factors for HIV infection. For pregnant patients, it is covered up to 3 times per pregnancy.    Immunizations:  Immunization Recommendations   Influenza Vaccine Annual influenza vaccination during flu season is recommended for all persons aged >= 6 months who do not have contraindications   Pneumococcal Vaccine   * Pneumococcal conjugate vaccine = PCV13 (Prevnar 13), PCV15 (Vaxneuvance), PCV20 (Prevnar 20)  * Pneumococcal polysaccharide vaccine = PPSV23 (Pneumovax) Adults 19-63 yo with certain risk factors or if 65+ yo  If never received any pneumonia vaccine: recommend Prevnar 20 (PCV20)  Give PCV20 if previously received 1 dose of PCV13 or PPSV23   Hepatitis B Vaccine 3 dose series if at intermediate or high risk (ex: diabetes, end stage renal disease,  liver disease)   Respiratory syncytial virus (RSV) Vaccine - COVERED BY MEDICARE PART D  * RSVPreF3 (Arexvy) CDC recommends that adults 60 years of age and older may receive a single dose of RSV vaccine using shared clinical decision-making (SCDM)   Tetanus (Td) Vaccine - COST NOT COVERED BY MEDICARE PART B Following completion of primary series, a booster dose should be given every 10 years to maintain immunity against tetanus. Td may also be given as tetanus wound prophylaxis.   Tdap Vaccine - COST NOT COVERED BY MEDICARE PART B Recommended at least once for all adults. For pregnant patients, recommended with each pregnancy.   Shingles Vaccine (Shingrix) - COST NOT COVERED BY MEDICARE PART B  2 shot series recommended in those 19 years and older who have or will have weakened immune systems or those 50 years and older     Health Maintenance Due:      Topic Date Due   • Breast Cancer Screening: Mammogram  05/12/2024   • Hepatitis C Screening  Completed   • Colorectal Cancer Screening  Discontinued     Immunizations Due:      Topic Date Due   • Pneumococcal Vaccine: 65+ Years (2 of 2 - PPSV23 or PCV20) 01/19/2021   • Influenza Vaccine (1) 09/01/2024     Advance Directives   What are advance directives?  Advance directives are legal documents that state your wishes and plans for medical care. These plans are made ahead of time in case you lose your ability to make decisions for yourself. Advance directives can apply to any medical decision, such as the treatments you want, and if you want to donate organs.   What are the types of advance directives?  There are many types of advance directives, and each state has rules about how to use them. You may choose a combination of any of the following:  Living will:  This is a written record of the treatment you want. You can also choose which treatments you do not want, which to limit, and which to stop at a certain time. This includes surgery, medicine, IV fluid, and tube  feedings.   Durable power of  for healthcare (DPA):  This is a written record that states who you want to make healthcare choices for you when you are unable to make them for yourself. This person, called a proxy, is usually a family member or a friend. You may choose more than 1 proxy.  Do not resuscitate (DNR) order:  A DNR order is used in case your heart stops beating or you stop breathing. It is a request not to have certain forms of treatment, such as CPR. A DNR order may be included in other types of advance directives.  Medical directive:  This covers the care that you want if you are in a coma, near death, or unable to make decisions for yourself. You can list the treatments you want for each condition. Treatment may include pain medicine, surgery, blood transfusions, dialysis, IV or tube feedings, and a ventilator (breathing machine).  Values history:  This document has questions about your views, beliefs, and how you feel and think about life. This information can help others choose the care that you would choose.  Why are advance directives important?  An advance directive helps you control your care. Although spoken wishes may be used, it is better to have your wishes written down. Spoken wishes can be misunderstood, or not followed. Treatments may be given even if you do not want them. An advance directive may make it easier for your family to make difficult choices about your care.   Weight Management   Why it is important to manage your weight:  Being overweight increases your risk of health conditions such as heart disease, high blood pressure, type 2 diabetes, and certain types of cancer. It can also increase your risk for osteoarthritis, sleep apnea, and other respiratory problems. Aim for a slow, steady weight loss. Even a small amount of weight loss can lower your risk of health problems.  How to lose weight safely:  A safe and healthy way to lose weight is to eat fewer calories and  get regular exercise. You can lose up about 1 pound a week by decreasing the number of calories you eat by 500 calories each day.   Healthy meal plan for weight management:  A healthy meal plan includes a variety of foods, contains fewer calories, and helps you stay healthy. A healthy meal plan includes the following:  Eat whole-grain foods more often.  A healthy meal plan should contain fiber. Fiber is the part of grains, fruits, and vegetables that is not broken down by your body. Whole-grain foods are healthy and provide extra fiber in your diet. Some examples of whole-grain foods are whole-wheat breads and pastas, oatmeal, brown rice, and bulgur.  Eat a variety of vegetables every day.  Include dark, leafy greens such as spinach, kale, jah greens, and mustard greens. Eat yellow and orange vegetables such as carrots, sweet potatoes, and winter squash.   Eat a variety of fruits every day.  Choose fresh or canned fruit (canned in its own juice or light syrup) instead of juice. Fruit juice has very little or no fiber.  Eat low-fat dairy foods.  Drink fat-free (skim) milk or 1% milk. Eat fat-free yogurt and low-fat cottage cheese. Try low-fat cheeses such as mozzarella and other reduced-fat cheeses.  Choose meat and other protein foods that are low in fat.  Choose beans or other legumes such as split peas or lentils. Choose fish, skinless poultry (chicken or turkey), or lean cuts of red meat (beef or pork). Before you cook meat or poultry, cut off any visible fat.   Use less fat and oil.  Try baking foods instead of frying them. Add less fat, such as margarine, sour cream, regular salad dressing and mayonnaise to foods. Eat fewer high-fat foods. Some examples of high-fat foods include french fries, doughnuts, ice cream, and cakes.  Eat fewer sweets.  Limit foods and drinks that are high in sugar. This includes candy, cookies, regular soda, and sweetened drinks.  Exercise:  Exercise at least 30 minutes per day on  most days of the week. Some examples of exercise include walking, biking, dancing, and swimming. You can also fit in more physical activity by taking the stairs instead of the elevator or parking farther away from stores. Ask your healthcare provider about the best exercise plan for you.      © Copyright [employer] 2018 Information is for End User's use only and may not be sold, redistributed or otherwise used for commercial purposes. All illustrations and images included in CareNotes® are the copyrighted property of [employer] or [employer]

## 2024-08-07 NOTE — ASSESSMENT & PLAN NOTE
-DM2.  A1c is normal  Lab Results   Component Value Date    HGBA1C 5.5 05/15/2024   -continue lifestyle modifications

## 2024-08-07 NOTE — ASSESSMENT & PLAN NOTE
-self discontinued rosuvastatin  -continue lifestyle modifications  -recheck levels prior to next visit

## 2024-08-27 ENCOUNTER — OFFICE VISIT (OUTPATIENT)
Dept: OBGYN CLINIC | Facility: MEDICAL CENTER | Age: 74
End: 2024-08-27

## 2024-08-27 VITALS
BODY MASS INDEX: 27.29 KG/M2 | HEIGHT: 62 IN | WEIGHT: 148.3 LBS | DIASTOLIC BLOOD PRESSURE: 74 MMHG | SYSTOLIC BLOOD PRESSURE: 115 MMHG | HEART RATE: 79 BPM

## 2024-08-27 DIAGNOSIS — Z96.652 STATUS POST TOTAL KNEE REPLACEMENT, LEFT: Primary | ICD-10-CM

## 2024-08-27 PROCEDURE — 99024 POSTOP FOLLOW-UP VISIT: CPT | Performed by: ORTHOPAEDIC SURGERY

## 2024-08-27 NOTE — PROGRESS NOTES
Assessment/Plan:  1. Status post total knee replacement, left      No orders of the defined types were placed in this encounter.      Continue with HEP as directed by PT  Pain control prn- tylenol 1000mg TID  Patient aware to call ahead for abx prior to dental appts.   See back in 9 months with imaging of both knee's and right hip replacements.     Return in about 9 months (around 5/27/2025) for Recheck.    I answered all of the patient's questions during the visit and provided education of the patient's condition during the visit.  The patient verbalized understanding of the information given and agrees with the plan.  This note was dictated using Epunchit software.  It may contain errors including improperly dictated words.  Please contact physician directly for any questions.      Subjective   Chief Complaint: No chief complaint on file.      Rosy Gibson is a 74 y.o. female who presents for 11 week follow up s/p left TKA.  She states overall she is doing very well. She is performing activities to tolerance. She has general ache at night in knee/shin area and will use tylenol which helps. She denies any instability, fever,chills.     Review of Systems  ROS:    See HPI for musculoskeletal review.   All other systems reviewed are negative     History:  Past Medical History:   Diagnosis Date    Arthritis     Encounter for geriatric assessment 05/18/2023    Fatty liver 05/01/2018    Fibromyalgia, primary     GERD (gastroesophageal reflux disease)     Hyperlipidemia     Hypertension     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    Low back pain     Lumbar stenosis     Neck pain     Neuropathy     feet    No natural teeth     on lower    Primary localized osteoarthritis of right knee 08/17/2017    Sacroiliitis (HCC) 08/20/2018    Skin cancer     on chest in past - melanoma    Sleep apnea     resolved with weight loss    Use of cane as ambulatory aid     or walker    Wears dentures     full  uppers  "    Past Surgical History:   Procedure Laterality Date    JOINT REPLACEMENT Right 06/2023    RTK RTH    AL ARTHRP ACETBLR/PROX FEM PROSTC AGRFT/ALGRFT Right 06/19/2023    Procedure: ARTHROPLASTY HIP TOTAL ANTERIOR;  Surgeon: Liss Whitehead DO;  Location:  MAIN OR;  Service: Orthopedics    AL ARTHRP KNE CONDYLE&PLATU MEDIAL&LAT COMPARTMENTS Right 10/18/2023    Procedure: RIGHT TOTAL KNEE ARTHROPLASTY;  Surgeon: Liss Whitehead DO;  Location:  MAIN OR;  Service: Orthopedics    AL ARTHRP KNE CONDYLE&PLATU MEDIAL&LAT COMPARTMENTS Left 6/11/2024    Procedure: ARTHROPLASTY KNEE TOTAL, potential same day discharge;  Surgeon: Liss Whitehead DO;  Location:  MAIN OR;  Service: Orthopedics    SKIN CANCER EXCISION      on chest - melanoma    TUBAL LIGATION       Social History   Social History     Substance and Sexual Activity   Alcohol Use Yes    Comment: rarely     Social History     Substance and Sexual Activity   Drug Use Never     Social History     Tobacco Use   Smoking Status Never   Smokeless Tobacco Never     Family History:   Family History   Problem Relation Age of Onset    Osteoporosis Mother     Skin cancer Father 55    Stomach cancer Sister 49    No Known Problems Daughter     No Known Problems Maternal Grandmother     No Known Problems Maternal Grandfather     No Known Problems Paternal Grandmother     No Known Problems Paternal Grandfather     No Known Problems Maternal Aunt     No Known Problems Maternal Aunt     No Known Problems Maternal Aunt     No Known Problems Maternal Aunt     No Known Problems Paternal Aunt        Meds/Allergies   Not in a hospital admission.  Allergies   Allergen Reactions    Penicillins Hives and Itching          Objective     /74   Pulse 79   Ht 5' 2\" (1.575 m)   Wt 67.3 kg (148 lb 4.8 oz)   BMI 27.12 kg/m²      PE:  AAOx 3  WDWN  Hearing intact, no drainage from eyes  no audible wheezing  no abdominal distension  LE compartments soft, AT/GS " intact    Ortho Exam:  left Knee:   INC: healed, No erythema, mild swelling  AROM:0 - 115 degrees    Scribe Attestation      I,:  Vincent Petty am acting as a scribe while in the presence of the attending physician.:       I,:  Liss Whitehead DO personally performed the services described in this documentation    as scribed in my presence.:

## 2024-10-21 DIAGNOSIS — G89.29 CHRONIC PAIN OF LEFT KNEE: ICD-10-CM

## 2024-10-21 DIAGNOSIS — M25.562 CHRONIC PAIN OF LEFT KNEE: ICD-10-CM

## 2024-10-21 DIAGNOSIS — E78.2 MIXED HYPERLIPIDEMIA: ICD-10-CM

## 2024-10-21 DIAGNOSIS — Z96.641 STATUS POST TOTAL HIP REPLACEMENT, RIGHT: ICD-10-CM

## 2024-10-21 DIAGNOSIS — M17.12 ARTHRITIS OF LEFT KNEE: ICD-10-CM

## 2024-10-22 RX ORDER — ROSUVASTATIN CALCIUM 10 MG/1
10 TABLET, COATED ORAL DAILY
Qty: 90 TABLET | Refills: 1 | Status: SHIPPED | OUTPATIENT
Start: 2024-10-22

## 2024-10-22 RX ORDER — FERROUS SULFATE 324(65)MG
324 TABLET, DELAYED RELEASE (ENTERIC COATED) ORAL
Qty: 90 TABLET | Refills: 1 | Status: SHIPPED | OUTPATIENT
Start: 2024-10-22

## 2024-10-22 RX ORDER — FOLIC ACID 1 MG/1
1 TABLET ORAL DAILY
Qty: 90 TABLET | Refills: 1 | Status: SHIPPED | OUTPATIENT
Start: 2024-10-22

## 2024-10-22 RX ORDER — MULTIVIT-MIN/FERROUS SULFATE 4.5 MG
1 TABLET ORAL DAILY
Qty: 90 TABLET | Refills: 1 | Status: SHIPPED | OUTPATIENT
Start: 2024-10-22

## 2024-10-22 RX ORDER — ASPIRIN 325 MG
325 TABLET, DELAYED RELEASE (ENTERIC COATED) ORAL 2 TIMES DAILY
Qty: 84 TABLET | Refills: 1 | Status: SHIPPED | OUTPATIENT
Start: 2024-10-22

## 2024-11-20 DIAGNOSIS — Z96.641 STATUS POST TOTAL HIP REPLACEMENT, RIGHT: ICD-10-CM

## 2024-11-20 DIAGNOSIS — M17.12 ARTHRITIS OF LEFT KNEE: ICD-10-CM

## 2024-11-20 DIAGNOSIS — M51.16 LUMBAR DISC HERNIATION WITH RADICULOPATHY: ICD-10-CM

## 2024-11-20 DIAGNOSIS — G89.29 CHRONIC PAIN OF LEFT KNEE: ICD-10-CM

## 2024-11-20 DIAGNOSIS — M25.562 CHRONIC PAIN OF LEFT KNEE: ICD-10-CM

## 2024-11-21 RX ORDER — ASCORBIC ACID 500 MG
TABLET ORAL
Qty: 90 TABLET | Refills: 1 | Status: SHIPPED | OUTPATIENT
Start: 2024-11-21

## 2024-11-21 RX ORDER — DOCUSATE SODIUM 100 MG/1
100 CAPSULE, LIQUID FILLED ORAL 2 TIMES DAILY
Qty: 180 CAPSULE | Refills: 1 | Status: SHIPPED | OUTPATIENT
Start: 2024-11-21

## 2024-11-21 RX ORDER — GABAPENTIN 600 MG/1
600 TABLET ORAL 3 TIMES DAILY
Qty: 90 TABLET | Refills: 2 | OUTPATIENT
Start: 2024-11-21

## 2024-11-22 RX ORDER — GABAPENTIN 600 MG/1
600 TABLET ORAL 3 TIMES DAILY
Qty: 90 TABLET | Refills: 5 | Status: SHIPPED | OUTPATIENT
Start: 2024-11-22

## 2024-11-22 NOTE — TELEPHONE ENCOUNTER
RN s/w pt regarding previous.  Pt takes 600 mg TID no side effects and helps her pain.  Has a few days remaining.  Please send to [facility] on file     Pharmacy will let her know when ready for

## 2024-12-12 DIAGNOSIS — I10 BENIGN ESSENTIAL HTN: ICD-10-CM

## 2024-12-12 DIAGNOSIS — R60.0 LOWER EXTREMITY EDEMA: ICD-10-CM

## 2024-12-13 RX ORDER — HYDROCHLOROTHIAZIDE 12.5 MG/1
12.5 TABLET ORAL DAILY
Qty: 90 TABLET | Refills: 1 | Status: SHIPPED | OUTPATIENT
Start: 2024-12-13

## 2024-12-13 RX ORDER — BENAZEPRIL HYDROCHLORIDE 20 MG/1
20 TABLET ORAL DAILY
Qty: 90 TABLET | Refills: 1 | Status: SHIPPED | OUTPATIENT
Start: 2024-12-13

## 2025-01-16 LAB — COLOGUARD RESULT REPORTABLE: NEGATIVE

## 2025-01-17 ENCOUNTER — RESULTS FOLLOW-UP (OUTPATIENT)
Age: 75
End: 2025-01-17

## 2025-02-04 ENCOUNTER — APPOINTMENT (OUTPATIENT)
Dept: LAB | Facility: MEDICAL CENTER | Age: 75
End: 2025-02-04
Payer: MEDICARE

## 2025-02-04 ENCOUNTER — TELEPHONE (OUTPATIENT)
Dept: PAIN MEDICINE | Facility: CLINIC | Age: 75
End: 2025-02-04

## 2025-02-04 ENCOUNTER — OFFICE VISIT (OUTPATIENT)
Dept: PAIN MEDICINE | Facility: MEDICAL CENTER | Age: 75
End: 2025-02-04
Payer: MEDICARE

## 2025-02-04 VITALS — WEIGHT: 155 LBS | BODY MASS INDEX: 28.52 KG/M2 | HEIGHT: 62 IN

## 2025-02-04 DIAGNOSIS — E11.69 TYPE 2 DIABETES MELLITUS WITH OBESITY  (HCC): ICD-10-CM

## 2025-02-04 DIAGNOSIS — I10 BENIGN ESSENTIAL HTN: ICD-10-CM

## 2025-02-04 DIAGNOSIS — M47.816 LUMBAR SPONDYLOSIS: ICD-10-CM

## 2025-02-04 DIAGNOSIS — G89.29 CHRONIC RIGHT-SIDED LOW BACK PAIN WITH RIGHT-SIDED SCIATICA: Primary | ICD-10-CM

## 2025-02-04 DIAGNOSIS — M54.41 CHRONIC RIGHT-SIDED LOW BACK PAIN WITH RIGHT-SIDED SCIATICA: Primary | ICD-10-CM

## 2025-02-04 DIAGNOSIS — M54.2 NECK PAIN: ICD-10-CM

## 2025-02-04 DIAGNOSIS — F32.1 CURRENT MODERATE EPISODE OF MAJOR DEPRESSIVE DISORDER WITHOUT PRIOR EPISODE (HCC): ICD-10-CM

## 2025-02-04 DIAGNOSIS — M47.812 CERVICAL SPINE ARTHRITIS: ICD-10-CM

## 2025-02-04 DIAGNOSIS — E78.2 MIXED HYPERLIPIDEMIA: ICD-10-CM

## 2025-02-04 DIAGNOSIS — E66.9 TYPE 2 DIABETES MELLITUS WITH OBESITY  (HCC): ICD-10-CM

## 2025-02-04 LAB
ALBUMIN SERPL BCG-MCNC: 4.8 G/DL (ref 3.5–5)
ALP SERPL-CCNC: 78 U/L (ref 34–104)
ALT SERPL W P-5'-P-CCNC: 18 U/L (ref 7–52)
ANION GAP SERPL CALCULATED.3IONS-SCNC: 11 MMOL/L (ref 4–13)
AST SERPL W P-5'-P-CCNC: 21 U/L (ref 13–39)
BILIRUB SERPL-MCNC: 0.52 MG/DL (ref 0.2–1)
BUN SERPL-MCNC: 15 MG/DL (ref 5–25)
CALCIUM SERPL-MCNC: 10.1 MG/DL (ref 8.4–10.2)
CHLORIDE SERPL-SCNC: 99 MMOL/L (ref 96–108)
CHOLEST SERPL-MCNC: 191 MG/DL (ref ?–200)
CO2 SERPL-SCNC: 30 MMOL/L (ref 21–32)
CREAT SERPL-MCNC: 0.63 MG/DL (ref 0.6–1.3)
CREAT UR-MCNC: 83.9 MG/DL
ERYTHROCYTE [DISTWIDTH] IN BLOOD BY AUTOMATED COUNT: 12.5 % (ref 11.6–15.1)
EST. AVERAGE GLUCOSE BLD GHB EST-MCNC: 114 MG/DL
GFR SERPL CREATININE-BSD FRML MDRD: 87 ML/MIN/1.73SQ M
GLUCOSE P FAST SERPL-MCNC: 101 MG/DL (ref 65–99)
HBA1C MFR BLD: 5.6 %
HCT VFR BLD AUTO: 44.6 % (ref 34.8–46.1)
HDLC SERPL-MCNC: 64 MG/DL
HGB BLD-MCNC: 15 G/DL (ref 11.5–15.4)
LDLC SERPL CALC-MCNC: 92 MG/DL (ref 0–100)
MCH RBC QN AUTO: 30 PG (ref 26.8–34.3)
MCHC RBC AUTO-ENTMCNC: 33.6 G/DL (ref 31.4–37.4)
MCV RBC AUTO: 89 FL (ref 82–98)
MICROALBUMIN UR-MCNC: 9.7 MG/L
MICROALBUMIN/CREAT 24H UR: 12 MG/G CREATININE (ref 0–30)
NONHDLC SERPL-MCNC: 127 MG/DL
PLATELET # BLD AUTO: 228 THOUSANDS/UL (ref 149–390)
PMV BLD AUTO: 9.6 FL (ref 8.9–12.7)
POTASSIUM SERPL-SCNC: 4.3 MMOL/L (ref 3.5–5.3)
PROT SERPL-MCNC: 7.4 G/DL (ref 6.4–8.4)
RBC # BLD AUTO: 5 MILLION/UL (ref 3.81–5.12)
SODIUM SERPL-SCNC: 140 MMOL/L (ref 135–147)
TRIGL SERPL-MCNC: 173 MG/DL (ref ?–150)
WBC # BLD AUTO: 5.8 THOUSAND/UL (ref 4.31–10.16)

## 2025-02-04 PROCEDURE — 82570 ASSAY OF URINE CREATININE: CPT

## 2025-02-04 PROCEDURE — 85027 COMPLETE CBC AUTOMATED: CPT

## 2025-02-04 PROCEDURE — 80061 LIPID PANEL: CPT

## 2025-02-04 PROCEDURE — 36415 COLL VENOUS BLD VENIPUNCTURE: CPT

## 2025-02-04 PROCEDURE — 83036 HEMOGLOBIN GLYCOSYLATED A1C: CPT

## 2025-02-04 PROCEDURE — 80053 COMPREHEN METABOLIC PANEL: CPT

## 2025-02-04 PROCEDURE — 82043 UR ALBUMIN QUANTITATIVE: CPT

## 2025-02-04 PROCEDURE — 99214 OFFICE O/P EST MOD 30 MIN: CPT | Performed by: NURSE PRACTITIONER

## 2025-02-04 RX ORDER — CYCLOBENZAPRINE HCL 10 MG
10 TABLET ORAL
Qty: 90 TABLET | Refills: 2 | Status: SHIPPED | OUTPATIENT
Start: 2025-02-04

## 2025-02-04 NOTE — TELEPHONE ENCOUNTER
Please make Pt aware of below recommendations & schedule accordingly. Thank You!    Regarding Pts Request for PAIGE from Alfonso Loomis DO (SPA) to Melissa Gomes MD (SPA) DUE TO LOCATION ONLY: Pt is APPROVED for PAIGE. (COURTESY NOTIFICATION: This RN also making RD & JE aware of this recommendation)     1. Patient is requesting a PAIGE to Melissa Gomes MD (SPA) DUE TO LOCATION ONLY; current treatment plan by Alfonso Loomis DO (SPA) noted below. (Per Pt: She moved to [place] and would like to transfer care to Dr. Gomes in [place])    2. Pt was last seen for OV TODAY 2/4/2025 by Barbara Kocher, CRNP/Cosigned by: Alfonso Loomis DO ([facility] [place]) for Dx of Chronic right-sided low back pain with right-sided sciatica, Lumbar spondylosis, Neck pain, & Cervical spine arthritis.    3. Per Barbara Kocher, CRNP/Cosigned by: Alfonso Loomis DO (SPA) notes on 2/4/2025: 74 y.o. female who presents for a follow up office visit in regards to Back Pain (lumbar). The patient’s current symptoms include complaints of neck pain and low back pain. While the patient was in the office today, I did have a thorough conversation regarding their chronic pain syndrome, medication management, and treatment plan options.  Overall, pain remains very well-controlled with the use of Flexeril on an as needed basis as well as gabapentin 600 mg 3 times daily.  Medications reduce her pain by 80 to 90%.  Pain is very minimal.  She denies side effects of medications.  -SUGGESTIONS/RECOMMENDATIONS: Continue gabapentin 600 mg 3 times daily.  She did not require refill of this medication during today's visit. Continue Flexeril 10 mg which she uses on an as needed basis.  A prescription was sent to her pharmacy with 2 refills. Follow-up in 6 months.  Call sooner if pain increases.     4. Pts PRO Hx Includes:   -Right L4-L5, L5-S1 TFESI on 10/12/2022.   -Right Hip Injection on 8/26/2022.   -JANEL on 3/8/2022.   -Right Piriformis Injection on 2/22/2022.    -C7-T1 CHRISSIE on 11/17/2021.    5. Pt has no recent imaging of Cervical or Lumbar Spine noted upon chart review. Pt had X-ray of Right Hip & Pelvis on 2/2/2024.  (Report & imaging available for viewing)     6. Per PDMP: X1 opioid script within past year written on 6/11/2024.

## 2025-02-04 NOTE — PROGRESS NOTES
Assessment:  1. Chronic right-sided low back pain with right-sided sciatica    2. Lumbar spondylosis    3. Neck pain    4. Cervical spine arthritis        Plan:  While the patient was in the office today, I did have a thorough conversation regarding their chronic pain syndrome, medication management, and treatment plan options.  Patient is being seen for a follow-up visit.  Overall, pain remains very well-controlled with the use of Flexeril on an as needed basis as well as gabapentin 600 mg 3 times daily.  Medications reduce her pain by 80 to 90%.  Pain is very minimal.  She denies side effects of medications.    Continue gabapentin 600 mg 3 times daily.  She did not require refill of this medication during today's visit.    Continue Flexeril 10 mg which she uses on an as needed basis.  A prescription was sent to her pharmacy with 2 refills.    Follow-up in 6 months.  Call sooner if pain increases.    My impressions and treatment recommendations were discussed in detail with the patient who verbalized understanding and had no further questions.  Discharge instructions were provided. I personally saw and examined the patient and I agree with the above discussed plan of care.    No orders of the defined types were placed in this encounter.    New Medications Ordered This Visit   Medications    cyclobenzaprine (FLEXERIL) 10 mg tablet     Sig: Take 1 tablet (10 mg total) by mouth daily at bedtime     Dispense:  90 tablet     Refill:  2       History of Present Illness:  Rosy Gibson is a 74 y.o. female who presents for a follow up office visit in regards to Back Pain (lumbar).   The patient’s current symptoms include complaints of neck pain and low back pain.  Current pain level is a 1/10.  Quality pain is described as dull, aching, cramping.  Current medications for pain include gabapentin 600 mg 3 times daily, Flexeril 10 mg as needed for spasms.  Medications reduce her pain by 80 to 90%.  She denies side effects  from medications.      I have personally reviewed and/or updated the patient's past medical history, past surgical history, family history, social history, current medications, allergies, and vital signs today.     Review of Systems   Respiratory:  Negative for shortness of breath.    Cardiovascular:  Negative for chest pain.   Gastrointestinal:  Negative for constipation, diarrhea, nausea and vomiting.   Musculoskeletal:  Positive for back pain. Negative for arthralgias, gait problem, joint swelling and myalgias.   Skin:  Negative for rash.   Neurological:  Negative for dizziness, seizures and weakness.   All other systems reviewed and are negative.      Patient Active Problem List   Diagnosis    Primary osteoarthritis of left knee    Lumbar disc disease with radiculopathy    Chronic right-sided low back pain with right-sided sciatica    Fibromyalgia    Benign essential HTN    Fatty liver    Gastroesophageal reflux disease without esophagitis    Lumbar radiculopathy    Pes anserine bursitis    Lower extremity edema    Cervical radiculopathy    Cervical spine arthritis    Cervical spinal stenosis    Lumbar spondylosis    Current episode of major depressive disorder without prior episode    Mixed hyperlipidemia    IFG (impaired fasting glucose)    Degenerative disc disease, cervical    Type 2 diabetes mellitus with obesity  (HCC)    Spasm of right piriformis muscle    Primary osteoarthritis of right hip    Spinal stenosis of lumbar region with neurogenic claudication    Status post total hip replacement, right    Status post total knee replacement, right    Anemia associated with nutritional deficiency    Teeth missing    Status post total knee replacement, left    Neck pain       Past Medical History:   Diagnosis Date    Arthritis     Encounter for geriatric assessment 05/18/2023    Fatty liver 05/01/2018    Fibromyalgia, primary     GERD (gastroesophageal reflux disease)     Hyperlipidemia     Hypertension      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    Low back pain     Lumbar stenosis     Neck pain     Neuropathy     feet    No natural teeth     on lower    Primary localized osteoarthritis of right knee 08/17/2017    Sacroiliitis (HCC) 08/20/2018    Skin cancer     on chest in past - melanoma    Sleep apnea     resolved with weight loss    Use of cane as ambulatory aid     or walker    Wears dentures     full  uppers       Past Surgical History:   Procedure Laterality Date    JOINT REPLACEMENT Right 06/2023    RTK RTH    CO ARTHRP ACETBLR/PROX FEM PROSTC AGRFT/ALGRFT Right 06/19/2023    Procedure: ARTHROPLASTY HIP TOTAL ANTERIOR;  Surgeon: Liss Whitehead DO;  Location:  MAIN OR;  Service: Orthopedics    CO ARTHRP KNE CONDYLE&PLATU MEDIAL&LAT COMPARTMENTS Right 10/18/2023    Procedure: RIGHT TOTAL KNEE ARTHROPLASTY;  Surgeon: Liss Whitehead DO;  Location:  MAIN OR;  Service: Orthopedics    CO ARTHRP KNE CONDYLE&PLATU MEDIAL&LAT COMPARTMENTS Left 6/11/2024    Procedure: ARTHROPLASTY KNEE TOTAL, potential same day discharge;  Surgeon: Liss Whitehead DO;  Location:  MAIN OR;  Service: Orthopedics    SKIN CANCER EXCISION      on chest - melanoma    TUBAL LIGATION         Family History   Problem Relation Age of Onset    Osteoporosis Mother     Skin cancer Father 55    Stomach cancer Sister 49    No Known Problems Daughter     No Known Problems Maternal Grandmother     No Known Problems Maternal Grandfather     No Known Problems Paternal Grandmother     No Known Problems Paternal Grandfather     No Known Problems Maternal Aunt     No Known Problems Maternal Aunt     No Known Problems Maternal Aunt     No Known Problems Maternal Aunt     No Known Problems Paternal Aunt        Social History     Occupational History    Not on file   Tobacco Use    Smoking status: Never    Smokeless tobacco: Never   Vaping Use    Vaping status: Never Used   Substance and Sexual Activity    Alcohol use:  Yes     Comment: rarely    Drug use: Never    Sexual activity: Not Currently       Current Outpatient Medications on File Prior to Visit   Medication Sig    acetaminophen (TYLENOL) 500 mg tablet Take 2 tablets (1,000 mg total) by mouth every 8 (eight) hours    ascorbic acid (VITAMIN C) 500 MG tablet TAKE 1 TABLET (500 MG TOTAL) BY MOUTH DAILY BEGIN 30 DAYS PRIOR TO SURGERY.    aspirin (ECOTRIN) 325 mg EC tablet TAKE 1 TABLET (325 MG TOTAL) BY MOUTH 2 (TWO) TIMES A DAY TAKE WITH FOOD.    benazepril (LOTENSIN) 20 mg tablet TAKE ONE TABLET BY MOUTH DAILY    cholecalciferol (VITAMIN D3) 1,000 units tablet Take 1,000 Units by mouth daily    docusate sodium (COLACE) 100 mg capsule TAKE 1 CAPSULE (100 MG TOTAL) BY MOUTH 2 (TWO) TIMES A DAY    DULoxetine (CYMBALTA) 60 mg delayed release capsule TAKE ONE CAPSULE BY MOUTH DAILY    ferrous sulfate 324 (65 Fe) mg TAKE 1 TABLET (324 MG TOTAL) BY MOUTH DAILY BEFORE BREAKFAST BEGIN 30 DAYS PRIOR TO SURGERY.    folic acid (FOLVITE) 1 mg tablet TAKE 1 TABLET (1 MG TOTAL) BY MOUTH DAILY BEGIN 30 DAYS PRIOR TO SURGERY.    gabapentin (Neurontin) 600 MG tablet Take 1 tablet (600 mg total) by mouth 3 (three) times a day    hydroCHLOROthiazide 12.5 mg tablet TAKE ONE TABLET BY MOUTH DAILY    Multiple Vitamins-Minerals (One-Daily Multi-Vit/Mineral) TABS TAKE 1 TABLET BY MOUTH IN THE MORNING    omeprazole (PriLOSEC) 10 mg delayed release capsule Take 10 mg by mouth daily      promethazine (PHENERGAN) 12.5 MG tablet Take 1 tablet (12.5 mg total) by mouth every 6 (six) hours as needed for nausea or vomiting    SUPER B COMPLEX/C PO Take 1 tablet by mouth daily      [DISCONTINUED] cyclobenzaprine (FLEXERIL) 10 mg tablet Take 1 tablet (10 mg total) by mouth daily at bedtime    lidocaine (Lidoderm) 5 % Apply 1 patch topically daily Remove & Discard patch within 12 hours or as directed by MD (Patient not taking: Reported on 5/30/2023)    rosuvastatin (CRESTOR) 10 MG tablet TAKE ONE TABLET BY MOUTH  "DAILY (Patient not taking: Reported on 2/4/2025)     No current facility-administered medications on file prior to visit.       Allergies   Allergen Reactions    Penicillins Hives and Itching       Physical Exam:    Ht 5' 2\" (1.575 m)   Wt 70.3 kg (155 lb)   BMI 28.35 kg/m²     Constitutional:normal, well developed, well nourished, alert, in no distress and non-toxic and no overt pain behavior.  Eyes:anicteric  HEENT:grossly intact  Neck:supple, symmetric, trachea midline and no masses   Pulmonary:even and unlabored  Cardiovascular:No edema or pitting edema present  Skin:Normal without rashes or lesions and well hydrated  Psychiatric:Mood and affect appropriate  Neurologic:Cranial Nerves II-XII grossly intact  Musculoskeletal:normal    Imaging    "

## 2025-02-04 NOTE — TELEPHONE ENCOUNTER
Patient is requesting a transfer of care for the following reason: Location Only. She moved to [place] and would like to transfer care to Dr. Gomes in [place].         Doctor: Vladislav    Would you release patient from your care?      Doctor: Eliseo    Would you take over patient's care?        Please advise,   Thank you.

## 2025-02-07 ENCOUNTER — OFFICE VISIT (OUTPATIENT)
Age: 75
End: 2025-02-07
Payer: MEDICARE

## 2025-02-07 VITALS
BODY MASS INDEX: 28.34 KG/M2 | RESPIRATION RATE: 15 BRPM | OXYGEN SATURATION: 98 % | WEIGHT: 154 LBS | HEIGHT: 62 IN | HEART RATE: 88 BPM | SYSTOLIC BLOOD PRESSURE: 128 MMHG | TEMPERATURE: 98 F | DIASTOLIC BLOOD PRESSURE: 82 MMHG

## 2025-02-07 DIAGNOSIS — E11.69 TYPE 2 DIABETES MELLITUS WITH OBESITY  (HCC): Primary | ICD-10-CM

## 2025-02-07 DIAGNOSIS — E78.2 MIXED HYPERLIPIDEMIA: ICD-10-CM

## 2025-02-07 DIAGNOSIS — E66.9 TYPE 2 DIABETES MELLITUS WITH OBESITY  (HCC): Primary | ICD-10-CM

## 2025-02-07 DIAGNOSIS — M51.16 LUMBAR DISC DISEASE WITH RADICULOPATHY: ICD-10-CM

## 2025-02-07 DIAGNOSIS — I10 BENIGN ESSENTIAL HTN: ICD-10-CM

## 2025-02-07 DIAGNOSIS — M54.12 CERVICAL RADICULOPATHY: ICD-10-CM

## 2025-02-07 DIAGNOSIS — F32.1 CURRENT MODERATE EPISODE OF MAJOR DEPRESSIVE DISORDER WITHOUT PRIOR EPISODE (HCC): ICD-10-CM

## 2025-02-07 DIAGNOSIS — Z23 NEED FOR COVID-19 VACCINE: ICD-10-CM

## 2025-02-07 PROBLEM — D53.9 ANEMIA ASSOCIATED WITH NUTRITIONAL DEFICIENCY: Status: RESOLVED | Noted: 2023-12-12 | Resolved: 2025-02-07

## 2025-02-07 PROBLEM — R60.0 LOWER EXTREMITY EDEMA: Status: RESOLVED | Noted: 2018-10-05 | Resolved: 2025-02-07

## 2025-02-07 PROCEDURE — 90480 ADMN SARSCOV2 VAC 1/ONLY CMP: CPT | Performed by: INTERNAL MEDICINE

## 2025-02-07 PROCEDURE — G2211 COMPLEX E/M VISIT ADD ON: HCPCS | Performed by: INTERNAL MEDICINE

## 2025-02-07 PROCEDURE — 91320 SARSCV2 VAC 30MCG TRS-SUC IM: CPT | Performed by: INTERNAL MEDICINE

## 2025-02-07 PROCEDURE — 99214 OFFICE O/P EST MOD 30 MIN: CPT | Performed by: INTERNAL MEDICINE

## 2025-02-07 NOTE — ASSESSMENT & PLAN NOTE
-DM2.  A1c remains well controlled with diet  Lab Results   Component Value Date    HGBA1C 5.6 02/04/2025   -reminded to go for eye exam  -on ACEI

## 2025-02-07 NOTE — ASSESSMENT & PLAN NOTE
-with underlying foraminal stenosis and arthritis  -advised to adjust gabapentin to 600mg in AM and 600mg qhs  -follow up with Pain Management

## 2025-02-07 NOTE — ASSESSMENT & PLAN NOTE
-LDL controlled with slightly elevated TG  -continue with dietary changes as she preferred off rosuvastatin

## 2025-02-07 NOTE — ASSESSMENT & PLAN NOTE
-doing well with self adjusted dose of gabapentin 600mg bid but recommend administration in AM and at bedtime

## 2025-02-07 NOTE — ASSESSMENT & PLAN NOTE
-repeat BP controlled  -continue benazepril 20mg daily and hctz 12.5mg daily  Orders:  •  TSH, 3rd generation with Free T4 reflex; Future  •  Comprehensive metabolic panel; Future

## 2025-02-07 NOTE — ASSESSMENT & PLAN NOTE
Depression Screening Follow-up Plan: Patient's depression screening was positive with a PHQ-9 score of 14. Patient with underlying depression and was advised to continue current medications as prescribed. Continue regular follow-up with their psychologist/therapist/psychiatrist who is managing their mental health condition(s).  -moderate in severity  -continue group counseling for bereavement  -restart socializing  -continue duloxetine 60mg daily

## 2025-02-18 DIAGNOSIS — M17.12 ARTHRITIS OF LEFT KNEE: ICD-10-CM

## 2025-02-18 DIAGNOSIS — M79.7 FIBROMYALGIA: ICD-10-CM

## 2025-02-18 DIAGNOSIS — G89.29 CHRONIC PAIN OF LEFT KNEE: ICD-10-CM

## 2025-02-18 DIAGNOSIS — M25.562 CHRONIC PAIN OF LEFT KNEE: ICD-10-CM

## 2025-02-19 RX ORDER — ASCORBIC ACID 500 MG
TABLET ORAL
Qty: 90 TABLET | Refills: 1 | Status: SHIPPED | OUTPATIENT
Start: 2025-02-19

## 2025-02-19 RX ORDER — DULOXETIN HYDROCHLORIDE 60 MG/1
60 CAPSULE, DELAYED RELEASE ORAL DAILY
Qty: 90 CAPSULE | Refills: 1 | Status: SHIPPED | OUTPATIENT
Start: 2025-02-19

## 2025-02-20 ENCOUNTER — VBI (OUTPATIENT)
Dept: ADMINISTRATIVE | Facility: OTHER | Age: 75
End: 2025-02-20

## 2025-02-20 NOTE — TELEPHONE ENCOUNTER
02/20/25 10:51 AM     Chart reviewed for Blood Pressure was/were submitted to the patient's insurance.     Liss Marie MA   [facility]

## 2025-03-21 DIAGNOSIS — E78.2 MIXED HYPERLIPIDEMIA: ICD-10-CM

## 2025-03-21 RX ORDER — ROSUVASTATIN CALCIUM 10 MG/1
10 TABLET, COATED ORAL DAILY
Qty: 90 TABLET | Refills: 1 | Status: SHIPPED | OUTPATIENT
Start: 2025-03-21

## 2025-05-12 DIAGNOSIS — I10 BENIGN ESSENTIAL HTN: ICD-10-CM

## 2025-05-12 DIAGNOSIS — R60.0 LOWER EXTREMITY EDEMA: ICD-10-CM

## 2025-05-12 RX ORDER — HYDROCHLOROTHIAZIDE 12.5 MG/1
12.5 TABLET ORAL DAILY
Qty: 90 TABLET | Refills: 1 | Status: SHIPPED | OUTPATIENT
Start: 2025-05-12

## (undated) DEVICE — SUT ETHIBOND 5 V-40 30 IN MB46G

## (undated) DEVICE — HANDPIECE SET WITH HIGH FLOW TIP AND SUCTION TUBE: Brand: INTERPULSE

## (undated) DEVICE — STERILE POLYISOPRENE POWDER-FREE SURGICAL GLOVES WITH EMOLLIENT COATING: Brand: PROTEXIS

## (undated) DEVICE — 3M™ STERI-STRIP™ REINFORCED ADHESIVE SKIN CLOSURES, R1547, 1/2 IN X 4 IN (12 MM X 100 MM), 6 STRIPS/ENVELOPE: Brand: 3M™ STERI-STRIP™

## (undated) DEVICE — 2108 SERIES SAGITTAL BLADE, OFFSET (12.4 X 1.24 X 73.7MM)

## (undated) DEVICE — SIGMOIDOSCOPIC SUCTION INSTRUMENT 18 FR W/WINGED CAP CONTROL AND 6 FOOT (1.8M) TUBING: Brand: SIGMOIDOSCOPIC

## (undated) DEVICE — BASIC SINGLE BASIN 2-LF: Brand: MEDLINE INDUSTRIES, INC.

## (undated) DEVICE — ADHESIVE SKIN HIGH VISCOSITY EXOFIN 1ML

## (undated) DEVICE — STERILE POLYISOPRENE POWDER-FREE SURGICAL GLOVES: Brand: PROTEXIS

## (undated) DEVICE — STIRRUP STRAP ADULT DISP

## (undated) DEVICE — GLOVE SRG BIOGEL PI ORTHOPEDIC 7

## (undated) DEVICE — SUT VICRYL 1 CTX 36 IN J977H

## (undated) DEVICE — HOOD: Brand: T7PLUS

## (undated) DEVICE — 450 ML BOTTLE OF 0.05% CHLORHEXIDINE GLUCONATE IN 99.95% STERILE WATER FOR IRRIGATION, USP AND APPLICATOR.: Brand: IRRISEPT ANTIMICROBIAL WOUND LAVAGE

## (undated) DEVICE — BETHLEHEM TOTAL HIP, KIT: Brand: CARDINAL HEALTH

## (undated) DEVICE — 3M™ IOBAN™ 2 ANTIMICROBIAL INCISE DRAPE 6651EZ: Brand: IOBAN™ 2

## (undated) DEVICE — U-DRAPE: Brand: CONVERTORS

## (undated) DEVICE — DRESSING MEPILEX AG BORDER POST-OP 4 X 12 IN

## (undated) DEVICE — INTENDED FOR TISSUE SEPARATION, AND OTHER PROCEDURES THAT REQUIRE A SHARP SURGICAL BLADE TO PUNCTURE OR CUT.: Brand: BARD-PARKER ® SAFETYLOCK CARBON RIB-BACK BLADES

## (undated) DEVICE — COBAN 4 IN STERILE

## (undated) DEVICE — BETHLEHEM UNIV TOTAL KNEE, KIT: Brand: CARDINAL HEALTH

## (undated) DEVICE — SYRINGE 30ML LL

## (undated) DEVICE — DUAL CUT SAGITTAL BLADE

## (undated) DEVICE — MAYO STAND COVER: Brand: CONVERTORS

## (undated) DEVICE — SURGICAL CLIPPER BLADE GENERAL USE

## (undated) DEVICE — TABLE COVER: Brand: CONVERTORS

## (undated) DEVICE — FRAZIER SUCTION INSTRUMENT 18 FR W/OBTURATOR, NO CONTROL VENT: Brand: FRAZIER

## (undated) DEVICE — NEPTUNE E-SEP SMOKE EVACUATION PENCIL, COATED, 70MM BLADE, PUSH BUTTON SWITCH: Brand: NEPTUNE E-SEP

## (undated) DEVICE — SUT MONOCRYL 4-0 PS-2 27 IN Y426H

## (undated) DEVICE — ARTHROSCOPY FLOOR MAT

## (undated) DEVICE — NEEDLE 18 G X 1 1/2

## (undated) DEVICE — ADHESIVE SKIN HIGH VISCOSITY EXOFIN PRECISION PEN

## (undated) DEVICE — ELECTRODE BLADE E-Z CLEAN 6.5IN -0014

## (undated) DEVICE — BIPOLAR SEALER 23-301-1 AQM MBS: Brand: AQUAMANTYS™

## (undated) DEVICE — CAPIT KNEE ATTUNE FB W/DOM

## (undated) DEVICE — SKIN MARKER DUAL TIP WITH RULER CAP, FLEXIBLE RULER AND LABELS: Brand: DEVON

## (undated) DEVICE — DISPOSABLE OR TOWEL: Brand: CARDINAL HEALTH

## (undated) DEVICE — SPONGE LAP 18 X 18 IN STRL RFD

## (undated) DEVICE — SURGICAL GOWN, XL SMARTSLEEVE: Brand: CONVERTORS

## (undated) DEVICE — 4-PORT MANIFOLD: Brand: NEPTUNE 2

## (undated) DEVICE — WEBRIL 6 IN UNSTERILE

## (undated) DEVICE — CHLORAPREP HI-LITE 26ML ORANGE

## (undated) DEVICE — 2108 SERIES SAGITTAL BLADE (28.9 X 0.64 X 58.7MM)

## (undated) DEVICE — SUT VICRYL 2-0 CT-1 36 IN J945H

## (undated) DEVICE — CAPIT HIP COP -CMNT/POR-ACTIS

## (undated) DEVICE — CUFF TOURNIQUET 30 X 4 IN QUICK CONNECT DISP 1BLA

## (undated) DEVICE — 3M™ STERI-DRAPE™ U-DRAPE 1015: Brand: STERI-DRAPE™

## (undated) DEVICE — GLOVE SRG BIOGEL ORTHOPEDIC 6.5

## (undated) DEVICE — PAD GROUNDING ADULT

## (undated) DEVICE — STOCKINETTE,IMPERVIOUS,12X48,STERILE: Brand: MEDLINE

## (undated) DEVICE — GOWN,SLEEVE,STERILE,W/CSR WRAP,1/P: Brand: MEDLINE

## (undated) DEVICE — CEMENT MIXING SYSTEM WITH FEMORAL BREAKWAY NOZZLE: Brand: REVOLUTION

## (undated) DEVICE — COBAN 6 IN STERILE

## (undated) DEVICE — EMERALD TOP SHEET DRAPE: Brand: CONVERTORS

## (undated) DEVICE — 2108 SERIES SAGITTAL BLADE, GROUND (20.5 X 1.27 X 85.0MM)

## (undated) DEVICE — SUT MONOCRYL 3-0 PS-2 27 IN Y427H

## (undated) DEVICE — Device

## (undated) DEVICE — POSITIONER HANA TABLE PACK

## (undated) DEVICE — DRAPE SHEET THREE QUARTER

## (undated) DEVICE — DRAPE C-ARM X-RAY

## (undated) DEVICE — HANDPIECE SET WITH RETRACTABLE COAXIAL FAN SPRAY TIP AND SUCTION TUBE: Brand: INTERPULSE